# Patient Record
Sex: FEMALE | Race: BLACK OR AFRICAN AMERICAN | Employment: OTHER | URBAN - METROPOLITAN AREA
[De-identification: names, ages, dates, MRNs, and addresses within clinical notes are randomized per-mention and may not be internally consistent; named-entity substitution may affect disease eponyms.]

---

## 2017-07-21 ENCOUNTER — APPOINTMENT (OUTPATIENT)
Dept: CT IMAGING | Facility: HOSPITAL | Age: 62
DRG: 125 | End: 2017-07-21
Attending: EMERGENCY MEDICINE
Payer: COMMERCIAL

## 2017-07-21 ENCOUNTER — APPOINTMENT (OUTPATIENT)
Dept: CV DIAGNOSTICS | Facility: HOSPITAL | Age: 62
DRG: 125 | End: 2017-07-21
Attending: Other
Payer: COMMERCIAL

## 2017-07-21 ENCOUNTER — APPOINTMENT (OUTPATIENT)
Dept: MRI IMAGING | Facility: HOSPITAL | Age: 62
DRG: 125 | End: 2017-07-21
Attending: Other
Payer: COMMERCIAL

## 2017-07-21 ENCOUNTER — HOSPITAL ENCOUNTER (OUTPATIENT)
Facility: HOSPITAL | Age: 62
Setting detail: OBSERVATION
Discharge: HOME | DRG: 125 | End: 2017-07-22
Attending: EMERGENCY MEDICINE | Admitting: HOSPITALIST
Payer: COMMERCIAL

## 2017-07-21 ENCOUNTER — APPOINTMENT (OUTPATIENT)
Dept: ULTRASOUND IMAGING | Facility: HOSPITAL | Age: 62
DRG: 125 | End: 2017-07-21
Attending: Other
Payer: COMMERCIAL

## 2017-07-21 DIAGNOSIS — I63.9 CEREBROVASCULAR ACCIDENT (CVA), UNSPECIFIED MECHANISM (HCC): ICD-10-CM

## 2017-07-21 DIAGNOSIS — I10 ESSENTIAL HYPERTENSION: ICD-10-CM

## 2017-07-21 DIAGNOSIS — H54.62 VISION LOSS, LEFT EYE: Primary | ICD-10-CM

## 2017-07-21 LAB
ALBUMIN SERPL BCP-MCNC: 3.7 G/DL (ref 3.5–4.8)
ALBUMIN/GLOB SERPL: 0.9 {RATIO} (ref 1–2)
ALP SERPL-CCNC: 77 U/L (ref 32–100)
ALT SERPL-CCNC: 22 U/L (ref 14–54)
ANION GAP SERPL CALC-SCNC: 7 MMOL/L (ref 0–18)
ANION GAP SERPL CALC-SCNC: 9 MMOL/L (ref 0–18)
AST SERPL-CCNC: 24 U/L (ref 15–41)
BASOPHILS # BLD: 0.1 K/UL (ref 0–0.2)
BASOPHILS NFR BLD: 1 %
BILIRUB SERPL-MCNC: 0.5 MG/DL (ref 0.3–1.2)
BUN SERPL-MCNC: 14 MG/DL (ref 8–20)
BUN SERPL-MCNC: 14 MG/DL (ref 8–20)
BUN/CREAT SERPL: 13 (ref 10–20)
BUN/CREAT SERPL: 13.6 (ref 10–20)
CALCIUM SERPL-MCNC: 9 MG/DL (ref 8.5–10.5)
CALCIUM SERPL-MCNC: 9 MG/DL (ref 8.5–10.5)
CHLORIDE SERPL-SCNC: 103 MMOL/L (ref 95–110)
CHLORIDE SERPL-SCNC: 105 MMOL/L (ref 95–110)
CO2 SERPL-SCNC: 28 MMOL/L (ref 22–32)
CO2 SERPL-SCNC: 30 MMOL/L (ref 22–32)
CREAT SERPL-MCNC: 1.03 MG/DL (ref 0.5–1.5)
CREAT SERPL-MCNC: 1.08 MG/DL (ref 0.5–1.5)
EOSINOPHIL # BLD: 0.1 K/UL (ref 0–0.7)
EOSINOPHIL NFR BLD: 2 %
ERYTHROCYTE [DISTWIDTH] IN BLOOD BY AUTOMATED COUNT: 16.4 % (ref 11–15)
GLOBULIN PLAS-MCNC: 4.1 G/DL (ref 2.5–3.7)
GLUCOSE BLDC GLUCOMTR-MCNC: 132 MG/DL (ref 70–99)
GLUCOSE BLDC GLUCOMTR-MCNC: 143 MG/DL (ref 70–99)
GLUCOSE BLDC GLUCOMTR-MCNC: 85 MG/DL (ref 70–99)
GLUCOSE SERPL-MCNC: 120 MG/DL (ref 70–99)
GLUCOSE SERPL-MCNC: 124 MG/DL (ref 70–99)
HBA1C MFR BLD: 6.5 % (ref 4–6)
HCT VFR BLD AUTO: 37 % (ref 35–48)
HGB BLD-MCNC: 12 G/DL (ref 12–16)
LYMPHOCYTES # BLD: 1.6 K/UL (ref 1–4)
LYMPHOCYTES NFR BLD: 36 %
MCH RBC QN AUTO: 24 PG (ref 27–32)
MCHC RBC AUTO-ENTMCNC: 32.3 G/DL (ref 32–37)
MCV RBC AUTO: 74.5 FL (ref 80–100)
MONOCYTES # BLD: 0.3 K/UL (ref 0–1)
MONOCYTES NFR BLD: 7 %
NEUTROPHILS # BLD AUTO: 2.4 K/UL (ref 1.8–7.7)
NEUTROPHILS NFR BLD: 54 %
OSMOLALITY UR CALC.SUM OF ELEC: 292 MOSM/KG (ref 275–295)
OSMOLALITY UR CALC.SUM OF ELEC: 296 MOSM/KG (ref 275–295)
PLATELET # BLD AUTO: 303 K/UL (ref 140–400)
PMV BLD AUTO: 8.3 FL (ref 7.4–10.3)
POTASSIUM SERPL-SCNC: 3 MMOL/L (ref 3.3–5.1)
POTASSIUM SERPL-SCNC: 3 MMOL/L (ref 3.3–5.1)
PROT SERPL-MCNC: 7.8 G/DL (ref 5.9–8.4)
RBC # BLD AUTO: 4.97 M/UL (ref 3.7–5.4)
SODIUM SERPL-SCNC: 140 MMOL/L (ref 136–144)
SODIUM SERPL-SCNC: 142 MMOL/L (ref 136–144)
WBC # BLD AUTO: 4.4 K/UL (ref 4–11)

## 2017-07-21 PROCEDURE — 70450 CT HEAD/BRAIN W/O DYE: CPT | Performed by: EMERGENCY MEDICINE

## 2017-07-21 PROCEDURE — 99254 IP/OBS CNSLTJ NEW/EST MOD 60: CPT | Performed by: OTHER

## 2017-07-21 PROCEDURE — 70551 MRI BRAIN STEM W/O DYE: CPT | Performed by: OTHER

## 2017-07-21 PROCEDURE — 93880 EXTRACRANIAL BILAT STUDY: CPT | Performed by: OTHER

## 2017-07-21 RX ORDER — FLUTICASONE PROPIONATE AND SALMETEROL 500; 50 UG/1; UG/1
1 POWDER RESPIRATORY (INHALATION) 2 TIMES DAILY
COMMUNITY

## 2017-07-21 RX ORDER — BISACODYL 10 MG
10 SUPPOSITORY, RECTAL RECTAL
Status: DISCONTINUED | OUTPATIENT
Start: 2017-07-21 | End: 2017-07-22

## 2017-07-21 RX ORDER — LOSARTAN POTASSIUM 50 MG/1
100 TABLET ORAL DAILY
COMMUNITY

## 2017-07-21 RX ORDER — LOSARTAN POTASSIUM 50 MG/1
50 TABLET ORAL DAILY
Status: DISCONTINUED | OUTPATIENT
Start: 2017-07-21 | End: 2017-07-22

## 2017-07-21 RX ORDER — ACETAMINOPHEN 325 MG/1
650 TABLET ORAL EVERY 6 HOURS PRN
Status: DISCONTINUED | OUTPATIENT
Start: 2017-07-21 | End: 2017-07-22

## 2017-07-21 RX ORDER — DEXTROSE MONOHYDRATE 25 G/50ML
50 INJECTION, SOLUTION INTRAVENOUS AS NEEDED
Status: DISCONTINUED | OUTPATIENT
Start: 2017-07-21 | End: 2017-07-22

## 2017-07-21 RX ORDER — CARVEDILOL 25 MG/1
25 TABLET ORAL 2 TIMES DAILY WITH MEALS
Status: DISCONTINUED | OUTPATIENT
Start: 2017-07-21 | End: 2017-07-22

## 2017-07-21 RX ORDER — ASPIRIN 81 MG/1
81 TABLET, CHEWABLE ORAL DAILY
Status: DISCONTINUED | OUTPATIENT
Start: 2017-07-22 | End: 2017-07-21

## 2017-07-21 RX ORDER — DOCUSATE SODIUM 100 MG/1
100 CAPSULE, LIQUID FILLED ORAL 2 TIMES DAILY PRN
Status: DISCONTINUED | OUTPATIENT
Start: 2017-07-21 | End: 2017-07-22

## 2017-07-21 RX ORDER — CLOPIDOGREL BISULFATE 75 MG/1
75 TABLET ORAL DAILY
Status: DISCONTINUED | OUTPATIENT
Start: 2017-07-22 | End: 2017-07-22

## 2017-07-21 RX ORDER — ONDANSETRON 2 MG/ML
4 INJECTION INTRAMUSCULAR; INTRAVENOUS EVERY 6 HOURS PRN
Status: DISCONTINUED | OUTPATIENT
Start: 2017-07-21 | End: 2017-07-22

## 2017-07-21 RX ORDER — CARVEDILOL 25 MG/1
25 TABLET ORAL 2 TIMES DAILY WITH MEALS
COMMUNITY

## 2017-07-21 RX ORDER — AMLODIPINE BESYLATE 10 MG/1
10 TABLET ORAL DAILY
COMMUNITY

## 2017-07-21 RX ORDER — ASPIRIN 325 MG
325 TABLET ORAL ONCE
Status: COMPLETED | OUTPATIENT
Start: 2017-07-21 | End: 2017-07-21

## 2017-07-21 RX ORDER — SODIUM CHLORIDE 9 MG/ML
INJECTION, SOLUTION INTRAVENOUS CONTINUOUS
Status: DISCONTINUED | OUTPATIENT
Start: 2017-07-21 | End: 2017-07-21

## 2017-07-21 RX ORDER — SODIUM CHLORIDE 0.9 % (FLUSH) 0.9 %
3 SYRINGE (ML) INJECTION AS NEEDED
Status: DISCONTINUED | OUTPATIENT
Start: 2017-07-21 | End: 2017-07-22

## 2017-07-21 RX ORDER — AMLODIPINE BESYLATE 10 MG/1
10 TABLET ORAL DAILY
Status: DISCONTINUED | OUTPATIENT
Start: 2017-07-21 | End: 2017-07-22

## 2017-07-21 RX ORDER — BRIMONIDINE TARTRATE 2 MG/ML
1 SOLUTION/ DROPS OPHTHALMIC 2 TIMES DAILY
Status: DISCONTINUED | OUTPATIENT
Start: 2017-07-21 | End: 2017-07-22

## 2017-07-21 RX ORDER — ASPIRIN 81 MG/1
TABLET, CHEWABLE ORAL DAILY
Status: ON HOLD | COMMUNITY
End: 2017-07-22

## 2017-07-21 RX ORDER — BRIMONIDINE TARTRATE 2 MG/ML
1 SOLUTION/ DROPS OPHTHALMIC 2 TIMES DAILY
COMMUNITY

## 2017-07-21 RX ORDER — POLYETHYLENE GLYCOL 3350 17 G/17G
17 POWDER, FOR SOLUTION ORAL DAILY PRN
Status: DISCONTINUED | OUTPATIENT
Start: 2017-07-21 | End: 2017-07-22

## 2017-07-21 RX ORDER — METOCLOPRAMIDE HYDROCHLORIDE 5 MG/ML
10 INJECTION INTRAMUSCULAR; INTRAVENOUS EVERY 8 HOURS PRN
Status: DISCONTINUED | OUTPATIENT
Start: 2017-07-21 | End: 2017-07-22

## 2017-07-21 RX ORDER — POTASSIUM CHLORIDE 14.9 MG/ML
20 INJECTION INTRAVENOUS ONCE
Status: COMPLETED | OUTPATIENT
Start: 2017-07-21 | End: 2017-07-22

## 2017-07-21 NOTE — ED NOTES
Pt presents to the er with c/o of worsening vision in her left eye. Pt sts that after her stroke 3 years ago she was left with visual problems in her left eye. Pt sts that since last night her vision has been worse.  Pt denies any other previous residual or

## 2017-07-21 NOTE — DISCHARGE PLANNING
SW met w/ pt and pt's son to discuss discharge planning. Pt lives at home w/ son w/ 8 steps to use to get into the home. Pt reported using a cane. Pt reported no services and being independent w/ ADL's. Pt does not anticipate any d/c needs at this time.  Nicholas

## 2017-07-21 NOTE — ED PROVIDER NOTES
Patient Seen in: [facility] AND [facility] Emergency Department    History   Patient presents with:   Eye Visual Problem (opthalmic)    Stated Complaint: left eye visual problem    HPI    Patient with history of diabetes hypertension and stroke presents with visi °C)  Temp src: n/a  SpO2: 100 %  O2 Device: None (Room air)    Current:BP (!) 162/69   Pulse 66   Temp 97.8 °F (36.6 °C)   Resp 20   Ht 167.6 cm (5' 6\")   Wt 89.4 kg   SpO2 98%   BMI 31.80 kg/m²     Right Eye Chart Acuity: 20/30, Corrected  Left Eye Chart occipital problem in the past.    ED Course     Labs Reviewed   BASIC METABOLIC PANEL (8) - Abnormal; Notable for the following:        Result Value    Glucose 124 (*)     Potassium 3.0 (*)     GFR, Non- 51 (*)     All other components with

## 2017-07-21 NOTE — ED INITIAL ASSESSMENT (HPI)
Pt states she had a stroke 3 yrs ago and since then left eye closed with visual problems but seem worse since yesterday

## 2017-07-21 NOTE — CONSULTS
[facility]    PATIENT'S NAME: Zaira WOOD   ATTENDING PHYSICIAN: Dannie Loja MD   CONSULTING PHYSICIAN: Chucho Zavala MD   PATIENT ACCOUNT#:   [id number]    LOCATION:  [address] [place] [address] [address] #:   [id number]       DATE OF BIR Right optic disc was briefly visualized and appeared to be normal.  She has no vision centrally except to hand motion. Peripherally, she appears to have more vision but is not able to count fingers, but notices the fingers.   There appears to be a large ce

## 2017-07-21 NOTE — CONSULTS
[facility]  Opthalmologic Consul Note    Coleen Esteban Patient Status:  Inpatient    1955 MRN [id number]   Location [facility] 5SW/SE Attending Anny Suggs MD   Hosp Day # 0 PCP None Pcp       CHIEF COMPLAINT:  Patient present Rfl: TAKING   brimonidine Tartrate 0.2 % Ophthalmic Solution Place 1 drop into both eyes 2 (two) times daily. Disp:  Rfl:  TAKING       Allergies: Review of patient's allergies indicates no known allergies.     Social History:    Past Surgical History:  No will need outpatient followup for management of that problem as well. Please call if you have any questions.     Thank you        Mary Kay Ayala MD  7/21/2017

## 2017-07-21 NOTE — H&P
[facility] Hospitalist H&P     CC: Patient presents with:   Eye Visual Problem (opthalmic)       PCP: None Pcp, non [facility], in [place]      Assessment and Plan     Baron Thompson is a 57year old female with PMH sig for type 2 DM on oral meds, HTN on coreg, norv hospitalist to continue to follow patient while in house    Patient and/or patient's family given opportunity to ask questions and note understanding and agreeing with therapeutic plan as outlined    Thank Lexi Chen MD  Orase 98 carvedilol 25 MG Oral Tab Take 25 mg by mouth 2 (two) times daily with meals. Disp:  Rfl:    Losartan Potassium 50 MG Oral Tab Take 50 mg by mouth daily. Disp:  Rfl:    aspirin 81 MG Oral Chew Tab Chew by mouth daily.  Disp:  Rfl:    fluticasone-salmetero Or Head (98217)    Result Date: 7/21/2017  CONCLUSION:  1. Age-indeterminate small infarcts involving the left thalamus and basal ganglia. If there is concern for acute ischemia, MRI would be recommended.  2. Sequela of age-indeterminate likely chronic mode

## 2017-07-22 ENCOUNTER — APPOINTMENT (OUTPATIENT)
Dept: CV DIAGNOSTICS | Facility: HOSPITAL | Age: 62
DRG: 125 | End: 2017-07-22
Attending: Other
Payer: COMMERCIAL

## 2017-07-22 VITALS
SYSTOLIC BLOOD PRESSURE: 147 MMHG | TEMPERATURE: 98 F | WEIGHT: 190.13 LBS | DIASTOLIC BLOOD PRESSURE: 63 MMHG | HEART RATE: 72 BPM | HEIGHT: 67 IN | RESPIRATION RATE: 18 BRPM | BODY MASS INDEX: 29.84 KG/M2 | OXYGEN SATURATION: 99 %

## 2017-07-22 LAB
ANION GAP SERPL CALC-SCNC: 5 MMOL/L (ref 0–18)
BASOPHILS # BLD: 0 K/UL (ref 0–0.2)
BASOPHILS NFR BLD: 1 %
BUN SERPL-MCNC: 14 MG/DL (ref 8–20)
BUN/CREAT SERPL: 13.7 (ref 10–20)
CALCIUM SERPL-MCNC: 8.8 MG/DL (ref 8.5–10.5)
CHLORIDE SERPL-SCNC: 107 MMOL/L (ref 95–110)
CHOLEST SERPL-MCNC: 185 MG/DL (ref 110–200)
CO2 SERPL-SCNC: 28 MMOL/L (ref 22–32)
CREAT SERPL-MCNC: 1.02 MG/DL (ref 0.5–1.5)
EOSINOPHIL # BLD: 0.1 K/UL (ref 0–0.7)
EOSINOPHIL NFR BLD: 2 %
ERYTHROCYTE [DISTWIDTH] IN BLOOD BY AUTOMATED COUNT: 16.1 % (ref 11–15)
GLUCOSE BLDC GLUCOMTR-MCNC: 124 MG/DL (ref 70–99)
GLUCOSE SERPL-MCNC: 117 MG/DL (ref 70–99)
HCT VFR BLD AUTO: 33.7 % (ref 35–48)
HDLC SERPL-MCNC: 39 MG/DL
HGB BLD-MCNC: 11.1 G/DL (ref 12–16)
LDLC SERPL CALC-MCNC: 125 MG/DL (ref 0–99)
LYMPHOCYTES # BLD: 1.7 K/UL (ref 1–4)
LYMPHOCYTES NFR BLD: 37 %
MCH RBC QN AUTO: 24.4 PG (ref 27–32)
MCHC RBC AUTO-ENTMCNC: 32.8 G/DL (ref 32–37)
MCV RBC AUTO: 74.4 FL (ref 80–100)
MONOCYTES # BLD: 0.3 K/UL (ref 0–1)
MONOCYTES NFR BLD: 7 %
NEUTROPHILS # BLD AUTO: 2.5 K/UL (ref 1.8–7.7)
NEUTROPHILS NFR BLD: 54 %
NONHDLC SERPL-MCNC: 146 MG/DL
OSMOLALITY UR CALC.SUM OF ELEC: 292 MOSM/KG (ref 275–295)
PLATELET # BLD AUTO: 271 K/UL (ref 140–400)
PMV BLD AUTO: 8.8 FL (ref 7.4–10.3)
POTASSIUM SERPL-SCNC: 3.4 MMOL/L (ref 3.3–5.1)
RBC # BLD AUTO: 4.53 M/UL (ref 3.7–5.4)
SODIUM SERPL-SCNC: 140 MMOL/L (ref 136–144)
TRIGL SERPL-MCNC: 103 MG/DL (ref 1–149)
WBC # BLD AUTO: 4.6 K/UL (ref 4–11)

## 2017-07-22 RX ORDER — CLOPIDOGREL BISULFATE 75 MG/1
75 TABLET ORAL DAILY
Qty: 30 TABLET | Refills: 0 | Status: SHIPPED | OUTPATIENT
Start: 2017-07-22

## 2017-07-22 RX ORDER — ATORVASTATIN CALCIUM 20 MG/1
20 TABLET, FILM COATED ORAL NIGHTLY
Qty: 30 TABLET | Refills: 0 | Status: SHIPPED | OUTPATIENT
Start: 2017-07-22

## 2017-07-22 RX ORDER — POTASSIUM CHLORIDE 20 MEQ/1
40 TABLET, EXTENDED RELEASE ORAL EVERY 4 HOURS
Status: DISCONTINUED | OUTPATIENT
Start: 2017-07-22 | End: 2017-07-22

## 2017-07-22 NOTE — RESPIRATORY THERAPY NOTE
CEDRICK ASSESSMENT:    Pt does have a previous diagnosis of CEDRICK. Pt does routinely use a CPAP device at home. This pt is suspected to be at high risk for CEDRICK and sleep lab packet was not provided to patient for outpatient follow-up.     CPAP INITIATION:    Pt t

## 2017-07-22 NOTE — PHYSICAL THERAPY NOTE
Orders received and chart reviewed. Per RN Jann White and Dr. Rita Jain, patient is currently being discharged. She is at baseline function. Will complete orders at this time.

## 2017-07-22 NOTE — DISCHARGE SUMMARY
[facility] Internal Medicine Discharge Summary   Patient ID:  Barbara Reyes  [id number]  57year old  1/22/1955    Admit date: 7/21/2017    Discharge date and time: 7/22/2017 10:03 AM     Attending Physician: No att. providers found     Primary Care Physician of these medications  · atorvastatin 20 MG Tabs  · Clopidogrel Bisulfate 75 MG Tabs         Discharge Diagnoses:     Acute on chronic left vision loss  -prior retinal CVA  -seen by Neuro and Ophtho  -changed to plavix (takes aspirin most days)  -Per Dr Hali Gagnon CEREBRUM: No edema, hemorrhage, or mass. There were small areas of hypoattenuation in the left thalamus and left basal ganglia  compatible with age indeterminate infarcts.  WHITE MATTER: There are patchy and confluent areas of low attenuation in the suprat scattered hyperintense foci are present, typical for a patient of this age, most commonly caused by small vessel ischemic changes. Prior lacunar infarct in the left thalamus. There are numerous tiny foci of susceptibility artifact on T2*/GRE imaging.  CER ICA Peak systolic: 63.7-KO/H. ICA End diastolic: 41.2-TA/R. ECA Peak systolic:  57.4-XA/A. ICA/VCCA: 1.0. LEFT CAROTID: There is trace atherosclerosis at the bifurcation. 0-49 % stenosis based on Doppler assessment. CCA Peak systolic: 07.9-EL/L.   I Benign    Total Time Coordinating Care: > than 30 minutes    Patient had opportunity to ask questions and state understand and agree with therapeutic plan as outlined

## 2017-07-23 ENCOUNTER — TELEPHONE (OUTPATIENT)
Dept: MEDSURG UNIT | Facility: HOSPITAL | Age: 62
End: 2017-07-23

## 2017-07-26 NOTE — PAYOR COMM NOTE
--------------  DISCHARGE REVIEW    Payor: Mingo Rodolfo #:  [id number]  Authorization Number: [id number]    Admit date: 7/21/17  Admit time:  1322  Discharge Date: 7/22/2017 10:03 AM     Admitting Physician: Amada Young MD  Attending Physi 75 MG Tabs  Commonly known as:  PLAVIX  Take 1 tablet (75 mg total) by mouth daily.         CONTINUE taking these medications    AmLODIPine Besylate 10 MG Tabs  Commonly known as:  NORVASC     brimonidine Tartrate 0.2 % Soln  Commonly known as:  [place] [employer] CP or SOB     Asthma, CEDRICK  -no signs of acute exacerbation  -lungs clear, on RA    Consults:[JH.1] IP CONSULT TO OPHTHALMOLOGY  IP CONSULT TO OPHTHALMOLOGY  IP CONSULT TO SOCIAL WORK[JH.2]    Radiology: Ct Brain Or Head ([id number])    Result Date: 7/21/2017  P recommended. 2. Sequela of age-indeterminate likely chronic moderate microvascular ischemic changes, most pronounced in the frontal lobes. 3. Intracranial calcific atherosclerosis.         Mri Brain (cpt=70551)    Result Date: 7/21/2017  PROCEDURE: MRI BRA previous hemorrhage. Us Carotid Doppler Bilat - Diag Img (cpt=93880)    Result Date: 7/21/2017  PROCEDURE: US CAROTID DOPPLER BILAT-DIAG IMG (CPT=93880)  COMPARISON: None. INDICATIONS: Left eye visual problem.   TECHNIQUE: Color duplex Doppler Vee Bernabe HTN on coreg, norvasc, losartan, asthma on Advair,  CAD s/p 2 stents over 1 year ago, CEDRICK, prior CVA with diminished vision in left eye who presents with acute on chronic left vision changes. Head CT neg for acute findings.   Seen by Neuro and Ophthalmolog problem    HPI    Patient with history of diabetes hypertension and stroke presents with vision loss to left eye.   She reports she had a stroke about 3 years ago which caused her some have some visual problems to the left eye however she reports when she u 18   Ht 167.6 cm (5' 6\")   Wt 89.4 kg   SpO2 98%   BMI 31.80 kg/m²[MH.2]     Right Eye Chart Acuity: 20/30, Corrected  Left Eye Chart Acuity: 20/200, Corrected    Physical Exam  Constitutional:  Alert, well nourished adult lying in bed in no distress.   Vi Notable for the following:        Result Value    Glucose 124 (*)     Potassium 3.0 (*)     GFR, Non- 51 (*)     All other components within normal limits   CBC W/ DIFFERENTIAL - Abnormal; Notable for the following:     MCV 74.5 (*)     [facility] Karolina Sam MD on 7/21/2017 10:23 AM                       H&P - H&P Note      H&P signed by Dom Rudd MD at 7/21/2017  4:24 PM     Author:  Dom Rudd MD Service:  (none) Author Type:  Physician    Filed:  7/21/2017  4:24 PM Date of Service: CP or SOB    Asthma, CEDRICK  -no signs of acute exacerbation  -lungs clear  -CEDRICK protocol, cont home med    FEN  -no IVF  -lytes in AM  -cardiac/DM diet    PPX; SCD, await further work up    Dispo: pending course[JH.2]    Outpatient records reviewed confirmin CAD s/p 2 stents over 1 year ago  CEDRICK[JH.2]    PSH  Past Surgical History:  No date: TOTAL ABDOM HYSTERECTOMY     ALL:  No Known Allergies     Home Medications:    Outpatient Prescriptions Marked as Taking for the 7/21/17 encounter [facility] Encounter):   [place] Recent Labs   Lab  07/21/17   0953   NA  140   K  3.0*   CL  103   CO2  30   BUN  14   CREATSERUM  1.08   GLU  124*   CA  9.0       No results for input(s): ALT, AST, ALB, AMYLASE, LIPASE, LDH in the last 72 hours.     Invalid input(s): ALPHOS, TBIL,

## 2017-07-27 ENCOUNTER — HOSPITAL ENCOUNTER (OUTPATIENT)
Dept: CV DIAGNOSTICS | Facility: HOSPITAL | Age: 62
Discharge: HOME | End: 2017-07-27
Attending: HOSPITALIST
Payer: COMMERCIAL

## 2017-07-27 DIAGNOSIS — H54.62 VISION LOSS, LEFT EYE: ICD-10-CM

## 2017-07-27 DIAGNOSIS — I63.9 CEREBROVASCULAR ACCIDENT (CVA), UNSPECIFIED MECHANISM (HCC): ICD-10-CM

## 2017-07-27 PROCEDURE — 93306 TTE W/DOPPLER COMPLETE: CPT | Performed by: HOSPITALIST

## 2017-08-11 NOTE — PAYOR COMM NOTE
--------------  DISCHARGE REVIEW    Payor: Michel Joseph #:  [id number]  Authorization Number: [id number]    Admit date: 7/21/17  Admit time:  1322  Discharge Date: 7/22/2017 10:03 AM     Admitting Physician: Luba Bell MD  Attending Physi 75 MG Tabs  Commonly known as:  PLAVIX  Take 1 tablet (75 mg total) by mouth daily.         CONTINUE taking these medications    AmLODIPine Besylate 10 MG Tabs  Commonly known as:  NORVASC     brimonidine Tartrate 0.2 % Soln  Commonly known as:  [place] CP or SOB     Asthma, CEDRICK  -no signs of acute exacerbation  -lungs clear, on RA    Consults:[JH.1] IP CONSULT TO OPHTHALMOLOGY  IP CONSULT TO OPHTHALMOLOGY  IP CONSULT TO SOCIAL WORK[JH.2]    Radiology: Ct Brain Or Head ([id number])    Result Date: 7/21/2017  P recommended. 2. Sequela of age-indeterminate likely chronic moderate microvascular ischemic changes, most pronounced in the frontal lobes. 3. Intracranial calcific atherosclerosis.         Mri Brain (cpt=70551)    Result Date: 7/21/2017  PROCEDURE: MRI BRA previous hemorrhage. Us Carotid Doppler Bilat - Diag Img (cpt=93880)    Result Date: 7/21/2017  PROCEDURE: US CAROTID DOPPLER BILAT-DIAG IMG (CPT=93880)  COMPARISON: None. INDICATIONS: Left eye visual problem.   TECHNIQUE: Color duplex Doppler Elayne Holly HTN on coreg, norvasc, losartan, asthma on Advair,  CAD s/p 2 stents over 1 year ago, CEDRICK, prior CVA with diminished vision in left eye who presents with acute on chronic left vision changes. Head CT neg for acute findings.   Seen by Neuro and Ophthalmolog problem    HPI    Patient with history of diabetes hypertension and stroke presents with vision loss to left eye.   She reports she had a stroke about 3 years ago which caused her some have some visual problems to the left eye however she reports when she u 18   Ht 167.6 cm (5' 6\")   Wt 89.4 kg   SpO2 98%   BMI 31.80 kg/m²[MH.2]     Right Eye Chart Acuity: 20/30, Corrected  Left Eye Chart Acuity: 20/200, Corrected    Physical Exam  Constitutional:  Alert, well nourished adult lying in bed in no distress.   Vi Notable for the following:        Result Value    Glucose 124 (*)     Potassium 3.0 (*)     GFR, Non- 51 (*)     All other components within normal limits   CBC W/ DIFFERENTIAL - Abnormal; Notable for the following:     MCV 74.5 (*)     [facility] Carmen Richardson MD on 7/21/2017 10:23 AM                       H&P - H&P Note      H&P signed by Rneetta Earl MD at 7/21/2017  4:24 PM     Author:  Renetta Earl MD Service:  (none) Author Type:  Physician    Filed:  7/21/2017  4:24 PM Date of Service: CP or SOB    Asthma, CEDRICK  -no signs of acute exacerbation  -lungs clear  -CEDRICK protocol, cont home med    FEN  -no IVF  -lytes in AM  -cardiac/DM diet    PPX; SCD, await further work up    Dispo: pending course[JH.2]    Outpatient records reviewed confirmin CAD s/p 2 stents over 1 year ago  CEDRICK[JH.2]    PSH  Past Surgical History:  No date: TOTAL ABDOM HYSTERECTOMY     ALL:  No Known Allergies     Home Medications:    Outpatient Prescriptions Marked as Taking for the 7/21/17 encounter [facility] Encounter):   Kimberly Sykes Recent Labs   Lab  07/21/17   0953   NA  140   K  3.0*   CL  103   CO2  30   BUN  14   CREATSERUM  1.08   GLU  124*   CA  9.0       No results for input(s): ALT, AST, ALB, AMYLASE, LIPASE, LDH in the last 72 hours.     Invalid input(s): ALPHOS, TBIL,

## 2017-09-18 ENCOUNTER — ANESTHESIA EVENT (OUTPATIENT)
Dept: SURGERY | Facility: HOSPITAL | Age: 62
End: 2017-09-18
Payer: COMMERCIAL

## 2017-09-18 ENCOUNTER — SURGERY (OUTPATIENT)
Age: 62
End: 2017-09-18

## 2017-09-18 ENCOUNTER — HOSPITAL ENCOUNTER (OUTPATIENT)
Facility: HOSPITAL | Age: 62
Setting detail: HOSPITAL OUTPATIENT SURGERY
Discharge: HOME | End: 2017-09-18
Attending: OPHTHALMOLOGY | Admitting: OPHTHALMOLOGY
Payer: COMMERCIAL

## 2017-09-18 ENCOUNTER — ANESTHESIA (OUTPATIENT)
Dept: SURGERY | Facility: HOSPITAL | Age: 62
End: 2017-09-18
Payer: COMMERCIAL

## 2017-09-18 VITALS
RESPIRATION RATE: 12 BRPM | BODY MASS INDEX: 30.78 KG/M2 | TEMPERATURE: 97 F | WEIGHT: 191.5 LBS | HEIGHT: 66 IN | DIASTOLIC BLOOD PRESSURE: 56 MMHG | HEART RATE: 57 BPM | OXYGEN SATURATION: 96 % | SYSTOLIC BLOOD PRESSURE: 117 MMHG

## 2017-09-18 DIAGNOSIS — E11.3299 NONPROLIFERATIVE DIABETIC RETINOPATHY (HCC): Primary | ICD-10-CM

## 2017-09-18 LAB — GLUCOSE BLDC GLUCOMTR-MCNC: 151 MG/DL (ref 70–99)

## 2017-09-18 PROCEDURE — 08NF3ZZ RELEASE LEFT RETINA, PERCUTANEOUS APPROACH: ICD-10-PCS | Performed by: OPHTHALMOLOGY

## 2017-09-18 PROCEDURE — 82962 GLUCOSE BLOOD TEST: CPT

## 2017-09-18 PROCEDURE — 08T53ZZ RESECTION OF LEFT VITREOUS, PERCUTANEOUS APPROACH: ICD-10-PCS | Performed by: OPHTHALMOLOGY

## 2017-09-18 RX ORDER — PHENYLEPHRINE HCL 2.5 %
2 DROPS OPHTHALMIC (EYE) SEE ADMIN INSTRUCTIONS
Status: COMPLETED | OUTPATIENT
Start: 2017-09-18 | End: 2017-09-18

## 2017-09-18 RX ORDER — HYDROCODONE BITARTRATE AND ACETAMINOPHEN 5; 325 MG/1; MG/1
2 TABLET ORAL AS NEEDED
Status: DISCONTINUED | OUTPATIENT
Start: 2017-09-18 | End: 2017-09-18

## 2017-09-18 RX ORDER — KETAMINE HCL IN 0.9 % NACL 100MG/10ML
SYRINGE (ML) INTRAVENOUS AS NEEDED
Status: DISCONTINUED | OUTPATIENT
Start: 2017-09-18 | End: 2017-09-18 | Stop reason: SURG

## 2017-09-18 RX ORDER — KETOROLAC TROMETHAMINE 5 MG/ML
1 SOLUTION OPHTHALMIC SEE ADMIN INSTRUCTIONS
Status: COMPLETED | OUTPATIENT
Start: 2017-09-18 | End: 2017-09-18

## 2017-09-18 RX ORDER — NALOXONE HYDROCHLORIDE 0.4 MG/ML
80 INJECTION, SOLUTION INTRAMUSCULAR; INTRAVENOUS; SUBCUTANEOUS AS NEEDED
Status: DISCONTINUED | OUTPATIENT
Start: 2017-09-18 | End: 2017-09-18

## 2017-09-18 RX ORDER — FAMOTIDINE 20 MG/1
20 TABLET ORAL ONCE
Status: DISCONTINUED | OUTPATIENT
Start: 2017-09-18 | End: 2017-09-18 | Stop reason: HOSPADM

## 2017-09-18 RX ORDER — TRIAMCINOLONE ACETONIDE 40 MG/ML
INJECTION, SUSPENSION INTRA-ARTICULAR; INTRAMUSCULAR AS NEEDED
Status: DISCONTINUED | OUTPATIENT
Start: 2017-09-18 | End: 2017-09-18 | Stop reason: HOSPADM

## 2017-09-18 RX ORDER — MORPHINE SULFATE 10 MG/ML
6 INJECTION, SOLUTION INTRAMUSCULAR; INTRAVENOUS EVERY 10 MIN PRN
Status: DISCONTINUED | OUTPATIENT
Start: 2017-09-18 | End: 2017-09-18

## 2017-09-18 RX ORDER — SODIUM CHLORIDE, SODIUM LACTATE, POTASSIUM CHLORIDE, CALCIUM CHLORIDE 600; 310; 30; 20 MG/100ML; MG/100ML; MG/100ML; MG/100ML
INJECTION, SOLUTION INTRAVENOUS CONTINUOUS
Status: DISCONTINUED | OUTPATIENT
Start: 2017-09-18 | End: 2017-09-18

## 2017-09-18 RX ORDER — HYDROMORPHONE HYDROCHLORIDE 1 MG/ML
0.6 INJECTION, SOLUTION INTRAMUSCULAR; INTRAVENOUS; SUBCUTANEOUS EVERY 5 MIN PRN
Status: DISCONTINUED | OUTPATIENT
Start: 2017-09-18 | End: 2017-09-18

## 2017-09-18 RX ORDER — METOPROLOL TARTRATE 5 MG/5ML
2.5 INJECTION INTRAVENOUS ONCE
Status: DISCONTINUED | OUTPATIENT
Start: 2017-09-18 | End: 2017-09-18

## 2017-09-18 RX ORDER — ONDANSETRON 2 MG/ML
4 INJECTION INTRAMUSCULAR; INTRAVENOUS ONCE AS NEEDED
Status: DISCONTINUED | OUTPATIENT
Start: 2017-09-18 | End: 2017-09-18

## 2017-09-18 RX ORDER — CIPROFLOXACIN HYDROCHLORIDE 3.5 MG/ML
SOLUTION/ DROPS TOPICAL AS NEEDED
Status: DISCONTINUED | OUTPATIENT
Start: 2017-09-18 | End: 2017-09-18 | Stop reason: HOSPADM

## 2017-09-18 RX ORDER — ACETAMINOPHEN 325 MG/1
650 TABLET ORAL ONCE
Status: COMPLETED | OUTPATIENT
Start: 2017-09-18 | End: 2017-09-18

## 2017-09-18 RX ORDER — MORPHINE SULFATE 2 MG/ML
2 INJECTION, SOLUTION INTRAMUSCULAR; INTRAVENOUS EVERY 10 MIN PRN
Status: DISCONTINUED | OUTPATIENT
Start: 2017-09-18 | End: 2017-09-18

## 2017-09-18 RX ORDER — METHYLPREDNISOLONE SODIUM SUCCINATE 125 MG/2ML
INJECTION, POWDER, LYOPHILIZED, FOR SOLUTION INTRAMUSCULAR; INTRAVENOUS AS NEEDED
Status: DISCONTINUED | OUTPATIENT
Start: 2017-09-18 | End: 2017-09-18 | Stop reason: HOSPADM

## 2017-09-18 RX ORDER — MORPHINE SULFATE 4 MG/ML
4 INJECTION, SOLUTION INTRAMUSCULAR; INTRAVENOUS EVERY 10 MIN PRN
Status: DISCONTINUED | OUTPATIENT
Start: 2017-09-18 | End: 2017-09-18

## 2017-09-18 RX ORDER — DEXAMETHASONE SODIUM PHOSPHATE 4 MG/ML
VIAL (ML) INJECTION AS NEEDED
Status: DISCONTINUED | OUTPATIENT
Start: 2017-09-18 | End: 2017-09-18 | Stop reason: HOSPADM

## 2017-09-18 RX ORDER — HYDROMORPHONE HYDROCHLORIDE 1 MG/ML
0.4 INJECTION, SOLUTION INTRAMUSCULAR; INTRAVENOUS; SUBCUTANEOUS EVERY 5 MIN PRN
Status: DISCONTINUED | OUTPATIENT
Start: 2017-09-18 | End: 2017-09-18

## 2017-09-18 RX ORDER — MIDAZOLAM HYDROCHLORIDE 1 MG/ML
INJECTION INTRAMUSCULAR; INTRAVENOUS AS NEEDED
Status: DISCONTINUED | OUTPATIENT
Start: 2017-09-18 | End: 2017-09-18 | Stop reason: SURG

## 2017-09-18 RX ORDER — HALOPERIDOL 5 MG/ML
0.25 INJECTION INTRAMUSCULAR ONCE AS NEEDED
Status: DISCONTINUED | OUTPATIENT
Start: 2017-09-18 | End: 2017-09-18

## 2017-09-18 RX ORDER — EPHEDRINE SULFATE 50 MG/ML
INJECTION, SOLUTION INTRAVENOUS AS NEEDED
Status: DISCONTINUED | OUTPATIENT
Start: 2017-09-18 | End: 2017-09-18 | Stop reason: SURG

## 2017-09-18 RX ORDER — TROPICAMIDE 10 MG/ML
2 SOLUTION/ DROPS OPHTHALMIC SEE ADMIN INSTRUCTIONS
Status: COMPLETED | OUTPATIENT
Start: 2017-09-18 | End: 2017-09-18

## 2017-09-18 RX ORDER — METOCLOPRAMIDE 10 MG/1
10 TABLET ORAL ONCE
Status: COMPLETED | OUTPATIENT
Start: 2017-09-18 | End: 2017-09-18

## 2017-09-18 RX ORDER — BALANCED SALT SOLUTION 6.4; .75; .48; .3; 3.9; 1.7 MG/ML; MG/ML; MG/ML; MG/ML; MG/ML; MG/ML
SOLUTION OPHTHALMIC AS NEEDED
Status: DISCONTINUED | OUTPATIENT
Start: 2017-09-18 | End: 2017-09-18 | Stop reason: HOSPADM

## 2017-09-18 RX ORDER — HYDROMORPHONE HYDROCHLORIDE 1 MG/ML
0.2 INJECTION, SOLUTION INTRAMUSCULAR; INTRAVENOUS; SUBCUTANEOUS EVERY 5 MIN PRN
Status: DISCONTINUED | OUTPATIENT
Start: 2017-09-18 | End: 2017-09-18

## 2017-09-18 RX ORDER — HOMATROPINE HYDROBROMIDE OPHTHALMIC 50 MG/ML
2 SOLUTION OPHTHALMIC SEE ADMIN INSTRUCTIONS
Status: COMPLETED | OUTPATIENT
Start: 2017-09-18 | End: 2017-09-18

## 2017-09-18 RX ORDER — HYDROCODONE BITARTRATE AND ACETAMINOPHEN 5; 325 MG/1; MG/1
1 TABLET ORAL AS NEEDED
Status: DISCONTINUED | OUTPATIENT
Start: 2017-09-18 | End: 2017-09-18

## 2017-09-18 RX ADMIN — SODIUM CHLORIDE, SODIUM LACTATE, POTASSIUM CHLORIDE, CALCIUM CHLORIDE: 600; 310; 30; 20 INJECTION, SOLUTION INTRAVENOUS at 09:08:00

## 2017-09-18 RX ADMIN — EPHEDRINE SULFATE 5 MG: 50 INJECTION, SOLUTION INTRAVENOUS at 09:26:00

## 2017-09-18 RX ADMIN — SODIUM CHLORIDE, SODIUM LACTATE, POTASSIUM CHLORIDE, CALCIUM CHLORIDE: 600; 310; 30; 20 INJECTION, SOLUTION INTRAVENOUS at 09:54:00

## 2017-09-18 RX ADMIN — KETAMINE HCL IN 0.9 % NACL 20 MG: 100MG/10ML SYRINGE (ML) INTRAVENOUS at 09:13:00

## 2017-09-18 RX ADMIN — MIDAZOLAM HYDROCHLORIDE 2 MG: 1 INJECTION INTRAMUSCULAR; INTRAVENOUS at 09:10:00

## 2017-09-18 NOTE — H&P
History & Physical Examination    Name: Nuno Rae  Patient ID:  [id number]  Date:  9/18/2017  YOB: 1955 AGE:  57year old SEX:  female      M.D./D.O./D.P.M PERFORMING SURGERY/PROCEDURE:  Breanna Botello MD    Diagnosis:  t2dm with mild are noted above.   yes     Piedad Noe MD 09/18/17

## 2017-09-18 NOTE — OPERATIVE REPORT
[facility]    PATIENT'S NAME: Becky Willem   ATTENDING PHYSICIAN: Ramana Daly MD   OPERATING PHYSICIAN: Ramana Daly MD   PATIENT ACCOUNT#:   [de-identified]    LOCATION:  [place] [address]  MEDICAL RECORD #:   [id number]       DATE OF BIRTH:  0 cannulas. The retina was examined. There was noted to be a recent subacute onset vitreous hemorrhage inferiorly as well as the appearance of a subacute central retinal vein occlusion.   There was noted to be _______ hemorrhages with macular edema as well

## 2017-09-18 NOTE — BRIEF OP NOTE
Pre-Operative Diagnosis: type 2 diabetes with mild nonproliferative diabetic retinopathy without macular edema     Post-Operative Diagnosis: type 2 diabetes with mild nonproliferative diabetic retinopathy with  macular edema; central retinal vein occlus

## 2017-09-18 NOTE — ANESTHESIA POSTPROCEDURE EVALUATION
Patient: Shanna Milner    Procedure Summary     Date:  09/18/17 Room / Location:  [address] [place] [address] MAIN OR 03 / [address] [place] [address] MAIN OR    Anesthesia Start:  8175 Anesthesia Stop:      Procedure:  EYE VITRECTOMY WITH INDIRECT/ DIRECT LASER/ IRIDEX (Left Eye) Diagnosis:  (type 2

## 2017-09-18 NOTE — ANESTHESIA PREPROCEDURE EVALUATION
Anesthesia PreOp Note    HPI:     Zulema Erwin is a 57year old female who presents for preoperative consultation requested by:  Minor Centeno MD    Date of Surgery: 9/18/2017    Procedure(s):  EYE VITRECTOMY WITH INDIRECT/ DIRECT LASER/ IRIDEX  Indicatio lungs 2 (two) times daily. Disp:  Rfl:  9/16/2017   brimonidine Tartrate 0.2 % Ophthalmic Solution Place 1 drop into both eyes 2 (two) times daily.  Disp:  Rfl:  9/18/2017 at 0530       Current Facility-Administered Medications Ordered in Epic:  lactated ri 271 07/22/2017   MPV 8.8 07/22/2017       Lab Results  Component Value Date    07/22/2017   K 3.4 07/22/2017    07/22/2017   CO2 28 07/22/2017   BUN 14 07/22/2017   CREATSERUM 1.02 07/22/2017    (H) 07/22/2017   PGLU 151 (H) 09/18/2017

## 2017-10-09 ENCOUNTER — HOSPITAL ENCOUNTER (EMERGENCY)
Facility: HOSPITAL | Age: 62
Discharge: HOME | End: 2017-10-09
Attending: EMERGENCY MEDICINE
Payer: COMMERCIAL

## 2017-10-09 ENCOUNTER — APPOINTMENT (OUTPATIENT)
Dept: GENERAL RADIOLOGY | Facility: HOSPITAL | Age: 62
End: 2017-10-09
Payer: COMMERCIAL

## 2017-10-09 VITALS
BODY MASS INDEX: 30.22 KG/M2 | HEIGHT: 66 IN | WEIGHT: 188 LBS | TEMPERATURE: 98 F | DIASTOLIC BLOOD PRESSURE: 67 MMHG | HEART RATE: 63 BPM | RESPIRATION RATE: 12 BRPM | OXYGEN SATURATION: 97 % | SYSTOLIC BLOOD PRESSURE: 144 MMHG

## 2017-10-09 DIAGNOSIS — S90.31XA CONTUSION OF RIGHT FOOT, INITIAL ENCOUNTER: Primary | ICD-10-CM

## 2017-10-09 PROCEDURE — 73630 X-RAY EXAM OF FOOT: CPT | Performed by: EMERGENCY MEDICINE

## 2017-10-09 PROCEDURE — 99283 EMERGENCY DEPT VISIT LOW MDM: CPT

## 2017-10-09 NOTE — ED PROVIDER NOTES
Patient Seen in: [facility] AND [facility] Emergency Department    History   Patient presents with: Toe Pain    Stated Complaint: right toe pain    HPI    56 yo male with right foot pain after it was run over by a chair. She is able to walk but it is painful. HENT:   Head: Normocephalic and atraumatic. Musculoskeletal:   Right foot inspection normal. There is diffuse tenderness. Distal neurovascular intact. Neurological: She is alert. Skin: Skin is warm and dry.    Psychiatric: She has a normal mood and

## 2021-05-18 ENCOUNTER — APPOINTMENT (OUTPATIENT)
Dept: CT IMAGING | Facility: HOSPITAL | Age: 66
End: 2021-05-18
Payer: MEDICARE

## 2021-05-18 ENCOUNTER — HOSPITAL ENCOUNTER (EMERGENCY)
Facility: HOSPITAL | Age: 66
Discharge: HOME | End: 2021-05-18
Payer: MEDICARE

## 2021-05-18 VITALS
HEIGHT: 66 IN | SYSTOLIC BLOOD PRESSURE: 156 MMHG | DIASTOLIC BLOOD PRESSURE: 68 MMHG | BODY MASS INDEX: 30.37 KG/M2 | HEART RATE: 73 BPM | TEMPERATURE: 98 F | OXYGEN SATURATION: 99 % | RESPIRATION RATE: 18 BRPM | WEIGHT: 189 LBS

## 2021-05-18 DIAGNOSIS — R29.6 FREQUENT FALLS: Primary | ICD-10-CM

## 2021-05-18 PROCEDURE — 85025 COMPLETE CBC W/AUTO DIFF WBC: CPT

## 2021-05-18 PROCEDURE — 36415 COLL VENOUS BLD VENIPUNCTURE: CPT

## 2021-05-18 PROCEDURE — 99284 EMERGENCY DEPT VISIT MOD MDM: CPT

## 2021-05-18 PROCEDURE — 80048 BASIC METABOLIC PNL TOTAL CA: CPT

## 2021-05-18 PROCEDURE — 70450 CT HEAD/BRAIN W/O DYE: CPT

## 2021-05-18 NOTE — ED INITIAL ASSESSMENT (HPI)
Pt reporting several episodes of falling over the past two weeks. Pt denies dizziness or chest pain at time of fall. Reports legs feel shaky followed by falls. Pt with bruising to right and lower back pain. Pt with head injury today without LOC.  Pt on florence

## 2021-05-19 NOTE — ED PROVIDER NOTES
Patient Seen in: [facility] Emergency Department    History   Patient presents with:  Fall  Head Injury      HPI    The patient presents to the ED complaining of several mechanical falls over the past several weeks.   She states that her legs get s ED Triage Vitals [05/18/21 1901]   BP (!) 195/73   Pulse 82   Resp 18   Temp 97.9 °F (36.6 °C)   Temp src    SpO2 98 %   O2 Device None (Room air)       All measures to prevent infection transmission during my interaction with the patient were taken.  Martin Car 50 (*)     All other components within normal limits   CBC W/ DIFFERENTIAL - Abnormal; Notable for the following components:    HGB 10.8 (*)     MCV 78.8 (*)     MCH 24.3 (*)     MCHC 30.9 (*)     RDW 15.3 (*)     All other components within normal limits prior medical records for any recent pertinent discharge summaries, testing, and procedures and reviewed those reports. Complicating Factors: The patient already has does not have any pertinent problems on file.  to contribute to the complexity of this E

## 2021-07-29 NOTE — ED NOTES
Attempt to call report x1 normal... Well appearing, awake, alert, oriented to person, place, time/situation and in no apparent distress.

## 2021-12-17 ENCOUNTER — APPOINTMENT (OUTPATIENT)
Dept: CT IMAGING | Facility: HOSPITAL | Age: 66
End: 2021-12-17
Attending: EMERGENCY MEDICINE
Payer: MEDICARE

## 2021-12-17 ENCOUNTER — HOSPITAL ENCOUNTER (EMERGENCY)
Facility: HOSPITAL | Age: 66
Discharge: HOME | End: 2021-12-17
Attending: EMERGENCY MEDICINE
Payer: MEDICARE

## 2021-12-17 VITALS
DIASTOLIC BLOOD PRESSURE: 72 MMHG | HEART RATE: 97 BPM | OXYGEN SATURATION: 96 % | RESPIRATION RATE: 20 BRPM | BODY MASS INDEX: 32 KG/M2 | SYSTOLIC BLOOD PRESSURE: 176 MMHG | WEIGHT: 199 LBS | TEMPERATURE: 98 F

## 2021-12-17 DIAGNOSIS — N83.202 CYST OF LEFT OVARY: ICD-10-CM

## 2021-12-17 DIAGNOSIS — S30.1XXA CONTUSION OF ABDOMINAL WALL, INITIAL ENCOUNTER: Primary | ICD-10-CM

## 2021-12-17 PROCEDURE — 85730 THROMBOPLASTIN TIME PARTIAL: CPT

## 2021-12-17 PROCEDURE — 85610 PROTHROMBIN TIME: CPT | Performed by: EMERGENCY MEDICINE

## 2021-12-17 PROCEDURE — 85025 COMPLETE CBC W/AUTO DIFF WBC: CPT

## 2021-12-17 PROCEDURE — 93005 ELECTROCARDIOGRAM TRACING: CPT

## 2021-12-17 PROCEDURE — 99284 EMERGENCY DEPT VISIT MOD MDM: CPT

## 2021-12-17 PROCEDURE — 80048 BASIC METABOLIC PNL TOTAL CA: CPT | Performed by: EMERGENCY MEDICINE

## 2021-12-17 PROCEDURE — 80048 BASIC METABOLIC PNL TOTAL CA: CPT

## 2021-12-17 PROCEDURE — 74176 CT ABD & PELVIS W/O CONTRAST: CPT | Performed by: EMERGENCY MEDICINE

## 2021-12-17 PROCEDURE — 36415 COLL VENOUS BLD VENIPUNCTURE: CPT

## 2021-12-17 PROCEDURE — 85025 COMPLETE CBC W/AUTO DIFF WBC: CPT | Performed by: EMERGENCY MEDICINE

## 2021-12-17 PROCEDURE — 85730 THROMBOPLASTIN TIME PARTIAL: CPT | Performed by: EMERGENCY MEDICINE

## 2021-12-17 PROCEDURE — 93010 ELECTROCARDIOGRAM REPORT: CPT | Performed by: EMERGENCY MEDICINE

## 2021-12-17 PROCEDURE — 85610 PROTHROMBIN TIME: CPT

## 2021-12-17 NOTE — ED PROVIDER NOTES
Patient Seen in: [facility] AND [facility] Emergency Department      History   Patient presents with:  Fall    Stated Complaint: fall    Subjective:   HPI    58-year-old female now on Plavix on 3 antihypertensive medications here with her son after mechanical fa affect. Behavior is normal.   Nursing note and vitals reviewed. Differential diagnosis includes lower rib fracture, kidney or liver injury, abdominal wall contusion.       ED Course     Labs Reviewed   BASIC METABOLIC PANEL (8) - Abnormal; Notable for t employed. FINDINGS: COMMENT: Evaluation of the vasculature and of the abdominal viscera for the presence of intraparenchymal pathology is suboptimal in the absence of contrast infusion. LUNG BASES: The heart is normal in size.  There is dependent subsegmen evaluation is recommended in addition to gynecologic evaluation. 4. Well-circumscribed 1 cm low-density focus in the left hepatic lobe. This is incompletely characterized, but statistically relates to a benign cyst or hemangioma.  5. Lesser incidental find

## 2021-12-17 NOTE — ED INITIAL ASSESSMENT (HPI)
Pt felt dizzy and fell. Recent stroke last Thursday with  residual facial droop, slurred speech and r side weakness. Dizziness onset this am around 0700.

## 2022-01-18 ENCOUNTER — HOSPITAL ENCOUNTER (OUTPATIENT)
Facility: HOSPITAL | Age: 67
Setting detail: OBSERVATION
Discharge: NURSING, REHAB OR LONG-TERM CARE | End: 2022-01-25
Attending: EMERGENCY MEDICINE | Admitting: INTERNAL MEDICINE
Payer: MEDICARE

## 2022-01-18 ENCOUNTER — APPOINTMENT (OUTPATIENT)
Dept: CT IMAGING | Facility: HOSPITAL | Age: 67
End: 2022-01-18
Attending: EMERGENCY MEDICINE
Payer: MEDICARE

## 2022-01-18 DIAGNOSIS — U07.1 COVID-19: Primary | ICD-10-CM

## 2022-01-18 DIAGNOSIS — R53.1 RIGHT SIDED WEAKNESS: ICD-10-CM

## 2022-01-18 LAB
ANION GAP SERPL CALC-SCNC: 6 MMOL/L (ref 0–18)
BASOPHILS # BLD AUTO: 0.01 X10(3) UL (ref 0–0.2)
BASOPHILS NFR BLD AUTO: 0.2 %
BUN BLD-MCNC: 23 MG/DL (ref 7–18)
BUN/CREAT SERPL: 16.8 (ref 10–20)
CALCIUM BLD-MCNC: 9 MG/DL (ref 8.5–10.1)
CHLORIDE SERPL-SCNC: 110 MMOL/L (ref 98–112)
CO2 SERPL-SCNC: 27 MMOL/L (ref 21–32)
CREAT BLD-MCNC: 1.37 MG/DL
DEPRECATED RDW RBC AUTO: 46.3 FL (ref 35.1–46.3)
EOSINOPHIL # BLD AUTO: 0.11 X10(3) UL (ref 0–0.7)
EOSINOPHIL NFR BLD AUTO: 2.4 %
ERYTHROCYTE [DISTWIDTH] IN BLOOD BY AUTOMATED COUNT: 15.7 % (ref 11–15)
GLUCOSE BLD-MCNC: 157 MG/DL (ref 70–99)
GLUCOSE BLDC GLUCOMTR-MCNC: 119 MG/DL (ref 70–99)
GLUCOSE BLDC GLUCOMTR-MCNC: 162 MG/DL (ref 70–99)
HCT VFR BLD AUTO: 34 %
HGB BLD-MCNC: 10.6 G/DL
IMM GRANULOCYTES # BLD AUTO: 0.01 X10(3) UL (ref 0–1)
IMM GRANULOCYTES NFR BLD: 0.2 %
LYMPHOCYTES # BLD AUTO: 1.45 X10(3) UL (ref 1–4)
LYMPHOCYTES NFR BLD AUTO: 31.1 %
MCH RBC QN AUTO: 25.1 PG (ref 26–34)
MCHC RBC AUTO-ENTMCNC: 31.2 G/DL (ref 31–37)
MCV RBC AUTO: 80.6 FL
MONOCYTES # BLD AUTO: 0.25 X10(3) UL (ref 0.1–1)
MONOCYTES NFR BLD AUTO: 5.4 %
NEUTROPHILS # BLD AUTO: 2.83 X10 (3) UL (ref 1.5–7.7)
NEUTROPHILS # BLD AUTO: 2.83 X10(3) UL (ref 1.5–7.7)
NEUTROPHILS NFR BLD AUTO: 60.7 %
OSMOLALITY SERPL CALC.SUM OF ELEC: 303 MOSM/KG (ref 275–295)
PLATELET # BLD AUTO: 382 10(3)UL (ref 150–450)
POTASSIUM SERPL-SCNC: 3.5 MMOL/L (ref 3.5–5.1)
RBC # BLD AUTO: 4.22 X10(6)UL
SARS-COV-2 RNA RESP QL NAA+PROBE: DETECTED
SODIUM SERPL-SCNC: 143 MMOL/L (ref 136–145)
TROPONIN I HIGH SENSITIVITY: 11 NG/L
WBC # BLD AUTO: 4.7 X10(3) UL (ref 4–11)

## 2022-01-18 PROCEDURE — 70450 CT HEAD/BRAIN W/O DYE: CPT | Performed by: EMERGENCY MEDICINE

## 2022-01-18 RX ORDER — ACETAMINOPHEN 325 MG/1
650 TABLET ORAL EVERY 6 HOURS PRN
Status: DISCONTINUED | OUTPATIENT
Start: 2022-01-18 | End: 2022-01-25

## 2022-01-18 RX ORDER — SODIUM PHOSPHATE, DIBASIC AND SODIUM PHOSPHATE, MONOBASIC 7; 19 G/133ML; G/133ML
1 ENEMA RECTAL ONCE AS NEEDED
Status: DISCONTINUED | OUTPATIENT
Start: 2022-01-18 | End: 2022-01-25

## 2022-01-18 RX ORDER — ACETAMINOPHEN 650 MG/1
650 SUPPOSITORY RECTAL EVERY 4 HOURS PRN
Status: DISCONTINUED | OUTPATIENT
Start: 2022-01-18 | End: 2022-01-25

## 2022-01-18 RX ORDER — METOCLOPRAMIDE HYDROCHLORIDE 5 MG/ML
5 INJECTION INTRAMUSCULAR; INTRAVENOUS EVERY 8 HOURS PRN
Status: DISCONTINUED | OUTPATIENT
Start: 2022-01-18 | End: 2022-01-25

## 2022-01-18 RX ORDER — HYDRALAZINE HYDROCHLORIDE 20 MG/ML
10 INJECTION INTRAMUSCULAR; INTRAVENOUS EVERY 2 HOUR PRN
Status: DISCONTINUED | OUTPATIENT
Start: 2022-01-18 | End: 2022-01-25

## 2022-01-18 RX ORDER — CLOPIDOGREL BISULFATE 75 MG/1
75 TABLET ORAL DAILY
Status: DISCONTINUED | OUTPATIENT
Start: 2022-01-19 | End: 2022-01-25

## 2022-01-18 RX ORDER — POLYETHYLENE GLYCOL 3350 17 G/17G
17 POWDER, FOR SOLUTION ORAL DAILY PRN
Status: DISCONTINUED | OUTPATIENT
Start: 2022-01-18 | End: 2022-01-25

## 2022-01-18 RX ORDER — ALBUTEROL SULFATE 90 UG/1
2 AEROSOL, METERED RESPIRATORY (INHALATION) 4 TIMES DAILY
Status: DISCONTINUED | OUTPATIENT
Start: 2022-01-18 | End: 2022-01-25

## 2022-01-18 RX ORDER — BISACODYL 10 MG
10 SUPPOSITORY, RECTAL RECTAL
Status: DISCONTINUED | OUTPATIENT
Start: 2022-01-18 | End: 2022-01-25

## 2022-01-18 RX ORDER — CARVEDILOL 25 MG/1
25 TABLET ORAL 2 TIMES DAILY WITH MEALS
Status: DISCONTINUED | OUTPATIENT
Start: 2022-01-19 | End: 2022-01-25

## 2022-01-18 RX ORDER — OXYBUTYNIN CHLORIDE 5 MG/1
5 TABLET ORAL 3 TIMES DAILY
Status: DISCONTINUED | OUTPATIENT
Start: 2022-01-18 | End: 2022-01-25

## 2022-01-18 RX ORDER — ASPIRIN 300 MG
300 SUPPOSITORY, RECTAL RECTAL DAILY
Status: DISCONTINUED | OUTPATIENT
Start: 2022-01-18 | End: 2022-01-19

## 2022-01-18 RX ORDER — HEPARIN SODIUM 5000 [USP'U]/ML
5000 INJECTION, SOLUTION INTRAVENOUS; SUBCUTANEOUS EVERY 12 HOURS SCHEDULED
Status: DISCONTINUED | OUTPATIENT
Start: 2022-01-18 | End: 2022-01-19

## 2022-01-18 RX ORDER — DEXTROSE MONOHYDRATE 25 G/50ML
50 INJECTION, SOLUTION INTRAVENOUS
Status: DISCONTINUED | OUTPATIENT
Start: 2022-01-18 | End: 2022-01-25

## 2022-01-18 RX ORDER — BENZONATATE 100 MG/1
100 CAPSULE ORAL 3 TIMES DAILY PRN
Status: DISCONTINUED | OUTPATIENT
Start: 2022-01-18 | End: 2022-01-25

## 2022-01-18 RX ORDER — ENOXAPARIN SODIUM 100 MG/ML
40 INJECTION SUBCUTANEOUS DAILY
Status: DISCONTINUED | OUTPATIENT
Start: 2022-01-19 | End: 2022-01-25

## 2022-01-18 RX ORDER — ASPIRIN 325 MG
325 TABLET ORAL DAILY
Status: DISCONTINUED | OUTPATIENT
Start: 2022-01-18 | End: 2022-01-19

## 2022-01-18 RX ORDER — LABETALOL HYDROCHLORIDE 5 MG/ML
10 INJECTION, SOLUTION INTRAVENOUS EVERY 10 MIN PRN
Status: DISCONTINUED | OUTPATIENT
Start: 2022-01-18 | End: 2022-01-25

## 2022-01-18 RX ORDER — LOSARTAN POTASSIUM 100 MG/1
100 TABLET ORAL DAILY
Status: DISCONTINUED | OUTPATIENT
Start: 2022-01-19 | End: 2022-01-25

## 2022-01-18 RX ORDER — ASPIRIN 300 MG
300 SUPPOSITORY, RECTAL RECTAL ONCE
Status: COMPLETED | OUTPATIENT
Start: 2022-01-18 | End: 2022-01-18

## 2022-01-18 RX ORDER — BRIMONIDINE TARTRATE 2 MG/ML
1 SOLUTION/ DROPS OPHTHALMIC 2 TIMES DAILY
Status: DISCONTINUED | OUTPATIENT
Start: 2022-01-18 | End: 2022-01-25

## 2022-01-18 RX ORDER — ATORVASTATIN CALCIUM 80 MG/1
80 TABLET, FILM COATED ORAL NIGHTLY
Status: DISCONTINUED | OUTPATIENT
Start: 2022-01-18 | End: 2022-01-25

## 2022-01-18 RX ORDER — ATORVASTATIN CALCIUM 20 MG/1
20 TABLET, FILM COATED ORAL NIGHTLY
Status: DISCONTINUED | OUTPATIENT
Start: 2022-01-18 | End: 2022-01-18

## 2022-01-18 RX ORDER — OXYBUTYNIN CHLORIDE 5 MG/1
5 TABLET ORAL 3 TIMES DAILY
COMMUNITY

## 2022-01-18 RX ORDER — ONDANSETRON 2 MG/ML
4 INJECTION INTRAMUSCULAR; INTRAVENOUS EVERY 6 HOURS PRN
Status: DISCONTINUED | OUTPATIENT
Start: 2022-01-18 | End: 2022-01-25

## 2022-01-18 RX ORDER — ACETAMINOPHEN 325 MG/1
650 TABLET ORAL EVERY 4 HOURS PRN
Status: DISCONTINUED | OUTPATIENT
Start: 2022-01-18 | End: 2022-01-25

## 2022-01-18 RX ORDER — AMLODIPINE BESYLATE 10 MG/1
10 TABLET ORAL DAILY
Status: DISCONTINUED | OUTPATIENT
Start: 2022-01-19 | End: 2022-01-25

## 2022-01-18 RX ORDER — SODIUM CHLORIDE 9 MG/ML
INJECTION, SOLUTION INTRAVENOUS CONTINUOUS
Status: ACTIVE | OUTPATIENT
Start: 2022-01-18 | End: 2022-01-21

## 2022-01-18 RX ORDER — SENNOSIDES 8.6 MG
17.2 TABLET ORAL NIGHTLY PRN
Status: DISCONTINUED | OUTPATIENT
Start: 2022-01-18 | End: 2022-01-25

## 2022-01-19 ENCOUNTER — APPOINTMENT (OUTPATIENT)
Dept: ULTRASOUND IMAGING | Facility: HOSPITAL | Age: 67
End: 2022-01-19
Attending: Other
Payer: MEDICARE

## 2022-01-19 ENCOUNTER — APPOINTMENT (OUTPATIENT)
Dept: CV DIAGNOSTICS | Facility: HOSPITAL | Age: 67
End: 2022-01-19
Attending: Other
Payer: MEDICARE

## 2022-01-19 ENCOUNTER — APPOINTMENT (OUTPATIENT)
Dept: MRI IMAGING | Facility: HOSPITAL | Age: 67
End: 2022-01-19
Attending: Other
Payer: MEDICARE

## 2022-01-19 LAB
ALBUMIN SERPL-MCNC: 3.2 G/DL (ref 3.4–5)
ALBUMIN/GLOB SERPL: 0.9 {RATIO} (ref 1–2)
ALP LIVER SERPL-CCNC: 90 U/L
ALT SERPL-CCNC: 17 U/L
ANION GAP SERPL CALC-SCNC: 6 MMOL/L (ref 0–18)
AST SERPL-CCNC: 12 U/L (ref 15–37)
BASOPHILS # BLD AUTO: 0.02 X10(3) UL (ref 0–0.2)
BASOPHILS NFR BLD AUTO: 0.4 %
BILIRUB SERPL-MCNC: 0.4 MG/DL (ref 0.1–2)
BUN BLD-MCNC: 22 MG/DL (ref 7–18)
BUN/CREAT SERPL: 20.2 (ref 10–20)
CALCIUM BLD-MCNC: 9.2 MG/DL (ref 8.5–10.1)
CHLORIDE SERPL-SCNC: 110 MMOL/L (ref 98–112)
CHOLEST SERPL-MCNC: 149 MG/DL (ref ?–200)
CO2 SERPL-SCNC: 27 MMOL/L (ref 21–32)
CREAT BLD-MCNC: 1.09 MG/DL
DEPRECATED RDW RBC AUTO: 45 FL (ref 35.1–46.3)
EOSINOPHIL # BLD AUTO: 0.05 X10(3) UL (ref 0–0.7)
EOSINOPHIL NFR BLD AUTO: 0.9 %
ERYTHROCYTE [DISTWIDTH] IN BLOOD BY AUTOMATED COUNT: 15.6 % (ref 11–15)
EST. AVERAGE GLUCOSE BLD GHB EST-MCNC: 137 MG/DL (ref 68–126)
GLOBULIN PLAS-MCNC: 3.7 G/DL (ref 2.8–4.4)
GLUCOSE BLD-MCNC: 147 MG/DL (ref 70–99)
GLUCOSE BLDC GLUCOMTR-MCNC: 118 MG/DL (ref 70–99)
GLUCOSE BLDC GLUCOMTR-MCNC: 135 MG/DL (ref 70–99)
GLUCOSE BLDC GLUCOMTR-MCNC: 140 MG/DL (ref 70–99)
GLUCOSE BLDC GLUCOMTR-MCNC: 89 MG/DL (ref 70–99)
HBA1C MFR BLD: 6.4 % (ref ?–5.7)
HCT VFR BLD AUTO: 34.2 %
HDLC SERPL-MCNC: 39 MG/DL (ref 40–59)
HGB BLD-MCNC: 10.6 G/DL
IMM GRANULOCYTES # BLD AUTO: 0.02 X10(3) UL (ref 0–1)
IMM GRANULOCYTES NFR BLD: 0.4 %
LDLC SERPL CALC-MCNC: 95 MG/DL (ref ?–100)
LYMPHOCYTES # BLD AUTO: 1.49 X10(3) UL (ref 1–4)
LYMPHOCYTES NFR BLD AUTO: 28.1 %
MCH RBC QN AUTO: 24.8 PG (ref 26–34)
MCHC RBC AUTO-ENTMCNC: 31 G/DL (ref 31–37)
MCV RBC AUTO: 79.9 FL
MONOCYTES # BLD AUTO: 0.25 X10(3) UL (ref 0.1–1)
MONOCYTES NFR BLD AUTO: 4.7 %
NEUTROPHILS # BLD AUTO: 3.47 X10 (3) UL (ref 1.5–7.7)
NEUTROPHILS # BLD AUTO: 3.47 X10(3) UL (ref 1.5–7.7)
NEUTROPHILS NFR BLD AUTO: 65.5 %
NONHDLC SERPL-MCNC: 110 MG/DL (ref ?–130)
OSMOLALITY SERPL CALC.SUM OF ELEC: 302 MOSM/KG (ref 275–295)
PLATELET # BLD AUTO: 384 10(3)UL (ref 150–450)
POTASSIUM SERPL-SCNC: 3.9 MMOL/L (ref 3.5–5.1)
PROT SERPL-MCNC: 6.9 G/DL (ref 6.4–8.2)
RBC # BLD AUTO: 4.28 X10(6)UL
SODIUM SERPL-SCNC: 143 MMOL/L (ref 136–145)
TRIGL SERPL-MCNC: 78 MG/DL (ref 30–149)
VLDLC SERPL CALC-MCNC: 13 MG/DL (ref 0–30)
WBC # BLD AUTO: 5.3 X10(3) UL (ref 4–11)

## 2022-01-19 PROCEDURE — 70551 MRI BRAIN STEM W/O DYE: CPT | Performed by: OTHER

## 2022-01-19 PROCEDURE — 93306 TTE W/DOPPLER COMPLETE: CPT | Performed by: OTHER

## 2022-01-19 PROCEDURE — 99204 OFFICE O/P NEW MOD 45 MIN: CPT | Performed by: OTHER

## 2022-01-19 PROCEDURE — 95816 EEG AWAKE AND DROWSY: CPT | Performed by: OTHER

## 2022-01-19 PROCEDURE — 93880 EXTRACRANIAL BILAT STUDY: CPT | Performed by: OTHER

## 2022-01-19 RX ORDER — ASPIRIN 81 MG/1
81 TABLET ORAL DAILY
Status: DISCONTINUED | OUTPATIENT
Start: 2022-01-19 | End: 2022-01-25

## 2022-01-19 NOTE — PLAN OF CARE
Problem: Patient Centered Care  Goal: Patient preferences are identified and integrated in the patient's plan of care  Description: Interventions:  - What would you like us to know as we care for you? From home with son.   - Provide timely, complete, and patient fatigue and changes in neurological status  - Encourage and assist patient to increase activity and self care with guidance from PT/OT  - Encourage visually impaired, hearing impaired and aphasic patients to use assistive/communication devices  Out period  Description: INTERVENTIONS  - Monitor WBC  - Administer growth factors as ordered  - Implement neutropenic guidelines  Outcome: Progressing     Problem: Patient/Family Goals  Goal: Patient/Family Long Term Goal  Description: Patient's [facility]

## 2022-01-19 NOTE — OCCUPATIONAL THERAPY NOTE
OCCUPATIONAL THERAPY EVALUATION - INPATIENT     Room Number: 348/348-A  Evaluation Date: 1/19/2022  Type of Evaluation: Initial  Presenting Problem: Covid-19    Physician Order: IP Consult to Occupational Therapy  Reason for Therapy: ADL/IADL Dysfunction a cholesterol    • Hyperlipidemia    • Osteoarthritis    • Sleep apnea     cpap   • Stroke [facility])     30 yrs ago   • Visual impairment     left eye edema       Past Surgical History  Past Surgical History:   Procedure Laterality Date   • CATH BARE METAL STENT A    Education Provided: role of OT; therapy process       OT Goals  Patients self stated goal is: unable to state     Patient will complete functional transfer with SBA  Comment:     Patient will complete toileting with SBA  Comment:     Patient will tole

## 2022-01-19 NOTE — SLP NOTE
SPEECH/LANGUAGE/COGNITIVE EVALUATION - INPATIENT    Admission Date: 1/18/2022  Evaluation Date: 01/19/22    Reason for Referral: Stroke protocol    ASSESSMENT & PLAN   ASSESSMENT & IMPRESSION  PPE REQUIRED.  THIS THERAPIST WORE GLOVES, GOWN, N95 mask, DROPL comprehension;Verbal expression; Motor speech; Attention concentration;Problem solving;Memory    Discharge Recommendations/Plan: Acute rehabilitation    Patient Experiencing Pain: No             GOALS  Goal #1 The patient will follow 2 step directions with x plan of treatment and have been advised and agree on the findings and goals. FOLLOW UP  Treatment Plan/Recommendations: Aphasia therapy; Dysarthria therapy; Aspiration precautions  Number of Visits to Meet Established Goals: 2  Follow Up Needed (Ria Callaway

## 2022-01-19 NOTE — PHYSICAL THERAPY NOTE
PHYSICAL THERAPY EVALUATION - INPATIENT     Room Number: 348/348-A  Evaluation Date: 1/19/2022  Type of Evaluation: Initial   Physician Order: PT Eval and Treat    Presenting Problem: COVID  Reason for Therapy: Mobility Dysfunction and Discharge Planning Mobility Short Form. Research supports that patients with this level of impairment may benefit from Home with home health PT. RN aware of patient status post session.     Patient will benefit from continued IP PT services to address these deficits in prepa Status:  Impaired    RANGE OF MOTION AND STRENGTH ASSESSMENT    Lower extremity ROM is within functional limits BLE WNL    Lower extremity strength is within functional limits BLE WNL    BALANCE  Static Sitting: Good  Dynamic Sitting: Good  Static Standing will negotiate 6 stairs/one curb w/ assistive device and supervision   Goal #4   Current Status    Goal #5 Patient to demonstrate independence with home activity/exercise instructions provided to patient in preparation for discharge.    Goal #5   Current St

## 2022-01-19 NOTE — SLP NOTE
ADULT CLINICAL SWALLOWING EVALUATION    ASSESSMENT    ASSESSMENT/OVERALL IMPRESSION:  74year old female with PMH of HTN, DM, CVA, CAD, gout covid presented to ER with near syncope today while in bathroom.  Patient was just discharged from [facility] yesterday assessment 2/2 stroke protocol  Recommend f/u for dysarthria and speech therapy as well as x1 to ensure safety with above recs.         RECOMMENDATIONS   Diet Recommendations - Solids: Mechanical soft chopped/ Soft & Bite Sized  Diet Recommendations - Liqui artifact throughout the brain with bilaterality. These likely reflect foci of remote microhemorrhage. 3. Senescent changes of parenchymal volume loss with extensive sequela of chronic microvascular ischemic disease, including chronic lacunar infarcts. signs or symptoms of aspiration with 90 % accuracy over 1 session(s).   In Progress   Goal #2 The patient will tolerate trial upgrade of Regular Diet consistency and THIN liquids without overt signs or symptoms of aspiration with 100 % accuracy over 1 sessi

## 2022-01-19 NOTE — PROGRESS NOTES
[facility]    Progress Note    Lou Saud Patient Status:  Observation    1955 MRN [id number]   Location [facility] 3W/SW Attending Philippe Yeh MD   Hosp Day # 0 PCP WILLIAM Cherry     Subjective:    Vick Aranda foci of remote microhemorrhage. 3. Senescent changes of parenchymal volume loss with extensive sequela of chronic microvascular ischemic disease, including chronic lacunar infarcts. 4. Lesser incidental findings as above.     Dictated by (CST): Marshal Nino rehab placement     Rui Bardales MD  1/19/2022

## 2022-01-19 NOTE — CM/SW NOTE
Dr. Tashi Haynes called [facility] back stating she spoke with pt's daughter who does not want patient transferred back to [facility] and is requesting patient stay here at [address] [place] [address] for hospitalization.  [facility] will not pursue transfer per daughter's request.

## 2022-01-19 NOTE — COVID NURSING ASSESSMENT
COVID-19 Daily Discharge Readiness-Nursing    O2 Sat at Rest:    95  %   O2 Sat with Exertion:  90  % on RA  Temperature max from last 24 hrs: Temp (24hrs), Av.3 °F (36.8 °C), Min:97.9 °F (36.6 °C), Max:98.8 °F (37.1 °C)    Inflammatory Markers: No res

## 2022-01-19 NOTE — CONSULTS
[facility]    Report of Consultation    [facility] Patient Status:  Observation    1955 MRN [id number]   Location [facility] 3W/SW Attending Sabina Wells MD   Hosp Day # 0 PCP WILLIAM Daniels     Date of Admi Surgical History  Past Surgical History:   Procedure Laterality Date   • CATH BARE METAL STENT (BMS)     • COLONOSCOPY     • HYSTERECTOMY     • TOTAL ABDOM HYSTERECTOMY     • TUBAL LIGATION         Family History  Family History   Problem Relation Age of O PRN  metoclopramide (REGLAN) injection 5 mg, 5 mg, Intravenous, Q8H PRN  benzonatate (TESSALON) cap 100 mg, 100 mg, Oral, TID PRN  albuterol 108 (90 Base) MCG/ACT inhaler 2 puff, 2 puff, Inhalation, QID  glucose (DEX4) oral liquid 15 g, 15 g, Oral, Q15 Min apparent distress, well nourished, well groomed.   Head- Normocephalic, atraumatic  Eyes- No redness or swelling  ENT- Hearing intake, smell preserved, normal glutition  Neck- No masses or adenopathy  Cv: pulses were palpable and normal, no cyanosis or denzel Acute/subacute infarction involving the left frontal lobe centrum semiovale. 2. There are innumerable foci of supratentorial and infratentorial susceptibility artifact throughout the brain with bilaterality.  These likely reflect foci of remote microhemorr repeating it. Continue with rehabilitation. Thank you for allowing me to participate in the care of your patient.     Latasha Delacruz MD  1/19/2022

## 2022-01-19 NOTE — PROCEDURES
EEG report    REFERRING PHYSICIAN: Farnaz Parra MD    PCP and phone number:  Sheldon Arias, WILLIAM  [phone]    TECHNIQUE: 21 channels of EEG, 2 channels of EOG, and 1 channel of EKG were recorded utilizing the International 10/20 System.  The rec

## 2022-01-19 NOTE — H&P
[facility] Patient Status:  Observation    1955 MRN [id number]   Location [facility]5W Attending Tracee Ngo MD   Hosp Day # 0 PCP WILLIAM Benitez     Date:  2022  Tray date: 1980        Years since quittin.3      Smokeless tobacco: Never Used    Alcohol use: No    Drug use: No      Allergies/Medications: Allergies: No Known Allergies  oxybutynin 5 MG Oral Tab, Take 5 mg by mouth 3 (three) times daily.   Clopid (L) 01/18/2022    .0 01/18/2022    CREATSERUM 1.37 (H) 01/18/2022    BUN 23 (H) 01/18/2022     01/18/2022    K 3.5 01/18/2022     01/18/2022    CO2 27.0 01/18/2022     (H) 01/18/2022    CA 9.0 01/18/2022    ALB 3.7 07/21/2017    A TBD.      Taylor Guzman MD  1/18/2022

## 2022-01-19 NOTE — ED QUICK NOTES
Orders for admission, patient is aware of plan and ready to go upstairs. Any questions, please call ED RN Dilma Gloria  at [address].    Type of COVID test sent:rapid  COVID Suspicion level:High    Titratable drug(s) infusing:  Rate:    LOC at time of transp

## 2022-01-19 NOTE — CM/SW NOTE
Dr. Ashanti Duvall called [facility], [facility]. for assistance in transferring patient to a tele bed at [facility] for continuity of care for possible acute stroke.  Per Dr. Ashanti Duvall patient was discharged from [facility] yesterday S/P CVA and was supposed to be discharged to [facility] how

## 2022-01-19 NOTE — CM/SW NOTE
74year old female with PMH of HTN, DM, CVA, CAD, gout covid +  presented to ER on 1/18 with near syncope while in bathroom.  Patient was discharged from [facility] yesterday aftera week of admission for ischemic stroke with plans for transfer to [facility]

## 2022-01-19 NOTE — PROGRESS NOTES
fluticasone-salmeterol (ADVAIR DISKUS) 500-50 MCG/DOSE inhaler 1 puff bid  is Non-Formulary Medication &  Auto-Substituted to Fluticasone furorate/Vilanterol 200/62.5 mg 1 puff daily Per P&T PROTOCOL

## 2022-01-19 NOTE — ED INITIAL ASSESSMENT (HPI)
stopke alert called at [facility] by [facility]. Pt arrives with dizziness. Recent TIA last week and dc from [address] yesterday per EMS. Pt arrives with right sided face droop. Per EMS that was normal per family from TIA last week. Pt speaking in full sentences.  Rocío

## 2022-01-19 NOTE — CM/SW NOTE
This writer notified by assigned SW to begin ALICIA and [facility] referrals. Assigned CM/SW to follow up with pt/family on further discharge planning.      Marilou 106, LSW

## 2022-01-19 NOTE — ED PROVIDER NOTES
Patient Seen in: [facility] AND [facility] Emergency Department      History   Patient presents with:  Stroke    Stated Complaint: stroke alert 1912    Subjective:   HPI    History provided by EMS and patient.     49-year-old female with recent diagnosis of COVID Negative for rash. Neurological: Positive for weakness and light-headedness. Negative for dizziness and syncope. Psychiatric/Behavioral: Negative for suicidal ideas. All other systems reviewed and are negative.       Positive for stated complaint: str regular rhythm at a rate of 66 bpm.     My interpretation is   normal for rate   Normal for rhythm    Pulse Oximeter:  Pulse oximetry on room air is 98%, indicating adequate oxygenation.     PROCEDURES:  none    DIAGNOSTICS:   Labs:  Recent Results (from th 0.2 %    Immature Granulocyte % 0.2 %   Rapid SARS-CoV-2 by PCR    Collection Time: 01/18/22  7:36 PM    Specimen: Nares;  Other   Result Value Ref Range    Rapid SARS-CoV-2 by PCR Detected (A) Not Detected       Imaging Results Available and Reviewed by me generated by Resident/Fellow: Charisse Ugarte DO on 1/12/2022 1:09 AM.    Varghese Garcia report approved by Attending: Pepe Díaz on 1/12/2022 9:33 AM    EMERGENCY DEPARTMENT COURSE AND TREATMENT:  Patient's condition was stable during Emergency Depar recommend that you schedule follow up care with a primary care provider within the next three months to obtain basic health screening including reassessment of your blood pressure.       Medications Prescribed:  Current Discharge Medication List

## 2022-01-19 NOTE — IMAGING NOTE
Echo department protocol. Bubbles not performed on patients over 65 unless specified in the indications or comments. Patient had negative bubble study on 7/27/2017.

## 2022-01-20 LAB
GLUCOSE BLDC GLUCOMTR-MCNC: 100 MG/DL (ref 70–99)
GLUCOSE BLDC GLUCOMTR-MCNC: 103 MG/DL (ref 70–99)
GLUCOSE BLDC GLUCOMTR-MCNC: 124 MG/DL (ref 70–99)
GLUCOSE BLDC GLUCOMTR-MCNC: 92 MG/DL (ref 70–99)

## 2022-01-20 PROCEDURE — 99212 OFFICE O/P EST SF 10 MIN: CPT | Performed by: OTHER

## 2022-01-20 NOTE — PHYSICAL THERAPY NOTE
PHYSICAL THERAPY TREATMENT NOTE - INPATIENT     Room Number: 348/348-A       Presenting Problem: COVID       Problem List  Principal Problem:    COVID-19  Active Problems:    Right sided weakness    Aphasia due to recent stroke      PHYSICAL THERAPY ASSESS Air at Rest: 97  SPO2% Ambulation on Oxygen: 96    AM-PAC '6-Clicks' [address]  How much difficulty does the patient currently have. ..   Patient Difficulty: Turning over in bed (including adjusting bedclothes, sheets and blankets supervision   Goal #3   Current Status  CGA with RW 20' unsteady gait   Goal #4 Patient will negotiate 6 stairs/one curb w/ assistive device and supervision   Goal #4   Current Status  NT   Goal #5 Patient to demonstrate independence with home activity/exe

## 2022-01-20 NOTE — PROGRESS NOTES
[facility]    Progress Note    Deneise Mobile Patient Status:  Observation    1955 MRN [id number]   Location [facility] 3W/SW Attending Toni Reveles MD   Hosp Day # 0 PCP Cyndee Colon, APRN     Subjective:    Karla Krueger foci of remote microhemorrhage. 3. Senescent changes of parenchymal volume loss with extensive sequela of chronic microvascular ischemic disease, including chronic lacunar infarcts. 4. Lesser incidental findings as above.     Dictated by (CST): Arely Ledesma workup, pt/ot recommend acute rehab placement pending       #DM- continue metformin, ssi, achs     #HTN- resume amlodipine, carvedilol, losartan      #gluacoma- resume eye drops.      FEN  No IVF  Lytes prn  Cardiac diet      Code status: DNI  POA: Gokul thakkar

## 2022-01-20 NOTE — PLAN OF CARE
Problem: Patient Centered Care  Goal: Patient preferences are identified and integrated in the patient's plan of care  Description: Interventions:  - What would you like us to know as we care for you?  Recently at [facility] for stroke  - Provide timely, comp airway patency with patient fatigue and changes in neurological status  - Encourage and assist patient to increase activity and self care with guidance from PT/OT  - Encourage visually impaired, hearing impaired and aphasic patients to use assistive/commun Problem: Impaired Communication  Goal: Patient will achieve maximal communication potential  Description: Interventions:    Outcome: Not Progressing

## 2022-01-20 NOTE — PLAN OF CARE
Pt received awake and alert, confused at times. Son, Eze Pina, updated. [id number] [address] for daughter, Robyn Headley, to visit on Saturday. Neuro checks done. Educated to call for assistance. Call light within reach.      Problem: Patient Centered Care  Goal: Patient preference and sodium.  Initiate appropriate interventions as ordered  Outcome: Progressing  Goal: Achieves maximal functionality and self care  Description: INTERVENTIONS  - Monitor swallowing and airway patency with patient fatigue and changes in neurological status needed  - Discontinue feeding and notify MD (or speech pathologist) if coughing or persistent throat clearing or wet/gurgly vocal quality is noted  Outcome: Progressing     Problem: Impaired Cognition  Goal: Patient will exhibit improved attention, thought

## 2022-01-20 NOTE — PROGRESS NOTES
[facility] AND [facility]  Neurology Progress Note    Roque Lagos Patient Status:  Observation    1955 MRN [id number]   Location [facility] 3W/SW Attending Isai Mckoy MD   Hosp Day # 0 PCP WILLIAM Vann     Subjective:   Munira Blackman mg, 10 mg, Intravenous, Q2H PRN  atorvastatin (LIPITOR) tab 80 mg, 80 mg, Oral, Nightly  amLODIPine (NORVASC) tab 10 mg, 10 mg, Oral, Daily  brimonidine Tartrate (ALPHAGAN) 0.2 % ophthalmic solution 1 drop, 1 drop, Both Eyes, BID  carvedilol (COREG) tab 25 Pulse: 75 61 66 63   Resp: 18 16 16    Temp: 98.8 °F (37.1 °C) 98.9 °F (37.2 °C) 98.9 °F (37.2 °C)    TempSrc: Oral Oral Oral Oral   SpO2: 97% 98% 97% 97%   Weight:       Height:           General: No apparent distress, well nourished, well groomed.   He susceptibility artifact throughout the brain with bilaterality. These likely reflect foci of remote microhemorrhage.   3. Senescent changes of parenchymal volume loss with extensive sequela of chronic microvascular ischemic disease, including chronic lacuna therefore EEG was done and essentially normal, but after discussing with the family apparently patient was not feeling well and willing to 1 side but there was no actual syncope event. Continue with aspirin and Plavix.   Rest of the work-up was already d

## 2022-01-20 NOTE — CONSULTS
Physician Medicine & Rehabilitation Consult Note         SUBJECTIVE:    Chief Complaint: To assess rehabilitation needs following Mobility and ADL dysfunction s/p COVID-19 as per request of Dr. Paul Zazueta  .     History of Present Illness: Review of the mei Procedure Laterality Date   • CATH BARE METAL STENT (BMS)     • COLONOSCOPY     • HYSTERECTOMY     • TOTAL ABDOM HYSTERECTOMY     • TUBAL LIGATION          Medications:  • aspirin  81 mg Oral Daily   • Insulin Aspart Pen  1-5 Units Subcutaneous TID CC abdominal pain diarrhea constipation bright red blood per rectum, melena  Genitourinary: Denies dysuria to increased urinary frequency or hematuria  Musculoskeletal: Denies myalgias or arthralgias  Integumentary: Denies rash  Neurological: Denies weakness GFRNAA 40* 52*   CA 9.0 9.2    143   K 3.5 3.9    110   CO2 27.0 27.0     Lab Results   Component Value Date    INR 1.05 12/17/2021         REHAB DIAGNOSES:  Impaired mobility and self-care secondary to Right sided weakness [R53.1]  COVID-19 beneficial for medication management, pressure sore prevention, bladder and bowel management. Physiatric medical oversight to monitor anemia, cardiac function, neurologic status, deep vein thrombosis prevention. Estimated length of stay is 2-3 weeks.  Rehab

## 2022-01-21 LAB
GLUCOSE BLDC GLUCOMTR-MCNC: 105 MG/DL (ref 70–99)
GLUCOSE BLDC GLUCOMTR-MCNC: 107 MG/DL (ref 70–99)
GLUCOSE BLDC GLUCOMTR-MCNC: 108 MG/DL (ref 70–99)
GLUCOSE BLDC GLUCOMTR-MCNC: 77 MG/DL (ref 70–99)

## 2022-01-21 NOTE — PROGRESS NOTES
[facility]    Progress Note    Malika [place] Patient Status:  Observation    1955 MRN [id number]   Location [facility] 3W/SW Attending Santo Anglin MD   Hosp Day # 0 PCP WILLIAM Rubalcava     Subjective:    Nato Siddiqi Abraham Horn       #DM- continue metformin, ssi, achs     #HTN- resume amlodipine, carvedilol, losartan      #gluacoma- resume eye drops.      FEN  No IVF  Lytes prn  Cardiac diet      Code status: DNI  POA: Gokul hamilton  Dispo: pending rehab placement  Per S

## 2022-01-21 NOTE — OCCUPATIONAL THERAPY NOTE
OCCUPATIONAL THERAPY TREATMENT NOTE - INPATIENT        Room Number: 348/348-A  Presenting Problem: Covid-19    Problem List  Principal Problem:    COVID-19  Active Problems:    Right sided weakness    Aphasia due to recent stroke      OCCUPATIONAL THERAPY Activity Short Form  How much help from another person does the patient currently need…  -   Putting on and taking off regular lower body clothing?: A Lot  -   Bathing (including washing, rinsing, drying)?: A Lot  -   Toileting, which includes using toilet

## 2022-01-21 NOTE — PLAN OF CARE
Maryan Simmons is alert and oriented x1-2. She is up with standby assistance and a walker. She worked with PT today. Recommendation is acute rehab. Case management to work on placement.    Problem: Patient Centered Care  Goal: Patient preferences are identified Initiate appropriate interventions as ordered  Outcome: Progressing  Goal: Achieves maximal functionality and self care  Description: INTERVENTIONS  - Monitor swallowing and airway patency with patient fatigue and changes in neurological status  - Encourag degrees preferred)  - Offer food and liquids at a slow rate  - No straws  - Encourage small bites of food and small sips of liquid  - Offer pills one at a time, crush or deliver with applesauce as needed  - Discontinue feeding and notify MD (or speech path Encourage toileting schedule  Outcome: Progressing     Problem: RISK FOR INFECTION - ADULT  Goal: Absence of fever/infection during anticipated neutropenic period  Description: INTERVENTIONS  - Monitor WBC  - Administer growth factors as ordered  - Impleme

## 2022-01-21 NOTE — PHYSICAL THERAPY NOTE
PHYSICAL THERAPY TREATMENT NOTE - INPATIENT     Room Number: 348/348-A       Presenting Problem: COVID + CVA       Problem List  Principal Problem:    COVID-19  Active Problems:    Right sided weakness    Aphasia due to recent stroke      PHYSICAL THERAPY Discharge Recommendations: Acute rehabilitation     PLAN  PT Treatment Plan: Bed mobility; Body mechanics; Endurance; Patient education; Energy conservation; Family education;Gait training;Range of motion;Strengthening;Transfer training;Balance training;Stoop t times    THERAPEUTIC EXERCISES  Lower Extremity Alternating marching  Ankle pumps  Glut sets  Hip AB/AD  Heel raises  LAQ  Quad sets  Toe raises     Position Sitting & Standing       Patient End of Session: Up in chair;Needs met;Call light within reach;RN

## 2022-01-21 NOTE — SLP NOTE
SPEECH DAILY NOTE - INPATIENT    ASSESSMENT & PLAN   ASSESSMENT    PT IS COVID 19 +. PPE REQUIRED. THIS SLP WORE GLOVES, GOWN, FACE SHIELD AND DROPLET MASK OVER N95 MASK. HANDS SANITIZED/WASHED UPON ENTRANCE/EXIT. Pt alert, afebrile and on room air.  Pt therapy; Aspiration precautions  Interdisciplinary Communication: Discussed with RN    GOALS  Goal #1 The patient will tolerate Soft and Bite size Diet consistency and THIN liquids without overt signs or symptoms of aspiration with 90 % accuracy over 1-2 se

## 2022-01-21 NOTE — PLAN OF CARE
Justin Bhagat is alert and oriented to self. She is up with standby assistance and a walker. Patient is medically clear. Awaiting bed and acceptance at [facility] 2891.    Problem: Patient Centered Care  Goal: Patient preferences are identified and in appropriate interventions as ordered  Outcome: Progressing  Goal: Achieves maximal functionality and self care  Description: INTERVENTIONS  - Monitor swallowing and airway patency with patient fatigue and changes in neurological status  - Encourage and ass food and small sips of liquid  - Offer pills one at a time, crush or deliver with applesauce as needed  - Discontinue feeding and notify MD (or speech pathologist) if coughing or persistent throat clearing or wet/gurgly vocal quality is noted  Outcome: Pro Goal  Description: Patient's Long Term Goal: To get stronger    Interventions:  - PT/OT  - See additional Care Plan goals for specific interventions  Outcome: Progressing  Goal: Patient/Family Short Term Goal  Description: Patient's Short Term Goal: To go

## 2022-01-21 NOTE — CM/SW NOTE
Patient discussed in discharge rounds  Therapy notes reviewed. DISCHARGE RECOMMENDATIONS  PT/OT and PMR Discharge Recommendations: Acute rehabilitation.     Referrals sent in Aidin,   Choices were provided and only one accepting facility in network with pa

## 2022-01-22 ENCOUNTER — APPOINTMENT (OUTPATIENT)
Dept: CT IMAGING | Facility: HOSPITAL | Age: 67
End: 2022-01-22
Attending: INTERNAL MEDICINE
Payer: MEDICARE

## 2022-01-22 LAB
GLUCOSE BLDC GLUCOMTR-MCNC: 109 MG/DL (ref 70–99)
GLUCOSE BLDC GLUCOMTR-MCNC: 120 MG/DL (ref 70–99)
GLUCOSE BLDC GLUCOMTR-MCNC: 135 MG/DL (ref 70–99)
GLUCOSE BLDC GLUCOMTR-MCNC: 88 MG/DL (ref 70–99)

## 2022-01-22 PROCEDURE — 70450 CT HEAD/BRAIN W/O DYE: CPT | Performed by: INTERNAL MEDICINE

## 2022-01-22 PROCEDURE — 99213 OFFICE O/P EST LOW 20 MIN: CPT | Performed by: OTHER

## 2022-01-22 NOTE — PLAN OF CARE
Problem: Patient Centered Care  Goal: Patient preferences are identified and integrated in the patient's plan of care  Description: Interventions:  - What would you like us to know as we care for you?  Recently at [facility] for stroke  - Provide timely, comp with patient fatigue and changes in neurological status  - Encourage and assist patient to increase activity and self care with guidance from PT/OT  - Encourage visually impaired, hearing impaired and aphasic patients to use assistive/communication devices Patient will exhibit improved attention, thought processing and/or memory  Description: Interventions:  - Allow additional time for processing after asking questions or providing instructions  - Provide stimulation to the neglected side by encouraging fami Patient/Family Short Term Goal  Description: Patient's Short Term Goal: To go home    Interventions:   - PT/OT  - See additional Care Plan goals for specific interventions  Outcome: Progressing    Patient vital signs stable.   Call light and belongings with

## 2022-01-22 NOTE — PROGRESS NOTES
[facility]  [address] [facility], [address] [place] [address]  [phone]          INPATIENT NEUROLOGY   FOLLOW UP CONSULT NOTE       [facility]    Report of Consultation    Jenn Vicente Patient Status:  Poppy Hurtado antiplatelets. Admitted for near syncope. Neurology reconsulted due to worsening orientation questions.   During hospitalization, per most recent neurology note she had continued aphasia and was not oriented to the year but was able to tell what month it questions. This note was prepared using [employer] [facility] voice recognition dictation software and as a result, errors may occur. When identified, these errors have been corrected.  While every attempt is made to correct errors during dictation, Shaniqua Nascimento

## 2022-01-23 LAB
GLUCOSE BLDC GLUCOMTR-MCNC: 111 MG/DL (ref 70–99)
GLUCOSE BLDC GLUCOMTR-MCNC: 124 MG/DL (ref 70–99)
GLUCOSE BLDC GLUCOMTR-MCNC: 125 MG/DL (ref 70–99)
GLUCOSE BLDC GLUCOMTR-MCNC: 95 MG/DL (ref 70–99)

## 2022-01-23 NOTE — CM/SW NOTE
CM recived secure chat from RN requesting update on bed at [facility],  Keith Ruvalcaba message in [address], pts insurance does not fall under covid waiver and ins Eduarda Dileep is needed.      Today's MAR attached to referral per [facility] request.   Per [place] notes imaging obtained and

## 2022-01-23 NOTE — PLAN OF CARE
CT completed today. No acute changes. Safety precautions in place.  Call light within reach  Problem: Patient Centered Care  Goal: Patient preferences are identified and integrated in the patient's plan of care  Description: Interventions:  - What would you functionality and self care  Description: INTERVENTIONS  - Monitor swallowing and airway patency with patient fatigue and changes in neurological status  - Encourage and assist patient to increase activity and self care with guidance from PT/OT  - Encourag or wet/gurgly vocal quality is noted  Outcome: Progressing     Problem: Impaired Cognition  Goal: Patient will exhibit improved attention, thought processing and/or memory  Description: Interventions:    Outcome: Progressing     Problem: Impaired Communica

## 2022-01-23 NOTE — PLAN OF CARE
Patient had no complaints of chest pain, shortness of breath, cough and other discomfort. No change of cognition overnight. Prompt incontinent care done. Bed in lowest, locked position and alarm on. Nonskid socks in place.  Call light within reach and reinf cerebral perfusion and minimize risk of hemorrhage  - Monitor temperature, glucose, and sodium.  Initiate appropriate interventions as ordered  Outcome: Progressing  Goal: Achieves maximal functionality and self care  Description: INTERVENTIONS  - Monitor s slow rate  - No straws  - Encourage small bites of food and small sips of liquid  - Offer pills one at a time, crush or deliver with applesauce as needed  - Discontinue feeding and notify MD (or speech pathologist) if coughing or persistent throat clearing Short Term Goal  Description: Patient's Short Term Goal: To go home    Interventions:   - PT/OT  - See additional Care Plan goals for specific interventions  Outcome: Progressing

## 2022-01-23 NOTE — PROGRESS NOTES
[facility]    Progress Note    Rama Ruiz Patient Status:  Observation    1955 MRN [id number]   Location [facility] 3W/SW Attending Lucio Harrington MD   Hosp Day # 0 PCP WILLIAM Espinoza     Subjective:    Flor Moore microangiopathy throughout both cerebral hemispheres. Chronic lacunar infarcts within the left greater than right basal nuclei. 3. Intracranial atherosclerosis.  4. Lesser incidental findings as above.   elm-remote  Dictated by (CST): Cynthia Erazo MD on

## 2022-01-23 NOTE — PROGRESS NOTES
[facility]    Progress Note    Asia Dyson Patient Status:  Observation    1955 MRN [id number]   Location [facility] 3W/SW Attending Carrie Hancock MD   Hosp Day # 0 PCP WILLIAM Petersen     Subjective:    Severino Dejesus throughout both cerebral hemispheres. Chronic lacunar infarcts within the left greater than right basal nuclei. 3. Intracranial atherosclerosis.  4. Lesser incidental findings as above.   elm-remote  Dictated by (CST): Payton Mancilla MD on 1/22/2022 at

## 2022-01-23 NOTE — PLAN OF CARE
Problem: Patient Centered Care  Goal: Patient preferences are identified and integrated in the patient's plan of care  Description: Interventions:  - What would you like us to know as we care for you?  Recently at [facility] for stroke  - Provide timely, comp with patient fatigue and changes in neurological status  - Encourage and assist patient to increase activity and self care with guidance from PT/OT  - Encourage visually impaired, hearing impaired and aphasic patients to use assistive/communication devices improved attention, thought processing and/or memory  Description: Interventions:    Outcome: Progressing     Problem: Impaired Communication  Goal: Patient will achieve maximal communication potential  Description: Interventions:    Outcome: Progressing needed  - Discontinue feeding and notify MD (or speech pathologist) if coughing or persistent throat clearing or wet/gurgly vocal quality is noted  Outcome: Progressing     Patient is A/Ox1, RA, CPAP at night. VSS, no complaints of pain, pt sat up the chair

## 2022-01-24 LAB
ANION GAP SERPL CALC-SCNC: 6 MMOL/L (ref 0–18)
BASOPHILS # BLD AUTO: 0.02 X10(3) UL (ref 0–0.2)
BASOPHILS NFR BLD AUTO: 0.5 %
BUN BLD-MCNC: 22 MG/DL (ref 7–18)
BUN/CREAT SERPL: 18.5 (ref 10–20)
CALCIUM BLD-MCNC: 8.9 MG/DL (ref 8.5–10.1)
CHLORIDE SERPL-SCNC: 111 MMOL/L (ref 98–112)
CO2 SERPL-SCNC: 26 MMOL/L (ref 21–32)
CREAT BLD-MCNC: 1.19 MG/DL
DEPRECATED RDW RBC AUTO: 45.3 FL (ref 35.1–46.3)
EOSINOPHIL # BLD AUTO: 0.05 X10(3) UL (ref 0–0.7)
EOSINOPHIL NFR BLD AUTO: 1.1 %
ERYTHROCYTE [DISTWIDTH] IN BLOOD BY AUTOMATED COUNT: 15.8 % (ref 11–15)
GLUCOSE BLD-MCNC: 88 MG/DL (ref 70–99)
GLUCOSE BLDC GLUCOMTR-MCNC: 104 MG/DL (ref 70–99)
GLUCOSE BLDC GLUCOMTR-MCNC: 109 MG/DL (ref 70–99)
GLUCOSE BLDC GLUCOMTR-MCNC: 206 MG/DL (ref 70–99)
GLUCOSE BLDC GLUCOMTR-MCNC: 96 MG/DL (ref 70–99)
HCT VFR BLD AUTO: 30.1 %
HGB BLD-MCNC: 9.6 G/DL
IMM GRANULOCYTES # BLD AUTO: 0.02 X10(3) UL (ref 0–1)
IMM GRANULOCYTES NFR BLD: 0.5 %
LYMPHOCYTES # BLD AUTO: 1.22 X10(3) UL (ref 1–4)
LYMPHOCYTES NFR BLD AUTO: 27.5 %
MCH RBC QN AUTO: 25.3 PG (ref 26–34)
MCHC RBC AUTO-ENTMCNC: 31.9 G/DL (ref 31–37)
MCV RBC AUTO: 79.4 FL
MONOCYTES # BLD AUTO: 0.3 X10(3) UL (ref 0.1–1)
MONOCYTES NFR BLD AUTO: 6.8 %
NEUTROPHILS # BLD AUTO: 2.83 X10 (3) UL (ref 1.5–7.7)
NEUTROPHILS # BLD AUTO: 2.83 X10(3) UL (ref 1.5–7.7)
NEUTROPHILS NFR BLD AUTO: 63.6 %
OSMOLALITY SERPL CALC.SUM OF ELEC: 299 MOSM/KG (ref 275–295)
PLATELET # BLD AUTO: 386 10(3)UL (ref 150–450)
POTASSIUM SERPL-SCNC: 3.7 MMOL/L (ref 3.5–5.1)
RBC # BLD AUTO: 3.79 X10(6)UL
SODIUM SERPL-SCNC: 143 MMOL/L (ref 136–145)
WBC # BLD AUTO: 4.4 X10(3) UL (ref 4–11)

## 2022-01-24 RX ORDER — ASPIRIN 81 MG/1
81 TABLET ORAL DAILY
Qty: 30 TABLET | Refills: 2 | Status: SHIPPED | OUTPATIENT
Start: 2022-01-25

## 2022-01-24 NOTE — PROGRESS NOTES
[facility]    Progress Note    Neeraj Reardon Patient Status:  Observation    1955 MRN [id number]   Location [facility] 3W/SW Attending Rebecca Golden MD   Hosp Day # 0 PCP WILLIAM Blankenship     Subjective:    Ana M Teto Lucina Burger       #DM- continue metformin, ssi, achs     #HTN- resume amlodipine, carvedilol, losartan      #glu[place]- resume eye drops.      FEN  No IVF  Lytes prn  Cardiac diet      Code status: DNI  POA: Gokul hamilton  Dispo: pending rehab placement  Await

## 2022-01-24 NOTE — OCCUPATIONAL THERAPY NOTE
OCCUPATIONAL THERAPY TREATMENT NOTE - INPATIENT        Room Number: 348/348-A           Presenting Problem: Covid-19    Problem List  Principal Problem:    COVID-19  Active Problems:    Right sided weakness    Aphasia due to recent stroke    Cerebral amylo in the [facility] '6 clicks' Inpatient Daily Activity Short Form. Research supports that patients with this level of impairment may benefit from acute rehab.     DISCHARGE RECOMMENDATIONS  OT Discharge Recommendations: Acute rehabilitation        PLAN       SUB        Goals  on: 22  Frequency: 3-5x/wk

## 2022-01-24 NOTE — PLAN OF CARE
Problem: Patient Centered Care  Goal: Patient preferences are identified and integrated in the patient's plan of care  Description: Interventions:  - What would you like us to know as we care for you?  Recently at [facility] for stroke  - Provide timely, comp with patient fatigue and changes in neurological status  - Encourage and assist patient to increase activity and self care with guidance from PT/OT  - Encourage visually impaired, hearing impaired and aphasic patients to use assistive/communication devices Cognition  Goal: Patient will exhibit improved attention, thought processing and/or memory  Description: Interventions:  Outcome: Progressing     Problem: Impaired Communication  Goal: Patient will achieve maximal communication potential  Description: Inte

## 2022-01-24 NOTE — SLP NOTE
SPEECH DAILY NOTE - INPATIENT    ASSESSMENT & PLAN   ASSESSMENT    PT IS COVID 19 +. PPE REQUIRED. THIS SLP WORE GLOVES, GOWN, FACE SHIELD AND DROPLET MASK OVER N95 MASK. HANDS SANITIZED/WASHED UPON ENTRANCE/EXIT.     Pt seen for both speech and swallowing: therapy; Dysarthria therapy; Aspiration precautions  Interdisciplinary Communication: Discussed with RN    GOALS  Goal #1 The patient will tolerate Soft and Bite size Diet consistency and THIN liquids without overt signs or symptoms of aspiration with 90 % a improve verbal expression by completing sentences with max (semantic and phonemic) cues with 80 % accuracy within 2 session(s).        Naming  Common objects 80% min cues  Sentence completion 80% with min cues  Progressing   Goal #5 The patient will complet

## 2022-01-24 NOTE — PHYSICAL THERAPY NOTE
PHYSICAL THERAPY TREATMENT NOTE - INPATIENT     Room Number: 348/348-A       Presenting Problem: COVID + CVA       Problem List  Principal Problem:    COVID-19  Active Problems:    Right sided weakness    Aphasia due to recent stroke    Cerebral amyloid an training    SUBJECTIVE  Pt agreeable to PT treatment - acknowledges she has trouble finding words at times.      OBJECTIVE  Precautions: None    WEIGHT BEARING RESTRICTION                PAIN ASSESSMENT   Ratin          BALANCE  Static Sitting: Fair + is able to demonstrate transfers Sit to/from [facility] assistance level: supervision with walker - rolling      Goal #2  Current Status  Min A to RW (one episode of Mod A)   Goal #3 Patient is able to ambulate 100 feet with assist device: walker - rolling a

## 2022-01-24 NOTE — CM/SW NOTE
SW uploaded updated PT/OT and speech notes to [facility] via [employer]. Insurance [employer] is pending as of this documentation. SW/CM to remain available for support and/or discharge planning.      GLADYS Blanco, [facility]  Social Work   NCD:#[id number]

## 2022-01-24 NOTE — CM/SW NOTE
1/24 328pm: ENRIQUE was notified by Roopa Shaw, insurance [facility]) is requesting a peer to peer prior to making a determination. ENRIQUE notified Dr. Yee Alaniz of the above and provided with contact info for the peer to peer.   Peer to peer will need to be scheduled prior

## 2022-01-24 NOTE — DISCHARGE SUMMARY
[facility]    Discharge Summary    Jamaal Monique Patient Status:  Observation    1955 MRN [id number]   Location [facility] 3W/SW Attending Adela Perez MD   Hosp Day # 0 PCP WILLIAM Chauhan     Date of Admission and looked weak and family brought her in. Denies any specific symptoms. Patient vitals stable, Cr 1.37, Hgb 10.6, troponin negative, EKG NSR. CT head no acute process. Not a candidate for tpa due to recent CVA per neurology and will evaluate in morning.  Micah Perez times daily. brimonidine Tartrate 0.2 % Ophthalmic Solution  Place 1 drop into both eyes 2 (two) times daily. Discharge Diet: As tolerated    Discharge Activity: As tolerated    Follow up:       Follow-up Information     Schedule an appointm

## 2022-01-24 NOTE — PLAN OF CARE
Pt medically cleared for discharge. Awaiting insurance authorization and bed availability at [facility]. Safety precautions in place.     Problem: Patient Centered Care  Goal: Patient preferences are identified and integrated in the patient's plan of care within ordered parameters to optimize cerebral perfusion and minimize risk of hemorrhage  - Monitor temperature, glucose, and sodium.  Initiate appropriate interventions as ordered  Outcome: Progressing  Goal: Achieves maximal functionality and self care  D Encourage small bites of food and small sips of liquid  - Offer pills one at a time, crush or deliver with applesauce as needed  - Discontinue feeding and notify MD (or speech pathologist) if coughing or persistent throat clearing or wet/gurgly vocal quali

## 2022-01-25 VITALS
WEIGHT: 174.5 LBS | OXYGEN SATURATION: 98 % | SYSTOLIC BLOOD PRESSURE: 115 MMHG | DIASTOLIC BLOOD PRESSURE: 71 MMHG | RESPIRATION RATE: 16 BRPM | HEIGHT: 67 IN | HEART RATE: 69 BPM | TEMPERATURE: 98 F | BODY MASS INDEX: 27.39 KG/M2

## 2022-01-25 LAB — GLUCOSE BLDC GLUCOMTR-MCNC: 107 MG/DL (ref 70–99)

## 2022-01-25 NOTE — DISCHARGE PLANNING
I called and gave report to nurse Moe Herndon at [facility] rehab facility. Patient's physical and history were relayed to nursing staff and included past medical history, admitting diagnosis of right sided facial droop.  Patient will be discharging at 1pm as ar

## 2022-01-25 NOTE — PLAN OF CARE
Patient have no chest pain, SOB, or any discomfort. Prompt incontinent care done. Bed in lowest, locked position and alarm on. Nonskid socks in place. For transfer to [facility] for rehab.    Problem: Patient Centered Care  Goal: Patient preferences are i sodium.  Initiate appropriate interventions as ordered  Outcome: Progressing  Goal: Achieves maximal functionality and self care  Description: INTERVENTIONS  - Monitor swallowing and airway patency with patient fatigue and changes in neurological status  - time, crush or deliver with applesauce as needed  - Discontinue feeding and notify MD (or speech pathologist) if coughing or persistent throat clearing or wet/gurgly vocal quality is noted  Outcome: Progressing     Problem: Impaired Cognition  Goal: Patien additional Care Plan goals for specific interventions  Outcome: Progressing

## 2022-01-25 NOTE — PLAN OF CARE
Problem: Patient Centered Care  Goal: Patient preferences are identified and integrated in the patient's plan of care  Description: Interventions:  - What would you like us to know as we care for you?  Recently at [facility] for stroke  - Provide timely, comp with patient fatigue and changes in neurological status  - Encourage and assist patient to increase activity and self care with guidance from PT/OT  - Encourage visually impaired, hearing impaired and aphasic patients to use assistive/communication devices Cognition  Goal: Patient will exhibit improved attention, thought processing and/or memory  Description: Interventions:    Outcome: Progressing     Problem: Impaired Communication  Goal: Patient will achieve maximal communication potential  Description:  In

## 2022-01-25 NOTE — CM/SW NOTE
01/25/22 1000   Discharge disposition   Expected discharge disposition [facility] [address] Provider   (Trace Beckett)   Discharge transportation [facility]     Pt ready for discharge today and insurance auth obtained.   Conf

## 2022-04-26 ENCOUNTER — APPOINTMENT (OUTPATIENT)
Dept: MRI IMAGING | Facility: HOSPITAL | Age: 67
End: 2022-04-26
Attending: EMERGENCY MEDICINE
Payer: MEDICARE

## 2022-04-26 ENCOUNTER — HOSPITAL ENCOUNTER (EMERGENCY)
Facility: HOSPITAL | Age: 67
Discharge: HOME | End: 2022-04-26
Attending: EMERGENCY MEDICINE
Payer: MEDICARE

## 2022-04-26 VITALS
SYSTOLIC BLOOD PRESSURE: 152 MMHG | TEMPERATURE: 98 F | DIASTOLIC BLOOD PRESSURE: 63 MMHG | HEART RATE: 67 BPM | RESPIRATION RATE: 18 BRPM | OXYGEN SATURATION: 99 % | WEIGHT: 160 LBS | HEIGHT: 67 IN | BODY MASS INDEX: 25.11 KG/M2

## 2022-04-26 DIAGNOSIS — R42 DIZZINESS: Primary | ICD-10-CM

## 2022-04-26 LAB
ANION GAP SERPL CALC-SCNC: 3 MMOL/L (ref 0–18)
BASOPHILS # BLD AUTO: 0.02 X10(3) UL (ref 0–0.2)
BASOPHILS NFR BLD AUTO: 0.4 %
BILIRUB UR QL: NEGATIVE
BUN BLD-MCNC: 17 MG/DL (ref 7–18)
BUN/CREAT SERPL: 13.9 (ref 10–20)
CALCIUM BLD-MCNC: 9.5 MG/DL (ref 8.5–10.1)
CHLORIDE SERPL-SCNC: 110 MMOL/L (ref 98–112)
CO2 SERPL-SCNC: 32 MMOL/L (ref 21–32)
COLOR UR: YELLOW
CREAT BLD-MCNC: 1.22 MG/DL
DEPRECATED RDW RBC AUTO: 46.8 FL (ref 35.1–46.3)
EOSINOPHIL # BLD AUTO: 0.06 X10(3) UL (ref 0–0.7)
EOSINOPHIL NFR BLD AUTO: 1.1 %
ERYTHROCYTE [DISTWIDTH] IN BLOOD BY AUTOMATED COUNT: 15.5 % (ref 11–15)
GLUCOSE BLD-MCNC: 108 MG/DL (ref 70–99)
GLUCOSE UR-MCNC: NEGATIVE MG/DL
HCT VFR BLD AUTO: 33.4 %
HGB BLD-MCNC: 10.4 G/DL
IMM GRANULOCYTES # BLD AUTO: 0.02 X10(3) UL (ref 0–1)
IMM GRANULOCYTES NFR BLD: 0.4 %
KETONES UR-MCNC: NEGATIVE MG/DL
LYMPHOCYTES # BLD AUTO: 1.55 X10(3) UL (ref 1–4)
LYMPHOCYTES NFR BLD AUTO: 28.1 %
MCH RBC QN AUTO: 25.7 PG (ref 26–34)
MCHC RBC AUTO-ENTMCNC: 31.1 G/DL (ref 31–37)
MCV RBC AUTO: 82.7 FL
MONOCYTES # BLD AUTO: 0.36 X10(3) UL (ref 0.1–1)
MONOCYTES NFR BLD AUTO: 6.5 %
NEUTROPHILS # BLD AUTO: 3.51 X10 (3) UL (ref 1.5–7.7)
NEUTROPHILS # BLD AUTO: 3.51 X10(3) UL (ref 1.5–7.7)
NEUTROPHILS NFR BLD AUTO: 63.5 %
NITRITE UR QL STRIP.AUTO: POSITIVE
OSMOLALITY SERPL CALC.SUM OF ELEC: 302 MOSM/KG (ref 275–295)
PH UR: 8 [PH] (ref 5–8)
PLATELET # BLD AUTO: 303 10(3)UL (ref 150–450)
POTASSIUM SERPL-SCNC: 3.5 MMOL/L (ref 3.5–5.1)
PROT UR-MCNC: NEGATIVE MG/DL
RBC # BLD AUTO: 4.04 X10(6)UL
SODIUM SERPL-SCNC: 145 MMOL/L (ref 136–145)
SP GR UR STRIP: 1.01 (ref 1–1.03)
TROPONIN I HIGH SENSITIVITY: 17 NG/L
UROBILINOGEN UR STRIP-ACNC: <2
VIT C UR-MCNC: NEGATIVE MG/DL
WBC # BLD AUTO: 5.5 X10(3) UL (ref 4–11)

## 2022-04-26 PROCEDURE — 87086 URINE CULTURE/COLONY COUNT: CPT | Performed by: EMERGENCY MEDICINE

## 2022-04-26 PROCEDURE — 93005 ELECTROCARDIOGRAM TRACING: CPT

## 2022-04-26 PROCEDURE — 99284 EMERGENCY DEPT VISIT MOD MDM: CPT

## 2022-04-26 PROCEDURE — 93010 ELECTROCARDIOGRAM REPORT: CPT | Performed by: EMERGENCY MEDICINE

## 2022-04-26 PROCEDURE — 80048 BASIC METABOLIC PNL TOTAL CA: CPT | Performed by: EMERGENCY MEDICINE

## 2022-04-26 PROCEDURE — 84484 ASSAY OF TROPONIN QUANT: CPT | Performed by: EMERGENCY MEDICINE

## 2022-04-26 PROCEDURE — 87186 SC STD MICRODIL/AGAR DIL: CPT | Performed by: EMERGENCY MEDICINE

## 2022-04-26 PROCEDURE — 36415 COLL VENOUS BLD VENIPUNCTURE: CPT

## 2022-04-26 PROCEDURE — 87088 URINE BACTERIA CULTURE: CPT | Performed by: EMERGENCY MEDICINE

## 2022-04-26 PROCEDURE — 81001 URINALYSIS AUTO W/SCOPE: CPT | Performed by: EMERGENCY MEDICINE

## 2022-04-26 PROCEDURE — 85025 COMPLETE CBC W/AUTO DIFF WBC: CPT | Performed by: EMERGENCY MEDICINE

## 2022-04-26 PROCEDURE — 70551 MRI BRAIN STEM W/O DYE: CPT | Performed by: EMERGENCY MEDICINE

## 2022-04-26 RX ORDER — MECLIZINE HYDROCHLORIDE CHEWABLE TABLETS 25 MG/1
25 TABLET, CHEWABLE ORAL 3 TIMES DAILY PRN
Qty: 15 TABLET | Refills: 0 | Status: SHIPPED | OUTPATIENT
Start: 2022-04-26

## 2022-04-26 RX ORDER — MECLIZINE HYDROCHLORIDE 25 MG/1
25 TABLET ORAL ONCE
Status: COMPLETED | OUTPATIENT
Start: 2022-04-26 | End: 2022-04-26

## 2022-04-26 NOTE — ED INITIAL ASSESSMENT (HPI)
Patient from home with c/o dizziness and hypertension that began this morning. BP at home was 180/70. Feels the room is spinning. Denies nausea, vomiting, pain. Hx stroke in December.

## 2022-04-29 ENCOUNTER — HOSPITAL ENCOUNTER (OUTPATIENT)
Dept: MAMMOGRAPHY | Facility: HOSPITAL | Age: 67
Discharge: HOME | End: 2022-04-29
Attending: PHYSICIAN ASSISTANT
Payer: MEDICARE

## 2022-04-29 DIAGNOSIS — Z12.31 ENCOUNTER FOR SCREENING MAMMOGRAM FOR MALIGNANT NEOPLASM OF BREAST: ICD-10-CM

## 2022-04-29 PROCEDURE — 77067 SCR MAMMO BI INCL CAD: CPT | Performed by: PHYSICIAN ASSISTANT

## 2022-04-29 PROCEDURE — 77063 BREAST TOMOSYNTHESIS BI: CPT | Performed by: PHYSICIAN ASSISTANT

## 2022-05-03 ENCOUNTER — HOSPITAL ENCOUNTER (EMERGENCY)
Facility: HOSPITAL | Age: 67
Discharge: HOME | End: 2022-05-03
Attending: EMERGENCY MEDICINE
Payer: MEDICARE

## 2022-05-03 ENCOUNTER — APPOINTMENT (OUTPATIENT)
Dept: CT IMAGING | Facility: HOSPITAL | Age: 67
End: 2022-05-03
Attending: EMERGENCY MEDICINE
Payer: MEDICARE

## 2022-05-03 VITALS
OXYGEN SATURATION: 99 % | TEMPERATURE: 98 F | HEART RATE: 72 BPM | BODY MASS INDEX: 27 KG/M2 | RESPIRATION RATE: 16 BRPM | DIASTOLIC BLOOD PRESSURE: 50 MMHG | SYSTOLIC BLOOD PRESSURE: 127 MMHG | WEIGHT: 175 LBS
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
## 2022-05-03 DIAGNOSIS — S19.9XXA HEADACHE DUE TO INJURY OF HEAD AND NECK: ICD-10-CM

## 2022-05-03 DIAGNOSIS — S09.90XA HEADACHE DUE TO INJURY OF HEAD AND NECK: ICD-10-CM

## 2022-05-03 PROCEDURE — 99284 EMERGENCY DEPT VISIT MOD MDM: CPT

## 2022-05-03 PROCEDURE — 70450 CT HEAD/BRAIN W/O DYE: CPT | Performed by: EMERGENCY MEDICINE

## 2022-05-03 PROCEDURE — 72125 CT NECK SPINE W/O DYE: CPT | Performed by: EMERGENCY MEDICINE

## 2022-05-03 RX ORDER — BUTALBITAL, ACETAMINOPHEN AND CAFFEINE 50; 325; 40 MG/1; MG/1; MG/1
1 TABLET ORAL ONCE
Status: COMPLETED | OUTPATIENT
Start: 2022-05-03 | End: 2022-05-03

## 2022-05-03 RX ORDER — BUTALBITAL, ACETAMINOPHEN AND CAFFEINE 50; 325; 40 MG/1; MG/1; MG/1
1 TABLET ORAL 2 TIMES DAILY PRN
Qty: 6 TABLET | Refills: 0 | Status: SHIPPED | OUTPATIENT
Start: 2022-05-03 | End: 2022-05-06

## 2022-05-03 RX ORDER — CYCLOBENZAPRINE HCL 10 MG
10 TABLET ORAL 3 TIMES DAILY PRN
Qty: 15 TABLET | Refills: 0 | Status: SHIPPED | OUTPATIENT
Start: 2022-05-03 | End: 2022-05-08

## 2022-05-03 NOTE — ED INITIAL ASSESSMENT (HPI)
Patient complains of mid lower back pain secondary to mechanical fall from bed this am, positive head injury, believes she lost consciousness. Is on plavix. A/o x 4 arriving on wheelchair in triage.

## 2022-06-01 ENCOUNTER — APPOINTMENT (OUTPATIENT)
Dept: GENERAL RADIOLOGY | Facility: HOSPITAL | Age: 67
End: 2022-06-01
Attending: EMERGENCY MEDICINE
Payer: MEDICARE

## 2022-06-01 ENCOUNTER — HOSPITAL ENCOUNTER (EMERGENCY)
Facility: HOSPITAL | Age: 67
Discharge: HOME | End: 2022-06-01
Attending: EMERGENCY MEDICINE
Payer: MEDICARE

## 2022-06-01 VITALS
DIASTOLIC BLOOD PRESSURE: 69 MMHG | HEART RATE: 70 BPM | BODY MASS INDEX: 25 KG/M2 | SYSTOLIC BLOOD PRESSURE: 115 MMHG | TEMPERATURE: 98 F | OXYGEN SATURATION: 98 % | WEIGHT: 157 LBS | RESPIRATION RATE: 17 BRPM

## 2022-06-01 DIAGNOSIS — S93.602A FOOT SPRAIN, LEFT, INITIAL ENCOUNTER: Primary | ICD-10-CM

## 2022-06-01 PROCEDURE — 73590 X-RAY EXAM OF LOWER LEG: CPT | Performed by: EMERGENCY MEDICINE

## 2022-06-01 PROCEDURE — 73630 X-RAY EXAM OF FOOT: CPT | Performed by: EMERGENCY MEDICINE

## 2022-06-01 PROCEDURE — 99283 EMERGENCY DEPT VISIT LOW MDM: CPT

## 2022-06-01 NOTE — ED INITIAL ASSESSMENT (HPI)
Patient complains of bilateral leg swelling since Tuesday. Denies chest pain or shortness of breath.

## 2022-10-11 ENCOUNTER — APPOINTMENT (OUTPATIENT)
Dept: CT IMAGING | Facility: HOSPITAL | Age: 67
End: 2022-10-11
Attending: EMERGENCY MEDICINE
Payer: MEDICARE

## 2022-10-11 ENCOUNTER — APPOINTMENT (OUTPATIENT)
Dept: GENERAL RADIOLOGY | Facility: HOSPITAL | Age: 67
End: 2022-10-11
Attending: EMERGENCY MEDICINE
Payer: MEDICARE

## 2022-10-11 ENCOUNTER — HOSPITAL ENCOUNTER (EMERGENCY)
Facility: HOSPITAL | Age: 67
Discharge: HOME | End: 2022-10-11
Attending: EMERGENCY MEDICINE
Payer: MEDICARE

## 2022-10-11 ENCOUNTER — APPOINTMENT (OUTPATIENT)
Dept: ULTRASOUND IMAGING | Facility: HOSPITAL | Age: 67
End: 2022-10-11
Attending: EMERGENCY MEDICINE
Payer: MEDICARE

## 2022-10-11 VITALS
DIASTOLIC BLOOD PRESSURE: 69 MMHG | OXYGEN SATURATION: 99 % | SYSTOLIC BLOOD PRESSURE: 178 MMHG | HEART RATE: 67 BPM | WEIGHT: 156 LBS | TEMPERATURE: 98 F | HEIGHT: 66 IN | BODY MASS INDEX: 25.07 KG/M2 | RESPIRATION RATE: 19 BRPM
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
## 2022-10-11 DIAGNOSIS — M79.671 RIGHT FOOT PAIN: ICD-10-CM

## 2022-10-11 DIAGNOSIS — I10 HYPERTENSION, UNSPECIFIED TYPE: ICD-10-CM

## 2022-10-11 LAB
ANION GAP SERPL CALC-SCNC: 5 MMOL/L (ref 0–18)
BASOPHILS # BLD AUTO: 0.02 X10(3) UL (ref 0–0.2)
BASOPHILS NFR BLD AUTO: 0.4 %
BUN BLD-MCNC: 18 MG/DL (ref 7–18)
BUN/CREAT SERPL: 12.2 (ref 10–20)
CALCIUM BLD-MCNC: 9.5 MG/DL (ref 8.5–10.1)
CHLORIDE SERPL-SCNC: 108 MMOL/L (ref 98–112)
CO2 SERPL-SCNC: 29 MMOL/L (ref 21–32)
CREAT BLD-MCNC: 1.47 MG/DL
D DIMER PPP FEU-MCNC: 2.4 UG/ML FEU (ref ?–0.69)
DEPRECATED RDW RBC AUTO: 46.2 FL (ref 35.1–46.3)
EOSINOPHIL # BLD AUTO: 0.05 X10(3) UL (ref 0–0.7)
EOSINOPHIL NFR BLD AUTO: 1.1 %
ERYTHROCYTE [DISTWIDTH] IN BLOOD BY AUTOMATED COUNT: 15.2 % (ref 11–15)
GFR SERPLBLD BASED ON 1.73 SQ M-ARVRAT: 38 ML/MIN/1.73M2 (ref 60–?)
GLUCOSE BLD-MCNC: 105 MG/DL (ref 70–99)
HCT VFR BLD AUTO: 35.9 %
HGB BLD-MCNC: 11.2 G/DL
IMM GRANULOCYTES # BLD AUTO: 0 X10(3) UL (ref 0–1)
IMM GRANULOCYTES NFR BLD: 0 %
LYMPHOCYTES # BLD AUTO: 1.79 X10(3) UL (ref 1–4)
LYMPHOCYTES NFR BLD AUTO: 40.2 %
MCH RBC QN AUTO: 26 PG (ref 26–34)
MCHC RBC AUTO-ENTMCNC: 31.2 G/DL (ref 31–37)
MCV RBC AUTO: 83.5 FL
MONOCYTES # BLD AUTO: 0.3 X10(3) UL (ref 0.1–1)
MONOCYTES NFR BLD AUTO: 6.7 %
NEUTROPHILS # BLD AUTO: 2.29 X10 (3) UL (ref 1.5–7.7)
NEUTROPHILS # BLD AUTO: 2.29 X10(3) UL (ref 1.5–7.7)
NEUTROPHILS NFR BLD AUTO: 51.6 %
NT-PROBNP SERPL-MCNC: 628 PG/ML (ref ?–125)
OSMOLALITY SERPL CALC.SUM OF ELEC: 296 MOSM/KG (ref 275–295)
PLATELET # BLD AUTO: 271 10(3)UL (ref 150–450)
POTASSIUM SERPL-SCNC: 3.4 MMOL/L (ref 3.5–5.1)
RBC # BLD AUTO: 4.3 X10(6)UL
SODIUM SERPL-SCNC: 142 MMOL/L (ref 136–145)
TROPONIN I HIGH SENSITIVITY: 13 NG/L
WBC # BLD AUTO: 4.5 X10(3) UL (ref 4–11)

## 2022-10-11 PROCEDURE — 96374 THER/PROPH/DIAG INJ IV PUSH: CPT

## 2022-10-11 PROCEDURE — 71045 X-RAY EXAM CHEST 1 VIEW: CPT | Performed by: EMERGENCY MEDICINE

## 2022-10-11 PROCEDURE — 96375 TX/PRO/DX INJ NEW DRUG ADDON: CPT

## 2022-10-11 PROCEDURE — 85025 COMPLETE CBC W/AUTO DIFF WBC: CPT | Performed by: EMERGENCY MEDICINE

## 2022-10-11 PROCEDURE — 80048 BASIC METABOLIC PNL TOTAL CA: CPT | Performed by: EMERGENCY MEDICINE

## 2022-10-11 PROCEDURE — 83880 ASSAY OF NATRIURETIC PEPTIDE: CPT | Performed by: EMERGENCY MEDICINE

## 2022-10-11 PROCEDURE — 99285 EMERGENCY DEPT VISIT HI MDM: CPT

## 2022-10-11 PROCEDURE — 93010 ELECTROCARDIOGRAM REPORT: CPT | Performed by: EMERGENCY MEDICINE

## 2022-10-11 PROCEDURE — 71260 CT THORAX DX C+: CPT | Performed by: EMERGENCY MEDICINE

## 2022-10-11 PROCEDURE — 93005 ELECTROCARDIOGRAM TRACING: CPT

## 2022-10-11 PROCEDURE — 84484 ASSAY OF TROPONIN QUANT: CPT | Performed by: EMERGENCY MEDICINE

## 2022-10-11 PROCEDURE — 73630 X-RAY EXAM OF FOOT: CPT | Performed by: EMERGENCY MEDICINE

## 2022-10-11 PROCEDURE — 93971 EXTREMITY STUDY: CPT | Performed by: EMERGENCY MEDICINE

## 2022-10-11 PROCEDURE — 85379 FIBRIN DEGRADATION QUANT: CPT | Performed by: EMERGENCY MEDICINE

## 2022-10-11 RX ORDER — HYDROCODONE BITARTRATE AND ACETAMINOPHEN 5; 325 MG/1; MG/1
1 TABLET ORAL EVERY 8 HOURS PRN
Qty: 9 TABLET | Refills: 0 | Status: SHIPPED | OUTPATIENT
Start: 2022-10-11 | End: 2022-10-14

## 2022-10-11 RX ORDER — POTASSIUM CHLORIDE 20 MEQ/1
40 TABLET, EXTENDED RELEASE ORAL DAILY
Qty: 10 TABLET | Refills: 0 | Status: SHIPPED | OUTPATIENT
Start: 2022-10-11 | End: 2022-10-16

## 2022-10-11 RX ORDER — HYDROCODONE BITARTRATE AND ACETAMINOPHEN 5; 325 MG/1; MG/1
1 TABLET ORAL ONCE
Status: COMPLETED | OUTPATIENT
Start: 2022-10-11 | End: 2022-10-11

## 2022-10-11 RX ORDER — FUROSEMIDE 10 MG/ML
40 INJECTION INTRAMUSCULAR; INTRAVENOUS ONCE
Status: COMPLETED | OUTPATIENT
Start: 2022-10-11 | End: 2022-10-11

## 2022-10-11 RX ORDER — HYDRALAZINE HYDROCHLORIDE 20 MG/ML
10 INJECTION INTRAMUSCULAR; INTRAVENOUS ONCE
Status: COMPLETED | OUTPATIENT
Start: 2022-10-11 | End: 2022-10-11

## 2022-10-11 RX ORDER — FUROSEMIDE 40 MG/1
40 TABLET ORAL DAILY
Qty: 5 TABLET | Refills: 0 | Status: SHIPPED | OUTPATIENT
Start: 2022-10-11 | End: 2022-10-16

## 2022-10-11 RX ORDER — POTASSIUM CHLORIDE 20 MEQ/1
40 TABLET, EXTENDED RELEASE ORAL ONCE
Status: COMPLETED | OUTPATIENT
Start: 2022-10-11 | End: 2022-10-11

## 2022-10-11 NOTE — ED INITIAL ASSESSMENT (HPI)
Pt fell a couple months ago on her right side. Pt c/o about right leg and foot pain today. Pt seen here after the fall sent home with no injury. pt remembers chest pain across the whole chest that began yesterday.

## 2022-12-09 ENCOUNTER — HOSPITAL ENCOUNTER (EMERGENCY)
Facility: HOSPITAL | Age: 67
Discharge: HOME | End: 2022-12-09
Payer: MEDICARE

## 2022-12-09 ENCOUNTER — APPOINTMENT (OUTPATIENT)
Dept: GENERAL RADIOLOGY | Facility: HOSPITAL | Age: 67
End: 2022-12-09
Attending: NURSE PRACTITIONER
Payer: MEDICARE

## 2022-12-09 VITALS
HEIGHT: 69 IN | DIASTOLIC BLOOD PRESSURE: 71 MMHG | OXYGEN SATURATION: 100 % | RESPIRATION RATE: 18 BRPM | BODY MASS INDEX: 23.55 KG/M2 | HEART RATE: 67 BPM | SYSTOLIC BLOOD PRESSURE: 195 MMHG | WEIGHT: 159 LBS | TEMPERATURE: 98 F

## 2022-12-09 DIAGNOSIS — S99.921A INJURY OF RIGHT FOOT, INITIAL ENCOUNTER: Primary | ICD-10-CM

## 2022-12-09 PROCEDURE — 99283 EMERGENCY DEPT VISIT LOW MDM: CPT

## 2022-12-09 PROCEDURE — 73630 X-RAY EXAM OF FOOT: CPT | Performed by: NURSE PRACTITIONER

## 2022-12-09 RX ORDER — TRAMADOL HYDROCHLORIDE 50 MG/1
TABLET ORAL EVERY 6 HOURS PRN
Qty: 10 TABLET | Refills: 0 | Status: ON HOLD | OUTPATIENT
Start: 2022-12-09 | End: 2022-12-14

## 2022-12-09 NOTE — ED INITIAL ASSESSMENT (HPI)
S: Right foot pain, patient states that its the bottom of her foot that hurts, patient states that she fell on last Friday and it's been sore since then.    B: Hx stroke, dm, htn

## 2022-12-13 ENCOUNTER — HOSPITAL ENCOUNTER (EMERGENCY)
Facility: HOSPITAL | Age: 67
Discharge: HOME | End: 2022-12-13
Attending: EMERGENCY MEDICINE
Payer: MEDICARE

## 2022-12-13 VITALS
DIASTOLIC BLOOD PRESSURE: 61 MMHG | RESPIRATION RATE: 19 BRPM | OXYGEN SATURATION: 98 % | TEMPERATURE: 98 F | HEART RATE: 59 BPM | SYSTOLIC BLOOD PRESSURE: 188 MMHG

## 2022-12-13 DIAGNOSIS — I10 UNCONTROLLED HYPERTENSION: Primary | ICD-10-CM

## 2022-12-13 LAB
ANION GAP SERPL CALC-SCNC: 6 MMOL/L (ref 0–18)
BASOPHILS # BLD AUTO: 0.02 X10(3) UL (ref 0–0.2)
BASOPHILS NFR BLD AUTO: 0.5 %
BUN BLD-MCNC: 20 MG/DL (ref 7–18)
BUN/CREAT SERPL: 13.8 (ref 10–20)
CALCIUM BLD-MCNC: 9.6 MG/DL (ref 8.5–10.1)
CHLORIDE SERPL-SCNC: 110 MMOL/L (ref 98–112)
CO2 SERPL-SCNC: 31 MMOL/L (ref 21–32)
CREAT BLD-MCNC: 1.45 MG/DL
DEPRECATED RDW RBC AUTO: 42.7 FL (ref 35.1–46.3)
EOSINOPHIL # BLD AUTO: 0.06 X10(3) UL (ref 0–0.7)
EOSINOPHIL NFR BLD AUTO: 1.4 %
ERYTHROCYTE [DISTWIDTH] IN BLOOD BY AUTOMATED COUNT: 14.3 % (ref 11–15)
GFR SERPLBLD BASED ON 1.73 SQ M-ARVRAT: 39 ML/MIN/1.73M2 (ref 60–?)
GLUCOSE BLD-MCNC: 111 MG/DL (ref 70–99)
HCT VFR BLD AUTO: 35.4 %
HGB BLD-MCNC: 11.2 G/DL
IMM GRANULOCYTES # BLD AUTO: 0.01 X10(3) UL (ref 0–1)
IMM GRANULOCYTES NFR BLD: 0.2 %
LYMPHOCYTES # BLD AUTO: 1.52 X10(3) UL (ref 1–4)
LYMPHOCYTES NFR BLD AUTO: 35.1 %
MCH RBC QN AUTO: 25.9 PG (ref 26–34)
MCHC RBC AUTO-ENTMCNC: 31.6 G/DL (ref 31–37)
MCV RBC AUTO: 81.9 FL
MONOCYTES # BLD AUTO: 0.22 X10(3) UL (ref 0.1–1)
MONOCYTES NFR BLD AUTO: 5.1 %
NEUTROPHILS # BLD AUTO: 2.5 X10 (3) UL (ref 1.5–7.7)
NEUTROPHILS # BLD AUTO: 2.5 X10(3) UL (ref 1.5–7.7)
NEUTROPHILS NFR BLD AUTO: 57.7 %
OSMOLALITY SERPL CALC.SUM OF ELEC: 307 MOSM/KG (ref 275–295)
PLATELET # BLD AUTO: 259 10(3)UL (ref 150–450)
POTASSIUM SERPL-SCNC: 3.8 MMOL/L (ref 3.5–5.1)
RBC # BLD AUTO: 4.32 X10(6)UL
SODIUM SERPL-SCNC: 147 MMOL/L (ref 136–145)
TROPONIN I HIGH SENSITIVITY: 18 NG/L
WBC # BLD AUTO: 4.3 X10(3) UL (ref 4–11)

## 2022-12-13 PROCEDURE — 84484 ASSAY OF TROPONIN QUANT: CPT | Performed by: EMERGENCY MEDICINE

## 2022-12-13 PROCEDURE — 99284 EMERGENCY DEPT VISIT MOD MDM: CPT

## 2022-12-13 PROCEDURE — 93005 ELECTROCARDIOGRAM TRACING: CPT

## 2022-12-13 PROCEDURE — 93010 ELECTROCARDIOGRAM REPORT: CPT

## 2022-12-13 PROCEDURE — 80048 BASIC METABOLIC PNL TOTAL CA: CPT | Performed by: EMERGENCY MEDICINE

## 2022-12-13 PROCEDURE — 36415 COLL VENOUS BLD VENIPUNCTURE: CPT

## 2022-12-13 PROCEDURE — 85025 COMPLETE CBC W/AUTO DIFF WBC: CPT | Performed by: EMERGENCY MEDICINE

## 2022-12-13 RX ORDER — HYDROCHLOROTHIAZIDE 25 MG/1
25 TABLET ORAL DAILY
Qty: 90 TABLET | Refills: 0 | Status: ON HOLD | OUTPATIENT
Start: 2022-12-13 | End: 2023-03-13

## 2022-12-13 RX ORDER — MEMANTINE HYDROCHLORIDE 10 MG/1
10 TABLET ORAL 2 TIMES DAILY
Status: ON HOLD | COMMUNITY

## 2022-12-13 RX ORDER — HYDROCHLOROTHIAZIDE 25 MG/1
25 TABLET ORAL ONCE
Status: COMPLETED | OUTPATIENT
Start: 2022-12-13 | End: 2022-12-13

## 2022-12-13 NOTE — ED INITIAL ASSESSMENT (HPI)
S: Patient presents to ED with concerns for hypertension. BP has never been controlled but today higher than normal. Patient takes losartan 50mg 1/day. Patient ate heavily salted foods today. C/o chest pain and dizziness as well.

## 2022-12-14 LAB
ATRIAL RATE: 65 BPM
P AXIS: 31 DEGREES
P-R INTERVAL: 204 MS
Q-T INTERVAL: 418 MS
QRS DURATION: 88 MS
QTC CALCULATION (BEZET): 434 MS
R AXIS: -18 DEGREES
T AXIS: -85 DEGREES
VENTRICULAR RATE: 65 BPM

## 2022-12-14 NOTE — ED QUICK NOTES
Patient took BP at home and noticed BP was in 200s. Went to urgent care and was told to come to ER. No symptoms with high BP. Sclera red, new per pt. No vision changes.

## 2022-12-18 ENCOUNTER — HOSPITAL ENCOUNTER (INPATIENT)
Facility: HOSPITAL | Age: 67
LOS: 8 days | Discharge: HOME | End: 2022-12-27
Attending: EMERGENCY MEDICINE | Admitting: STUDENT IN AN ORGANIZED HEALTH CARE EDUCATION/TRAINING PROGRAM
Payer: MEDICARE

## 2022-12-18 ENCOUNTER — HOSPITAL ENCOUNTER (INPATIENT)
Facility: HOSPITAL | Age: 67
LOS: 8 days | Discharge: HOME HEALTH | End: 2022-12-27
Attending: EMERGENCY MEDICINE | Admitting: STUDENT IN AN ORGANIZED HEALTH CARE EDUCATION/TRAINING PROGRAM
Payer: MEDICARE

## 2022-12-18 ENCOUNTER — APPOINTMENT (OUTPATIENT)
Dept: CT IMAGING | Facility: HOSPITAL | Age: 67
End: 2022-12-18
Attending: EMERGENCY MEDICINE
Payer: MEDICARE

## 2022-12-18 ENCOUNTER — APPOINTMENT (OUTPATIENT)
Dept: GENERAL RADIOLOGY | Facility: HOSPITAL | Age: 67
End: 2022-12-18
Attending: EMERGENCY MEDICINE
Payer: MEDICARE

## 2022-12-18 DIAGNOSIS — R07.9 ACUTE CHEST PAIN: ICD-10-CM

## 2022-12-18 DIAGNOSIS — I70.1 RENAL ARTERY STENOSIS (HCC): ICD-10-CM

## 2022-12-18 DIAGNOSIS — I16.0 HYPERTENSIVE URGENCY: Primary | ICD-10-CM

## 2022-12-18 LAB
ANION GAP SERPL CALC-SCNC: 3 MMOL/L (ref 0–18)
BASOPHILS # BLD AUTO: 0.01 X10(3) UL (ref 0–0.2)
BASOPHILS NFR BLD AUTO: 0.2 %
BILIRUB UR QL: NEGATIVE
BUN BLD-MCNC: 33 MG/DL (ref 7–18)
BUN/CREAT SERPL: 15.5 (ref 10–20)
CALCIUM BLD-MCNC: 9.5 MG/DL (ref 8.5–10.1)
CHLORIDE SERPL-SCNC: 105 MMOL/L (ref 98–112)
CO2 SERPL-SCNC: 33 MMOL/L (ref 21–32)
COLOR UR: YELLOW
CREAT BLD-MCNC: 2.13 MG/DL
CRP SERPL-MCNC: 0.31 MG/DL (ref ?–0.3)
DEPRECATED RDW RBC AUTO: 41.5 FL (ref 35.1–46.3)
EOSINOPHIL # BLD AUTO: 0.05 X10(3) UL (ref 0–0.7)
EOSINOPHIL NFR BLD AUTO: 1 %
ERYTHROCYTE [DISTWIDTH] IN BLOOD BY AUTOMATED COUNT: 14.4 % (ref 11–15)
ERYTHROCYTE [SEDIMENTATION RATE] IN BLOOD: 37 MM/HR
GFR SERPLBLD BASED ON 1.73 SQ M-ARVRAT: 25 ML/MIN/1.73M2 (ref 60–?)
GLUCOSE BLD-MCNC: 106 MG/DL (ref 70–99)
GLUCOSE UR-MCNC: NEGATIVE MG/DL
HCT VFR BLD AUTO: 36.3 %
HGB BLD-MCNC: 11.8 G/DL
HGB UR QL STRIP.AUTO: NEGATIVE
HYALINE CASTS #/AREA URNS AUTO: PRESENT /LPF
IMM GRANULOCYTES # BLD AUTO: 0.01 X10(3) UL (ref 0–1)
IMM GRANULOCYTES NFR BLD: 0.2 %
KETONES UR-MCNC: NEGATIVE MG/DL
LYMPHOCYTES # BLD AUTO: 1.79 X10(3) UL (ref 1–4)
LYMPHOCYTES NFR BLD AUTO: 35.3 %
MAGNESIUM SERPL-MCNC: 2.1 MG/DL (ref 1.6–2.6)
MCH RBC QN AUTO: 26.1 PG (ref 26–34)
MCHC RBC AUTO-ENTMCNC: 32.5 G/DL (ref 31–37)
MCV RBC AUTO: 80.3 FL
MONOCYTES # BLD AUTO: 0.34 X10(3) UL (ref 0.1–1)
MONOCYTES NFR BLD AUTO: 6.7 %
NEUTROPHILS # BLD AUTO: 2.87 X10 (3) UL (ref 1.5–7.7)
NEUTROPHILS # BLD AUTO: 2.87 X10(3) UL (ref 1.5–7.7)
NEUTROPHILS NFR BLD AUTO: 56.6 %
NITRITE UR QL STRIP.AUTO: NEGATIVE
OSMOLALITY SERPL CALC.SUM OF ELEC: 300 MOSM/KG (ref 275–295)
PH UR: 6 [PH] (ref 5–8)
PLATELET # BLD AUTO: 283 10(3)UL (ref 150–450)
POTASSIUM SERPL-SCNC: 3.2 MMOL/L (ref 3.5–5.1)
PROT UR-MCNC: NEGATIVE MG/DL
RBC # BLD AUTO: 4.52 X10(6)UL
SARS-COV-2 RNA RESP QL NAA+PROBE: NOT DETECTED
SODIUM SERPL-SCNC: 141 MMOL/L (ref 136–145)
SP GR UR STRIP: 1.01 (ref 1–1.03)
TROPONIN I HIGH SENSITIVITY: 18 NG/L
UROBILINOGEN UR STRIP-ACNC: <2
VIT C UR-MCNC: NEGATIVE MG/DL
WBC # BLD AUTO: 5.1 X10(3) UL (ref 4–11)
YEAST UR QL: PRESENT /HPF

## 2022-12-18 PROCEDURE — 71045 X-RAY EXAM CHEST 1 VIEW: CPT | Performed by: EMERGENCY MEDICINE

## 2022-12-18 PROCEDURE — 83036 HEMOGLOBIN GLYCOSYLATED A1C: CPT | Performed by: INTERNAL MEDICINE

## 2022-12-18 PROCEDURE — 87086 URINE CULTURE/COLONY COUNT: CPT | Performed by: EMERGENCY MEDICINE

## 2022-12-18 PROCEDURE — 87088 URINE BACTERIA CULTURE: CPT | Performed by: EMERGENCY MEDICINE

## 2022-12-18 PROCEDURE — 85652 RBC SED RATE AUTOMATED: CPT | Performed by: EMERGENCY MEDICINE

## 2022-12-18 PROCEDURE — 74175 CTA ABDOMEN W/CONTRAST: CPT | Performed by: EMERGENCY MEDICINE

## 2022-12-18 PROCEDURE — 93005 ELECTROCARDIOGRAM TRACING: CPT

## 2022-12-18 PROCEDURE — 85025 COMPLETE CBC W/AUTO DIFF WBC: CPT | Performed by: EMERGENCY MEDICINE

## 2022-12-18 PROCEDURE — 99285 EMERGENCY DEPT VISIT HI MDM: CPT

## 2022-12-18 PROCEDURE — 71275 CT ANGIOGRAPHY CHEST: CPT | Performed by: EMERGENCY MEDICINE

## 2022-12-18 PROCEDURE — 86140 C-REACTIVE PROTEIN: CPT | Performed by: EMERGENCY MEDICINE

## 2022-12-18 PROCEDURE — 87186 SC STD MICRODIL/AGAR DIL: CPT | Performed by: EMERGENCY MEDICINE

## 2022-12-18 PROCEDURE — 81001 URINALYSIS AUTO W/SCOPE: CPT | Performed by: EMERGENCY MEDICINE

## 2022-12-18 PROCEDURE — 96361 HYDRATE IV INFUSION ADD-ON: CPT

## 2022-12-18 PROCEDURE — 80048 BASIC METABOLIC PNL TOTAL CA: CPT | Performed by: EMERGENCY MEDICINE

## 2022-12-18 PROCEDURE — 84484 ASSAY OF TROPONIN QUANT: CPT | Performed by: EMERGENCY MEDICINE

## 2022-12-18 PROCEDURE — 83735 ASSAY OF MAGNESIUM: CPT | Performed by: EMERGENCY MEDICINE

## 2022-12-18 PROCEDURE — 96374 THER/PROPH/DIAG INJ IV PUSH: CPT

## 2022-12-18 PROCEDURE — 93010 ELECTROCARDIOGRAM REPORT: CPT | Performed by: INTERNAL MEDICINE

## 2022-12-18 PROCEDURE — 93010 ELECTROCARDIOGRAM REPORT: CPT

## 2022-12-18 RX ORDER — HYDRALAZINE HYDROCHLORIDE 20 MG/ML
10 INJECTION INTRAMUSCULAR; INTRAVENOUS ONCE
Status: COMPLETED | OUTPATIENT
Start: 2022-12-18 | End: 2022-12-18

## 2022-12-19 PROBLEM — I70.1 RENAL ARTERY STENOSIS: Status: ACTIVE | Noted: 2022-12-19

## 2022-12-19 PROBLEM — R07.9 ACUTE CHEST PAIN: Status: ACTIVE | Noted: 2022-12-19

## 2022-12-19 PROBLEM — I70.1 RENAL ARTERY STENOSIS (HCC): Status: ACTIVE | Noted: 2022-12-19

## 2022-12-19 LAB
ALBUMIN SERPL-MCNC: 3.4 G/DL (ref 3.4–5)
ANION GAP SERPL CALC-SCNC: 6 MMOL/L (ref 0–18)
ATRIAL RATE: 61 BPM
BUN BLD-MCNC: 28 MG/DL (ref 7–18)
BUN/CREAT SERPL: 16.3 (ref 10–20)
CALCIUM BLD-MCNC: 9.1 MG/DL (ref 8.5–10.1)
CHLORIDE SERPL-SCNC: 108 MMOL/L (ref 98–112)
CO2 SERPL-SCNC: 30 MMOL/L (ref 21–32)
CREAT BLD-MCNC: 1.72 MG/DL
CREAT UR-SCNC: 182 MG/DL
EST. AVERAGE GLUCOSE BLD GHB EST-MCNC: 137 MG/DL (ref 68–126)
GFR SERPLBLD BASED ON 1.73 SQ M-ARVRAT: 32 ML/MIN/1.73M2 (ref 60–?)
GLUCOSE BLD-MCNC: 111 MG/DL (ref 70–99)
GLUCOSE BLDC GLUCOMTR-MCNC: 103 MG/DL (ref 70–99)
GLUCOSE BLDC GLUCOMTR-MCNC: 117 MG/DL (ref 70–99)
GLUCOSE BLDC GLUCOMTR-MCNC: 117 MG/DL (ref 70–99)
GLUCOSE BLDC GLUCOMTR-MCNC: 130 MG/DL (ref 70–99)
GLUCOSE BLDC GLUCOMTR-MCNC: 156 MG/DL (ref 70–99)
HBA1C MFR BLD: 6.4 % (ref ?–5.7)
OSMOLALITY SERPL CALC.SUM OF ELEC: 304 MOSM/KG (ref 275–295)
P AXIS: -3 DEGREES
P-R INTERVAL: 218 MS
PHOSPHATE SERPL-MCNC: 2.7 MG/DL (ref 2.5–4.9)
POTASSIUM SERPL-SCNC: 3.3 MMOL/L (ref 3.5–5.1)
POTASSIUM SERPL-SCNC: 3.3 MMOL/L (ref 3.5–5.1)
POTASSIUM SERPL-SCNC: 3.4 MMOL/L (ref 3.5–5.1)
POTASSIUM SERPL-SCNC: 3.5 MMOL/L (ref 3.5–5.1)
Q-T INTERVAL: 450 MS
QRS DURATION: 94 MS
QTC CALCULATION (BEZET): 453 MS
R AXIS: -29 DEGREES
SODIUM SERPL-SCNC: 144 MMOL/L (ref 136–145)
SODIUM SERPL-SCNC: 89 MMOL/L
T AXIS: 51 DEGREES
TROPONIN I HIGH SENSITIVITY: 22 NG/L
UUN UR-MCNC: 783 MG/DL
VENTRICULAR RATE: 61 BPM

## 2022-12-19 PROCEDURE — 84484 ASSAY OF TROPONIN QUANT: CPT | Performed by: STUDENT IN AN ORGANIZED HEALTH CARE EDUCATION/TRAINING PROGRAM

## 2022-12-19 PROCEDURE — 82962 GLUCOSE BLOOD TEST: CPT

## 2022-12-19 PROCEDURE — 84540 ASSAY OF URINE/UREA-N: CPT | Performed by: INTERNAL MEDICINE

## 2022-12-19 PROCEDURE — 84132 ASSAY OF SERUM POTASSIUM: CPT | Performed by: STUDENT IN AN ORGANIZED HEALTH CARE EDUCATION/TRAINING PROGRAM

## 2022-12-19 PROCEDURE — 84132 ASSAY OF SERUM POTASSIUM: CPT | Performed by: INTERNAL MEDICINE

## 2022-12-19 PROCEDURE — 80069 RENAL FUNCTION PANEL: CPT | Performed by: INTERNAL MEDICINE

## 2022-12-19 PROCEDURE — 82570 ASSAY OF URINE CREATININE: CPT | Performed by: INTERNAL MEDICINE

## 2022-12-19 PROCEDURE — 94660 CPAP INITIATION&MGMT: CPT

## 2022-12-19 PROCEDURE — 84300 ASSAY OF URINE SODIUM: CPT | Performed by: INTERNAL MEDICINE

## 2022-12-19 RX ORDER — HYDROCODONE BITARTRATE AND ACETAMINOPHEN 5; 325 MG/1; MG/1
1 TABLET ORAL EVERY 4 HOURS PRN
Status: DISCONTINUED | OUTPATIENT
Start: 2022-12-19 | End: 2022-12-27

## 2022-12-19 RX ORDER — SENNOSIDES 8.6 MG
17.2 TABLET ORAL NIGHTLY PRN
Status: DISCONTINUED | OUTPATIENT
Start: 2022-12-19 | End: 2022-12-27

## 2022-12-19 RX ORDER — AMLODIPINE BESYLATE 10 MG/1
10 TABLET ORAL DAILY
Status: DISCONTINUED | OUTPATIENT
Start: 2022-12-19 | End: 2022-12-27

## 2022-12-19 RX ORDER — HYDROCODONE BITARTRATE AND ACETAMINOPHEN 5; 325 MG/1; MG/1
2 TABLET ORAL EVERY 4 HOURS PRN
Status: DISCONTINUED | OUTPATIENT
Start: 2022-12-19 | End: 2022-12-27

## 2022-12-19 RX ORDER — DEXTROSE MONOHYDRATE 25 G/50ML
50 INJECTION, SOLUTION INTRAVENOUS
Status: DISCONTINUED | OUTPATIENT
Start: 2022-12-19 | End: 2022-12-27

## 2022-12-19 RX ORDER — ACETAMINOPHEN 325 MG/1
650 TABLET ORAL EVERY 4 HOURS PRN
Status: DISCONTINUED | OUTPATIENT
Start: 2022-12-19 | End: 2022-12-27

## 2022-12-19 RX ORDER — HYDROCHLOROTHIAZIDE 25 MG/1
25 TABLET ORAL DAILY
Status: DISCONTINUED | OUTPATIENT
Start: 2022-12-19 | End: 2022-12-27

## 2022-12-19 RX ORDER — METOCLOPRAMIDE HYDROCHLORIDE 5 MG/ML
5 INJECTION INTRAMUSCULAR; INTRAVENOUS EVERY 8 HOURS PRN
Status: DISCONTINUED | OUTPATIENT
Start: 2022-12-19 | End: 2022-12-27

## 2022-12-19 RX ORDER — NICOTINE POLACRILEX 4 MG
15 LOZENGE BUCCAL
Status: DISCONTINUED | OUTPATIENT
Start: 2022-12-19 | End: 2022-12-27

## 2022-12-19 RX ORDER — OXYBUTYNIN CHLORIDE 5 MG/1
5 TABLET ORAL 3 TIMES DAILY
Status: DISCONTINUED | OUTPATIENT
Start: 2022-12-19 | End: 2022-12-23

## 2022-12-19 RX ORDER — CLOPIDOGREL BISULFATE 75 MG/1
75 TABLET ORAL DAILY
Status: DISCONTINUED | OUTPATIENT
Start: 2022-12-19 | End: 2022-12-27

## 2022-12-19 RX ORDER — HYDRALAZINE HYDROCHLORIDE 20 MG/ML
10 INJECTION INTRAMUSCULAR; INTRAVENOUS
Status: DISCONTINUED | OUTPATIENT
Start: 2022-12-19 | End: 2022-12-21

## 2022-12-19 RX ORDER — FLUTICASONE FUROATE AND VILANTEROL 200; 25 UG/1; UG/1
1 POWDER RESPIRATORY (INHALATION) DAILY
Status: DISCONTINUED | OUTPATIENT
Start: 2022-12-19 | End: 2022-12-23

## 2022-12-19 RX ORDER — NICOTINE POLACRILEX 4 MG
30 LOZENGE BUCCAL
Status: DISCONTINUED | OUTPATIENT
Start: 2022-12-19 | End: 2022-12-27

## 2022-12-19 RX ORDER — LOSARTAN POTASSIUM 100 MG/1
100 TABLET ORAL DAILY
Status: DISCONTINUED | OUTPATIENT
Start: 2022-12-19 | End: 2022-12-27

## 2022-12-19 RX ORDER — HEPARIN SODIUM 5000 [USP'U]/ML
5000 INJECTION, SOLUTION INTRAVENOUS; SUBCUTANEOUS EVERY 8 HOURS SCHEDULED
Status: DISCONTINUED | OUTPATIENT
Start: 2022-12-19 | End: 2022-12-27

## 2022-12-19 RX ORDER — ACETAMINOPHEN 500 MG
500 TABLET ORAL EVERY 4 HOURS PRN
Status: DISCONTINUED | OUTPATIENT
Start: 2022-12-19 | End: 2022-12-27

## 2022-12-19 RX ORDER — POLYETHYLENE GLYCOL 3350 17 G/17G
17 POWDER, FOR SOLUTION ORAL DAILY PRN
Status: DISCONTINUED | OUTPATIENT
Start: 2022-12-19 | End: 2022-12-26

## 2022-12-19 RX ORDER — MEMANTINE HYDROCHLORIDE 5 MG/1
10 TABLET ORAL 2 TIMES DAILY
Status: DISCONTINUED | OUTPATIENT
Start: 2022-12-19 | End: 2022-12-27

## 2022-12-19 RX ORDER — CARVEDILOL 25 MG/1
25 TABLET ORAL 2 TIMES DAILY WITH MEALS
Status: DISCONTINUED | OUTPATIENT
Start: 2022-12-19 | End: 2022-12-27

## 2022-12-19 RX ORDER — POTASSIUM CHLORIDE 20 MEQ/1
40 TABLET, EXTENDED RELEASE ORAL ONCE
Status: COMPLETED | OUTPATIENT
Start: 2022-12-19 | End: 2022-12-19

## 2022-12-19 RX ORDER — ONDANSETRON 2 MG/ML
4 INJECTION INTRAMUSCULAR; INTRAVENOUS EVERY 6 HOURS PRN
Status: DISCONTINUED | OUTPATIENT
Start: 2022-12-19 | End: 2022-12-27

## 2022-12-19 RX ORDER — BRIMONIDINE TARTRATE 2 MG/ML
1 SOLUTION/ DROPS OPHTHALMIC 2 TIMES DAILY
Status: DISCONTINUED | OUTPATIENT
Start: 2022-12-19 | End: 2022-12-27

## 2022-12-19 RX ORDER — ASPIRIN 81 MG/1
81 TABLET ORAL DAILY
Status: DISCONTINUED | OUTPATIENT
Start: 2022-12-19 | End: 2022-12-27

## 2022-12-19 RX ORDER — ATORVASTATIN CALCIUM 40 MG/1
40 TABLET, FILM COATED ORAL NIGHTLY
Status: DISCONTINUED | OUTPATIENT
Start: 2022-12-19 | End: 2022-12-27

## 2022-12-19 RX ORDER — BISACODYL 10 MG
10 SUPPOSITORY, RECTAL RECTAL
Status: DISCONTINUED | OUTPATIENT
Start: 2022-12-19 | End: 2022-12-26

## 2022-12-19 NOTE — ED INITIAL ASSESSMENT (HPI)
Pt AOx4 C/C intermittent bilateral chest pain radiating to bilateral flanks x 2 weeks. Also states home /180. Seen here Tuesday for a fall.

## 2022-12-19 NOTE — PLAN OF CARE
Problem: Patient Centered Care  Goal: Patient preferences are identified and integrated in the patient's plan of care  Description: Interventions:  - What would you like us to know as we care for you?  Im from home with my son  - Provide timely, complete, and accurate information to patient/family  - Incorporate patient and family knowledge, values, beliefs, and cultural backgrounds into the planning and delivery of care  - Encourage patient/family to participate in care and decision-making at the level they choose  - Honor patient and family perspectives and choices  Outcome: Progressing     Problem: Patient/Family Goals  Goal: Patient/Family Long Term Goal  Description: Patient's Long Term Goal: Get stronger     Interventions:  - See additional Care Plan goals for specific interventions  Outcome: Progressing  Goal: Patient/Family Short Term Goal  Description: Patient's Short Term Goal: Go home     Interventions:   - See additional Care Plan goals for specific interventions  Outcome: Progressing     Problem: PAIN - ADULT  Goal: Verbalizes/displays adequate comfort level or patient's stated pain goal  Description: INTERVENTIONS:  - Encourage pt to monitor pain and request assistance  - Assess pain using appropriate pain scale  - Administer analgesics based on type and severity of pain and evaluate response  - Implement non-pharmacological measures as appropriate and evaluate response  - Consider cultural and social influences on pain and pain management  - Manage/alleviate anxiety  - Utilize distraction and/or relaxation techniques  - Monitor for opioid side effects  - Notify MD/LIP if interventions unsuccessful or patient reports new pain  - Anticipate increased pain with activity and pre-medicate as appropriate  Outcome: Progressing     Problem: SAFETY ADULT - FALL  Goal: Free from fall injury  Description: INTERVENTIONS:  - Assess pt frequently for physical needs  - Identify cognitive and physical deficits and behaviors that affect risk of falls.   - [place] fall precautions as indicated by assessment.  - Educate pt/family on patient safety including physical limitations  - Instruct pt to call for assistance with activity based on assessment  - Modify environment to reduce risk of injury  - Provide assistive devices as appropriate  - Consider OT/PT consult to assist with strengthening/mobility  - Encourage toileting schedule  Outcome: Progressing     Problem: DISCHARGE PLANNING  Goal: Discharge to home or other facility with appropriate resources  Description: INTERVENTIONS:  - Identify barriers to discharge w/pt and caregiver  - Include patient/family/discharge partner in discharge planning  - Arrange for needed discharge resources and transportation as appropriate  - Identify discharge learning needs (meds, wound care, etc)  - Arrange for interpreters to assist at discharge as needed  - Consider post-discharge preferences of patient/family/discharge partner  - Complete POLST form as appropriate  - Assess patient's ability to be responsible for managing their own health  - Refer to Case Management Department for coordinating discharge planning if the patient needs post-hospital services based on physician/LIP order or complex needs related to functional status, cognitive ability or social support system  Outcome: Progressing

## 2022-12-19 NOTE — ED QUICK NOTES
Pt updated on room assignment, remains agreeable to plan of care for admission. Pt remains A&Ox4, respirations even and unlabored, skin warm and dry. VSS. Awaiting transport.

## 2022-12-19 NOTE — ED QUICK NOTES
Dr. Jessi Valera reports that he is aware of pt's lab work and he wants the patient to get a CT w/ contrast.

## 2022-12-19 NOTE — ED QUICK NOTES
Orders for admission, patient is aware of plan and ready to go upstairs. Any questions, please call ED RN Gail Lugo at extension [phone].      Patient Covid vaccination status: Fully vaccinated     COVID Test Ordered in ED: Rapid SARS-CoV-2 by PCR    COVID Suspicion at Admission: No risk with negative rapid    Running Infusions:  None    Mental Status/LOC at time of transport: A&Ox4    Other pertinent information:   CIWA score: N/A   NIH score:  N/A clear

## 2022-12-20 ENCOUNTER — APPOINTMENT (OUTPATIENT)
Dept: NUCLEAR MEDICINE | Facility: HOSPITAL | Age: 67
End: 2022-12-20
Attending: STUDENT IN AN ORGANIZED HEALTH CARE EDUCATION/TRAINING PROGRAM
Payer: MEDICARE

## 2022-12-20 ENCOUNTER — APPOINTMENT (OUTPATIENT)
Dept: CV DIAGNOSTICS | Facility: HOSPITAL | Age: 67
End: 2022-12-20
Attending: STUDENT IN AN ORGANIZED HEALTH CARE EDUCATION/TRAINING PROGRAM
Payer: MEDICARE

## 2022-12-20 LAB
% OF MAX PREDICTED HR: 100 %
ALBUMIN SERPL-MCNC: 3.3 G/DL (ref 3.4–5)
ANION GAP SERPL CALC-SCNC: 6 MMOL/L (ref 0–18)
BUN BLD-MCNC: 28 MG/DL (ref 7–18)
BUN/CREAT SERPL: 19.4 (ref 10–20)
CALCIUM BLD-MCNC: 9.2 MG/DL (ref 8.5–10.1)
CHLORIDE SERPL-SCNC: 107 MMOL/L (ref 98–112)
CO2 SERPL-SCNC: 31 MMOL/L (ref 21–32)
CREAT BLD-MCNC: 1.44 MG/DL
DEPRECATED RDW RBC AUTO: 42.2 FL (ref 35.1–46.3)
ERYTHROCYTE [DISTWIDTH] IN BLOOD BY AUTOMATED COUNT: 14.5 % (ref 11–15)
GFR SERPLBLD BASED ON 1.73 SQ M-ARVRAT: 39 ML/MIN/1.73M2 (ref 60–?)
GLUCOSE BLD-MCNC: 104 MG/DL (ref 70–99)
GLUCOSE BLDC GLUCOMTR-MCNC: 108 MG/DL (ref 70–99)
GLUCOSE BLDC GLUCOMTR-MCNC: 123 MG/DL (ref 70–99)
GLUCOSE BLDC GLUCOMTR-MCNC: 162 MG/DL (ref 70–99)
GLUCOSE BLDC GLUCOMTR-MCNC: 201 MG/DL (ref 70–99)
HCT VFR BLD AUTO: 34.2 %
HGB BLD-MCNC: 11 G/DL
MAGNESIUM SERPL-MCNC: 2 MG/DL (ref 1.6–2.6)
MAX DIASTOLIC BP: 63 MMHG
MAX HEART RATE: 137 BPM
MAX PREDICTED HEART RATE: 151 BPM
MAX SYSTOLIC BP: 157 MMHG
MAX WORK LOAD: 10
MCH RBC QN AUTO: 25.9 PG (ref 26–34)
MCHC RBC AUTO-ENTMCNC: 32.2 G/DL (ref 31–37)
MCV RBC AUTO: 80.5 FL
OSMOLALITY SERPL CALC.SUM OF ELEC: 304 MOSM/KG (ref 275–295)
PHOSPHATE SERPL-MCNC: 2.9 MG/DL (ref 2.5–4.9)
PLATELET # BLD AUTO: 246 10(3)UL (ref 150–450)
POTASSIUM SERPL-SCNC: 3 MMOL/L (ref 3.5–5.1)
POTASSIUM SERPL-SCNC: 3.9 MMOL/L (ref 3.5–5.1)
RBC # BLD AUTO: 4.25 X10(6)UL
SODIUM SERPL-SCNC: 144 MMOL/L (ref 136–145)
WBC # BLD AUTO: 4.3 X10(3) UL (ref 4–11)

## 2022-12-20 PROCEDURE — 78452 HT MUSCLE IMAGE SPECT MULT: CPT | Performed by: STUDENT IN AN ORGANIZED HEALTH CARE EDUCATION/TRAINING PROGRAM

## 2022-12-20 PROCEDURE — 85027 COMPLETE CBC AUTOMATED: CPT | Performed by: INTERNAL MEDICINE

## 2022-12-20 PROCEDURE — 93306 TTE W/DOPPLER COMPLETE: CPT | Performed by: STUDENT IN AN ORGANIZED HEALTH CARE EDUCATION/TRAINING PROGRAM

## 2022-12-20 PROCEDURE — 94799 UNLISTED PULMONARY SVC/PX: CPT

## 2022-12-20 PROCEDURE — 83735 ASSAY OF MAGNESIUM: CPT | Performed by: INTERNAL MEDICINE

## 2022-12-20 PROCEDURE — 84132 ASSAY OF SERUM POTASSIUM: CPT | Performed by: STUDENT IN AN ORGANIZED HEALTH CARE EDUCATION/TRAINING PROGRAM

## 2022-12-20 PROCEDURE — 82962 GLUCOSE BLOOD TEST: CPT

## 2022-12-20 PROCEDURE — 80069 RENAL FUNCTION PANEL: CPT | Performed by: INTERNAL MEDICINE

## 2022-12-20 PROCEDURE — 93017 CV STRESS TEST TRACING ONLY: CPT | Performed by: STUDENT IN AN ORGANIZED HEALTH CARE EDUCATION/TRAINING PROGRAM

## 2022-12-20 PROCEDURE — 93018 CV STRESS TEST I&R ONLY: CPT | Performed by: INTERNAL MEDICINE

## 2022-12-20 PROCEDURE — 93016 CV STRESS TEST SUPVJ ONLY: CPT | Performed by: INTERNAL MEDICINE

## 2022-12-20 RX ORDER — POTASSIUM CHLORIDE 1.5 G/1.77G
40 POWDER, FOR SOLUTION ORAL EVERY 4 HOURS
Status: COMPLETED | OUTPATIENT
Start: 2022-12-20 | End: 2022-12-20

## 2022-12-20 NOTE — PLAN OF CARE
Pt alert and oriented x3. Pt on room air. Cardiology saw patient today and stress test completed. Losartan resumed per MD. Pt up to chair with stand by assistance. Problem: Patient Centered Care  Goal: Patient preferences are identified and integrated in the patient's plan of care  Description: Interventions:  - What would you like us to know as we care for you?  Im from home with my son  - Provide timely, complete, and accurate information to patient/family  - Incorporate patient and family knowledge, values, beliefs, and cultural backgrounds into the planning and delivery of care  - Encourage patient/family to participate in care and decision-making at the level they choose  - Honor patient and family perspectives and choices  Outcome: Progressing     Problem: Patient/Family Goals  Goal: Patient/Family Long Term Goal  Description: Patient's Long Term Goal: Get stronger     Interventions:  - See additional Care Plan goals for specific interventions  Outcome: Progressing  Goal: Patient/Family Short Term Goal  Description: Patient's Short Term Goal: Go home     Interventions:   - See additional Care Plan goals for specific interventions  Outcome: Progressing     Problem: PAIN - ADULT  Goal: Verbalizes/displays adequate comfort level or patient's stated pain goal  Description: INTERVENTIONS:  - Encourage pt to monitor pain and request assistance  - Assess pain using appropriate pain scale  - Administer analgesics based on type and severity of pain and evaluate response  - Implement non-pharmacological measures as appropriate and evaluate response  - Consider cultural and social influences on pain and pain management  - Manage/alleviate anxiety  - Utilize distraction and/or relaxation techniques  - Monitor for opioid side effects  - Notify MD/LIP if interventions unsuccessful or patient reports new pain  - Anticipate increased pain with activity and pre-medicate as appropriate  Outcome: Progressing     Problem: SAFETY ADULT - FALL  Goal: Free from fall injury  Description: INTERVENTIONS:  - Assess pt frequently for physical needs  - Identify cognitive and physical deficits and behaviors that affect risk of falls.   - [place] fall precautions as indicated by assessment.  - Educate pt/family on patient safety including physical limitations  - Instruct pt to call for assistance with activity based on assessment  - Modify environment to reduce risk of injury  - Provide assistive devices as appropriate  - Consider OT/PT consult to assist with strengthening/mobility  - Encourage toileting schedule  Outcome: Progressing     Problem: DISCHARGE PLANNING  Goal: Discharge to home or other facility with appropriate resources  Description: INTERVENTIONS:  - Identify barriers to discharge w/pt and caregiver  - Include patient/family/discharge partner in discharge planning  - Arrange for needed discharge resources and transportation as appropriate  - Identify discharge learning needs (meds, wound care, etc)  - Arrange for interpreters to assist at discharge as needed  - Consider post-discharge preferences of patient/family/discharge partner  - Complete POLST form as appropriate  - Assess patient's ability to be responsible for managing their own health  - Refer to Case Management Department for coordinating discharge planning if the patient needs post-hospital services based on physician/LIP order or complex needs related to functional status, cognitive ability or social support system  Outcome: Progressing

## 2022-12-20 NOTE — PLAN OF CARE
Patient A&Ox4, forgetful at times. Up with assistance. Hydralazine given x1 for SBP >160. Patient denies any complaints at this time. Instructed to call for assistance as needed. Fall precautions in place. Problem: Patient Centered Care  Goal: Patient preferences are identified and integrated in the patient's plan of care  Description: Interventions:  - What would you like us to know as we care for you?  Im from home with my son  - Provide timely, complete, and accurate information to patient/family  - Incorporate patient and family knowledge, values, beliefs, and cultural backgrounds into the planning and delivery of care  - Encourage patient/family to participate in care and decision-making at the level they choose  - Honor patient and family perspectives and choices  Outcome: Progressing     Problem: Patient/Family Goals  Goal: Patient/Family Long Term Goal  Description: Patient's Long Term Goal: Get stronger     Interventions:  - See additional Care Plan goals for specific interventions  Outcome: Progressing  Goal: Patient/Family Short Term Goal  Description: Patient's Short Term Goal: Go home     Interventions:   - See additional Care Plan goals for specific interventions  Outcome: Progressing     Problem: PAIN - ADULT  Goal: Verbalizes/displays adequate comfort level or patient's stated pain goal  Description: INTERVENTIONS:  - Encourage pt to monitor pain and request assistance  - Assess pain using appropriate pain scale  - Administer analgesics based on type and severity of pain and evaluate response  - Implement non-pharmacological measures as appropriate and evaluate response  - Consider cultural and social influences on pain and pain management  - Manage/alleviate anxiety  - Utilize distraction and/or relaxation techniques  - Monitor for opioid side effects  - Notify MD/LIP if interventions unsuccessful or patient reports new pain  - Anticipate increased pain with activity and pre-medicate as appropriate  Outcome: Progressing     Problem: SAFETY ADULT - FALL  Goal: Free from fall injury  Description: INTERVENTIONS:  - Assess pt frequently for physical needs  - Identify cognitive and physical deficits and behaviors that affect risk of falls.   - [place] fall precautions as indicated by assessment.  - Educate pt/family on patient safety including physical limitations  - Instruct pt to call for assistance with activity based on assessment  - Modify environment to reduce risk of injury  - Provide assistive devices as appropriate  - Consider OT/PT consult to assist with strengthening/mobility  - Encourage toileting schedule  Outcome: Progressing     Problem: DISCHARGE PLANNING  Goal: Discharge to home or other facility with appropriate resources  Description: INTERVENTIONS:  - Identify barriers to discharge w/pt and caregiver  - Include patient/family/discharge partner in discharge planning  - Arrange for needed discharge resources and transportation as appropriate  - Identify discharge learning needs (meds, wound care, etc)  - Arrange for interpreters to assist at discharge as needed  - Consider post-discharge preferences of patient/family/discharge partner  - Complete POLST form as appropriate  - Assess patient's ability to be responsible for managing their own health  - Refer to Case Management Department for coordinating discharge planning if the patient needs post-hospital services based on physician/LIP order or complex needs related to functional status, cognitive ability or social support system  Outcome: Progressing

## 2022-12-20 NOTE — PLAN OF CARE
Problem: Patient Centered Care  Goal: Patient preferences are identified and integrated in the patient's plan of care  Description: Interventions:  - What would you like us to know as we care for you?  Im from home with my son  - Provide timely, complete, and accurate information to patient/family  - Incorporate patient and family knowledge, values, beliefs, and cultural backgrounds into the planning and delivery of care  - Encourage patient/family to participate in care and decision-making at the level they choose  - Honor patient and family perspectives and choices  Outcome: Progressing     Problem: Patient/Family Goals  Goal: Patient/Family Long Term Goal  Description: Patient's Long Term Goal: Get stronger     Interventions:  - See additional Care Plan goals for specific interventions  Outcome: Progressing  Goal: Patient/Family Short Term Goal  Description: Patient's Short Term Goal: Go home     Interventions:   - See additional Care Plan goals for specific interventions  Outcome: Progressing     Problem: PAIN - ADULT  Goal: Verbalizes/displays adequate comfort level or patient's stated pain goal  Description: INTERVENTIONS:  - Encourage pt to monitor pain and request assistance  - Assess pain using appropriate pain scale  - Administer analgesics based on type and severity of pain and evaluate response  - Implement non-pharmacological measures as appropriate and evaluate response  - Consider cultural and social influences on pain and pain management  - Manage/alleviate anxiety  - Utilize distraction and/or relaxation techniques  - Monitor for opioid side effects  - Notify MD/LIP if interventions unsuccessful or patient reports new pain  - Anticipate increased pain with activity and pre-medicate as appropriate  Outcome: Progressing     Problem: SAFETY ADULT - FALL  Goal: Free from fall injury  Description: INTERVENTIONS:  - Assess pt frequently for physical needs  - Identify cognitive and physical deficits and behaviors that affect risk of falls. - [place] fall precautions as indicated by assessment.  - Educate pt/family on patient safety including physical limitations  - Instruct pt to call for assistance with activity based on assessment  - Modify environment to reduce risk of injury  - Provide assistive devices as appropriate  - Consider OT/PT consult to assist with strengthening/mobility  - Encourage toileting schedule  Outcome: Progressing     Problem: DISCHARGE PLANNING  Goal: Discharge to home or other facility with appropriate resources  Description: INTERVENTIONS:  - Identify barriers to discharge w/pt and caregiver  - Include patient/family/discharge partner in discharge planning  - Arrange for needed discharge resources and transportation as appropriate  - Identify discharge learning needs (meds, wound care, etc)  - Arrange for interpreters to assist at discharge as needed  - Consider post-discharge preferences of patient/family/discharge partner  - Complete POLST form as appropriate  - Assess patient's ability to be responsible for managing their own health  - Refer to Case Management Department for coordinating discharge planning if the patient needs post-hospital services based on physician/LIP order or complex needs related to functional status, cognitive ability or social support system  Outcome: Progressing     Pt alert and oriented X 4. Pt on room air. No complaints throughout the day. Replaced potassium today per protocol. Safety precautions in place, call light within reach. Plan is stress test and echo tomorrow.

## 2022-12-21 LAB
ALBUMIN SERPL-MCNC: 3.3 G/DL (ref 3.4–5)
ANION GAP SERPL CALC-SCNC: 7 MMOL/L (ref 0–18)
BILIRUB UR QL: NEGATIVE
BUN BLD-MCNC: 38 MG/DL (ref 7–18)
BUN/CREAT SERPL: 17.7 (ref 10–20)
CALCIUM BLD-MCNC: 8.9 MG/DL (ref 8.5–10.1)
CHLORIDE SERPL-SCNC: 107 MMOL/L (ref 98–112)
CO2 SERPL-SCNC: 29 MMOL/L (ref 21–32)
COLOR UR: YELLOW
CREAT BLD-MCNC: 2.15 MG/DL
GFR SERPLBLD BASED ON 1.73 SQ M-ARVRAT: 24 ML/MIN/1.73M2 (ref 60–?)
GLUCOSE BLD-MCNC: 103 MG/DL (ref 70–99)
GLUCOSE BLDC GLUCOMTR-MCNC: 105 MG/DL (ref 70–99)
GLUCOSE BLDC GLUCOMTR-MCNC: 113 MG/DL (ref 70–99)
GLUCOSE BLDC GLUCOMTR-MCNC: 129 MG/DL (ref 70–99)
GLUCOSE BLDC GLUCOMTR-MCNC: 206 MG/DL (ref 70–99)
GLUCOSE UR-MCNC: NEGATIVE MG/DL
HGB UR QL STRIP.AUTO: NEGATIVE
HYALINE CASTS #/AREA URNS AUTO: PRESENT /LPF
KETONES UR-MCNC: NEGATIVE MG/DL
MAGNESIUM SERPL-MCNC: 1.9 MG/DL (ref 1.6–2.6)
NITRITE UR QL STRIP.AUTO: NEGATIVE
OSMOLALITY SERPL CALC.SUM OF ELEC: 305 MOSM/KG (ref 275–295)
PH UR: 6 [PH] (ref 5–8)
PHOSPHATE SERPL-MCNC: 3.2 MG/DL (ref 2.5–4.9)
POTASSIUM SERPL-SCNC: 3.3 MMOL/L (ref 3.5–5.1)
POTASSIUM SERPL-SCNC: 3.7 MMOL/L (ref 3.5–5.1)
PROT UR-MCNC: 30 MG/DL
SODIUM SERPL-SCNC: 143 MMOL/L (ref 136–145)
SP GR UR STRIP: 1.02 (ref 1–1.03)
UROBILINOGEN UR STRIP-ACNC: 2
VIT C UR-MCNC: NEGATIVE MG/DL
WBC #/AREA URNS AUTO: >50 /HPF

## 2022-12-21 PROCEDURE — 94799 UNLISTED PULMONARY SVC/PX: CPT

## 2022-12-21 PROCEDURE — 87086 URINE CULTURE/COLONY COUNT: CPT | Performed by: STUDENT IN AN ORGANIZED HEALTH CARE EDUCATION/TRAINING PROGRAM

## 2022-12-21 PROCEDURE — 82962 GLUCOSE BLOOD TEST: CPT

## 2022-12-21 PROCEDURE — 97530 THERAPEUTIC ACTIVITIES: CPT

## 2022-12-21 PROCEDURE — 87186 SC STD MICRODIL/AGAR DIL: CPT | Performed by: STUDENT IN AN ORGANIZED HEALTH CARE EDUCATION/TRAINING PROGRAM

## 2022-12-21 PROCEDURE — 97162 PT EVAL MOD COMPLEX 30 MIN: CPT

## 2022-12-21 PROCEDURE — 81001 URINALYSIS AUTO W/SCOPE: CPT | Performed by: STUDENT IN AN ORGANIZED HEALTH CARE EDUCATION/TRAINING PROGRAM

## 2022-12-21 PROCEDURE — 87088 URINE BACTERIA CULTURE: CPT | Performed by: STUDENT IN AN ORGANIZED HEALTH CARE EDUCATION/TRAINING PROGRAM

## 2022-12-21 PROCEDURE — 83735 ASSAY OF MAGNESIUM: CPT | Performed by: INTERNAL MEDICINE

## 2022-12-21 PROCEDURE — 84132 ASSAY OF SERUM POTASSIUM: CPT | Performed by: STUDENT IN AN ORGANIZED HEALTH CARE EDUCATION/TRAINING PROGRAM

## 2022-12-21 PROCEDURE — 80069 RENAL FUNCTION PANEL: CPT | Performed by: INTERNAL MEDICINE

## 2022-12-21 RX ORDER — POTASSIUM CHLORIDE 20 MEQ/1
40 TABLET, EXTENDED RELEASE ORAL ONCE
Status: COMPLETED | OUTPATIENT
Start: 2022-12-21 | End: 2022-12-21

## 2022-12-21 RX ORDER — HYDRALAZINE HYDROCHLORIDE 25 MG/1
25 TABLET, FILM COATED ORAL EVERY 8 HOURS SCHEDULED
Status: DISCONTINUED | OUTPATIENT
Start: 2022-12-21 | End: 2022-12-27

## 2022-12-21 RX ORDER — HYDRALAZINE HYDROCHLORIDE 20 MG/ML
10 INJECTION INTRAMUSCULAR; INTRAVENOUS EVERY 6 HOURS PRN
Status: ACTIVE | OUTPATIENT
Start: 2022-12-21 | End: 2022-12-22

## 2022-12-21 NOTE — PLAN OF CARE
Pt did not report any CP, SOB or any discomfort overnight. No fever and not given any prn anti-hypertensive meds. Call light within reach and reinforced use. Bed in lowest, locked position. Nonskid socks in place. Problem: Patient Centered Care  Goal: Patient preferences are identified and integrated in the patient's plan of care  Description: Interventions:  - What would you like us to know as we care for you?  Im from home with my son  - Provide timely, complete, and accurate information to patient/family  - Incorporate patient and family knowledge, values, beliefs, and cultural backgrounds into the planning and delivery of care  - Encourage patient/family to participate in care and decision-making at the level they choose  - Honor patient and family perspectives and choices  Outcome: Progressing     Problem: Patient/Family Goals  Goal: Patient/Family Long Term Goal  Description: Patient's Long Term Goal: Get stronger     Interventions:  - See additional Care Plan goals for specific interventions  Outcome: Progressing  Goal: Patient/Family Short Term Goal  Description: Patient's Short Term Goal: Go home     Interventions:   - See additional Care Plan goals for specific interventions  Outcome: Progressing     Problem: PAIN - ADULT  Goal: Verbalizes/displays adequate comfort level or patient's stated pain goal  Description: INTERVENTIONS:  - Encourage pt to monitor pain and request assistance  - Assess pain using appropriate pain scale  - Administer analgesics based on type and severity of pain and evaluate response  - Implement non-pharmacological measures as appropriate and evaluate response  - Consider cultural and social influences on pain and pain management  - Manage/alleviate anxiety  - Utilize distraction and/or relaxation techniques  - Monitor for opioid side effects  - Notify MD/LIP if interventions unsuccessful or patient reports new pain  - Anticipate increased pain with activity and pre-medicate as appropriate  Outcome: Progressing     Problem: SAFETY ADULT - FALL  Goal: Free from fall injury  Description: INTERVENTIONS:  - Assess pt frequently for physical needs  - Identify cognitive and physical deficits and behaviors that affect risk of falls.   - [place] fall precautions as indicated by assessment.  - Educate pt/family on patient safety including physical limitations  - Instruct pt to call for assistance with activity based on assessment  - Modify environment to reduce risk of injury  - Provide assistive devices as appropriate  - Consider OT/PT consult to assist with strengthening/mobility  - Encourage toileting schedule  Outcome: Progressing     Problem: DISCHARGE PLANNING  Goal: Discharge to home or other facility with appropriate resources  Description: INTERVENTIONS:  - Identify barriers to discharge w/pt and caregiver  - Include patient/family/discharge partner in discharge planning  - Arrange for needed discharge resources and transportation as appropriate  - Identify discharge learning needs (meds, wound care, etc)  - Arrange for interpreters to assist at discharge as needed  - Consider post-discharge preferences of patient/family/discharge partner  - Complete POLST form as appropriate  - Assess patient's ability to be responsible for managing their own health  - Refer to Case Management Department for coordinating discharge planning if the patient needs post-hospital services based on physician/LIP order or complex needs related to functional status, cognitive ability or social support system  Outcome: Progressing

## 2022-12-21 NOTE — PLAN OF CARE
Eliud, A/O x 1-2, RA, 1 assist with a walker, patient has Pure wick on. Urinalysis sent down to lab. Patient was unable to answer where she was, what seasons it is, what month it is. Reoriented patient to day, season, month and birthday. Patient is relaxing in chair. Problem: Patient Centered Care  Goal: Patient preferences are identified and integrated in the patient's plan of care  Description: Interventions:  - What would you like us to know as we care for you?  Im from home with my son  - Provide timely, complete, and accurate information to patient/family  - Incorporate patient and family knowledge, values, beliefs, and cultural backgrounds into the planning and delivery of care  - Encourage patient/family to participate in care and decision-making at the level they choose  - Honor patient and family perspectives and choices  Outcome: Progressing     Problem: Patient/Family Goals  Goal: Patient/Family Long Term Goal  Description: Patient's Long Term Goal: Get stronger     Interventions:  - See additional Care Plan goals for specific interventions  Outcome: Progressing  Goal: Patient/Family Short Term Goal  Description: Patient's Short Term Goal: Go home     Interventions:   - See additional Care Plan goals for specific interventions  Outcome: Progressing     Problem: PAIN - ADULT  Goal: Verbalizes/displays adequate comfort level or patient's stated pain goal  Description: INTERVENTIONS:  - Encourage pt to monitor pain and request assistance  - Assess pain using appropriate pain scale  - Administer analgesics based on type and severity of pain and evaluate response  - Implement non-pharmacological measures as appropriate and evaluate response  - Consider cultural and social influences on pain and pain management  - Manage/alleviate anxiety  - Utilize distraction and/or relaxation techniques  - Monitor for opioid side effects  - Notify MD/LIP if interventions unsuccessful or patient reports new pain  - Anticipate increased pain with activity and pre-medicate as appropriate  Outcome: Progressing     Problem: SAFETY ADULT - FALL  Goal: Free from fall injury  Description: INTERVENTIONS:  - Assess pt frequently for physical needs  - Identify cognitive and physical deficits and behaviors that affect risk of falls.   - [place] fall precautions as indicated by assessment.  - Educate pt/family on patient safety including physical limitations  - Instruct pt to call for assistance with activity based on assessment  - Modify environment to reduce risk of injury  - Provide assistive devices as appropriate  - Consider OT/PT consult to assist with strengthening/mobility  - Encourage toileting schedule  Outcome: Progressing     Problem: DISCHARGE PLANNING  Goal: Discharge to home or other facility with appropriate resources  Description: INTERVENTIONS:  - Identify barriers to discharge w/pt and caregiver  - Include patient/family/discharge partner in discharge planning  - Arrange for needed discharge resources and transportation as appropriate  - Identify discharge learning needs (meds, wound care, etc)  - Arrange for interpreters to assist at discharge as needed  - Consider post-discharge preferences of patient/family/discharge partner  - Complete POLST form as appropriate  - Assess patient's ability to be responsible for managing their own health  - Refer to Case Management Department for coordinating discharge planning if the patient needs post-hospital services based on physician/LIP order or complex needs related to functional status, cognitive ability or social support system  Outcome: Progressing

## 2022-12-22 LAB
ALBUMIN SERPL-MCNC: 3.1 G/DL (ref 3.4–5)
ANION GAP SERPL CALC-SCNC: 5 MMOL/L (ref 0–18)
BUN BLD-MCNC: 33 MG/DL (ref 7–18)
BUN/CREAT SERPL: 20.4 (ref 10–20)
CALCIUM BLD-MCNC: 8.9 MG/DL (ref 8.5–10.1)
CHLORIDE SERPL-SCNC: 111 MMOL/L (ref 98–112)
CO2 SERPL-SCNC: 28 MMOL/L (ref 21–32)
CREAT BLD-MCNC: 1.62 MG/DL
GFR SERPLBLD BASED ON 1.73 SQ M-ARVRAT: 34 ML/MIN/1.73M2 (ref 60–?)
GLUCOSE BLD-MCNC: 107 MG/DL (ref 70–99)
GLUCOSE BLDC GLUCOMTR-MCNC: 103 MG/DL (ref 70–99)
GLUCOSE BLDC GLUCOMTR-MCNC: 120 MG/DL (ref 70–99)
GLUCOSE BLDC GLUCOMTR-MCNC: 138 MG/DL (ref 70–99)
GLUCOSE BLDC GLUCOMTR-MCNC: 174 MG/DL (ref 70–99)
OSMOLALITY SERPL CALC.SUM OF ELEC: 306 MOSM/KG (ref 275–295)
PHOSPHATE SERPL-MCNC: 3 MG/DL (ref 2.5–4.9)
POTASSIUM SERPL-SCNC: 3.5 MMOL/L (ref 3.5–5.1)
SODIUM SERPL-SCNC: 144 MMOL/L (ref 136–145)

## 2022-12-22 PROCEDURE — 94799 UNLISTED PULMONARY SVC/PX: CPT

## 2022-12-22 PROCEDURE — 82962 GLUCOSE BLOOD TEST: CPT

## 2022-12-22 PROCEDURE — 80069 RENAL FUNCTION PANEL: CPT | Performed by: INTERNAL MEDICINE

## 2022-12-22 RX ORDER — LEVOFLOXACIN 250 MG/1
250 TABLET ORAL
Status: DISCONTINUED | OUTPATIENT
Start: 2022-12-22 | End: 2022-12-22

## 2022-12-22 NOTE — PLAN OF CARE
Patient alert and oriented to self, reoriented to place/time/situation with notable forgetfulness. Breathing comfortably on room air. No acute distress, complains of some pain in abdomen but declines pain medication, heat pack provided with relief. 1 person assist with a walker. Purewick maintained. Plan is to discharge when medically cleared with renal follow-up.     Problem: PAIN - ADULT  Goal: Verbalizes/displays adequate comfort level or patient's stated pain goal  Description: INTERVENTIONS:  - Encourage pt to monitor pain and request assistance  - Assess pain using appropriate pain scale  - Administer analgesics based on type and severity of pain and evaluate response  - Implement non-pharmacological measures as appropriate and evaluate response  - Consider cultural and social influences on pain and pain management  - Manage/alleviate anxiety  - Utilize distraction and/or relaxation techniques  - Monitor for opioid side effects  - Notify MD/LIP if interventions unsuccessful or patient reports new pain  - Anticipate increased pain with activity and pre-medicate as appropriate  12/22/2022 0516 by Hemalatha Navas RN  Outcome: Progressing     Problem: CARDIOVASCULAR - ADULT  Goal: Absence of cardiac arrhythmias or at baseline  Description: INTERVENTIONS:  - Continuous cardiac monitoring, monitor vital signs, obtain 12 lead EKG if indicated  - Evaluate effectiveness of antiarrhythmic and heart rate control medications as ordered  - Initiate emergency measures for life threatening arrhythmias  - Monitor electrolytes and administer replacement therapy as ordered  12/22/2022 0516 by Hemalatha Navas RN  Outcome: Progressing  12/22/2022 0516 by Hemalatha Navas RN  Outcome: Progressing     Problem: METABOLIC/FLUID AND ELECTROLYTES - ADULT  Goal: Hemodynamic stability and optimal renal function maintained  Description: INTERVENTIONS:  - Monitor labs and assess for signs and symptoms of volume excess or deficit  - Monitor intake, output and patient weight  - Monitor urine specific gravity, serum osmolarity and serum sodium as indicated or ordered  - Monitor response to interventions for patient's volume status, including labs, urine output, blood pressure (other measures as available)  - Encourage oral intake as appropriate  - Instruct patient on fluid and nutrition restrictions as appropriate  Outcome: Progressing

## 2022-12-22 NOTE — PLAN OF CARE
Problem: Patient Centered Care  Goal: Patient preferences are identified and integrated in the patient's plan of care  Description: Interventions:  - What would you like us to know as we care for you?  Im from home with my son  - Provide timely, complete, and accurate information to patient/family  - Incorporate patient and family knowledge, values, beliefs, and cultural backgrounds into the planning and delivery of care  - Encourage patient/family to participate in care and decision-making at the level they choose  - Honor patient and family perspectives and choices  Outcome: Progressing     Problem: Patient/Family Goals  Goal: Patient/Family Long Term Goal  Description: Patient's Long Term Goal: Get stronger     Interventions:  - See additional Care Plan goals for specific interventions  Outcome: Progressing  Goal: Patient/Family Short Term Goal  Description: Patient's Short Term Goal: Go home     Interventions:   - See additional Care Plan goals for specific interventions  Outcome: Progressing     Problem: PAIN - ADULT  Goal: Verbalizes/displays adequate comfort level or patient's stated pain goal  Description: INTERVENTIONS:  - Encourage pt to monitor pain and request assistance  - Assess pain using appropriate pain scale  - Administer analgesics based on type and severity of pain and evaluate response  - Implement non-pharmacological measures as appropriate and evaluate response  - Consider cultural and social influences on pain and pain management  - Manage/alleviate anxiety  - Utilize distraction and/or relaxation techniques  - Monitor for opioid side effects  - Notify MD/LIP if interventions unsuccessful or patient reports new pain  - Anticipate increased pain with activity and pre-medicate as appropriate  Outcome: Progressing     Problem: SAFETY ADULT - FALL  Goal: Free from fall injury  Description: INTERVENTIONS:  - Assess pt frequently for physical needs  - Identify cognitive and physical deficits and behaviors that affect risk of falls. - [place] fall precautions as indicated by assessment.  - Educate pt/family on patient safety including physical limitations  - Instruct pt to call for assistance with activity based on assessment  - Modify environment to reduce risk of injury  - Provide assistive devices as appropriate  - Consider OT/PT consult to assist with strengthening/mobility  - Encourage toileting schedule  Outcome: Progressing     Problem: DISCHARGE PLANNING  Goal: Discharge to home or other facility with appropriate resources  Description: INTERVENTIONS:  - Identify barriers to discharge w/pt and caregiver  - Include patient/family/discharge partner in discharge planning  - Arrange for needed discharge resources and transportation as appropriate  - Identify discharge learning needs (meds, wound care, etc)  - Arrange for interpreters to assist at discharge as needed  - Consider post-discharge preferences of patient/family/discharge partner  - Complete POLST form as appropriate  - Assess patient's ability to be responsible for managing their own health  - Refer to Case Management Department for coordinating discharge planning if the patient needs post-hospital services based on physician/LIP order or complex needs related to functional status, cognitive ability or social support system  Outcome: Progressing     Problem: CARDIOVASCULAR - ADULT  Goal: Maintains optimal cardiac output and hemodynamic stability  Description: INTERVENTIONS:  - Monitor vital signs, rhythm, and trends  - Monitor for bleeding, hypotension and signs of decreased cardiac output  - Evaluate effectiveness of vasoactive medications to optimize hemodynamic stability  - Monitor arterial and/or venous puncture sites for bleeding and/or hematoma  - Assess quality of pulses, skin color and temperature  - Assess for signs of decreased coronary artery perfusion - ex.  Angina  - Evaluate fluid balance, assess for edema, trend weights  Outcome: Progressing  Goal: Absence of cardiac arrhythmias or at baseline  Description: INTERVENTIONS:  - Continuous cardiac monitoring, monitor vital signs, obtain 12 lead EKG if indicated  - Evaluate effectiveness of antiarrhythmic and heart rate control medications as ordered  - Initiate emergency measures for life threatening arrhythmias  - Monitor electrolytes and administer replacement therapy as ordered  Outcome: Progressing     Problem: METABOLIC/FLUID AND ELECTROLYTES - ADULT  Goal: Hemodynamic stability and optimal renal function maintained  Description: INTERVENTIONS:  - Monitor labs and assess for signs and symptoms of volume excess or deficit  - Monitor intake, output and patient weight  - Monitor urine specific gravity, serum osmolarity and serum sodium as indicated or ordered  - Monitor response to interventions for patient's volume status, including labs, urine output, blood pressure (other measures as available)  - Encourage oral intake as appropriate  - Instruct patient on fluid and nutrition restrictions as appropriate  Outcome: Progressing     Patient is alert and oriented X1, VSS, no complaints of pain. IV ABX added and UA to be collected today.

## 2022-12-23 LAB
ANION GAP SERPL CALC-SCNC: 6 MMOL/L (ref 0–18)
BUN BLD-MCNC: 33 MG/DL (ref 7–18)
BUN/CREAT SERPL: 19.8 (ref 10–20)
CALCIUM BLD-MCNC: 9 MG/DL (ref 8.5–10.1)
CHLORIDE SERPL-SCNC: 111 MMOL/L (ref 98–112)
CO2 SERPL-SCNC: 27 MMOL/L (ref 21–32)
CREAT BLD-MCNC: 1.67 MG/DL
DEPRECATED RDW RBC AUTO: 43.2 FL (ref 35.1–46.3)
ERYTHROCYTE [DISTWIDTH] IN BLOOD BY AUTOMATED COUNT: 14.7 % (ref 11–15)
GFR SERPLBLD BASED ON 1.73 SQ M-ARVRAT: 33 ML/MIN/1.73M2 (ref 60–?)
GLUCOSE BLD-MCNC: 103 MG/DL (ref 70–99)
GLUCOSE BLDC GLUCOMTR-MCNC: 123 MG/DL (ref 70–99)
GLUCOSE BLDC GLUCOMTR-MCNC: 155 MG/DL (ref 70–99)
GLUCOSE BLDC GLUCOMTR-MCNC: 180 MG/DL (ref 70–99)
GLUCOSE BLDC GLUCOMTR-MCNC: 193 MG/DL (ref 70–99)
HCT VFR BLD AUTO: 31.7 %
HGB BLD-MCNC: 10.3 G/DL
MCH RBC QN AUTO: 26.1 PG (ref 26–34)
MCHC RBC AUTO-ENTMCNC: 32.5 G/DL (ref 31–37)
MCV RBC AUTO: 80.3 FL
OSMOLALITY SERPL CALC.SUM OF ELEC: 306 MOSM/KG (ref 275–295)
PLATELET # BLD AUTO: 257 10(3)UL (ref 150–450)
POTASSIUM SERPL-SCNC: 3.8 MMOL/L (ref 3.5–5.1)
RBC # BLD AUTO: 3.95 X10(6)UL
SODIUM SERPL-SCNC: 144 MMOL/L (ref 136–145)
WBC # BLD AUTO: 4.4 X10(3) UL (ref 4–11)

## 2022-12-23 PROCEDURE — 82962 GLUCOSE BLOOD TEST: CPT

## 2022-12-23 PROCEDURE — 94799 UNLISTED PULMONARY SVC/PX: CPT

## 2022-12-23 PROCEDURE — 80048 BASIC METABOLIC PNL TOTAL CA: CPT | Performed by: STUDENT IN AN ORGANIZED HEALTH CARE EDUCATION/TRAINING PROGRAM

## 2022-12-23 PROCEDURE — 85027 COMPLETE CBC AUTOMATED: CPT | Performed by: STUDENT IN AN ORGANIZED HEALTH CARE EDUCATION/TRAINING PROGRAM

## 2022-12-23 RX ORDER — TRAMADOL HYDROCHLORIDE 50 MG/1
50 TABLET ORAL EVERY 6 HOURS PRN
COMMUNITY
End: 2022-12-27

## 2022-12-23 RX ORDER — HYDROCODONE BITARTRATE AND ACETAMINOPHEN 5; 325 MG/1; MG/1
1 TABLET ORAL EVERY 8 HOURS PRN
COMMUNITY
End: 2022-12-27

## 2022-12-23 NOTE — PLAN OF CARE
PENDING NOTE    Problem: Patient Centered Care  Goal: Patient preferences are identified and integrated in the patient's plan of care  Description: Interventions:  - What would you like us to know as we care for you?  Im from home with my son  - Provide timely, complete, and accurate information to patient/family  - Incorporate patient and family knowledge, values, beliefs, and cultural backgrounds into the planning and delivery of care  - Encourage patient/family to participate in care and decision-making at the level they choose  - Honor patient and family perspectives and choices  Outcome: Progressing     Problem: Patient/Family Goals  Goal: Patient/Family Long Term Goal  Description: Patient's Long Term Goal: Get stronger     Interventions:  - See additional Care Plan goals for specific interventions  Outcome: Progressing  Goal: Patient/Family Short Term Goal  Description: Patient's Short Term Goal: Go home     Interventions:   - See additional Care Plan goals for specific interventions  Outcome: Progressing     Problem: PAIN - ADULT  Goal: Verbalizes/displays adequate comfort level or patient's stated pain goal  Description: INTERVENTIONS:  - Encourage pt to monitor pain and request assistance  - Assess pain using appropriate pain scale  - Administer analgesics based on type and severity of pain and evaluate response  - Implement non-pharmacological measures as appropriate and evaluate response  - Consider cultural and social influences on pain and pain management  - Manage/alleviate anxiety  - Utilize distraction and/or relaxation techniques  - Monitor for opioid side effects  - Notify MD/LIP if interventions unsuccessful or patient reports new pain  - Anticipate increased pain with activity and pre-medicate as appropriate  Outcome: Progressing     Problem: SAFETY ADULT - FALL  Goal: Free from fall injury  Description: INTERVENTIONS:  - Assess pt frequently for physical needs  - Identify cognitive and physical deficits and behaviors that affect risk of falls. - [place] fall precautions as indicated by assessment.  - Educate pt/family on patient safety including physical limitations  - Instruct pt to call for assistance with activity based on assessment  - Modify environment to reduce risk of injury  - Provide assistive devices as appropriate  - Consider OT/PT consult to assist with strengthening/mobility  - Encourage toileting schedule  Outcome: Progressing     Problem: DISCHARGE PLANNING  Goal: Discharge to home or other facility with appropriate resources  Description: INTERVENTIONS:  - Identify barriers to discharge w/pt and caregiver  - Include patient/family/discharge partner in discharge planning  - Arrange for needed discharge resources and transportation as appropriate  - Identify discharge learning needs (meds, wound care, etc)  - Arrange for interpreters to assist at discharge as needed  - Consider post-discharge preferences of patient/family/discharge partner  - Complete POLST form as appropriate  - Assess patient's ability to be responsible for managing their own health  - Refer to Case Management Department for coordinating discharge planning if the patient needs post-hospital services based on physician/LIP order or complex needs related to functional status, cognitive ability or social support system  Outcome: Progressing     Problem: CARDIOVASCULAR - ADULT  Goal: Maintains optimal cardiac output and hemodynamic stability  Description: INTERVENTIONS:  - Monitor vital signs, rhythm, and trends  - Monitor for bleeding, hypotension and signs of decreased cardiac output  - Evaluate effectiveness of vasoactive medications to optimize hemodynamic stability  - Monitor arterial and/or venous puncture sites for bleeding and/or hematoma  - Assess quality of pulses, skin color and temperature  - Assess for signs of decreased coronary artery perfusion - ex.  Angina  - Evaluate fluid balance, assess for edema, trend weights  Outcome: Progressing  Goal: Absence of cardiac arrhythmias or at baseline  Description: INTERVENTIONS:  - Continuous cardiac monitoring, monitor vital signs, obtain 12 lead EKG if indicated  - Evaluate effectiveness of antiarrhythmic and heart rate control medications as ordered  - Initiate emergency measures for life threatening arrhythmias  - Monitor electrolytes and administer replacement therapy as ordered  Outcome: Progressing     Problem: METABOLIC/FLUID AND ELECTROLYTES - ADULT  Goal: Hemodynamic stability and optimal renal function maintained  Description: INTERVENTIONS:  - Monitor labs and assess for signs and symptoms of volume excess or deficit  - Monitor intake, output and patient weight  - Monitor urine specific gravity, serum osmolarity and serum sodium as indicated or ordered  - Monitor response to interventions for patient's volume status, including labs, urine output, blood pressure (other measures as available)  - Encourage oral intake as appropriate  - Instruct patient on fluid and nutrition restrictions as appropriate  Outcome: Progressing

## 2022-12-23 NOTE — PLAN OF CARE
Plan is to continue antibiotic Rocephin for UTI. CPAP placed for bedtime. BP has been stable. Problem: Patient Centered Care  Goal: Patient preferences are identified and integrated in the patient's plan of care  Description: Interventions:  - What would you like us to know as we care for you?  Im from home with my son  - Provide timely, complete, and accurate information to patient/family  - Incorporate patient and family knowledge, values, beliefs, and cultural backgrounds into the planning and delivery of care  - Encourage patient/family to participate in care and decision-making at the level they choose  - Honor patient and family perspectives and choices  Outcome: Progressing     Problem: Patient/Family Goals  Goal: Patient/Family Long Term Goal  Description: Patient's Long Term Goal: Get stronger     Interventions:  - See additional Care Plan goals for specific interventions  Outcome: Progressing  Goal: Patient/Family Short Term Goal  Description: Patient's Short Term Goal: Go home     Interventions:   - See additional Care Plan goals for specific interventions  Outcome: Progressing     Problem: PAIN - ADULT  Goal: Verbalizes/displays adequate comfort level or patient's stated pain goal  Description: INTERVENTIONS:  - Encourage pt to monitor pain and request assistance  - Assess pain using appropriate pain scale  - Administer analgesics based on type and severity of pain and evaluate response  - Implement non-pharmacological measures as appropriate and evaluate response  - Consider cultural and social influences on pain and pain management  - Manage/alleviate anxiety  - Utilize distraction and/or relaxation techniques  - Monitor for opioid side effects  - Notify MD/LIP if interventions unsuccessful or patient reports new pain  - Anticipate increased pain with activity and pre-medicate as appropriate  Outcome: Progressing     Problem: SAFETY ADULT - FALL  Goal: Free from fall injury  Description: INTERVENTIONS:  - Assess pt frequently for physical needs  - Identify cognitive and physical deficits and behaviors that affect risk of falls.   - [place] fall precautions as indicated by assessment.  - Educate pt/family on patient safety including physical limitations  - Instruct pt to call for assistance with activity based on assessment  - Modify environment to reduce risk of injury  - Provide assistive devices as appropriate  - Consider OT/PT consult to assist with strengthening/mobility  - Encourage toileting schedule  Outcome: Progressing     Problem: DISCHARGE PLANNING  Goal: Discharge to home or other facility with appropriate resources  Description: INTERVENTIONS:  - Identify barriers to discharge w/pt and caregiver  - Include patient/family/discharge partner in discharge planning  - Arrange for needed discharge resources and transportation as appropriate  - Identify discharge learning needs (meds, wound care, etc)  - Arrange for interpreters to assist at discharge as needed  - Consider post-discharge preferences of patient/family/discharge partner  - Complete POLST form as appropriate  - Assess patient's ability to be responsible for managing their own health  - Refer to Case Management Department for coordinating discharge planning if the patient needs post-hospital services based on physician/LIP order or complex needs related to functional status, cognitive ability or social support system  Outcome: Progressing     Problem: CARDIOVASCULAR - ADULT  Goal: Maintains optimal cardiac output and hemodynamic stability  Description: INTERVENTIONS:  - Monitor vital signs, rhythm, and trends  - Monitor for bleeding, hypotension and signs of decreased cardiac output  - Evaluate effectiveness of vasoactive medications to optimize hemodynamic stability  - Monitor arterial and/or venous puncture sites for bleeding and/or hematoma  - Assess quality of pulses, skin color and temperature  - Assess for signs of decreased coronary artery perfusion - ex.  Angina  - Evaluate fluid balance, assess for edema, trend weights  Outcome: Progressing  Goal: Absence of cardiac arrhythmias or at baseline  Description: INTERVENTIONS:  - Continuous cardiac monitoring, monitor vital signs, obtain 12 lead EKG if indicated  - Evaluate effectiveness of antiarrhythmic and heart rate control medications as ordered  - Initiate emergency measures for life threatening arrhythmias  - Monitor electrolytes and administer replacement therapy as ordered  Outcome: Progressing     Problem: METABOLIC/FLUID AND ELECTROLYTES - ADULT  Goal: Hemodynamic stability and optimal renal function maintained  Description: INTERVENTIONS:  - Monitor labs and assess for signs and symptoms of volume excess or deficit  - Monitor intake, output and patient weight  - Monitor urine specific gravity, serum osmolarity and serum sodium as indicated or ordered  - Monitor response to interventions for patient's volume status, including labs, urine output, blood pressure (other measures as available)  - Encourage oral intake as appropriate  - Instruct patient on fluid and nutrition restrictions as appropriate  Outcome: Progressing

## 2022-12-23 NOTE — CM/SW NOTE
12/23/22 0800   CM/SW Referral Data   Referral Source    Reason for Referral Discharge planning   Informant EMR;Clinical Staff Member   Pertinent Medical Hx   Does patient have an established PCP? Yes  Evangelina Guy)   Patient Info   Patient's Current Mental Status at Time of Assessment Alert;Oriented   Patient's [address]   Patient lives with Son   Patient Status Prior to Admission   Independent with ADLs and Mobility Yes   Discharge Needs   Anticipated D/C needs Home health care   Choice of Post-Acute Provider   Informed patient of right to choose their preferred provider Yes     Pt discussed during nursing rounds. Dx chest pain, hypertensive emergency. From home w/son, independent at baseline. PT recommending HH w/PT and OT at dc, pt agreeable to dc recommendation. [facility] referrals sent in [place], [place] completed. List of accepting agencies will be needed for choice. 1400: [facility] is only accepting agency as of the time of this entry. Agency reserved in [place] and added to [facility].    Plan: Home w/son and [facility] pending medical clearance. / to remain available for support and/or discharge planning.      ZOË Billingsley    [phone]

## 2022-12-23 NOTE — DISCHARGE INSTRUCTIONS
Sometimes managing your health at home requires assistance. The [facility] team has recognized your preference to use [address], Phone: [phone]. A representative from the home health agency will contact you or your family to schedule your first visit.

## 2022-12-23 NOTE — PLAN OF CARE
Patient's son at bedside today. See discontinued medications from meds brought in. Continued IV Rocephin. Blood sugars well-managed today. Plan - home with [facility]. Problem: Patient Centered Care  Goal: Patient preferences are identified and integrated in the patient's plan of care  Description: Interventions:  - What would you like us to know as we care for you?  Im from home with my son  - Provide timely, complete, and accurate information to patient/family  - Incorporate patient and family knowledge, values, beliefs, and cultural backgrounds into the planning and delivery of care  - Encourage patient/family to participate in care and decision-making at the level they choose  - Honor patient and family perspectives and choices  12/23/2022 1641 by Tate Rivero RN  Outcome: Progressing  12/23/2022 1640 by Tate Rivero RN  Outcome: Progressing     Problem: Patient/Family Goals  Goal: Patient/Family Long Term Goal  Description: Patient's Long Term Goal: Get stronger     Interventions:  - See additional Care Plan goals for specific interventions  12/23/2022 1641 by Tate Rivero RN  Outcome: Progressing  12/23/2022 1640 by Tate Rivero RN  Outcome: Progressing  Goal: Patient/Family Short Term Goal  Description: Patient's Short Term Goal: Go home     Interventions:   - See additional Care Plan goals for specific interventions  12/23/2022 1641 by Tate Rivero RN  Outcome: Progressing  12/23/2022 1640 by Tate Rivero RN  Outcome: Progressing     Problem: PAIN - ADULT  Goal: Verbalizes/displays adequate comfort level or patient's stated pain goal  Description: INTERVENTIONS:  - Encourage pt to monitor pain and request assistance  - Assess pain using appropriate pain scale  - Administer analgesics based on type and severity of pain and evaluate response  - Implement non-pharmacological measures as appropriate and evaluate response  - Consider cultural and social influences on pain and pain management  - Manage/alleviate anxiety  - Utilize distraction and/or relaxation techniques  - Monitor for opioid side effects  - Notify MD/LIP if interventions unsuccessful or patient reports new pain  - Anticipate increased pain with activity and pre-medicate as appropriate  12/23/2022 1641 by Marilyn Danielson RN  Outcome: Progressing  12/23/2022 1640 by Marilyn Danielson RN  Outcome: Progressing     Problem: SAFETY ADULT - FALL  Goal: Free from fall injury  Description: INTERVENTIONS:  - Assess pt frequently for physical needs  - Identify cognitive and physical deficits and behaviors that affect risk of falls.   - [place] fall precautions as indicated by assessment.  - Educate pt/family on patient safety including physical limitations  - Instruct pt to call for assistance with activity based on assessment  - Modify environment to reduce risk of injury  - Provide assistive devices as appropriate  - Consider OT/PT consult to assist with strengthening/mobility  - Encourage toileting schedule  12/23/2022 1641 by Marilyn Danielson RN  Outcome: Progressing  12/23/2022 1640 by Marilyn Danielson RN  Outcome: Progressing     Problem: DISCHARGE PLANNING  Goal: Discharge to home or other facility with appropriate resources  Description: INTERVENTIONS:  - Identify barriers to discharge w/pt and caregiver  - Include patient/family/discharge partner in discharge planning  - Arrange for needed discharge resources and transportation as appropriate  - Identify discharge learning needs (meds, wound care, etc)  - Arrange for interpreters to assist at discharge as needed  - Consider post-discharge preferences of patient/family/discharge partner  - Complete POLST form as appropriate  - Assess patient's ability to be responsible for managing their own health  - Refer to Case Management Department for coordinating discharge planning if the patient needs post-hospital services based on physician/LIP order or complex needs related to functional status, cognitive ability or social support system  12/23/2022 1641 by Marilyn Danielson RN  Outcome: Progressing  12/23/2022 1640 by Marilyn Danielson RN  Outcome: Progressing     Problem: CARDIOVASCULAR - ADULT  Goal: Maintains optimal cardiac output and hemodynamic stability  Description: INTERVENTIONS:  - Monitor vital signs, rhythm, and trends  - Monitor for bleeding, hypotension and signs of decreased cardiac output  - Evaluate effectiveness of vasoactive medications to optimize hemodynamic stability  - Monitor arterial and/or venous puncture sites for bleeding and/or hematoma  - Assess quality of pulses, skin color and temperature  - Assess for signs of decreased coronary artery perfusion - ex.  Angina  - Evaluate fluid balance, assess for edema, trend weights  12/23/2022 1641 by Marilyn Danielson RN  Outcome: Progressing  12/23/2022 1640 by Marilyn Danielson RN  Outcome: Progressing  Goal: Absence of cardiac arrhythmias or at baseline  Description: INTERVENTIONS:  - Continuous cardiac monitoring, monitor vital signs, obtain 12 lead EKG if indicated  - Evaluate effectiveness of antiarrhythmic and heart rate control medications as ordered  - Initiate emergency measures for life threatening arrhythmias  - Monitor electrolytes and administer replacement therapy as ordered  12/23/2022 1641 by Marilyn Danielson RN  Outcome: Progressing  12/23/2022 1640 by Marilyn Danielson RN  Outcome: Progressing     Problem: METABOLIC/FLUID AND ELECTROLYTES - ADULT  Goal: Hemodynamic stability and optimal renal function maintained  Description: INTERVENTIONS:  - Monitor labs and assess for signs and symptoms of volume excess or deficit  - Monitor intake, output and patient weight  - Monitor urine specific gravity, serum osmolarity and serum sodium as indicated or ordered  - Monitor response to interventions for patient's volume status, including labs, urine output, blood pressure (other measures as available)  - Encourage oral intake as appropriate  - Instruct patient on fluid and nutrition restrictions as appropriate  12/23/2022 1641 by Marie Childs RN  Outcome: Progressing  12/23/2022 1640 by Marie Childs RN  Outcome: Progressing

## 2022-12-24 LAB
ANION GAP SERPL CALC-SCNC: 6 MMOL/L (ref 0–18)
ANION GAP SERPL CALC-SCNC: 7 MMOL/L (ref 0–18)
BUN BLD-MCNC: 35 MG/DL (ref 7–18)
BUN BLD-MCNC: 39 MG/DL (ref 7–18)
BUN/CREAT SERPL: 17.6 (ref 10–20)
BUN/CREAT SERPL: 17.6 (ref 10–20)
CALCIUM BLD-MCNC: 9 MG/DL (ref 8.5–10.1)
CALCIUM BLD-MCNC: 9.3 MG/DL (ref 8.5–10.1)
CHLORIDE SERPL-SCNC: 109 MMOL/L (ref 98–112)
CHLORIDE SERPL-SCNC: 109 MMOL/L (ref 98–112)
CO2 SERPL-SCNC: 26 MMOL/L (ref 21–32)
CO2 SERPL-SCNC: 26 MMOL/L (ref 21–32)
CREAT BLD-MCNC: 1.99 MG/DL
CREAT BLD-MCNC: 2.21 MG/DL
CREAT UR-SCNC: 17.8 MG/DL
DEPRECATED RDW RBC AUTO: 44.3 FL (ref 35.1–46.3)
ERYTHROCYTE [DISTWIDTH] IN BLOOD BY AUTOMATED COUNT: 15.1 % (ref 11–15)
GFR SERPLBLD BASED ON 1.73 SQ M-ARVRAT: 24 ML/MIN/1.73M2 (ref 60–?)
GFR SERPLBLD BASED ON 1.73 SQ M-ARVRAT: 27 ML/MIN/1.73M2 (ref 60–?)
GLUCOSE BLD-MCNC: 124 MG/DL (ref 70–99)
GLUCOSE BLD-MCNC: 159 MG/DL (ref 70–99)
GLUCOSE BLDC GLUCOMTR-MCNC: 123 MG/DL (ref 70–99)
GLUCOSE BLDC GLUCOMTR-MCNC: 186 MG/DL (ref 70–99)
GLUCOSE BLDC GLUCOMTR-MCNC: 187 MG/DL (ref 70–99)
HCT VFR BLD AUTO: 31.6 %
HGB BLD-MCNC: 10 G/DL
MCH RBC QN AUTO: 25.6 PG (ref 26–34)
MCHC RBC AUTO-ENTMCNC: 31.6 G/DL (ref 31–37)
MCV RBC AUTO: 80.8 FL
OSMOLALITY SERPL CALC.SUM OF ELEC: 303 MOSM/KG (ref 275–295)
OSMOLALITY SERPL CALC.SUM OF ELEC: 305 MOSM/KG (ref 275–295)
PLATELET # BLD AUTO: 257 10(3)UL (ref 150–450)
POTASSIUM SERPL-SCNC: 3.7 MMOL/L (ref 3.5–5.1)
POTASSIUM SERPL-SCNC: 3.8 MMOL/L (ref 3.5–5.1)
RBC # BLD AUTO: 3.91 X10(6)UL
SODIUM SERPL-SCNC: 141 MMOL/L (ref 136–145)
SODIUM SERPL-SCNC: 142 MMOL/L (ref 136–145)
SODIUM SERPL-SCNC: 15 MMOL/L
WBC # BLD AUTO: 5 X10(3) UL (ref 4–11)

## 2022-12-24 PROCEDURE — 82570 ASSAY OF URINE CREATININE: CPT | Performed by: INTERNAL MEDICINE

## 2022-12-24 PROCEDURE — 82962 GLUCOSE BLOOD TEST: CPT

## 2022-12-24 PROCEDURE — 84300 ASSAY OF URINE SODIUM: CPT | Performed by: INTERNAL MEDICINE

## 2022-12-24 PROCEDURE — 85027 COMPLETE CBC AUTOMATED: CPT | Performed by: STUDENT IN AN ORGANIZED HEALTH CARE EDUCATION/TRAINING PROGRAM

## 2022-12-24 PROCEDURE — 80048 BASIC METABOLIC PNL TOTAL CA: CPT | Performed by: HOSPITALIST

## 2022-12-24 PROCEDURE — 80048 BASIC METABOLIC PNL TOTAL CA: CPT | Performed by: STUDENT IN AN ORGANIZED HEALTH CARE EDUCATION/TRAINING PROGRAM

## 2022-12-24 NOTE — PLAN OF CARE
Pt. still receiving IV antibiotic for E. Coli UTI. Confusion is improving but not at baseline yet. No complaints of pain. Vitals are stable. Plan is for home with [facility]. Problem: Patient Centered Care  Goal: Patient preferences are identified and integrated in the patient's plan of care  Description: Interventions:  - What would you like us to know as we care for you?  Im from home with my son  - Provide timely, complete, and accurate information to patient/family  - Incorporate patient and family knowledge, values, beliefs, and cultural backgrounds into the planning and delivery of care  - Encourage patient/family to participate in care and decision-making at the level they choose  - Honor patient and family perspectives and choices  Outcome: Progressing     Problem: Patient/Family Goals  Goal: Patient/Family Long Term Goal  Description: Patient's Long Term Goal: Get stronger     Interventions:  - See additional Care Plan goals for specific interventions  Outcome: Progressing  Goal: Patient/Family Short Term Goal  Description: Patient's Short Term Goal: Go home     Interventions:   - See additional Care Plan goals for specific interventions  Outcome: Progressing     Problem: PAIN - ADULT  Goal: Verbalizes/displays adequate comfort level or patient's stated pain goal  Description: INTERVENTIONS:  - Encourage pt to monitor pain and request assistance  - Assess pain using appropriate pain scale  - Administer analgesics based on type and severity of pain and evaluate response  - Implement non-pharmacological measures as appropriate and evaluate response  - Consider cultural and social influences on pain and pain management  - Manage/alleviate anxiety  - Utilize distraction and/or relaxation techniques  - Monitor for opioid side effects  - Notify MD/LIP if interventions unsuccessful or patient reports new pain  - Anticipate increased pain with activity and pre-medicate as appropriate  Outcome: Progressing     Problem: SAFETY ADULT - FALL  Goal: Free from fall injury  Description: INTERVENTIONS:  - Assess pt frequently for physical needs  - Identify cognitive and physical deficits and behaviors that affect risk of falls.   - [place] fall precautions as indicated by assessment.  - Educate pt/family on patient safety including physical limitations  - Instruct pt to call for assistance with activity based on assessment  - Modify environment to reduce risk of injury  - Provide assistive devices as appropriate  - Consider OT/PT consult to assist with strengthening/mobility  - Encourage toileting schedule  Outcome: Progressing     Problem: DISCHARGE PLANNING  Goal: Discharge to home or other facility with appropriate resources  Description: INTERVENTIONS:  - Identify barriers to discharge w/pt and caregiver  - Include patient/family/discharge partner in discharge planning  - Arrange for needed discharge resources and transportation as appropriate  - Identify discharge learning needs (meds, wound care, etc)  - Arrange for interpreters to assist at discharge as needed  - Consider post-discharge preferences of patient/family/discharge partner  - Complete POLST form as appropriate  - Assess patient's ability to be responsible for managing their own health  - Refer to Case Management Department for coordinating discharge planning if the patient needs post-hospital services based on physician/LIP order or complex needs related to functional status, cognitive ability or social support system  Outcome: Progressing     Problem: CARDIOVASCULAR - ADULT  Goal: Maintains optimal cardiac output and hemodynamic stability  Description: INTERVENTIONS:  - Monitor vital signs, rhythm, and trends  - Monitor for bleeding, hypotension and signs of decreased cardiac output  - Evaluate effectiveness of vasoactive medications to optimize hemodynamic stability  - Monitor arterial and/or venous puncture sites for bleeding and/or hematoma  - Assess quality of pulses, skin color and temperature  - Assess for signs of decreased coronary artery perfusion - ex.  Angina  - Evaluate fluid balance, assess for edema, trend weights  Outcome: Progressing  Goal: Absence of cardiac arrhythmias or at baseline  Description: INTERVENTIONS:  - Continuous cardiac monitoring, monitor vital signs, obtain 12 lead EKG if indicated  - Evaluate effectiveness of antiarrhythmic and heart rate control medications as ordered  - Initiate emergency measures for life threatening arrhythmias  - Monitor electrolytes and administer replacement therapy as ordered  Outcome: Progressing     Problem: METABOLIC/FLUID AND ELECTROLYTES - ADULT  Goal: Hemodynamic stability and optimal renal function maintained  Description: INTERVENTIONS:  - Monitor labs and assess for signs and symptoms of volume excess or deficit  - Monitor intake, output and patient weight  - Monitor urine specific gravity, serum osmolarity and serum sodium as indicated or ordered  - Monitor response to interventions for patient's volume status, including labs, urine output, blood pressure (other measures as available)  - Encourage oral intake as appropriate  - Instruct patient on fluid and nutrition restrictions as appropriate  Outcome: Progressing

## 2022-12-25 LAB
ANION GAP SERPL CALC-SCNC: 6 MMOL/L (ref 0–18)
BUN BLD-MCNC: 30 MG/DL (ref 7–18)
BUN/CREAT SERPL: 19 (ref 10–20)
CALCIUM BLD-MCNC: 9.1 MG/DL (ref 8.5–10.1)
CHLORIDE SERPL-SCNC: 112 MMOL/L (ref 98–112)
CO2 SERPL-SCNC: 27 MMOL/L (ref 21–32)
CREAT BLD-MCNC: 1.58 MG/DL
GFR SERPLBLD BASED ON 1.73 SQ M-ARVRAT: 35 ML/MIN/1.73M2 (ref 60–?)
GLUCOSE BLD-MCNC: 112 MG/DL (ref 70–99)
GLUCOSE BLDC GLUCOMTR-MCNC: 120 MG/DL (ref 70–99)
GLUCOSE BLDC GLUCOMTR-MCNC: 129 MG/DL (ref 70–99)
GLUCOSE BLDC GLUCOMTR-MCNC: 204 MG/DL (ref 70–99)
GLUCOSE BLDC GLUCOMTR-MCNC: 244 MG/DL (ref 70–99)
OSMOLALITY SERPL CALC.SUM OF ELEC: 307 MOSM/KG (ref 275–295)
POTASSIUM SERPL-SCNC: 3.8 MMOL/L (ref 3.5–5.1)
SODIUM SERPL-SCNC: 145 MMOL/L (ref 136–145)

## 2022-12-25 PROCEDURE — 80048 BASIC METABOLIC PNL TOTAL CA: CPT | Performed by: HOSPITALIST

## 2022-12-25 PROCEDURE — 82962 GLUCOSE BLOOD TEST: CPT

## 2022-12-25 NOTE — PLAN OF CARE
Continue with antibiotics, monitor kidney function, straight cath patient due to retention, will continue to monitor. Problem: Patient Centered Care  Goal: Patient preferences are identified and integrated in the patient's plan of care  Description: Interventions:  - What would you like us to know as we care for you?  Im from home with my son  - Provide timely, complete, and accurate information to patient/family  - Incorporate patient and family knowledge, values, beliefs, and cultural backgrounds into the planning and delivery of care  - Encourage patient/family to participate in care and decision-making at the level they choose  - Honor patient and family perspectives and choices  Outcome: Progressing     Problem: Patient/Family Goals  Goal: Patient/Family Long Term Goal  Description: Patient's Long Term Goal: Get stronger     Interventions:  - See additional Care Plan goals for specific interventions  Outcome: Progressing  Goal: Patient/Family Short Term Goal  Description: Patient's Short Term Goal: Go home     Interventions:   - See additional Care Plan goals for specific interventions  Outcome: Progressing     Problem: PAIN - ADULT  Goal: Verbalizes/displays adequate comfort level or patient's stated pain goal  Description: INTERVENTIONS:  - Encourage pt to monitor pain and request assistance  - Assess pain using appropriate pain scale  - Administer analgesics based on type and severity of pain and evaluate response  - Implement non-pharmacological measures as appropriate and evaluate response  - Consider cultural and social influences on pain and pain management  - Manage/alleviate anxiety  - Utilize distraction and/or relaxation techniques  - Monitor for opioid side effects  - Notify MD/LIP if interventions unsuccessful or patient reports new pain  - Anticipate increased pain with activity and pre-medicate as appropriate  Outcome: Progressing     Problem: SAFETY ADULT - FALL  Goal: Free from fall injury  Description: INTERVENTIONS:  - Assess pt frequently for physical needs  - Identify cognitive and physical deficits and behaviors that affect risk of falls.   - [place] fall precautions as indicated by assessment.  - Educate pt/family on patient safety including physical limitations  - Instruct pt to call for assistance with activity based on assessment  - Modify environment to reduce risk of injury  - Provide assistive devices as appropriate  - Consider OT/PT consult to assist with strengthening/mobility  - Encourage toileting schedule  Outcome: Progressing     Problem: DISCHARGE PLANNING  Goal: Discharge to home or other facility with appropriate resources  Description: INTERVENTIONS:  - Identify barriers to discharge w/pt and caregiver  - Include patient/family/discharge partner in discharge planning  - Arrange for needed discharge resources and transportation as appropriate  - Identify discharge learning needs (meds, wound care, etc)  - Arrange for interpreters to assist at discharge as needed  - Consider post-discharge preferences of patient/family/discharge partner  - Complete POLST form as appropriate  - Assess patient's ability to be responsible for managing their own health  - Refer to Case Management Department for coordinating discharge planning if the patient needs post-hospital services based on physician/LIP order or complex needs related to functional status, cognitive ability or social support system  Outcome: Progressing     Problem: CARDIOVASCULAR - ADULT  Goal: Maintains optimal cardiac output and hemodynamic stability  Description: INTERVENTIONS:  - Monitor vital signs, rhythm, and trends  - Monitor for bleeding, hypotension and signs of decreased cardiac output  - Evaluate effectiveness of vasoactive medications to optimize hemodynamic stability  - Monitor arterial and/or venous puncture sites for bleeding and/or hematoma  - Assess quality of pulses, skin color and temperature  - Assess for signs of decreased coronary artery perfusion - ex.  Angina  - Evaluate fluid balance, assess for edema, trend weights  Outcome: Progressing  Goal: Absence of cardiac arrhythmias or at baseline  Description: INTERVENTIONS:  - Continuous cardiac monitoring, monitor vital signs, obtain 12 lead EKG if indicated  - Evaluate effectiveness of antiarrhythmic and heart rate control medications as ordered  - Initiate emergency measures for life threatening arrhythmias  - Monitor electrolytes and administer replacement therapy as ordered  Outcome: Progressing     Problem: METABOLIC/FLUID AND ELECTROLYTES - ADULT  Goal: Hemodynamic stability and optimal renal function maintained  Description: INTERVENTIONS:  - Monitor labs and assess for signs and symptoms of volume excess or deficit  - Monitor intake, output and patient weight  - Monitor urine specific gravity, serum osmolarity and serum sodium as indicated or ordered  - Monitor response to interventions for patient's volume status, including labs, urine output, blood pressure (other measures as available)  - Encourage oral intake as appropriate  - Instruct patient on fluid and nutrition restrictions as appropriate  Outcome: Progressing

## 2022-12-25 NOTE — PLAN OF CARE
Pt. bladder scanned twice and straight cathed once. Plan is to continue monitoring BUN and CR levels from CKD and perhaps a muniz cath if retention persists due to pt. complicated anatomy. No complaints of pain. Problem: Patient Centered Care  Goal: Patient preferences are identified and integrated in the patient's plan of care  Description: Interventions:  - What would you like us to know as we care for you?  Im from home with my son  - Provide timely, complete, and accurate information to patient/family  - Incorporate patient and family knowledge, values, beliefs, and cultural backgrounds into the planning and delivery of care  - Encourage patient/family to participate in care and decision-making at the level they choose  - Honor patient and family perspectives and choices  Outcome: Progressing     Problem: Patient/Family Goals  Goal: Patient/Family Long Term Goal  Description: Patient's Long Term Goal: Get stronger     Interventions:  - See additional Care Plan goals for specific interventions  Outcome: Progressing  Goal: Patient/Family Short Term Goal  Description: Patient's Short Term Goal: Go home     Interventions:   - See additional Care Plan goals for specific interventions  Outcome: Progressing     Problem: PAIN - ADULT  Goal: Verbalizes/displays adequate comfort level or patient's stated pain goal  Description: INTERVENTIONS:  - Encourage pt to monitor pain and request assistance  - Assess pain using appropriate pain scale  - Administer analgesics based on type and severity of pain and evaluate response  - Implement non-pharmacological measures as appropriate and evaluate response  - Consider cultural and social influences on pain and pain management  - Manage/alleviate anxiety  - Utilize distraction and/or relaxation techniques  - Monitor for opioid side effects  - Notify MD/LIP if interventions unsuccessful or patient reports new pain  - Anticipate increased pain with activity and pre-medicate as appropriate  Outcome: Progressing     Problem: SAFETY ADULT - FALL  Goal: Free from fall injury  Description: INTERVENTIONS:  - Assess pt frequently for physical needs  - Identify cognitive and physical deficits and behaviors that affect risk of falls.   - [place] fall precautions as indicated by assessment.  - Educate pt/family on patient safety including physical limitations  - Instruct pt to call for assistance with activity based on assessment  - Modify environment to reduce risk of injury  - Provide assistive devices as appropriate  - Consider OT/PT consult to assist with strengthening/mobility  - Encourage toileting schedule  Outcome: Progressing     Problem: DISCHARGE PLANNING  Goal: Discharge to home or other facility with appropriate resources  Description: INTERVENTIONS:  - Identify barriers to discharge w/pt and caregiver  - Include patient/family/discharge partner in discharge planning  - Arrange for needed discharge resources and transportation as appropriate  - Identify discharge learning needs (meds, wound care, etc)  - Arrange for interpreters to assist at discharge as needed  - Consider post-discharge preferences of patient/family/discharge partner  - Complete POLST form as appropriate  - Assess patient's ability to be responsible for managing their own health  - Refer to Case Management Department for coordinating discharge planning if the patient needs post-hospital services based on physician/LIP order or complex needs related to functional status, cognitive ability or social support system  Outcome: Progressing     Problem: CARDIOVASCULAR - ADULT  Goal: Maintains optimal cardiac output and hemodynamic stability  Description: INTERVENTIONS:  - Monitor vital signs, rhythm, and trends  - Monitor for bleeding, hypotension and signs of decreased cardiac output  - Evaluate effectiveness of vasoactive medications to optimize hemodynamic stability  - Monitor arterial and/or venous puncture sites for bleeding and/or hematoma  - Assess quality of pulses, skin color and temperature  - Assess for signs of decreased coronary artery perfusion - ex.  Angina  - Evaluate fluid balance, assess for edema, trend weights  Outcome: Progressing  Goal: Absence of cardiac arrhythmias or at baseline  Description: INTERVENTIONS:  - Continuous cardiac monitoring, monitor vital signs, obtain 12 lead EKG if indicated  - Evaluate effectiveness of antiarrhythmic and heart rate control medications as ordered  - Initiate emergency measures for life threatening arrhythmias  - Monitor electrolytes and administer replacement therapy as ordered  Outcome: Progressing     Problem: METABOLIC/FLUID AND ELECTROLYTES - ADULT  Goal: Hemodynamic stability and optimal renal function maintained  Description: INTERVENTIONS:  - Monitor labs and assess for signs and symptoms of volume excess or deficit  - Monitor intake, output and patient weight  - Monitor urine specific gravity, serum osmolarity and serum sodium as indicated or ordered  - Monitor response to interventions for patient's volume status, including labs, urine output, blood pressure (other measures as available)  - Encourage oral intake as appropriate  - Instruct patient on fluid and nutrition restrictions as appropriate  Outcome: Progressing

## 2022-12-26 LAB
ANION GAP SERPL CALC-SCNC: 6 MMOL/L (ref 0–18)
ANION GAP SERPL CALC-SCNC: 6 MMOL/L (ref 0–18)
BUN BLD-MCNC: 29 MG/DL (ref 7–18)
BUN BLD-MCNC: 33 MG/DL (ref 7–18)
BUN/CREAT SERPL: 18.2 (ref 10–20)
BUN/CREAT SERPL: 19.4 (ref 10–20)
CALCIUM BLD-MCNC: 9 MG/DL (ref 8.5–10.1)
CALCIUM BLD-MCNC: 9.2 MG/DL (ref 8.5–10.1)
CHLORIDE SERPL-SCNC: 109 MMOL/L (ref 98–112)
CHLORIDE SERPL-SCNC: 113 MMOL/L (ref 98–112)
CO2 SERPL-SCNC: 27 MMOL/L (ref 21–32)
CO2 SERPL-SCNC: 27 MMOL/L (ref 21–32)
CREAT BLD-MCNC: 1.59 MG/DL
CREAT BLD-MCNC: 1.7 MG/DL
GFR SERPLBLD BASED ON 1.73 SQ M-ARVRAT: 32 ML/MIN/1.73M2 (ref 60–?)
GFR SERPLBLD BASED ON 1.73 SQ M-ARVRAT: 35 ML/MIN/1.73M2 (ref 60–?)
GLUCOSE BLD-MCNC: 122 MG/DL (ref 70–99)
GLUCOSE BLD-MCNC: 212 MG/DL (ref 70–99)
GLUCOSE BLDC GLUCOMTR-MCNC: 134 MG/DL (ref 70–99)
GLUCOSE BLDC GLUCOMTR-MCNC: 144 MG/DL (ref 70–99)
GLUCOSE BLDC GLUCOMTR-MCNC: 192 MG/DL (ref 70–99)
GLUCOSE BLDC GLUCOMTR-MCNC: 232 MG/DL (ref 70–99)
OSMOLALITY SERPL CALC.SUM OF ELEC: 306 MOSM/KG (ref 275–295)
OSMOLALITY SERPL CALC.SUM OF ELEC: 311 MOSM/KG (ref 275–295)
POTASSIUM SERPL-SCNC: 4 MMOL/L (ref 3.5–5.1)
POTASSIUM SERPL-SCNC: 4.1 MMOL/L (ref 3.5–5.1)
SODIUM SERPL-SCNC: 142 MMOL/L (ref 136–145)
SODIUM SERPL-SCNC: 146 MMOL/L (ref 136–145)

## 2022-12-26 PROCEDURE — 97530 THERAPEUTIC ACTIVITIES: CPT

## 2022-12-26 PROCEDURE — 80048 BASIC METABOLIC PNL TOTAL CA: CPT | Performed by: HOSPITALIST

## 2022-12-26 PROCEDURE — 94799 UNLISTED PULMONARY SVC/PX: CPT

## 2022-12-26 PROCEDURE — 82962 GLUCOSE BLOOD TEST: CPT

## 2022-12-26 PROCEDURE — 97116 GAIT TRAINING THERAPY: CPT

## 2022-12-26 RX ORDER — SODIUM CHLORIDE 450 MG/100ML
INJECTION, SOLUTION INTRAVENOUS CONTINUOUS
Status: DISCONTINUED | OUTPATIENT
Start: 2022-12-26 | End: 2022-12-27

## 2022-12-26 RX ORDER — BISACODYL 10 MG
10 SUPPOSITORY, RECTAL RECTAL
Status: DISCONTINUED | OUTPATIENT
Start: 2022-12-26 | End: 2022-12-27

## 2022-12-26 RX ORDER — POLYETHYLENE GLYCOL 3350 17 G/17G
17 POWDER, FOR SOLUTION ORAL DAILY
Status: DISCONTINUED | OUTPATIENT
Start: 2022-12-26 | End: 2022-12-27

## 2022-12-26 RX ORDER — DOCUSATE SODIUM 100 MG/1
100 CAPSULE, LIQUID FILLED ORAL 2 TIMES DAILY
Status: DISCONTINUED | OUTPATIENT
Start: 2022-12-26 | End: 2022-12-27

## 2022-12-26 NOTE — PLAN OF CARE
Problem: Patient Centered Care  Goal: Patient preferences are identified and integrated in the patient's plan of care  Description: Interventions:  - What would you like us to know as we care for you?  Im from home with my son  - Provide timely, complete, and accurate information to patient/family  - Incorporate patient and family knowledge, values, beliefs, and cultural backgrounds into the planning and delivery of care  - Encourage patient/family to participate in care and decision-making at the level they choose  - Honor patient and family perspectives and choices  Outcome: Progressing     Problem: Patient/Family Goals  Goal: Patient/Family Long Term Goal  Description: Patient's Long Term Goal: Get stronger     Interventions:  - PT/OT  - Ambulate as tolerated  - See additional Care Plan goals for specific interventions  Outcome: Progressing  Goal: Patient/Family Short Term Goal  Description: Patient's Short Term Goal: Go home     Interventions:   - Monitor vitals  - IV abx  - Monitor urine output  - See additional Care Plan goals for specific interventions  Outcome: Progressing     Problem: PAIN - ADULT  Goal: Verbalizes/displays adequate comfort level or patient's stated pain goal  Description: INTERVENTIONS:  - Encourage pt to monitor pain and request assistance  - Assess pain using appropriate pain scale  - Administer analgesics based on type and severity of pain and evaluate response  - Implement non-pharmacological measures as appropriate and evaluate response  - Consider cultural and social influences on pain and pain management  - Manage/alleviate anxiety  - Utilize distraction and/or relaxation techniques  - Monitor for opioid side effects  - Notify MD/LIP if interventions unsuccessful or patient reports new pain  - Anticipate increased pain with activity and pre-medicate as appropriate  Outcome: Progressing     Problem: SAFETY ADULT - FALL  Goal: Free from fall injury  Description: INTERVENTIONS:  - Assess pt frequently for physical needs  - Identify cognitive and physical deficits and behaviors that affect risk of falls.   - [place] fall precautions as indicated by assessment.  - Educate pt/family on patient safety including physical limitations  - Instruct pt to call for assistance with activity based on assessment  - Modify environment to reduce risk of injury  - Provide assistive devices as appropriate  - Consider OT/PT consult to assist with strengthening/mobility  - Encourage toileting schedule  Outcome: Progressing     Problem: DISCHARGE PLANNING  Goal: Discharge to home or other facility with appropriate resources  Description: INTERVENTIONS:  - Identify barriers to discharge w/pt and caregiver  - Include patient/family/discharge partner in discharge planning  - Arrange for needed discharge resources and transportation as appropriate  - Identify discharge learning needs (meds, wound care, etc)  - Arrange for interpreters to assist at discharge as needed  - Consider post-discharge preferences of patient/family/discharge partner  - Complete POLST form as appropriate  - Assess patient's ability to be responsible for managing their own health  - Refer to Case Management Department for coordinating discharge planning if the patient needs post-hospital services based on physician/LIP order or complex needs related to functional status, cognitive ability or social support system  Outcome: Progressing     Problem: CARDIOVASCULAR - ADULT  Goal: Maintains optimal cardiac output and hemodynamic stability  Description: INTERVENTIONS:  - Monitor vital signs, rhythm, and trends  - Monitor for bleeding, hypotension and signs of decreased cardiac output  - Evaluate effectiveness of vasoactive medications to optimize hemodynamic stability  - Monitor arterial and/or venous puncture sites for bleeding and/or hematoma  - Assess quality of pulses, skin color and temperature  - Assess for signs of decreased coronary artery perfusion - ex. Angina  - Evaluate fluid balance, assess for edema, trend weights  Outcome: Progressing  Goal: Absence of cardiac arrhythmias or at baseline  Description: INTERVENTIONS:  - Continuous cardiac monitoring, monitor vital signs, obtain 12 lead EKG if indicated  - Evaluate effectiveness of antiarrhythmic and heart rate control medications as ordered  - Initiate emergency measures for life threatening arrhythmias  - Monitor electrolytes and administer replacement therapy as ordered  Outcome: Progressing     Problem: METABOLIC/FLUID AND ELECTROLYTES - ADULT  Goal: Hemodynamic stability and optimal renal function maintained  Description: INTERVENTIONS:  - Monitor labs and assess for signs and symptoms of volume excess or deficit  - Monitor intake, output and patient weight  - Monitor urine specific gravity, serum osmolarity and serum sodium as indicated or ordered  - Monitor response to interventions for patient's volume status, including labs, urine output, blood pressure (other measures as available)  - Encourage oral intake as appropriate  - Instruct patient on fluid and nutrition restrictions as appropriate  Outcome: Progressing   Patient alert and orientated x 2 with delayed responses, room air, and up x 1 with a walker. IV abx. Patient had good urine output today - straight cath not required at this time. Call light within reach and safety precautions in place.

## 2022-12-26 NOTE — PLAN OF CARE
Patient denied pain or discomfort. Confused, easily reoriented. Patient urinating purewick in placed for strict I&Os, still retaining urine, bladder scan Q6H < 400ml. (See chart for details). On IV abx for UTI. Wear CPAP at night. Fall precaution maintained. Plan for discharge with home health once medically clear. Problem: Patient Centered Care  Goal: Patient preferences are identified and integrated in the patient's plan of care  Description: Interventions:  - What would you like us to know as we care for you?  Im from home with my son  - Provide timely, complete, and accurate information to patient/family  - Incorporate patient and family knowledge, values, beliefs, and cultural backgrounds into the planning and delivery of care  - Encourage patient/family to participate in care and decision-making at the level they choose  - Honor patient and family perspectives and choices  Outcome: Progressing     Problem: Patient/Family Goals  Goal: Patient/Family Long Term Goal  Description: Patient's Long Term Goal: Get stronger     Interventions:  - PT/OT  - Ambulate as tolerated  - See additional Care Plan goals for specific interventions  Outcome: Progressing  Goal: Patient/Family Short Term Goal  Description: Patient's Short Term Goal: Go home     Interventions:   - Monitor vitals  - IV abx  - Monitor urine output  - See additional Care Plan goals for specific interventions  Outcome: Progressing     Problem: PAIN - ADULT  Goal: Verbalizes/displays adequate comfort level or patient's stated pain goal  Description: INTERVENTIONS:  - Encourage pt to monitor pain and request assistance  - Assess pain using appropriate pain scale  - Administer analgesics based on type and severity of pain and evaluate response  - Implement non-pharmacological measures as appropriate and evaluate response  - Consider cultural and social influences on pain and pain management  - Manage/alleviate anxiety  - Utilize distraction and/or relaxation techniques  - Monitor for opioid side effects  - Notify MD/LIP if interventions unsuccessful or patient reports new pain  - Anticipate increased pain with activity and pre-medicate as appropriate  Outcome: Progressing     Problem: SAFETY ADULT - FALL  Goal: Free from fall injury  Description: INTERVENTIONS:  - Assess pt frequently for physical needs  - Identify cognitive and physical deficits and behaviors that affect risk of falls.   - [place] fall precautions as indicated by assessment.  - Educate pt/family on patient safety including physical limitations  - Instruct pt to call for assistance with activity based on assessment  - Modify environment to reduce risk of injury  - Provide assistive devices as appropriate  - Consider OT/PT consult to assist with strengthening/mobility  - Encourage toileting schedule  Outcome: Progressing     Problem: DISCHARGE PLANNING  Goal: Discharge to home or other facility with appropriate resources  Description: INTERVENTIONS:  - Identify barriers to discharge w/pt and caregiver  - Include patient/family/discharge partner in discharge planning  - Arrange for needed discharge resources and transportation as appropriate  - Identify discharge learning needs (meds, wound care, etc)  - Arrange for interpreters to assist at discharge as needed  - Consider post-discharge preferences of patient/family/discharge partner  - Complete POLST form as appropriate  - Assess patient's ability to be responsible for managing their own health  - Refer to Case Management Department for coordinating discharge planning if the patient needs post-hospital services based on physician/LIP order or complex needs related to functional status, cognitive ability or social support system  Outcome: Progressing     Problem: CARDIOVASCULAR - ADULT  Goal: Maintains optimal cardiac output and hemodynamic stability  Description: INTERVENTIONS:  - Monitor vital signs, rhythm, and trends  - Monitor for bleeding, hypotension and signs of decreased cardiac output  - Evaluate effectiveness of vasoactive medications to optimize hemodynamic stability  - Monitor arterial and/or venous puncture sites for bleeding and/or hematoma  - Assess quality of pulses, skin color and temperature  - Assess for signs of decreased coronary artery perfusion - ex.  Angina  - Evaluate fluid balance, assess for edema, trend weights  Outcome: Progressing  Goal: Absence of cardiac arrhythmias or at baseline  Description: INTERVENTIONS:  - Continuous cardiac monitoring, monitor vital signs, obtain 12 lead EKG if indicated  - Evaluate effectiveness of antiarrhythmic and heart rate control medications as ordered  - Initiate emergency measures for life threatening arrhythmias  - Monitor electrolytes and administer replacement therapy as ordered  Outcome: Progressing     Problem: METABOLIC/FLUID AND ELECTROLYTES - ADULT  Goal: Hemodynamic stability and optimal renal function maintained  Description: INTERVENTIONS:  - Monitor labs and assess for signs and symptoms of volume excess or deficit  - Monitor intake, output and patient weight  - Monitor urine specific gravity, serum osmolarity and serum sodium as indicated or ordered  - Monitor response to interventions for patient's volume status, including labs, urine output, blood pressure (other measures as available)  - Encourage oral intake as appropriate  - Instruct patient on fluid and nutrition restrictions as appropriate  Outcome: Progressing

## 2022-12-27 ENCOUNTER — APPOINTMENT (OUTPATIENT)
Dept: ULTRASOUND IMAGING | Facility: HOSPITAL | Age: 67
End: 2022-12-27
Attending: STUDENT IN AN ORGANIZED HEALTH CARE EDUCATION/TRAINING PROGRAM
Payer: MEDICARE

## 2022-12-27 VITALS
HEART RATE: 75 BPM | WEIGHT: 182.38 LBS | TEMPERATURE: 98 F | BODY MASS INDEX: 27 KG/M2 | RESPIRATION RATE: 18 BRPM | SYSTOLIC BLOOD PRESSURE: 135 MMHG | OXYGEN SATURATION: 94 % | DIASTOLIC BLOOD PRESSURE: 61 MMHG

## 2022-12-27 LAB
ANION GAP SERPL CALC-SCNC: 8 MMOL/L (ref 0–18)
BUN BLD-MCNC: 25 MG/DL (ref 7–18)
BUN/CREAT SERPL: 17.1 (ref 10–20)
CALCIUM BLD-MCNC: 8.8 MG/DL (ref 8.5–10.1)
CHLORIDE SERPL-SCNC: 111 MMOL/L (ref 98–112)
CO2 SERPL-SCNC: 25 MMOL/L (ref 21–32)
CREAT BLD-MCNC: 1.46 MG/DL
GFR SERPLBLD BASED ON 1.73 SQ M-ARVRAT: 39 ML/MIN/1.73M2 (ref 60–?)
GLUCOSE BLD-MCNC: 114 MG/DL (ref 70–99)
GLUCOSE BLDC GLUCOMTR-MCNC: 156 MG/DL (ref 70–99)
GLUCOSE BLDC GLUCOMTR-MCNC: 308 MG/DL (ref 70–99)
OSMOLALITY SERPL CALC.SUM OF ELEC: 303 MOSM/KG (ref 275–295)
POTASSIUM SERPL-SCNC: 4 MMOL/L (ref 3.5–5.1)
SODIUM SERPL-SCNC: 144 MMOL/L (ref 136–145)

## 2022-12-27 PROCEDURE — 82962 GLUCOSE BLOOD TEST: CPT

## 2022-12-27 PROCEDURE — 76770 US EXAM ABDO BACK WALL COMP: CPT | Performed by: STUDENT IN AN ORGANIZED HEALTH CARE EDUCATION/TRAINING PROGRAM

## 2022-12-27 PROCEDURE — 80048 BASIC METABOLIC PNL TOTAL CA: CPT | Performed by: HOSPITALIST

## 2022-12-27 PROCEDURE — 94799 UNLISTED PULMONARY SVC/PX: CPT

## 2022-12-27 RX ORDER — AMLODIPINE BESYLATE 10 MG/1
10 TABLET ORAL DAILY
Qty: 30 TABLET | Refills: 0 | Status: SHIPPED | OUTPATIENT
Start: 2022-12-27

## 2022-12-27 RX ORDER — ATORVASTATIN CALCIUM 20 MG/1
40 TABLET, FILM COATED ORAL NIGHTLY
Status: SHIPPED | COMMUNITY
Start: 2022-12-27

## 2022-12-27 RX ORDER — HYDRALAZINE HYDROCHLORIDE 25 MG/1
25 TABLET, FILM COATED ORAL EVERY 8 HOURS SCHEDULED
Qty: 90 TABLET | Refills: 0 | Status: SHIPPED | OUTPATIENT
Start: 2022-12-27

## 2022-12-27 NOTE — PLAN OF CARE
Patient medically cleared for discharge. Discharge instructions went over with the patient and her son. IV and tele box removed. Problem: Patient Centered Care  Goal: Patient preferences are identified and integrated in the patient's plan of care  Description: Interventions:  - What would you like us to know as we care for you?  Im from home with my son  - Provide timely, complete, and accurate information to patient/family  - Incorporate patient and family knowledge, values, beliefs, and cultural backgrounds into the planning and delivery of care  - Encourage patient/family to participate in care and decision-making at the level they choose  - Honor patient and family perspectives and choices  Outcome: Progressing     Problem: Patient/Family Goals  Goal: Patient/Family Long Term Goal  Description: Patient's Long Term Goal: Get stronger     Interventions:  - PT/OT  - Ambulate as tolerated  - See additional Care Plan goals for specific interventions  Outcome: Progressing  Goal: Patient/Family Short Term Goal  Description: Patient's Short Term Goal: Go home     Interventions:   - Monitor vitals  - IV abx  - Monitor urine output  - See additional Care Plan goals for specific interventions  Outcome: Progressing     Problem: PAIN - ADULT  Goal: Verbalizes/displays adequate comfort level or patient's stated pain goal  Description: INTERVENTIONS:  - Encourage pt to monitor pain and request assistance  - Assess pain using appropriate pain scale  - Administer analgesics based on type and severity of pain and evaluate response  - Implement non-pharmacological measures as appropriate and evaluate response  - Consider cultural and social influences on pain and pain management  - Manage/alleviate anxiety  - Utilize distraction and/or relaxation techniques  - Monitor for opioid side effects  - Notify MD/LIP if interventions unsuccessful or patient reports new pain  - Anticipate increased pain with activity and pre-medicate as appropriate  Outcome: Progressing     Problem: SAFETY ADULT - FALL  Goal: Free from fall injury  Description: INTERVENTIONS:  - Assess pt frequently for physical needs  - Identify cognitive and physical deficits and behaviors that affect risk of falls.   - [place] fall precautions as indicated by assessment.  - Educate pt/family on patient safety including physical limitations  - Instruct pt to call for assistance with activity based on assessment  - Modify environment to reduce risk of injury  - Provide assistive devices as appropriate  - Consider OT/PT consult to assist with strengthening/mobility  - Encourage toileting schedule  Outcome: Progressing     Problem: DISCHARGE PLANNING  Goal: Discharge to home or other facility with appropriate resources  Description: INTERVENTIONS:  - Identify barriers to discharge w/pt and caregiver  - Include patient/family/discharge partner in discharge planning  - Arrange for needed discharge resources and transportation as appropriate  - Identify discharge learning needs (meds, wound care, etc)  - Arrange for interpreters to assist at discharge as needed  - Consider post-discharge preferences of patient/family/discharge partner  - Complete POLST form as appropriate  - Assess patient's ability to be responsible for managing their own health  - Refer to Case Management Department for coordinating discharge planning if the patient needs post-hospital services based on physician/LIP order or complex needs related to functional status, cognitive ability or social support system  Outcome: Progressing     Problem: CARDIOVASCULAR - ADULT  Goal: Maintains optimal cardiac output and hemodynamic stability  Description: INTERVENTIONS:  - Monitor vital signs, rhythm, and trends  - Monitor for bleeding, hypotension and signs of decreased cardiac output  - Evaluate effectiveness of vasoactive medications to optimize hemodynamic stability  - Monitor arterial and/or venous puncture sites for bleeding and/or hematoma  - Assess quality of pulses, skin color and temperature  - Assess for signs of decreased coronary artery perfusion - ex.  Angina  - Evaluate fluid balance, assess for edema, trend weights  Outcome: Progressing  Goal: Absence of cardiac arrhythmias or at baseline  Description: INTERVENTIONS:  - Continuous cardiac monitoring, monitor vital signs, obtain 12 lead EKG if indicated  - Evaluate effectiveness of antiarrhythmic and heart rate control medications as ordered  - Initiate emergency measures for life threatening arrhythmias  - Monitor electrolytes and administer replacement therapy as ordered  Outcome: Progressing     Problem: METABOLIC/FLUID AND ELECTROLYTES - ADULT  Goal: Hemodynamic stability and optimal renal function maintained  Description: INTERVENTIONS:  - Monitor labs and assess for signs and symptoms of volume excess or deficit  - Monitor intake, output and patient weight  - Monitor urine specific gravity, serum osmolarity and serum sodium as indicated or ordered  - Monitor response to interventions for patient's volume status, including labs, urine output, blood pressure (other measures as available)  - Encourage oral intake as appropriate  - Instruct patient on fluid and nutrition restrictions as appropriate  Outcome: Progressing

## 2022-12-27 NOTE — PLAN OF CARE
Patient denied pain or discomfort. Still has diminished urine output. Purewick in placed to monitor output. Bladder scan Q6H, patient still retaining urine. Volume noted <300 ml, (see on chart). Colace given per MD order. On 0.45NaCl running at 100ml/hr. Plan to monitor urinary retention and creatinine level, discharge with [facility] once medically clear. Problem: Patient Centered Care  Goal: Patient preferences are identified and integrated in the patient's plan of care  Description: Interventions:  - What would you like us to know as we care for you?  Im from home with my son  - Provide timely, complete, and accurate information to patient/family  - Incorporate patient and family knowledge, values, beliefs, and cultural backgrounds into the planning and delivery of care  - Encourage patient/family to participate in care and decision-making at the level they choose  - Honor patient and family perspectives and choices  Outcome: Progressing     Problem: Patient/Family Goals  Goal: Patient/Family Long Term Goal  Description: Patient's Long Term Goal: Get stronger     Interventions:  - PT/OT  - Ambulate as tolerated  - See additional Care Plan goals for specific interventions  Outcome: Progressing  Goal: Patient/Family Short Term Goal  Description: Patient's Short Term Goal: Go home     Interventions:   - Monitor vitals  - IV abx  - Monitor urine output  - See additional Care Plan goals for specific interventions  Outcome: Progressing     Problem: PAIN - ADULT  Goal: Verbalizes/displays adequate comfort level or patient's stated pain goal  Description: INTERVENTIONS:  - Encourage pt to monitor pain and request assistance  - Assess pain using appropriate pain scale  - Administer analgesics based on type and severity of pain and evaluate response  - Implement non-pharmacological measures as appropriate and evaluate response  - Consider cultural and social influences on pain and pain management  - Manage/alleviate anxiety  - Utilize distraction and/or relaxation techniques  - Monitor for opioid side effects  - Notify MD/LIP if interventions unsuccessful or patient reports new pain  - Anticipate increased pain with activity and pre-medicate as appropriate  Outcome: Progressing     Problem: SAFETY ADULT - FALL  Goal: Free from fall injury  Description: INTERVENTIONS:  - Assess pt frequently for physical needs  - Identify cognitive and physical deficits and behaviors that affect risk of falls.   - [place] fall precautions as indicated by assessment.  - Educate pt/family on patient safety including physical limitations  - Instruct pt to call for assistance with activity based on assessment  - Modify environment to reduce risk of injury  - Provide assistive devices as appropriate  - Consider OT/PT consult to assist with strengthening/mobility  - Encourage toileting schedule  Outcome: Progressing     Problem: DISCHARGE PLANNING  Goal: Discharge to home or other facility with appropriate resources  Description: INTERVENTIONS:  - Identify barriers to discharge w/pt and caregiver  - Include patient/family/discharge partner in discharge planning  - Arrange for needed discharge resources and transportation as appropriate  - Identify discharge learning needs (meds, wound care, etc)  - Arrange for interpreters to assist at discharge as needed  - Consider post-discharge preferences of patient/family/discharge partner  - Complete POLST form as appropriate  - Assess patient's ability to be responsible for managing their own health  - Refer to Case Management Department for coordinating discharge planning if the patient needs post-hospital services based on physician/LIP order or complex needs related to functional status, cognitive ability or social support system  Outcome: Progressing     Problem: CARDIOVASCULAR - ADULT  Goal: Maintains optimal cardiac output and hemodynamic stability  Description: INTERVENTIONS:  - Monitor vital signs, rhythm, and trends  - Monitor for bleeding, hypotension and signs of decreased cardiac output  - Evaluate effectiveness of vasoactive medications to optimize hemodynamic stability  - Monitor arterial and/or venous puncture sites for bleeding and/or hematoma  - Assess quality of pulses, skin color and temperature  - Assess for signs of decreased coronary artery perfusion - ex.  Angina  - Evaluate fluid balance, assess for edema, trend weights  Outcome: Progressing  Goal: Absence of cardiac arrhythmias or at baseline  Description: INTERVENTIONS:  - Continuous cardiac monitoring, monitor vital signs, obtain 12 lead EKG if indicated  - Evaluate effectiveness of antiarrhythmic and heart rate control medications as ordered  - Initiate emergency measures for life threatening arrhythmias  - Monitor electrolytes and administer replacement therapy as ordered  Outcome: Progressing     Problem: METABOLIC/FLUID AND ELECTROLYTES - ADULT  Goal: Hemodynamic stability and optimal renal function maintained  Description: INTERVENTIONS:  - Monitor labs and assess for signs and symptoms of volume excess or deficit  - Monitor intake, output and patient weight  - Monitor urine specific gravity, serum osmolarity and serum sodium as indicated or ordered  - Monitor response to interventions for patient's volume status, including labs, urine output, blood pressure (other measures as available)  - Encourage oral intake as appropriate  - Instruct patient on fluid and nutrition restrictions as appropriate  Outcome: Progressing

## 2022-12-27 NOTE — PLAN OF CARE
Bladder scans done every 6 hours, did not have to straight cath during this shift. NaCl 0.45% IVF started today to help with Cr level. Plan to discharge home with [facility] PT/OT if Cr is stable tomorrow. Problem: Patient Centered Care  Goal: Patient preferences are identified and integrated in the patient's plan of care  Description: Interventions:  - What would you like us to know as we care for you?  Im from home with my son  - Provide timely, complete, and accurate information to patient/family  - Incorporate patient and family knowledge, values, beliefs, and cultural backgrounds into the planning and delivery of care  - Encourage patient/family to participate in care and decision-making at the level they choose  - Honor patient and family perspectives and choices  Outcome: Progressing     Problem: Patient/Family Goals  Goal: Patient/Family Long Term Goal  Description: Patient's Long Term Goal: Get stronger     Interventions:  - PT/OT  - Ambulate as tolerated  - See additional Care Plan goals for specific interventions  Outcome: Progressing  Goal: Patient/Family Short Term Goal  Description: Patient's Short Term Goal: Go home     Interventions:   - Monitor vitals  - IV abx  - Monitor urine output  - See additional Care Plan goals for specific interventions  Outcome: Progressing     Problem: PAIN - ADULT  Goal: Verbalizes/displays adequate comfort level or patient's stated pain goal  Description: INTERVENTIONS:  - Encourage pt to monitor pain and request assistance  - Assess pain using appropriate pain scale  - Administer analgesics based on type and severity of pain and evaluate response  - Implement non-pharmacological measures as appropriate and evaluate response  - Consider cultural and social influences on pain and pain management  - Manage/alleviate anxiety  - Utilize distraction and/or relaxation techniques  - Monitor for opioid side effects  - Notify MD/LIP if interventions unsuccessful or patient reports new pain  - Anticipate increased pain with activity and pre-medicate as appropriate  Outcome: Progressing     Problem: SAFETY ADULT - FALL  Goal: Free from fall injury  Description: INTERVENTIONS:  - Assess pt frequently for physical needs  - Identify cognitive and physical deficits and behaviors that affect risk of falls.   - [place] fall precautions as indicated by assessment.  - Educate pt/family on patient safety including physical limitations  - Instruct pt to call for assistance with activity based on assessment  - Modify environment to reduce risk of injury  - Provide assistive devices as appropriate  - Consider OT/PT consult to assist with strengthening/mobility  - Encourage toileting schedule  Outcome: Progressing     Problem: DISCHARGE PLANNING  Goal: Discharge to home or other facility with appropriate resources  Description: INTERVENTIONS:  - Identify barriers to discharge w/pt and caregiver  - Include patient/family/discharge partner in discharge planning  - Arrange for needed discharge resources and transportation as appropriate  - Identify discharge learning needs (meds, wound care, etc)  - Arrange for interpreters to assist at discharge as needed  - Consider post-discharge preferences of patient/family/discharge partner  - Complete POLST form as appropriate  - Assess patient's ability to be responsible for managing their own health  - Refer to Case Management Department for coordinating discharge planning if the patient needs post-hospital services based on physician/LIP order or complex needs related to functional status, cognitive ability or social support system  Outcome: Progressing     Problem: CARDIOVASCULAR - ADULT  Goal: Maintains optimal cardiac output and hemodynamic stability  Description: INTERVENTIONS:  - Monitor vital signs, rhythm, and trends  - Monitor for bleeding, hypotension and signs of decreased cardiac output  - Evaluate effectiveness of vasoactive medications to optimize hemodynamic stability  - Monitor arterial and/or venous puncture sites for bleeding and/or hematoma  - Assess quality of pulses, skin color and temperature  - Assess for signs of decreased coronary artery perfusion - ex.  Angina  - Evaluate fluid balance, assess for edema, trend weights  Outcome: Progressing  Goal: Absence of cardiac arrhythmias or at baseline  Description: INTERVENTIONS:  - Continuous cardiac monitoring, monitor vital signs, obtain 12 lead EKG if indicated  - Evaluate effectiveness of antiarrhythmic and heart rate control medications as ordered  - Initiate emergency measures for life threatening arrhythmias  - Monitor electrolytes and administer replacement therapy as ordered  Outcome: Progressing     Problem: METABOLIC/FLUID AND ELECTROLYTES - ADULT  Goal: Hemodynamic stability and optimal renal function maintained  Description: INTERVENTIONS:  - Monitor labs and assess for signs and symptoms of volume excess or deficit  - Monitor intake, output and patient weight  - Monitor urine specific gravity, serum osmolarity and serum sodium as indicated or ordered  - Monitor response to interventions for patient's volume status, including labs, urine output, blood pressure (other measures as available)  - Encourage oral intake as appropriate  - Instruct patient on fluid and nutrition restrictions as appropriate  Outcome: Progressing

## 2022-12-28 LAB — GLUCOSE BLDC GLUCOMTR-MCNC: 187 MG/DL (ref 70–99)

## 2023-01-13 ENCOUNTER — HOSPITAL ENCOUNTER (EMERGENCY)
Facility: HOSPITAL | Age: 68
Discharge: HOME | End: 2023-01-13
Attending: EMERGENCY MEDICINE
Payer: MEDICARE

## 2023-01-13 VITALS
HEIGHT: 66 IN | RESPIRATION RATE: 16 BRPM | HEART RATE: 63 BPM | WEIGHT: 156 LBS | DIASTOLIC BLOOD PRESSURE: 75 MMHG | TEMPERATURE: 98 F | OXYGEN SATURATION: 98 % | BODY MASS INDEX: 25.07 KG/M2 | SYSTOLIC BLOOD PRESSURE: 204 MMHG

## 2023-01-13 DIAGNOSIS — S40.022A CONTUSION OF LEFT UPPER EXTREMITY, INITIAL ENCOUNTER: ICD-10-CM

## 2023-01-13 DIAGNOSIS — S70.12XA CONTUSION OF LEFT THIGH, INITIAL ENCOUNTER: ICD-10-CM
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
## 2023-01-13 PROCEDURE — 99283 EMERGENCY DEPT VISIT LOW MDM: CPT | Performed by: EMERGENCY MEDICINE

## 2023-01-13 RX ORDER — ACETAMINOPHEN 500 MG
1000 TABLET ORAL ONCE
Status: DISCONTINUED | OUTPATIENT
Start: 2023-01-13 | End: 2023-01-13

## 2023-01-13 RX ORDER — ACETAMINOPHEN 500 MG
1000 TABLET ORAL EVERY 6 HOURS PRN
Qty: 50 TABLET | Refills: 0 | Status: SHIPPED | OUTPATIENT
Start: 2023-01-13 | End: 2023-01-20

## 2023-01-13 NOTE — ED QUICK NOTES
To ED for fall out of bed. Patient stated that she hit the side of there left arm on the night stand and is on plavix.  No pain medication taken upon arrival. Denies hitting head and LOC

## 2023-01-13 NOTE — ED INITIAL ASSESSMENT (HPI)
Patient presents to ED with c/o falling after trying to get out of bed. Patient states she got tangled up in the sheets and hit the nightstand. Small abrasion to left forearm. Patient states her left side hurts after the fall. Patient denies head injury, denies loc. Patient states she is on plavix.

## 2023-01-31 ENCOUNTER — HOSPITAL ENCOUNTER (EMERGENCY)
Facility: HOSPITAL | Age: 68
Discharge: HOME | End: 2023-01-31
Attending: EMERGENCY MEDICINE
Payer: MEDICARE

## 2023-01-31 ENCOUNTER — APPOINTMENT (OUTPATIENT)
Dept: GENERAL RADIOLOGY | Facility: HOSPITAL | Age: 68
End: 2023-01-31
Payer: MEDICARE

## 2023-01-31 VITALS
DIASTOLIC BLOOD PRESSURE: 66 MMHG | TEMPERATURE: 97 F | BODY MASS INDEX: 26.51 KG/M2 | WEIGHT: 179 LBS | HEART RATE: 68 BPM | RESPIRATION RATE: 20 BRPM | SYSTOLIC BLOOD PRESSURE: 154 MMHG | OXYGEN SATURATION: 99 % | HEIGHT: 69 IN

## 2023-01-31 DIAGNOSIS — I10 PRIMARY HYPERTENSION: Primary | ICD-10-CM

## 2023-01-31 DIAGNOSIS — N17.9 AKI (ACUTE KIDNEY INJURY) (HCC): ICD-10-CM

## 2023-01-31 DIAGNOSIS — R07.89 CHEST PAIN, ATYPICAL: ICD-10-CM

## 2023-01-31 LAB
ALBUMIN SERPL-MCNC: 3.7 G/DL (ref 3.4–5)
ALBUMIN/GLOB SERPL: 0.9 {RATIO} (ref 1–2)
ALP LIVER SERPL-CCNC: 80 U/L
ALT SERPL-CCNC: 24 U/L
ANION GAP SERPL CALC-SCNC: 7 MMOL/L (ref 0–18)
AST SERPL-CCNC: 19 U/L (ref 15–37)
BASOPHILS # BLD AUTO: 0.02 X10(3) UL (ref 0–0.2)
BASOPHILS NFR BLD AUTO: 0.3 %
BILIRUB SERPL-MCNC: 0.4 MG/DL (ref 0.1–2)
BUN BLD-MCNC: 27 MG/DL (ref 7–18)
BUN/CREAT SERPL: 15 (ref 10–20)
CALCIUM BLD-MCNC: 9.7 MG/DL (ref 8.5–10.1)
CHLORIDE SERPL-SCNC: 107 MMOL/L (ref 98–112)
CO2 SERPL-SCNC: 29 MMOL/L (ref 21–32)
CREAT BLD-MCNC: 1.8 MG/DL
DEPRECATED RDW RBC AUTO: 43.8 FL (ref 35.1–46.3)
EOSINOPHIL # BLD AUTO: 0.04 X10(3) UL (ref 0–0.7)
EOSINOPHIL NFR BLD AUTO: 0.7 %
ERYTHROCYTE [DISTWIDTH] IN BLOOD BY AUTOMATED COUNT: 14.9 % (ref 11–15)
GFR SERPLBLD BASED ON 1.73 SQ M-ARVRAT: 30 ML/MIN/1.73M2 (ref 60–?)
GLOBULIN PLAS-MCNC: 4.1 G/DL (ref 2.8–4.4)
GLUCOSE BLD-MCNC: 99 MG/DL (ref 70–99)
HCT VFR BLD AUTO: 33.8 %
HGB BLD-MCNC: 10.8 G/DL
IMM GRANULOCYTES # BLD AUTO: 0.01 X10(3) UL (ref 0–1)
IMM GRANULOCYTES NFR BLD: 0.2 %
LYMPHOCYTES # BLD AUTO: 1.81 X10(3) UL (ref 1–4)
LYMPHOCYTES NFR BLD AUTO: 30.8 %
MCH RBC QN AUTO: 25.8 PG (ref 26–34)
MCHC RBC AUTO-ENTMCNC: 32 G/DL (ref 31–37)
MCV RBC AUTO: 80.9 FL
MONOCYTES # BLD AUTO: 0.41 X10(3) UL (ref 0.1–1)
MONOCYTES NFR BLD AUTO: 7 %
NEUTROPHILS # BLD AUTO: 3.58 X10 (3) UL (ref 1.5–7.7)
NEUTROPHILS # BLD AUTO: 3.58 X10(3) UL (ref 1.5–7.7)
NEUTROPHILS NFR BLD AUTO: 61 %
OSMOLALITY SERPL CALC.SUM OF ELEC: 301 MOSM/KG (ref 275–295)
PLATELET # BLD AUTO: 283 10(3)UL (ref 150–450)
POTASSIUM SERPL-SCNC: 3.2 MMOL/L (ref 3.5–5.1)
PROT SERPL-MCNC: 7.8 G/DL (ref 6.4–8.2)
RBC # BLD AUTO: 4.18 X10(6)UL
SODIUM SERPL-SCNC: 143 MMOL/L (ref 136–145)
TROPONIN I HIGH SENSITIVITY: 20 NG/L
WBC # BLD AUTO: 5.9 X10(3) UL (ref 4–11)

## 2023-01-31 PROCEDURE — 84484 ASSAY OF TROPONIN QUANT: CPT

## 2023-01-31 PROCEDURE — 85025 COMPLETE CBC W/AUTO DIFF WBC: CPT | Performed by: EMERGENCY MEDICINE

## 2023-01-31 PROCEDURE — 84484 ASSAY OF TROPONIN QUANT: CPT | Performed by: EMERGENCY MEDICINE

## 2023-01-31 PROCEDURE — 96374 THER/PROPH/DIAG INJ IV PUSH: CPT

## 2023-01-31 PROCEDURE — 93010 ELECTROCARDIOGRAM REPORT: CPT

## 2023-01-31 PROCEDURE — 80053 COMPREHEN METABOLIC PANEL: CPT | Performed by: EMERGENCY MEDICINE

## 2023-01-31 PROCEDURE — 71045 X-RAY EXAM CHEST 1 VIEW: CPT | Performed by: EMERGENCY MEDICINE

## 2023-01-31 PROCEDURE — 99291 CRITICAL CARE FIRST HOUR: CPT

## 2023-01-31 PROCEDURE — 93005 ELECTROCARDIOGRAM TRACING: CPT

## 2023-01-31 PROCEDURE — 85025 COMPLETE CBC W/AUTO DIFF WBC: CPT

## 2023-01-31 PROCEDURE — 99284 EMERGENCY DEPT VISIT MOD MDM: CPT

## 2023-01-31 PROCEDURE — 80053 COMPREHEN METABOLIC PANEL: CPT

## 2023-01-31 RX ORDER — HYDRALAZINE HYDROCHLORIDE 20 MG/ML
10 INJECTION INTRAMUSCULAR; INTRAVENOUS ONCE
Status: COMPLETED | OUTPATIENT
Start: 2023-01-31 | End: 2023-01-31

## 2023-01-31 RX ORDER — AMLODIPINE BESYLATE 10 MG/1
10 TABLET ORAL DAILY
Qty: 30 TABLET | Refills: 0 | Status: SHIPPED | OUTPATIENT
Start: 2023-01-31

## 2023-02-01 LAB
ATRIAL RATE: 70 BPM
P AXIS: 10 DEGREES
P-R INTERVAL: 174 MS
Q-T INTERVAL: 402 MS
QRS DURATION: 90 MS
QTC CALCULATION (BEZET): 434 MS
R AXIS: -15 DEGREES
T AXIS: 198 DEGREES
VENTRICULAR RATE: 70 BPM

## 2023-02-01 NOTE — DISCHARGE INSTRUCTIONS
Call the cardiologist listed above for follow-up as soon as possible for further blood pressure management. Start taking the amlodipine as previously prescribed. Hold the hydrochlorothiazide. Return to the emergency department if you develop severe and persistent chest pain, difficulty breathing, dizziness, leg swelling, or if you are coughing up blood, as these can be signs of a medical emergency. Please call your doctor for a follow-up appointment in 1 to 3 days to determine the need for further testing.

## 2023-02-23 ENCOUNTER — APPOINTMENT (OUTPATIENT)
Dept: GENERAL RADIOLOGY | Facility: HOSPITAL | Age: 68
End: 2023-02-23
Payer: MEDICARE

## 2023-02-23 ENCOUNTER — HOSPITAL ENCOUNTER (EMERGENCY)
Facility: HOSPITAL | Age: 68
Discharge: HOME | End: 2023-02-23
Attending: EMERGENCY MEDICINE
Payer: MEDICARE

## 2023-02-23 VITALS
SYSTOLIC BLOOD PRESSURE: 150 MMHG | HEART RATE: 76 BPM | DIASTOLIC BLOOD PRESSURE: 70 MMHG | TEMPERATURE: 98 F | RESPIRATION RATE: 20 BRPM | OXYGEN SATURATION: 99 %

## 2023-02-23 DIAGNOSIS — M94.0 COSTOCHONDRITIS: Primary | ICD-10-CM

## 2023-02-23 LAB
ANION GAP SERPL CALC-SCNC: 5 MMOL/L (ref 0–18)
ATRIAL RATE: 69 BPM
BASOPHILS # BLD AUTO: 0.02 X10(3) UL (ref 0–0.2)
BASOPHILS NFR BLD AUTO: 0.5 %
BUN BLD-MCNC: 17 MG/DL (ref 7–18)
BUN/CREAT SERPL: 11.4 (ref 10–20)
CALCIUM BLD-MCNC: 9.4 MG/DL (ref 8.5–10.1)
CHLORIDE SERPL-SCNC: 110 MMOL/L (ref 98–112)
CO2 SERPL-SCNC: 29 MMOL/L (ref 21–32)
CREAT BLD-MCNC: 1.49 MG/DL
DEPRECATED RDW RBC AUTO: 46.2 FL (ref 35.1–46.3)
EOSINOPHIL # BLD AUTO: 0.03 X10(3) UL (ref 0–0.7)
EOSINOPHIL NFR BLD AUTO: 0.7 %
ERYTHROCYTE [DISTWIDTH] IN BLOOD BY AUTOMATED COUNT: 15.5 % (ref 11–15)
GFR SERPLBLD BASED ON 1.73 SQ M-ARVRAT: 38 ML/MIN/1.73M2 (ref 60–?)
GLUCOSE BLD-MCNC: 130 MG/DL (ref 70–99)
HCT VFR BLD AUTO: 34.8 %
HGB BLD-MCNC: 10.9 G/DL
IMM GRANULOCYTES # BLD AUTO: 0.01 X10(3) UL (ref 0–1)
IMM GRANULOCYTES NFR BLD: 0.2 %
LYMPHOCYTES # BLD AUTO: 1.44 X10(3) UL (ref 1–4)
LYMPHOCYTES NFR BLD AUTO: 34.6 %
MCH RBC QN AUTO: 25.5 PG (ref 26–34)
MCHC RBC AUTO-ENTMCNC: 31.3 G/DL (ref 31–37)
MCV RBC AUTO: 81.5 FL
MONOCYTES # BLD AUTO: 0.28 X10(3) UL (ref 0.1–1)
MONOCYTES NFR BLD AUTO: 6.7 %
NEUTROPHILS # BLD AUTO: 2.38 X10 (3) UL (ref 1.5–7.7)
NEUTROPHILS # BLD AUTO: 2.38 X10(3) UL (ref 1.5–7.7)
NEUTROPHILS NFR BLD AUTO: 57.3 %
OSMOLALITY SERPL CALC.SUM OF ELEC: 301 MOSM/KG (ref 275–295)
P AXIS: 50 DEGREES
P-R INTERVAL: 220 MS
PLATELET # BLD AUTO: 314 10(3)UL (ref 150–450)
POTASSIUM SERPL-SCNC: 3.8 MMOL/L (ref 3.5–5.1)
Q-T INTERVAL: 390 MS
QRS DURATION: 86 MS
QTC CALCULATION (BEZET): 417 MS
R AXIS: -10 DEGREES
RBC # BLD AUTO: 4.27 X10(6)UL
SODIUM SERPL-SCNC: 144 MMOL/L (ref 136–145)
T AXIS: 252 DEGREES
TROPONIN I HIGH SENSITIVITY: 14 NG/L
VENTRICULAR RATE: 69 BPM
WBC # BLD AUTO: 4.2 X10(3) UL (ref 4–11)

## 2023-02-23 PROCEDURE — 96374 THER/PROPH/DIAG INJ IV PUSH: CPT

## 2023-02-23 PROCEDURE — 84484 ASSAY OF TROPONIN QUANT: CPT | Performed by: EMERGENCY MEDICINE

## 2023-02-23 PROCEDURE — 80048 BASIC METABOLIC PNL TOTAL CA: CPT

## 2023-02-23 PROCEDURE — 99285 EMERGENCY DEPT VISIT HI MDM: CPT

## 2023-02-23 PROCEDURE — 71045 X-RAY EXAM CHEST 1 VIEW: CPT

## 2023-02-23 PROCEDURE — 93010 ELECTROCARDIOGRAM REPORT: CPT

## 2023-02-23 PROCEDURE — 99284 EMERGENCY DEPT VISIT MOD MDM: CPT

## 2023-02-23 PROCEDURE — 84484 ASSAY OF TROPONIN QUANT: CPT

## 2023-02-23 PROCEDURE — 93005 ELECTROCARDIOGRAM TRACING: CPT

## 2023-02-23 PROCEDURE — 85025 COMPLETE CBC W/AUTO DIFF WBC: CPT

## 2023-02-23 PROCEDURE — 80048 BASIC METABOLIC PNL TOTAL CA: CPT | Performed by: EMERGENCY MEDICINE

## 2023-02-23 PROCEDURE — 85025 COMPLETE CBC W/AUTO DIFF WBC: CPT | Performed by: EMERGENCY MEDICINE

## 2023-02-23 RX ORDER — ACETAMINOPHEN AND CODEINE PHOSPHATE 300; 30 MG/1; MG/1
1 TABLET ORAL EVERY 6 HOURS PRN
Qty: 10 TABLET | Refills: 0 | Status: SHIPPED | OUTPATIENT
Start: 2023-02-23 | End: 2023-02-28

## 2023-02-23 RX ORDER — MORPHINE SULFATE 4 MG/ML
4 INJECTION, SOLUTION INTRAMUSCULAR; INTRAVENOUS ONCE
Status: COMPLETED | OUTPATIENT
Start: 2023-02-23 | End: 2023-02-23

## 2023-02-23 NOTE — ED INITIAL ASSESSMENT (HPI)
Patient complains of chest pain x 6 weeks. Last ER visit was 1 month prior. Patient stayed over night for high blood pressure, no heart attack per patient. History of stroke in 2021.

## 2023-02-28 ENCOUNTER — HOSPITAL ENCOUNTER (EMERGENCY)
Facility: HOSPITAL | Age: 68
Discharge: HOME | End: 2023-02-28
Attending: EMERGENCY MEDICINE
Payer: MEDICARE

## 2023-02-28 VITALS
DIASTOLIC BLOOD PRESSURE: 62 MMHG | SYSTOLIC BLOOD PRESSURE: 124 MMHG | HEART RATE: 72 BPM | BODY MASS INDEX: 26.84 KG/M2 | HEIGHT: 66 IN | RESPIRATION RATE: 18 BRPM | TEMPERATURE: 98 F | OXYGEN SATURATION: 99 % | WEIGHT: 167 LBS

## 2023-02-28 DIAGNOSIS — R07.9 CHEST PAIN OF UNCERTAIN ETIOLOGY: Primary | ICD-10-CM

## 2023-02-28 LAB
ANION GAP SERPL CALC-SCNC: 4 MMOL/L (ref 0–18)
BASOPHILS # BLD AUTO: 0.02 X10(3) UL (ref 0–0.2)
BASOPHILS NFR BLD AUTO: 0.3 %
BUN BLD-MCNC: 19 MG/DL (ref 7–18)
BUN/CREAT SERPL: 12.2 (ref 10–20)
CALCIUM BLD-MCNC: 9.2 MG/DL (ref 8.5–10.1)
CHLORIDE SERPL-SCNC: 112 MMOL/L (ref 98–112)
CO2 SERPL-SCNC: 28 MMOL/L (ref 21–32)
CREAT BLD-MCNC: 1.56 MG/DL
DEPRECATED RDW RBC AUTO: 45 FL (ref 35.1–46.3)
EOSINOPHIL # BLD AUTO: 0.02 X10(3) UL (ref 0–0.7)
EOSINOPHIL NFR BLD AUTO: 0.3 %
ERYTHROCYTE [DISTWIDTH] IN BLOOD BY AUTOMATED COUNT: 15.2 % (ref 11–15)
GFR SERPLBLD BASED ON 1.73 SQ M-ARVRAT: 36 ML/MIN/1.73M2 (ref 60–?)
GLUCOSE BLD-MCNC: 132 MG/DL (ref 70–99)
HCT VFR BLD AUTO: 33.1 %
HGB BLD-MCNC: 10.5 G/DL
IMM GRANULOCYTES # BLD AUTO: 0.01 X10(3) UL (ref 0–1)
IMM GRANULOCYTES NFR BLD: 0.2 %
LYMPHOCYTES # BLD AUTO: 1.1 X10(3) UL (ref 1–4)
LYMPHOCYTES NFR BLD AUTO: 16.7 %
MCH RBC QN AUTO: 25.9 PG (ref 26–34)
MCHC RBC AUTO-ENTMCNC: 31.7 G/DL (ref 31–37)
MCV RBC AUTO: 81.5 FL
MONOCYTES # BLD AUTO: 0.22 X10(3) UL (ref 0.1–1)
MONOCYTES NFR BLD AUTO: 3.3 %
NEUTROPHILS # BLD AUTO: 5.21 X10 (3) UL (ref 1.5–7.7)
NEUTROPHILS # BLD AUTO: 5.21 X10(3) UL (ref 1.5–7.7)
NEUTROPHILS NFR BLD AUTO: 79.2 %
OSMOLALITY SERPL CALC.SUM OF ELEC: 302 MOSM/KG (ref 275–295)
PLATELET # BLD AUTO: 293 10(3)UL (ref 150–450)
POTASSIUM SERPL-SCNC: 3.7 MMOL/L (ref 3.5–5.1)
RBC # BLD AUTO: 4.06 X10(6)UL
SODIUM SERPL-SCNC: 144 MMOL/L (ref 136–145)
TROPONIN I HIGH SENSITIVITY: 13 NG/L
WBC # BLD AUTO: 6.6 X10(3) UL (ref 4–11)

## 2023-02-28 PROCEDURE — 93010 ELECTROCARDIOGRAM REPORT: CPT

## 2023-02-28 PROCEDURE — 80048 BASIC METABOLIC PNL TOTAL CA: CPT | Performed by: EMERGENCY MEDICINE

## 2023-02-28 PROCEDURE — 99284 EMERGENCY DEPT VISIT MOD MDM: CPT

## 2023-02-28 PROCEDURE — 84484 ASSAY OF TROPONIN QUANT: CPT | Performed by: EMERGENCY MEDICINE

## 2023-02-28 PROCEDURE — 93005 ELECTROCARDIOGRAM TRACING: CPT

## 2023-02-28 PROCEDURE — 96374 THER/PROPH/DIAG INJ IV PUSH: CPT

## 2023-02-28 PROCEDURE — 99285 EMERGENCY DEPT VISIT HI MDM: CPT

## 2023-02-28 PROCEDURE — 85025 COMPLETE CBC W/AUTO DIFF WBC: CPT | Performed by: EMERGENCY MEDICINE

## 2023-02-28 RX ORDER — HYDROCODONE BITARTRATE AND ACETAMINOPHEN 5; 325 MG/1; MG/1
1 TABLET ORAL EVERY 6 HOURS PRN
Qty: 10 TABLET | Refills: 0 | Status: SHIPPED | OUTPATIENT
Start: 2023-02-28 | End: 2023-03-07

## 2023-02-28 RX ORDER — MORPHINE SULFATE 4 MG/ML
4 INJECTION, SOLUTION INTRAMUSCULAR; INTRAVENOUS ONCE
Status: COMPLETED | OUTPATIENT
Start: 2023-02-28 | End: 2023-02-28

## 2023-03-01 LAB
ATRIAL RATE: 74 BPM
P AXIS: 46 DEGREES
P-R INTERVAL: 218 MS
Q-T INTERVAL: 396 MS
QRS DURATION: 84 MS
QTC CALCULATION (BEZET): 439 MS
R AXIS: 6 DEGREES
T AXIS: 253 DEGREES
VENTRICULAR RATE: 74 BPM

## 2023-03-01 NOTE — ED INITIAL ASSESSMENT (HPI)
Pt to ED via [facility] from home with c/o bilateral breast pain. Pt states she has been seen for the same concern.

## 2023-06-05 ENCOUNTER — HOSPITAL ENCOUNTER (OUTPATIENT)
Dept: MAMMOGRAPHY | Facility: HOSPITAL | Age: 68
Discharge: HOME | End: 2023-06-05
Attending: PHYSICIAN ASSISTANT
Payer: MEDICARE

## 2023-06-05 DIAGNOSIS — Z12.31 ENCOUNTER FOR SCREENING MAMMOGRAM FOR MALIGNANT NEOPLASM OF BREAST: ICD-10-CM

## 2023-06-05 PROCEDURE — 77067 SCR MAMMO BI INCL CAD: CPT | Performed by: PHYSICIAN ASSISTANT

## 2023-06-05 PROCEDURE — 77063 BREAST TOMOSYNTHESIS BI: CPT | Performed by: PHYSICIAN ASSISTANT

## 2023-09-30 ENCOUNTER — APPOINTMENT (OUTPATIENT)
Dept: GENERAL RADIOLOGY | Facility: HOSPITAL | Age: 68
End: 2023-09-30
Payer: MEDICARE

## 2023-09-30 ENCOUNTER — HOSPITAL ENCOUNTER (INPATIENT)
Facility: HOSPITAL | Age: 68
LOS: 3 days | Discharge: HOME HEALTH | End: 2023-10-03
Attending: STUDENT IN AN ORGANIZED HEALTH CARE EDUCATION/TRAINING PROGRAM | Admitting: HOSPITALIST
Payer: MEDICARE

## 2023-09-30 ENCOUNTER — APPOINTMENT (OUTPATIENT)
Dept: CT IMAGING | Facility: HOSPITAL | Age: 68
End: 2023-09-30
Attending: STUDENT IN AN ORGANIZED HEALTH CARE EDUCATION/TRAINING PROGRAM
Payer: MEDICARE

## 2023-09-30 DIAGNOSIS — U07.1 COVID-19: ICD-10-CM

## 2023-09-30 DIAGNOSIS — N30.90 CYSTITIS: Primary | ICD-10-CM

## 2023-09-30 LAB
ANION GAP SERPL CALC-SCNC: 6 MMOL/L (ref 0–18)
BASOPHILS # BLD AUTO: 0.02 X10(3) UL (ref 0–0.2)
BASOPHILS NFR BLD AUTO: 0.2 %
BILIRUB UR QL: NEGATIVE
BUN BLD-MCNC: 17 MG/DL (ref 7–18)
BUN/CREAT SERPL: 11.3 (ref 10–20)
CALCIUM BLD-MCNC: 9.1 MG/DL (ref 8.5–10.1)
CHLORIDE SERPL-SCNC: 113 MMOL/L (ref 98–112)
CO2 SERPL-SCNC: 27 MMOL/L (ref 21–32)
COLOR UR: YELLOW
CREAT BLD-MCNC: 1.51 MG/DL
DEPRECATED RDW RBC AUTO: 45.7 FL (ref 35.1–46.3)
EGFRCR SERPLBLD CKD-EPI 2021: 37 ML/MIN/1.73M2 (ref 60–?)
EOSINOPHIL # BLD AUTO: 0 X10(3) UL (ref 0–0.7)
EOSINOPHIL NFR BLD AUTO: 0 %
ERYTHROCYTE [DISTWIDTH] IN BLOOD BY AUTOMATED COUNT: 16.1 % (ref 11–15)
EST. AVERAGE GLUCOSE BLD GHB EST-MCNC: 120 MG/DL (ref 68–126)
GLUCOSE BLD-MCNC: 142 MG/DL (ref 70–99)
GLUCOSE BLDC GLUCOMTR-MCNC: 107 MG/DL (ref 70–99)
GLUCOSE UR-MCNC: NORMAL MG/DL
HBA1C MFR BLD: 5.8 % (ref ?–5.7)
HCT VFR BLD AUTO: 33.1 %
HGB BLD-MCNC: 10.6 G/DL
HGB UR QL STRIP.AUTO: NEGATIVE
IMM GRANULOCYTES # BLD AUTO: 0.05 X10(3) UL (ref 0–1)
IMM GRANULOCYTES NFR BLD: 0.6 %
KETONES UR-MCNC: NEGATIVE MG/DL
LACTATE SERPL-SCNC: 0.9 MMOL/L (ref 0.4–2)
LEUKOCYTE ESTERASE UR QL STRIP.AUTO: 500
LYMPHOCYTES # BLD AUTO: 0.75 X10(3) UL (ref 1–4)
LYMPHOCYTES NFR BLD AUTO: 8.7 %
MCH RBC QN AUTO: 25.2 PG (ref 26–34)
MCHC RBC AUTO-ENTMCNC: 32 G/DL (ref 31–37)
MCV RBC AUTO: 78.6 FL
MONOCYTES # BLD AUTO: 0.32 X10(3) UL (ref 0.1–1)
MONOCYTES NFR BLD AUTO: 3.7 %
NEUTROPHILS # BLD AUTO: 7.53 X10 (3) UL (ref 1.5–7.7)
NEUTROPHILS # BLD AUTO: 7.53 X10(3) UL (ref 1.5–7.7)
NEUTROPHILS NFR BLD AUTO: 86.8 %
OSMOLALITY SERPL CALC.SUM OF ELEC: 306 MOSM/KG (ref 275–295)
PH UR: 5.5 [PH] (ref 5–8)
PLATELET # BLD AUTO: 258 10(3)UL (ref 150–450)
POTASSIUM SERPL-SCNC: 3.3 MMOL/L (ref 3.5–5.1)
PROT UR-MCNC: 30 MG/DL
RBC # BLD AUTO: 4.21 X10(6)UL
SARS-COV-2 RNA RESP QL NAA+PROBE: DETECTED
SODIUM SERPL-SCNC: 146 MMOL/L (ref 136–145)
SP GR UR STRIP: 1.02 (ref 1–1.03)
UROBILINOGEN UR STRIP-ACNC: NORMAL
WBC # BLD AUTO: 8.7 X10(3) UL (ref 4–11)
WBC #/AREA URNS AUTO: >50 /HPF

## 2023-09-30 PROCEDURE — 99285 EMERGENCY DEPT VISIT HI MDM: CPT

## 2023-09-30 PROCEDURE — 96361 HYDRATE IV INFUSION ADD-ON: CPT

## 2023-09-30 PROCEDURE — 81001 URINALYSIS AUTO W/SCOPE: CPT | Performed by: STUDENT IN AN ORGANIZED HEALTH CARE EDUCATION/TRAINING PROGRAM

## 2023-09-30 PROCEDURE — 36415 COLL VENOUS BLD VENIPUNCTURE: CPT

## 2023-09-30 PROCEDURE — 87040 BLOOD CULTURE FOR BACTERIA: CPT | Performed by: STUDENT IN AN ORGANIZED HEALTH CARE EDUCATION/TRAINING PROGRAM

## 2023-09-30 PROCEDURE — 83036 HEMOGLOBIN GLYCOSYLATED A1C: CPT | Performed by: HOSPITALIST

## 2023-09-30 PROCEDURE — 85025 COMPLETE CBC W/AUTO DIFF WBC: CPT | Performed by: STUDENT IN AN ORGANIZED HEALTH CARE EDUCATION/TRAINING PROGRAM

## 2023-09-30 PROCEDURE — 83605 ASSAY OF LACTIC ACID: CPT | Performed by: STUDENT IN AN ORGANIZED HEALTH CARE EDUCATION/TRAINING PROGRAM

## 2023-09-30 PROCEDURE — 80048 BASIC METABOLIC PNL TOTAL CA: CPT

## 2023-09-30 PROCEDURE — 82962 GLUCOSE BLOOD TEST: CPT

## 2023-09-30 PROCEDURE — 71045 X-RAY EXAM CHEST 1 VIEW: CPT | Performed by: STUDENT IN AN ORGANIZED HEALTH CARE EDUCATION/TRAINING PROGRAM

## 2023-09-30 PROCEDURE — 94660 CPAP INITIATION&MGMT: CPT

## 2023-09-30 PROCEDURE — 93005 ELECTROCARDIOGRAM TRACING: CPT

## 2023-09-30 PROCEDURE — 87086 URINE CULTURE/COLONY COUNT: CPT | Performed by: STUDENT IN AN ORGANIZED HEALTH CARE EDUCATION/TRAINING PROGRAM

## 2023-09-30 PROCEDURE — 87088 URINE BACTERIA CULTURE: CPT | Performed by: STUDENT IN AN ORGANIZED HEALTH CARE EDUCATION/TRAINING PROGRAM

## 2023-09-30 PROCEDURE — 87040 BLOOD CULTURE FOR BACTERIA: CPT

## 2023-09-30 PROCEDURE — 5A09457 ASSISTANCE WITH RESPIRATORY VENTILATION, 24-96 CONSECUTIVE HOURS, CONTINUOUS POSITIVE AIRWAY PRESSURE: ICD-10-PCS | Performed by: HOSPITALIST

## 2023-09-30 PROCEDURE — 87186 SC STD MICRODIL/AGAR DIL: CPT | Performed by: STUDENT IN AN ORGANIZED HEALTH CARE EDUCATION/TRAINING PROGRAM

## 2023-09-30 PROCEDURE — 85025 COMPLETE CBC W/AUTO DIFF WBC: CPT

## 2023-09-30 PROCEDURE — 93010 ELECTROCARDIOGRAM REPORT: CPT

## 2023-09-30 PROCEDURE — 96365 THER/PROPH/DIAG IV INF INIT: CPT

## 2023-09-30 PROCEDURE — 80048 BASIC METABOLIC PNL TOTAL CA: CPT | Performed by: STUDENT IN AN ORGANIZED HEALTH CARE EDUCATION/TRAINING PROGRAM

## 2023-09-30 PROCEDURE — 70450 CT HEAD/BRAIN W/O DYE: CPT | Performed by: STUDENT IN AN ORGANIZED HEALTH CARE EDUCATION/TRAINING PROGRAM

## 2023-09-30 PROCEDURE — 83605 ASSAY OF LACTIC ACID: CPT

## 2023-09-30 RX ORDER — CARVEDILOL 25 MG/1
25 TABLET ORAL 2 TIMES DAILY WITH MEALS
Status: DISCONTINUED | OUTPATIENT
Start: 2023-09-30 | End: 2023-10-03

## 2023-09-30 RX ORDER — ENOXAPARIN SODIUM 100 MG/ML
40 INJECTION SUBCUTANEOUS DAILY
Status: DISCONTINUED | OUTPATIENT
Start: 2023-10-01 | End: 2023-10-03

## 2023-09-30 RX ORDER — BISACODYL 10 MG
10 SUPPOSITORY, RECTAL RECTAL
Status: DISCONTINUED | OUTPATIENT
Start: 2023-09-30 | End: 2023-10-03

## 2023-09-30 RX ORDER — CLOPIDOGREL BISULFATE 75 MG/1
75 TABLET ORAL DAILY
Status: DISCONTINUED | OUTPATIENT
Start: 2023-09-30 | End: 2023-10-03

## 2023-09-30 RX ORDER — BRIMONIDINE TARTRATE 2 MG/ML
1 SOLUTION/ DROPS OPHTHALMIC 2 TIMES DAILY
Status: DISCONTINUED | OUTPATIENT
Start: 2023-09-30 | End: 2023-10-03

## 2023-09-30 RX ORDER — SENNOSIDES 8.6 MG
17.2 TABLET ORAL NIGHTLY PRN
Status: DISCONTINUED | OUTPATIENT
Start: 2023-09-30 | End: 2023-10-03

## 2023-09-30 RX ORDER — ATORVASTATIN CALCIUM 40 MG/1
40 TABLET, FILM COATED ORAL NIGHTLY
Status: DISCONTINUED | OUTPATIENT
Start: 2023-09-30 | End: 2023-10-03

## 2023-09-30 RX ORDER — AMLODIPINE BESYLATE 10 MG/1
10 TABLET ORAL DAILY
Status: DISCONTINUED | OUTPATIENT
Start: 2023-09-30 | End: 2023-10-03

## 2023-09-30 RX ORDER — HYDRALAZINE HYDROCHLORIDE 25 MG/1
25 TABLET, FILM COATED ORAL EVERY 8 HOURS SCHEDULED
Status: DISCONTINUED | OUTPATIENT
Start: 2023-09-30 | End: 2023-10-03

## 2023-09-30 RX ORDER — ENEMA 19; 7 G/133ML; G/133ML
1 ENEMA RECTAL ONCE AS NEEDED
Status: DISCONTINUED | OUTPATIENT
Start: 2023-09-30 | End: 2023-10-03

## 2023-09-30 RX ORDER — POLYETHYLENE GLYCOL 3350 17 G/17G
17 POWDER, FOR SOLUTION ORAL DAILY PRN
Status: DISCONTINUED | OUTPATIENT
Start: 2023-09-30 | End: 2023-10-03

## 2023-09-30 RX ORDER — LOSARTAN POTASSIUM 50 MG/1
50 TABLET ORAL DAILY
Status: DISCONTINUED | OUTPATIENT
Start: 2023-09-30 | End: 2023-09-30

## 2023-09-30 RX ORDER — METOCLOPRAMIDE HYDROCHLORIDE 5 MG/ML
5 INJECTION INTRAMUSCULAR; INTRAVENOUS EVERY 8 HOURS PRN
Status: DISCONTINUED | OUTPATIENT
Start: 2023-09-30 | End: 2023-10-03

## 2023-09-30 RX ORDER — POTASSIUM CHLORIDE 20 MEQ/1
40 TABLET, EXTENDED RELEASE ORAL EVERY 4 HOURS
Status: COMPLETED | OUTPATIENT
Start: 2023-09-30 | End: 2023-10-01

## 2023-09-30 RX ORDER — MEMANTINE HYDROCHLORIDE 10 MG/1
10 TABLET ORAL 2 TIMES DAILY
Status: DISCONTINUED | OUTPATIENT
Start: 2023-09-30 | End: 2023-10-03

## 2023-09-30 RX ORDER — ONDANSETRON 2 MG/ML
4 INJECTION INTRAMUSCULAR; INTRAVENOUS EVERY 6 HOURS PRN
Status: DISCONTINUED | OUTPATIENT
Start: 2023-09-30 | End: 2023-10-03

## 2023-09-30 RX ORDER — ACETAMINOPHEN 500 MG
1000 TABLET ORAL ONCE
Status: COMPLETED | OUTPATIENT
Start: 2023-09-30 | End: 2023-09-30

## 2023-09-30 RX ORDER — ACETAMINOPHEN 500 MG
500 TABLET ORAL EVERY 4 HOURS PRN
Status: DISCONTINUED | OUTPATIENT
Start: 2023-09-30 | End: 2023-10-03

## 2023-09-30 RX ORDER — ASPIRIN 81 MG/1
81 TABLET ORAL DAILY
Status: DISCONTINUED | OUTPATIENT
Start: 2023-09-30 | End: 2023-10-03

## 2023-09-30 NOTE — ED INITIAL ASSESSMENT (HPI)
Pt presents via EMS from home s/p mechanical fall yesterday. EMS denies head injury. Pt presents for increased weakness s/p fall. Pt found to be febrile to 102 by EMS.

## 2023-09-30 NOTE — ED QUICK NOTES
Orders for admission, patient is aware of plan and ready to go upstairs. Any questions, please call ED RN Jen Quevedo at extension [phone].      Patient Covid vaccination status: Fully vaccinated     COVID Test Ordered in ED: Rapid SARS-CoV-2 by PCR    COVID Suspicion at Admission: N/A    Running Infusions:  None    Mental Status/LOC at time of transport: AOx4    Other pertinent information: from home, fall risk, very weak, hx of CVA with residual left sided weakness  CIWA score: N/A   NIH score:  N/A

## 2023-10-01 LAB
ALBUMIN SERPL-MCNC: 2.9 G/DL (ref 3.4–5)
ALBUMIN/GLOB SERPL: 0.7 {RATIO} (ref 1–2)
ALP LIVER SERPL-CCNC: 75 U/L
ALT SERPL-CCNC: 36 U/L
ANION GAP SERPL CALC-SCNC: 4 MMOL/L (ref 0–18)
AST SERPL-CCNC: 40 U/L (ref 15–37)
ATRIAL RATE: 92 BPM
BASOPHILS # BLD AUTO: 0.02 X10(3) UL (ref 0–0.2)
BASOPHILS NFR BLD AUTO: 0.5 %
BILIRUB SERPL-MCNC: 0.4 MG/DL (ref 0.1–2)
BUN BLD-MCNC: 17 MG/DL (ref 7–18)
BUN/CREAT SERPL: 12 (ref 10–20)
CALCIUM BLD-MCNC: 8.4 MG/DL (ref 8.5–10.1)
CHLORIDE SERPL-SCNC: 116 MMOL/L (ref 98–112)
CHOLEST SERPL-MCNC: 101 MG/DL (ref ?–200)
CO2 SERPL-SCNC: 26 MMOL/L (ref 21–32)
CREAT BLD-MCNC: 1.42 MG/DL
DEPRECATED RDW RBC AUTO: 48.9 FL (ref 35.1–46.3)
EGFRCR SERPLBLD CKD-EPI 2021: 40 ML/MIN/1.73M2 (ref 60–?)
EOSINOPHIL # BLD AUTO: 0.01 X10(3) UL (ref 0–0.7)
EOSINOPHIL NFR BLD AUTO: 0.2 %
ERYTHROCYTE [DISTWIDTH] IN BLOOD BY AUTOMATED COUNT: 16.5 % (ref 11–15)
GLOBULIN PLAS-MCNC: 4 G/DL (ref 2.8–4.4)
GLUCOSE BLD-MCNC: 123 MG/DL (ref 70–99)
GLUCOSE BLDC GLUCOMTR-MCNC: 106 MG/DL (ref 70–99)
GLUCOSE BLDC GLUCOMTR-MCNC: 132 MG/DL (ref 70–99)
GLUCOSE BLDC GLUCOMTR-MCNC: 133 MG/DL (ref 70–99)
GLUCOSE BLDC GLUCOMTR-MCNC: 147 MG/DL (ref 70–99)
HCT VFR BLD AUTO: 31.4 %
HDLC SERPL-MCNC: 53 MG/DL (ref 40–59)
HGB BLD-MCNC: 9.7 G/DL
IMM GRANULOCYTES # BLD AUTO: 0.02 X10(3) UL (ref 0–1)
IMM GRANULOCYTES NFR BLD: 0.5 %
LDLC SERPL CALC-MCNC: 32 MG/DL (ref ?–100)
LYMPHOCYTES # BLD AUTO: 0.85 X10(3) UL (ref 1–4)
LYMPHOCYTES NFR BLD AUTO: 20.5 %
MAGNESIUM SERPL-MCNC: 1.8 MG/DL (ref 1.6–2.6)
MCH RBC QN AUTO: 25 PG (ref 26–34)
MCHC RBC AUTO-ENTMCNC: 30.9 G/DL (ref 31–37)
MCV RBC AUTO: 80.9 FL
MONOCYTES # BLD AUTO: 0.39 X10(3) UL (ref 0.1–1)
MONOCYTES NFR BLD AUTO: 9.4 %
NEUTROPHILS # BLD AUTO: 2.85 X10 (3) UL (ref 1.5–7.7)
NEUTROPHILS # BLD AUTO: 2.85 X10(3) UL (ref 1.5–7.7)
NEUTROPHILS NFR BLD AUTO: 68.9 %
NONHDLC SERPL-MCNC: 48 MG/DL (ref ?–130)
OSMOLALITY SERPL CALC.SUM OF ELEC: 305 MOSM/KG (ref 275–295)
P-R INTERVAL: 200 MS
PHOSPHATE SERPL-MCNC: 2.6 MG/DL (ref 2.5–4.9)
PLATELET # BLD AUTO: 241 10(3)UL (ref 150–450)
POTASSIUM SERPL-SCNC: 4 MMOL/L (ref 3.5–5.1)
POTASSIUM SERPL-SCNC: 4 MMOL/L (ref 3.5–5.1)
PROT SERPL-MCNC: 6.9 G/DL (ref 6.4–8.2)
Q-T INTERVAL: 358 MS
QRS DURATION: 82 MS
QTC CALCULATION (BEZET): 442 MS
R AXIS: -28 DEGREES
RBC # BLD AUTO: 3.88 X10(6)UL
SODIUM SERPL-SCNC: 146 MMOL/L (ref 136–145)
T AXIS: 149 DEGREES
TRIGL SERPL-MCNC: 79 MG/DL (ref 30–149)
VENTRICULAR RATE: 92 BPM
VLDLC SERPL CALC-MCNC: 11 MG/DL (ref 0–30)
WBC # BLD AUTO: 4.1 X10(3) UL (ref 4–11)

## 2023-10-01 PROCEDURE — 94799 UNLISTED PULMONARY SVC/PX: CPT

## 2023-10-01 PROCEDURE — 84132 ASSAY OF SERUM POTASSIUM: CPT | Performed by: HOSPITALIST

## 2023-10-01 PROCEDURE — 80053 COMPREHEN METABOLIC PANEL: CPT | Performed by: HOSPITALIST

## 2023-10-01 PROCEDURE — 85025 COMPLETE CBC W/AUTO DIFF WBC: CPT | Performed by: HOSPITALIST

## 2023-10-01 PROCEDURE — 82962 GLUCOSE BLOOD TEST: CPT

## 2023-10-01 PROCEDURE — 84100 ASSAY OF PHOSPHORUS: CPT | Performed by: HOSPITALIST

## 2023-10-01 PROCEDURE — 83735 ASSAY OF MAGNESIUM: CPT | Performed by: HOSPITALIST

## 2023-10-01 PROCEDURE — 80061 LIPID PANEL: CPT | Performed by: HOSPITALIST

## 2023-10-01 RX ORDER — DEXTROSE MONOHYDRATE 25 G/50ML
50 INJECTION, SOLUTION INTRAVENOUS
Status: DISCONTINUED | OUTPATIENT
Start: 2023-10-01 | End: 2023-10-03

## 2023-10-01 RX ORDER — NICOTINE POLACRILEX 4 MG
30 LOZENGE BUCCAL
Status: DISCONTINUED | OUTPATIENT
Start: 2023-10-01 | End: 2023-10-03

## 2023-10-01 RX ORDER — BENZONATATE 100 MG/1
200 CAPSULE ORAL 3 TIMES DAILY PRN
Status: DISCONTINUED | OUTPATIENT
Start: 2023-10-01 | End: 2023-10-03

## 2023-10-01 RX ORDER — MAGNESIUM OXIDE 400 MG/1
400 TABLET ORAL ONCE
Status: COMPLETED | OUTPATIENT
Start: 2023-10-01 | End: 2023-10-01

## 2023-10-01 RX ORDER — NICOTINE POLACRILEX 4 MG
15 LOZENGE BUCCAL
Status: DISCONTINUED | OUTPATIENT
Start: 2023-10-01 | End: 2023-10-03

## 2023-10-01 NOTE — PLAN OF CARE
Problem: GASTROINTESTINAL - ADULT  Goal: Maintains or returns to baseline bowel function  Description: INTERVENTIONS:  - Assess bowel function  - Maintain adequate hydration with IV or PO as ordered and tolerated  - Evaluate effectiveness of GI medications  - Encourage mobilization and activity  - Obtain nutritional consult as needed  - Establish a toileting routine/schedule  - Consider collaborating with pharmacy to review patient's medication profile  Outcome: Progressing     Problem: METABOLIC/FLUID AND ELECTROLYTES - ADULT  Goal: Glucose maintained within prescribed range  Description: INTERVENTIONS:  - Monitor Blood Glucose as ordered  - Assess for signs and symptoms of hyperglycemia and hypoglycemia  - Administer ordered medications to maintain glucose within target range  - Assess barriers to adequate nutritional intake and initiate nutrition consult as needed  - Instruct patient on self management of diabetes  Outcome: Progressing  Goal: Electrolytes maintained within normal limits  Description: INTERVENTIONS:  - Monitor labs and rhythm and assess patient for signs and symptoms of electrolyte imbalances  - Administer electrolyte replacement as ordered  - Monitor response to electrolyte replacements, including rhythm and repeat lab results as appropriate  - Fluid restriction as ordered  - Instruct patient on fluid and nutrition restrictions as appropriate  Outcome: Progressing  Goal: Hemodynamic stability and optimal renal function maintained  Description: INTERVENTIONS:  - Monitor labs and assess for signs and symptoms of volume excess or deficit  - Monitor intake, output and patient weight  - Monitor urine specific gravity, serum osmolarity and serum sodium as indicated or ordered  - Monitor response to interventions for patient's volume status, including labs, urine output, blood pressure (other measures as available)  - Encourage oral intake as appropriate  - Instruct patient on fluid and nutrition restrictions as appropriate  Outcome: Progressing     Problem: SKIN/TISSUE INTEGRITY - ADULT  Goal: Skin integrity remains intact  Description: INTERVENTIONS  - Assess and document risk factors for pressure ulcer development  - Assess and document skin integrity  - Monitor for areas of redness and/or skin breakdown  - Initiate interventions, skin care algorithm/standards of care as needed  Outcome: Progressing     Problem: MUSCULOSKELETAL - ADULT  Goal: Return mobility to safest level of function  Description: INTERVENTIONS:  - Assess patient stability and activity tolerance for standing, transferring and ambulating w/ or w/o assistive devices  - Assist with transfers and ambulation using safe patient handling equipment as needed  - Ensure adequate protection for wounds/incisions during mobilization  - Obtain PT/OT consults as needed  - Advance activity as appropriate  - Communicate ordered activity level and limitations with patient/family  Outcome: Progressing     Problem: NEUROLOGICAL - ADULT  Goal: Achieves stable or improved neurological status  Description: INTERVENTIONS  - Assess for and report changes in neurological status  - Initiate measures to prevent increased intracranial pressure  - Maintain blood pressure and fluid volume within ordered parameters to optimize cerebral perfusion and minimize risk of hemorrhage  - Monitor temperature, glucose, and sodium.  Initiate appropriate interventions as ordered  Outcome: Progressing     Problem: Impaired Functional Mobility  Goal: Achieve highest/safest level of mobility/gait  Description: Interventions:  - Assess patient's functional ability and stability  - Promote increasing activity/tolerance for mobility and gait  - Educate and engage patient/family in tolerated activity level and precautions    Outcome: Progressing

## 2023-10-01 NOTE — CM/SW NOTE
10/01/23 1600   CM/SW Referral Data   Referral Source    Reason for Referral Discharge planning   Informant Son   Medical Hx   Does patient have an established PCP? Yes  Akila Lopez)   Patient Info   Patient's Current Mental Status at Time of Assessment Alert;Oriented;Memory Impairments   Patient's [address]   Patient lives with Son   Patient Status Prior to Admission   Independent with ADLs and Mobility Yes   Discharge Needs   Anticipated D/C needs To be determined     Pt discussed during nursing rounds. Dx cystitis. Home w/son, independent w/walker at baseline. Pt's son reports that pt has recent issue w/falling at home. PT/OT evals needed for dc recommendation, RN is aware. Plan: TBD    / to remain available for support and/or discharge planning.      ZOË Arreguin    [phone]

## 2023-10-01 NOTE — PLAN OF CARE
Problem: PAIN - ADULT  Goal: Verbalizes/displays adequate comfort level or patient's stated pain goal  Description: INTERVENTIONS:  - Encourage pt to monitor pain and request assistance  - Assess pain using appropriate pain scale  - Administer analgesics based on type and severity of pain and evaluate response  - Implement non-pharmacological measures as appropriate and evaluate response  - Consider cultural and social influences on pain and pain management  - Manage/alleviate anxiety  - Utilize distraction and/or relaxation techniques  - Monitor for opioid side effects  - Notify MD/LIP if interventions unsuccessful or patient reports new pain  - Anticipate increased pain with activity and pre-medicate as appropriate  Outcome: Progressing     Problem: RISK FOR INFECTION - ADULT  Goal: Absence of fever/infection during anticipated neutropenic period  Description: INTERVENTIONS  - Monitor WBC  - Administer growth factors as ordered  - Implement neutropenic guidelines  Outcome: Progressing     Problem: SAFETY ADULT - FALL  Goal: Free from fall injury  Description: INTERVENTIONS:  - Assess pt frequently for physical needs  - Identify cognitive and physical deficits and behaviors that affect risk of falls.   - [place] fall precautions as indicated by assessment.  - Educate pt/family on patient safety including physical limitations  - Instruct pt to call for assistance with activity based on assessment  - Modify environment to reduce risk of injury  - Provide assistive devices as appropriate  - Consider OT/PT consult to assist with strengthening/mobility  - Encourage toileting schedule  Outcome: Progressing     Problem: METABOLIC/FLUID AND ELECTROLYTES - ADULT  Goal: Glucose maintained within prescribed range  Description: INTERVENTIONS:  - Monitor Blood Glucose as ordered  - Assess for signs and symptoms of hyperglycemia and hypoglycemia  - Administer ordered medications to maintain glucose within target range  - Assess barriers to adequate nutritional intake and initiate nutrition consult as needed  - Instruct patient on self management of diabetes  Outcome: Progressing  Goal: Electrolytes maintained within normal limits  Description: INTERVENTIONS:  - Monitor labs and rhythm and assess patient for signs and symptoms of electrolyte imbalances  - Administer electrolyte replacement as ordered  - Monitor response to electrolyte replacements, including rhythm and repeat lab results as appropriate  - Fluid restriction as ordered  - Instruct patient on fluid and nutrition restrictions as appropriate  Outcome: Progressing     Problem: SKIN/TISSUE INTEGRITY - ADULT  Goal: Skin integrity remains intact  Description: INTERVENTIONS  - Assess and document risk factors for pressure ulcer development  - Assess and document skin integrity  - Monitor for areas of redness and/or skin breakdown  - Initiate interventions, skin care algorithm/standards of care as needed  Outcome: Progressing     No acute events this shift. Safety precautions in place, frequent nursing rounding completed, call light within reach. All questions answered and needs met.

## 2023-10-01 NOTE — PLAN OF CARE
Patient from home, was admitted due to fall and increased weakness. Vital signs stable at this time. Monitoring blood glucose levels. Fall precautions maintained, bed locked in lowest position, bed alarm on, call light within reach and frequent rounding by nursing staff. No acute changes noted at this moment. Problem: Patient Centered Care  Goal: Patient preferences are identified and integrated in the patient's plan of care  Description: Interventions:  - What would you like us to know as we care for you? From home. - Provide timely, complete, and accurate information to patient/family  - Incorporate patient and family knowledge, values, beliefs, and cultural backgrounds into the planning and delivery of care  - Encourage patient/family to participate in care and decision-making at the level they choose  - Honor patient and family perspectives and choices  Outcome: Progressing     Problem: RISK FOR INFECTION - ADULT  Goal: Absence of fever/infection during anticipated neutropenic period  Description: INTERVENTIONS  - Monitor WBC  - Administer growth factors as ordered  - Implement neutropenic guidelines  Outcome: Progressing     Problem: SAFETY ADULT - FALL  Goal: Free from fall injury  Description: INTERVENTIONS:  - Assess pt frequently for physical needs  - Identify cognitive and physical deficits and behaviors that affect risk of falls.   - [place] fall precautions as indicated by assessment.  - Educate pt/family on patient safety including physical limitations  - Instruct pt to call for assistance with activity based on assessment  - Modify environment to reduce risk of injury  - Provide assistive devices as appropriate  - Consider OT/PT consult to assist with strengthening/mobility  - Encourage toileting schedule  Outcome: Progressing     Problem: DISCHARGE PLANNING  Goal: Discharge to home or other facility with appropriate resources  Description: INTERVENTIONS:  - Identify barriers to discharge w/pt and caregiver  - Include patient/family/discharge partner in discharge planning  - Arrange for needed discharge resources and transportation as appropriate  - Identify discharge learning needs (meds, wound care, etc)  - Arrange for interpreters to assist at discharge as needed  - Consider post-discharge preferences of patient/family/discharge partner  - Complete POLST form as appropriate  - Assess patient's ability to be responsible for managing their own health  - Refer to Case Management Department for coordinating discharge planning if the patient needs post-hospital services based on physician/LIP order or complex needs related to functional status, cognitive ability or social support system  Outcome: Progressing

## 2023-10-02 LAB
GLUCOSE BLDC GLUCOMTR-MCNC: 106 MG/DL (ref 70–99)
GLUCOSE BLDC GLUCOMTR-MCNC: 117 MG/DL (ref 70–99)
GLUCOSE BLDC GLUCOMTR-MCNC: 127 MG/DL (ref 70–99)
GLUCOSE BLDC GLUCOMTR-MCNC: 97 MG/DL (ref 70–99)
MAGNESIUM SERPL-MCNC: 2 MG/DL (ref 1.6–2.6)

## 2023-10-02 PROCEDURE — 82962 GLUCOSE BLOOD TEST: CPT

## 2023-10-02 PROCEDURE — 83735 ASSAY OF MAGNESIUM: CPT | Performed by: HOSPITALIST

## 2023-10-02 PROCEDURE — 97110 THERAPEUTIC EXERCISES: CPT

## 2023-10-02 PROCEDURE — 94799 UNLISTED PULMONARY SVC/PX: CPT

## 2023-10-02 PROCEDURE — 97166 OT EVAL MOD COMPLEX 45 MIN: CPT

## 2023-10-02 PROCEDURE — 97530 THERAPEUTIC ACTIVITIES: CPT

## 2023-10-02 PROCEDURE — 97162 PT EVAL MOD COMPLEX 30 MIN: CPT

## 2023-10-02 RX ORDER — ACETAMINOPHEN 325 MG/1
650 TABLET ORAL ONCE
Status: COMPLETED | OUTPATIENT
Start: 2023-10-02 | End: 2023-10-02

## 2023-10-02 NOTE — PHYSICAL THERAPY NOTE
PHYSICAL THERAPY EVALUATION - INPATIENT     Room Number: 362/220-U  Evaluation Date: 10/2/2023  Type of Evaluation: Initial   Physician Order: PT Eval and Treat    Presenting Problem: cysitis  Reason for Therapy: Mobility Dysfunction and Discharge Planning    PHYSICAL THERAPY ASSESSMENT   Orders received and chart reviewed. ODALYS Keenan approved participation in physical therapy. Session coordinated with OT, gait belt donned. PPE worn by therapist: mask, gloves, goggles, gown, and N95. Patient was not wearing a mask during session. Patient is a 77year old female admitted 9/30/2023 for Presenting Problem: cysitis. Patient presented in bedside chair with 0/10 pain. Education provided on Physical therapy plan of care and physiological benefits of out of bed mobility. Patient with fair carryover. Patient confused during the session, not aware of the month. Patient with pain and numbness in legs. Patient normally uses rollator while ambulating, however, patient not safe for ambulation at this time. Patient currently with mod A x 2 for bed mobility and transfer to bedside chair, able to take a few steps. Patient on room air, oxygen sat 95% and heart rate 79, blood pressure 117/59 when sitting edge of bed, after the session oxygen sat 97% and heart rate 81. Patient with history of CVA and right sided weakness. Patient is currently functioning below baseline with bed mobility, transfers, gait, and stair negotiation as a result of the following impairments: decreased functional strength and medical status. Next session anticipate to progress bed mobility, transfers, gait, and stair negotiation. Patient history and/or personal factors that may impact the plan of care include home accessibility concerns and cognitive deficits.  Based on the physical therapy examination of the noted systems and functional activity/participation limitations, the patient presentation is evolving given the patient demonstrates worsening of previously stable condition, demonstrates worsening of co-morbidities, and demonstrates cognitive skills affecting safety. Patient would benefit from continued skilled physical therapy interventions to maximize patient safety and functional independence. Updated nurse on results of session, patient left in bedside chair, all lines intact, all needs met with call light in reach. Bed mobility: Mod assist  Transfers: Mod assist and assist of 2  Gait Assistance: Not tested                   Patient was left in bedside chair at end of session with all needs in reach. Patient's current functional deficits include bed mobility, transfers, gait and stair navigation. Patient does not have the physical skills to return to prior living environment upon D/C from the hospital. Anticipate patient will benefit from continued skilled physical therapy while in the hospital and upon D/C from the hospital at a rehab facility. The patient's Approx Degree of Impairment: 76.75% has been calculated based on documentation in the [facility] '6 clicks' Inpatient Basic Mobility Short Form. Research supports that patients with this level of impairment may benefit from Long term acute care. However, patient would benefit from rehab facility. RN aware of patient status post session. Patient will benefit from continued IP PT services to address these deficits in preparation for discharge. DISCHARGE RECOMMENDATIONS  PT Discharge Recommendations: Sub-acute rehabilitation    PLAN  PT Treatment Plan: Bed mobility; Body mechanics; Patient education;Gait training;Transfer training;Balance training;Strengthening  Rehab Potential : Fair  Frequency (Obs): 3-5x/week       PHYSICAL THERAPY MEDICAL/SOCIAL HISTORY   Problem List  Principal Problem:    Cystitis  Active Problems:    COVID-19    Past Medical History  Past Medical History:   Diagnosis Date    Asthma     Coronary atherosclerosis     Diabetes ([facility] [address]     diabetes for 2 yrs    Essential hypertension     Glaucoma     right    Heart attack ([facility] [address]     2005    High blood pressure     High cholesterol     Hyperlipidemia     Osteoarthritis     Sleep apnea     cpap    Stroke [facility])     30 yrs ago    Visual impairment     left eye edema     Past Surgical History  Past Surgical History:   Procedure Laterality Date    ANESTH,VITRECTOMY Left 2017    Pars plana vitrectomy, internal limiting membrane peel, left eye. CATH BARE METAL STENT (BMS)      COLONOSCOPY      HYSTERECTOMY      TOTAL ABDOM HYSTERECTOMY      TUBAL LIGATION       HOME SITUATION  Type of Home: House   Home Layout: One level  Stairs to Enter : 6  Railing: Yes  Stairs to Bedroom: 0  Railing: No  Lives With: Family (son)  Drives: No  Patient Owned Equipment: Rollator  Patient Regularly Uses: Glasses    Prior Level of [place]: Patient reports assist with ADL's and ambulation with rollator prior to admission to the hospital.     SUBJECTIVE  \"I have 20 stairs to enter my home\"    PHYSICAL THERAPY EXAMINATION     OBJECTIVE  Precautions: Bed/chair alarm  Fall Risk: High fall risk    WEIGHT BEARING RESTRICTION  Weight Bearing Restriction: None                PAIN ASSESSMENT  Ratin          COGNITION  Overall Cognitive Status:  Impaired    RANGE OF MOTION AND STRENGTH ASSESSMENT    Lower extremity ROM is within functional limits  BLE WNL    Lower extremity strength is within functional limits  BLE WNL    BALANCE  Static Sitting: Fair +  Dynamic Sitting: Fair  Static Standing: Fair -  Dynamic Standing: Poor +    AM-PAC '6-Clicks' INPATIENT SHORT FORM - BASIC MOBILITY  How much difficulty does the patient currently have. ..   Patient Difficulty: Turning over in bed (including adjusting bedclothes, sheets and blankets)?: A Lot   Patient Difficulty: Sitting down on and standing up from a chair with arms (e.g., wheelchair, bedside commode, etc.): A Lot   Patient Difficulty: Moving from lying on back to sitting on the side of the bed?: A Lot How much help from another person does the patient currently need. .. Help from Another: Moving to and from a bed to a chair (including a wheelchair)?: A Lot   Help from Another: Need to walk in hospital room?: Total   Help from Another: Climbing 3-5 steps with a railing?: Total     AM-PAC Score:  Raw Score: 10   Approx Degree of Impairment: 76.75%   Standardized Score (AM-PAC Scale): 32.29   CMS Modifier (G-Code): CL    Exercise/Education Provided:  Bed mobility  Body mechanics  Transfer training    Patient End of Session: Up in chair;Needs met;Call light within reach;RN aware of session/findings; All patient questions and concerns addressed; Alarm set    CURRENT GOALS  Goals to be met by: 10/20/23  Patient Goal Patient's self-stated goal is: to go home   Goal #1 Patient is able to demonstrate supine - sit EOB @ level: supervision     Goal #1   Current Status    Goal #2 Patient is able to demonstrate transfers Sit to/from Stand at assistance level: minimum assistance with walker - rolling     Goal #2  Current Status    Goal #3 Patient is able to ambulate 10 feet with assist device: walker - rolling at assistance level: minimum assistance   Goal #3   Current Status    Goal #4 Patient will negotiate 6 stairs/one curb w/ assistive device and supervision   Goal #4   Current Status    Goal #5 Patient to demonstrate independence with home activity/exercise instructions provided to patient in preparation for discharge.    Goal #5   Current Status    Goal #6    Goal #6  Current Status     Patient Evaluation Complexity Level:  History Moderate - 1 or 2 personal factors and/or co-morbidities   Examination of body systems Moderate - addressing a total of 3 or more elements   Clinical Presentation Moderate - Evolving   Clinical Decision Making Moderate Complexity     Therapeutic Activity: 10 minutes  Therapeutic Exercise: 13 minutes

## 2023-10-02 NOTE — PLAN OF CARE
Pt is A/Ox3, forgetful, slept well during the night. RA when awake/CPAP at night. On remote tele. VSS. PRN Tessalon given for cough and has been effective. Denies pain or discomfort. Incontinent care provided. Fall precaution maintained.     Problem: Patient Centered Care  Goal: Patient preferences are identified and integrated in the patient's plan of care  Description: Interventions:  - What would you like us to know as we care for you?   - Provide timely, complete, and accurate information to patient/family  - Incorporate patient and family knowledge, values, beliefs, and cultural backgrounds into the planning and delivery of care  - Encourage patient/family to participate in care and decision-making at the level they choose  - Honor patient and family perspectives and choices  Outcome: Progressing     Problem: PAIN - ADULT  Goal: Verbalizes/displays adequate comfort level or patient's stated pain goal  Description: INTERVENTIONS:  - Encourage pt to monitor pain and request assistance  - Assess pain using appropriate pain scale  - Administer analgesics based on type and severity of pain and evaluate response  - Implement non-pharmacological measures as appropriate and evaluate response  - Consider cultural and social influences on pain and pain management  - Manage/alleviate anxiety  - Utilize distraction and/or relaxation techniques  - Monitor for opioid side effects  - Notify MD/LIP if interventions unsuccessful or patient reports new pain  - Anticipate increased pain with activity and pre-medicate as appropriate  Outcome: Progressing     Problem: RISK FOR INFECTION - ADULT  Goal: Absence of fever/infection during anticipated neutropenic period  Description: INTERVENTIONS  - Monitor WBC  - Administer growth factors as ordered  - Implement neutropenic guidelines  Outcome: Progressing     Problem: SAFETY ADULT - FALL  Goal: Free from fall injury  Description: INTERVENTIONS:  - Assess pt frequently for physical needs  - Identify cognitive and physical deficits and behaviors that affect risk of falls.   - [place] fall precautions as indicated by assessment.  - Educate pt/family on patient safety including physical limitations  - Instruct pt to call for assistance with activity based on assessment  - Modify environment to reduce risk of injury  - Provide assistive devices as appropriate  - Consider OT/PT consult to assist with strengthening/mobility  - Encourage toileting schedule  Outcome: Progressing     Problem: GASTROINTESTINAL - ADULT  Goal: Maintains or returns to baseline bowel function  Description: INTERVENTIONS:  - Assess bowel function  - Maintain adequate hydration with IV or PO as ordered and tolerated  - Evaluate effectiveness of GI medications  - Encourage mobilization and activity  - Obtain nutritional consult as needed  - Establish a toileting routine/schedule  - Consider collaborating with pharmacy to review patient's medication profile  Outcome: Progressing     Problem: METABOLIC/FLUID AND ELECTROLYTES - ADULT  Goal: Glucose maintained within prescribed range  Description: INTERVENTIONS:  - Monitor Blood Glucose as ordered  - Assess for signs and symptoms of hyperglycemia and hypoglycemia  - Administer ordered medications to maintain glucose within target range  - Assess barriers to adequate nutritional intake and initiate nutrition consult as needed  - Instruct patient on self management of diabetes  Outcome: Progressing  Goal: Electrolytes maintained within normal limits  Description: INTERVENTIONS:  - Monitor labs and rhythm and assess patient for signs and symptoms of electrolyte imbalances  - Administer electrolyte replacement as ordered  - Monitor response to electrolyte replacements, including rhythm and repeat lab results as appropriate  - Fluid restriction as ordered  - Instruct patient on fluid and nutrition restrictions as appropriate  Outcome: Progressing  Goal: Hemodynamic stability and optimal renal function maintained  Description: INTERVENTIONS:  - Monitor labs and assess for signs and symptoms of volume excess or deficit  - Monitor intake, output and patient weight  - Monitor urine specific gravity, serum osmolarity and serum sodium as indicated or ordered  - Monitor response to interventions for patient's volume status, including labs, urine output, blood pressure (other measures as available)  - Encourage oral intake as appropriate  - Instruct patient on fluid and nutrition restrictions as appropriate  Outcome: Progressing     Problem: SKIN/TISSUE INTEGRITY - ADULT  Goal: Skin integrity remains intact  Description: INTERVENTIONS  - Assess and document risk factors for pressure ulcer development  - Assess and document skin integrity  - Monitor for areas of redness and/or skin breakdown  - Initiate interventions, skin care algorithm/standards of care as needed  Outcome: Progressing     Problem: MUSCULOSKELETAL - ADULT  Goal: Return mobility to safest level of function  Description: INTERVENTIONS:  - Assess patient stability and activity tolerance for standing, transferring and ambulating w/ or w/o assistive devices  - Assist with transfers and ambulation using safe patient handling equipment as needed  - Ensure adequate protection for wounds/incisions during mobilization  - Obtain PT/OT consults as needed  - Advance activity as appropriate  - Communicate ordered activity level and limitations with patient/family  Outcome: Progressing     Problem: NEUROLOGICAL - ADULT  Goal: Achieves stable or improved neurological status  Description: INTERVENTIONS  - Assess for and report changes in neurological status  - Initiate measures to prevent increased intracranial pressure  - Maintain blood pressure and fluid volume within ordered parameters to optimize cerebral perfusion and minimize risk of hemorrhage  - Monitor temperature, glucose, and sodium.  Initiate appropriate interventions as ordered  Outcome: Progressing     Problem: Impaired Functional Mobility  Goal: Achieve highest/safest level of mobility/gait  Description: Interventions:  - Assess patient's functional ability and stability  - Promote increasing activity/tolerance for mobility and gait  - Educate and engage patient/family in tolerated activity level and precautions  Outcome: Progressing     Problem: DISCHARGE PLANNING  Goal: Discharge to home or other facility with appropriate resources  Description: INTERVENTIONS:  - Identify barriers to discharge w/pt and caregiver  - Include patient/family/discharge partner in discharge planning  - Arrange for needed discharge resources and transportation as appropriate  - Identify discharge learning needs (meds, wound care, etc)  - Arrange for interpreters to assist at discharge as needed  - Consider post-discharge preferences of patient/family/discharge partner  - Complete POLST form as appropriate  - Assess patient's ability to be responsible for managing their own health  - Refer to Case Management Department for coordinating discharge planning if the patient needs post-hospital services based on physician/LIP order or complex needs related to functional status, cognitive ability or social support system  Outcome: Not Progressing

## 2023-10-02 NOTE — CM/SW NOTE
Expected Discharge Plan:    Destination: Subacute Rehab:    Referrals pending to:  [facility]      Current Barriers to d/c: Medical Clearance; Pt/Family aware of plan:  yes    [place] Staff aware of dc plan: Patient discussed in care rounds. PASRR completed    Discharge Planner Follow up Needed:  [facility] list for choice. DC planner/CM to remain available for support and/or discharge planning.     Anna Taylor RN, [facility]    Ext.  [phone]

## 2023-10-02 NOTE — CM/SW NOTE
Pt  NOT managed thru [facility]. Assigned SW/CM to follow up with patient/family on discharge plan.        Nic Khan   , [facility]

## 2023-10-02 NOTE — PLAN OF CARE
Leora Bowie is alert and oriented X3, frequently forgetful. Vitals are stable at this time. Family updated on plan of care. Pain assessed and treated. Bed low, locked, and all safety measures in place. Patient communicates understanding of fall precautions. Frequent rounding, call light within reach.      Problem: Patient Centered Care  Goal: Patient preferences are identified and integrated in the patient's plan of care  Description: Interventions:  - What would you like us to know as we care for you?   - Provide timely, complete, and accurate information to patient/family  - Incorporate patient and family knowledge, values, beliefs, and cultural backgrounds into the planning and delivery of care  - Encourage patient/family to participate in care and decision-making at the level they choose  - Honor patient and family perspectives and choices  Outcome: Progressing     Problem: PAIN - ADULT  Goal: Verbalizes/displays adequate comfort level or patient's stated pain goal  Description: INTERVENTIONS:  - Encourage pt to monitor pain and request assistance  - Assess pain using appropriate pain scale  - Administer analgesics based on type and severity of pain and evaluate response  - Implement non-pharmacological measures as appropriate and evaluate response  - Consider cultural and social influences on pain and pain management  - Manage/alleviate anxiety  - Utilize distraction and/or relaxation techniques  - Monitor for opioid side effects  - Notify MD/LIP if interventions unsuccessful or patient reports new pain  - Anticipate increased pain with activity and pre-medicate as appropriate  Outcome: Progressing     Problem: RISK FOR INFECTION - ADULT  Goal: Absence of fever/infection during anticipated neutropenic period  Description: INTERVENTIONS  - Monitor WBC  - Administer growth factors as ordered  - Implement neutropenic guidelines  Outcome: Progressing     Problem: SAFETY ADULT - FALL  Goal: Free from fall injury  Description: INTERVENTIONS:  - Assess pt frequently for physical needs  - Identify cognitive and physical deficits and behaviors that affect risk of falls.   - [place] fall precautions as indicated by assessment.  - Educate pt/family on patient safety including physical limitations  - Instruct pt to call for assistance with activity based on assessment  - Modify environment to reduce risk of injury  - Provide assistive devices as appropriate  - Consider OT/PT consult to assist with strengthening/mobility  - Encourage toileting schedule  Outcome: Progressing     Problem: DISCHARGE PLANNING  Goal: Discharge to home or other facility with appropriate resources  Description: INTERVENTIONS:  - Identify barriers to discharge w/pt and caregiver  - Include patient/family/discharge partner in discharge planning  - Arrange for needed discharge resources and transportation as appropriate  - Identify discharge learning needs (meds, wound care, etc)  - Arrange for interpreters to assist at discharge as needed  - Consider post-discharge preferences of patient/family/discharge partner  - Complete POLST form as appropriate  - Assess patient's ability to be responsible for managing their own health  - Refer to Case Management Department for coordinating discharge planning if the patient needs post-hospital services based on physician/LIP order or complex needs related to functional status, cognitive ability or social support system  Outcome: Progressing     Problem: GASTROINTESTINAL - ADULT  Goal: Maintains or returns to baseline bowel function  Description: INTERVENTIONS:  - Assess bowel function  - Maintain adequate hydration with IV or PO as ordered and tolerated  - Evaluate effectiveness of GI medications  - Encourage mobilization and activity  - Obtain nutritional consult as needed  - Establish a toileting routine/schedule  - Consider collaborating with pharmacy to review patient's medication profile  Outcome: Progressing     Problem: METABOLIC/FLUID AND ELECTROLYTES - ADULT  Goal: Glucose maintained within prescribed range  Description: INTERVENTIONS:  - Monitor Blood Glucose as ordered  - Assess for signs and symptoms of hyperglycemia and hypoglycemia  - Administer ordered medications to maintain glucose within target range  - Assess barriers to adequate nutritional intake and initiate nutrition consult as needed  - Instruct patient on self management of diabetes  Outcome: Progressing  Goal: Electrolytes maintained within normal limits  Description: INTERVENTIONS:  - Monitor labs and rhythm and assess patient for signs and symptoms of electrolyte imbalances  - Administer electrolyte replacement as ordered  - Monitor response to electrolyte replacements, including rhythm and repeat lab results as appropriate  - Fluid restriction as ordered  - Instruct patient on fluid and nutrition restrictions as appropriate  Outcome: Progressing  Goal: Hemodynamic stability and optimal renal function maintained  Description: INTERVENTIONS:  - Monitor labs and assess for signs and symptoms of volume excess or deficit  - Monitor intake, output and patient weight  - Monitor urine specific gravity, serum osmolarity and serum sodium as indicated or ordered  - Monitor response to interventions for patient's volume status, including labs, urine output, blood pressure (other measures as available)  - Encourage oral intake as appropriate  - Instruct patient on fluid and nutrition restrictions as appropriate  Outcome: Progressing     Problem: SKIN/TISSUE INTEGRITY - ADULT  Goal: Skin integrity remains intact  Description: INTERVENTIONS  - Assess and document risk factors for pressure ulcer development  - Assess and document skin integrity  - Monitor for areas of redness and/or skin breakdown  - Initiate interventions, skin care algorithm/standards of care as needed  Outcome: Progressing     Problem: MUSCULOSKELETAL - ADULT  Goal: Return mobility to safest level of function  Description: INTERVENTIONS:  - Assess patient stability and activity tolerance for standing, transferring and ambulating w/ or w/o assistive devices  - Assist with transfers and ambulation using safe patient handling equipment as needed  - Ensure adequate protection for wounds/incisions during mobilization  - Obtain PT/OT consults as needed  - Advance activity as appropriate  - Communicate ordered activity level and limitations with patient/family  Outcome: Progressing     Problem: NEUROLOGICAL - ADULT  Goal: Achieves stable or improved neurological status  Description: INTERVENTIONS  - Assess for and report changes in neurological status  - Initiate measures to prevent increased intracranial pressure  - Maintain blood pressure and fluid volume within ordered parameters to optimize cerebral perfusion and minimize risk of hemorrhage  - Monitor temperature, glucose, and sodium.  Initiate appropriate interventions as ordered  Outcome: Progressing     Problem: Impaired Functional Mobility  Goal: Achieve highest/safest level of mobility/gait  Description: Interventions:  - Assess patient's functional ability and stability  - Promote increasing activity/tolerance for mobility and gait  - Educate and engage patient/family in tolerated activity level and precautions  Outcome: Progressing

## 2023-10-03 VITALS
TEMPERATURE: 98 F | WEIGHT: 176.88 LBS | DIASTOLIC BLOOD PRESSURE: 59 MMHG | OXYGEN SATURATION: 93 % | BODY MASS INDEX: 28.43 KG/M2 | SYSTOLIC BLOOD PRESSURE: 125 MMHG | RESPIRATION RATE: 18 BRPM | HEIGHT: 66 IN | HEART RATE: 74 BPM

## 2023-10-03 LAB
ALBUMIN SERPL-MCNC: 2.6 G/DL (ref 3.4–5)
ALBUMIN/GLOB SERPL: 0.7 {RATIO} (ref 1–2)
ALP LIVER SERPL-CCNC: 70 U/L
ALT SERPL-CCNC: 30 U/L
ANION GAP SERPL CALC-SCNC: 5 MMOL/L (ref 0–18)
AST SERPL-CCNC: 24 U/L (ref 15–37)
BASOPHILS # BLD AUTO: 0 X10(3) UL (ref 0–0.2)
BASOPHILS NFR BLD AUTO: 0 %
BILIRUB SERPL-MCNC: 0.3 MG/DL (ref 0.1–2)
BUN BLD-MCNC: 13 MG/DL (ref 7–18)
BUN/CREAT SERPL: 11.4 (ref 10–20)
CALCIUM BLD-MCNC: 8.5 MG/DL (ref 8.5–10.1)
CHLORIDE SERPL-SCNC: 111 MMOL/L (ref 98–112)
CO2 SERPL-SCNC: 28 MMOL/L (ref 21–32)
CREAT BLD-MCNC: 1.14 MG/DL
DEPRECATED RDW RBC AUTO: 46.1 FL (ref 35.1–46.3)
EGFRCR SERPLBLD CKD-EPI 2021: 52 ML/MIN/1.73M2 (ref 60–?)
EOSINOPHIL # BLD AUTO: 0.04 X10(3) UL (ref 0–0.7)
EOSINOPHIL NFR BLD AUTO: 1.3 %
ERYTHROCYTE [DISTWIDTH] IN BLOOD BY AUTOMATED COUNT: 15.9 % (ref 11–15)
GLOBULIN PLAS-MCNC: 4 G/DL (ref 2.8–4.4)
GLUCOSE BLD-MCNC: 96 MG/DL (ref 70–99)
GLUCOSE BLDC GLUCOMTR-MCNC: 108 MG/DL (ref 70–99)
GLUCOSE BLDC GLUCOMTR-MCNC: 184 MG/DL (ref 70–99)
HCT VFR BLD AUTO: 29.7 %
HGB BLD-MCNC: 9.4 G/DL
IMM GRANULOCYTES # BLD AUTO: 0.01 X10(3) UL (ref 0–1)
IMM GRANULOCYTES NFR BLD: 0.3 %
LYMPHOCYTES # BLD AUTO: 1.39 X10(3) UL (ref 1–4)
LYMPHOCYTES NFR BLD AUTO: 45.1 %
MCH RBC QN AUTO: 25.1 PG (ref 26–34)
MCHC RBC AUTO-ENTMCNC: 31.6 G/DL (ref 31–37)
MCV RBC AUTO: 79.2 FL
MONOCYTES # BLD AUTO: 0.22 X10(3) UL (ref 0.1–1)
MONOCYTES NFR BLD AUTO: 7.1 %
NEUTROPHILS # BLD AUTO: 1.42 X10 (3) UL (ref 1.5–7.7)
NEUTROPHILS # BLD AUTO: 1.42 X10(3) UL (ref 1.5–7.7)
NEUTROPHILS NFR BLD AUTO: 46.2 %
OSMOLALITY SERPL CALC.SUM OF ELEC: 298 MOSM/KG (ref 275–295)
PLATELET # BLD AUTO: 243 10(3)UL (ref 150–450)
POTASSIUM SERPL-SCNC: 3.5 MMOL/L (ref 3.5–5.1)
PROT SERPL-MCNC: 6.6 G/DL (ref 6.4–8.2)
RBC # BLD AUTO: 3.75 X10(6)UL
SODIUM SERPL-SCNC: 144 MMOL/L (ref 136–145)
WBC # BLD AUTO: 3.1 X10(3) UL (ref 4–11)

## 2023-10-03 PROCEDURE — 85025 COMPLETE CBC W/AUTO DIFF WBC: CPT | Performed by: HOSPITALIST

## 2023-10-03 PROCEDURE — 82962 GLUCOSE BLOOD TEST: CPT

## 2023-10-03 PROCEDURE — 80053 COMPREHEN METABOLIC PANEL: CPT | Performed by: HOSPITALIST

## 2023-10-03 RX ORDER — POTASSIUM CHLORIDE 20 MEQ/1
40 TABLET, EXTENDED RELEASE ORAL EVERY 4 HOURS
Status: DISCONTINUED | OUTPATIENT
Start: 2023-10-03 | End: 2023-10-03

## 2023-10-03 NOTE — PROGRESS NOTES
Patient okay to be discharged per MD order, all discharge instructions discussed with pt and pt's son at bedside, all questions answered, peripheral IV removd. Pt assisted to hospital entrance via wheelchair by staff, pt discharged with all belongings and picked up by her son, stable at time of discharge.

## 2023-10-03 NOTE — PLAN OF CARE
No acute changes. Vitals stable. Safety precautions maintained. Frequent rounding by staff. Continue to monitor. Problem: Patient Centered Care  Goal: Patient preferences are identified and integrated in the patient's plan of care  Description: Interventions:  - What would you like us to know as we care for you?   - Provide timely, complete, and accurate information to patient/family  - Incorporate patient and family knowledge, values, beliefs, and cultural backgrounds into the planning and delivery of care  - Encourage patient/family to participate in care and decision-making at the level they choose  - Honor patient and family perspectives and choices  Outcome: Progressing     Problem: PAIN - ADULT  Goal: Verbalizes/displays adequate comfort level or patient's stated pain goal  Description: INTERVENTIONS:  - Encourage pt to monitor pain and request assistance  - Assess pain using appropriate pain scale  - Administer analgesics based on type and severity of pain and evaluate response  - Implement non-pharmacological measures as appropriate and evaluate response  - Consider cultural and social influences on pain and pain management  - Manage/alleviate anxiety  - Utilize distraction and/or relaxation techniques  - Monitor for opioid side effects  - Notify MD/LIP if interventions unsuccessful or patient reports new pain  - Anticipate increased pain with activity and pre-medicate as appropriate  Outcome: Progressing     Problem: RISK FOR INFECTION - ADULT  Goal: Absence of fever/infection during anticipated neutropenic period  Description: INTERVENTIONS  - Monitor WBC  - Administer growth factors as ordered  - Implement neutropenic guidelines  Outcome: Progressing     Problem: SAFETY ADULT - FALL  Goal: Free from fall injury  Description: INTERVENTIONS:  - Assess pt frequently for physical needs  - Identify cognitive and physical deficits and behaviors that affect risk of falls.   - [place] fall precautions as indicated by assessment.  - Educate pt/family on patient safety including physical limitations  - Instruct pt to call for assistance with activity based on assessment  - Modify environment to reduce risk of injury  - Provide assistive devices as appropriate  - Consider OT/PT consult to assist with strengthening/mobility  - Encourage toileting schedule  Outcome: Progressing     Problem: DISCHARGE PLANNING  Goal: Discharge to home or other facility with appropriate resources  Description: INTERVENTIONS:  - Identify barriers to discharge w/pt and caregiver  - Include patient/family/discharge partner in discharge planning  - Arrange for needed discharge resources and transportation as appropriate  - Identify discharge learning needs (meds, wound care, etc)  - Arrange for interpreters to assist at discharge as needed  - Consider post-discharge preferences of patient/family/discharge partner  - Complete POLST form as appropriate  - Assess patient's ability to be responsible for managing their own health  - Refer to Case Management Department for coordinating discharge planning if the patient needs post-hospital services based on physician/LIP order or complex needs related to functional status, cognitive ability or social support system  Outcome: Progressing     Problem: GASTROINTESTINAL - ADULT  Goal: Maintains or returns to baseline bowel function  Description: INTERVENTIONS:  - Assess bowel function  - Maintain adequate hydration with IV or PO as ordered and tolerated  - Evaluate effectiveness of GI medications  - Encourage mobilization and activity  - Obtain nutritional consult as needed  - Establish a toileting routine/schedule  - Consider collaborating with pharmacy to review patient's medication profile  Outcome: Progressing     Problem: METABOLIC/FLUID AND ELECTROLYTES - ADULT  Goal: Glucose maintained within prescribed range  Description: INTERVENTIONS:  - Monitor Blood Glucose as ordered  - Assess for signs and symptoms of hyperglycemia and hypoglycemia  - Administer ordered medications to maintain glucose within target range  - Assess barriers to adequate nutritional intake and initiate nutrition consult as needed  - Instruct patient on self management of diabetes  Outcome: Progressing  Goal: Electrolytes maintained within normal limits  Description: INTERVENTIONS:  - Monitor labs and rhythm and assess patient for signs and symptoms of electrolyte imbalances  - Administer electrolyte replacement as ordered  - Monitor response to electrolyte replacements, including rhythm and repeat lab results as appropriate  - Fluid restriction as ordered  - Instruct patient on fluid and nutrition restrictions as appropriate  Outcome: Progressing  Goal: Hemodynamic stability and optimal renal function maintained  Description: INTERVENTIONS:  - Monitor labs and assess for signs and symptoms of volume excess or deficit  - Monitor intake, output and patient weight  - Monitor urine specific gravity, serum osmolarity and serum sodium as indicated or ordered  - Monitor response to interventions for patient's volume status, including labs, urine output, blood pressure (other measures as available)  - Encourage oral intake as appropriate  - Instruct patient on fluid and nutrition restrictions as appropriate  Outcome: Progressing     Problem: SKIN/TISSUE INTEGRITY - ADULT  Goal: Skin integrity remains intact  Description: INTERVENTIONS  - Assess and document risk factors for pressure ulcer development  - Assess and document skin integrity  - Monitor for areas of redness and/or skin breakdown  - Initiate interventions, skin care algorithm/standards of care as needed  Outcome: Progressing     Problem: MUSCULOSKELETAL - ADULT  Goal: Return mobility to safest level of function  Description: INTERVENTIONS:  - Assess patient stability and activity tolerance for standing, transferring and ambulating w/ or w/o assistive devices  - Assist with transfers and ambulation using safe patient handling equipment as needed  - Ensure adequate protection for wounds/incisions during mobilization  - Obtain PT/OT consults as needed  - Advance activity as appropriate  - Communicate ordered activity level and limitations with patient/family  Outcome: Progressing     Problem: Impaired Functional Mobility  Goal: Achieve highest/safest level of mobility/gait  Description: Interventions:  - Assess patient's functional ability and stability  - Promote increasing activity/tolerance for mobility and gait  - Educate and engage patient/family in tolerated activity level and precautions    Outcome: Progressing

## 2023-10-03 NOTE — CM/SW NOTE
Department  notified of request for [facility], aidin referrals started. Assigned CM/SW to follow up with pt/family on further discharge planning.        Amando King   , [address] [place] [address]

## 2023-10-03 NOTE — PLAN OF CARE
Problem: Patient Centered Care  Goal: Patient preferences are identified and integrated in the patient's plan of care  Description: Interventions:  - What would you like us to know as we care for you?   - Provide timely, complete, and accurate information to patient/family  - Incorporate patient and family knowledge, values, beliefs, and cultural backgrounds into the planning and delivery of care  - Encourage patient/family to participate in care and decision-making at the level they choose  - Honor patient and family perspectives and choices  Outcome: Progressing     Problem: PAIN - ADULT  Goal: Verbalizes/displays adequate comfort level or patient's stated pain goal  Description: INTERVENTIONS:  - Encourage pt to monitor pain and request assistance  - Assess pain using appropriate pain scale  - Administer analgesics based on type and severity of pain and evaluate response  - Implement non-pharmacological measures as appropriate and evaluate response  - Consider cultural and social influences on pain and pain management  - Manage/alleviate anxiety  - Utilize distraction and/or relaxation techniques  - Monitor for opioid side effects  - Notify MD/LIP if interventions unsuccessful or patient reports new pain  - Anticipate increased pain with activity and pre-medicate as appropriate  Outcome: Progressing     Problem: RISK FOR INFECTION - ADULT  Goal: Absence of fever/infection during anticipated neutropenic period  Description: INTERVENTIONS  - Monitor WBC  - Administer growth factors as ordered  - Implement neutropenic guidelines  Outcome: Progressing     Problem: SAFETY ADULT - FALL  Goal: Free from fall injury  Description: INTERVENTIONS:  - Assess pt frequently for physical needs  - Identify cognitive and physical deficits and behaviors that affect risk of falls.   - [place] fall precautions as indicated by assessment.  - Educate pt/family on patient safety including physical limitations  - Instruct pt to call for assistance with activity based on assessment  - Modify environment to reduce risk of injury  - Provide assistive devices as appropriate  - Consider OT/PT consult to assist with strengthening/mobility  - Encourage toileting schedule  Outcome: Progressing     Problem: DISCHARGE PLANNING  Goal: Discharge to home or other facility with appropriate resources  Description: INTERVENTIONS:  - Identify barriers to discharge w/pt and caregiver  - Include patient/family/discharge partner in discharge planning  - Arrange for needed discharge resources and transportation as appropriate  - Identify discharge learning needs (meds, wound care, etc)  - Arrange for interpreters to assist at discharge as needed  - Consider post-discharge preferences of patient/family/discharge partner  - Complete POLST form as appropriate  - Assess patient's ability to be responsible for managing their own health  - Refer to Case Management Department for coordinating discharge planning if the patient needs post-hospital services based on physician/LIP order or complex needs related to functional status, cognitive ability or social support system  Outcome: Progressing     Problem: GASTROINTESTINAL - ADULT  Goal: Maintains or returns to baseline bowel function  Description: INTERVENTIONS:  - Assess bowel function  - Maintain adequate hydration with IV or PO as ordered and tolerated  - Evaluate effectiveness of GI medications  - Encourage mobilization and activity  - Obtain nutritional consult as needed  - Establish a toileting routine/schedule  - Consider collaborating with pharmacy to review patient's medication profile  Outcome: Progressing     Problem: METABOLIC/FLUID AND ELECTROLYTES - ADULT  Goal: Glucose maintained within prescribed range  Description: INTERVENTIONS:  - Monitor Blood Glucose as ordered  - Assess for signs and symptoms of hyperglycemia and hypoglycemia  - Administer ordered medications to maintain glucose within target range  - Assess barriers to adequate nutritional intake and initiate nutrition consult as needed  - Instruct patient on self management of diabetes  Outcome: Progressing  Goal: Electrolytes maintained within normal limits  Description: INTERVENTIONS:  - Monitor labs and rhythm and assess patient for signs and symptoms of electrolyte imbalances  - Administer electrolyte replacement as ordered  - Monitor response to electrolyte replacements, including rhythm and repeat lab results as appropriate  - Fluid restriction as ordered  - Instruct patient on fluid and nutrition restrictions as appropriate  Outcome: Progressing  Goal: Hemodynamic stability and optimal renal function maintained  Description: INTERVENTIONS:  - Monitor labs and assess for signs and symptoms of volume excess or deficit  - Monitor intake, output and patient weight  - Monitor urine specific gravity, serum osmolarity and serum sodium as indicated or ordered  - Monitor response to interventions for patient's volume status, including labs, urine output, blood pressure (other measures as available)  - Encourage oral intake as appropriate  - Instruct patient on fluid and nutrition restrictions as appropriate  Outcome: Progressing     Problem: SKIN/TISSUE INTEGRITY - ADULT  Goal: Skin integrity remains intact  Description: INTERVENTIONS  - Assess and document risk factors for pressure ulcer development  - Assess and document skin integrity  - Monitor for areas of redness and/or skin breakdown  - Initiate interventions, skin care algorithm/standards of care as needed  Outcome: Progressing     Problem: MUSCULOSKELETAL - ADULT  Goal: Return mobility to safest level of function  Description: INTERVENTIONS:  - Assess patient stability and activity tolerance for standing, transferring and ambulating w/ or w/o assistive devices  - Assist with transfers and ambulation using safe patient handling equipment as needed  - Ensure adequate protection for wounds/incisions during mobilization  - Obtain PT/OT consults as needed  - Advance activity as appropriate  - Communicate ordered activity level and limitations with patient/family  Outcome: Progressing     Problem: NEUROLOGICAL - ADULT  Goal: Achieves stable or improved neurological status  Description: INTERVENTIONS  - Assess for and report changes in neurological status  - Initiate measures to prevent increased intracranial pressure  - Maintain blood pressure and fluid volume within ordered parameters to optimize cerebral perfusion and minimize risk of hemorrhage  - Monitor temperature, glucose, and sodium. Initiate appropriate interventions as ordered  Outcome: Progressing     Problem: Impaired Functional Mobility  Goal: Achieve highest/safest level of mobility/gait  Description: Interventions:  - Assess patient's functional ability and stability  - Promote increasing activity/tolerance for mobility and gait  - Educate and engage patient/family in tolerated activity level and precautions    Outcome: Progressing     Patient A/Ox3-4, resting in bed, encourage activity, family at bedside. Pain manage per protocol. Isolation precaution. Potassium 3.5 replaced, will continue to monitor.  for discharge support, patient medically clear, plan for discharge to [facility], family is aware. Fall precaution. Call light within reach.

## 2023-10-03 NOTE — CM/SW NOTE
CM assisting Mary Kay LAMBERT. Per Mell Young, pt and family now requesting to take pt home with Amari Granado Counter Liaison updated via [employer]. CM requested department  [facility]) to initiate [address] referral for [facility]. F2F entered. SW/PETRA will continue to follow. Farhat Worthy.  Dusty Peabody RN, BSN  Nurse   [phone]

## 2024-03-20 ENCOUNTER — HOSPITAL ENCOUNTER (EMERGENCY)
Facility: HOSPITAL | Age: 69
Discharge: HOME | End: 2024-03-20
Attending: EMERGENCY MEDICINE
Payer: MEDICARE

## 2024-03-20 ENCOUNTER — APPOINTMENT (OUTPATIENT)
Dept: GENERAL RADIOLOGY | Facility: HOSPITAL | Age: 69
End: 2024-03-20
Attending: EMERGENCY MEDICINE
Payer: MEDICARE

## 2024-03-20 VITALS
SYSTOLIC BLOOD PRESSURE: 133 MMHG | DIASTOLIC BLOOD PRESSURE: 59 MMHG | BODY MASS INDEX: 31 KG/M2 | RESPIRATION RATE: 14 BRPM | HEART RATE: 61 BPM | WEIGHT: 194 LBS | TEMPERATURE: 98 F | OXYGEN SATURATION: 97 %

## 2024-03-20 DIAGNOSIS — N64.4 BREAST PAIN, RIGHT: Primary | ICD-10-CM

## 2024-03-20 LAB
ALBUMIN SERPL-MCNC: 4.3 G/DL (ref 3.2–4.8)
ALP LIVER SERPL-CCNC: 74 U/L
ALT SERPL-CCNC: 13 U/L
ANION GAP SERPL CALC-SCNC: 6 MMOL/L (ref 0–18)
AST SERPL-CCNC: 15 U/L (ref ?–34)
ATRIAL RATE: 68 BPM
BASOPHILS # BLD AUTO: 0.02 X10(3) UL (ref 0–0.2)
BASOPHILS NFR BLD AUTO: 0.5 %
BILIRUB DIRECT SERPL-MCNC: 0.2 MG/DL (ref ?–0.3)
BILIRUB SERPL-MCNC: 0.4 MG/DL (ref 0.2–1.1)
BNP SERPL-MCNC: 92 PG/ML
BUN BLD-MCNC: 11 MG/DL (ref 9–23)
BUN/CREAT SERPL: 8.7 (ref 10–20)
CALCIUM BLD-MCNC: 9.4 MG/DL (ref 8.7–10.4)
CHLORIDE SERPL-SCNC: 113 MMOL/L (ref 98–112)
CO2 SERPL-SCNC: 26 MMOL/L (ref 21–32)
CREAT BLD-MCNC: 1.26 MG/DL
DEPRECATED RDW RBC AUTO: 46.5 FL (ref 35.1–46.3)
EGFRCR SERPLBLD CKD-EPI 2021: 46 ML/MIN/1.73M2 (ref 60–?)
EOSINOPHIL # BLD AUTO: 0.04 X10(3) UL (ref 0–0.7)
EOSINOPHIL NFR BLD AUTO: 0.9 %
ERYTHROCYTE [DISTWIDTH] IN BLOOD BY AUTOMATED COUNT: 16.3 % (ref 11–15)
GLUCOSE BLD-MCNC: 109 MG/DL (ref 70–99)
GLUCOSE BLDC GLUCOMTR-MCNC: 109 MG/DL (ref 70–99)
HCT VFR BLD AUTO: 33.2 %
HGB BLD-MCNC: 10.3 G/DL
IMM GRANULOCYTES # BLD AUTO: 0.03 X10(3) UL (ref 0–1)
IMM GRANULOCYTES NFR BLD: 0.7 %
LYMPHOCYTES # BLD AUTO: 1.35 X10(3) UL (ref 1–4)
LYMPHOCYTES NFR BLD AUTO: 30.5 %
MCH RBC QN AUTO: 24.8 PG (ref 26–34)
MCHC RBC AUTO-ENTMCNC: 31 G/DL (ref 31–37)
MCV RBC AUTO: 79.8 FL
MONOCYTES # BLD AUTO: 0.31 X10(3) UL (ref 0.1–1)
MONOCYTES NFR BLD AUTO: 7 %
NEUTROPHILS # BLD AUTO: 2.67 X10 (3) UL (ref 1.5–7.7)
NEUTROPHILS # BLD AUTO: 2.67 X10(3) UL (ref 1.5–7.7)
NEUTROPHILS NFR BLD AUTO: 60.4 %
OSMOLALITY SERPL CALC.SUM OF ELEC: 300 MOSM/KG (ref 275–295)
P AXIS: 24 DEGREES
P-R INTERVAL: 220 MS
PLATELET # BLD AUTO: 282 10(3)UL (ref 150–450)
POTASSIUM SERPL-SCNC: 3.3 MMOL/L (ref 3.5–5.1)
PROT SERPL-MCNC: 7.5 G/DL (ref 5.7–8.2)
Q-T INTERVAL: 404 MS
QRS DURATION: 86 MS
QTC CALCULATION (BEZET): 429 MS
R AXIS: -25 DEGREES
RBC # BLD AUTO: 4.16 X10(6)UL
SODIUM SERPL-SCNC: 145 MMOL/L (ref 136–145)
T AXIS: 110 DEGREES
TROPONIN I SERPL HS-MCNC: 10 NG/L
VENTRICULAR RATE: 68 BPM
WBC # BLD AUTO: 4.4 X10(3) UL (ref 4–11)

## 2024-03-20 PROCEDURE — 84484 ASSAY OF TROPONIN QUANT: CPT | Performed by: EMERGENCY MEDICINE

## 2024-03-20 PROCEDURE — 93010 ELECTROCARDIOGRAM REPORT: CPT

## 2024-03-20 PROCEDURE — 82962 GLUCOSE BLOOD TEST: CPT

## 2024-03-20 PROCEDURE — 99284 EMERGENCY DEPT VISIT MOD MDM: CPT

## 2024-03-20 PROCEDURE — 71045 X-RAY EXAM CHEST 1 VIEW: CPT | Performed by: EMERGENCY MEDICINE

## 2024-03-20 PROCEDURE — 80048 BASIC METABOLIC PNL TOTAL CA: CPT | Performed by: EMERGENCY MEDICINE

## 2024-03-20 PROCEDURE — 36415 COLL VENOUS BLD VENIPUNCTURE: CPT

## 2024-03-20 PROCEDURE — 93005 ELECTROCARDIOGRAM TRACING: CPT

## 2024-03-20 PROCEDURE — 80076 HEPATIC FUNCTION PANEL: CPT | Performed by: EMERGENCY MEDICINE

## 2024-03-20 PROCEDURE — 85025 COMPLETE CBC W/AUTO DIFF WBC: CPT | Performed by: EMERGENCY MEDICINE

## 2024-03-20 PROCEDURE — 83880 ASSAY OF NATRIURETIC PEPTIDE: CPT | Performed by: EMERGENCY MEDICINE

## 2024-03-20 NOTE — ED INITIAL ASSESSMENT (HPI)
Patient presents from ED with complaints of weakness and high blood pressure readings at home. Patient reports weakness on the right side of her body.

## 2024-03-20 NOTE — ED QUICK NOTES
Patient's son, Gokul, given an update on patient's condition and aware of patient's discharge back home. Discharge information/instructions given to Gokul and patient.

## 2024-03-20 NOTE — ED PROVIDER NOTES
Patient Seen in: [facility] Emergency Department    History     Chief Complaint   Patient presents with    Numbness Weakness       HPI    69-year-old female with past medical history significant for CAD, diabetes, high blood pressure, high cholesterol, asthma, sleep apnea, CVA, presents to the ED via EMS for hypotension and right breast pain.  Per EMS, they were called due to patient having low blood pressure after she got out of the shower.  Upon their arrival, patient's blood pressure was elevated near 180 systolic.  Patient subsequently took all of her morning medications.  Patient's son had also stated that patient may have been weaker than usual.  Patient states that she asked her son to call EMS because she was experiencing pain in her right breast which had been going on for 3 days.  Patient states otherwise she is feeling fine and denies any new weakness, shortness of breath, nausea, fevers, chills.    History from Independent Source: History given by EMS as stated in HPI    External Records Reviewed: Reviewed medical history available in EMR and patient had CVA in 2022 with residual aphasia and has done speech therapy.  Prior hospitalization record from December 2022 and patient has history of confusion with baseline cognitive delay due to vascular dementia from prior CVAs    History reviewed.   Past Medical History:   Diagnosis Date    Asthma (HCC)     Coronary atherosclerosis     Diabetes (HCC)     diabetes for 2 yrs    Essential hypertension     Glaucoma     right    Heart attack (HCC)     2005    High blood pressure     High cholesterol     Hyperlipidemia     Osteoarthritis     Sleep apnea     cpap    Stroke (HCC)     30 yrs ago    Visual impairment     left eye edema       History reviewed.   Past Surgical History:   Procedure Laterality Date    ANESTH,VITRECTOMY Left 09/18/2017    Pars plana vitrectomy, internal limiting membrane peel, left eye.    CATH BARE METAL STENT (BMS)      COLONOSCOPY       HYSTERECTOMY      TOTAL ABDOM HYSTERECTOMY      TUBAL LIGATION           Medications :  (Not in a hospital admission)       Family History   Problem Relation Age of Onset    Cancer Father     Diabetes Mother        Smoking Status:   Social History     Socioeconomic History    Marital status: Single   Tobacco Use    Smoking status: Former     Years: 4     Types: Cigarettes     Quit date: 1980     Years since quittin.5    Smokeless tobacco: Never   Vaping Use    Vaping Use: Never used   Substance and Sexual Activity    Alcohol use: No    Drug use: No       Constitutional and vital signs reviewed.      Social History and Family History elements reviewed from today, pertinent positives to the presenting problem noted.    Physical Exam     ED Triage Vitals [24 0748]   /65   Pulse 71   Resp 16   Temp 97.7 °F (36.5 °C)   Temp src    SpO2 97 %   O2 Device None (Room air)       Physical Exam   Constitutional: AAOx3, well nourished, NAD  HEENT: Normocephalic, PERRLA, MMM  CV: s1s2+, RRR, no m/r/g, normal distal pulses  Pulmonary/Chest: CTA b/l with no rales, wheezes.  No chest wall tenderness  Abdominal: Nontender.  Nondistended. Soft. Bowel sounds are normal.   Neck/Back:   :   Musculoskeletal: Normal range of motion. No deformity.   Neurological: Awake, alert. Normal reflexes. No cranial nerve deficit.    Skin: Skin is warm and dry. No rash noted. No erythema.       All measures to prevent infection transmission during my interaction with the patient were taken. The patient was already wearing a droplet mask on my arrival to the room. Personal protective equipment was worn throughout the duration of the exam.      ED Course        Labs Reviewed   BASIC METABOLIC PANEL (8) - Abnormal; Notable for the following components:       Result Value    Glucose 109 (*)     Potassium 3.3 (*)     Chloride 113 (*)     Creatinine 1.26 (*)     BUN/CREA Ratio 8.7 (*)     Calculated Osmolality 300 (*)     eGFR-Cr  46 (*)     All other components within normal limits   POCT GLUCOSE - Abnormal; Notable for the following components:    POC Glucose  109 (*)     All other components within normal limits   CBC W/ DIFFERENTIAL - Abnormal; Notable for the following components:    HGB 10.3 (*)     HCT 33.2 (*)     MCV 79.8 (*)     MCH 24.8 (*)     RDW-SD 46.5 (*)     RDW 16.3 (*)     All other components within normal limits   HEPATIC FUNCTION PANEL (7) - Normal   TROPONIN I HIGH SENSITIVITY - Normal   BNP (B TYPE NATRIURETIC PEPTIDE) - Normal   CBC WITH DIFFERENTIAL WITH PLATELET    Narrative:     The following orders were created for panel order CBC With Differential With Platelet.                  Procedure                               Abnormality         Status                                     ---------                               -----------         ------                                     CBC W/ DIFFERENTIAL[[id number]]          Abnormal            Final result                                                 Please view results for these tests on the individual orders.   RAINBOW DRAW LAVENDER   RAINBOW DRAW LIGHT GREEN   RAINBOW DRAW BLUE   RAINBOW DRAW GOLD     My Independent Interpretation of EKG (if performed): EKG    Rate, intervals and axes as noted on EKG Report.  Rate: 68 bpm  Rhythm: Sinus Rhythm  Reading: Normal sinus rhythm, first-degree AV block,  nonspecific ST changes, normal axis, no ectopy             Monitor Interpretation:   normal sinus rhythm as interpreted by me.      Imaging Results Available and Reviewed while in ED: XR CHEST AP PORTABLE  (CPT=71045)    Result Date: 3/20/2024  CONCLUSION: Moderate interstitial edema.   Dictated by (CST): Eugenio Espitia MD on 3/20/2024 at 8:34 AM     Finalized by (CST): Eugenio Espitia MD on 3/20/2024 at 8:37 AM         ED Medications Administered: Medications - No data to display          MDM     Vitals:    03/20/24 0748 03/20/24 1000   BP: 149/65 133/59   Pulse: 71 61    Resp: 16 14   Temp: 97.7 °F (36.5 °C)    SpO2: 97% 97%   Weight: 88 kg      *I personally reviewed and interpreted all ED vitals.    Independent Interpretation of Studies: Independently reviewed patient's chest x-ray and there are no significant acute findings    Social Determinants of Health:     Procedures:      Differential/MDM/Shared Decision Making: Differential Diagnosis includes yes, PE, pneumonia, chest wall pain, mastitis, others.      The patient already has prior CVA with aphasia, CAD, high blood pressure, sleep apnea, diabetes, asthma, high cholesterol to contribute to the complexity of this ED evaluation.           Workup in the ED is relatively unremarkable.  Patient does not have any significant physical exam findings.  Discussed case with patient and she is comfortable discharge and follow-up with her doctor.      Condition upon leaving the department: Stable    Disposition and Plan     Clinical Impression:  1. Breast pain, right        Disposition:  Discharge    Follow-up:  Yao Rodriguez  [address] [place] [id number]  [phone]    Call in 2 day(s)        Medications Prescribed:  Current Discharge Medication List

## 2024-03-24 ENCOUNTER — APPOINTMENT (OUTPATIENT)
Dept: CT IMAGING | Facility: HOSPITAL | Age: 69
End: 2024-03-24
Attending: EMERGENCY MEDICINE
Payer: MEDICARE

## 2024-03-24 ENCOUNTER — HOSPITAL ENCOUNTER (EMERGENCY)
Facility: HOSPITAL | Age: 69
Discharge: HOME | End: 2024-03-24
Attending: EMERGENCY MEDICINE
Payer: MEDICARE

## 2024-03-24 VITALS
DIASTOLIC BLOOD PRESSURE: 61 MMHG | BODY MASS INDEX: 30.45 KG/M2 | RESPIRATION RATE: 20 BRPM | WEIGHT: 194 LBS | HEART RATE: 61 BPM | HEIGHT: 67 IN | SYSTOLIC BLOOD PRESSURE: 144 MMHG | TEMPERATURE: 98 F | OXYGEN SATURATION: 97 %

## 2024-03-24 DIAGNOSIS — N30.00 ACUTE CYSTITIS WITHOUT HEMATURIA: Primary | ICD-10-CM

## 2024-03-24 DIAGNOSIS — M54.16 LUMBAR RADICULOPATHY: ICD-10-CM

## 2024-03-24 LAB
ALBUMIN SERPL-MCNC: 4.1 G/DL (ref 3.2–4.8)
ALBUMIN/GLOB SERPL: 1.3 {RATIO} (ref 1–2)
ALP LIVER SERPL-CCNC: 74 U/L
ALT SERPL-CCNC: 14 U/L
ANION GAP SERPL CALC-SCNC: 8 MMOL/L (ref 0–18)
AST SERPL-CCNC: 19 U/L (ref ?–34)
BASOPHILS # BLD AUTO: 0.02 X10(3) UL (ref 0–0.2)
BASOPHILS NFR BLD AUTO: 0.5 %
BILIRUB SERPL-MCNC: 0.3 MG/DL (ref 0.2–1.1)
BILIRUB UR QL: NEGATIVE
BUN BLD-MCNC: 13 MG/DL (ref 9–23)
BUN/CREAT SERPL: 10.5 (ref 10–20)
CALCIUM BLD-MCNC: 9.7 MG/DL (ref 8.7–10.4)
CHLORIDE SERPL-SCNC: 111 MMOL/L (ref 98–112)
CK SERPL-CCNC: 79 U/L
CO2 SERPL-SCNC: 26 MMOL/L (ref 21–32)
COLOR UR: YELLOW
CREAT BLD-MCNC: 1.24 MG/DL
DEPRECATED RDW RBC AUTO: 48 FL (ref 35.1–46.3)
EGFRCR SERPLBLD CKD-EPI 2021: 47 ML/MIN/1.73M2 (ref 60–?)
EOSINOPHIL # BLD AUTO: 0.03 X10(3) UL (ref 0–0.7)
EOSINOPHIL NFR BLD AUTO: 0.7 %
ERYTHROCYTE [DISTWIDTH] IN BLOOD BY AUTOMATED COUNT: 16.2 % (ref 11–15)
FLUAV + FLUBV RNA SPEC NAA+PROBE: NEGATIVE
FLUAV + FLUBV RNA SPEC NAA+PROBE: NEGATIVE
GLOBULIN PLAS-MCNC: 3.2 G/DL (ref 2.8–4.4)
GLUCOSE BLD-MCNC: 99 MG/DL (ref 70–99)
GLUCOSE BLDC GLUCOMTR-MCNC: 89 MG/DL (ref 70–99)
GLUCOSE UR-MCNC: NORMAL MG/DL
HCT VFR BLD AUTO: 32.5 %
HGB BLD-MCNC: 10.1 G/DL
HGB UR QL STRIP.AUTO: NEGATIVE
IMM GRANULOCYTES # BLD AUTO: 0.02 X10(3) UL (ref 0–1)
IMM GRANULOCYTES NFR BLD: 0.5 %
KETONES UR-MCNC: NEGATIVE MG/DL
LACTATE SERPL-SCNC: 1.3 MMOL/L (ref 0.5–2)
LEUKOCYTE ESTERASE UR QL STRIP.AUTO: 500
LYMPHOCYTES # BLD AUTO: 1.47 X10(3) UL (ref 1–4)
LYMPHOCYTES NFR BLD AUTO: 34.3 %
MCH RBC QN AUTO: 25.1 PG (ref 26–34)
MCHC RBC AUTO-ENTMCNC: 31.1 G/DL (ref 31–37)
MCV RBC AUTO: 80.6 FL
MONOCYTES # BLD AUTO: 0.21 X10(3) UL (ref 0.1–1)
MONOCYTES NFR BLD AUTO: 4.9 %
NEUTROPHILS # BLD AUTO: 2.53 X10 (3) UL (ref 1.5–7.7)
NEUTROPHILS # BLD AUTO: 2.53 X10(3) UL (ref 1.5–7.7)
NEUTROPHILS NFR BLD AUTO: 59.1 %
OSMOLALITY SERPL CALC.SUM OF ELEC: 300 MOSM/KG (ref 275–295)
PH UR: 6.5 [PH] (ref 5–8)
PLATELET # BLD AUTO: 273 10(3)UL (ref 150–450)
POTASSIUM SERPL-SCNC: 3.8 MMOL/L (ref 3.5–5.1)
PROT SERPL-MCNC: 7.3 G/DL (ref 5.7–8.2)
RBC # BLD AUTO: 4.03 X10(6)UL
RSV RNA SPEC NAA+PROBE: NEGATIVE
SARS-COV-2 RNA RESP QL NAA+PROBE: NOT DETECTED
SODIUM SERPL-SCNC: 145 MMOL/L (ref 136–145)
SP GR UR STRIP: 1.02 (ref 1–1.03)
UROBILINOGEN UR STRIP-ACNC: NORMAL
WBC # BLD AUTO: 4.3 X10(3) UL (ref 4–11)
WBC #/AREA URNS AUTO: >50 /HPF

## 2024-03-24 PROCEDURE — 96366 THER/PROPH/DIAG IV INF ADDON: CPT

## 2024-03-24 PROCEDURE — 82962 GLUCOSE BLOOD TEST: CPT

## 2024-03-24 PROCEDURE — 82550 ASSAY OF CK (CPK): CPT | Performed by: EMERGENCY MEDICINE

## 2024-03-24 PROCEDURE — 74177 CT ABD & PELVIS W/CONTRAST: CPT | Performed by: EMERGENCY MEDICINE

## 2024-03-24 PROCEDURE — 87088 URINE BACTERIA CULTURE: CPT | Performed by: EMERGENCY MEDICINE

## 2024-03-24 PROCEDURE — 80053 COMPREHEN METABOLIC PANEL: CPT | Performed by: EMERGENCY MEDICINE

## 2024-03-24 PROCEDURE — 0241U SARS-COV-2/FLU A AND B/RSV BY PCR (GENEXPERT): CPT | Performed by: EMERGENCY MEDICINE

## 2024-03-24 PROCEDURE — 85025 COMPLETE CBC W/AUTO DIFF WBC: CPT | Performed by: EMERGENCY MEDICINE

## 2024-03-24 PROCEDURE — 87186 SC STD MICRODIL/AGAR DIL: CPT | Performed by: EMERGENCY MEDICINE

## 2024-03-24 PROCEDURE — 51798 US URINE CAPACITY MEASURE: CPT

## 2024-03-24 PROCEDURE — 36415 COLL VENOUS BLD VENIPUNCTURE: CPT

## 2024-03-24 PROCEDURE — 87086 URINE CULTURE/COLONY COUNT: CPT | Performed by: EMERGENCY MEDICINE

## 2024-03-24 PROCEDURE — 96375 TX/PRO/DX INJ NEW DRUG ADDON: CPT

## 2024-03-24 PROCEDURE — 96365 THER/PROPH/DIAG IV INF INIT: CPT

## 2024-03-24 PROCEDURE — 99285 EMERGENCY DEPT VISIT HI MDM: CPT

## 2024-03-24 PROCEDURE — 81001 URINALYSIS AUTO W/SCOPE: CPT | Performed by: EMERGENCY MEDICINE

## 2024-03-24 PROCEDURE — 83605 ASSAY OF LACTIC ACID: CPT | Performed by: EMERGENCY MEDICINE

## 2024-03-24 PROCEDURE — 87040 BLOOD CULTURE FOR BACTERIA: CPT | Performed by: EMERGENCY MEDICINE

## 2024-03-24 RX ORDER — KETOROLAC TROMETHAMINE 10 MG/1
10 TABLET, FILM COATED ORAL EVERY 6 HOURS PRN
Qty: 20 TABLET | Refills: 0 | Status: SHIPPED | OUTPATIENT
Start: 2024-03-24 | End: 2024-03-29

## 2024-03-24 RX ORDER — ORPHENADRINE CITRATE 30 MG/ML
30 INJECTION INTRAMUSCULAR; INTRAVENOUS ONCE
Status: COMPLETED | OUTPATIENT
Start: 2024-03-24 | End: 2024-03-24

## 2024-03-24 RX ORDER — METHYLPREDNISOLONE SODIUM SUCCINATE 40 MG/ML
40 INJECTION, POWDER, LYOPHILIZED, FOR SOLUTION INTRAMUSCULAR; INTRAVENOUS ONCE
Status: COMPLETED | OUTPATIENT
Start: 2024-03-24 | End: 2024-03-24

## 2024-03-24 RX ORDER — CEPHALEXIN 500 MG/1
500 CAPSULE ORAL 2 TIMES DAILY
Qty: 14 CAPSULE | Refills: 0 | Status: SHIPPED | OUTPATIENT
Start: 2024-03-24 | End: 2024-03-31

## 2024-03-24 RX ORDER — KETOROLAC TROMETHAMINE 15 MG/ML
15 INJECTION, SOLUTION INTRAMUSCULAR; INTRAVENOUS ONCE
Status: COMPLETED | OUTPATIENT
Start: 2024-03-24 | End: 2024-03-24

## 2024-03-24 RX ORDER — PREDNISONE 20 MG/1
20 TABLET ORAL DAILY
Qty: 5 TABLET | Refills: 0 | Status: SHIPPED | OUTPATIENT
Start: 2024-03-24 | End: 2024-03-29

## 2024-03-24 NOTE — ED QUICK NOTES
Patient is here with the complains of bilat. Hand & leg pain. Patient complains of lower back pain that is traveling to her right leg. + chills. Denies fevers.  History of stroke with right side deficits.

## 2024-03-24 NOTE — ED INITIAL ASSESSMENT (HPI)
Pt to ED A&O x 4 w/ c/o B/L mid back pain that started last night.  Pt denies injury or trauma.  Pt also reporting chills, denies any fevers. Pt denies any abdominal pain, n/v/d/c, dysuria, cough/congestion, chest pain, KAIA, sore throat.  Pt w/ hx prior CVA & residual R sided deficits & speech deficits.

## 2024-03-24 NOTE — ED PROVIDER NOTES
Patient Seen in: [facility] Emergency Department    History     Chief Complaint   Patient presents with    Back Pain     Stated Complaint: Back Pain, Chills     HPI    69-year-old female with past medical history of CAD, diabetes, hypertension, dyslipidemia, CVA with residual expressive aphasia/dysarthria in addition to right-sided weakness presenting sister for evaluation with 1 week of right lower back pain radiating to abdomen/lower extremity.  No new weakness or paresthesias, no incontinence.  No headache or neck pain.  No trauma.  Symptoms associate with chills without overt fevers.  No medications prior to arrival.  Sister bedside noting symptom onset after driving 3 hours each way for family  without known trauma or overexertion.    Past Medical History:   Diagnosis Date    Asthma (HCC)     Coronary atherosclerosis     Diabetes (HCC)     diabetes for 2 yrs    Essential hypertension     Glaucoma     right    Heart attack (HCC)         High blood pressure     High cholesterol     Hyperlipidemia     Osteoarthritis     Sleep apnea     cpap    Stroke (HCC)     30 yrs ago    Visual impairment     left eye edema       Past Surgical History:   Procedure Laterality Date    ANESTH,VITRECTOMY Left 2017    Pars plana vitrectomy, internal limiting membrane peel, left eye.    CATH BARE METAL STENT (BMS)      COLONOSCOPY      HYSTERECTOMY      TOTAL ABDOM HYSTERECTOMY      TUBAL LIGATION              Family History   Problem Relation Age of Onset    Cancer Father     Diabetes Mother        Social History     Socioeconomic History    Marital status: Single   Tobacco Use    Smoking status: Former     Years: 4     Types: Cigarettes     Quit date: 1980     Years since quittin.5    Smokeless tobacco: Never   Vaping Use    Vaping Use: Never used   Substance and Sexual Activity    Alcohol use: No    Drug use: No       Review of Systems :  Constitutional: As per HPI  Musculoskeletal: (+) lower back  pain.    Positive for stated complaint: Back Pain, Chills  Other systems are as noted in HPI.  Constitutional and vital signs reviewed.      All other systems reviewed and negative except as noted above.    PSFH elements reviewed from today and agreed except as otherwise stated in HPI.    Physical Exam     ED Triage Vitals [03/24/24 1141]   /60   Pulse 68   Resp 16   Temp 97.9 °F (36.6 °C)   Temp src Temporal   SpO2 99 %   O2 Device None (Room air)       Current:/61   Pulse 62   Temp 97.9 °F (36.6 °C) (Temporal)   Resp 20   Ht 170.2 cm (5' 7\")   Wt 88 kg   SpO2 97%   BMI 30.39 kg/m²         Physical Exam   Constitutional: No distress.   HEENT: MMM.  Head: Normocephalic.   Eyes: No injection.   Cardiovascular: RRR. BLE with 2+ DP/PT pulses.  Pulmonary/Chest: Effort normal. CTAB.  Abdominal: Soft. Mild right flank tenderness without cutaneous/peritonitic change.  Musculoskeletal: No gross deformity. Right paralumbar spasm without cutaneous/crepitant change and with soft compartments.  Neurological: Alert. CN II-XII grossly itnact. BUE with 5/5 strength proximally and distally. BLE with equal strength proximally and mildly weaker distally to RLE (baseline per patient/ family).  Skin: Skin is warm.   Psychiatric: Cooperative.  Nursing note and vitals reviewed.        ED Course     Labs Reviewed   URINALYSIS, ROUTINE - Abnormal; Notable for the following components:       Result Value    Clarity Urine Turbid (*)     Protein Urine Trace (*)     Nitrite Urine 1+ (*)     Leukocyte Esterase Urine 500 (*)     WBC Urine >50 (*)     RBC Urine 3-5 (*)     Bacteria Urine 1+ (*)     Squamous Epi. Cells Few (*)     All other components within normal limits   COMP METABOLIC PANEL (14) - Abnormal; Notable for the following components:    Creatinine 1.24 (*)     Calculated Osmolality 300 (*)     eGFR-Cr 47 (*)     All other components within normal limits   CBC W/ DIFFERENTIAL - Abnormal; Notable for the following  components:    HGB 10.1 (*)     HCT 32.5 (*)     MCH 25.1 (*)     RDW-SD 48.0 (*)     RDW 16.2 (*)     All other components within normal limits   CK CREATINE KINASE (NOT CREATININE) - Normal   LACTIC ACID, PLASMA - Normal   POCT GLUCOSE - Normal   SARS-COV-2/FLU A AND B/RSV BY PCR (GENEXPERT) - Normal    Narrative:     This test is intended for the qualitative detection and differentiation of SARS-CoV-2, influenza A, influenza B, and respiratory syncytial virus (RSV) viral RNA in nasopharyngeal or nares swabs from individuals suspected of respiratory viral infection consistent with COVID-19 by their healthcare provider. Signs and symptoms of respiratory viral infection due to SARS-CoV-2, influenza, and RSV can be similar.    Test performed using the Xpert Xpress SARS-CoV-2/FLU/RSV (real time RT-PCR)  assay on the [employer]ee[employer] instrument, [employer], [place] [address].   This test is being used under the Food and Drug Administration's Emergency Use Authorization.    The authorized Fact Sheet for Healthcare Providers for this assay is available upon request from the laboratory.   CBC WITH DIFFERENTIAL WITH PLATELET    Narrative:     The following orders were created for panel order CBC With Differential With Platelet.  Procedure                               Abnormality         Status                     ---------                               -----------         ------                     CBC W/ DIFFERENTIAL[[id number]]          Abnormal            Final result                 Please view results for these tests on the individual orders.   BLOOD CULTURE   BLOOD CULTURE   URINE CULTURE, ROUTINE     CT ABDOMEN+PELVIS(CONTRAST ONLY)(CPT=74177)    Result Date: 3/24/2024  PROCEDURE: CT ABDOMEN + PELVIS (CONTRAST ONLY) (CPT=74177)  COMPARISON: [facility], CTA CHEST+CTA ABDOMEN DISSECT SET (CPT=71275/63235), 12/18/2022, 10:29 PM.  [facility], CT ABDOMEN+PELVIS (CPT=74176), 12/17/2021, 2:12 PM.   INDICATIONS: Back Pain, Chills.  TECHNIQUE: CT images of the abdomen and pelvis were obtained with non-ionic intravenous contrast material.  Automated exposure control for dose reduction was used. Adjustment of the mA and/or kV was done based on the patient's size. Use of iterative reconstruction technique for dose reduction was used.  Dose information is transmitted to the ACR (American College of Radiology) NRDR (National Radiology Data Registry) which includes the Dose Index Registry.  FINDINGS:  LOWER THORAX:  Cardiomegaly.  Coronary artery calcifications are not seen.  Subsegmental atelectasis. LIVER:   No enlargement, atrophy, abnormal density, or significant focal lesion. GALLBLADDER: Normal size and appearance. BILIARY:   No visible dilatation or calcification.  PANCREAS:   No lesion, fluid collection, ductal dilatation, or atrophy.  SPLEEN:   No enlargement or focal lesion.  ADRENALS:   No mass or enlargement.  KIDNEYS:   No mass, obstruction, or calcification.  Small bilateral renal hypodensities are too small to be accurately characterized, statistically favored to represent cysts.  RETROPERITONEUM:   No mass or enlarged adenopathy.  AORTA/VASCULAR:   No aneurysm or dissection.  Mild calcified atherosclerosis. PERITONEUM: No free fluid or air. GI TRACT/MESENTERY:    No visible mass, obstruction, or bowel wall thickening.  The appendix is not seen but there are no right lower quadrant inflammatory changes. URINARY BLADDER: Unremarkable. REPRODUCTIVE ORGANS: The uterus is not seen.  There is no adnexal mass. ABDOMINAL WALL:   No acute abnormality. BONES:  No acute fracture.  Osteopenia.  Mild-to-moderate spondylosis.          CONCLUSION:   No acute abnormality in the abdomen or pelvis.  No renal stones or hydronephrosis.  No bowel obstruction.  The appendix is not seen but there are no right lower quadrant inflammatory changes.  No gallstones or biliary ductal dilatation.  No acute fracture or malalignment  of the lumbar spine.  Mild-to-moderate spondylosis.  Additional chronic or incidental findings are described in the body of this report.    Dictated by (CST): Philip Manzo MD on 3/24/2024 at 3:17 PM     Finalized by (CST): Philip Manzo MD on 3/24/2024 at 3:23 PM          ED Course as of 03/24/24 1647  ------------------------------------------------------------  Time: 03/24 1555  Comment: Resting comfortably, smiling with improving symptoms - labs/CT nonacute, will discharge with symptomatic care/antibiotics and ongoing outpatient followup.       MDM   DIFFERENTIAL DIAGNOSIS: After history and physical exam differential diagnosis includes but is not limited to UTI, ureterolithiasis, pyelonephritis, radiculopathy, retroperitoneal hemorrhage, electrolyte derangement, rhabdomyolysis, bacteremia.    Pulse ox: 97%:Normal on RA, as independently interpreted by myself    Medical Decision Making  Evaluation for right lateral back pain with radiation to right lower extremity without neurovascular compromise and with palpable spasm noted without red flag symptoms CT/labs nonacute, UA noted for which ceftriaxone initiated. Resting comfortably after meds, will discharge with same and empirically prolonged course of antibiotics for possible pyelo with pancultures pending.    Problems Addressed:  Acute cystitis without hematuria: acute illness or injury  Lumbar radiculopathy: acute illness or injury    Amount and/or Complexity of Data Reviewed  Independent Historian: caregiver     Details: Sister at bedside for collateral history  External Data Reviewed: labs.  Labs: ordered. Decision-making details documented in ED Course.  Radiology: ordered and independent interpretation performed. Decision-making details documented in ED Course.     Details: CT without obvious free air as independently interpreted by myself    Risk  Prescription drug management.        I was wearing at minimum a facemask and eye protection throughout this  encounter with handwashing performed prior and after patient evaluation without personal hand/facial/oropharyngeal contact and gloves worn throughout encounter. See note and/or contact this provider for further PPE details.      Disposition and Plan     Clinical Impression:  1. Acute cystitis without hematuria    2. Lumbar radiculopathy        Disposition:  Discharge    Follow-up:  Yao Rodriguez  [address] [place] [id number]  [phone]    Call  For followup and re-evaluation.      Medications Prescribed:  Discharge Medication List as of 3/24/2024  4:36 PM        START taking these medications    Details   cephalexin 500 MG Oral Cap Take 1 capsule (500 mg total) by mouth 2 (two) times daily for 7 days., Normal, Disp-14 capsule, R-0      predniSONE 20 MG Oral Tab Take 1 tablet (20 mg total) by mouth daily for 5 days., Normal, Disp-5 tablet, R-0      Ketorolac Tromethamine 10 MG Oral Tab Take 1 tablet (10 mg total) by mouth every 6 (six) hours as needed for Pain., Normal, Disp-20 tablet, R-0

## 2024-03-26 NOTE — PROGRESS NOTES
ED Culture Callback Results Review    Pharmacist reviewed culture results from ED visit .    Final urine culture positive for e. coli. Patient was prescribed Cephalexin (Keflex) on discharge. Current therapy is appropriate based on reported susceptibilities. No further intervention required at this time.      Heidy Germain, Lyric  Emergency Medicine Pharmacist Specialist  03/26/24; 11:12 AM

## 2024-07-08 ENCOUNTER — APPOINTMENT (OUTPATIENT)
Dept: MRI IMAGING | Facility: HOSPITAL | Age: 69
End: 2024-07-08
Attending: EMERGENCY MEDICINE
Payer: MEDICARE

## 2024-07-08 ENCOUNTER — HOSPITAL ENCOUNTER (OUTPATIENT)
Facility: HOSPITAL | Age: 69
Setting detail: OBSERVATION
Discharge: HOME | End: 2024-07-10
Attending: EMERGENCY MEDICINE | Admitting: HOSPITALIST
Payer: MEDICARE

## 2024-07-08 DIAGNOSIS — I10 PRIMARY HYPERTENSION: ICD-10-CM

## 2024-07-08 DIAGNOSIS — N17.9 AKI (ACUTE KIDNEY INJURY) (HCC): Primary | ICD-10-CM

## 2024-07-08 LAB
ALBUMIN SERPL-MCNC: 4.4 G/DL (ref 3.2–4.8)
ALBUMIN/GLOB SERPL: 1.4 {RATIO} (ref 1–2)
ALP LIVER SERPL-CCNC: 81 U/L
ALT SERPL-CCNC: 31 U/L
ANION GAP SERPL CALC-SCNC: 10 MMOL/L (ref 0–18)
AST SERPL-CCNC: 20 U/L (ref ?–34)
BASOPHILS # BLD AUTO: 0.01 X10(3) UL (ref 0–0.2)
BASOPHILS NFR BLD AUTO: 0.2 %
BILIRUB SERPL-MCNC: 0.5 MG/DL (ref 0.2–1.1)
BILIRUB UR QL: NEGATIVE
BUN BLD-MCNC: 19 MG/DL (ref 9–23)
BUN/CREAT SERPL: 12 (ref 10–20)
CALCIUM BLD-MCNC: 9.9 MG/DL (ref 8.7–10.4)
CHLORIDE SERPL-SCNC: 113 MMOL/L (ref 98–112)
CLARITY UR: CLEAR
CO2 SERPL-SCNC: 26 MMOL/L (ref 21–32)
CREAT BLD-MCNC: 1.58 MG/DL
DEPRECATED RDW RBC AUTO: 46.3 FL (ref 35.1–46.3)
EGFRCR SERPLBLD CKD-EPI 2021: 35 ML/MIN/1.73M2 (ref 60–?)
EOSINOPHIL # BLD AUTO: 0.03 X10(3) UL (ref 0–0.7)
EOSINOPHIL NFR BLD AUTO: 0.7 %
ERYTHROCYTE [DISTWIDTH] IN BLOOD BY AUTOMATED COUNT: 15.9 % (ref 11–15)
EST. AVERAGE GLUCOSE BLD GHB EST-MCNC: 114 MG/DL (ref 68–126)
GLOBULIN PLAS-MCNC: 3.2 G/DL (ref 2–3.5)
GLUCOSE BLD-MCNC: 91 MG/DL (ref 70–99)
GLUCOSE BLDC GLUCOMTR-MCNC: 104 MG/DL (ref 70–99)
GLUCOSE BLDC GLUCOMTR-MCNC: 77 MG/DL (ref 70–99)
GLUCOSE BLDC GLUCOMTR-MCNC: 96 MG/DL (ref 70–99)
GLUCOSE UR-MCNC: NORMAL MG/DL
HBA1C MFR BLD: 5.6 % (ref ?–5.7)
HCT VFR BLD AUTO: 32.8 %
HGB BLD-MCNC: 10.3 G/DL
HGB UR QL STRIP.AUTO: NEGATIVE
IMM GRANULOCYTES # BLD AUTO: 0.02 X10(3) UL (ref 0–1)
IMM GRANULOCYTES NFR BLD: 0.4 %
KETONES UR-MCNC: NEGATIVE MG/DL
LEUKOCYTE ESTERASE UR QL STRIP.AUTO: NEGATIVE
LYMPHOCYTES # BLD AUTO: 1.55 X10(3) UL (ref 1–4)
LYMPHOCYTES NFR BLD AUTO: 34.8 %
MCH RBC QN AUTO: 25.2 PG (ref 26–34)
MCHC RBC AUTO-ENTMCNC: 31.4 G/DL (ref 31–37)
MCV RBC AUTO: 80.4 FL
MONOCYTES # BLD AUTO: 0.29 X10(3) UL (ref 0.1–1)
MONOCYTES NFR BLD AUTO: 6.5 %
NEUTROPHILS # BLD AUTO: 2.56 X10 (3) UL (ref 1.5–7.7)
NEUTROPHILS # BLD AUTO: 2.56 X10(3) UL (ref 1.5–7.7)
NEUTROPHILS NFR BLD AUTO: 57.4 %
NITRITE UR QL STRIP.AUTO: NEGATIVE
OSMOLALITY SERPL CALC.SUM OF ELEC: 310 MOSM/KG (ref 275–295)
PH UR: 5.5 [PH] (ref 5–8)
PLATELET # BLD AUTO: 308 10(3)UL (ref 150–450)
POTASSIUM SERPL-SCNC: 3.1 MMOL/L (ref 3.5–5.1)
PROT SERPL-MCNC: 7.6 G/DL (ref 5.7–8.2)
PROT UR-MCNC: 20 MG/DL
RBC # BLD AUTO: 4.08 X10(6)UL
SODIUM SERPL-SCNC: 149 MMOL/L (ref 136–145)
SP GR UR STRIP: 1.02 (ref 1–1.03)
UROBILINOGEN UR STRIP-ACNC: NORMAL
WBC # BLD AUTO: 4.5 X10(3) UL (ref 4–11)

## 2024-07-08 PROCEDURE — 70551 MRI BRAIN STEM W/O DYE: CPT | Performed by: EMERGENCY MEDICINE

## 2024-07-08 PROCEDURE — 99223 1ST HOSP IP/OBS HIGH 75: CPT | Performed by: HOSPITALIST

## 2024-07-08 RX ORDER — ASPIRIN 81 MG/1
81 TABLET ORAL DAILY
Status: DISCONTINUED | OUTPATIENT
Start: 2024-07-08 | End: 2024-07-10

## 2024-07-08 RX ORDER — SPIRONOLACTONE 25 MG/1
25 TABLET ORAL DAILY
Status: DISCONTINUED | OUTPATIENT
Start: 2024-07-08 | End: 2024-07-10

## 2024-07-08 RX ORDER — BRIMONIDINE TARTRATE 2 MG/ML
1 SOLUTION/ DROPS OPHTHALMIC 2 TIMES DAILY
Status: DISCONTINUED | OUTPATIENT
Start: 2024-07-08 | End: 2024-07-10

## 2024-07-08 RX ORDER — LOSARTAN POTASSIUM 50 MG/1
50 TABLET ORAL DAILY
COMMUNITY
Start: 2024-03-23

## 2024-07-08 RX ORDER — ACETAMINOPHEN 500 MG
500 TABLET ORAL EVERY 4 HOURS PRN
Status: DISCONTINUED | OUTPATIENT
Start: 2024-07-08 | End: 2024-07-10

## 2024-07-08 RX ORDER — ATORVASTATIN CALCIUM 40 MG/1
40 TABLET, FILM COATED ORAL NIGHTLY
Status: DISCONTINUED | OUTPATIENT
Start: 2024-07-08 | End: 2024-07-10

## 2024-07-08 RX ORDER — CLOPIDOGREL BISULFATE 75 MG/1
75 TABLET ORAL DAILY
Status: DISCONTINUED | OUTPATIENT
Start: 2024-07-09 | End: 2024-07-10

## 2024-07-08 RX ORDER — DONEPEZIL HYDROCHLORIDE 10 MG/1
10 TABLET, FILM COATED ORAL NIGHTLY
Status: DISCONTINUED | OUTPATIENT
Start: 2024-07-09 | End: 2024-07-10

## 2024-07-08 RX ORDER — HYDRALAZINE HYDROCHLORIDE 20 MG/ML
20 INJECTION INTRAMUSCULAR; INTRAVENOUS EVERY 4 HOURS PRN
Status: DISCONTINUED | OUTPATIENT
Start: 2024-07-08 | End: 2024-07-10

## 2024-07-08 RX ORDER — HEPARIN SODIUM 5000 [USP'U]/ML
5000 INJECTION, SOLUTION INTRAVENOUS; SUBCUTANEOUS EVERY 12 HOURS SCHEDULED
Status: DISCONTINUED | OUTPATIENT
Start: 2024-07-08 | End: 2024-07-10

## 2024-07-08 RX ORDER — MEMANTINE HYDROCHLORIDE AND DONEPEZIL HYDROCHLORIDE 28; 10 MG/1; MG/1
28 CAPSULE ORAL DAILY
COMMUNITY

## 2024-07-08 RX ORDER — HYDRALAZINE HYDROCHLORIDE 50 MG/1
50 TABLET, FILM COATED ORAL EVERY 8 HOURS SCHEDULED
Status: DISCONTINUED | OUTPATIENT
Start: 2024-07-08 | End: 2024-07-10

## 2024-07-08 RX ORDER — MEMANTINE HYDROCHLORIDE 10 MG/1
10 TABLET ORAL 2 TIMES DAILY
Status: DISCONTINUED | OUTPATIENT
Start: 2024-07-09 | End: 2024-07-10

## 2024-07-08 RX ORDER — HYDRALAZINE HYDROCHLORIDE 20 MG/ML
10 INJECTION INTRAMUSCULAR; INTRAVENOUS ONCE
Status: COMPLETED | OUTPATIENT
Start: 2024-07-08 | End: 2024-07-08

## 2024-07-08 RX ORDER — LOSARTAN POTASSIUM 50 MG/1
50 TABLET ORAL DAILY
Status: DISCONTINUED | OUTPATIENT
Start: 2024-07-09 | End: 2024-07-10

## 2024-07-08 RX ORDER — ONDANSETRON 2 MG/ML
4 INJECTION INTRAMUSCULAR; INTRAVENOUS EVERY 6 HOURS PRN
Status: DISCONTINUED | OUTPATIENT
Start: 2024-07-08 | End: 2024-07-10

## 2024-07-08 RX ORDER — AMLODIPINE BESYLATE 10 MG/1
10 TABLET ORAL DAILY
Status: DISCONTINUED | OUTPATIENT
Start: 2024-07-08 | End: 2024-07-08

## 2024-07-08 RX ORDER — METOCLOPRAMIDE HYDROCHLORIDE 5 MG/ML
5 INJECTION INTRAMUSCULAR; INTRAVENOUS EVERY 8 HOURS PRN
Status: DISCONTINUED | OUTPATIENT
Start: 2024-07-08 | End: 2024-07-10

## 2024-07-08 RX ORDER — SODIUM CHLORIDE 9 MG/ML
INJECTION, SOLUTION INTRAVENOUS CONTINUOUS
Status: DISCONTINUED | OUTPATIENT
Start: 2024-07-08 | End: 2024-07-10

## 2024-07-08 NOTE — ED INITIAL ASSESSMENT (HPI)
Pt presents to ED with son for difficulty swallowing and drooling since last Sunday .   Per son he noticed pt drooling and having a hard time eating for about 4-5 days. Pt also having more confusion and slurred speech for a couple days.  Pt denies numbness or weakness. Pt states she has visual deficits on her R eye from prev stroke but no new visual changes. Denies dizziness. Denies headache.   Hx of stroke in 2021. Pt on plavix

## 2024-07-08 NOTE — ED QUICK NOTES
Orders for admission, patient is aware of plan and ready to go upstairs. Any questions, please call ED RN Celia at extension [phone].     Patient Covid vaccination status: Fully vaccinated     COVID Test Ordered in ED: None    COVID Suspicion at Admission: N/A    Running Infusions:  None    Mental Status/LOC at time of transport: x3    Other pertinent information:   CIWA score: N/A   NIH score:  N/A

## 2024-07-08 NOTE — RESPIRATORY THERAPY NOTE
Pt has a history of CEDRICK and uses cpap at home. Hospital's equipment is available at the bedside for prn. Pt is not able to wear CPAP tonight due to uncontrollable drooling.

## 2024-07-09 LAB
ANION GAP SERPL CALC-SCNC: 8 MMOL/L (ref 0–18)
BASOPHILS # BLD AUTO: 0.02 X10(3) UL (ref 0–0.2)
BASOPHILS NFR BLD AUTO: 0.5 %
BUN BLD-MCNC: 17 MG/DL (ref 9–23)
BUN/CREAT SERPL: 14.3 (ref 10–20)
CALCIUM BLD-MCNC: 9.1 MG/DL (ref 8.7–10.4)
CHLORIDE SERPL-SCNC: 113 MMOL/L (ref 98–112)
CO2 SERPL-SCNC: 26 MMOL/L (ref 21–32)
CREAT BLD-MCNC: 1.19 MG/DL
DEPRECATED RDW RBC AUTO: 44.2 FL (ref 35.1–46.3)
EGFRCR SERPLBLD CKD-EPI 2021: 49 ML/MIN/1.73M2 (ref 60–?)
EOSINOPHIL # BLD AUTO: 0.03 X10(3) UL (ref 0–0.7)
EOSINOPHIL NFR BLD AUTO: 0.7 %
ERYTHROCYTE [DISTWIDTH] IN BLOOD BY AUTOMATED COUNT: 15.6 % (ref 11–15)
GLUCOSE BLD-MCNC: 84 MG/DL (ref 70–99)
GLUCOSE BLDC GLUCOMTR-MCNC: 108 MG/DL (ref 70–99)
GLUCOSE BLDC GLUCOMTR-MCNC: 122 MG/DL (ref 70–99)
GLUCOSE BLDC GLUCOMTR-MCNC: 84 MG/DL (ref 70–99)
GLUCOSE BLDC GLUCOMTR-MCNC: 91 MG/DL (ref 70–99)
HCT VFR BLD AUTO: 30.1 %
HGB BLD-MCNC: 9.8 G/DL
IMM GRANULOCYTES # BLD AUTO: 0.01 X10(3) UL (ref 0–1)
IMM GRANULOCYTES NFR BLD: 0.2 %
LYMPHOCYTES # BLD AUTO: 1.43 X10(3) UL (ref 1–4)
LYMPHOCYTES NFR BLD AUTO: 33.6 %
MCH RBC QN AUTO: 25.7 PG (ref 26–34)
MCHC RBC AUTO-ENTMCNC: 32.6 G/DL (ref 31–37)
MCV RBC AUTO: 78.8 FL
MONOCYTES # BLD AUTO: 0.24 X10(3) UL (ref 0.1–1)
MONOCYTES NFR BLD AUTO: 5.6 %
NEUTROPHILS # BLD AUTO: 2.52 X10 (3) UL (ref 1.5–7.7)
NEUTROPHILS # BLD AUTO: 2.52 X10(3) UL (ref 1.5–7.7)
NEUTROPHILS NFR BLD AUTO: 59.4 %
OSMOLALITY SERPL CALC.SUM OF ELEC: 305 MOSM/KG (ref 275–295)
PLATELET # BLD AUTO: 261 10(3)UL (ref 150–450)
POTASSIUM SERPL-SCNC: 3.4 MMOL/L (ref 3.5–5.1)
RBC # BLD AUTO: 3.82 X10(6)UL
SODIUM SERPL-SCNC: 147 MMOL/L (ref 136–145)
WBC # BLD AUTO: 4.3 X10(3) UL (ref 4–11)

## 2024-07-09 PROCEDURE — 99233 SBSQ HOSP IP/OBS HIGH 50: CPT | Performed by: HOSPITALIST

## 2024-07-09 RX ORDER — CARVEDILOL 25 MG/1
25 TABLET ORAL 2 TIMES DAILY WITH MEALS
Status: DISCONTINUED | OUTPATIENT
Start: 2024-07-09 | End: 2024-07-10

## 2024-07-09 RX ORDER — NICOTINE POLACRILEX 4 MG
15 LOZENGE BUCCAL
Status: DISCONTINUED | OUTPATIENT
Start: 2024-07-09 | End: 2024-07-10

## 2024-07-09 RX ORDER — DEXTROSE MONOHYDRATE 25 G/50ML
50 INJECTION, SOLUTION INTRAVENOUS
Status: DISCONTINUED | OUTPATIENT
Start: 2024-07-09 | End: 2024-07-10

## 2024-07-09 RX ORDER — POTASSIUM CHLORIDE 20 MEQ/1
40 TABLET, EXTENDED RELEASE ORAL ONCE
Status: COMPLETED | OUTPATIENT
Start: 2024-07-09 | End: 2024-07-09

## 2024-07-09 RX ORDER — NICOTINE POLACRILEX 4 MG
30 LOZENGE BUCCAL
Status: DISCONTINUED | OUTPATIENT
Start: 2024-07-09 | End: 2024-07-10

## 2024-07-09 NOTE — H&P
[facility]    PATIENT'S NAME: ANKUSH HOFFMAN   ATTENDING PHYSICIAN: Elizabeth Murillo MD   PATIENT ACCOUNT#:   [id number]    LOCATION:  [address] [place] [address]  MEDICAL RECORD #:   [id number]       YOB: 1955  ADMISSION DATE:       07/08/2024    HISTORY AND PHYSICAL EXAMINATION    CHIEF COMPLAINT:  Uncontrolled hypertension, hypokalemia, and difficulty swallowing.    HISTORY OF PRESENT ILLNESS:  The patient is a 69-year-old  female who brought into the emergency department for evaluation for difficulty swallowing her food per the family for last few days.  Noted to have systolic blood pressure 180 to 200.  MRI scan of the brain showed no acute infarct.  Chemistry showed BUN and creatinine of 19 and 1.58.  GFR is 35, slightly below her baseline.  Urinalysis was unremarkable.  Patient will be admitted to the hospital for further management.  Received potassium in the emergency room for a potassium level of 3.1.    PAST MEDICAL HISTORY:  Per the records, patient had a history of hypertension, cerebrovascular accident with chronic aphasia, chronic kidney disease stage 3, obstructive sleep apnea, obesity, asthma, hyperlipidemia, diabetes mellitus type 2, glaucoma, osteoarthritis, coronary artery disease with remote PCI, dementia.    PAST SURGICAL HISTORY:  Left eye vitrectomy, total abdominal hysterectomy.    MEDICATIONS:  Please see medication reconciliation list.     ALLERGIES:  No known drug allergies.    FAMILY HISTORY:  Positive for hypertension and diabetes mellitus type 2.    SOCIAL HISTORY:  Ex-tobacco user.  No current tobacco, alcohol, or drug use.  Lives with her family.  Usually independent for basic activities of daily living.     REVIEW OF SYSTEMS:  Per the family, patient will take few bites.  She will swallow them and then she will stop eating.  She does not complain of specific dysphagia.  No choking or aspiration.  Other 12-point review of systems is negative.  Recently,  her carvedilol was discontinued because of bradycardia.      PHYSICAL EXAMINATION:    GENERAL:  Alert and oriented to time, place, and person.  Responds to commands.  VITAL SIGNS:  Temperature 97.5, pulse 63, respiratory rate 13, blood pressure 178/54, pulse ox 97% on room air.    HEENT:  Atraumatic.  Oropharynx clear.  Dry mucous membranes.  Ears, nose normal.  Eyes:  Anicteric sclerae.  NECK:  Supple.  No lymphadenopathy.  Trachea midline.  Full range of motion.  LUNGS:  Clear to auscultation bilaterally.  Normal respiratory effort.   HEART:  Regular rate, rhythm.  S1 and S2 auscultated.  No murmur.  ABDOMEN:  Soft, nondistended.  No tenderness.  Positive bowel sounds.   EXTREMITIES:  No peripheral edema, clubbing, or cyanosis.  NEUROLOGIC:  Motor and sensory intact.  Expressive aphasia on exam.    ASSESSMENT:    1.   Mild acute on chronic kidney injury and dehydration.  2.   Difficulty with oral intake, not clear if the patient actually has dysphagia.  3.   Uncontrolled hypertension.  4.   Hypokalemia.    PLAN:  Patient will be admitted to general medical floor.  IV hydralazine p.r.n.  Start her on spironolactone and monitor her blood pressure.  If that does not work, we can up titrate hydralazine.  Speech therapy to evaluate patient's swallowing ability and evaluate for any swallowing dysfunction.  Further recommendations to follow.     Dictated By Elizabeth Murillo MD  d: 07/08/2024 17:53:50  t: 07/08/2024 18:44:46  Job [id number]  FB/

## 2024-07-09 NOTE — PLAN OF CARE
New admission. Patient is alert and oriented x4 and on RA. No complaints of pain. Frequent rounding done throughout the shift. Safety precautions in place. Plan speech eval and IV fluids.     Problem: Patient Centered Care  Goal: Patient preferences are identified and integrated in the patient's plan of care  Description: Interventions:  - What would you like us to know as we care for you?   - Provide timely, complete, and accurate information to patient/family  - Incorporate patient and family knowledge, values, beliefs, and cultural backgrounds into the planning and delivery of care  - Encourage patient/family to participate in care and decision-making at the level they choose  - Honor patient and family perspectives and choices  7/8/2024 1934 by Heidy Almodovar  Outcome: Progressing  7/8/2024 1934 by Heidy Almodovar  Outcome: Progressing     Problem: Diabetes/Glucose Control  Goal: Glucose maintained within prescribed range  Description: INTERVENTIONS:  - Monitor Blood Glucose as ordered  - Assess for signs and symptoms of hyperglycemia and hypoglycemia  - Administer ordered medications to maintain glucose within target range  - Assess barriers to adequate nutritional intake and initiate nutrition consult as needed  - Instruct patient on self management of diabetes  7/8/2024 1934 by Heidy Almodovar  Outcome: Progressing  7/8/2024 1934 by Heidy Almodovar  Outcome: Progressing     Problem: Patient/Family Goals  Goal: Patient/Family Long Term Goal  Description: Patient's Long Term Goal: Go home     Interventions:  - Follow MD orders   - Non pharmacological interventions   - See additional Care Plan goals for specific interventions  7/8/2024 1934 by Heidy Almodovar  Outcome: Progressing  7/8/2024 1934 by Heidy Almodovar  Outcome: Progressing  Goal: Patient/Family Short Term Goal  Description: Patient's Short Term Goal: Resolve swallowing difficulties back to baseline.     Interventions:   - Follow MD orders  - Non  pharmacological interventions   - See additional Care Plan goals for specific interventions  7/8/2024 1934 by Heidy Almodovar  Outcome: Progressing  7/8/2024 1934 by Heidy Almodovar  Outcome: Progressing     Problem: PAIN - ADULT  Goal: Verbalizes/displays adequate comfort level or patient's stated pain goal  Description: INTERVENTIONS:  - Encourage pt to monitor pain and request assistance  - Assess pain using appropriate pain scale  - Administer analgesics based on type and severity of pain and evaluate response  - Implement non-pharmacological measures as appropriate and evaluate response  - Consider cultural and social influences on pain and pain management  - Manage/alleviate anxiety  - Utilize distraction and/or relaxation techniques  - Monitor for opioid side effects  - Notify MD/LIP if interventions unsuccessful or patient reports new pain  - Anticipate increased pain with activity and pre-medicate as appropriate  7/8/2024 1934 by Heidy Almodovar  Outcome: Progressing  7/8/2024 1934 by Heidy Almodovar  Outcome: Progressing     Problem: Impaired Swallowing  Goal: Minimize aspiration risk  Description: Interventions:  - Patient should be alert and upright for all feedings (90 degrees preferred)  - Offer food and liquids at a slow rate  - No straws  - Encourage small bites of food and small sips of liquid  - Offer pills one at a time, crush or deliver with applesauce as needed  - Discontinue feeding and notify MD (or speech pathologist) if coughing or persistent throat clearing or wet/gurgly vocal quality is noted  Outcome: Progressing

## 2024-07-09 NOTE — ED PROVIDER NOTES
Patient Seen in: [facility] 5sw/se      History     Chief Complaint   Patient presents with    Swallowing Problem     Stated Complaint: patient is drooling, hx of stroke in 2021    Subjective:   HPI    69-year-old female with history of prior CVA with residual right-sided weakness and speech difficulties, diabetes, hypertension, hyperlipidemia, CAD presenting to the emergency department for complaints of ongoing difficulties swallowing with worsening slurred speech and right-sided weakness for approximately 1 week.     For member at the bedside who is the primary caregiver states that the patient is acting normally contrary to triage note.  States that slurred speech with difficulty swallowing noted approximately 1 week ago.  States that patient has been spitting more.  Has still been able to swallow during meals and eat.  Denies specific aggravating relieving factors.  Denies acute traumatic injury.  Details worsening right-sided weakness ongoing over similar timeframe.    Of note patient recently seen by primary care provider with hypertensive agent removed from medication regimen.    Otherwise denies fever, head neck or back pain, chest pain or shortness of breath, abdominal pain, dysuria or hematuria.    Objective:   Past Medical History:    Asthma (HCC)    Coronary atherosclerosis    Diabetes (HCC)    diabetes for 2 yrs    Essential hypertension    Glaucoma    right    Heart attack (HCC)    2005    High blood pressure    High cholesterol    Hyperlipidemia    Osteoarthritis    Sleep apnea    cpap    Stroke (HCC)    30 yrs ago    Visual impairment    left eye edema              Past Surgical History:   Procedure Laterality Date    Anesth,vitrectomy Left 09/18/2017    Pars plana vitrectomy, internal limiting membrane peel, left eye.    Cath bare metal stent (bms)      Colonoscopy      Hysterectomy      Total abdom hysterectomy      Tubal ligation                  Social History     Socioeconomic History     Marital status: Single   Tobacco Use    Smoking status: Former     Current packs/day: 0.00     Types: Cigarettes     Start date: 1976     Quit date: 1980     Years since quittin.8    Smokeless tobacco: Never   Vaping Use    Vaping status: Never Used   Substance and Sexual Activity    Alcohol use: No    Drug use: No     Social Determinants of Health     Food Insecurity: No Food Insecurity (2024)    Food Insecurity     Food Insecurity: Never true   Transportation Needs: No Transportation Needs (2024)    Transportation Needs     Lack of Transportation: No   Housing Stability: Low Risk  (2024)    Housing Stability     Housing Instability: No              Review of Systems    Positive for stated Chief Complaint: Swallowing Problem    Other systems are as noted in HPI.  Constitutional and vital signs reviewed.      All other systems reviewed and negative except as noted above.    Physical Exam     ED Triage Vitals   BP 24 1412 (!) 207/65   Pulse 24 1412 78   Resp 24 1412 18   Temp 24 1412 97.5 °F (36.4 °C)   Temp src 24 1833 Oral   SpO2 24 1412 97 %   O2 Device 24 1412 None (Room air)       Current Vitals:   Vital Signs  BP: (!) 192/66 (PRN BP given)  Pulse: 63  Resp: 15  Temp: 97.4 °F (36.3 °C)  Temp src: Oral  MAP (mmHg): 98    Oxygen Therapy  SpO2: 97 %  O2 Device: None (Room air)            Physical Exam    Physical Exam:   BP (!) 192/66 (BP Location: Right arm)   Pulse 63   Temp 97.4 °F (36.3 °C) (Oral)   Resp 15   Wt 77.6 kg   SpO2 97%   BMI 27.60 kg/m²  - I reviewed these vital signs    Constitutional: Pt is well appearing, in no distress  HEENT: Normocephalic/Atraumatic, PERRL, EOM grossly normal, Conjunctiva Clear, MMM without Erythema or Lesions, Uvula midline, No Palatal/Oropharyngeal Erythema/Lesions noted  Neck: Range of motion intact, no midline tenderness to palpation  Lungs: CTA B, No crepitus, Talking in full sentences in no  respiratory distress  Cardiovascular: RRR: Yes  Abdominal: No discomfort to palpation   Back: No erythema or rash, no midline tenderness to palpation  Rectal: deferred  : deferred  Musculoskeletal: No deformities noted, No cyanosis/clubbing noted, No tenderness to palpation  Neurologic: A&O x 3, Speech, No facial asymmetry noted, DTR intact in UE and LE bilaterally, intact sensation throughout V1-3 distribution, Shoulder shrug equal bilaterally, midline tongue protrusion, equal audiotry capabilities and facial sensation bilaterally, 5/5 strength in LUE/LLE with 4/5 strength in RUE/RLE, equal sensation to upper and lower extremities bilaterally  Psychiatric: Mood and affect are appropriate, Speech not pressured, Thought process is logical  Skin: Warm and dry, No rash      ED Course     Labs Reviewed   COMP METABOLIC PANEL (14) - Abnormal; Notable for the following components:       Result Value    Sodium 149 (*)     Potassium 3.1 (*)     Chloride 113 (*)     Creatinine 1.58 (*)     Calculated Osmolality 310 (*)     eGFR-Cr 35 (*)     All other components within normal limits   URINALYSIS WITH CULTURE REFLEX - Abnormal; Notable for the following components:    Protein Urine 20 (*)     All other components within normal limits   CBC W/ DIFFERENTIAL - Abnormal; Notable for the following components:    HGB 10.3 (*)     HCT 32.8 (*)     MCH 25.2 (*)     RDW 15.9 (*)     All other components within normal limits   HEMOGLOBIN A1C - Normal   POCT GLUCOSE - Normal   POCT GLUCOSE - Normal   CBC WITH DIFFERENTIAL WITH PLATELET    Narrative:     The following orders were created for panel order CBC With Differential With Platelet.  Procedure                               Abnormality         Status                     ---------                               -----------         ------                     CBC W/ DIFFERENTIAL[[id number]]          Abnormal            Final result                 Please view results for these tests  on the individual orders.   RAINBOW DRAW BLUE          MDM        Medical Decision Making  69-year-old female presenting to the emergency department for 1 week of bulbar symptoms including difficulty swallowing with worsening symptoms and deficits from previous stroke.    On arrival to the emergency department patient overall well-appearing and in no acute distress.      Patient with symptoms approximately 1 week in time.  Do not suspect acute stroke.  Stroke code not called as no deficits are new.  Do not suspect acute CVA.  Do not suspect acute traumatic causes no trauma is reported.    ED Course as of 07/08/24 2012  ------------------------------------------------------------  Time: 07/08 1434  Value: POC GLUCOSE: 96  Comment: No evidence of hypoglycemia  ------------------------------------------------------------  Time: 07/08 1547  Comment: Urinalysis unremarkable lessening concern for UTI.  ------------------------------------------------------------  Time: 07/08 1650  Comment: CMP with potassium 3.1.  This will be repleted in the emergency department.  Creatinine at 1.58 increased from baseline concerning for PAPO in the setting of uncontrolled hypertension.  ------------------------------------------------------------  Time: 07/08 1657  Comment: Hydralazine ordered for the patient for blood pressure control.  ------------------------------------------------------------  Time: 07/08 1658  Comment: CBC with hemoglobin near patient's baseline.  No leukocytosis.  Platelets normal.  ------------------------------------------------------------  Time: 07/08 1659  Comment: No acute or subacute infarct. Moderate confluent chronic periventricular white matter disease with prior lacunar infarcts in the left corona radiata. Diffuse small foci of gradient susceptibility throughout the bilateral cerebral and cerebellar hemispheres from old hemorrhagic micro infarcts or amyloid angiopathy. Stable mild diffuse ventricular  enlargement.  No midline shift     No acute or subacute infarct detailed.  Possible uncomplicated recrudescence of stroke symptoms with new swallowing difficulties of other etiology.  Suspect uncontrolled hypertension with renal deficits noted as a result.      Patient will be admitted at this time for blood pressure management in addition to further workup of possible PAPO.    Have discussed with admitting physician who is agreeable with admission at this time.      Amount and/or Complexity of Data Reviewed  Independent Historian: caregiver  External Data Reviewed: notes.  Labs: ordered. Decision-making details documented in ED Course.  Radiology: ordered and independent interpretation performed. Decision-making details documented in ED Course.    Risk  Prescription drug management.  Decision regarding hospitalization.        Disposition and Plan     Clinical Impression:  1. PAPO (acute kidney injury) (HCC)    2. Primary hypertension         Disposition:  Admit  7/8/2024  4:57 pm    Follow-up:  No follow-up provider specified.  We recommend that you schedule follow up care with a primary care provider within the next three months to obtain basic health screening including reassessment of your blood pressure.      Medications Prescribed:  Current Discharge Medication List                            Hospital Problems       Present on Admission             ICD-10-CM Noted POA    * (Principal) PAPO (acute kidney injury) (HCC) N17.9 7/8/2024 Unknown    Primary hypertension I10 7/8/2024 Unknown    Uncontrolled hypertension I10 7/8/2024 Unknown

## 2024-07-09 NOTE — PLAN OF CARE
Problem: Patient Centered Care  Goal: Patient preferences are identified and integrated in the patient's plan of care  Description: Interventions:  - What would you like us to know as we care for you? From home with son  - Provide timely, complete, and accurate information to patient/family  - Incorporate patient and family knowledge, values, beliefs, and cultural backgrounds into the planning and delivery of care  - Encourage patient/family to participate in care and decision-making at the level they choose  - Honor patient and family perspectives and choices  Outcome: Progressing     Problem: Diabetes/Glucose Control  Goal: Glucose maintained within prescribed range  Description: INTERVENTIONS:  - Monitor Blood Glucose as ordered  - Assess for signs and symptoms of hyperglycemia and hypoglycemia  - Administer ordered medications to maintain glucose within target range  - Assess barriers to adequate nutritional intake and initiate nutrition consult as needed  - Instruct patient on self management of diabetes  Outcome: Progressing     Problem: PAIN - ADULT  Goal: Verbalizes/displays adequate comfort level or patient's stated pain goal  Description: INTERVENTIONS:  - Encourage pt to monitor pain and request assistance  - Assess pain using appropriate pain scale  - Administer analgesics based on type and severity of pain and evaluate response  - Implement non-pharmacological measures as appropriate and evaluate response  - Consider cultural and social influences on pain and pain management  - Manage/alleviate anxiety  - Utilize distraction and/or relaxation techniques  - Monitor for opioid side effects  - Notify MD/LIP if interventions unsuccessful or patient reports new pain  - Anticipate increased pain with activity and pre-medicate as appropriate  Outcome: Progressing     Problem: Impaired Swallowing  Goal: Minimize aspiration risk  Description: Interventions:  - Patient should be alert and upright for all  feedings (90 degrees preferred)  - Offer food and liquids at a slow rate  - No straws  - Encourage small bites of food and small sips of liquid  - Offer pills one at a time, crush or deliver with applesauce as needed  - Discontinue feeding and notify MD (or speech pathologist) if coughing or persistent throat clearing or wet/gurgly vocal quality is noted  Outcome: Progressing   Patient in no acute distress, denies shortness of breath, denies pain, tolerating diet well, no s/s aspiration. Safety precautions in place, son updated on plan of care at bedside

## 2024-07-09 NOTE — SLP NOTE
ADULT SWALLOWING EVALUATION    ASSESSMENT    ASSESSMENT/OVERALL IMPRESSION:  PPE REQUIRED. THIS THERAPIST WORE GLOVES AND MASK FOR DURATION OF EVALUATION. HANDS WASHED UPON ENTRANCE/EXIT.    Per MD H&P, \"The patient is a 69-year-old  female who brought into the emergency department for evaluation for difficulty swallowing her food per the family for last few days. Noted to have systolic blood pressure 180 to 200. MRI scan of the brain showed no acute infarct. Chemistry showed BUN and creatinine of 19 and 1.58. GFR is 35, slightly below her baseline. Urinalysis was unremarkable. Patient will be admitted to the hospital for further management.\"    SLP BSSE orders received and acknowledged. A swallow evaluation warranted per \"eval/tx\". Pt afebrile with clear vocal quality, on room air, with oxygen saturation at 98%. Pt with hx of dysphagia at [facility], last seen 1/24/22 with recommendations for soft easy to chew/thin liquids.   Pt positioned 90 degrees in bed, alert/cooperative/son present. Pt with no complaints of pain. Oral motor skills within functional limits. Pt presented with trials of hard solids and thin liquids via straw. Pt with adequate oral acceptance and bilabial seal across all trials. Pt with intact bite, mastication of solids, and timely A-P transit. Pt's swallow response appears delayed with reduced hyolaryngeal elevation/excursion. No clinical signs of aspiration (e.g., immediate/delayed throat clear, immediate/delayed cough, wet vocal quality, increased O2 effort) observed across all trials. No CXR on this admission. Oxygen status remained stable t/o the entire evaluation.     At this time, pt presents with functional oral swallow stage and probable pharyngeal dysfunction. Recommend a regular diet and thin liquids with strict adherence to safe swallowing compensatory strategies. Results and recommendations reviewed with RN, pt, and family. Pt/pt's family v/u to all results/recommendations.  Recommendations remain written on whiteboard. SLP collaborated with RN for MD diet orders.     PLAN: SLP to f/u x1-2 meal assessments, monitor imaging, and VFSS if clinically indicated         RECOMMENDATIONS   Diet Recommendations - Solids: Regular  Diet Recommendations - Liquids: Thin Liquids                        Compensatory Strategies Recommended: Slow rate  Aspiration Precautions: Upright position;Slow rate  Medication Administration Recommendations:  (as tolerated)  Treatment Plan/Recommendations: Aspiration precautions    HISTORY   MEDICAL HISTORY  Reason for Referral:  (eval/tx)    Problem List  Principal Problem:    PAPO (acute kidney injury) (HCC)  Active Problems:    Primary hypertension    Uncontrolled hypertension      Past Medical History  Past Medical History:    Asthma (HCC)    Coronary atherosclerosis    Diabetes (HCC)    diabetes for 2 yrs    Essential hypertension    Glaucoma    right    Heart attack (HCC)    2005    High blood pressure    High cholesterol    Hyperlipidemia    Osteoarthritis    Sleep apnea    cpap    Stroke (HCC)    30 yrs ago    Visual impairment    left eye edema       Prior Living Situation: Home with support  Diet Prior to Admission: Regular;Thin liquids  Precautions: Aspiration    Patient/Family Goals: did not state    SWALLOWING HISTORY  Current Diet Consistency: Soft/ Easy to Chew;Thin liquids  Dysphagia History: see above  Imaging Results: 7/8 MRI BRAIN  CONCLUSION: No acute infarct     SUBJECTIVE       OBJECTIVE   ORAL MOTOR EXAMINATION  Dentition: Functional  Symmetry: Within Functional Limits  Strength: Within Functional Limits     Range of Motion: Within Functional Limits  Rate of Motion: Within Functional Limits    Voice Quality: Clear  Respiratory Status: Unlabored  Consistencies Trialed: Thin liquids;Hard solid  Method of Presentation: Staff/Clinician assistance;Straw;Self presentation  Patient Positioning: Upright;Midline    Oral Phase of Swallow: Within  Functional Limits                      Pharyngeal Phase of Swallow: Impaired  Laryngeal Elevation Timing: Appears impaired  Laryngeal Elevation Strength: Appears impaired  Laryngeal Elevation Coordination: Appears intact  (Please note: Silent aspiration cannot be evaluated clinically. Videofluoroscopic Swallow Study is required to rule-out silent aspiration.)    Esophageal Phase of Swallow: No complaints consistent with possible esophageal involvement  Comments: NA              GOALS  Goal #1 The patient will tolerate regular consistency and thin liquids without overt signs or symptoms of aspiration with 100 % accuracy over 1-2 session(s).  In Progress   Goal #2 The patient/family/caregiver will demonstrate understanding and implementation of aspiration precautions and swallow strategies independently over 1-2 session(s).    In Progress     FOLLOW UP  Treatment Plan/Recommendations: Aspiration precautions  Number of Visits to Meet Established Goals:  (1-2)  Follow Up Needed (Documentation Required): Yes  SLP Follow-up Date: 07/10/24    Thank you for your referral.   If you have any questions, please contact ALICIA Gaitan M.S. CCC-SLP  Speech Language Pathologist  Phone Number [phone]

## 2024-07-09 NOTE — SPIRITUAL CARE NOTE
Spiritual Care Visit Note    Patient Name: Eliud Fine Date of Spiritual Care Visit: 24   : 1955 Primary Dx: PAPO (acute kidney injury) (HCC)       Referred By: Referral From: Nurse;Family    Spiritual Care Taxonomy:    Intended Effects: Aligning care plan with patient's values    Methods: Collaborate with care team member;Offer support    Interventions: Active listening;Ask guided questions;Assist someone with Advance Directives;Silent prayer    Visit Type/Summary:     - PoA: New PoAH Created: Visited patient in response to PoA for Healthcare consult/request. Created a new PoAH for Healthcare document that, per the patient, accurately reflects their wishes. Patient named son, Gokul Fine ([phone]) as primary agent. Gave the patient the original PoAH document along with copies. Confirmed that the PoAH primary agent name and contact information have been entered into the Epic, Advance Directives section. A copy of the new PoAH document has been placed on the patient's paper chart. Writer gave Spiritual Care card. No other need at this time.       ELIU Emmanuel CAMII   [id number]     Spiritual Care support can be requested via an [facility] consult. For urgent/immediate needs, please contact the On Call  at: [place]: ext [phone]

## 2024-07-09 NOTE — PLAN OF CARE
Pt alert and oriented. Vitals stable. IV fluids infusing. Pt appetite good. Possible dc tomorrow   Problem: Patient Centered Care  Goal: Patient preferences are identified and integrated in the patient's plan of care  Description: Interventions:  - What would you like us to know as we care for you?  - Provide timely, complete, and accurate information to patient/family  - Incorporate patient and family knowledge, values, beliefs, and cultural backgrounds into the planning and delivery of care  - Encourage patient/family to participate in care and decision-making at the level they choose  - Honor patient and family perspectives and choices  Outcome: Progressing     Problem: Diabetes/Glucose Control  Goal: Glucose maintained within prescribed range  Description: INTERVENTIONS:  - Monitor Blood Glucose as ordered  - Assess for signs and symptoms of hyperglycemia and hypoglycemia  - Administer ordered medications to maintain glucose within target range  - Assess barriers to adequate nutritional intake and initiate nutrition consult as needed  - Instruct patient on self management of diabetes  Outcome: Progressing     Problem: Patient/Family Goals  Goal: Patient/Family Long Term Goal  Description: Patient's Long Term Goal: Go home     Interventions:  - Follow MD orders   - Non pharmacological interventions   - See additional Care Plan goals for specific interventions  Outcome: Progressing  Goal: Patient/Family Short Term Goal  Description: Patient's Short Term Goal: Resolve swallowing difficulties back to baseline.     Interventions:   - Follow MD orders  - Non pharmacological interventions   - See additional Care Plan goals for specific interventions  Outcome: Progressing     Problem: PAIN - ADULT  Goal: Verbalizes/displays adequate comfort level or patient's stated pain goal  Description: INTERVENTIONS:  - Encourage pt to monitor pain and request assistance  - Assess pain using appropriate pain scale  - Administer  analgesics based on type and severity of pain and evaluate response  - Implement non-pharmacological measures as appropriate and evaluate response  - Consider cultural and social influences on pain and pain management  - Manage/alleviate anxiety  - Utilize distraction and/or relaxation techniques  - Monitor for opioid side effects  - Notify MD/LIP if interventions unsuccessful or patient reports new pain  - Anticipate increased pain with activity and pre-medicate as appropriate  Outcome: Progressing     Problem: Impaired Swallowing  Goal: Minimize aspiration risk  Description: Interventions:  - Patient should be alert and upright for all feedings (90 degrees preferred)  - Offer food and liquids at a slow rate  - No straws  - Encourage small bites of food and small sips of liquid  - Offer pills one at a time, crush or deliver with applesauce as needed  - Discontinue feeding and notify MD (or speech pathologist) if coughing or persistent throat clearing or wet/gurgly vocal quality is noted  Outcome: Progressing

## 2024-07-10 VITALS
RESPIRATION RATE: 18 BRPM | TEMPERATURE: 98 F | OXYGEN SATURATION: 98 % | HEART RATE: 69 BPM | DIASTOLIC BLOOD PRESSURE: 57 MMHG | BODY MASS INDEX: 28 KG/M2 | SYSTOLIC BLOOD PRESSURE: 150 MMHG | WEIGHT: 171 LBS

## 2024-07-10 LAB
ANION GAP SERPL CALC-SCNC: 6 MMOL/L (ref 0–18)
BUN BLD-MCNC: 14 MG/DL (ref 9–23)
BUN/CREAT SERPL: 11.5 (ref 10–20)
CALCIUM BLD-MCNC: 8.8 MG/DL (ref 8.7–10.4)
CHLORIDE SERPL-SCNC: 113 MMOL/L (ref 98–112)
CO2 SERPL-SCNC: 25 MMOL/L (ref 21–32)
CREAT BLD-MCNC: 1.22 MG/DL
EGFRCR SERPLBLD CKD-EPI 2021: 47 ML/MIN/1.73M2 (ref 60–?)
GLUCOSE BLD-MCNC: 161 MG/DL (ref 70–99)
GLUCOSE BLDC GLUCOMTR-MCNC: 106 MG/DL (ref 70–99)
GLUCOSE BLDC GLUCOMTR-MCNC: 95 MG/DL (ref 70–99)
OSMOLALITY SERPL CALC.SUM OF ELEC: 302 MOSM/KG (ref 275–295)
POTASSIUM SERPL-SCNC: 3.4 MMOL/L (ref 3.5–5.1)
POTASSIUM SERPL-SCNC: 3.6 MMOL/L (ref 3.5–5.1)
SODIUM SERPL-SCNC: 144 MMOL/L (ref 136–145)

## 2024-07-10 PROCEDURE — 99239 HOSP IP/OBS DSCHRG MGMT >30: CPT | Performed by: HOSPITALIST

## 2024-07-10 RX ORDER — AMLODIPINE BESYLATE 10 MG/1
10 TABLET ORAL DAILY
Status: DISCONTINUED | OUTPATIENT
Start: 2024-07-10 | End: 2024-07-10

## 2024-07-10 RX ORDER — POTASSIUM CHLORIDE 20 MEQ/1
40 TABLET, EXTENDED RELEASE ORAL ONCE
Status: COMPLETED | OUTPATIENT
Start: 2024-07-10 | End: 2024-07-10

## 2024-07-10 RX ORDER — HYDRALAZINE HYDROCHLORIDE 50 MG/1
50 TABLET, FILM COATED ORAL EVERY 8 HOURS SCHEDULED
Qty: 90 TABLET | Refills: 0 | Status: SHIPPED | OUTPATIENT
Start: 2024-07-10

## 2024-07-10 RX ORDER — SPIRONOLACTONE 25 MG/1
25 TABLET ORAL DAILY
Qty: 30 TABLET | Refills: 0 | Status: SHIPPED | OUTPATIENT
Start: 2024-07-11

## 2024-07-10 RX ORDER — AMLODIPINE BESYLATE 10 MG/1
10 TABLET ORAL DAILY
Qty: 30 TABLET | Refills: 0 | Status: SHIPPED | OUTPATIENT
Start: 2024-07-10

## 2024-07-10 NOTE — PLAN OF CARE
Problem: Patient Centered Care  Goal: Patient preferences are identified and integrated in the patient's plan of care  Description: Interventions:  - What would you like us to know as we care for you?   - Provide timely, complete, and accurate information to patient/family  - Incorporate patient and family knowledge, values, beliefs, and cultural backgrounds into the planning and delivery of care  - Encourage patient/family to participate in care and decision-making at the level they choose  - Honor patient and family perspectives and choices  Outcome: Progressing     Problem: Diabetes/Glucose Control  Goal: Glucose maintained within prescribed range  Description: INTERVENTIONS:  - Monitor Blood Glucose as ordered  - Assess for signs and symptoms of hyperglycemia and hypoglycemia  - Administer ordered medications to maintain glucose within target range  - Assess barriers to adequate nutritional intake and initiate nutrition consult as needed  - Instruct patient on self management of diabetes  Outcome: Progressing     Problem: Patient/Family Goals  Goal: Patient/Family Long Term Goal  Description: Patient's Long Term Goal: Go home     Interventions:  - Follow MD orders   - Non pharmacological interventions   - See additional Care Plan goals for specific interventions  Outcome: Progressing  Goal: Patient/Family Short Term Goal  Description: Patient's Short Term Goal: Resolve swallowing difficulties back to baseline.     Interventions:   - Follow MD orders  - Non pharmacological interventions   - See additional Care Plan goals for specific interventions  Outcome: Progressing     Problem: PAIN - ADULT  Goal: Verbalizes/displays adequate comfort level or patient's stated pain goal  Description: INTERVENTIONS:  - Encourage pt to monitor pain and request assistance  - Assess pain using appropriate pain scale  - Administer analgesics based on type and severity of pain and evaluate response  - Implement non-pharmacological  measures as appropriate and evaluate response  - Consider cultural and social influences on pain and pain management  - Manage/alleviate anxiety  - Utilize distraction and/or relaxation techniques  - Monitor for opioid side effects  - Notify MD/LIP if interventions unsuccessful or patient reports new pain  - Anticipate increased pain with activity and pre-medicate as appropriate  Outcome: Progressing     Problem: Impaired Swallowing  Goal: Minimize aspiration risk  Description: Interventions:  - Patient should be alert and upright for all feedings (90 degrees preferred)  - Offer food and liquids at a slow rate  - No straws  - Encourage small bites of food and small sips of liquid  - Offer pills one at a time, crush or deliver with applesauce as needed  - Discontinue feeding and notify MD (or speech pathologist) if coughing or persistent throat clearing or wet/gurgly vocal quality is noted  Outcome: Progressing

## 2024-07-10 NOTE — SLP NOTE
SPEECH DAILY NOTE - INPATIENT    ASSESSMENT & PLAN   ASSESSMENT  Pt was seen for dysphagia intervention. RN was consulted prior to the visit.   Pt was sitting independently on a chair during the visit. Alertness and oxygen status stable throughout the session. Pt reported adequate tolerance to current diet recommendation of regular solids and thin liquids.   Pt was observed taking several bites of regular solids and sips of thin liquids via straw. Oral bolus prep and oral transit were both prolonged for the solid consistency. Bolus formation was occasionally observed to be incomplete. No overt s/s of laryngeal penetration or aspiration were observed for any consistency.   At this time, pt is safe to intake a regular solid diet with thin liquids. Pt was educated on adequate mastication and chewing prior to attempting to swallow. She verbalized understanding.   Pt is not in need of skilled SLP services at this time. Please contact us if status changes. Thank you for the referral.     Diet Recommendations - Solids: Regular  Diet Recommendations - Liquids: Thin Liquids    Compensatory Strategies Recommended: Slow rate  Aspiration Precautions: Upright position;Slow rate  Medication Administration Recommendations:  (as tolerated)    Patient Experiencing Pain: No                Treatment Plan  Treatment Plan/Recommendations: Aspiration precautions    Interdisciplinary Communication: Discussed with RN            GOALS  Goal #1 The patient will tolerate regular consistency and thin liquids without overt signs or symptoms of aspiration with 100 % accuracy over 1-2 session(s).  Met - 7/10   Goal #2 The patient/family/caregiver will demonstrate understanding and implementation of aspiration precautions and swallow strategies independently over 1-2 session(s).     Met - 7/10          FOLLOW UP  Follow Up Needed (Documentation Required): Yes  SLP Follow-up Date: 07/10/24  Number of Visits to Meet Established Goals:   (1-2)    Session: 1    If you have any questions, please contact Tay Whelan, ALICIA Whelan, Ph.D., [facility]-SLP  Speech-Language Pathologist  Phone number Ext.: [phone]

## 2024-07-10 NOTE — DISCHARGE SUMMARY
[facility]  Discharge Summary     Eliud Fine  : 1955    Status: Observation  Day #: 0    Attending: Laron Alejandro MD  PCP: Yao Rodriguez     Date of Admission:  2024  Date of Discharge:  7/10/2024     Hospital Discharge Diagnoses:     Dehydration  Mild PAPO on CKD3  Uncontrolled hypertension  Hypokalemia      History of Present Illness:     Copied from Admission H&P:    The patient is a 69-year-old  female who brought into the emergency department for evaluation for difficulty swallowing her food per the family for last few days. Noted to have systolic blood pressure 180 to 200. MRI scan of the brain showed no acute infarct. Chemistry showed BUN and creatinine of 19 and 1.58. GFR is 35, slightly below her baseline. Urinalysis was unremarkable. Patient will be admitted to the hospital for further management. Received potassium in the emergency room for a potassium level of 3.1.       Hospital Course:     The patient was given IVF with improvement in her Cr which is actually a little better than baseline now at 1.22. She was seen by ST and there were no signs of dysphagia. BP better on increased dose of hydralazine and aldactone. She is medically stable for discharge home.       Physical Exam:   Blood pressure 150/57, pulse 69, temperature 98.2 °F (36.8 °C), temperature source Oral, resp. rate 18, weight 171 lb (77.6 kg), SpO2 98%.  General:  Alert, no distress  HEENT:  Normocephalic, atraumatic  Cardiac:  Regular rate, regular rhythm  Pulmonary:  Clear to auscultation bilaterally, respirations unlabored  Gastrointestinal:  Soft, non-tender, normal bowel sounds  Musculoskeletal:  No joint swelling  Extremities:  No edema, no cyanosis, no clubbing  Neurologic:  nonfocal  Psychiatric:  Normal affect, calm and appropriate         Discharge Medications        START taking these medications        Instructions Prescription details   spironolactone 25 MG Tabs  Commonly known  as: Aldactone  Start taking on: July 11, 2024      Take 1 tablet (25 mg total) by mouth daily.   Quantity: 30 tablet  Refills: 0            CHANGE how you take these medications        Instructions Prescription details   hydrALAZINE 50 MG Tabs  Commonly known as: Apresoline  What changed:   medication strength  how much to take      Take 1 tablet (50 mg total) by mouth every 8 (eight) hours.   Quantity: 90 tablet  Refills: 0            CONTINUE taking these medications        Instructions Prescription details   amLODIPine 10 MG Tabs  Commonly known as: Norvasc      Take 1 tablet (10 mg total) by mouth daily.   Quantity: 30 tablet  Refills: 0     atorvastatin 20 MG Tabs  Commonly known as: Lipitor      Take 2 tablets (40 mg total) by mouth nightly.   Refills: 0     brimonidine 0.2 % Soln  Commonly known as: Alphagan      Place 1 drop into both eyes 2 (two) times daily.   Refills: 0     carvedilol 25 MG Tabs  Commonly known as: Coreg      Take 1 tablet (25 mg total) by mouth 2 (two) times daily with meals.   Refills: 0     clopidogrel 75 MG Tabs  Commonly known as: Plavix      Take 1 tablet (75 mg total) by mouth daily.   Quantity: 30 tablet  Refills: 0     losartan 50 MG Tabs  Commonly known as: Cozaar      Take 1 tablet (50 mg total) by mouth daily.   Refills: 0     metFORMIN 500 MG Tabs  Commonly known as: Glucophage  Notes to patient: Continue home routine      Take 1 tablet (500 mg total) by mouth daily with breakfast.   Refills: 0     Namzaric 28-10 MG Cp24  Generic drug: Memantine HCl-Donepezil HCl  Notes to patient: Continue home routine      Take 28 mg by mouth daily.   Refills: 0            STOP taking these medications      aspirin 81 MG Tbec        memantine 10 MG Tabs  Commonly known as: Namenda                  Where to Get Your Medications        These medications were sent to [id number] - [place], [place] - [address], [phone], [phone]   [address] [address]      Phone: [phone]   amLODIPine 10 MG Tabs  hydrALAZINE 50 MG Tabs  spironolactone 25 MG Tabs           Hospital Discharge Diagnoses:  Dehydration    Lace+ Score: 72  59-90 High Risk  29-58 Medium Risk  0-28   Low Risk.    TCM Follow-Up Recommendation:  LACE > 58: High Risk of readmission after discharge from the hospital.        I spent >30 minutes on this discharge. Discussed treatment and discharge plans.       Laron Alejandro MD

## 2024-07-10 NOTE — CM/SW NOTE
07/10/24 1000   CM/SW Referral Data   Referral Source Social Work (self-referral)   Reason for Referral Discharge planning   Informant Son   Medical Hx   Does patient have an established PCP? Yes   Patient Info   Patient's Home Environment House   Patient lives with Son   Patient Status Prior to Admission   Independent with ADLs and Mobility No   Services in place prior to admission DME/Supplies at home   Type of DME/Supplies Wheeled Walker   Discharge Needs   Anticipated D/C needs Home health care     Sw initiated self referral for discharge planning. SW called alfonso Hodgson, introduced self and role to son. Son provided above information. Son informed ENRIQUE that patient lives at address on file, son assists patient with all ADLs, and patient has walker at home. Anticipated therapy need: Home with Home Healthcare. Son aware of HH, and agreeable to HH. SW sent HH referrals via [employer], f2f placed. ENRIQUE will provide a list of HH providers once avail.     1157am- ENRIQUE was informed [facility] is avail to accept patient. ENRIQUE called alfonso Hodgson, who is aware, and agreeable. SW reserved [facility]. ENRIQUE sent AVS via [employer].    07/10/24 1100   Discharge disposition   Expected discharge disposition Home or Self   Post Acute Care Provider [facility]   Discharge transportation Private car       Plan: DC to Home with [facility],    SW/CM to remain available for support and/or discharge planning.     Meghna Saravia, MSW, LSW   x [phone]

## 2024-07-10 NOTE — PROGRESS NOTES
[facility]  part of [facility]    Progress Note    Eliud Fine Patient Status:  Observation    1955 MRN [id number]   Location [facility] 5SW/SE Attending Joselito Calhoun MD   Hosp Day # 0 PCP Yao Rodriguez       Subjective:     Pt seen earlier this afternoon.  Eating better after seeing speech therapy.  No complaints.      Objective:   Blood pressure (!) 179/74, pulse 83, temperature 98.5 °F (36.9 °C), temperature source Oral, resp. rate 18, weight 171 lb (77.6 kg), SpO2 100%.    Gen:   NAD.  A and O x 3  CV:   RRR, no m/g/r  Pulm:   CTA bilat  Abd:   +bs, soft, NT, ND  LE:   No c/c/e  Neuro:   nonfocal    Results:     Lab Results   Component Value Date    WBC 4.3 2024    HGB 9.8 (L) 2024    HCT 30.1 (L) 2024    .0 2024    CREATSERUM 1.19 (H) 2024    BUN 17 2024     (H) 2024    K 3.4 (L) 2024     (H) 2024    CO2 26.0 2024    GLU 84 2024    CA 9.1 2024    ALB 4.4 2024    ALKPHO 81 2024    BILT 0.5 2024    TP 7.6 2024    AST 20 2024    ALT 31 2024    PTT 28.8 2021    INR 1.05 2021    DDIMER 2.40 (H) 10/11/2022    ESRML 37 (H) 2022    CRP 0.31 (H) 2022    MG 2.0 10/02/2023    PHOS 2.6 10/01/2023    CK 79 2024       MRI BRAIN WO ACUTE (3) SEQUENCE (CPT=70551)    Result Date: 2024  CONCLUSION: No acute infarct    Dictated by (CST): Musa Gutierrez MD on 2024 at 4:10 PM     Finalized by (CST): Musa Gutierrez MD on 2024 at 4:13 PM               Assessment and Plan:     Mild acute on chronic kidney injury and dehydration.  - improving with IVF  - cont IVF    Difficulty with oral intake, not clear if the patient actually has dysphagia.  - improved after speech therapy  - encourage PO intake    HTN  - resume home coreg  - cont hydralazine  - cont losartan  - aldactone added  - can add norvasc next if  needed    Hypokalemia.  - replace per protocol    dvt proph:    heparin    Code status:    DNAR-select    MDM:    High Joselito Jorgensen MD  7/9/2024

## 2024-07-10 NOTE — PHYSICAL THERAPY NOTE
PHYSICAL THERAPY EVALUATION - INPATIENT     Room Number: 551/551-A  Evaluation Date: 7/10/2024  Type of Evaluation: Initial   Physician Order: PT Eval and Treat    Presenting Problem: Uncontrolled hypertension, hypokalemia, and difficulty swallowing  Co-Morbidities : hx of stroke, chronic ahpasia, DM Type II, CAD,  Reason for Therapy: Mobility Dysfunction and Discharge Planning    PHYSICAL THERAPY ASSESSMENT   Patient is a 69 year old female admitted 7/8/2024 for difficulty swallowing, slurred speech, drooling, and change in mental status (confusion). Prior to admission, patient's baseline is mod I with rollator for ambulation and assist from son with ADLs.  Patient is currently functioning below baseline with transfers, gait, standing prolonged periods, and performing household tasks.  Patient is requiring contact guard assist and minimal assist as a result of the following impairments: decreased functional strength, decreased endurance/aerobic capacity, impaired standing balance, decreased muscular endurance, and medical status.  Physical Therapy will continue to follow for duration of hospitalization.    Patient will benefit from continued skilled PT Services at discharge to promote functional independence in home.  Anticipate patient will return home with home health PT.    PLAN  PT Treatment Plan: Bed mobility;Body mechanics;Endurance;Patient education;Gait training;Strengthening;Transfer training;Balance training  Rehab Potential : Good  Frequency (Obs): 5x/week    PHYSICAL THERAPY MEDICAL/SOCIAL HISTORY   Problem List  Principal Problem:    PAPO (acute kidney injury) (HCC)  Active Problems:    Primary hypertension    Uncontrolled hypertension      HOME SITUATION  Home Situation  Type of Home: House  Home Layout: One level  Stairs to Enter : 4  Railing: Yes  Stairs to Bedroom: 0  Railing: No  Lives With: Son  Drives: No  Patient Owned Equipment: Rollator;Rolling walker  Patient Regularly Uses: Glasses      SUBJECTIVE  \"I didn't sleep much last night because of the TV\"    PHYSICAL THERAPY EXAMINATION   OBJECTIVE  Precautions: Bed/chair alarm  Fall Risk: High fall risk    WEIGHT BEARING RESTRICTION  Weight Bearing Restriction: None    PAIN ASSESSMENT  Ratin    COGNITION  Overall Cognitive Status:  WFL - within functional limits  Following Commands:  follows one step commands consistently and follows multistep commands without difficulty  Safety Judgement:  decreased awareness of need for safety    RANGE OF MOTION AND STRENGTH ASSESSMENT  Upper extremity ROM and strength are within functional limits   Lower extremity ROM is within functional limits except for the following: R ankle DF/PF ROM  Lower extremity strength is within functional limits: Formal testing revealed RLE weakness (4/5) except R ankle DF/PF (4-/5).     BALANCE  Static Sitting: Good  Dynamic Sitting: Fair +  Static Standing: Fair -  Dynamic Standing: Poor +    ACTIVITY TOLERANCE  Pulse: 69  Heart Rate Source: Monitor     BP: 157/62  BP Location: Right arm  BP Method: Automatic  Patient Position: Semi-Jones    O2 WALK  Oxygen Therapy  SPO2% on Room Air at Rest: 96  SPO2% Ambulation on Room Air: 100    AM-PAC '6-Clicks' INPATIENT SHORT FORM - BASIC MOBILITY  How much difficulty does the patient currently have...  Patient Difficulty: Turning over in bed (including adjusting bedclothes, sheets and blankets)?: A Little   Patient Difficulty: Sitting down on and standing up from a chair with arms (e.g., wheelchair, bedside commode, etc.): A Little   Patient Difficulty: Moving from lying on back to sitting on the side of the bed?: A Little   How much help from another person does the patient currently need...   Help from Another: Moving to and from a bed to a chair (including a wheelchair)?: A Little   Help from Another: Need to walk in hospital room?: A Little   Help from Another: Climbing 3-5 steps with a railing?: A Little     AM-PAC Score:  Raw  Score: 18   Approx Degree of Impairment: 46.58%   Standardized Score (AM-PAC Scale): 43.63   CMS Modifier (G-Code): CK    FUNCTIONAL ABILITY STATUS  Functional Mobility/Gait Assessment  Gait Assistance: Contact guard assist;Minimum assistance  Distance (ft): 150 ft  Assistive Device:  (rollator)  Pattern: R Foot flat;R Foot drag (decreased elliott/speed, L lateral lean with RLE in swing for clearance, short step length, as time went on toe drag becomes more apparent. R side inattention with cues to bring awareness to obstacles on R. CG initially, Min A as pt fatigued.)   Supine to Sit: contact guard assist and minimal assist. 2 attempts. Pt had to return to supine during first attempt due to pt not used to sitting up. Pt required hand-held assist during the second attempt, min A. Increased time to complete. Pt tolerated 5 minutes sitting at EOB with CGA.   Sit to Stand: contact guard assist. Cued on UE sequencing/positioning to ensure pt safety. Pt tolerated 3 minutes of standing requiring SBA to maintain standing balance and bilateral UE support on rollator.     Exercise/Education Provided:  Bed mobility  Body mechanics  Gait training  Strengthening  Transfer training    The patient's Approx Degree of Impairment: 46.58% has been calculated based on documentation in the [facility] '6 clicks' Inpatient Basic Mobility Short Form.  Research supports that patients with this level of impairment may benefit from home with HHPT.  Final disposition will be made by interdisciplinary medical team.    Pt received supine lying in bed. RN approved activity. Pt agreeable to participation. At end of gait training, pt's R foot drag and left lean became more apparent due to fatigue. Vitals taken after gait training (/62, O2 96%, HR 69 BPM).     Patient End of Session: Up in chair;Needs met;Call light within reach;RN aware of session/findings;All patient questions and concerns addressed;Alarm set    CURRENT GOALS  Goals to be met  by: 7/17/24  Patient Goal Patient's self-stated goal is: return to PLOF   Goal #1 Patient is able to demonstrate supine - sit EOB @ level: Mod I     Goal #1   Current Status    Goal #2 Patient is able to demonstrate transfers Sit to/from Stand at assistance level: Mod I with rollator     Goal #2  Current Status    Goal #3 Patient is able to ambulate 175 feet with assist device:  rollator  at assistance level: supervision   Goal #3   Current Status    Goal #4 Patient will negotiate 6 stairs/one curb w/ assistive device and CG   Goal #4   Current Status    Goal #5 Patient to demonstrate independence with home activity/exercise instructions provided to patient in preparation for discharge.   Goal #5   Current Status      Patient Evaluation Complexity Level:  History Moderate - 1 or 2 personal factors and/or co-morbidities   Examination of body systems Moderate - addressing a total of 3 or more elements   Clinical Presentation Low- Stable   Clinical Decision Making  Moderate Complexity     Gait Training: 15 minutes  Therapeutic Activity:  8 minutes    [facility]  DPT Student     I provided clinical instruction and supervision for the duration of this session and agree with the above documentation.     Maira Savage, PT, DPT

## 2024-07-10 NOTE — PLAN OF CARE
Patient adequate for discharge. PIV removed. AVS completed. Personal belongings taken home. Nurse  emptied. Pt taken down by wheelchair.    Problem: Patient Centered Care  Goal: Patient preferences are identified and integrated in the patient's plan of care  Description: Interventions:  - What would you like us to know as we care for you?   - Provide timely, complete, and accurate information to patient/family  - Incorporate patient and family knowledge, values, beliefs, and cultural backgrounds into the planning and delivery of care  - Encourage patient/family to participate in care and decision-making at the level they choose  - Honor patient and family perspectives and choices  Outcome: Adequate for Discharge     Problem: Diabetes/Glucose Control  Goal: Glucose maintained within prescribed range  Description: INTERVENTIONS:  - Monitor Blood Glucose as ordered  - Assess for signs and symptoms of hyperglycemia and hypoglycemia  - Administer ordered medications to maintain glucose within target range  - Assess barriers to adequate nutritional intake and initiate nutrition consult as needed  - Instruct patient on self management of diabetes  Outcome: Adequate for Discharge     Problem: Patient/Family Goals  Goal: Patient/Family Long Term Goal  Description: Patient's Long Term Goal: Go home     Interventions:  - Follow MD orders   - Non pharmacological interventions   - See additional Care Plan goals for specific interventions  Outcome: Adequate for Discharge  Goal: Patient/Family Short Term Goal  Description: Patient's Short Term Goal: Resolve swallowing difficulties back to baseline.     Interventions:   - Follow MD orders  - Non pharmacological interventions   - See additional Care Plan goals for specific interventions  Outcome: Adequate for Discharge     Problem: PAIN - ADULT  Goal: Verbalizes/displays adequate comfort level or patient's stated pain goal  Description: INTERVENTIONS:  - Encourage pt to  monitor pain and request assistance  - Assess pain using appropriate pain scale  - Administer analgesics based on type and severity of pain and evaluate response  - Implement non-pharmacological measures as appropriate and evaluate response  - Consider cultural and social influences on pain and pain management  - Manage/alleviate anxiety  - Utilize distraction and/or relaxation techniques  - Monitor for opioid side effects  - Notify MD/LIP if interventions unsuccessful or patient reports new pain  - Anticipate increased pain with activity and pre-medicate as appropriate  Outcome: Adequate for Discharge     Problem: Impaired Swallowing  Goal: Minimize aspiration risk  Description: Interventions:  - Patient should be alert and upright for all feedings (90 degrees preferred)  - Offer food and liquids at a slow rate  - No straws  - Encourage small bites of food and small sips of liquid  - Offer pills one at a time, crush or deliver with applesauce as needed  - Discontinue feeding and notify MD (or speech pathologist) if coughing or persistent throat clearing or wet/gurgly vocal quality is noted  Outcome: Adequate for Discharge

## 2024-09-23 ENCOUNTER — APPOINTMENT (OUTPATIENT)
Dept: MRI IMAGING | Facility: HOSPITAL | Age: 69
End: 2024-09-23
Attending: INTERNAL MEDICINE
Payer: MEDICARE

## 2024-09-23 ENCOUNTER — HOSPITAL ENCOUNTER (INPATIENT)
Facility: HOSPITAL | Age: 69
LOS: 4 days | Discharge: HOME HEALTH | End: 2024-09-27
Attending: EMERGENCY MEDICINE
Payer: MEDICARE

## 2024-09-23 ENCOUNTER — APPOINTMENT (OUTPATIENT)
Dept: GENERAL RADIOLOGY | Facility: HOSPITAL | Age: 69
End: 2024-09-23
Attending: EMERGENCY MEDICINE
Payer: MEDICARE

## 2024-09-23 ENCOUNTER — APPOINTMENT (OUTPATIENT)
Dept: CT IMAGING | Facility: HOSPITAL | Age: 69
End: 2024-09-23
Attending: EMERGENCY MEDICINE
Payer: MEDICARE

## 2024-09-23 ENCOUNTER — HOSPITAL ENCOUNTER (INPATIENT)
Facility: HOSPITAL | Age: 69
LOS: 4 days | Discharge: HOME HEALTH | End: 2024-09-27
Attending: EMERGENCY MEDICINE | Admitting: INTERNAL MEDICINE
Payer: MEDICARE

## 2024-09-23 ENCOUNTER — APPOINTMENT (OUTPATIENT)
Dept: CT IMAGING | Facility: HOSPITAL | Age: 69
End: 2024-09-23
Payer: MEDICARE

## 2024-09-23 ENCOUNTER — APPOINTMENT (OUTPATIENT)
Dept: CV DIAGNOSTICS | Facility: HOSPITAL | Age: 69
End: 2024-09-23
Attending: INTERNAL MEDICINE
Payer: MEDICARE

## 2024-09-23 DIAGNOSIS — I63.9 ACUTE CVA (CEREBROVASCULAR ACCIDENT) (HCC): Primary | ICD-10-CM

## 2024-09-23 LAB
ALBUMIN SERPL-MCNC: 4.3 G/DL (ref 3.2–4.8)
ALBUMIN/GLOB SERPL: 1.3 {RATIO} (ref 1–2)
ALP LIVER SERPL-CCNC: 86 U/L
ALT SERPL-CCNC: 23 U/L
ANION GAP SERPL CALC-SCNC: 9 MMOL/L (ref 0–18)
APTT PPP: 27.8 SECONDS (ref 23–36)
AST SERPL-CCNC: 21 U/L (ref ?–34)
ATRIAL RATE: 64 BPM
BASOPHILS # BLD AUTO: 0.02 X10(3) UL (ref 0–0.2)
BASOPHILS NFR BLD AUTO: 0.5 %
BILIRUB SERPL-MCNC: 0.4 MG/DL (ref 0.2–1.1)
BILIRUB UR QL: NEGATIVE
BUN BLD-MCNC: 24 MG/DL (ref 9–23)
BUN/CREAT SERPL: 11.9 (ref 10–20)
C DIFF TOX B STL QL: NEGATIVE
CALCIUM BLD-MCNC: 9.5 MG/DL (ref 8.7–10.4)
CHLORIDE SERPL-SCNC: 114 MMOL/L (ref 98–112)
CHOLEST SERPL-MCNC: 115 MG/DL (ref ?–200)
CLARITY UR: CLEAR
CO2 SERPL-SCNC: 26 MMOL/L (ref 21–32)
CREAT BLD-MCNC: 2.01 MG/DL
DEPRECATED RDW RBC AUTO: 51.5 FL (ref 35.1–46.3)
EGFRCR SERPLBLD CKD-EPI 2021: 26 ML/MIN/1.73M2 (ref 60–?)
EOSINOPHIL # BLD AUTO: 0.04 X10(3) UL (ref 0–0.7)
EOSINOPHIL NFR BLD AUTO: 1 %
ERYTHROCYTE [DISTWIDTH] IN BLOOD BY AUTOMATED COUNT: 17.3 % (ref 11–15)
GLOBULIN PLAS-MCNC: 3.2 G/DL (ref 2–3.5)
GLUCOSE BLD-MCNC: 100 MG/DL (ref 70–99)
GLUCOSE BLDC GLUCOMTR-MCNC: 112 MG/DL (ref 70–99)
GLUCOSE BLDC GLUCOMTR-MCNC: 144 MG/DL (ref 70–99)
GLUCOSE BLDC GLUCOMTR-MCNC: 94 MG/DL (ref 70–99)
GLUCOSE UR-MCNC: NORMAL MG/DL
HCT VFR BLD AUTO: 34.1 %
HDLC SERPL-MCNC: 33 MG/DL (ref 40–59)
HGB BLD-MCNC: 10.8 G/DL
HGB UR QL STRIP.AUTO: NEGATIVE
HYALINE CASTS #/AREA URNS AUTO: PRESENT /LPF
IMM GRANULOCYTES # BLD AUTO: 0.01 X10(3) UL (ref 0–1)
IMM GRANULOCYTES NFR BLD: 0.3 %
INR BLD: 1.11 (ref 0.8–1.2)
KETONES UR-MCNC: NEGATIVE MG/DL
LDLC SERPL CALC-MCNC: 66 MG/DL (ref ?–100)
LEUKOCYTE ESTERASE UR QL STRIP.AUTO: 250
LYMPHOCYTES # BLD AUTO: 1.33 X10(3) UL (ref 1–4)
LYMPHOCYTES NFR BLD AUTO: 34.6 %
MCH RBC QN AUTO: 25.8 PG (ref 26–34)
MCHC RBC AUTO-ENTMCNC: 31.7 G/DL (ref 31–37)
MCV RBC AUTO: 81.6 FL
MONOCYTES # BLD AUTO: 0.2 X10(3) UL (ref 0.1–1)
MONOCYTES NFR BLD AUTO: 5.2 %
NEUTROPHILS # BLD AUTO: 2.24 X10 (3) UL (ref 1.5–7.7)
NEUTROPHILS # BLD AUTO: 2.24 X10(3) UL (ref 1.5–7.7)
NEUTROPHILS NFR BLD AUTO: 58.4 %
NONHDLC SERPL-MCNC: 82 MG/DL (ref ?–130)
OSMOLALITY SERPL CALC.SUM OF ELEC: 312 MOSM/KG (ref 275–295)
P AXIS: 24 DEGREES
P-R INTERVAL: 216 MS
PH UR: 6.5 [PH] (ref 5–8)
PLATELET # BLD AUTO: 254 10(3)UL (ref 150–450)
POTASSIUM SERPL-SCNC: 3.9 MMOL/L (ref 3.5–5.1)
PROT SERPL-MCNC: 7.5 G/DL (ref 5.7–8.2)
PROT UR-MCNC: NEGATIVE MG/DL
PROTHROMBIN TIME: 15 SECONDS (ref 11.6–14.8)
Q-T INTERVAL: 418 MS
QRS DURATION: 84 MS
QTC CALCULATION (BEZET): 431 MS
R AXIS: -24 DEGREES
RBC # BLD AUTO: 4.18 X10(6)UL
SODIUM SERPL-SCNC: 149 MMOL/L (ref 136–145)
SP GR UR STRIP: 1.03 (ref 1–1.03)
T AXIS: 111 DEGREES
TRIGL SERPL-MCNC: 77 MG/DL (ref 30–149)
TROPONIN I SERPL HS-MCNC: 12 NG/L
TROPONIN I SERPL HS-MCNC: 14 NG/L
UROBILINOGEN UR STRIP-ACNC: NORMAL
VENTRICULAR RATE: 64 BPM
VLDLC SERPL CALC-MCNC: 12 MG/DL (ref 0–30)
WBC # BLD AUTO: 3.8 X10(3) UL (ref 4–11)

## 2024-09-23 PROCEDURE — 99223 1ST HOSP IP/OBS HIGH 75: CPT | Performed by: INTERNAL MEDICINE

## 2024-09-23 PROCEDURE — 70450 CT HEAD/BRAIN W/O DYE: CPT | Performed by: EMERGENCY MEDICINE

## 2024-09-23 PROCEDURE — 70496 CT ANGIOGRAPHY HEAD: CPT | Performed by: EMERGENCY MEDICINE

## 2024-09-23 PROCEDURE — 71045 X-RAY EXAM CHEST 1 VIEW: CPT | Performed by: EMERGENCY MEDICINE

## 2024-09-23 PROCEDURE — 70551 MRI BRAIN STEM W/O DYE: CPT | Performed by: INTERNAL MEDICINE

## 2024-09-23 PROCEDURE — 93306 TTE W/DOPPLER COMPLETE: CPT | Performed by: INTERNAL MEDICINE

## 2024-09-23 PROCEDURE — 70498 CT ANGIOGRAPHY NECK: CPT | Performed by: EMERGENCY MEDICINE

## 2024-09-23 RX ORDER — DEXTROSE MONOHYDRATE 25 G/50ML
50 INJECTION, SOLUTION INTRAVENOUS
Status: DISCONTINUED | OUTPATIENT
Start: 2024-09-23 | End: 2024-09-27

## 2024-09-23 RX ORDER — ONDANSETRON 2 MG/ML
4 INJECTION INTRAMUSCULAR; INTRAVENOUS EVERY 6 HOURS PRN
Status: DISCONTINUED | OUTPATIENT
Start: 2024-09-23 | End: 2024-09-27

## 2024-09-23 RX ORDER — HYDROCODONE BITARTRATE AND ACETAMINOPHEN 5; 325 MG/1; MG/1
2 TABLET ORAL EVERY 4 HOURS PRN
Status: DISCONTINUED | OUTPATIENT
Start: 2024-09-23 | End: 2024-09-27

## 2024-09-23 RX ORDER — NICOTINE POLACRILEX 4 MG
15 LOZENGE BUCCAL
Status: DISCONTINUED | OUTPATIENT
Start: 2024-09-23 | End: 2024-09-27

## 2024-09-23 RX ORDER — LABETALOL HYDROCHLORIDE 5 MG/ML
10 INJECTION, SOLUTION INTRAVENOUS EVERY 10 MIN PRN
Status: DISCONTINUED | OUTPATIENT
Start: 2024-09-23 | End: 2024-09-27

## 2024-09-23 RX ORDER — NICOTINE POLACRILEX 4 MG
30 LOZENGE BUCCAL
Status: DISCONTINUED | OUTPATIENT
Start: 2024-09-23 | End: 2024-09-27

## 2024-09-23 RX ORDER — ASPIRIN 81 MG/1
81 TABLET, CHEWABLE ORAL DAILY
Status: DISCONTINUED | OUTPATIENT
Start: 2024-09-23 | End: 2024-09-27

## 2024-09-23 RX ORDER — SODIUM CHLORIDE 9 MG/ML
INJECTION, SOLUTION INTRAVENOUS CONTINUOUS
Status: DISCONTINUED | OUTPATIENT
Start: 2024-09-23 | End: 2024-09-24

## 2024-09-23 RX ORDER — ATORVASTATIN CALCIUM 80 MG/1
80 TABLET, FILM COATED ORAL NIGHTLY
Status: DISCONTINUED | OUTPATIENT
Start: 2024-09-23 | End: 2024-09-27

## 2024-09-23 RX ORDER — HYDRALAZINE HYDROCHLORIDE 20 MG/ML
10 INJECTION INTRAMUSCULAR; INTRAVENOUS EVERY 2 HOUR PRN
Status: DISCONTINUED | OUTPATIENT
Start: 2024-09-23 | End: 2024-09-27

## 2024-09-23 RX ORDER — ACETAMINOPHEN 500 MG
500 TABLET ORAL EVERY 4 HOURS PRN
Status: DISCONTINUED | OUTPATIENT
Start: 2024-09-23 | End: 2024-09-27

## 2024-09-23 RX ORDER — CLOPIDOGREL BISULFATE 75 MG/1
75 TABLET ORAL DAILY
Status: DISCONTINUED | OUTPATIENT
Start: 2024-09-23 | End: 2024-09-27

## 2024-09-23 RX ORDER — ACETAMINOPHEN 325 MG/1
650 TABLET ORAL EVERY 4 HOURS PRN
Status: DISCONTINUED | OUTPATIENT
Start: 2024-09-23 | End: 2024-09-27

## 2024-09-23 RX ORDER — HEPARIN SODIUM 5000 [USP'U]/ML
5000 INJECTION, SOLUTION INTRAVENOUS; SUBCUTANEOUS EVERY 12 HOURS SCHEDULED
Status: DISCONTINUED | OUTPATIENT
Start: 2024-09-23 | End: 2024-09-27

## 2024-09-23 RX ORDER — HYDROCODONE BITARTRATE AND ACETAMINOPHEN 5; 325 MG/1; MG/1
1 TABLET ORAL EVERY 4 HOURS PRN
Status: DISCONTINUED | OUTPATIENT
Start: 2024-09-23 | End: 2024-09-27

## 2024-09-23 NOTE — ED INITIAL ASSESSMENT (HPI)
Son reports \"Yesterday at 12 pm she started to have slurred speech\".   Son reports LKW was 11:50am yesterday.   Pt has hx of strokes with right sided deficits/ right sided facial droop  +slurred speech  Pt is unable to maintain oral secretions.   Stroke alert called at 10:58    Pt c/o cp onset yesterday.

## 2024-09-23 NOTE — SPIRITUAL CARE NOTE
Spiritual Care Visit Note    Patient Name: Eliud Fine Date of Spiritual Care Visit: 24   : 1955 Primary Dx: <principal problem not specified>       Referred By:      Spiritual Care Taxonomy:    Intended Effects: Demonstrate caring and concern    Methods: Encourage sharing of feelings;Explore mary and values    Interventions: Acknowledge current situation;Acknowledge response to difficult experience;Active listening;Ask guided questions;Discuss concerns;Explain  role;Provide hospitality    Visit Type/Summary:     - Spiritual Care: Responded to a request via the on call phone Consulted with RN prior to visit. Offered empathic listening and emotional support. Patient and family expressed appreciation for  visit. Attempted visit. Patient is unavailable. Left a Spiritual Care contact information card. Prayed with the son.  remains available.    Spiritual Care support can be requested via an Epic consult. For urgent/immediate needs, please contact the On Call  at: [place]: ext [phone]    Resident , Marysol Veras PhD

## 2024-09-23 NOTE — H&P
[facility]  part of [facility]  HISTORY AND PHYSICAL       Eliud Fine Patient Status:  Inpatient    1955  69 year old CSN [id number]   Location 309/309-A Attending Vu Rea MD     PCP Yao Rodriguez         DATE OF ADMISSION: 2024     CHIEF COMPLAINT: Slurred speech, right-sided weakness    HISTORY OF PRESENT ILLNESS  This is a 69 year oldfemale who presented complaining of slurred speech and right-sided weakness.  Of note, patient with history of CVA in  with deficits of slurred speech and right-sided weakness.  Patient believes them to be getting worse over the past 1 day prior to admission.  Patient reports she also developed central chest pressure prompting visit to ED for further evaluation.  Patient described her difficulty speaking as being slurred and with difficulty with word finding.  Stuttering more than usual.  Patient stated she was having increased right-sided weakness which led to difficulties with ambulation.  At time of interview, patient reports chest pain resolved.  Denied current shortness of breath.  No associated diaphoresis, palpitations.  Pain was nonradiating.  Patient otherwise denies abdominal pain, nausea vomit, fevers or chills.    PAST MEDICAL HISTORY  Past Medical History:    Asthma (HCC)    Coronary atherosclerosis    Diabetes (HCC)    diabetes for 2 yrs    Essential hypertension    Glaucoma    right    Heart attack (HCC)        High blood pressure    High cholesterol    Hyperlipidemia    Osteoarthritis    Sleep apnea    cpap    Stroke (HCC)    30 yrs ago    Visual impairment    left eye edema        PAST SURGICAL HISTORY  Past Surgical History:   Procedure Laterality Date    Anesth,vitrectomy Left 2017    Pars plana vitrectomy, internal limiting membrane peel, left eye.    Cath bare metal stent (bms)      Colonoscopy      Hysterectomy      Total abdom hysterectomy      Tubal ligation         ALLERGIES   Patient has no  known allergies.    MEDICATIONS  Current Discharge Medication List        CONTINUE these medications which have NOT CHANGED    Details   hydrALAZINE 50 MG Oral Tab Take 1 tablet (50 mg total) by mouth every 8 (eight) hours.  Qty: 90 tablet, Refills: 0      spironolactone 25 MG Oral Tab Take 1 tablet (25 mg total) by mouth daily.  Qty: 30 tablet, Refills: 0      NAMZARIC 28-10 MG Oral Capsule SR 24 Hr Take 28 mg by mouth daily.      atorvastatin 20 MG Oral Tab Take 2 tablets (40 mg total) by mouth nightly.      Clopidogrel Bisulfate 75 MG Oral Tab Take 1 tablet (75 mg total) by mouth daily.  Qty: 30 tablet, Refills: 0    Associated Diagnoses: Cerebrovascular accident (CVA), unspecified mechanism (HCC)      MetFORMIN HCl 500 MG Oral Tab Take 1 tablet (500 mg total) by mouth daily with breakfast.      carvedilol 25 MG Oral Tab Take 1 tablet (25 mg total) by mouth 2 (two) times daily with meals.      brimonidine Tartrate 0.2 % Ophthalmic Solution Place 1 drop into both eyes 2 (two) times daily.      amLODIPine 10 MG Oral Tab Take 1 tablet (10 mg total) by mouth daily.  Qty: 30 tablet, Refills: 0      losartan 50 MG Oral Tab Take 1 tablet (50 mg total) by mouth daily.             SOCIAL HISTORY  Social History     Socioeconomic History    Marital status: Single   Tobacco Use    Smoking status: Former     Current packs/day: 0.00     Types: Cigarettes     Start date: 1976     Quit date: 1980     Years since quittin.0    Smokeless tobacco: Never   Vaping Use    Vaping status: Never Used   Substance and Sexual Activity    Alcohol use: No    Drug use: No       FAMILY HISTORY  Family History   Problem Relation Age of Onset    Cancer Father     Diabetes Mother        PHYSICAL EXAM  Vital signs:  BP (!) 172/61 (BP Location: Right arm)   Pulse 65   Temp 97.8 °F (36.6 °C) (Axillary)   Resp 15   Ht 5' 7\" (1.702 m)   Wt 157 lb 3.2 oz (71.3 kg)   SpO2 98%   BMI 24.62 kg/m²      GENERAL:  Awake and alert, in no  acute distress.  HEART:  Regular rhythm.  Regular rate   LUNGS:  Air entry was good.  No crackles or wheezes   ABDOMEN: Soft and non-tender.    NEUROLOGICAL: Awake and alert, right-sided weakness in upper and lower extremity compared to left.  Some intermittent shaking of right upper extremity, patient reports this is chronic.  Slurred speech with stuttering.    SKIN:  Warm and well perfused  PSYCHIATRIC: Normal mood      IMAGING  XR CHEST AP PORTABLE  (CPT=71045)    Result Date: 9/23/2024  CONCLUSION:  1. No acute cardiopulmonary finding.    Dictated by (CST): Alexis Rabago MD on 9/23/2024 at 12:27 PM     Finalized by (CST): Alexis Rabago MD on 9/23/2024 at 12:28 PM          CT STROKE CTA BRAIN/CTA NECK (W IV)(CPT=70496/86448)    Result Date: 9/23/2024  CONCLUSION:  1. CTA head: No high-grade stenosis, occlusion, or gross aneurysm in the anterior or posterior circulation. 2. CTA neck: No high-grade stenosis, occlusion, or dissection of the carotids or vertebrals.    Dictated by (CST): Brett Berumen MD on 9/23/2024 at 11:32 AM     Finalized by (CST): Brett Berumen MD on 9/23/2024 at 11:37 AM          CT STROKE BRAIN (NO IV)(CPT=70450)    Result Date: 9/23/2024  CONCLUSION:  Moderate chronic microvascular ischemic disease without acute intracranial abnormality.  If there is high clinical suspicion for acute ischemia, MRI brain should be obtained.  This report was called immediately at 1127 hours to emergency department and discussed with Dr. Gallagher.     Dictated by (CST): Eugenio Espitia MD on 9/23/2024 at 11:24 AM     Finalized by (CST): Eugenio Espitia MD on 9/23/2024 at 11:27 AM           Data:  Recent Labs   Lab 09/23/24  1107   RBC 4.18   HGB 10.8*   HCT 34.1*   MCV 81.6   MCH 25.8*   MCHC 31.7   RDW 17.3*   NEPRELIM 2.24   WBC 3.8*   .0     Recent Labs   Lab 09/23/24  1107   *   BUN 24*   CREATSERUM 2.01*   CA 9.5   ALB 4.3   *   K 3.9   *   CO2 26.0   ALKPHO 86   AST 21   ALT 23   BILT 0.4    TP 7.5       ASSESSMENT/PLAN    Worsening slurred speech and right-sided weakness.  -Patient with history of CVA in 2021 with similar deficits.  -Patient reports deficits worsening over the past 1 day.  -Initial CT brain without acute infarct.    -Patient deemed not a candidate for TPA  -Restart home aspirin, Plavix, and statin therapy  -Allowing permissive hypertension  -CTA reviewed with no high-grade stenosis.  -Check MR I and echocardiogram  -Neurology consulted, appreciate further recommendations  -PT/OT/SLP  -Telemetry  -Neurochecks    Acute Chest Pain  -Resolving   -Initial troponin negative  -Started on ASA, Plavix and statin as above.   -Telemetry monitoring.   -Trending troponins.   -Consider further ischemic evaluation.     Possible Acute cystitis  -Patient presenting with worsening strokelike symptoms as above  -UA with signs of infection  -Starting broad-spectrum antibiotics and IV fluids  -Follow-up urine culture  -Continue to monitor    Acute Kidney Injury on CKD  III   - Starting IVF  - Avoiding Nephrotoxic agents  - Cont to monitor    HTN  -Elevations noted  -Allowing for permissive hypertension as above  -As needed antihypertensives for gross elevations.  - Monitor and adjust as needed        Plan of care discussed with patient and son at bedside.  Discussed with ED physician and RN. Decision made that pt needs hospitalization for further management/monitoring.      Vu Rea MD    This note was prepared using Dragon Medical voice recognition dictation software. As a result errors may occur. When identified these errors have been corrected. While every attempt is made to correct errors during dictation discrepancies may still exist

## 2024-09-23 NOTE — ED PROVIDER NOTES
Patient Seen in: [facility] 3w/      History     Chief Complaint   Patient presents with    Stroke     Stated Complaint: Stroke Symptoms    Subjective:   HPI        Objective:   Past Medical History:    Asthma (HCC)    Coronary atherosclerosis    Diabetes (HCC)    diabetes for 2 yrs    Essential hypertension    Glaucoma    right    Heart attack (HCC)        High blood pressure    High cholesterol    Hyperlipidemia    Osteoarthritis    Sleep apnea    cpap    Stroke (HCC)    30 yrs ago    Visual impairment    left eye edema              Past Surgical History:   Procedure Laterality Date    Anesth,vitrectomy Left 2017    Pars plana vitrectomy, internal limiting membrane peel, left eye.    Cath bare metal stent (bms)      Colonoscopy      Hysterectomy      Total abdom hysterectomy      Tubal ligation                  Social History     Socioeconomic History    Marital status: Single   Tobacco Use    Smoking status: Former     Current packs/day: 0.00     Types: Cigarettes     Start date: 1976     Quit date: 1980     Years since quittin.0    Smokeless tobacco: Never   Vaping Use    Vaping status: Never Used   Substance and Sexual Activity    Alcohol use: No    Drug use: No     Social Determinants of Health     Food Insecurity: No Food Insecurity (2024)    Food Insecurity     Food Insecurity: Never true   Transportation Needs: No Transportation Needs (2024)    Transportation Needs     Lack of Transportation: No   Housing Stability: Low Risk  (2024)    Housing Stability     Housing Instability: No              Review of Systems    Positive for stated Chief Complaint: Stroke    Other systems are as noted in HPI.  Constitutional and vital signs reviewed.      All other systems reviewed and negative except as noted above.    Physical Exam     ED Triage Vitals   BP 24 1059 (!) 174/67   Pulse 24 1059 69   Resp 24 1059 16   Temp 24 1059 97.1 °F (36.2 °C)    Temp src 09/23/24 1325 Oral   SpO2 09/23/24 1059 98 %   O2 Device 09/23/24 1059 None (Room air)       Current Vitals:   Vital Signs  BP: (!) 182/58  Pulse: 64  Resp: 18  Temp: 98.4 °F (36.9 °C)  Temp src: Oral  MAP (mmHg): 88    Oxygen Therapy  SpO2: 100 %  O2 Device: None (Room air)            Physical Exam          ED Course     Labs Reviewed   CBC WITH DIFFERENTIAL WITH PLATELET - Abnormal; Notable for the following components:       Result Value    WBC 3.8 (*)     HGB 10.8 (*)     HCT 34.1 (*)     MCH 25.8 (*)     RDW-SD 51.5 (*)     RDW 17.3 (*)     All other components within normal limits   COMP METABOLIC PANEL (14) - Abnormal; Notable for the following components:    Glucose 100 (*)     Sodium 149 (*)     Chloride 114 (*)     BUN 24 (*)     Creatinine 2.01 (*)     Calculated Osmolality 312 (*)     eGFR-Cr 26 (*)     All other components within normal limits   PROTHROMBIN TIME (PT) - Abnormal; Notable for the following components:    PT 15.0 (*)     All other components within normal limits   URINALYSIS WITH CULTURE REFLEX - Abnormal; Notable for the following components:    Nitrite Urine 2+ (*)     Leukocyte Esterase Urine 250 (*)     WBC Urine 11-20 (*)     Bacteria Urine 1+ (*)     Squamous Epi. Cells Few (*)     Hyaline Casts Present (*)     All other components within normal limits   LIPID PANEL - Abnormal; Notable for the following components:    HDL Cholesterol 33 (*)     All other components within normal limits   TROPONIN I HIGH SENSITIVITY - Normal   PTT, ACTIVATED - Normal   POCT GLUCOSE - Normal   RAINBOW DRAW BLUE   RAINBOW DRAW GOLD   URINE CULTURE, ROUTINE     EKG    Rate, intervals and axes as noted on EKG Report.  Rate: 64  Rhythm: Sinus Rhythm  Reading: Normal sinus rhythm without signs of acute ischemia or abnormal intervals.             TNK/ NI Documentation:    Date/Time last known well:   N/A    NIHSS on presentation: 5     Chief Complaint   Patient presents with    Stroke     IV  Tenecteplase (TNK) administered: No; Patient is not a Candidate for IV TNK due to: Out of time window period or time of onset unknown    Candidate for Endovascular thrombectomy (EVT): No; Patient is not a candidate for Endovascular Thrombectomy due to: No large vessel occlusion ( LVO)  on CTA/MRA imaging      Disposition: Admit                  [facility]      69-year-old female with history of hypertension and diabetes presents today with slurred speech and right-sided weakness.  Per her son, who provides most of the history, symptoms started about 23 hours prior to arrival.  At that time, noticing primarily some speech slurring.  They deny recent illness, medication changes, abnormal ingestions, or other concomitant symptoms.    On exam, hypertensive 170s over 60s, right facial droop, some speech slurring, right-sided weakness primarily in the right lower extremity.  Abdomen benign.  Lungs clear.    Differential: CVA, ICH, sepsis    Patient evaluated as a stroke code.  Noncontrast head CT negative for bleed.  Patient not a candidate for TNK or endovascular intervention.     Lab workup also significant for PAPO for which she was given IV saline and likely UTI for which she was given ceftriaxone.    Neurology consulted and recommends admission for MRI and stroke workup.  Admission disposition: 9/23/2024 11:38 AM         I spent a total of 40 minutes of critical care time in obtaining history, performing a physical exam, bedside monitoring of interventions, collecting and interpreting tests and discussion with consultants but not including time spent performing procedures.                                 [facility]    Disposition and Plan     Clinical Impression:  1. Acute CVA (cerebrovascular accident) (HCC)         Disposition:  Admit  9/23/2024 11:38 am    Follow-up:  No follow-up provider specified.  We recommend that you schedule follow up care with a primary care provider within the next three months to obtain basic health  screening including reassessment of your blood pressure.      Medications Prescribed:  Current Discharge Medication List                            Hospital Problems       Present on Admission             ICD-10-CM Noted POA    * (Principal) Acute CVA (cerebrovascular accident) (HCC) I63.9 9/23/2024 Unknown    CVA (cerebral vascular accident) (HCC) I63.9 9/23/2024 Unknown

## 2024-09-23 NOTE — SLP NOTE
ADULT SWALLOWING EVALUATION    ASSESSMENT    ASSESSMENT/OVERALL IMPRESSION:  Pt was seen for a clinical swallowing exam at bedside. The exam is suggestive of mild oropharyngeal dysphagia with no overt s/s of laryngeal penetration or aspiration.   Pt is known to our service and was last seen by our SLP team in July 2024. RN was consulted before the visit.     Pt is fully alert and answered simple yes/no questions and followed simple verbal commands with 100% accuracy. Family is by bedside.   Oral motor skills were characterized by generalized weakness of all oral structures. Some tremors were noted of oral structures as well as limbs. Pt was given trials of thin liquids, puree and solid consistencies. Oral acceptance of bolus was WFL with adequate labial seal. Pt demonstrated mildly prolonged oral prep time and oral bolus transit. Hyolaryngeal excursion was perceived to be mildly reduced. No overt s/s of laryngeal penetration or aspiration were noted for any consistency. Voice was clear post swallows and respiratory status was stable throughout the session.     Recommendations:  Diet: Thin liquids and regular solids.  Tx: SLP to f/u x1-2 to ensure diet tolerance. Pt will benefit from an SLE evaluation.          RECOMMENDATIONS   Diet Recommendations - Solids: Regular  Diet Recommendations - Liquids: Thin Liquids                        Compensatory Strategies Recommended: Slow rate;Small bites and sips  Aspiration Precautions: Upright position;Slow rate;Small bites and sips  Medication Administration Recommendations: One pill at a time  Treatment Plan/Recommendations: Aspiration precautions;Communication evaluation    HISTORY   MEDICAL HISTORY  Reason for Referral: Stroke protocol    Problem List  Principal Problem:    Acute CVA (cerebrovascular accident) (HCC)  Active Problems:    CVA (cerebral vascular accident) (HCC)      Past Medical History  Past Medical History:    Asthma (HCC)    Coronary atherosclerosis     Diabetes (HCC)    diabetes for 2 yrs    Essential hypertension    Glaucoma    right    Heart attack (HCC)    2005    High blood pressure    High cholesterol    Hyperlipidemia    Osteoarthritis    Sleep apnea    cpap    Stroke (HCC)    30 yrs ago    Visual impairment    left eye edema       Prior Living Situation: Home with support  Diet Prior to Admission: Regular;Thin liquids  Precautions: Aspiration    Patient/Family Goals: Start eating    SWALLOWING HISTORY  Current Diet Consistency: NPO  Dysphagia History: Pt seen by SLP service at [facility] in January 2022 and July 2024. Most recent visit in July 2024 - recommended a regular diet with thin liquids.   Imaging Results:     Chest Xray - 9/23: No acute cardiopulmonary finding.     CTA head: No high-grade stenosis, occlusion, or gross aneurysm in the anterior or posterior circulation     SUBJECTIVE       OBJECTIVE   ORAL MOTOR EXAMINATION  Dentition: Functional (some missing teeth)  Symmetry: Reduced right facial;Reduced left facial  Strength: Reduced right facial;Reduced left facial;Reduced right lingual;Reduced left lingual  Tone: Reduced right facial;Reduced left facial;Reduced right lingual;Reduced left lingual  Range of Motion: Reduced right facial;Reduced left facial;Reduced right lingual;Reduced left lingual  Rate of Motion: Reduced    Voice Quality: Clear  Respiratory Status: Unlabored  Consistencies Trialed: Thin liquids;Puree;Hard solid  Method of Presentation: Self presentation;Spoon;Cup  Patient Positioning: Upright    Oral Phase of Swallow: Impaired  Bolus Retrieval: Intact  Bilabial Seal: Intact  Bolus Formation: Impaired  Bolus Propulsion: Impaired  Mastication: Impaired  Retention: Impaired    Pharyngeal Phase of Swallow: Impaired  Laryngeal Elevation Timing: Appears impaired  Laryngeal Elevation Strength: Appears impaired  Laryngeal Elevation Coordination: Appears impaired  (Please note: Silent aspiration cannot be evaluated clinically. Videofluoroscopic  Swallow Study is required to rule-out silent aspiration.)    Esophageal Phase of Swallow: No complaints consistent with possible esophageal involvement              GOALS  Goal #1 The patient will tolerate regular solid consistency and thin liquids without overt signs or symptoms of aspiration with 100 % accuracy over 1-2 session(s).  In Progress   Goal #2 The patient/family/caregiver will demonstrate understanding and implementation of aspiration precautions and swallow strategies independently over 2 session(s).    In Progress     FOLLOW UP  Treatment Plan/Recommendations: Aspiration precautions;Communication evaluation  Number of Visits to Meet Established Goals: 1  Follow Up Needed (Documentation Required): Yes  SLP Follow-up Date: 09/24/24    Thank you for your referral.   If you have any questions, please contact Tay Whelan, SLP  Tay Whelan, Ph.D., [facility], [place]-SLP  Speech-Language Pathologist  Phone number Ext.: [phone]

## 2024-09-24 LAB
ALBUMIN SERPL-MCNC: 3.3 G/DL (ref 3.2–4.8)
ALBUMIN/GLOB SERPL: 1.4 {RATIO} (ref 1–2)
ALP LIVER SERPL-CCNC: 67 U/L
ALT SERPL-CCNC: 16 U/L
ANION GAP SERPL CALC-SCNC: 7 MMOL/L (ref 0–18)
AST SERPL-CCNC: 18 U/L (ref ?–34)
BILIRUB SERPL-MCNC: 0.3 MG/DL (ref 0.2–1.1)
BUN BLD-MCNC: 21 MG/DL (ref 9–23)
BUN/CREAT SERPL: 13.7 (ref 10–20)
CALCIUM BLD-MCNC: 9 MG/DL (ref 8.7–10.4)
CHLORIDE SERPL-SCNC: 114 MMOL/L (ref 98–112)
CO2 SERPL-SCNC: 24 MMOL/L (ref 21–32)
CREAT BLD-MCNC: 1.53 MG/DL
EGFRCR SERPLBLD CKD-EPI 2021: 36 ML/MIN/1.73M2 (ref 60–?)
GLOBULIN PLAS-MCNC: 2.4 G/DL (ref 2–3.5)
GLUCOSE BLD-MCNC: 94 MG/DL (ref 70–99)
GLUCOSE BLDC GLUCOMTR-MCNC: 101 MG/DL (ref 70–99)
GLUCOSE BLDC GLUCOMTR-MCNC: 164 MG/DL (ref 70–99)
GLUCOSE BLDC GLUCOMTR-MCNC: 174 MG/DL (ref 70–99)
GLUCOSE BLDC GLUCOMTR-MCNC: 88 MG/DL (ref 70–99)
GLUCOSE BLDC GLUCOMTR-MCNC: 90 MG/DL (ref 70–99)
MAGNESIUM SERPL-MCNC: 1.5 MG/DL (ref 1.6–2.6)
OSMOLALITY SERPL CALC.SUM OF ELEC: 303 MOSM/KG (ref 275–295)
POTASSIUM SERPL-SCNC: 3.7 MMOL/L (ref 3.5–5.1)
PROT SERPL-MCNC: 5.7 G/DL (ref 5.7–8.2)
SODIUM SERPL-SCNC: 145 MMOL/L (ref 136–145)

## 2024-09-24 PROCEDURE — 99233 SBSQ HOSP IP/OBS HIGH 50: CPT | Performed by: HOSPITALIST

## 2024-09-24 PROCEDURE — 99223 1ST HOSP IP/OBS HIGH 75: CPT | Performed by: OTHER

## 2024-09-24 RX ORDER — CARVEDILOL 25 MG/1
25 TABLET ORAL 2 TIMES DAILY WITH MEALS
Status: DISCONTINUED | OUTPATIENT
Start: 2024-09-24 | End: 2024-09-27

## 2024-09-24 RX ORDER — SPIRONOLACTONE 25 MG/1
25 TABLET ORAL DAILY
Status: DISCONTINUED | OUTPATIENT
Start: 2024-09-24 | End: 2024-09-27

## 2024-09-24 RX ORDER — POTASSIUM CHLORIDE 1500 MG/1
40 TABLET, EXTENDED RELEASE ORAL ONCE
Status: COMPLETED | OUTPATIENT
Start: 2024-09-24 | End: 2024-09-24

## 2024-09-24 RX ORDER — MAGNESIUM OXIDE 400 MG/1
800 TABLET ORAL ONCE
Status: COMPLETED | OUTPATIENT
Start: 2024-09-24 | End: 2024-09-24

## 2024-09-24 RX ORDER — BRIMONIDINE TARTRATE 2 MG/ML
1 SOLUTION/ DROPS OPHTHALMIC 2 TIMES DAILY
Status: DISCONTINUED | OUTPATIENT
Start: 2024-09-24 | End: 2024-09-27

## 2024-09-24 RX ORDER — HYDRALAZINE HYDROCHLORIDE 50 MG/1
50 TABLET, FILM COATED ORAL 2 TIMES DAILY
Status: DISCONTINUED | OUTPATIENT
Start: 2024-09-24 | End: 2024-09-27

## 2024-09-24 NOTE — PHYSICAL THERAPY NOTE
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 Pt with PMH of CVA w/right sided deficits.  Prior to admission, patient's baseline is supervision to mod I at home using a rollator walker.  Patient is currently functioning near baseline with bed mobility, transfers, gait, stair negotiation, and standing prolonged periods.  Patient is requiring minimal to contact guard assist as a result of the following impairments: decreased functional strength, decreased endurance/aerobic capacity, impaired standing balance, impaired coordination, decreased muscular endurance, and medical status.  Physical Therapy will continue to follow for duration of hospitalization and will need 24 hr hands on care at home.    Patient will benefit from continued skilled PT Services at discharge to promote prior level of function and safety with additional support and return home with home health PT.Patient reports her son is very helpful and they have figured things out at home.     PLAN  PT Treatment Plan: Bed mobility;Endurance;Patient education;Family education;Gait training;Strengthening;Transfer training;Stair training  Rehab Potential : Good  Frequency (Obs): 5x/week    PHYSICAL THERAPY MEDICAL/SOCIAL HISTORY   History related to current admission: This is a 69 year oldfemale who presented complaining of slurred speech and right-sided weakness.  Of note, patient with history of CVA in 2021 with deficits of slurred speech and right-sided  weakness.  Patient believes them to be getting worse over the past 1 day prior to admission.  Patient reports she also developed central chest pressure prompting visit to ED for further evaluation.  Patient described her difficulty speaking as being slurred and with difficulty with word finding.  Stuttering more than usual.  Patient stated she was having increased right-sided weakness which led to difficulties with ambulation.  At time of interview, patient reports chest pain resolved.  Denied current shortness of breath.  No associated diaphoresis, palpitations.  Pain was nonradiating.  Patient otherwise denies abdominal pain, nausea vomit, fevers or chills.      Problem List  Principal Problem:    Acute CVA (cerebrovascular accident) ([facility])  Active Problems:    CVA (cerebral vascular accident) ([facility])      HOME SITUATION  Home Situation  Type of Home: House  Home Layout: One level  Stairs to Enter : 4  Railing: Yes  Lives With: Son  Drives: No  Patient Owned Equipment: Rollator  Patient Regularly Uses: Glasses     Prior Level of [place]: Patient lives with her son in a one level home, has a few steps to enter and son helps her. She uses a rollator at home.     SUBJECTIVE  \"My son helps me at home\"    PHYSICAL THERAPY EXAMINATION   OBJECTIVE  Precautions: Limb alert - right  Fall Risk: Standard fall risk    WEIGHT BEARING RESTRICTION  Weight Bearing Restriction: None                PAIN ASSESSMENT  Ratin  Location: no complaints of pain  Management Techniques: Activity promotion    COGNITION  Overall Cognitive Status:  WFL - within functional limits    RANGE OF MOTION AND STRENGTH ASSESSMENT  Upper extremity ROM and strength are within functional limits   Lower extremity ROM is within functional limits   Lower extremity strength is within functional limits but right side weaker than left    BALANCE  Static Sitting: Good  Dynamic Sitting: Fair +  Static Standing: Fair -  Dynamic Standing: Poor    NEUROLOGICAL  FINDINGS        Coordination - Rapid Alternating Movement: Right decreased speed;Right decreased accuracy             ACTIVITY TOLERANCE  Pulse: 65  Heart Rate Source: Monitor     BP: (!) 170/66  BP Location: Right arm  BP Method: Automatic  Patient Position: Sitting    O2 WALK  Oxygen Therapy  SPO2% on Room Air at Rest: 98  SPO2% Ambulation on Room Air: 98    AM-PAC '6-Clicks' INPATIENT SHORT FORM - BASIC MOBILITY  How much difficulty does the patient currently have...  Patient Difficulty: Turning over in bed (including adjusting bedclothes, sheets and blankets)?: A Little   Patient Difficulty: Sitting down on and standing up from a chair with arms (e.g., wheelchair, bedside commode, etc.): A Little   Patient Difficulty: Moving from lying on back to sitting on the side of the bed?: A Little   How much help from another person does the patient currently need...   Help from Another: Moving to and from a bed to a chair (including a wheelchair)?: A Little   Help from Another: Need to walk in hospital room?: A Little   Help from Another: Climbing 3-5 steps with a railing?: A Little     AM-PAC Score:  Raw Score: 18   Approx Degree of Impairment: 46.58%   Standardized Score (AM-PAC Scale): 43.63   CMS Modifier (G-Code): CK    FUNCTIONAL ABILITY STATUS  Functional Mobility/Gait Assessment  Gait Assistance: Contact guard assist;Supervision  Distance (ft): 20'x2  Assistive Device: Rolling walker (pts own rollator)  Pattern: Shuffle;R Foot drag  Rolling: minimal assist  Supine to Sit: minimal assist  Sit to Supine: minimal assist  Sit to Stand: contact guard assist    Exercise/Education Provided:  Bed mobility  Energy conservation  Functional activity tolerated  Gait training  Neuromuscular re-educate  Posture  Transfer training    Skilled Therapy Provided: ODALYS Marcum approves participation in therapy session. Patient presents in bed and agreeable to therapy. Bed is saturated so assisted pt to clean up and clean gown and briefs.  She is able to report plf and reports she feels a bit weaker on the right compared to normal, needing a bit more assist than usual. She is able to walk to bathroom and needs CGA-min A throughout session. Increased time to process directions and respond to questions noted. She does have some drooling but is able to correct with cues, reports she has a suction machine she uses at home for this. She is moving well with rollator, encouraged her to be up and moving with staff to maximize ability to return home. She does have some slowed SHREYA and noted increased right foot drag near end of walks. She will benefit from HHPT. RN is aware of session and mobility.    The patient's Approx Degree of Impairment: 46.58% has been calculated based on documentation in the [facility] '6 clicks' Inpatient Basic Mobility Short Form.  Research supports that patients with this level of impairment may benefit from home with 24 hr care and HHPT and this is the recommendation.  Final disposition will be made by interdisciplinary medical team.    Patient End of Session: Up in chair;Needs met;Call light within reach;RN aware of session/findings;All patient questions and concerns addressed;Alarm set;Discussed recommendations with /    CURRENT GOALS  Goals to be met by: 9/30/24  Patient Goal Patient's self-stated goal is: to go home   Goal #1 Patient is able to demonstrate supine - sit EOB @ level: modified independent     Goal #1   Current Status    Goal #2 Patient is able to demonstrate transfers Sit to/from Stand at assistance level: supervision with rollator     Goal #2  Current Status    Goal #3 Patient is able to ambulate 40 feet with assist device: rollator at assistance level: supervision   Goal #3   Current Status    Goal #4 Patient will negotiate 4 stairs/one curb w/ assistive device and minimal assistance   Goal #4   Current Status    Goal #5 Patient to demonstrate independence with home activity/exercise  instructions provided to patient in preparation for discharge.   Goal #5   Current Status    Goal #6    Goal #6  Current Status      Patient Evaluation Complexity Level:  History High - 3 or more personal factors and/or co-morbidities   Examination of body systems Moderate - addressing a total of 3 or more elements   Clinical Presentation  Moderate - Evolving   Clinical Decision Making  Moderate Complexity     Therapeutic Activity:  23 minutes

## 2024-09-24 NOTE — SLP NOTE
SPEECH DAILY NOTE - INPATIENT    ASSESSMENT & PLAN   ASSESSMENT  PPE REQUIRED. THIS THERAPIST WORE GLOVES AND GOWN FOR DURATION OF EVALUATION. HANDS WASHED UPON ENTRANCE/EXIT.    SLP f/u for ongoing meal assessment per recommendations of regular/thin liquids per BSE. RN reports pt tolerates diet and medication well with no overt clinical s/s aspiration. Pt denies any swallowing challenges.     Pt positioned upright in bed, alert/cooperative. Pt afebrile, tolerating room air with oxygen status 100% prior to the start of oral trials. SLP reviewed aspiration precautions and safe swallowing compensatory strategies with the patient. Safe swallow guidelines remain written on the white board in purple. Patient v/u. Provided 1:1 assistance, pt tolerates hard solids and thin liquids via straw with no overt clinical signs/symptoms of aspiration. Oxygen status remained stable t/o the entire session. Recommend remain on current diet with adherence to aspiration precautions and swallow strategies. No further swallow services warranted at this time. Please re consult if needed. RN alerted with results and recommendations.     MOST RECENT CXR 9/23  CONCLUSION:   1. No acute cardiopulmonary finding.       Diet Recommendations - Solids: Regular  Diet Recommendations - Liquids: Thin Liquids    Compensatory Strategies Recommended: Slow rate;Small bites and sips  Aspiration Precautions: Upright position;Slow rate;Small bites and sips  Medication Administration Recommendations: One pill at a time    Patient Experiencing Pain: No              Treatment Plan  Treatment Plan/Recommendations: No further inpatient SLP service warranted    Interdisciplinary Communication: Discussed with RN  Plan posted at bedside          GOALS  Goal #1 The patient will tolerate regular solid consistency and thin liquids without overt signs or symptoms of aspiration with 100 % accuracy over 1-2 session(s).  Goal Met 9/24   Goal #2 The patient/family/caregiver  will demonstrate understanding and implementation of aspiration precautions and swallow strategies independently over 2 session(s).    Goal Met 9/24     FOLLOW UP  Follow Up Needed (Documentation Required): No  SLP Follow-up Date: 09/24/24  Number of Visits to Meet Established Goals: 1    Session: 1    If you have any questions, please contact ALICIA Gaitan M.S. CCC-SLP  Speech Language Pathologist  Phone Number [phone]

## 2024-09-24 NOTE — PLAN OF CARE
Problem: CARDIOVASCULAR - ADULT  Goal: Maintains optimal cardiac output and hemodynamic stability  Description: INTERVENTIONS:  - Monitor vital signs, rhythm, and trends  - Monitor for bleeding, hypotension and signs of decreased cardiac output  - Evaluate effectiveness of vasoactive medications to optimize hemodynamic stability  - Monitor arterial and/or venous puncture sites for bleeding and/or hematoma  - Assess quality of pulses, skin color and temperature  - Assess for signs of decreased coronary artery perfusion - ex. Angina  - Evaluate fluid balance, assess for edema, trend weights  Outcome: Progressing  Goal: Absence of cardiac arrhythmias or at baseline  Description: INTERVENTIONS:  - Continuous cardiac monitoring, monitor vital signs, obtain 12 lead EKG if indicated  - Evaluate effectiveness of antiarrhythmic and heart rate control medications as ordered  - Initiate emergency measures for life threatening arrhythmias  - Monitor electrolytes and administer replacement therapy as ordered  Outcome: Progressing     Problem: Patient/Family Goals  Goal: Patient/Family Long Term Goal  Description: Patient's Long Term Goal: to go back home    Interventions:  - follow medical regimen  - See additional Care Plan goals for specific interventions  Outcome: Progressing  Goal: Patient/Family Short Term Goal  Description: Patient's Short Term Goal: slurred speech    Interventions:   - follow medical regimen  - See additional Care Plan goals for specific interventions  Outcome: Progressing     Problem: PAIN - ADULT  Goal: Verbalizes/displays adequate comfort level or patient's stated pain goal  Description: INTERVENTIONS:  - Encourage pt to monitor pain and request assistance  - Assess pain using appropriate pain scale  - Administer analgesics based on type and severity of pain and evaluate response  - Implement non-pharmacological measures as appropriate and evaluate response  - Consider cultural and social influences  on pain and pain management  - Manage/alleviate anxiety  - Utilize distraction and/or relaxation techniques  - Monitor for opioid side effects  - Notify MD/LIP if interventions unsuccessful or patient reports new pain  - Anticipate increased pain with activity and pre-medicate as appropriate  Outcome: Progressing     Problem: Patient Centered Care  Goal: Patient preferences are identified and integrated in the patient's plan of care  Description: Interventions:  - What would you like us to know as we care for you? From home with son  - Provide timely, complete, and accurate information to patient/family  - Incorporate patient and family knowledge, values, beliefs, and cultural backgrounds into the planning and delivery of care  - Encourage patient/family to participate in care and decision-making at the level they choose  - Honor patient and family perspectives and choices  Outcome: Progressing     Problem: Diabetes/Glucose Control  Goal: Glucose maintained within prescribed range  Description: INTERVENTIONS:  - Monitor Blood Glucose as ordered  - Assess for signs and symptoms of hyperglycemia and hypoglycemia  - Administer ordered medications to maintain glucose within target range  - Assess barriers to adequate nutritional intake and initiate nutrition consult as needed  - Instruct patient on self management of diabetes  Outcome: Progressing   A&ox3, neurochecks unchanged. Cpap overnight. SR on tele. Purewick in place. IVF and abx as ordered. MRI done overnight results pending. All needs met at this time.

## 2024-09-24 NOTE — PLAN OF CARE
Med rec completed. Patient is A&Ox 3/4, room air, complete assist. R facial droop & slurred speech is her baseline. Plan for PT/OT & MRI. Call light within reach and fall precautions in place.     Problem: CARDIOVASCULAR - ADULT  Goal: Maintains optimal cardiac output and hemodynamic stability  Description: INTERVENTIONS:  - Monitor vital signs, rhythm, and trends  - Monitor for bleeding, hypotension and signs of decreased cardiac output  - Evaluate effectiveness of vasoactive medications to optimize hemodynamic stability  - Monitor arterial and/or venous puncture sites for bleeding and/or hematoma  - Assess quality of pulses, skin color and temperature  - Assess for signs of decreased coronary artery perfusion - ex. Angina  - Evaluate fluid balance, assess for edema, trend weights  Outcome: Progressing  Goal: Absence of cardiac arrhythmias or at baseline  Description: INTERVENTIONS:  - Continuous cardiac monitoring, monitor vital signs, obtain 12 lead EKG if indicated  - Evaluate effectiveness of antiarrhythmic and heart rate control medications as ordered  - Initiate emergency measures for life threatening arrhythmias  - Monitor electrolytes and administer replacement therapy as ordered  Outcome: Progressing     Problem: Patient/Family Goals  Goal: Patient/Family Long Term Goal  Description: Patient's Long Term Goal: to go back home    Interventions:  - follow medical regimen  - See additional Care Plan goals for specific interventions  Outcome: Progressing  Goal: Patient/Family Short Term Goal  Description: Patient's Short Term Goal: slurred speech    Interventions:   - follow medical regimen  - See additional Care Plan goals for specific interventions  Outcome: Progressing     Problem: PAIN - ADULT  Goal: Verbalizes/displays adequate comfort level or patient's stated pain goal  Description: INTERVENTIONS:  - Encourage pt to monitor pain and request assistance  - Assess pain using appropriate pain scale  -  Administer analgesics based on type and severity of pain and evaluate response  - Implement non-pharmacological measures as appropriate and evaluate response  - Consider cultural and social influences on pain and pain management  - Manage/alleviate anxiety  - Utilize distraction and/or relaxation techniques  - Monitor for opioid side effects  - Notify MD/LIP if interventions unsuccessful or patient reports new pain  - Anticipate increased pain with activity and pre-medicate as appropriate  Outcome: Progressing

## 2024-09-24 NOTE — PLAN OF CARE
Continuing Q4 neuro's & NIH daily. Plan to go home with  services and once medically cleared. Electrolytes replaced. Call light within reach and fall precautions in place.     Problem: CARDIOVASCULAR - ADULT  Goal: Maintains optimal cardiac output and hemodynamic stability  Description: INTERVENTIONS:  - Monitor vital signs, rhythm, and trends  - Monitor for bleeding, hypotension and signs of decreased cardiac output  - Evaluate effectiveness of vasoactive medications to optimize hemodynamic stability  - Monitor arterial and/or venous puncture sites for bleeding and/or hematoma  - Assess quality of pulses, skin color and temperature  - Assess for signs of decreased coronary artery perfusion - ex. Angina  - Evaluate fluid balance, assess for edema, trend weights  Outcome: Progressing  Goal: Absence of cardiac arrhythmias or at baseline  Description: INTERVENTIONS:  - Continuous cardiac monitoring, monitor vital signs, obtain 12 lead EKG if indicated  - Evaluate effectiveness of antiarrhythmic and heart rate control medications as ordered  - Initiate emergency measures for life threatening arrhythmias  - Monitor electrolytes and administer replacement therapy as ordered  Outcome: Progressing     Problem: Patient/Family Goals  Goal: Patient/Family Long Term Goal  Description: Patient's Long Term Goal: to go back home    Interventions:  - follow medical regimen  - See additional Care Plan goals for specific interventions  Outcome: Progressing  Goal: Patient/Family Short Term Goal  Description: Patient's Short Term Goal: slurred speech    Interventions:   - follow medical regimen  - See additional Care Plan goals for specific interventions  Outcome: Progressing     Problem: PAIN - ADULT  Goal: Verbalizes/displays adequate comfort level or patient's stated pain goal  Description: INTERVENTIONS:  - Encourage pt to monitor pain and request assistance  - Assess pain using appropriate pain scale  - Administer analgesics  based on type and severity of pain and evaluate response  - Implement non-pharmacological measures as appropriate and evaluate response  - Consider cultural and social influences on pain and pain management  - Manage/alleviate anxiety  - Utilize distraction and/or relaxation techniques  - Monitor for opioid side effects  - Notify MD/LIP if interventions unsuccessful or patient reports new pain  - Anticipate increased pain with activity and pre-medicate as appropriate  Outcome: Progressing     Problem: Patient Centered Care  Goal: Patient preferences are identified and integrated in the patient's plan of care  Description: Interventions:  - What would you like us to know as we care for you? From home with son  - Provide timely, complete, and accurate information to patient/family  - Incorporate patient and family knowledge, values, beliefs, and cultural backgrounds into the planning and delivery of care  - Encourage patient/family to participate in care and decision-making at the level they choose  - Honor patient and family perspectives and choices  Outcome: Progressing     Problem: Diabetes/Glucose Control  Goal: Glucose maintained within prescribed range  Description: INTERVENTIONS:  - Monitor Blood Glucose as ordered  - Assess for signs and symptoms of hyperglycemia and hypoglycemia  - Administer ordered medications to maintain glucose within target range  - Assess barriers to adequate nutritional intake and initiate nutrition consult as needed  - Instruct patient on self management of diabetes  Outcome: Progressing     Problem: NEUROLOGICAL - ADULT  Goal: Achieves stable or improved neurological status  Description: INTERVENTIONS  - Assess for and report changes in neurological status  - Initiate measures to prevent increased intracranial pressure  - Maintain blood pressure and fluid volume within ordered parameters to optimize cerebral perfusion and minimize risk of hemorrhage  - Monitor temperature, glucose,  and sodium. Initiate appropriate interventions as ordered  Outcome: Progressing  Goal: Achieves maximal functionality and self care  Description: INTERVENTIONS  - Monitor swallowing and airway patency with patient fatigue and changes in neurological status  - Encourage and assist patient to increase activity and self care with guidance from PT/OT  - Encourage visually impaired, hearing impaired and aphasic patients to use assistive/communication devices  Outcome: Progressing

## 2024-09-24 NOTE — DISCHARGE INSTRUCTIONS
Sometimes managing your health at home requires assistance.  The [facility] team has recognized your preference to use [facility], Phone: [phone] or Fax: [phone]. A representative from the home health agency will contact you or your family to schedule your first visit.       SCHEDULE MRI PELVIS WITH YOUR PCP (your ultrasound showed Indeterminate complex cystic lesion at the left lateral aspect of the bladder wall. This may be adnexal in etiology.)    SCHEDULE APPOINTMENT WITH YOUR EYE DOCTOR FOR FULL EYE EXAM FOLLOWING STROKE    CHECK YOUR BLOOD PRESSURE DAILY AND WHEN NOT FEELING WELL,   WRITE IT DOWN TO CREATE A LOG AND BRING TO YOUR PCP FOR REVIEW    IF ANY QUESTIONS OR CONCERNS, CALL YOUR PCP FOR ADVICE

## 2024-09-24 NOTE — OCCUPATIONAL THERAPY NOTE
OCCUPATIONAL THERAPY EVALUATION - INPATIENT     Room Number: 309/309-A  Evaluation Date: 9/24/2024  Type of Evaluation: Initial  Presenting Problem: slurred speech and RT sided weakness  Physician Order: IP Consult to Occupational Therapy  Reason for Therapy: ADL/IADL Dysfunction and Discharge Planning    OCCUPATIONAL THERAPY ASSESSMENT   Patient is a 69 year old female admitted 9/23/2024 for RT sided weakness and slurred speech. Pt dx with UTI, imaging (-).   Prior to admission, patient's baseline is PRN assist for ADls and functional mobility using 3 wheeled rollator.  Patient is currently functioning near baseline. Patient is requiring minimal assist as a result of the following impairments: decreased functional strength, impaired coordination, decreased muscular endurance, cognitive deficits (benefits from increased time to process), and decreased safety awareness. Occupational Therapy will continue to follow for duration of hospitalization.    Patient will benefit from continued skilled OT Services at discharge to promote prior level of function and safety with additional support and return home with home health OT    PLAN  OT Treatment Plan: ADL training;Functional transfer training;Patient/Family education;Patient/Family training     OCCUPATIONAL THERAPY MEDICAL/SOCIAL HISTORY   Problem List  Principal Problem:    Acute CVA (cerebrovascular accident) ([facility])  Active Problems:    CVA (cerebral vascular accident) ([facility])    HOME SITUATION  Home Layout: One level  Lives With: Son  Other Equipment: -- (3 Wheeled rollator)  Hand Dominance: Right  Drives: No  Patient Regularly Uses: Glasses      Prior Level of [place]:  Prior to admission, patient's baseline is PRN assist for ADls and functional mobility using 3 wheeled rollator.     SUBJECTIVE  \"My son helps me\"    OCCUPATIONAL THERAPY EXAMINATION    OBJECTIVE  Precautions: Limb alert - right  Fall Risk: Standard fall risk    PAIN ASSESSMENT  Rating:  0    ACTIVITY TOLERANCE  Performs all requested in room tasks without c/o pain, no SOB    COGNITION  Pt is alert, consistently following commands  Pt benefits from increased time to process verbal instruction  Pt benefits from repeated cues to carry over instruction    VISION  Pt denies deficits, no deficits observed    Communication: slurred speech- improves when cues to clear mouth from excess saliva     Behavioral/Emotional/Social: pleasant and cooperative    RANGE OF MOTION   Pt demo RAFA UE AROM and strength to WFL; note mild increased tone RT UE/LE    COORDINATION  Gross Motor: mild impaired RT  Fine Motor: mild impaired RT    ACTIVITIES OF DAILY LIVING ASSESSMENT  AM-PAC ‘6-Clicks’ Inpatient Daily Activity Short Form  How much help from another person does the patient currently need…  -   Putting on and taking off regular lower body clothing?: A Little  -   Bathing (including washing, rinsing, drying)?: A Little  -   Toileting, which includes using toilet, bedpan or urinal? : A Lot  -   Putting on and taking off regular upper body clothing?: A Little  -   Taking care of personal grooming such as brushing teeth?: A Little  -   Eating meals?: A Little    AM-PAC Score:  Score: 17  Approx Degree of Impairment: 50.11%  Standardized Score (AM-PAC Scale): 37.26  CMS Modifier (G-Code): CK    FUNCTIONAL TRANSFER ASSESSMENT  Sit to Stand: Edge of Bed; Chair  Edge of Bed: Contact Guard Assist  Chair: Contact Guard Assist    BED MOBILITY  Rolling: Not Tested  Supine to Sit : Minimal Assist  Sit to Supine (OT): Not Tested    FUNCTIONAL ADL ASSESSMENT  Provide set up fr simple feeling task  Provide set up from sitting for [facility]   Provide Min A for UE and Min to Mod A for LE dressing  Toileting: Mod A; pt incontinent of urine with external catheter out of place      Skilled Therapy Provided: Pt received in sitting up in bed, no visitors present. Pt is pleasant and cooperative. Observe RT sided facial droop with mild rt sided  increased tone. Pt tends to hold saliva in RT side of mouth, able to clear when cues. Pt reports she uses a \"spit cup\" or in home suction PTA Provide CGA/Min A for bed mobility, functional xfers, benefiting from repeated cues. Provide CGA/close SBA for short distance BR mobility using 3 wheeled rollator.     EDUCATION PROVIDED  Patient: Role of Occupational Therapy; Discharge Recommendations; Fall Prevention; Posture/Positioning  Patient's Response to Education: Verbalized Understanding (would benefit from CG included in education)    The patient's Approx Degree of Impairment: 50.11% has been calculated based on documentation in the [facility] '6 clicks' Inpatient Daily Activity Short Form.  Research supports that patients with this level of impairment may benefit from ALICIA denise pt is from home where she was recieiving PRN assist from her son and anticipate she is performing near her baseline. Pt and her CG son may benefit from HHOT/PT services upon discharge.  Final disposition will be made by interdisciplinary medical team.     Patient End of Session: Up in chair;Needs met;Call light within reach;Alarm set;All patient questions and concerns addressed    OT Goals  Patients self stated goal is: pt is hoping to get home     Patient will complete functional transfer with SBA  Comment:     Patient will complete toileting with SBA  Comment:     Patient will tolerate standing for 3 minutes in prep for adls with SBA   Comment:    Patient will complete UE/LE dressing with set up and supervision  Comment:          Goals  on: 10/4/24  Frequency: 3x/week    Patient Evaluation Complexity Level:   Occupational Profile/Medical History MODERATE - Expanded review of history including review of medical or therapy record   Specific performance deficits impacting engagement in ADL/IADL MODERATE  3 - 5 performance deficits   Client Assessment/Performance Deficits MODERATE - Comorbidities and min to mod modifications of tasks     Clinical Decision Making MODERATE - Analysis of occupational profile, detailed assessments, several treatment options    Overall Complexity MODERATE     Self-Care Home Management: 15 minutes

## 2024-09-24 NOTE — CM/SW NOTE
09/24/24 1000   CM/SW Referral Data   Referral Source    Reason for Referral Discharge planning   Informant Patient;Son   Medical Hx   Does patient have an established PCP? Yes  (Yao Rodriguez)   Patient Info   Patient's Current Mental Status at Time of Assessment Alert;Oriented;Memory Impairments   Patient's Home Environment House   Number of Levels in Home 1   Patient lives with Son   Patient Status Prior to Admission   Independent with ADLs and Mobility No   Pt. requires assistance with Housework;Driving;Meals;Finances;Medications;Bathing   Services in place prior to admission Home Health Care;DME/Supplies at home   Home Health Provider Info [facility]  (RN and PT)   Type of DME/Supplies Wheeled Walker   Discharge Needs   Anticipated D/C needs To be determined     Pt discussed during nursing rounds. Dxs r/o CVA, chest pain, hx CVA, right facial droop and slurred speech at baseline. Home w/son who assists patient w/ADLs, meals, transport, etc. Current w/[facility] for RN and PT services. HH referral sent to [facility]  in [facility], RICKY entered. PT/OT evals scheduled later today.    1655: Anticipated therapy need: Home with Home Healthcare. New F2F entered, OT added to current RN and PT services. Wednesday  will need to send signed F2F in [facility] once available.    Plan: Home w/son with [facility] pending medical clearance.    / to remain available for support and/or discharge planning.     ZOË Caldwell    [phone]

## 2024-09-24 NOTE — CONSULTS
[facility]  [address] [place] [address], [address]  [facility] [address]  [phone]            Eliud Fine Patient Status:  Inpatient    1955 MRN [id number]   Location [facility] 3W/SW Attending Magdalena Moran MD   Hosp Day # 1 PCP Yao Rodriguez     Date of Admission:  2024  Date of Consult:  2024  Reason for Consultation:     Weakness  History of Present Illness:   Patient is a 69 year old female who was admitted to the hospital for Acute CVA (cerebrovascular accident) (HCC):    History was obtained from the patient herself as well as from the medical record.  She is admitted for slurred speech and right-sided weakness.  Her past medical history significant for ischemic stroke in the left basal ganglia back in  and residual dysarthria and right-sided weakness.  Couple of days ago her symptoms started to recur, she started experiencing more slurred speech and felt that the right upper and lower extremities were weaker than usual.  She also complained of chest pain that has not resolved.  She told me that she has no symptoms on the left side, there was no twitching or jerking on her face upper or lower extremities, she did not lose consciousness.  Upon arrival to the emergency department a CT scan of the head was obtained still any acute intracranial abnormalities.  CT angiogram of the head and neck reveal any acute intracranial or extracranial stenosis.   MRI brain was also obtained that revealed chronic microvascular ischemic disease, no acute strokes.  She has urinary tract infection and is currently on antibiotics.  Her blood pressure is elevated 160-180 systolic.  She told me she is taking aspirin and Plavix though when I looked in her medication bag, she had no aspirin and apparently has not been taking it and her Plavix medication bottle was empty        Past Medical History  Past Medical History:    Asthma (HCC)    Coronary  atherosclerosis    Diabetes (HCC)    diabetes for 2 yrs    Essential hypertension    Glaucoma    right    Heart attack (HCC)    2005    High blood pressure    High cholesterol    Hyperlipidemia    Osteoarthritis    Sleep apnea    cpap    Stroke (HCC)    30 yrs ago    Visual impairment    left eye edema       Past Surgical History  Past Surgical History:   Procedure Laterality Date    Anesth,vitrectomy Left 09/18/2017    Pars plana vitrectomy, internal limiting membrane peel, left eye.    Cath bare metal stent (bms)      Colonoscopy      Hysterectomy      Total abdom hysterectomy      Tubal ligation         Family History  Family History   Problem Relation Age of Onset    Cancer Father     Diabetes Mother        Social History  Pediatric History   Patient Parents    Not on file     Other Topics Concern    Not on file   Social History Narrative    Not on file           Current Medications:  Current Facility-Administered Medications   Medication Dose Route Frequency    brimonidine (Alphagan) 0.2 % ophthalmic solution 1 drop  1 drop Both Eyes BID    carvedilol (Coreg) tab 25 mg  25 mg Oral BID with meals    hydrALAZINE (Apresoline) tab 50 mg  50 mg Oral BID    spironolactone (Aldactone) tab 25 mg  25 mg Oral Daily    clopidogrel (Plavix) tab 75 mg  75 mg Oral Daily    ceFAZolin (Ancef) 1 g in dextrose 5% 100mL IVPB-ADD  1 g Intravenous Q12H    glucose (Dex4) 15 GM/59ML oral liquid 15 g  15 g Oral Q15 Min PRN    Or    glucose (Glutose) 40% oral gel 15 g  15 g Oral Q15 Min PRN    Or    glucose-vitamin C (Dex-4) chewable tab 4 tablet  4 tablet Oral Q15 Min PRN    Or    dextrose 50% injection 50 mL  50 mL Intravenous Q15 Min PRN    Or    glucose (Dex4) 15 GM/59ML oral liquid 30 g  30 g Oral Q15 Min PRN    Or    glucose (Glutose) 40% oral gel 30 g  30 g Oral Q15 Min PRN    Or    glucose-vitamin C (Dex-4) chewable tab 8 tablet  8 tablet Oral Q15 Min PRN    labetalol (Trandate) 5 mg/mL injection 10 mg  10 mg Intravenous Q10  Min PRN    hydrALAzine (Apresoline) 20 mg/mL injection 10 mg  10 mg Intravenous Q2H PRN    ondansetron (Zofran) 4 MG/2ML injection 4 mg  4 mg Intravenous Q6H PRN    heparin (Porcine) 5000 UNIT/ML injection 5,000 Units  5,000 Units Subcutaneous 2 times per day    acetaminophen (Tylenol Extra Strength) tab 500 mg  500 mg Oral Q4H PRN    acetaminophen (Tylenol) tab 650 mg  650 mg Oral Q4H PRN    Or    HYDROcodone-acetaminophen (Norco) 5-325 MG per tab 1 tablet  1 tablet Oral Q4H PRN    Or    HYDROcodone-acetaminophen (Norco) 5-325 MG per tab 2 tablet  2 tablet Oral Q4H PRN    atorvastatin (Lipitor) tab 80 mg  80 mg Oral Nightly    aspirin chewable tab 81 mg  81 mg Oral Daily    insulin aspart (NovoLOG) 100 Units/mL FlexPen 1-5 Units  1-5 Units Subcutaneous TID CC and HS     Medications Prior to Admission   Medication Sig    hydrALAZINE 50 MG Oral Tab Take 1 tablet (50 mg total) by mouth every 8 (eight) hours. (Patient taking differently: Take 1 tablet (50 mg total) by mouth in the morning and 1 tablet (50 mg total) before bedtime.)    spironolactone 25 MG Oral Tab Take 1 tablet (25 mg total) by mouth daily.    NAMZARIC 28-10 MG Oral Capsule SR 24 Hr Take 28 mg by mouth daily.    atorvastatin 20 MG Oral Tab Take 2 tablets (40 mg total) by mouth nightly.    Clopidogrel Bisulfate 75 MG Oral Tab Take 1 tablet (75 mg total) by mouth daily.    MetFORMIN HCl 500 MG Oral Tab Take 1 tablet (500 mg total) by mouth daily with breakfast.    carvedilol 25 MG Oral Tab Take 1 tablet (25 mg total) by mouth 2 (two) times daily with meals.    brimonidine Tartrate 0.2 % Ophthalmic Solution Place 1 drop into both eyes 2 (two) times daily.    amLODIPine 10 MG Oral Tab Take 1 tablet (10 mg total) by mouth daily. (Patient not taking: Reported on 9/23/2024)    losartan 50 MG Oral Tab Take 1 tablet (50 mg total) by mouth daily. (Patient not taking: Reported on 9/23/2024)       Allergies  No Known Allergies    Review of Systems:   As in HPI,  the rest of the 14 system review was done and was negative    Physical Exam:     Vitals:    09/23/24 2116 09/24/24 0109 09/24/24 0400 09/24/24 0800   BP: (!) 175/66 (!) 162/63 (!) 186/64 (!) 175/89   Pulse: 78 69 64 69   Resp: 18 16 18 18   Temp: 98.7 °F (37.1 °C) 97.7 °F (36.5 °C) 98.7 °F (37.1 °C) 98.6 °F (37 °C)   TempSrc: Oral Oral Oral Oral   SpO2: 97% 100% 98% 99%   Weight:       Height:         Neurologic:  Alert attentive and she follows simple and complex commands.  Her speech is moderately dysarthric, she was able to name objects and repeat.  Her visual field is full to confrontation test and her extraocular movements are intact, she has right-sided facial droop, she is weak in right upper and right lower extremity and mildly spastic.  She had 3+ symmetric reflexes  Decreased sensation in right lower extremity compared to the left.  She is ataxic in her right upper extremity    Results:     Laboratory Data:  Lab Results   Component Value Date    WBC 3.8 (L) 09/23/2024    HGB 10.8 (L) 09/23/2024    HCT 34.1 (L) 09/23/2024    .0 09/23/2024    CREATSERUM 1.53 (H) 09/24/2024    BUN 21 09/24/2024     09/24/2024    K 3.7 09/24/2024     (H) 09/24/2024    CO2 24.0 09/24/2024    GLU 94 09/24/2024    CA 9.0 09/24/2024    ALB 3.3 09/24/2024    ALKPHO 67 09/24/2024    TP 5.7 09/24/2024    AST 18 09/24/2024    ALT 16 09/24/2024    PTT 27.8 09/23/2024    INR 1.11 09/23/2024    PTP 15.0 (H) 09/23/2024    DDIMER 2.40 (H) 10/11/2022    ESRML 37 (H) 12/18/2022    CRP 0.31 (H) 12/18/2022    MG 1.5 (L) 09/24/2024    PHOS 2.6 10/01/2023    CK 79 03/24/2024         Imaging:    MRI BRAIN (CPT=70551)    Result Date: 9/24/2024  CONCLUSION:   Severe chronic microvascular ischemic disease without acute intracranial abnormality.   Multiple chronic foci of hemosiderin deposition seen involving the bilateral cerebral hemispheres consistent with remote microhemorrhages.  This is secondary to chronic microangiopathy  and can be seen with amyloid.  No acute hemorrhage.  Preliminary report was submitted by the [facility] teleradiologist and there are no significant discrepancies.  Dictated by (CST): Eugenio Espitia MD on 9/24/2024 at 8:44 AM     Finalized by (CST): Eugenio Espitia MD on 9/24/2024 at 8:51 AM          XR CHEST AP PORTABLE  (CPT=71045)    Result Date: 9/23/2024  CONCLUSION:  1. No acute cardiopulmonary finding.    Dictated by (CST): Alexis Rabago MD on 9/23/2024 at 12:27 PM     Finalized by (CST): Alexis Rabago MD on 9/23/2024 at 12:28 PM          CT STROKE CTA BRAIN/CTA NECK (W IV)(CPT=70496/00565)    Result Date: 9/23/2024  CONCLUSION:  1. CTA head: No high-grade stenosis, occlusion, or gross aneurysm in the anterior or posterior circulation. 2. CTA neck: No high-grade stenosis, occlusion, or dissection of the carotids or vertebrals.    Dictated by (CST): Brett Berumen MD on 9/23/2024 at 11:32 AM     Finalized by (CST): Brett Berumen MD on 9/23/2024 at 11:37 AM          CT STROKE BRAIN (NO IV)(CPT=70450)    Result Date: 9/23/2024  CONCLUSION:  Moderate chronic microvascular ischemic disease without acute intracranial abnormality.  If there is high clinical suspicion for acute ischemia, MRI brain should be obtained.  This report was called immediately at 1127 hours to emergency department and discussed with Dr. Gallagher.     Dictated by (CST): Eugenio Espitia MD on 9/23/2024 at 11:24 AM     Finalized by (CST): Eugenio Espitia MD on 9/23/2024 at 11:27 AM         EKG 12 Lead    Result Date: 9/23/2024  Sinus rhythm with 1st degree A-V block Minimal voltage criteria for LVH, may be normal variant ( Byron product ) Nonspecific T wave abnormality Abnormal ECG When compared with ECG of 20-MAR-2024 08:00, No significant change was found Confirmed by RADHA PETTIT ([id number]) on 9/23/2024 3:45:55 PM       Impression:     Recrudescence of her old left basal ganglia stroke symptoms in setting of UTI    I personally reviewed MRI brain, there are no  acute strokes.  She has old left basal ganglia infarct.  She also has urinary tract infection currently and is on antibiotics        Recommendations:  1-continue dual antiplatelet therapy with aspirin 81 mg and Plavix 75 mg  2-continue atorvastatin 80 mg  3-treat urinary tract infection    Neurology signing off    Thank you for allowing me to participate in the care of your patient.    Shaun Medel MD

## 2024-09-24 NOTE — PAYOR COMM NOTE
ADMISSION REVIEW     Payor: [facility] [place]  Subscriber #:  [id number]  Authorization Number: [id number]    Admit date: 24  Admit time:  1:25 PM       REVIEW DOCUMENTATION:     ED Provider Notes        ED Provider Notes signed by Edmund Gallagher MD at 2024  4:45 PM       Author: Edmund Gallagher MD Service: -- Author Type: Physician    Filed: 2024  4:45 PM Date of Service: 2024  4:42 PM Status: Signed    : Edmund Gallagher MD (Physician)           Patient Seen in: [facility] 3w      History     Chief Complaint   Patient presents with    Stroke     Stated Complaint: Stroke Symptoms    Subjective:   HPI        Objective:   Past Medical History:    Asthma (HCC)    Coronary atherosclerosis    Diabetes (HCC)    diabetes for 2 yrs    Essential hypertension    Glaucoma    right    Heart attack (HCC)        High blood pressure    High cholesterol    Hyperlipidemia    Osteoarthritis    Sleep apnea    cpap    Stroke (HCC)    30 yrs ago    Visual impairment    left eye edema              Past Surgical History:   Procedure Laterality Date    Anesth,vitrectomy Left 2017    Pars plana vitrectomy, internal limiting membrane peel, left eye.    Cath bare metal stent (bms)      Colonoscopy      Hysterectomy      Total abdom hysterectomy      Tubal ligation                  Social History     Socioeconomic History    Marital status: Single   Tobacco Use    Smoking status: Former     Current packs/day: 0.00     Types: Cigarettes     Start date: 1976     Quit date: 1980     Years since quittin.0    Smokeless tobacco: Never   Vaping Use    Vaping status: Never Used   Substance and Sexual Activity    Alcohol use: No    Drug use: No     Social Determinants of Health     Food Insecurity: No Food Insecurity (2024)    Food Insecurity     Food Insecurity: Never true   Transportation Needs: No Transportation Needs (2024)    Transportation Needs     Lack of Transportation: No    Housing Stability: Low Risk  (7/8/2024)    Housing Stability     Housing Instability: No              Review of Systems    Positive for stated Chief Complaint: Stroke    Other systems are as noted in HPI.  Constitutional and vital signs reviewed.      All other systems reviewed and negative except as noted above.    Physical Exam     ED Triage Vitals   BP 09/23/24 1059 (!) 174/67   Pulse 09/23/24 1059 69   Resp 09/23/24 1059 16   Temp 09/23/24 1059 97.1 °F (36.2 °C)   Temp src 09/23/24 1325 Oral   SpO2 09/23/24 1059 98 %   O2 Device 09/23/24 1059 None (Room air)       Current Vitals:   Vital Signs  BP: (!) 182/58  Pulse: 64  Resp: 18  Temp: 98.4 °F (36.9 °C)  Temp src: Oral  MAP (mmHg): 88    Oxygen Therapy  SpO2: 100 %  O2 Device: None (Room air)            Physical Exam          ED Course     Labs Reviewed   CBC WITH DIFFERENTIAL WITH PLATELET - Abnormal; Notable for the following components:       Result Value    WBC 3.8 (*)     HGB 10.8 (*)     HCT 34.1 (*)     MCH 25.8 (*)     RDW-SD 51.5 (*)     RDW 17.3 (*)     All other components within normal limits   COMP METABOLIC PANEL (14) - Abnormal; Notable for the following components:    Glucose 100 (*)     Sodium 149 (*)     Chloride 114 (*)     BUN 24 (*)     Creatinine 2.01 (*)     Calculated Osmolality 312 (*)     eGFR-Cr 26 (*)     All other components within normal limits   PROTHROMBIN TIME (PT) - Abnormal; Notable for the following components:    PT 15.0 (*)     All other components within normal limits   URINALYSIS WITH CULTURE REFLEX - Abnormal; Notable for the following components:    Nitrite Urine 2+ (*)     Leukocyte Esterase Urine 250 (*)     WBC Urine 11-20 (*)     Bacteria Urine 1+ (*)     Squamous Epi. Cells Few (*)     Hyaline Casts Present (*)     All other components within normal limits   LIPID PANEL - Abnormal; Notable for the following components:    HDL Cholesterol 33 (*)     All other components within normal limits   TROPONIN I HIGH  SENSITIVITY - Normal   PTT, ACTIVATED - Normal   POCT GLUCOSE - Normal   RAINBOW DRAW BLUE   RAINBOW DRAW GOLD   URINE CULTURE, ROUTINE     EKG    Rate, intervals and axes as noted on EKG Report.  Rate: 64  Rhythm: Sinus Rhythm  Reading: Normal sinus rhythm without signs of acute ischemia or abnormal intervals.             TNK/ NI Documentation:    Date/Time last known well:   N/A    NIHSS on presentation: 5     Chief Complaint   Patient presents with    Stroke     IV Tenecteplase (TNK) administered: No; Patient is not a Candidate for IV TNK due to: Out of time window period or time of onset unknown    Candidate for Endovascular thrombectomy (EVT): No; Patient is not a candidate for Endovascular Thrombectomy due to: No large vessel occlusion ( LVO)  on CTA/MRA imaging      Disposition: Admit        MDM      69-year-old female with history of hypertension and diabetes presents today with slurred speech and right-sided weakness.  Per her son, who provides most of the history, symptoms started about 23 hours prior to arrival.  At that time, noticing primarily some speech slurring.  They deny recent illness, medication changes, abnormal ingestions, or other concomitant symptoms.    On exam, hypertensive 170s over 60s, right facial droop, some speech slurring, right-sided weakness primarily in the right lower extremity.  Abdomen benign.  Lungs clear.    Differential: CVA, ICH, sepsis    Patient evaluated as a stroke code.  Noncontrast head CT negative for bleed.  Patient not a candidate for TNK or endovascular intervention.     Lab workup also significant for PAPO for which she was given IV saline and likely UTI for which she was given ceftriaxone.    Neurology consulted and recommends admission for MRI and stroke workup.  Admission disposition: 9/23/2024 11:38 AM         I spent a total of 40 minutes of critical care time in obtaining history, performing a physical exam, bedside monitoring of interventions, collecting  and interpreting tests and discussion with consultants but not including time spent performing procedures.         MDM    Disposition and Plan     Clinical Impression:  1. Acute CVA (cerebrovascular accident) (HCC)         Disposition:  Admit  9/23/2024 11:38 am    Follow-up:  No follow-up provider specified.  We recommend that you schedule follow up care with a primary care provider within the next three months to obtain basic health screening including reassessment of your blood pressure.      Hospital Problems       Present on Admission             ICD-10-CM Noted POA    * (Principal) Acute CVA (cerebrovascular accident) (HCC) I63.9 9/23/2024 Unknown    CVA (cerebral vascular accident) (HCC) I63.9 9/23/2024 Unknown                     Signed by Edmund Gallagher MD on 9/23/2024  4:45 PM         MEDICATIONS ADMINISTERED IN LAST 1 DAY:  aspirin chewable tab 81 mg       Date Action Dose Route User    9/23/2024 1444 Given 81 mg Oral Rae Mendez RN          atorvastatin (Lipitor) tab 80 mg       Date Action Dose Route User    9/23/2024 2300 Given 80 mg Oral Sara Lacey RN          ceFAZolin (Ancef) 1 g in dextrose 5% 100mL IVPB-ADD       Date Action Dose Route User    9/24/2024 0602 New Bag 1 g Intravenous Sara Lacey RN          cefTRIAXone (Rocephin) 2 g in sodium chloride 0.9% 100 mL IVPB-ADDV       Date Action Dose Route User    9/23/2024 1303 New Bag 2 g Intravenous Lexi Vu RN          clopidogrel (Plavix) tab 75 mg       Date Action Dose Route User    9/23/2024 1444 Given 75 mg Oral Rae Mendez RN          heparin (Porcine) 5000 UNIT/ML injection 5,000 Units       Date Action Dose Route User    9/23/2024 2300 Given 5,000 Units Subcutaneous (Left Upper Abdomen) Sara Lacey RN          iopamidol 76% (ISOVUE-370) injection for power injector       Date Action Dose Route User    9/23/2024 1127 Given 65 mL Intravenous Frauli, [place] L          sodium chloride 0.9% infusion        Date Action Dose Route User    9/23/2024 1800 New Bag (none) Intravenous Trixie George, RN    9/23/2024 1441 New Bag (none) Intravenous Rae Mendez, RN          sodium chloride 0.9 % IV bolus 1,000 mL       Date Action Dose Route User    9/23/2024 1304 New Bag 1,000 mL Intravenous Lexi Vu, RN            Vitals (last day)       Date/Time Temp Pulse Resp BP SpO2 Weight O2 Device O2 Flow Rate (L/min) [facility]    09/24/24 0400 98.7 °F (37.1 °C) 64 18 186/64 98 % -- None (Room air) -- AL    09/24/24 0109 97.7 °F (36.5 °C) 69 16 162/63 100 % -- None (Room air) -- AL    09/23/24 2116 98.7 °F (37.1 °C) 78 18 175/66 97 % -- -- -- AL    09/23/24 1900 -- 65 -- -- -- -- -- -- CY    09/23/24 1800 98.5 °F (36.9 °C) 63 18 175/78 98 % -- None (Room air) -- LD    09/23/24 1600 98.4 °F (36.9 °C) 64 18 182/58 100 % -- None (Room air) -- LD    09/23/24 1400 97.8 °F (36.6 °C) 65 15 172/61 98 % -- None (Room air) -- AB    09/23/24 1342 -- -- -- -- -- 157 lb 3.2 oz (71.3 kg) -- -- LD    09/23/24 1325 98.6 °F (37 °C) 62 15 177/49 98 % -- None (Room air) -- LD    09/23/24 1315 -- 57 13 185/64 99 % -- None (Room air) -- CB    09/23/24 1059 97.1 °F (36.2 °C) 69 16 174/67 98 % 157 lb 10.1 oz (71.5 kg) None (Room air) -- KS       9/23/2024 H&P  DATE OF ADMISSION: 9/23/2024      CHIEF COMPLAINT: Slurred speech, right-sided weakness     HISTORY OF PRESENT ILLNESS  This is a 69 year oldfemale who presented complaining of slurred speech and right-sided weakness.  Of note, patient with history of CVA in 2021 with deficits of slurred speech and right-sided weakness.  Patient believes them to be getting worse over the past 1 day prior to admission.  Patient reports she also developed central chest pressure prompting visit to ED for further evaluation.  Patient described her difficulty speaking as being slurred and with difficulty with word finding.  Stuttering more than usual.  Patient stated she was having increased right-sided weakness  which led to difficulties with ambulation.  At time of interview, patient reports chest pain resolved.  Denied current shortness of breath.  No associated diaphoresis, palpitations.  Pain was nonradiating.  Patient otherwise denies abdominal pain, nausea vomit, fevers or chills.     PAST MEDICAL HISTORY  Past Medical History       Past Medical History:    Asthma (HCC)    Coronary atherosclerosis    Diabetes (HCC)     diabetes for 2 yrs    Essential hypertension    Glaucoma     right    Heart attack (HCC)     2005    High blood pressure    High cholesterol    Hyperlipidemia    Osteoarthritis    Sleep apnea     cpap    Stroke ([facility])     30 yrs ago    Visual impairment     left eye edema            PAST SURGICAL HISTORY  Past Surgical History         Past Surgical History:   Procedure Laterality Date    Anesth,vitrectomy Left 09/18/2017     Pars plana vitrectomy, internal limiting membrane peel, left eye.    Cath bare metal stent (bms)        Colonoscopy        Hysterectomy        Total abdom hysterectomy        Tubal ligation                ALLERGIES   Patient has no known allergies.     MEDICATIONS  Current Discharge Medication List               CONTINUE these medications which have NOT CHANGED     Details   hydrALAZINE 50 MG Oral Tab Take 1 tablet (50 mg total) by mouth every 8 (eight) hours.  Qty: 90 tablet, Refills: 0       spironolactone 25 MG Oral Tab Take 1 tablet (25 mg total) by mouth daily.  Qty: 30 tablet, Refills: 0       NAMZARIC 28-10 MG Oral Capsule SR 24 Hr Take 28 mg by mouth daily.       atorvastatin 20 MG Oral Tab Take 2 tablets (40 mg total) by mouth nightly.       Clopidogrel Bisulfate 75 MG Oral Tab Take 1 tablet (75 mg total) by mouth daily.  Qty: 30 tablet, Refills: 0     Associated Diagnoses: Cerebrovascular accident (CVA), unspecified mechanism ([facility])       MetFORMIN HCl 500 MG Oral Tab Take 1 tablet (500 mg total) by mouth daily with breakfast.       carvedilol 25 MG Oral Tab Take 1 tablet  (25 mg total) by mouth 2 (two) times daily with meals.       brimonidine Tartrate 0.2 % Ophthalmic Solution Place 1 drop into both eyes 2 (two) times daily.       amLODIPine 10 MG Oral Tab Take 1 tablet (10 mg total) by mouth daily.  Qty: 30 tablet, Refills: 0       losartan 50 MG Oral Tab Take 1 tablet (50 mg total) by mouth daily.                 SOCIAL HISTORY  Social Hx on file   Social History            Socioeconomic History    Marital status: Single   Tobacco Use    Smoking status: Former       Current packs/day: 0.00       Types: Cigarettes       Start date: 1976       Quit date: 1980       Years since quittin.0    Smokeless tobacco: Never   Vaping Use    Vaping status: Never Used   Substance and Sexual Activity    Alcohol use: No    Drug use: No            FAMILY HISTORY  Family History         Family History   Problem Relation Age of Onset    Cancer Father      Diabetes Mother              PHYSICAL EXAM  Vital signs:  BP (!) 172/61 (BP Location: Right arm)   Pulse 65   Temp 97.8 °F (36.6 °C) (Axillary)   Resp 15   Ht 5' 7\" (1.702 m)   Wt 157 lb 3.2 oz (71.3 kg)   SpO2 98%   BMI 24.62 kg/m²      GENERAL:  Awake and alert, in no acute distress.  HEART:  Regular rhythm.  Regular rate   LUNGS:  Air entry was good.  No crackles or wheezes   ABDOMEN: Soft and non-tender.    NEUROLOGICAL: Awake and alert, right-sided weakness in upper and lower extremity compared to left.  Some intermittent shaking of right upper extremity, patient reports this is chronic.  Slurred speech with stuttering.    SKIN:  Warm and well perfused  PSYCHIATRIC: Normal mood        IMAGING  XR CHEST AP PORTABLE  (CPT=71045)     Result Date: 2024  CONCLUSION:         1. No acute cardiopulmonary finding.    Dictated by (CST): Alexis Rabago MD on 2024 at 12:27 PM     Finalized by (CST): Alexis Rabago MD on 2024 at 12:28 PM           CT STROKE CTA BRAIN/CTA NECK (W IV)(CPT=70496/65537)     Result Date:  9/23/2024  CONCLUSION:         1. CTA head: No high-grade stenosis, occlusion, or gross aneurysm in the anterior or posterior circulation. 2. CTA neck: No high-grade stenosis, occlusion, or dissection of the carotids or vertebrals.    Dictated by (CST): Brett Berumen MD on 9/23/2024 at 11:32 AM     Finalized by (CST): Brett Berumen MD on 9/23/2024 at 11:37 AM           CT STROKE BRAIN (NO IV)(CPT=70450)     Result Date: 9/23/2024  CONCLUSION:  Moderate chronic microvascular ischemic disease without acute intracranial abnormality.  If there is high clinical suspicion for acute ischemia, MRI brain should be obtained.  This report was called immediately at 1127 hours to emergency department and discussed with Dr. Gallagher.     Dictated by (CST): Eugenio Espitia MD on 9/23/2024 at 11:24 AM     Finalized by (CST): Eugenio Espitia MD on 9/23/2024 at 11:27 AM            Data:      Recent Labs   Lab 09/23/24  1107   RBC 4.18   HGB 10.8*   HCT 34.1*   MCV 81.6   MCH 25.8*   MCHC 31.7   RDW 17.3*   NEPRELIM 2.24   WBC 3.8*   .0          Recent Labs   Lab 09/23/24  1107   *   BUN 24*   CREATSERUM 2.01*   CA 9.5   ALB 4.3   *   K 3.9   *   CO2 26.0   ALKPHO 86   AST 21   ALT 23   BILT 0.4   TP 7.5         ASSESSMENT/PLAN     Worsening slurred speech and right-sided weakness.  -Patient with history of CVA in 2021 with similar deficits.  -Patient reports deficits worsening over the past 1 day.  -Initial CT brain without acute infarct.    -Patient deemed not a candidate for TPA  -Restart home aspirin, Plavix, and statin therapy  -Allowing permissive hypertension  -CTA reviewed with no high-grade stenosis.  -Check MR I and echocardiogram  -Neurology consulted, appreciate further recommendations  -PT/OT/SLP  -Telemetry  -Neurochecks     Acute Chest Pain  -Resolving   -Initial troponin negative  -Started on ASA, Plavix and statin as above.   -Telemetry monitoring.   -Trending troponins.   -Consider further ischemic  evaluation.      Possible Acute cystitis  -Patient presenting with worsening strokelike symptoms as above  -UA with signs of infection  -Starting broad-spectrum antibiotics and IV fluids  -Follow-up urine culture  -Continue to monitor     Acute Kidney Injury on CKD  III   - Starting IVF  - Avoiding Nephrotoxic agents  - Cont to monitor     HTN  -Elevations noted  -Allowing for permissive hypertension as above  -As needed antihypertensives for gross elevations.  - Monitor and adjust as needed           Plan of care discussed with patient and son at bedside.  Discussed with ED physician and RN. Decision made that pt needs hospitalization for further management/monitoring.        Vu Rea MD      Medications 09/22/24 09/23/24 09/24/24   aspirin chewable tab 81 mg  Dose: 81 mg  Freq: Daily Route: OR  Start: 09/23/24 1430     1444 AB-Given        0930          atorvastatin (Lipitor) tab 80 mg  Dose: 80 mg  Freq: Nightly Route: OR  Start: 09/23/24 2100     2300 AL-Given        2100          ceFAZolin (Ancef) 1 g in dextrose 5% 100mL IVPB-ADD  Dose: 1 g  Freq: Every 12 hours Route: IV  Last Dose: 1 g (09/24/24 0602)  Start: 09/24/24 0600   Order specific questions:         0602 AL-New Bag   1800         cefTRIAXone (Rocephin) 2 g in sodium chloride 0.9% 100 mL IVPB-ADDV  Dose: 2 g  Freq: Once Route: IV  Last Dose: 2 g (09/23/24 1303)  Start: 09/23/24 1210 End: 09/23/24 1333   Admin Instructions:   Ceftriaxone must NOT be administered simultaneously with calcium containing IV solutions. Includes Y-site as well.  In patients other than neonates ceftriaxone and calcium containing products may administered sequentially, provided the line is flushed in between administrations.   Order specific questions:        1303 CB-New Bag   1317 CB-Handoff          clopidogrel (Plavix) tab 75 mg  Dose: 75 mg  Freq: Daily Route: OR  Start: 09/23/24 1430     1444 AB-Given        0930          heparin (Porcine) 5000 UNIT/ML  injection 5,000 Units  Dose: 5,000 Units  Freq: 2 times per day Route: SC  Start: 09/23/24 2100     2300 AL-Given           sodium chloride 0.9 % IV bolus 1,000 mL  Dose: 1,000 mL  Freq: Once Route: IV  Last Dose: 1,000 mL (09/23/24 1304)       sodium chloride 0.9% infusion  Rate: 75 mL/hr  Freq: Continuous Route: IV  Start: 09/23/24 1415 End: 09/25/24 1440       iopamidol 76% (ISOVUE-370) injection for power injector  Dose: 65 mL  Freq: IMG once as needed Route: IV  PRN Reason: contrast  Start: 09/23/24 1127 End: 09/23/24 1127

## 2024-09-24 NOTE — PROGRESS NOTES
[facility]  part of [facility]    Progress Note    Eliud Fine Patient Status:  Inpatient    1955 MRN [id number]   Location [facility] 3W/SW Attending Magdalena Moran MD   Hosp Day # 1 PCP Yao Rodriguez     Chief complaint: weakness  Subjective:   Pt reports feeling much better  No new deficit  No high fevers    Objective:   Blood pressure 156/62, pulse 68, temperature 98.8 °F (37.1 °C), temperature source Oral, resp. rate 18, height 5' 7\" (1.702 m), weight 157 lb 3.2 oz (71.3 kg), SpO2 98%.    GEN: NAD, alert, conversant  HEENT: conjunctivae/corneas clear. PERRL, EOM's intact.  Neck: no adenopathy, no carotid bruit, no JVD, supple  Pulmonary:  clear to auscultation bilaterally  Cardiovascular: S1, S2 normal, no murmur, click, rub or gallop, regular rate and rhythm  Abdominal: soft, non-tender; bowel sounds normal;    Extremities: no cyanosis or edema  Pulses: palpable and symmetric  Skin: No rashes or lesions  Neurologic: Alert and oriented X 3, Rt sided weakness  Psychiatric: calm, cooperative    Assessment and Plan:     Worsening slurred speech and right-sided weakness.  -Patient with history of CVA in  with similar deficits.  -Patient reports deficits worsening over the past 1 day.  -Initial CT brain without acute infarct.    -Patient deemed not a candidate for TPA  -Restart home aspirin, Plavix, and statin therapy  -Allowing permissive hypertension  -CTA reviewed with no high-grade stenosis.  - MRI brain was also obtained that revealed chronic microvascular ischemic disease, no acute strokes.   -Neurology consulted, symptoms c/w  Recrudescence of her old left basal ganglia stroke symptoms in setting of UTI, recomm to cont ASA/plavix, statins   -PT/OT/SLP  -Telemetry  -Neurochecks  -previous TSH nl  -previous B12 low ~186 (')-->recheck     Acute Chest Pain  -Resolved   -Initial troponin negative  -Started on ASA, Plavix and statin as above.   -Telemetry  monitoring.   -Trending troponins.   -Consider further ischemic evaluation.       Acute cystitis  -Patient presenting with worsening strokelike symptoms as above  -UA with signs of infection  -Starting broad-spectrum antibiotics and IV fluids  -ceftriaxone x 1 on admission, now on iv ancef  -Follow-up urine culture, +E. coli  -Continue to monitor     Acute Kidney Injury on CKD  III   - IVF  - Avoiding Nephrotoxic agents  - Cont to monitor  - Cr trending down 2 -->1.53 (baseline ~1.2)     HTN  -Elevations noted  -Allowing for permissive hypertension as above  -restart antihypertensives (acute stroke ruled out)  - Monitor and adjust as needed    Hypomagnesemia  -suppl, -->cont on discharge, has been low in the past    DVT prophylaxis: heparin sq     Dispo: home with HH once stable, anticipate 1-2 days       Chart reviewed, including current vitals, notes, labs and imaging  Pertinent past medical records reviewed  Labs ordered and medications adjusted as outlined above  Coordinate care with care team/consultants  Discussed with patient the results of tests, management plan as outlined above, and the need for ongoing hospitalization  D/w RN     MDM high  severe acute illness/or exacerbation of chronic illness posing a threat to life, IV medications, requiring close monitoring in hospital.       Results:     Lab Results   Component Value Date    WBC 3.8 (L) 09/23/2024    HGB 10.8 (L) 09/23/2024    HCT 34.1 (L) 09/23/2024    .0 09/23/2024    CREATSERUM 1.53 (H) 09/24/2024    BUN 21 09/24/2024     09/24/2024    K 3.7 09/24/2024     (H) 09/24/2024    CO2 24.0 09/24/2024    GLU 94 09/24/2024    CA 9.0 09/24/2024    ALB 3.3 09/24/2024    ALKPHO 67 09/24/2024    BILT 0.3 09/24/2024    TP 5.7 09/24/2024    AST 18 09/24/2024    ALT 16 09/24/2024    PTT 27.8 09/23/2024    INR 1.11 09/23/2024    DDIMER 2.40 (H) 10/11/2022    ESRML 37 (H) 12/18/2022    CRP 0.31 (H) 12/18/2022    MG 1.5 (L) 09/24/2024    PHOS 2.6  10/01/2023    CK 79 03/24/2024       MRI BRAIN (CPT=70551)    Result Date: 9/24/2024  CONCLUSION:   Severe chronic microvascular ischemic disease without acute intracranial abnormality.   Multiple chronic foci of hemosiderin deposition seen involving the bilateral cerebral hemispheres consistent with remote microhemorrhages.  This is secondary to chronic microangiopathy and can be seen with amyloid.  No acute hemorrhage.  Preliminary report was submitted by the [facility] teleradiologist and there are no significant discrepancies.  Dictated by (CST): Eugenio Espitia MD on 9/24/2024 at 8:44 AM     Finalized by (CST): Eugenio Espitia MD on 9/24/2024 at 8:51 AM          XR CHEST AP PORTABLE  (CPT=71045)    Result Date: 9/23/2024  CONCLUSION:  1. No acute cardiopulmonary finding.    Dictated by (CST): Alexis Rabago MD on 9/23/2024 at 12:27 PM     Finalized by (CST): Alexis Rabago MD on 9/23/2024 at 12:28 PM          CT STROKE CTA BRAIN/CTA NECK (W IV)(CPT=70496/36040)    Result Date: 9/23/2024  CONCLUSION:  1. CTA head: No high-grade stenosis, occlusion, or gross aneurysm in the anterior or posterior circulation. 2. CTA neck: No high-grade stenosis, occlusion, or dissection of the carotids or vertebrals.    Dictated by (CST): Brett Berumen MD on 9/23/2024 at 11:32 AM     Finalized by (CST): Brett Berumen MD on 9/23/2024 at 11:37 AM          CT STROKE BRAIN (NO IV)(CPT=70450)    Result Date: 9/23/2024  CONCLUSION:  Moderate chronic microvascular ischemic disease without acute intracranial abnormality.  If there is high clinical suspicion for acute ischemia, MRI brain should be obtained.  This report was called immediately at 1127 hours to emergency department and discussed with Dr. Gallagher.     Dictated by (CST): Eugenio Espitia MD on 9/23/2024 at 11:24 AM     Finalized by (CST): Eugenio Espitia MD on 9/23/2024 at 11:27 AM         EKG 12 Lead    Result Date: 9/23/2024  Sinus rhythm with 1st degree A-V block Minimal voltage criteria for  LVH, may be normal variant ( [place] product ) Nonspecific T wave abnormality Abnormal ECG When compared with ECG of 20-MAR-2024 08:00, No significant change was found Confirmed by RADHA PETTIT ([id number]) on 9/23/2024 3:45:55 PM       MANUEL WESTON MD  9/24/2024

## 2024-09-25 ENCOUNTER — APPOINTMENT (OUTPATIENT)
Dept: CT IMAGING | Facility: HOSPITAL | Age: 69
End: 2024-09-25
Attending: HOSPITALIST
Payer: MEDICARE

## 2024-09-25 LAB
ALBUMIN SERPL-MCNC: 3.5 G/DL (ref 3.2–4.8)
ALBUMIN SERPL-MCNC: 3.5 G/DL (ref 3.2–4.8)
ALBUMIN/GLOB SERPL: 1.2 {RATIO} (ref 1–2)
ALP LIVER SERPL-CCNC: 70 U/L
ALT SERPL-CCNC: 15 U/L
ANION GAP SERPL CALC-SCNC: 5 MMOL/L (ref 0–18)
ANION GAP SERPL CALC-SCNC: 5 MMOL/L (ref 0–18)
ANTIBODY SCREEN: NEGATIVE
APTT PPP: 30.4 SECONDS (ref 23–36)
AST SERPL-CCNC: 16 U/L (ref ?–34)
BASOPHILS # BLD AUTO: 0.02 X10(3) UL (ref 0–0.2)
BASOPHILS NFR BLD AUTO: 0.6 %
BILIRUB SERPL-MCNC: 0.3 MG/DL (ref 0.2–1.1)
BUN BLD-MCNC: 18 MG/DL (ref 9–23)
BUN BLD-MCNC: 18 MG/DL (ref 9–23)
BUN/CREAT SERPL: 12 (ref 10–20)
BUN/CREAT SERPL: 12.6 (ref 10–20)
CALCIUM BLD-MCNC: 9.2 MG/DL (ref 8.7–10.4)
CALCIUM BLD-MCNC: 9.3 MG/DL (ref 8.7–10.4)
CHLORIDE SERPL-SCNC: 112 MMOL/L (ref 98–112)
CHLORIDE SERPL-SCNC: 114 MMOL/L (ref 98–112)
CO2 SERPL-SCNC: 25 MMOL/L (ref 21–32)
CO2 SERPL-SCNC: 25 MMOL/L (ref 21–32)
CREAT BLD-MCNC: 1.43 MG/DL
CREAT BLD-MCNC: 1.5 MG/DL
DEPRECATED RDW RBC AUTO: 50.7 FL (ref 35.1–46.3)
EGFRCR SERPLBLD CKD-EPI 2021: 37 ML/MIN/1.73M2 (ref 60–?)
EGFRCR SERPLBLD CKD-EPI 2021: 39 ML/MIN/1.73M2 (ref 60–?)
EOSINOPHIL # BLD AUTO: 0.06 X10(3) UL (ref 0–0.7)
EOSINOPHIL NFR BLD AUTO: 1.9 %
ERYTHROCYTE [DISTWIDTH] IN BLOOD BY AUTOMATED COUNT: 17.1 % (ref 11–15)
GLOBULIN PLAS-MCNC: 3 G/DL (ref 2–3.5)
GLUCOSE BLD-MCNC: 94 MG/DL (ref 70–99)
GLUCOSE BLD-MCNC: 95 MG/DL (ref 70–99)
GLUCOSE BLDC GLUCOMTR-MCNC: 102 MG/DL (ref 70–99)
GLUCOSE BLDC GLUCOMTR-MCNC: 104 MG/DL (ref 70–99)
GLUCOSE BLDC GLUCOMTR-MCNC: 130 MG/DL (ref 70–99)
GLUCOSE BLDC GLUCOMTR-MCNC: 79 MG/DL (ref 70–99)
GLUCOSE BLDC GLUCOMTR-MCNC: 96 MG/DL (ref 70–99)
HCT VFR BLD AUTO: 28.9 %
HGB BLD-MCNC: 9.2 G/DL
IMM GRANULOCYTES # BLD AUTO: 0.01 X10(3) UL (ref 0–1)
IMM GRANULOCYTES NFR BLD: 0.3 %
INR BLD: 1.11 (ref 0.8–1.2)
LYMPHOCYTES # BLD AUTO: 1.27 X10(3) UL (ref 1–4)
LYMPHOCYTES NFR BLD AUTO: 41.2 %
MAGNESIUM SERPL-MCNC: 1.6 MG/DL (ref 1.6–2.6)
MCH RBC QN AUTO: 26 PG (ref 26–34)
MCHC RBC AUTO-ENTMCNC: 31.8 G/DL (ref 31–37)
MCV RBC AUTO: 81.6 FL
MONOCYTES # BLD AUTO: 0.21 X10(3) UL (ref 0.1–1)
MONOCYTES NFR BLD AUTO: 6.8 %
NEUTROPHILS # BLD AUTO: 1.51 X10 (3) UL (ref 1.5–7.7)
NEUTROPHILS # BLD AUTO: 1.51 X10(3) UL (ref 1.5–7.7)
NEUTROPHILS NFR BLD AUTO: 49.2 %
OSMOLALITY SERPL CALC.SUM OF ELEC: 296 MOSM/KG (ref 275–295)
OSMOLALITY SERPL CALC.SUM OF ELEC: 300 MOSM/KG (ref 275–295)
PHOSPHATE SERPL-MCNC: 3.3 MG/DL (ref 2.4–5.1)
PLATELET # BLD AUTO: 215 10(3)UL (ref 150–450)
POTASSIUM SERPL-SCNC: 4 MMOL/L (ref 3.5–5.1)
POTASSIUM SERPL-SCNC: 4 MMOL/L (ref 3.5–5.1)
POTASSIUM SERPL-SCNC: 4.1 MMOL/L (ref 3.5–5.1)
PROT SERPL-MCNC: 6.5 G/DL (ref 5.7–8.2)
PROTHROMBIN TIME: 15 SECONDS (ref 11.6–14.8)
RBC # BLD AUTO: 3.54 X10(6)UL
RH BLOOD TYPE: POSITIVE
RH BLOOD TYPE: POSITIVE
SODIUM SERPL-SCNC: 142 MMOL/L (ref 136–145)
SODIUM SERPL-SCNC: 144 MMOL/L (ref 136–145)
TROPONIN I SERPL HS-MCNC: 12 NG/L
VIT B12 SERPL-MCNC: 260 PG/ML (ref 211–911)
WBC # BLD AUTO: 3.1 X10(3) UL (ref 4–11)

## 2024-09-25 PROCEDURE — 70450 CT HEAD/BRAIN W/O DYE: CPT | Performed by: HOSPITALIST

## 2024-09-25 PROCEDURE — 99291 CRITICAL CARE FIRST HOUR: CPT | Performed by: OTHER

## 2024-09-25 PROCEDURE — 99233 SBSQ HOSP IP/OBS HIGH 50: CPT | Performed by: HOSPITALIST

## 2024-09-25 RX ORDER — MAGNESIUM OXIDE 400 MG/1
400 TABLET ORAL 2 TIMES DAILY WITH MEALS
Status: DISCONTINUED | OUTPATIENT
Start: 2024-09-25 | End: 2024-09-27

## 2024-09-25 RX ORDER — MAGNESIUM OXIDE 400 MG (241.3 MG MAGNESIUM) TABLET
500 TABLET DAILY
Status: DISCONTINUED | OUTPATIENT
Start: 2024-09-25 | End: 2024-09-27

## 2024-09-25 RX ORDER — MAGNESIUM OXIDE 400 MG/1
400 TABLET ORAL ONCE
Status: COMPLETED | OUTPATIENT
Start: 2024-09-25 | End: 2024-09-25

## 2024-09-25 NOTE — SPIRITUAL CARE NOTE
Spiritual Care Visit Note    Patient Name: Eliud Fine Date of Spiritual Care Visit: 24   : 1955 Primary Dx: Acute CVA (cerebrovascular accident) (HCC)       Referred By: Referral From: Care Coordination    Spiritual Care Taxonomy:    Intended Effects: Demonstrate caring and concern  Spiritual Care Visit Note    Patient Name: Eliud Fine Date of Spiritual Care Visit: 24   : 1955 Primary Dx: Acute CVA (cerebrovascular accident) (HCC)       Referred By: Referral From: Care Coordination    Spiritual Care Taxonomy:    Intended Effects: Build relationship of care and support    Methods: Offer support;Offer emotional support    Interventions: Acknowledge current situation    Visit Type/Summary:     Spiritual Care: Responded to a request via the on call phone Consulted with RN prior to visit.  remains available as needed for follow up. Patient sent to CT brain. No family members present.    Spiritual Care support can be requested via an Epic consult. For urgent/immediate needs, please contact the On Call  at: [place]: haylie [phone]    [place] [phone]

## 2024-09-25 NOTE — RESPIRATORY THERAPY NOTE
RESPIRATORY THERAPY RAPID RESPONSE NOTE    RRT called for respiratory distress? no- stroke alert.  Breathing pattern: Normal    RT interventions necessary? no

## 2024-09-25 NOTE — PLAN OF CARE
RRT    *See RRT Documentation Record*    Reason the RRT was called: Stroke alert  Assessment of patient leading up to RRT: Pt had fixed 1 mm pupils on Neuro assessment, partial gaze pasly, partial hemiopia, mild to moderate aphasia upon NIH assessment.   Interventions/Testing: STAT CT of brain   Patient Outcome/Disposition: CT of brain negative for acute changes.  Family Notified: yes  Name of family notified: Gokul Fine (son)

## 2024-09-25 NOTE — PLAN OF CARE
Q4 neuros stable. No complaints overnight. Wore CPAP. Plan: home w/ HH PT pending medical clearance    Call light within reach, safety precautions in place  Problem: CARDIOVASCULAR - ADULT  Goal: Maintains optimal cardiac output and hemodynamic stability  Description: INTERVENTIONS:  - Monitor vital signs, rhythm, and trends  - Monitor for bleeding, hypotension and signs of decreased cardiac output  - Evaluate effectiveness of vasoactive medications to optimize hemodynamic stability  - Monitor arterial and/or venous puncture sites for bleeding and/or hematoma  - Assess quality of pulses, skin color and temperature  - Assess for signs of decreased coronary artery perfusion - ex. Angina  - Evaluate fluid balance, assess for edema, trend weights  Outcome: Progressing  Goal: Absence of cardiac arrhythmias or at baseline  Description: INTERVENTIONS:  - Continuous cardiac monitoring, monitor vital signs, obtain 12 lead EKG if indicated  - Evaluate effectiveness of antiarrhythmic and heart rate control medications as ordered  - Initiate emergency measures for life threatening arrhythmias  - Monitor electrolytes and administer replacement therapy as ordered  Outcome: Progressing     Problem: PAIN - ADULT  Goal: Verbalizes/displays adequate comfort level or patient's stated pain goal  Description: INTERVENTIONS:  - Encourage pt to monitor pain and request assistance  - Assess pain using appropriate pain scale  - Administer analgesics based on type and severity of pain and evaluate response  - Implement non-pharmacological measures as appropriate and evaluate response  - Consider cultural and social influences on pain and pain management  - Manage/alleviate anxiety  - Utilize distraction and/or relaxation techniques  - Monitor for opioid side effects  - Notify MD/LIP if interventions unsuccessful or patient reports new pain  - Anticipate increased pain with activity and pre-medicate as appropriate  Outcome: Progressing      Problem: Diabetes/Glucose Control  Goal: Glucose maintained within prescribed range  Description: INTERVENTIONS:  - Monitor Blood Glucose as ordered  - Assess for signs and symptoms of hyperglycemia and hypoglycemia  - Administer ordered medications to maintain glucose within target range  - Assess barriers to adequate nutritional intake and initiate nutrition consult as needed  - Instruct patient on self management of diabetes  Outcome: Progressing     Problem: NEUROLOGICAL - ADULT  Goal: Achieves stable or improved neurological status  Description: INTERVENTIONS  - Assess for and report changes in neurological status  - Initiate measures to prevent increased intracranial pressure  - Maintain blood pressure and fluid volume within ordered parameters to optimize cerebral perfusion and minimize risk of hemorrhage  - Monitor temperature, glucose, and sodium. Initiate appropriate interventions as ordered  Outcome: Progressing  Goal: Achieves maximal functionality and self care  Description: INTERVENTIONS  - Monitor swallowing and airway patency with patient fatigue and changes in neurological status  - Encourage and assist patient to increase activity and self care with guidance from PT/OT  - Encourage visually impaired, hearing impaired and aphasic patients to use assistive/communication devices  Outcome: Progressing

## 2024-09-25 NOTE — SPIRITUAL CARE NOTE
Spiritual Care Visit Note    Patient Name: Eliud Fine Date of Spiritual Care Visit: 24   : 1955 Primary Dx: Acute CVA (cerebrovascular accident) (HCC)       Referred By: Referral From: Care Coordination    Spiritual Care Taxonomy:    Intended Effects: Build relationship of care and support    Methods: Offer spiritual/Advent support    Interventions: Acknowledge current situation;Acknowledge response to difficult experience;Prayer for healing;Provide hospitality    Visit Type/Summary:     - Spiritual Care: Consulted with RN prior to visit. Offered empathic listening and emotional support. Provided information regarding how to contact Spiritual Care and left a Spiritual Care information card.  remains available as needed for follow up.    Spiritual Care support can be requested via an Epic consult. For urgent/immediate needs, please contact the On Call  at: [place]: ext [phone]    Chaplain Resident, Marysol Veras, PhD

## 2024-09-25 NOTE — CONSULTS
[facility]  [address] [place] [address], [id number]  [facility] [address]  [phone]            Eliud Fine Patient Status:  Inpatient    1955 MRN [id number]   Location [facility] 3W/SW Attending Magdalena Moran MD   Hosp Day # 2 PCP Yao Rodriguez     Date of Admission:  2024  Date of Consult:  2024  Reason for Consultation:   Acute stroke    History of Present Illness:   Patient is a 69 year old female who was admitted to the hospital for Acute CVA (cerebrovascular accident) (HCC):    Was called acute stroke this morning due to vision changes.  She initially said she might have had some vision loss on the left.  When I talked to her at bedside she told me that she has been feeling some odd sensation on the left and right side of her face.  No new weakness in upper or lower extremities.   I have seen her yesterday for recrudescence of her old left basal ganglia stroke due to urinary tract infection.   Her BP has been consistently elevated systolic 180-190's  Had a stat CT head        Past Medical History  Past Medical History:    Asthma ([facility])    Coronary atherosclerosis    Diabetes ([facility])    diabetes for 2 yrs    Essential hypertension    Glaucoma    right    Heart attack ([facility])        High blood pressure    High cholesterol    Hyperlipidemia    Osteoarthritis    Sleep apnea    cpap    Stroke ([facility])    30 yrs ago    Visual impairment    left eye edema       Past Surgical History  Past Surgical History:   Procedure Laterality Date    Anesth,vitrectomy Left 2017    Pars plana vitrectomy, internal limiting membrane peel, left eye.    Cath bare metal stent (bms)      Colonoscopy      Hysterectomy      Total abdom hysterectomy      Tubal ligation         Family History  Family History   Problem Relation Age of Onset    Cancer Father     Diabetes Mother        Social History  Pediatric History   Patient Parents    Not on file     Other Topics Concern     Not on file   Social History Narrative    Not on file           Current Medications:  Current Facility-Administered Medications   Medication Dose Route Frequency    ceFAZolin (Ancef) 1 g in dextrose 5% 100mL IVPB-ADD  1 g Intravenous Q8H    magnesium oxide (Mag-Ox) tab 400 mg  400 mg Oral Once    brimonidine (Alphagan) 0.2 % ophthalmic solution 1 drop  1 drop Both Eyes BID    carvedilol (Coreg) tab 25 mg  25 mg Oral BID with meals    hydrALAZINE (Apresoline) tab 50 mg  50 mg Oral BID    spironolactone (Aldactone) tab 25 mg  25 mg Oral Daily    clopidogrel (Plavix) tab 75 mg  75 mg Oral Daily    glucose (Dex4) 15 GM/59ML oral liquid 15 g  15 g Oral Q15 Min PRN    Or    glucose (Glutose) 40% oral gel 15 g  15 g Oral Q15 Min PRN    Or    glucose-vitamin C (Dex-4) chewable tab 4 tablet  4 tablet Oral Q15 Min PRN    Or    dextrose 50% injection 50 mL  50 mL Intravenous Q15 Min PRN    Or    glucose (Dex4) 15 GM/59ML oral liquid 30 g  30 g Oral Q15 Min PRN    Or    glucose (Glutose) 40% oral gel 30 g  30 g Oral Q15 Min PRN    Or    glucose-vitamin C (Dex-4) chewable tab 8 tablet  8 tablet Oral Q15 Min PRN    labetalol (Trandate) 5 mg/mL injection 10 mg  10 mg Intravenous Q10 Min PRN    hydrALAzine (Apresoline) 20 mg/mL injection 10 mg  10 mg Intravenous Q2H PRN    ondansetron (Zofran) 4 MG/2ML injection 4 mg  4 mg Intravenous Q6H PRN    heparin (Porcine) 5000 UNIT/ML injection 5,000 Units  5,000 Units Subcutaneous 2 times per day    acetaminophen (Tylenol Extra Strength) tab 500 mg  500 mg Oral Q4H PRN    acetaminophen (Tylenol) tab 650 mg  650 mg Oral Q4H PRN    Or    HYDROcodone-acetaminophen (Norco) 5-325 MG per tab 1 tablet  1 tablet Oral Q4H PRN    Or    HYDROcodone-acetaminophen (Norco) 5-325 MG per tab 2 tablet  2 tablet Oral Q4H PRN    atorvastatin (Lipitor) tab 80 mg  80 mg Oral Nightly    aspirin chewable tab 81 mg  81 mg Oral Daily    insulin aspart (NovoLOG) 100 Units/mL FlexPen 1-5 Units  1-5 Units  Subcutaneous TID CC and HS     Medications Prior to Admission   Medication Sig    hydrALAZINE 50 MG Oral Tab Take 1 tablet (50 mg total) by mouth every 8 (eight) hours. (Patient taking differently: Take 1 tablet (50 mg total) by mouth in the morning and 1 tablet (50 mg total) before bedtime.)    spironolactone 25 MG Oral Tab Take 1 tablet (25 mg total) by mouth daily.    NAMZARIC 28-10 MG Oral Capsule SR 24 Hr Take 28 mg by mouth daily.    atorvastatin 20 MG Oral Tab Take 2 tablets (40 mg total) by mouth nightly.    Clopidogrel Bisulfate 75 MG Oral Tab Take 1 tablet (75 mg total) by mouth daily.    MetFORMIN HCl 500 MG Oral Tab Take 1 tablet (500 mg total) by mouth daily with breakfast.    carvedilol 25 MG Oral Tab Take 1 tablet (25 mg total) by mouth 2 (two) times daily with meals.    brimonidine Tartrate 0.2 % Ophthalmic Solution Place 1 drop into both eyes 2 (two) times daily.    amLODIPine 10 MG Oral Tab Take 1 tablet (10 mg total) by mouth daily. (Patient not taking: Reported on 9/23/2024)    losartan 50 MG Oral Tab Take 1 tablet (50 mg total) by mouth daily. (Patient not taking: Reported on 9/23/2024)       Allergies  No Known Allergies    Review of Systems:   As in HPI, the rest of the 14 system review was done and was negative    Physical Exam:     Vitals:    09/25/24 0000 09/25/24 0400 09/25/24 0801 09/25/24 0830   BP: 139/68 156/69 (!) 186/62 (!) 192/61   Pulse: 60 55 58    Resp: 16 18 16    Temp: 98 °F (36.7 °C) 97.9 °F (36.6 °C) 98.1 °F (36.7 °C)    TempSrc: Axillary Axillary Oral    SpO2: 97% 100% 100%    Weight:  162 lb 14.4 oz (73.9 kg)     Height:           General: No apparent distress, was smiling with symmetric face for me when I walked in the room  NIHSS  1a 0  1b 0  1c 0  2 0  3 0  4 0  5a 0  5b 0  6a 2  6b 2  7 1  8 0  9 0  10 2  11 0    NIHSS 7        Results:     Laboratory Data:  Lab Results   Component Value Date    WBC 3.1 (L) 09/25/2024    HGB 9.2 (L) 09/25/2024    HCT 28.9 (L) 09/25/2024     .0 09/25/2024    CREATSERUM 1.50 (H) 09/25/2024    BUN 18 09/25/2024     09/25/2024    K 4.0 09/25/2024    K 4.0 09/25/2024     (H) 09/25/2024    CO2 25.0 09/25/2024    GLU 94 09/25/2024    CA 9.3 09/25/2024    ALB 3.5 09/25/2024    ALKPHO 67 09/24/2024    TP 5.7 09/24/2024    AST 18 09/24/2024    ALT 16 09/24/2024    PTT 27.8 09/23/2024    INR 1.11 09/23/2024    PTP 15.0 (H) 09/23/2024    DDIMER 2.40 (H) 10/11/2022    ESRML 37 (H) 12/18/2022    CRP 0.31 (H) 12/18/2022    MG 1.6 09/25/2024    PHOS 3.3 09/25/2024    CK 79 03/24/2024    B12 260 09/25/2024         Imaging:    CT STROKE BRAIN (NO IV)(CPT=70450)    Result Date: 9/25/2024  CONCLUSION:  1. No evidence of acute intracranial hemorrhage or extended signs of acute large vessel infarction. If symptoms or suspicion for acute ischemia persist, consider followup brain MR. 2. Stable chronic lacunar infarcts in the right caudate nucleus and left thalamus. 3. Nonspecific white matter changes involving both cerebral hemispheres that most likely reflect sequelae of chronic microangiopathy. 4. Intracranial atherosclerosis. 5. Nonspecific rightward conjugate gaze deviation at the time of imaging.  This report was called immediately at 0847 hours to the inpatient floor and discussed with ODALYS Blanchard.    Dictated by (CST): Kelvin Amaya MD on 9/25/2024 at 8:44 AM     Finalized by (CST): Kelvin Amaya MD on 9/25/2024 at 8:49 AM          MRI BRAIN (CPT=70551)    Result Date: 9/24/2024  CONCLUSION:   Severe chronic microvascular ischemic disease without acute intracranial abnormality.   Multiple chronic foci of hemosiderin deposition seen involving the bilateral cerebral hemispheres consistent with remote microhemorrhages.  This is secondary to chronic microangiopathy and can be seen with amyloid.  No acute hemorrhage.  Preliminary report was submitted by the [facility] teleradiologist and there are no significant discrepancies.  Dictated by (CST): Omkar  MD Eugenio on 9/24/2024 at 8:44 AM     Finalized by (CST): Eugenio Espitia MD on 9/24/2024 at 8:51 AM          XR CHEST AP PORTABLE  (CPT=71045)    Result Date: 9/23/2024  CONCLUSION:  1. No acute cardiopulmonary finding.    Dictated by (CST): Alexis Rabago MD on 9/23/2024 at 12:27 PM     Finalized by (CST): Alexis Rabago MD on 9/23/2024 at 12:28 PM          CT STROKE CTA BRAIN/CTA NECK (W IV)(CPT=70496/60412)    Result Date: 9/23/2024  CONCLUSION:  1. CTA head: No high-grade stenosis, occlusion, or gross aneurysm in the anterior or posterior circulation. 2. CTA neck: No high-grade stenosis, occlusion, or dissection of the carotids or vertebrals.    Dictated by (CST): Brett Berumen MD on 9/23/2024 at 11:32 AM     Finalized by (CST): Brett Berumen MD on 9/23/2024 at 11:37 AM          CT STROKE BRAIN (NO IV)(CPT=70450)    Result Date: 9/23/2024  CONCLUSION:  Moderate chronic microvascular ischemic disease without acute intracranial abnormality.  If there is high clinical suspicion for acute ischemia, MRI brain should be obtained.  This report was called immediately at 1127 hours to emergency department and discussed with Dr. Gallagher.     Dictated by (CST): Eugenio Espitia MD on 9/23/2024 at 11:24 AM     Finalized by (CST): Eugenio Espitia MD on 9/23/2024 at 11:27 AM         EKG 12 Lead    Result Date: 9/23/2024  Sinus rhythm with 1st degree A-V block Minimal voltage criteria for LVH, may be normal variant ( [place] product ) Nonspecific T wave abnormality Abnormal ECG When compared with ECG of 20-MAR-2024 08:00, No significant change was found Confirmed by RADHA PETTIT ([id number]) on 9/23/2024 3:45:55 PM       Impression:     Acute CVA (cerebrovascular accident) (HCC)  I have previously seen her for recrudescence of her old left basal ganglia stroke.  Her symptoms today are nonlocalizing and inconsistent.  She has no visual field deficit on my exam.  She told me that she has odd sensation bilaterally in her face, there was no facial  droop.  She was stuttering when speaking and was dysarthric.  She has been treated for UTI and her blood pressure has been consistently elevated  I personally reviewed the CT scan of the head and there are no acute intracranial findings      Recommendations:  1-I am not suspecting an ischemic cerebrovascular event.  2-continue aspirin 81 mg daily, plavix 75 mg atorvastatin 80 mg  3-treat blood pressure to normal   4-continue antibiotics for UTI      45 min critical care time      Thank you for allowing me to participate in the care of your patient.    Shaun Medel MD

## 2024-09-25 NOTE — PLAN OF CARE
Pt alertx4, stroke alert called this AM, Stat CT of brain came back with no acute changes, pt is q4 nuero checks, NIH daily, IV ancef for UTI, plan is for pt to DC home with current HH services once medically clear.   Problem: CARDIOVASCULAR - ADULT  Goal: Maintains optimal cardiac output and hemodynamic stability  Description: INTERVENTIONS:  - Monitor vital signs, rhythm, and trends  - Monitor for bleeding, hypotension and signs of decreased cardiac output  - Evaluate effectiveness of vasoactive medications to optimize hemodynamic stability  - Monitor arterial and/or venous puncture sites for bleeding and/or hematoma  - Assess quality of pulses, skin color and temperature  - Assess for signs of decreased coronary artery perfusion - ex. Angina  - Evaluate fluid balance, assess for edema, trend weights  Outcome: Progressing  Goal: Absence of cardiac arrhythmias or at baseline  Description: INTERVENTIONS:  - Continuous cardiac monitoring, monitor vital signs, obtain 12 lead EKG if indicated  - Evaluate effectiveness of antiarrhythmic and heart rate control medications as ordered  - Initiate emergency measures for life threatening arrhythmias  - Monitor electrolytes and administer replacement therapy as ordered  Outcome: Progressing     Problem: PAIN - ADULT  Goal: Verbalizes/displays adequate comfort level or patient's stated pain goal  Description: INTERVENTIONS:  - Encourage pt to monitor pain and request assistance  - Assess pain using appropriate pain scale  - Administer analgesics based on type and severity of pain and evaluate response  - Implement non-pharmacological measures as appropriate and evaluate response  - Consider cultural and social influences on pain and pain management  - Manage/alleviate anxiety  - Utilize distraction and/or relaxation techniques  - Monitor for opioid side effects  - Notify MD/LIP if interventions unsuccessful or patient reports new pain  - Anticipate increased pain with activity  and pre-medicate as appropriate  Outcome: Progressing     Problem: Diabetes/Glucose Control  Goal: Glucose maintained within prescribed range  Description: INTERVENTIONS:  - Monitor Blood Glucose as ordered  - Assess for signs and symptoms of hyperglycemia and hypoglycemia  - Administer ordered medications to maintain glucose within target range  - Assess barriers to adequate nutritional intake and initiate nutrition consult as needed  - Instruct patient on self management of diabetes  Outcome: Progressing     Problem: NEUROLOGICAL - ADULT  Goal: Achieves stable or improved neurological status  Description: INTERVENTIONS  - Assess for and report changes in neurological status  - Initiate measures to prevent increased intracranial pressure  - Maintain blood pressure and fluid volume within ordered parameters to optimize cerebral perfusion and minimize risk of hemorrhage  - Monitor temperature, glucose, and sodium. Initiate appropriate interventions as ordered  Outcome: Progressing  Goal: Achieves maximal functionality and self care  Description: INTERVENTIONS  - Monitor swallowing and airway patency with patient fatigue and changes in neurological status  - Encourage and assist patient to increase activity and self care with guidance from PT/OT  - Encourage visually impaired, hearing impaired and aphasic patients to use assistive/communication devices  Outcome: Progressing

## 2024-09-26 ENCOUNTER — APPOINTMENT (OUTPATIENT)
Dept: ULTRASOUND IMAGING | Facility: HOSPITAL | Age: 69
End: 2024-09-26
Attending: HOSPITALIST
Payer: MEDICARE

## 2024-09-26 LAB
ALBUMIN SERPL-MCNC: 3.4 G/DL (ref 3.2–4.8)
ANION GAP SERPL CALC-SCNC: 6 MMOL/L (ref 0–18)
BASOPHILS # BLD AUTO: 0.01 X10(3) UL (ref 0–0.2)
BASOPHILS NFR BLD AUTO: 0.3 %
BUN BLD-MCNC: 19 MG/DL (ref 9–23)
BUN/CREAT SERPL: 12.4 (ref 10–20)
CALCIUM BLD-MCNC: 8.9 MG/DL (ref 8.7–10.4)
CHLORIDE SERPL-SCNC: 110 MMOL/L (ref 98–112)
CO2 SERPL-SCNC: 26 MMOL/L (ref 21–32)
CREAT BLD-MCNC: 1.53 MG/DL
DEPRECATED RDW RBC AUTO: 51 FL (ref 35.1–46.3)
EGFRCR SERPLBLD CKD-EPI 2021: 36 ML/MIN/1.73M2 (ref 60–?)
EOSINOPHIL # BLD AUTO: 0.06 X10(3) UL (ref 0–0.7)
EOSINOPHIL NFR BLD AUTO: 1.8 %
ERYTHROCYTE [DISTWIDTH] IN BLOOD BY AUTOMATED COUNT: 17.2 % (ref 11–15)
GLUCOSE BLD-MCNC: 87 MG/DL (ref 70–99)
GLUCOSE BLDC GLUCOMTR-MCNC: 101 MG/DL (ref 70–99)
GLUCOSE BLDC GLUCOMTR-MCNC: 106 MG/DL (ref 70–99)
GLUCOSE BLDC GLUCOMTR-MCNC: 129 MG/DL (ref 70–99)
GLUCOSE BLDC GLUCOMTR-MCNC: 88 MG/DL (ref 70–99)
HCT VFR BLD AUTO: 27.5 %
HGB BLD-MCNC: 8.7 G/DL
IMM GRANULOCYTES # BLD AUTO: 0.01 X10(3) UL (ref 0–1)
IMM GRANULOCYTES NFR BLD: 0.3 %
LYMPHOCYTES # BLD AUTO: 1.42 X10(3) UL (ref 1–4)
LYMPHOCYTES NFR BLD AUTO: 41.5 %
MAGNESIUM SERPL-MCNC: 1.8 MG/DL (ref 1.6–2.6)
MCH RBC QN AUTO: 25.7 PG (ref 26–34)
MCHC RBC AUTO-ENTMCNC: 31.6 G/DL (ref 31–37)
MCV RBC AUTO: 81.4 FL
MONOCYTES # BLD AUTO: 0.19 X10(3) UL (ref 0.1–1)
MONOCYTES NFR BLD AUTO: 5.6 %
NEUTROPHILS # BLD AUTO: 1.73 X10 (3) UL (ref 1.5–7.7)
NEUTROPHILS # BLD AUTO: 1.73 X10(3) UL (ref 1.5–7.7)
NEUTROPHILS NFR BLD AUTO: 50.5 %
OSMOLALITY SERPL CALC.SUM OF ELEC: 296 MOSM/KG (ref 275–295)
PHOSPHATE SERPL-MCNC: 3.1 MG/DL (ref 2.4–5.1)
PLATELET # BLD AUTO: 218 10(3)UL (ref 150–450)
POTASSIUM SERPL-SCNC: 4.1 MMOL/L (ref 3.5–5.1)
RBC # BLD AUTO: 3.38 X10(6)UL
SODIUM SERPL-SCNC: 142 MMOL/L (ref 136–145)
WBC # BLD AUTO: 3.4 X10(3) UL (ref 4–11)

## 2024-09-26 PROCEDURE — 99233 SBSQ HOSP IP/OBS HIGH 50: CPT | Performed by: HOSPITALIST

## 2024-09-26 PROCEDURE — 76770 US EXAM ABDO BACK WALL COMP: CPT | Performed by: HOSPITALIST

## 2024-09-26 RX ORDER — MAGNESIUM OXIDE 400 MG/1
400 TABLET ORAL ONCE
Status: DISCONTINUED | OUTPATIENT
Start: 2024-09-26 | End: 2024-09-26

## 2024-09-26 RX ORDER — DEXTROSE MONOHYDRATE AND SODIUM CHLORIDE 5; .45 G/100ML; G/100ML
INJECTION, SOLUTION INTRAVENOUS CONTINUOUS
Status: DISCONTINUED | OUTPATIENT
Start: 2024-09-26 | End: 2024-09-27

## 2024-09-26 RX ORDER — AMLODIPINE BESYLATE 5 MG/1
5 TABLET ORAL NIGHTLY
Status: DISCONTINUED | OUTPATIENT
Start: 2024-09-26 | End: 2024-09-27

## 2024-09-26 NOTE — PLAN OF CARE
IV abx for UTI.  PT recommending HH.  EF of 60-65%.  CT brain/neck negative for anything acute.  Possible discharge.      Problem: CARDIOVASCULAR - ADULT  Goal: Maintains optimal cardiac output and hemodynamic stability  Description: INTERVENTIONS:  - Monitor vital signs, rhythm, and trends  - Monitor for bleeding, hypotension and signs of decreased cardiac output  - Evaluate effectiveness of vasoactive medications to optimize hemodynamic stability  - Monitor arterial and/or venous puncture sites for bleeding and/or hematoma  - Assess quality of pulses, skin color and temperature  - Assess for signs of decreased coronary artery perfusion - ex. Angina  - Evaluate fluid balance, assess for edema, trend weights  Outcome: Not Progressing  Goal: Absence of cardiac arrhythmias or at baseline  Description: INTERVENTIONS:  - Continuous cardiac monitoring, monitor vital signs, obtain 12 lead EKG if indicated  - Evaluate effectiveness of antiarrhythmic and heart rate control medications as ordered  - Initiate emergency measures for life threatening arrhythmias  - Monitor electrolytes and administer replacement therapy as ordered  Outcome: Not Progressing

## 2024-09-26 NOTE — SPIRITUAL CARE NOTE
Spiritual Care Visit Note    Patient Name: Eliud Fine Date of Spiritual Care Visit: 24   : 1955 Primary Dx: Acute CVA (cerebrovascular accident) (HCC)       Referred By: Referral From: Nurse    Spiritual Care Taxonomy:    Intended Effects: Aligning care plan with patient's values    Methods: Assist with spiritual/Mosque practices    Interventions: Active listening;Ask guided questions;Ask guided questions about cultural and Mosque values    Visit Type/Summary:     - Spiritual Care: Consulted with RN prior to visit. Offered empathic listening and emotional support. Writer asked if patient would like [employer] resources. Patient would like [employer] resources.  Son, Gokul at bedside.     ELIU Emmnauel CAMII   [id number]     Spiritual Care support can be requested via an Epic consult. For urgent/immediate needs, please contact the On Call  at: Maria Stein: ext [phone]

## 2024-09-26 NOTE — CM/SW NOTE
ENRIQUE received MDO for POLST. ENRIQUE placed POLST form on chart. MD to discuss w/ pt/family, fill out middle section of POLST form, and sign prior to pt's discharge.      GLADYS Marie, LSW [id number]

## 2024-09-26 NOTE — PROGRESS NOTES
[facility]  part of [facility]    Progress Note    Eliud Fine Patient Status:  Inpatient    1955 MRN [id number]   Location [facility] 3W/SW Attending Magdalena Moran MD   Hosp Day # 3 PCP Yao Rodriguez     Chief complaint: weakness  Subjective:     No recurrent neuro deficits  Pt reports feeling much better now, resting in bed   No high fevers    BP overall better    Cr up today    No family at bedside    Objective:   Blood pressure 121/54, pulse 65, temperature 97.5 °F (36.4 °C), temperature source Axillary, resp. rate 20, height 5' 7\" (1.702 m), weight 161 lb 8 oz (73.3 kg), SpO2 98%.    GEN: NAD, alert, conversant, but looks more fatigued  HEENT: conjunctivae/corneas clear.  Left eye conjugate gaze (while Rt covered)  Neck: no adenopathy, no carotid bruit, no JVD, supple  Pulmonary:  clear to auscultation bilaterally  Cardiovascular: S1, S2 normal, no murmur, click, rub or gallop, regular rate and rhythm  Abdominal: soft, non-tender; bowel sounds normal;    Extremities: no cyanosis or edema  Pulses: palpable and symmetric  Skin: No rashes or lesions  Neurologic: Alert and oriented X 3, Rt sided weakness  Psychiatric: calm, cooperative    Assessment and Plan:     Worsening slurred speech and right-sided weakness.    -Patient with history of CVA in  with similar deficits.  -Patient reports deficits worsening over the past 1 day.  -Initial CT brain without acute infarct.    -Patient deemed not a candidate for TPA  -Restart home aspirin, Plavix, and statin therapy  -Allowing permissive hypertension  -CTA reviewed with no high-grade stenosis.  - MRI brain was also obtained that revealed chronic microvascular ischemic disease, no acute strokes.   -Neurology consulted, symptoms c/w  Recrudescence of her old left basal ganglia stroke symptoms in setting of UTI, recomm to cont ASA/plavix, statins   -PT/OT/SLP  -Telemetry  -Neurochecks  -previous TSH nl  -previous B12  low ~186 ('2022)-->recheck ~260 -->suppl  -mag low ~1.6 -->suppl    RRT the morning 9/25 for worsening vision   -Pt reports mostly left eye blurriness  She wears glasses and states \"were working good\" so far  -emergent CT and neurology eval without acute changes or new recommendations  -BP variable, elevated during episode this morning, but other times ok (of note she didn't get her morning BP meds until 10 am -->re time for earlier dosing)  -on coreg 25 mg bid and hydralazine 50 bid, and spironolactone 25 mg  -9/26 --> overall blood pressure much better, still occasionally on the higher side, restart amlodipine 5 mg nightly     Acute Chest Pain  -Resolved   -Initial troponin negative  -Started on ASA, Plavix and statin as above.   -Telemetry monitoring.   -Trending troponins.   -Consider further ischemic evaluation.       Acute cystitis  -Patient presenting with worsening strokelike symptoms as above  -UA with signs of infection  -Starting broad-spectrum antibiotics and IV fluids  -ceftriaxone x 1 on admission, now on iv ancef  -Follow-up urine culture, +E. coli  -Continue to monitor     Acute Kidney Injury on CKD  III   - Avoiding Nephrotoxic agents  - Cont to monitor  - Cr improved initially from ~2 -->1.53 (baseline ~1.2)  Now plateaued, ?  Poor oral intake  -Push oral hydration  -Check renal ultrasound  -Repeat in the morning  -May need to restart fluids     HTN  -Elevations noted  -Allowing for permissive hypertension as above  -restart antihypertensives (acute stroke ruled out)  - Monitor and adjust as needed  -Changes as above    Hypomagnesemia  -suppl, -->cont on discharge, has been low in the past    DVT prophylaxis: heparin sq     Dispo: home with HH once stable, anticipate 1-2 days       Chart reviewed, including current vitals, notes, labs and imaging  Pertinent past medical records reviewed  Labs ordered and medications adjusted as outlined above  Coordinate care with care team/consultants  Discussed  with patient he results of tests, management plan as outlined above, and the need for ongoing hospitalization  D/w RN     MDM high  severe acute illness/or exacerbation of chronic illness posing a threat to life, IV medications, requiring close monitoring in hospital.       Results:     Lab Results   Component Value Date    WBC 3.4 (L) 09/26/2024    HGB 8.7 (L) 09/26/2024    HCT 27.5 (L) 09/26/2024    .0 09/26/2024    CREATSERUM 1.53 (H) 09/26/2024    BUN 19 09/26/2024     09/26/2024    K 4.1 09/26/2024     09/26/2024    CO2 26.0 09/26/2024    GLU 87 09/26/2024    CA 8.9 09/26/2024    ALB 3.4 09/26/2024    ALKPHO 70 09/25/2024    BILT 0.3 09/25/2024    TP 6.5 09/25/2024    AST 16 09/25/2024    ALT 15 09/25/2024    PTT 30.4 09/25/2024    INR 1.11 09/25/2024    DDIMER 2.40 (H) 10/11/2022    ESRML 37 (H) 12/18/2022    CRP 0.31 (H) 12/18/2022    MG 1.8 09/26/2024    PHOS 3.1 09/26/2024    CK 79 03/24/2024    B12 260 09/25/2024       CT STROKE BRAIN (NO IV)(CPT=70450)    Result Date: 9/25/2024  CONCLUSION:  1. No evidence of acute intracranial hemorrhage or extended signs of acute large vessel infarction. If symptoms or suspicion for acute ischemia persist, consider followup brain MR. 2. Stable chronic lacunar infarcts in the right caudate nucleus and left thalamus. 3. Nonspecific white matter changes involving both cerebral hemispheres that most likely reflect sequelae of chronic microangiopathy. 4. Intracranial atherosclerosis. 5. Nonspecific rightward conjugate gaze deviation at the time of imaging.  This report was called immediately at 0847 hours to the inpatient floor and discussed with ODALYS Blanchard.    Dictated by (CST): Kelvin Amaya MD on 9/25/2024 at 8:44 AM     Finalized by (CST): Kelvin Amaya MD on 9/25/2024 at 8:49 AM                 MANUEL WESTON MD  9/26/2024

## 2024-09-26 NOTE — PROGRESS NOTES
[facility]  part of [facility]    Progress Note    Eliud Fine Patient Status:  Inpatient    1955 MRN [id number]   Location [facility] 3W/SW Attending Magdalena Moran MD   Hosp Day # 2 PCP Yao Rodriguez     Chief complaint: weakness  Subjective:   RRT this morning for worsening vision     Pt reports feeling much better now, resting in bed, reports mostly left eye blurriness  She wears glasses and states \"were working good\" so far  No high fevers    BP variable, elevated during episode this morning, but other times ok    Sister at bedside    Objective:   Blood pressure 133/61, pulse 65, temperature 98.7 °F (37.1 °C), temperature source Oral, resp. rate 18, height 5' 7\" (1.702 m), weight 162 lb 14.4 oz (73.9 kg), SpO2 99%.    GEN: NAD, alert, conversant, but looks more fatigued  HEENT: conjunctivae/corneas clear.  Left eye conjugate gaze (while Rt covered)  Neck: no adenopathy, no carotid bruit, no JVD, supple  Pulmonary:  clear to auscultation bilaterally  Cardiovascular: S1, S2 normal, no murmur, click, rub or gallop, regular rate and rhythm  Abdominal: soft, non-tender; bowel sounds normal;    Extremities: no cyanosis or edema  Pulses: palpable and symmetric  Skin: No rashes or lesions  Neurologic: Alert and oriented X 3, Rt sided weakness  Psychiatric: calm, cooperative    Assessment and Plan:     Worsening slurred speech and right-sided weakness.  -Patient with history of CVA in  with similar deficits.  -Patient reports deficits worsening over the past 1 day.  -Initial CT brain without acute infarct.    -Patient deemed not a candidate for TPA  -Restart home aspirin, Plavix, and statin therapy  -Allowing permissive hypertension  -CTA reviewed with no high-grade stenosis.  - MRI brain was also obtained that revealed chronic microvascular ischemic disease, no acute strokes.   -Neurology consulted, symptoms c/w  Recrudescence of her old left basal ganglia stroke  symptoms in setting of UTI, recomm to cont ASA/plavix, statins   -PT/OT/SLP  -Telemetry  -Neurochecks  -previous TSH nl  -previous B12 low ~186 ('2022)-->recheck ~260 -->suppl  -mag low ~1.6 -->suppl    RRT this morning 9/25 for worsening vision   -Pt reports mostly left eye blurriness  She wears glasses and states \"were working good\" so far  -emergent CT and neurology eval without acute changes or new recommendations  -BP variable, elevated during episode this morning, but other times ok (of note she didn't get her morning BP meds until 10 am -->re time for earlier dosing)  -on coreg 25 mg bid and hydralazine 50 bid     Acute Chest Pain  -Resolved   -Initial troponin negative  -Started on ASA, Plavix and statin as above.   -Telemetry monitoring.   -Trending troponins.   -Consider further ischemic evaluation.       Acute cystitis  -Patient presenting with worsening strokelike symptoms as above  -UA with signs of infection  -Starting broad-spectrum antibiotics and IV fluids  -ceftriaxone x 1 on admission, now on iv ancef  -Follow-up urine culture, +E. coli  -Continue to monitor     Acute Kidney Injury on CKD  III   - IVF  - Avoiding Nephrotoxic agents  - Cont to monitor  - Cr trending down 2 -->1.53 (baseline ~1.2)     HTN  -Elevations noted  -Allowing for permissive hypertension as above  -restart antihypertensives (acute stroke ruled out)  - Monitor and adjust as needed    Hypomagnesemia  -suppl, -->cont on discharge, has been low in the past    DVT prophylaxis: heparin sq     Dispo: home with HH once stable, anticipate 1-2 days       Chart reviewed, including current vitals, notes, labs and imaging  Pertinent past medical records reviewed  Labs ordered and medications adjusted as outlined above  Coordinate care with care team/consultants  Discussed with patient and family the results of tests, management plan as outlined above, and the need for ongoing hospitalization  D/w RN     MDM high  severe acute illness/or  exacerbation of chronic illness posing a threat to life, IV medications, requiring close monitoring in hospital.       Results:     Lab Results   Component Value Date    WBC 3.1 (L) 09/25/2024    HGB 9.2 (L) 09/25/2024    HCT 28.9 (L) 09/25/2024    .0 09/25/2024    CREATSERUM 1.43 (H) 09/25/2024    BUN 18 09/25/2024     09/25/2024    K 4.1 09/25/2024     09/25/2024    CO2 25.0 09/25/2024    GLU 95 09/25/2024    CA 9.2 09/25/2024    ALB 3.5 09/25/2024    ALKPHO 70 09/25/2024    BILT 0.3 09/25/2024    TP 6.5 09/25/2024    AST 16 09/25/2024    ALT 15 09/25/2024    PTT 30.4 09/25/2024    INR 1.11 09/25/2024    DDIMER 2.40 (H) 10/11/2022    ESRML 37 (H) 12/18/2022    CRP 0.31 (H) 12/18/2022    MG 1.6 09/25/2024    PHOS 3.3 09/25/2024    CK 79 03/24/2024    B12 260 09/25/2024       CT STROKE BRAIN (NO IV)(CPT=70450)    Result Date: 9/25/2024  CONCLUSION:  1. No evidence of acute intracranial hemorrhage or extended signs of acute large vessel infarction. If symptoms or suspicion for acute ischemia persist, consider followup brain MR. 2. Stable chronic lacunar infarcts in the right caudate nucleus and left thalamus. 3. Nonspecific white matter changes involving both cerebral hemispheres that most likely reflect sequelae of chronic microangiopathy. 4. Intracranial atherosclerosis. 5. Nonspecific rightward conjugate gaze deviation at the time of imaging.  This report was called immediately at 0847 hours to the inpatient floor and discussed with ODALYS Blanchard.    Dictated by (CST): Kelvin Amaya MD on 9/25/2024 at 8:44 AM     Finalized by (CST): Kelvin Amaya MD on 9/25/2024 at 8:49 AM          MRI BRAIN (CPT=70551)    Result Date: 9/24/2024  CONCLUSION:   Severe chronic microvascular ischemic disease without acute intracranial abnormality.   Multiple chronic foci of hemosiderin deposition seen involving the bilateral cerebral hemispheres consistent with remote microhemorrhages.  This is secondary to chronic  microangiopathy and can be seen with amyloid.  No acute hemorrhage.  Preliminary report was submitted by the [facility] teleradiologist and there are no significant discrepancies.  Dictated by (CST): Eugenio Espitia MD on 9/24/2024 at 8:44 AM     Finalized by (CST): Eugenio Espitia MD on 9/24/2024 at 8:51 AM                 MANUEL WESTON MD  9/25/2024

## 2024-09-26 NOTE — PLAN OF CARE
Pt is alertx4, Neuro q4, NIH daily, RA, IV ancef, D5-Nacl 0.4% @75ml/hr, US of kidney done today, see results. No complaints, pt up to chair and walking with mobility tech Jennifer, pt son updated.   Problem: CARDIOVASCULAR - ADULT  Goal: Maintains optimal cardiac output and hemodynamic stability  Description: INTERVENTIONS:  - Monitor vital signs, rhythm, and trends  - Monitor for bleeding, hypotension and signs of decreased cardiac output  - Evaluate effectiveness of vasoactive medications to optimize hemodynamic stability  - Monitor arterial and/or venous puncture sites for bleeding and/or hematoma  - Assess quality of pulses, skin color and temperature  - Assess for signs of decreased coronary artery perfusion - ex. Angina  - Evaluate fluid balance, assess for edema, trend weights  Outcome: Progressing  Goal: Absence of cardiac arrhythmias or at baseline  Description: INTERVENTIONS:  - Continuous cardiac monitoring, monitor vital signs, obtain 12 lead EKG if indicated  - Evaluate effectiveness of antiarrhythmic and heart rate control medications as ordered  - Initiate emergency measures for life threatening arrhythmias  - Monitor electrolytes and administer replacement therapy as ordered  Outcome: Progressing     Problem: PAIN - ADULT  Goal: Verbalizes/displays adequate comfort level or patient's stated pain goal  Description: INTERVENTIONS:  - Encourage pt to monitor pain and request assistance  - Assess pain using appropriate pain scale  - Administer analgesics based on type and severity of pain and evaluate response  - Implement non-pharmacological measures as appropriate and evaluate response  - Consider cultural and social influences on pain and pain management  - Manage/alleviate anxiety  - Utilize distraction and/or relaxation techniques  - Monitor for opioid side effects  - Notify MD/LIP if interventions unsuccessful or patient reports new pain  - Anticipate increased pain with activity and pre-medicate  as appropriate  Outcome: Progressing     Problem: Diabetes/Glucose Control  Goal: Glucose maintained within prescribed range  Description: INTERVENTIONS:  - Monitor Blood Glucose as ordered  - Assess for signs and symptoms of hyperglycemia and hypoglycemia  - Administer ordered medications to maintain glucose within target range  - Assess barriers to adequate nutritional intake and initiate nutrition consult as needed  - Instruct patient on self management of diabetes  Outcome: Progressing     Problem: NEUROLOGICAL - ADULT  Goal: Achieves stable or improved neurological status  Description: INTERVENTIONS  - Assess for and report changes in neurological status  - Initiate measures to prevent increased intracranial pressure  - Maintain blood pressure and fluid volume within ordered parameters to optimize cerebral perfusion and minimize risk of hemorrhage  - Monitor temperature, glucose, and sodium. Initiate appropriate interventions as ordered  Outcome: Progressing  Goal: Achieves maximal functionality and self care  Description: INTERVENTIONS  - Monitor swallowing and airway patency with patient fatigue and changes in neurological status  - Encourage and assist patient to increase activity and self care with guidance from PT/OT  - Encourage visually impaired, hearing impaired and aphasic patients to use assistive/communication devices  Outcome: Progressing

## 2024-09-26 NOTE — PROGRESS NOTES
09/26/24 1459   VISIT TYPE   PT Inpatient Visit Type (Documentation Required) Attempted Treatment  (pt heading off unit to US)

## 2024-09-27 VITALS
OXYGEN SATURATION: 99 % | BODY MASS INDEX: 25.56 KG/M2 | HEART RATE: 69 BPM | RESPIRATION RATE: 18 BRPM | HEIGHT: 67 IN | TEMPERATURE: 99 F | DIASTOLIC BLOOD PRESSURE: 51 MMHG | SYSTOLIC BLOOD PRESSURE: 125 MMHG | WEIGHT: 162.88 LBS

## 2024-09-27 LAB
ALBUMIN SERPL-MCNC: 3.4 G/DL (ref 3.2–4.8)
ANION GAP SERPL CALC-SCNC: 3 MMOL/L (ref 0–18)
BUN BLD-MCNC: 17 MG/DL (ref 9–23)
BUN/CREAT SERPL: 11.4 (ref 10–20)
CALCIUM BLD-MCNC: 9.3 MG/DL (ref 8.7–10.4)
CHLORIDE SERPL-SCNC: 112 MMOL/L (ref 98–112)
CO2 SERPL-SCNC: 27 MMOL/L (ref 21–32)
CREAT BLD-MCNC: 1.49 MG/DL
EGFRCR SERPLBLD CKD-EPI 2021: 37 ML/MIN/1.73M2 (ref 60–?)
GLUCOSE BLD-MCNC: 110 MG/DL (ref 70–99)
GLUCOSE BLDC GLUCOMTR-MCNC: 112 MG/DL (ref 70–99)
GLUCOSE BLDC GLUCOMTR-MCNC: 153 MG/DL (ref 70–99)
MAGNESIUM SERPL-MCNC: 2.1 MG/DL (ref 1.6–2.6)
OSMOLALITY SERPL CALC.SUM OF ELEC: 296 MOSM/KG (ref 275–295)
PHOSPHATE SERPL-MCNC: 2.9 MG/DL (ref 2.4–5.1)
POTASSIUM SERPL-SCNC: 4.3 MMOL/L (ref 3.5–5.1)
SODIUM SERPL-SCNC: 142 MMOL/L (ref 136–145)

## 2024-09-27 PROCEDURE — 5A09357 ASSISTANCE WITH RESPIRATORY VENTILATION, LESS THAN 24 CONSECUTIVE HOURS, CONTINUOUS POSITIVE AIRWAY PRESSURE: ICD-10-PCS | Performed by: HOSPITALIST

## 2024-09-27 RX ORDER — AMLODIPINE BESYLATE 10 MG/1
10 TABLET ORAL NIGHTLY
Qty: 30 TABLET | Refills: 1 | Status: SHIPPED | OUTPATIENT
Start: 2024-09-27

## 2024-09-27 RX ORDER — ASPIRIN 81 MG/1
81 TABLET, CHEWABLE ORAL DAILY
Qty: 30 TABLET | Refills: 1 | Status: SHIPPED | OUTPATIENT
Start: 2024-09-27

## 2024-09-27 RX ORDER — MAGNESIUM OXIDE 400 MG/1
400 TABLET ORAL DAILY
Qty: 30 TABLET | Refills: 1 | Status: SHIPPED | OUTPATIENT
Start: 2024-09-27

## 2024-09-27 RX ORDER — HYDRALAZINE HYDROCHLORIDE 50 MG/1
50 TABLET, FILM COATED ORAL 2 TIMES DAILY
Status: SHIPPED | COMMUNITY
Start: 2024-09-27

## 2024-09-27 RX ORDER — CEPHALEXIN 500 MG/1
500 CAPSULE ORAL 2 TIMES DAILY
Qty: 4 CAPSULE | Refills: 0 | Status: SHIPPED | OUTPATIENT
Start: 2024-09-27 | End: 2024-09-29

## 2024-09-27 NOTE — PHYSICAL THERAPY NOTE
PHYSICAL THERAPY TREATMENT NOTE - INPATIENT     Room Number: 309/309-A       Presenting Problem: worsening slurred speech and RUE/RLE weakness, UTI  Co-Morbidities : h/o LG BG CVA  with RT sided weakness, PAPO,    Problem List  Principal Problem:    Acute CVA (cerebrovascular accident) ([facility])  Active Problems:    CVA (cerebral vascular accident) ([facility])      PHYSICAL THERAPY ASSESSMENT   Patient demonstrates fair progress this session, goals  remain in progress.      Patient is requiring contact guard assist as a result of the following impairments: decreased functional strength, impaired standing balance, and decreased muscular endurance.     Patient continues to function near baseline with bed mobility, transfers, gait, stair negotiation, and standing prolonged periods.  Next session anticipate patient to progress bed mobility, transfers, gait, and stair negotiation.  Physical Therapy will continue to follow patient for duration of hospitalization.    Patient continues to benefit from continued skilled PT services: at discharge to promote prior level of function.  Anticipate patient will return home with home health PT and assist from son at home.    PLAN  PT Treatment Plan: Bed mobility;Endurance;Patient education;Family education;Gait training;Strengthening;Stair training;Transfer training;Balance training  Frequency (Obs): 5x/week    SUBJECTIVE  I want to go to the bathroom    OBJECTIVE  Precautions: Limb alert - right    WEIGHT BEARING RESTRICTION                PAIN ASSESSMENT   Ratin  Location: no c/o pain  Management Techniques: Activity promotion    BALANCE  Static Sitting: Fair +  Dynamic Sitting: Fair  Static Standing: Fair -  Dynamic Standing: Fair -    ACTIVITY TOLERANCE                          O2 WALK       AM-PAC '6-Clicks' INPATIENT SHORT FORM - BASIC MOBILITY  How much difficulty does the patient currently have...  Patient Difficulty: Turning over in bed (including adjusting bedclothes, sheets  and blankets)?: A Little   Patient Difficulty: Sitting down on and standing up from a chair with arms (e.g., wheelchair, bedside commode, etc.): A Little   Patient Difficulty: Moving from lying on back to sitting on the side of the bed?: A Little   How much help from another person does the patient currently need...   Help from Another: Moving to and from a bed to a chair (including a wheelchair)?: A Little   Help from Another: Need to walk in hospital room?: A Little   Help from Another: Climbing 3-5 steps with a railing?: A Little     AM-PAC Score:  Raw Score: 18   Approx Degree of Impairment: 46.58%   Standardized Score (AM-PAC Scale): 43.63   CMS Modifier (G-Code): CK    FUNCTIONAL ABILITY STATUS  Functional Mobility/Gait Assessment  Gait Assistance: Contact guard assist  Distance (ft): 2x15  Assistive Device: Rolling walker (rollator)  Pattern: R Decreased stance time    Sit to Stand: contact guard assist with inc time and rollator    Skilled Therapy Provided: Pt ok to be seen per ODALYS Minor. Pt willing to participate in PT session. Pt plans to d/c today. Pt with inc time for functional mobility, pt aware of R sided weakness. Pt locked breaks on rollator prior to initiating mobility. Pt amb with dec stance time R, but with ability to clear feet in swing phase. Pt cued for hand placement and ecc control for stand to sit at toilet and at bedside chair. Assist provided for yolanda cares in bathroom. Pt able to stand safely at counter to wash hands. PCT present to assist pt to dress for d/c. Pt had no further questions at this time.    The patient's Approx Degree of Impairment: 46.58% has been calculated based on documentation in the [facility] '6 clicks' Inpatient Daily Activity Short Form.  Research supports that patients with this level of impairment may benefit from home with HHPT.  Final disposition will be made by interdisciplinary medical team.      Patient End of Session: Up in chair;Needs met;With  staff;RN  aware of session/findings;All patient questions and concerns addressed;Alarm set    CURRENT GOALS   Goals to be met by: 9/30/24  Patient Goal Patient's self-stated goal is: to go home   Goal #1 Patient is able to demonstrate supine - sit EOB @ level: modified independent      Goal #1   Current Status  NT, pt received up in chair   Goal #2 Patient is able to demonstrate transfers Sit to/from Stand at assistance level: supervision with rollator      Goal #2  Current Status  CGA with rollator   Goal #3 Patient is able to ambulate 40 feet with assist device: rollator at assistance level: supervision   Goal #3   Current Status  2x15 feet with RW and CGA   Goal #4 Patient will negotiate 4 stairs/one curb w/ assistive device and minimal assistance   Goal #4   Current Status  NT   Goal #5 Patient to demonstrate independence with home activity/exercise instructions provided to patient in preparation for discharge.   Goal #5   Current Status  in progress   Goal #6     Goal #6  Current Status       Gait Training: 15 minutes  Therapeutic Activity: 5 minutes

## 2024-09-27 NOTE — SPIRITUAL CARE NOTE
Spiritual Care Visit Note    Patient Name: Eliud Fine Date of Spiritual Care Visit: 24   : 1955 Primary Dx: Acute CVA (cerebrovascular accident) (HCC)       Referred By: Referral From:     Spiritual Care Taxonomy:    Intended Effects: Aligning care plan with patient's values    Methods: Offer spiritual/Zoroastrian support    Interventions: Provide a Zoroastrian item(s)    Visit Type/Summary:     - Spiritual Care: Patient and family expressed appreciation for  visit. Provided support for Patient's spiritual/Zoroastrian requests. Provided Zoroastrian resources/items, per Patient's request.      ELIU Emmanuel CAMII   [id number]     Spiritual Care support can be requested via an Epic consult. For urgent/immediate needs, please contact the On Call  at: [place]: ext [phone]

## 2024-09-27 NOTE — PLAN OF CARE
Eliud Alvarez is resting well. Plan for patient to discharge home when able.     Problem: CARDIOVASCULAR - ADULT  Goal: Maintains optimal cardiac output and hemodynamic stability  Description: INTERVENTIONS:  - Monitor vital signs, rhythm, and trends  - Monitor for bleeding, hypotension and signs of decreased cardiac output  - Evaluate effectiveness of vasoactive medications to optimize hemodynamic stability  - Monitor arterial and/or venous puncture sites for bleeding and/or hematoma  - Assess quality of pulses, skin color and temperature  - Assess for signs of decreased coronary artery perfusion - ex. Angina  - Evaluate fluid balance, assess for edema, trend weights  Outcome: Progressing  Goal: Absence of cardiac arrhythmias or at baseline  Description: INTERVENTIONS:  - Continuous cardiac monitoring, monitor vital signs, obtain 12 lead EKG if indicated  - Evaluate effectiveness of antiarrhythmic and heart rate control medications as ordered  - Initiate emergency measures for life threatening arrhythmias  - Monitor electrolytes and administer replacement therapy as ordered  Outcome: Progressing     Problem: Patient/Family Goals  Goal: Patient/Family Long Term Goal  Description: Patient's Long Term Goal: to go back home    Interventions:  - follow medical regimen  - See additional Care Plan goals for specific interventions  Outcome: Progressing  Goal: Patient/Family Short Term Goal  Description: Patient's Short Term Goal: slurred speech    Interventions:   - follow medical regimen  - See additional Care Plan goals for specific interventions  Outcome: Progressing     Problem: PAIN - ADULT  Goal: Verbalizes/displays adequate comfort level or patient's stated pain goal  Description: INTERVENTIONS:  - Encourage pt to monitor pain and request assistance  - Assess pain using appropriate pain scale  - Administer analgesics based on type and severity of pain and evaluate response  - Implement non-pharmacological measures as  appropriate and evaluate response  - Consider cultural and social influences on pain and pain management  - Manage/alleviate anxiety  - Utilize distraction and/or relaxation techniques  - Monitor for opioid side effects  - Notify MD/LIP if interventions unsuccessful or patient reports new pain  - Anticipate increased pain with activity and pre-medicate as appropriate  Outcome: Progressing     Problem: Patient Centered Care  Goal: Patient preferences are identified and integrated in the patient's plan of care  Description: Interventions:  - What would you like us to know as we care for you?   - Provide timely, complete, and accurate information to patient/family  - Incorporate patient and family knowledge, values, beliefs, and cultural backgrounds into the planning and delivery of care  - Encourage patient/family to participate in care and decision-making at the level they choose  - Honor patient and family perspectives and choices  Outcome: Progressing     Problem: Diabetes/Glucose Control  Goal: Glucose maintained within prescribed range  Description: INTERVENTIONS:  - Monitor Blood Glucose as ordered  - Assess for signs and symptoms of hyperglycemia and hypoglycemia  - Administer ordered medications to maintain glucose within target range  - Assess barriers to adequate nutritional intake and initiate nutrition consult as needed  - Instruct patient on self management of diabetes  Outcome: Progressing     Problem: NEUROLOGICAL - ADULT  Goal: Achieves stable or improved neurological status  Description: INTERVENTIONS  - Assess for and report changes in neurological status  - Initiate measures to prevent increased intracranial pressure  - Maintain blood pressure and fluid volume within ordered parameters to optimize cerebral perfusion and minimize risk of hemorrhage  - Monitor temperature, glucose, and sodium. Initiate appropriate interventions as ordered  Outcome: Progressing  Goal: Achieves maximal functionality and  self care  Description: INTERVENTIONS  - Monitor swallowing and airway patency with patient fatigue and changes in neurological status  - Encourage and assist patient to increase activity and self care with guidance from PT/OT  - Encourage visually impaired, hearing impaired and aphasic patients to use assistive/communication devices  Outcome: Progressing

## 2024-09-27 NOTE — PLAN OF CARE
Patient was provided with discharge instructions, education, and follow up information. Patient's son present for discharge instructions with patient's consent. Prescriptions were already sent electronically to patient's pharmacy. Patient verbalizes understanding of follow up information, specifically PCP and Neurology. Patient has no questions after reviewing all instructions and will be going home.     Liam MORROW, Discharge Leader [id number]

## 2024-09-27 NOTE — CM/SW NOTE
09/27/24 1000   Discharge disposition   Expected discharge disposition Home-Health   Post Acute Care Provider   ([facility])   Discharge transportation Private car       Per chart, pt has DC order for today.    [facility] notified via [employer] messenger.    Pt is cleared from SW/CM stand point.    PLAN: Home w/ son & [facility]            GLADYS Marie, LSW [id number]

## 2024-09-28 NOTE — PLAN OF CARE
Patient remains on q shift neuros and NIH daily. Denies pain throughout shift. POC updated to patient.     Problem: SAFETY ADULT - FALL  Goal: Free from fall injury  Description: INTERVENTIONS:  - Assess pt frequently for physical needs  - Identify cognitive and physical deficits and behaviors that affect risk of falls.  - [place] fall precautions as indicated by assessment.  - Educate pt/family on patient safety including physical limitations  - Instruct pt to call for assistance with activity based on assessment  - Modify environment to reduce risk of injury  - Provide assistive devices as appropriate  - Consider OT/PT consult to assist with strengthening/mobility  - Encourage toileting schedule  Outcome: Adequate for Discharge     Problem: PAIN - ADULT  Goal: Verbalizes/displays adequate comfort level or patient's stated pain goal  Description: INTERVENTIONS:  - Encourage pt to monitor pain and request assistance  - Assess pain using appropriate pain scale  - Administer analgesics based on type and severity of pain and evaluate response  - Implement non-pharmacological measures as appropriate and evaluate response  - Consider cultural and social influences on pain and pain management  - Manage/alleviate anxiety  - Utilize distraction and/or relaxation techniques  - Monitor for opioid side effects  - Notify MD/LIP if interventions unsuccessful or patient reports new pain  - Anticipate increased pain with activity and pre-medicate as appropriate  Outcome: Adequate for Discharge     Problem: CARDIOVASCULAR - ADULT  Goal: Maintains optimal cardiac output and hemodynamic stability  Description: INTERVENTIONS:  - Monitor vital signs, rhythm, and trends  - Monitor for bleeding, hypotension and signs of decreased cardiac output  - Evaluate effectiveness of vasoactive medications to optimize hemodynamic stability  - Monitor arterial and/or venous puncture sites for bleeding and/or hematoma  - Assess quality of pulses,  skin color and temperature  - Assess for signs of decreased coronary artery perfusion - ex. Angina  - Evaluate fluid balance, assess for edema, trend weights  Outcome: Adequate for Discharge  Goal: Absence of cardiac arrhythmias or at baseline  Description: INTERVENTIONS:  - Continuous cardiac monitoring, monitor vital signs, obtain 12 lead EKG if indicated  - Evaluate effectiveness of antiarrhythmic and heart rate control medications as ordered  - Initiate emergency measures for life threatening arrhythmias  - Monitor electrolytes and administer replacement therapy as ordered  Outcome: Adequate for Discharge     Problem: MUSCULOSKELETAL - ADULT  Goal: Return mobility to safest level of function  Description: INTERVENTIONS:  - Assess patient stability and activity tolerance for standing, transferring and ambulating w/ or w/o assistive devices  - Assist with transfers and ambulation using safe patient handling equipment as needed  - Ensure adequate protection for wounds/incisions during mobilization  - Obtain PT/OT consults as needed  - Advance activity as appropriate  - Communicate ordered activity level and limitations with patient/family  Outcome: Adequate for Discharge  Goal: Maintain proper alignment of affected body part  Description: INTERVENTIONS:  - Support and protect limb and body alignment per provider's orders  - Instruct and reinforce with patient and family use of appropriate assistive device and precautions (e.g. spinal or hip dislocation precautions)  Outcome: Adequate for Discharge     Problem: NEUROLOGICAL - ADULT  Goal: Achieves stable or improved neurological status  Description: INTERVENTIONS  - Assess for and report changes in neurological status  - Initiate measures to prevent increased intracranial pressure  - Maintain blood pressure and fluid volume within ordered parameters to optimize cerebral perfusion and minimize risk of hemorrhage  - Monitor temperature, glucose, and sodium. Initiate  appropriate interventions as ordered  Outcome: Adequate for Discharge  Goal: Absence of seizures  Description: INTERVENTIONS  - Monitor for seizure activity  - Administer anti-seizure medications as ordered  - Monitor neurological status  Outcome: Adequate for Discharge  Goal: Remains free of injury related to seizure activity  Description: INTERVENTIONS:  - Maintain airway, patient safety  and administer oxygen as ordered  - Monitor patient for seizure activity, document and report duration and description of seizure to MD/LIP  - If seizure occurs, turn patient to side and suction secretions as needed  - Reorient patient post seizure  - Seizure pads on all 4 side rails  - Instruct patient/family to notify RN of any seizure activity  - Instruct patient/family to call for assistance with activity based on assessment  Outcome: Adequate for Discharge  Goal: Achieves maximal functionality and self care  Description: INTERVENTIONS  - Monitor swallowing and airway patency with patient fatigue and changes in neurological status  - Encourage and assist patient to increase activity and self care with guidance from PT/OT  - Encourage visually impaired, hearing impaired and aphasic patients to use assistive/communication devices  Outcome: Adequate for Discharge

## 2024-10-01 ENCOUNTER — PATIENT OUTREACH (OUTPATIENT)
Dept: CASE MANAGEMENT | Age: 69
End: 2024-10-01

## 2024-10-01 NOTE — PROGRESS NOTES
Hospital follow up.    Stroke follow up with any neurologist.    PCP follow up.  Patient followed up with PCP today (10/1).    Theresa Gil M.D.  Neurology; Clinical Neurophysiology; Epilepsy  [address] [address]  [phone]  Monday 11/11 @1:50    No answer on return call; left a message with appointment information and callback information.    Closing encounter.

## 2024-10-02 NOTE — PAYOR COMM NOTE
Discharge Notification    Patient Name: ANKUSH HOFFMAN  Payor: MARJORIE DAVE [facility]  Subscriber #: [id number]  Authorization Number: [id number]  Admit Date/Time: 9/23/2024 11:00 AM  Discharge Date/Time: 9/27/2024 3:36 PM

## 2024-10-24 ENCOUNTER — TELEPHONE (OUTPATIENT)
Facility: CLINIC | Age: 69
End: 2024-10-24

## 2024-10-24 NOTE — TELEPHONE ENCOUNTER
Referral received via fax from [facility] on 10/8/24. Referral expires on 10/8/2025. No appt made yet. Referral in GI referral binder at .

## 2024-11-04 ENCOUNTER — HOSPITAL ENCOUNTER (INPATIENT)
Facility: HOSPITAL | Age: 69
LOS: 2 days | Discharge: HOME HEALTH | End: 2024-11-06
Attending: EMERGENCY MEDICINE | Admitting: HOSPITALIST
Payer: MEDICARE

## 2024-11-04 ENCOUNTER — APPOINTMENT (OUTPATIENT)
Dept: CT IMAGING | Facility: HOSPITAL | Age: 69
End: 2024-11-04
Payer: MEDICARE

## 2024-11-04 ENCOUNTER — APPOINTMENT (OUTPATIENT)
Dept: CT IMAGING | Facility: HOSPITAL | Age: 69
End: 2024-11-04
Attending: EMERGENCY MEDICINE
Payer: MEDICARE

## 2024-11-04 DIAGNOSIS — R53.1 RIGHT SIDED WEAKNESS: ICD-10-CM

## 2024-11-04 DIAGNOSIS — R47.81 SLURRED SPEECH: Primary | ICD-10-CM

## 2024-11-04 PROBLEM — R29.898 WEAKNESS OF RIGHT UPPER EXTREMITY: Status: ACTIVE | Noted: 2024-11-04

## 2024-11-04 LAB
ANION GAP SERPL CALC-SCNC: 5 MMOL/L (ref 0–18)
BASOPHILS # BLD AUTO: 0.02 X10(3) UL (ref 0–0.2)
BASOPHILS NFR BLD AUTO: 0.5 %
BILIRUB UR QL: NEGATIVE
BUN BLD-MCNC: 24 MG/DL (ref 9–23)
BUN/CREAT SERPL: 12.7 (ref 10–20)
CALCIUM BLD-MCNC: 9.5 MG/DL (ref 8.7–10.4)
CHLORIDE SERPL-SCNC: 111 MMOL/L (ref 98–112)
CHOLEST SERPL-MCNC: 89 MG/DL (ref ?–200)
CLARITY UR: CLEAR
CO2 SERPL-SCNC: 26 MMOL/L (ref 21–32)
COLOR UR: YELLOW
CREAT BLD-MCNC: 1.89 MG/DL
DEPRECATED RDW RBC AUTO: 48.2 FL (ref 35.1–46.3)
EGFRCR SERPLBLD CKD-EPI 2021: 28 ML/MIN/1.73M2 (ref 60–?)
EOSINOPHIL # BLD AUTO: 0.05 X10(3) UL (ref 0–0.7)
EOSINOPHIL NFR BLD AUTO: 1.2 %
ERYTHROCYTE [DISTWIDTH] IN BLOOD BY AUTOMATED COUNT: 15.8 % (ref 11–15)
EST. AVERAGE GLUCOSE BLD GHB EST-MCNC: 105 MG/DL (ref 68–126)
GLUCOSE BLD-MCNC: 91 MG/DL (ref 70–99)
GLUCOSE BLDC GLUCOMTR-MCNC: 100 MG/DL (ref 70–99)
GLUCOSE UR-MCNC: NORMAL MG/DL
HBA1C MFR BLD: 5.3 % (ref ?–5.7)
HCT VFR BLD AUTO: 29.7 %
HDLC SERPL-MCNC: 29 MG/DL (ref 40–59)
HGB BLD-MCNC: 9.5 G/DL
HGB UR QL STRIP.AUTO: NEGATIVE
IMM GRANULOCYTES # BLD AUTO: 0.01 X10(3) UL (ref 0–1)
IMM GRANULOCYTES NFR BLD: 0.2 %
KETONES UR-MCNC: NEGATIVE MG/DL
LDLC SERPL CALC-MCNC: 43 MG/DL (ref ?–100)
LEUKOCYTE ESTERASE UR QL STRIP.AUTO: 75
LYMPHOCYTES # BLD AUTO: 1.5 X10(3) UL (ref 1–4)
LYMPHOCYTES NFR BLD AUTO: 37.2 %
MCH RBC QN AUTO: 26.7 PG (ref 26–34)
MCHC RBC AUTO-ENTMCNC: 32 G/DL (ref 31–37)
MCV RBC AUTO: 83.4 FL
MONOCYTES # BLD AUTO: 0.34 X10(3) UL (ref 0.1–1)
MONOCYTES NFR BLD AUTO: 8.4 %
NEUTROPHILS # BLD AUTO: 2.11 X10 (3) UL (ref 1.5–7.7)
NEUTROPHILS # BLD AUTO: 2.11 X10(3) UL (ref 1.5–7.7)
NEUTROPHILS NFR BLD AUTO: 52.5 %
NITRITE UR QL STRIP.AUTO: NEGATIVE
NONHDLC SERPL-MCNC: 60 MG/DL (ref ?–130)
OSMOLALITY SERPL CALC.SUM OF ELEC: 298 MOSM/KG (ref 275–295)
PH UR: 5.5 [PH] (ref 5–8)
PLATELET # BLD AUTO: 223 10(3)UL (ref 150–450)
POTASSIUM SERPL-SCNC: 4 MMOL/L (ref 3.5–5.1)
PROT UR-MCNC: 20 MG/DL
RBC # BLD AUTO: 3.56 X10(6)UL
SODIUM SERPL-SCNC: 142 MMOL/L (ref 136–145)
SP GR UR STRIP: >1.03 (ref 1–1.03)
TRIGL SERPL-MCNC: 80 MG/DL (ref 30–149)
UROBILINOGEN UR STRIP-ACNC: NORMAL
VLDLC SERPL CALC-MCNC: 11 MG/DL (ref 0–30)
WBC # BLD AUTO: 4 X10(3) UL (ref 4–11)

## 2024-11-04 PROCEDURE — 70450 CT HEAD/BRAIN W/O DYE: CPT | Performed by: EMERGENCY MEDICINE

## 2024-11-04 PROCEDURE — 70498 CT ANGIOGRAPHY NECK: CPT | Performed by: EMERGENCY MEDICINE

## 2024-11-04 PROCEDURE — 70496 CT ANGIOGRAPHY HEAD: CPT | Performed by: EMERGENCY MEDICINE

## 2024-11-04 PROCEDURE — 99222 1ST HOSP IP/OBS MODERATE 55: CPT | Performed by: HOSPITALIST

## 2024-11-04 RX ORDER — HYDRALAZINE HYDROCHLORIDE 20 MG/ML
10 INJECTION INTRAMUSCULAR; INTRAVENOUS EVERY 2 HOUR PRN
Status: DISCONTINUED | OUTPATIENT
Start: 2024-11-04 | End: 2024-11-06

## 2024-11-04 RX ORDER — LOSARTAN POTASSIUM 50 MG/1
50 TABLET ORAL DAILY
COMMUNITY

## 2024-11-04 RX ORDER — ASPIRIN 81 MG/1
81 TABLET, CHEWABLE ORAL DAILY
Status: DISCONTINUED | OUTPATIENT
Start: 2024-11-04 | End: 2024-11-06

## 2024-11-04 RX ORDER — HEPARIN SODIUM 5000 [USP'U]/ML
5000 INJECTION, SOLUTION INTRAVENOUS; SUBCUTANEOUS EVERY 12 HOURS SCHEDULED
Status: DISCONTINUED | OUTPATIENT
Start: 2024-11-04 | End: 2024-11-06

## 2024-11-04 RX ORDER — ACETAMINOPHEN 500 MG
500 TABLET ORAL EVERY 4 HOURS PRN
Status: DISCONTINUED | OUTPATIENT
Start: 2024-11-04 | End: 2024-11-06

## 2024-11-04 RX ORDER — METOCLOPRAMIDE HYDROCHLORIDE 5 MG/ML
10 INJECTION INTRAMUSCULAR; INTRAVENOUS EVERY 8 HOURS PRN
Status: DISCONTINUED | OUTPATIENT
Start: 2024-11-04 | End: 2024-11-06

## 2024-11-04 RX ORDER — ACETAMINOPHEN 650 MG/1
650 SUPPOSITORY RECTAL EVERY 4 HOURS PRN
Status: DISCONTINUED | OUTPATIENT
Start: 2024-11-04 | End: 2024-11-06

## 2024-11-04 RX ORDER — ONDANSETRON 2 MG/ML
4 INJECTION INTRAMUSCULAR; INTRAVENOUS EVERY 6 HOURS PRN
Status: DISCONTINUED | OUTPATIENT
Start: 2024-11-04 | End: 2024-11-06

## 2024-11-04 RX ORDER — ATORVASTATIN CALCIUM 40 MG/1
40 TABLET, FILM COATED ORAL NIGHTLY
Status: DISCONTINUED | OUTPATIENT
Start: 2024-11-04 | End: 2024-11-06

## 2024-11-04 RX ORDER — LABETALOL HYDROCHLORIDE 5 MG/ML
10 INJECTION, SOLUTION INTRAVENOUS EVERY 10 MIN PRN
Status: DISCONTINUED | OUTPATIENT
Start: 2024-11-04 | End: 2024-11-06

## 2024-11-04 RX ORDER — ACETAMINOPHEN 325 MG/1
650 TABLET ORAL EVERY 4 HOURS PRN
Status: DISCONTINUED | OUTPATIENT
Start: 2024-11-04 | End: 2024-11-06

## 2024-11-04 RX ORDER — SODIUM CHLORIDE 9 MG/ML
INJECTION, SOLUTION INTRAVENOUS CONTINUOUS
Status: DISCONTINUED | OUTPATIENT
Start: 2024-11-04 | End: 2024-11-05

## 2024-11-04 NOTE — ED INITIAL ASSESSMENT (HPI)
Per son, patient is incomprehensible since noon today. Droop to right side of face. Unable to lift up right leg.

## 2024-11-05 ENCOUNTER — APPOINTMENT (OUTPATIENT)
Dept: MRI IMAGING | Facility: HOSPITAL | Age: 69
End: 2024-11-05
Attending: Other
Payer: MEDICARE

## 2024-11-05 LAB
ANION GAP SERPL CALC-SCNC: 6 MMOL/L (ref 0–18)
ATRIAL RATE: 62 BPM
BASOPHILS # BLD AUTO: 0.01 X10(3) UL (ref 0–0.2)
BASOPHILS NFR BLD AUTO: 0.3 %
BUN BLD-MCNC: 22 MG/DL (ref 9–23)
BUN/CREAT SERPL: 13.7 (ref 10–20)
CALCIUM BLD-MCNC: 9.5 MG/DL (ref 8.7–10.4)
CHLORIDE SERPL-SCNC: 112 MMOL/L (ref 98–112)
CO2 SERPL-SCNC: 26 MMOL/L (ref 21–32)
CREAT BLD-MCNC: 1.61 MG/DL
DEPRECATED RDW RBC AUTO: 47.9 FL (ref 35.1–46.3)
EGFRCR SERPLBLD CKD-EPI 2021: 34 ML/MIN/1.73M2 (ref 60–?)
EOSINOPHIL # BLD AUTO: 0.04 X10(3) UL (ref 0–0.7)
EOSINOPHIL NFR BLD AUTO: 1.3 %
ERYTHROCYTE [DISTWIDTH] IN BLOOD BY AUTOMATED COUNT: 15.5 % (ref 11–15)
GLUCOSE BLD-MCNC: 89 MG/DL (ref 70–99)
GLUCOSE BLDC GLUCOMTR-MCNC: 131 MG/DL (ref 70–99)
GLUCOSE BLDC GLUCOMTR-MCNC: 167 MG/DL (ref 70–99)
GLUCOSE BLDC GLUCOMTR-MCNC: 75 MG/DL (ref 70–99)
GLUCOSE BLDC GLUCOMTR-MCNC: 90 MG/DL (ref 70–99)
GLUCOSE BLDC GLUCOMTR-MCNC: 97 MG/DL (ref 70–99)
HCT VFR BLD AUTO: 28.3 %
HGB BLD-MCNC: 9.1 G/DL
IMM GRANULOCYTES # BLD AUTO: 0.01 X10(3) UL (ref 0–1)
IMM GRANULOCYTES NFR BLD: 0.3 %
LYMPHOCYTES # BLD AUTO: 1.33 X10(3) UL (ref 1–4)
LYMPHOCYTES NFR BLD AUTO: 44.8 %
MAGNESIUM SERPL-MCNC: 2 MG/DL (ref 1.6–2.6)
MCH RBC QN AUTO: 27.1 PG (ref 26–34)
MCHC RBC AUTO-ENTMCNC: 32.2 G/DL (ref 31–37)
MCV RBC AUTO: 84.2 FL
MONOCYTES # BLD AUTO: 0.24 X10(3) UL (ref 0.1–1)
MONOCYTES NFR BLD AUTO: 8.1 %
NEUTROPHILS # BLD AUTO: 1.34 X10 (3) UL (ref 1.5–7.7)
NEUTROPHILS # BLD AUTO: 1.34 X10(3) UL (ref 1.5–7.7)
NEUTROPHILS NFR BLD AUTO: 45.2 %
OSMOLALITY SERPL CALC.SUM OF ELEC: 301 MOSM/KG (ref 275–295)
P AXIS: 69 DEGREES
P-R INTERVAL: 216 MS
PLATELET # BLD AUTO: 205 10(3)UL (ref 150–450)
POTASSIUM SERPL-SCNC: 3.7 MMOL/L (ref 3.5–5.1)
Q-T INTERVAL: 438 MS
QRS DURATION: 82 MS
QTC CALCULATION (BEZET): 444 MS
R AXIS: -22 DEGREES
RBC # BLD AUTO: 3.36 X10(6)UL
SODIUM SERPL-SCNC: 144 MMOL/L (ref 136–145)
T AXIS: 46 DEGREES
VENTRICULAR RATE: 62 BPM
WBC # BLD AUTO: 3 X10(3) UL (ref 4–11)

## 2024-11-05 PROCEDURE — 99223 1ST HOSP IP/OBS HIGH 75: CPT | Performed by: OTHER

## 2024-11-05 PROCEDURE — 99233 SBSQ HOSP IP/OBS HIGH 50: CPT | Performed by: HOSPITALIST

## 2024-11-05 PROCEDURE — 70551 MRI BRAIN STEM W/O DYE: CPT | Performed by: OTHER

## 2024-11-05 PROCEDURE — 95816 EEG AWAKE AND DROWSY: CPT | Performed by: OTHER

## 2024-11-05 RX ORDER — POTASSIUM CHLORIDE 1.5 G/1.58G
40 POWDER, FOR SOLUTION ORAL ONCE
Status: COMPLETED | OUTPATIENT
Start: 2024-11-05 | End: 2024-11-05

## 2024-11-05 RX ORDER — MAGNESIUM OXIDE 400 MG (241.3 MG MAGNESIUM) TABLET
500 TABLET DAILY
Status: DISCONTINUED | OUTPATIENT
Start: 2024-11-05 | End: 2024-11-06

## 2024-11-05 RX ORDER — POTASSIUM CHLORIDE 14.9 MG/ML
20 INJECTION INTRAVENOUS ONCE
Status: DISCONTINUED | OUTPATIENT
Start: 2024-11-05 | End: 2024-11-05

## 2024-11-05 RX ORDER — NICOTINE POLACRILEX 4 MG
15 LOZENGE BUCCAL
Status: DISCONTINUED | OUTPATIENT
Start: 2024-11-05 | End: 2024-11-06

## 2024-11-05 RX ORDER — LEVETIRACETAM 500 MG/1
500 TABLET ORAL 2 TIMES DAILY
Status: DISCONTINUED | OUTPATIENT
Start: 2024-11-05 | End: 2024-11-06

## 2024-11-05 RX ORDER — DEXTROSE MONOHYDRATE AND SODIUM CHLORIDE 5; .9 G/100ML; G/100ML
INJECTION, SOLUTION INTRAVENOUS CONTINUOUS
Status: DISCONTINUED | OUTPATIENT
Start: 2024-11-05 | End: 2024-11-06

## 2024-11-05 RX ORDER — MAGNESIUM OXIDE 400 MG/1
400 TABLET ORAL DAILY
Status: DISCONTINUED | OUTPATIENT
Start: 2024-11-05 | End: 2024-11-06

## 2024-11-05 RX ORDER — CLOPIDOGREL BISULFATE 75 MG/1
75 TABLET ORAL DAILY
Status: DISCONTINUED | OUTPATIENT
Start: 2024-11-05 | End: 2024-11-05

## 2024-11-05 RX ORDER — NICOTINE POLACRILEX 4 MG
30 LOZENGE BUCCAL
Status: DISCONTINUED | OUTPATIENT
Start: 2024-11-05 | End: 2024-11-06

## 2024-11-05 RX ORDER — DEXTROSE MONOHYDRATE 25 G/50ML
50 INJECTION, SOLUTION INTRAVENOUS
Status: DISCONTINUED | OUTPATIENT
Start: 2024-11-05 | End: 2024-11-06

## 2024-11-05 RX ORDER — SPIRONOLACTONE 25 MG/1
25 TABLET ORAL DAILY
Status: DISCONTINUED | OUTPATIENT
Start: 2024-11-05 | End: 2024-11-06

## 2024-11-05 RX ORDER — BRIMONIDINE TARTRATE 2 MG/ML
1 SOLUTION/ DROPS OPHTHALMIC 2 TIMES DAILY
Status: DISCONTINUED | OUTPATIENT
Start: 2024-11-05 | End: 2024-11-06

## 2024-11-05 RX ORDER — CARVEDILOL 25 MG/1
25 TABLET ORAL 2 TIMES DAILY WITH MEALS
Status: DISCONTINUED | OUTPATIENT
Start: 2024-11-05 | End: 2024-11-06

## 2024-11-05 NOTE — ED QUICK NOTES
Orders for admission, patient is aware of plan and ready to go upstairs. Any questions, please call ED RN Rafael at extension [phone].     Patient Covid vaccination status: Fully vaccinated     COVID Test Ordered in ED: None    COVID Suspicion at Admission: N/A    Running Infusions:  None    Mental Status/LOC at time of transport: A+Ox2    Other pertinent information:   CIWA score: N/A   NIH score:  10

## 2024-11-05 NOTE — PLAN OF CARE
Problem: Patient Centered Care  Goal: Patient preferences are identified and integrated in the patient's plan of care  Description: Interventions:  - What would you like us to know as we care for you? From home with son and home health care  - Provide timely, complete, and accurate information to patient/family  - Incorporate patient and family knowledge, values, beliefs, and cultural backgrounds into the planning and delivery of care  - Encourage patient/family to participate in care and decision-making at the level they choose  - Honor patient and family perspectives and choices  Outcome: Progressing     Problem: CARDIOVASCULAR - ADULT  Goal: Maintains optimal cardiac output and hemodynamic stability  Description: INTERVENTIONS:  - Monitor vital signs, rhythm, and trends  - Monitor for bleeding, hypotension and signs of decreased cardiac output  - Evaluate effectiveness of vasoactive medications to optimize hemodynamic stability  - Monitor arterial and/or venous puncture sites for bleeding and/or hematoma  - Assess quality of pulses, skin color and temperature  - Assess for signs of decreased coronary artery perfusion - ex. Angina  - Evaluate fluid balance, assess for edema, trend weights  Outcome: Progressing  Goal: Absence of cardiac arrhythmias or at baseline  Description: INTERVENTIONS:  - Continuous cardiac monitoring, monitor vital signs, obtain 12 lead EKG if indicated  - Evaluate effectiveness of antiarrhythmic and heart rate control medications as ordered  - Initiate emergency measures for life threatening arrhythmias  - Monitor electrolytes and administer replacement therapy as ordered  Outcome: Progressing     Problem: NEUROLOGICAL - ADULT  Goal: Achieves stable or improved neurological status  Description: INTERVENTIONS  - Assess for and report changes in neurological status  - Initiate measures to prevent increased intracranial pressure  - Maintain blood pressure and fluid volume within ordered  parameters to optimize cerebral perfusion and minimize risk of hemorrhage  - Monitor temperature, glucose, and sodium. Initiate appropriate interventions as ordered  Outcome: Progressing     Problem: Impaired Swallowing  Goal: Minimize aspiration risk  Description: Interventions:  - Patient should be alert and upright for all feedings (90 degrees preferred)  - Offer food and liquids at a slow rate  - No straws  - Encourage small bites of food and small sips of liquid  - Offer pills one at a time, crush or deliver with applesauce as needed  - Discontinue feeding and notify MD (or speech pathologist) if coughing or persistent throat clearing or wet/gurgly vocal quality is noted  Outcome: Progressing

## 2024-11-05 NOTE — PLAN OF CARE
noticed DNAR forms not signed by POA. DNAR select forms re-printed and signed by POA son. Placed in chart.

## 2024-11-05 NOTE — PROGRESS NOTES
[facility]  Progress Note     Eliud Fine  : 1955    Status: Inpatient  Day #: 1    Attending: Laron Alejandro MD  PCP: Yao Rodriguez     Assessment and Plan:    RUE weakness, possible TIA vs related to UTI  -neurology on consult  -CTA head and neck reviewed  -MRI albert without acute stroke  -SBP goal <180 per neuro  -asa, plavix x3 months  -setatin  -PT/OT/ST    Acute UTI  -UA, UCx reviewed. +GNR, follow up results  -start ctx    HTN  -cont coreg  -resume other home meds as needed    HL  -statin    H/o Prediabetes - A1c ok at 5.3 this admission    Vascular dementia  -monitor        DVT Mechanical Prophylaxis:   SCDs,    DVT Pharmacologic Prophylaxis   Medication    heparin (Porcine) 5000 UNIT/ML injection 5,000 Units      DVT Pharmacologic prophylaxis: Aspirin 162 mg        Subjective:      Initial Chief Complaint:  RUE weakness    No CP, no SOB, no new weakness    10 point ROS completed and was negative, except for pertinent positive and negatives stated in subjective.      Objective:      Temp:  [97.3 °F (36.3 °C)-98.5 °F (36.9 °C)] 97.9 °F (36.6 °C)  Pulse:  [53-64] 62  Resp:  [12-20] 20  BP: (121-218)/(45-67) 121/45  SpO2:  [96 %-100 %] 96 %  General:  Alert, no distress  HEENT:  Normocephalic, atraumatic  Cardiac:  Regular rate, regular rhythm  Pulmonary:  Clear to auscultation bilaterally, respirations unlabored  Gastrointestinal:  Soft, non-tender, normal bowel sounds  Musculoskeletal:  No joint swelling  Extremities:  No edema, no cyanosis, no clubbing  Neurologic:  RUE and LUE strength seems similar   Psychiatric:  Normal affect, calm and appropriate    Intake/Output Summary (Last 24 hours) at 2024 1246  Last data filed at 2024 1000  Gross per 24 hour   Intake 0 ml   Output --   Net 0 ml         Recent Labs   Lab 24  1816 24  0639   WBC 4.0 3.0*   HGB 9.5* 9.1*   HCT 29.7* 28.3*   .0 205.0   RBC 3.56* 3.36*   MCV 83.4 84.2   MCH 26.7 27.1   MCHC  32.0 32.2   RDW 15.8* 15.5*   NEPRELIM 2.11 1.34*     Recent Labs   Lab 11/04/24  1817 11/05/24  0639   BUN 24* 22   CREATSERUM 1.89* 1.61*   CA 9.5 9.5    144   K 4.0 3.7    112   CO2 26.0 26.0   GLU 91 89   MG  --  2.0       MRI BRAIN (CPT=70551)    Result Date: 11/5/2024  CONCLUSION:   1. No acute intracranial abnormality.  No evidence of acute or subacute ischemia or infarct.  2. Advanced chronic microvascular ischemic changes and moderate generalized parenchymal volume loss.  3. Chronic diffuse cerebral microhemorrhages, with likely differential etiology including chronic hypertensive encephalopathy and cerebral amyloid angiopathy.      elm-remote  Dictated by (CST): Philip Manzo MD on 11/05/2024 at 12:27 PM     Finalized by (CST): Philip Manzo MD on 11/05/2024 at 12:33 PM          CT STROKE CTA BRAIN/CTA NECK (W IV)(CPT=70496/92399)    Result Date: 11/4/2024  CONCLUSION:  1. No large vessel occlusion, aneurysm, or dissection in the head or neck. 2. Moderate multifocal narrowing of the anterior left M2 segment. 3. Mild-to-moderate narrowing of the anterior right M2 segment. 4. Moderate narrowing of a right M3 branch in the right temporal region. 5. Mild narrowing of the distal right V4 segment of the vertebral artery. 6. Lesser incidental findings described above.    Impression points 1-4 communicated via telephone to Azam JOSEPH at 6:33 p.m. on 11/04/2024 by Kunal Meléndez MD  Dictated by (CST): Kunal Meléndez MD on 11/04/2024 at 6:23 PM     Finalized by (CST): Kunal Meléndez MD on 11/04/2024 at 6:35 PM          CT STROKE BRAIN (NO IV)(CPT=70450)    Result Date: 11/4/2024  CONCLUSION:   1. No new transcortical area of hypoattenuation to suggest an acute infarct.  If there is clinical concern for an acute infarct, recommend further evaluation with an MRI brain. 2. No acute intracranial hemorrhage, midline shift, mass effect, or hydrocephalus. 3. Multiple small chronic infarcts involving the deep  gray nuclei and left corona radiata/centrum semiovale. 4. Severe chronic microvascular ischemic changes.  5. Lesser incidental findings described above.   Impression points 1-4 were communicated via telephone to Azam JOSEPH at 6:16 p.m. on 11/04/2024 by Kunal Meléndez MD  Dictated by (CST): Kunal Meléndez MD on 11/04/2024 at 6:10 PM     Finalized by (CST): Kunal Meléndez MD on 11/04/2024 at 6:16 PM         EKG 12 Lead    Result Date: 11/5/2024  Sinus rhythm with 1st degree A-V block Nonspecific T wave abnormality Abnormal ECG When compared with ECG of 23-SEP-2024 11:25, Nonspecific T wave abnormality has replaced inverted T waves in Lateral leads Confirmed by JALEEL WILSON ([id number]) on 11/5/2024 12:02:26 PM   Medications:   brimonidine  1 drop Both Eyes BID    carvedilol  25 mg Oral BID with meals    cyanocobalamin  500 mcg Oral Daily    magnesium oxide  400 mg Oral Daily    spironolactone  25 mg Oral Daily    levETIRAcetam  500 mg Oral BID    atorvastatin  40 mg Oral Nightly    heparin  5,000 Units Subcutaneous 2 times per day    aspirin  81 mg Oral Daily      PRN Meds:   glucose **OR** glucose **OR** glucose-vitamin C **OR** dextrose **OR** glucose **OR** glucose **OR** glucose-vitamin C    acetaminophen **OR** acetaminophen    labetalol    hydrALAzine    ondansetron    metoclopramide    acetaminophen    Supplementary Documentation:   DVT Mechanical Prophylaxis:   SCDs,    DVT Pharmacologic Prophylaxis   Medication    heparin (Porcine) 5000 UNIT/ML injection 5,000 Units      DVT Pharmacologic prophylaxis: Aspirin 162 mg         Code Status: DNAR/Selective Treatment  Marino: External urinary catheter in place  Marino Duration (in days):   Central line:    CHARLY: 11/6/2024        **Certification      PHYSICIAN Certification of Need for Inpatient Hospitalization - Initial Certification    Patient will require inpatient services that will reasonably be expected to span two midnight's based on the clinical documentation in  H+P.   Based on patients current state of illness, I anticipate that, after discharge, patient will require TBD.                MDM High. Time spent on chart/note review, review of labs/imaging, discussion with patient, physical exam, discussion with staff, consultants, coordinating care, writing progress note, discussion of plan of care.      Laron Alejandro MD

## 2024-11-05 NOTE — CM/SW NOTE
11/05/24 1100   CM/SW Referral Data   Referral Source Social Work (self-referral);Physician   Reason for Referral Discharge planning   Informant Patient;Son   Medical Hx   Does patient have an established PCP? Yes   Significant Past Medical/Mental Health Hx HTN, CVA, Asphasia, CKD3, CEDRICK, Asthma, HLD, CAD, Dementia, Arthritis   Patient Info   Patient's Current Mental Status at Time of Assessment Alert;Oriented   Patient's Home Environment House   Number of Levels in Home 2   Number of Stair in Home 10   Patient lives with Son   Patient Status Prior to Admission   Independent with ADLs and Mobility No   Pt. requires assistance with Housework;Driving   Services in place prior to admission DME/Supplies at home;Home Health Care   Home Health Provider Info [facility]   Type of DME/Supplies Wheeled Walker;CPAP   Discharge Needs   Anticipated D/C needs To be determined     Social work was able to meet with the patient and her son at bedside to discuss discharge planning.    The patient lives in a 2 level home with her son.  The patient is independent with most ADLs at baseline.  The patient uses a wheeled walker.    The patient is current with [facility] for RN/PT/OT.  New F2F entered.    Social work will follow up if there are any further discharge needs.    SW/CM to remain available for support and/or discharge planning.     Malika Leonardo MSW, LSW  Discharge Planner [id number]

## 2024-11-05 NOTE — DISCHARGE INSTRUCTIONS
HOME HEALTH:  Sometimes managing your health at home requires assistance.  The [facility] team has recognized your preference to use   [facility]  [address] [place] [id number]  Phone: [phone]  Fax: [phone]  A representative from the home health agency will contact you or your family to schedule your first visit.

## 2024-11-05 NOTE — DIETARY NOTE
ADULT NUTRITION INITIAL ASSESSMENT    Pt is at moderate nutrition risk.  Pt does not meet malnutrition criteria.      RECOMMENDATIONS TO MD: See nutrition intervention for ONS (oral nutrition supplements)    ADMITTING DIAGNOSIS:  Slurred speech [R47.81]  Right sided weakness [R53.1]  PERTINENT PAST MEDICAL HISTORY:   Past Medical History:    Asthma (HCC)    Coronary atherosclerosis    Diabetes (HCC)    diabetes for 2 yrs    Essential hypertension    Glaucoma    right    Heart attack (HCC)    2005    High blood pressure    High cholesterol    Hyperlipidemia    Osteoarthritis    Sleep apnea    cpap    Stroke (HCC)    30 yrs ago    Visual impairment    left eye edema     PATIENT STATUS: Initial 11/05/24: Pt assessed due to screening at risk for MST of 2 for declined PO and weight loss. Chart review. Variable weights noted, but it appears pt has weight loss of ~18#/10.5% x 3 months (severe). Pt with 1 meal recorded 100% so far. Visited pt at bedside who was sleeping, but son was present. Son provided most of history, states pt typically eats well at home. Some days it is not 3 full meals, but for the most part she does eat well. States her weight loss likely accurate. She sometimes drinks strawberry or vanilla Ensure. Agreeable to ONS daily while admitted. RD to update orders. Pt appears well-nourished.     FOOD/NUTRITION RELATED HISTORY:  Appetite: Good  Intake:  100% x 1 meal so far  Intake Meeting Needs: Yes, and oral nutrition supplements (ONS) to maximize  Percent Meals Eaten (last 6 days)       Date/Time Percent Meals Eaten (%)    11/05/24 1217 100 %            Food Allergies: No Known Food Allergies (NKFA)  Cultural/Ethnic/Mormon Preferences: None    GASTROINTESTINAL: no GI issues reported per pt/chart    MEDICATIONS: reviewed   brimonidine  1 drop Both Eyes BID    carvedilol  25 mg Oral BID with meals    cyanocobalamin  500 mcg Oral Daily    magnesium oxide  400 mg Oral Daily    spironolactone  25 mg Oral  Daily    levETIRAcetam  500 mg Oral BID    cefTRIAXone  1 g Intravenous Q24H    atorvastatin  40 mg Oral Nightly    heparin  5,000 Units Subcutaneous 2 times per day    aspirin  81 mg Oral Daily     LABS: reviewed  Recent Labs     11/04/24  1817 11/05/24  0639   GLU 91 89   BUN 24* 22   CREATSERUM 1.89* 1.61*   CA 9.5 9.5   MG  --  2.0    144   K 4.0 3.7    112   CO2 26.0 26.0   OSMOCALC 298* 301*       NUTRITION RELATED PHYSICAL FINDINGS:  - Nutrition Focused Physical Exam (NFPE): well nourished  - Fluid Accumulation: none  ---> See RN documentation for details  - Skin Integrity: intact ---> See RN documentation for details    ANTHROPOMETRICS:  HT: 167.6 cm (5' 6\")  WT: 69.8 kg (153 lb 14.4 oz)   BMI: Body mass index is 24.84 kg/m².  BMI CLASSIFICATION: 19-24.9 kg/m2 - WNL  IBW: 130 lbs        117% IBW  Usual Body Wt: ~170's in recent past, ~180's in 2022 per EMR      90% UBW    WEIGHT HISTORY:  Patient Weight(s) for the past 336 hrs:   Weight   11/04/24 2225 69.8 kg (153 lb 14.4 oz)   11/04/24 1754 70.5 kg (155 lb 6.8 oz)     Wt Readings from Last 10 Encounters:   11/04/24 69.8 kg (153 lb 14.4 oz)   09/27/24 73.9 kg (162 lb 14.4 oz)   07/08/24 77.6 kg (171 lb)   07/08/24 89 kg (196 lb 3.4 oz)   03/24/24 88 kg (194 lb 0.1 oz)   03/20/24 88 kg (194 lb)   09/30/23 80.2 kg (176 lb 14.4 oz)   02/28/23 75.8 kg (167 lb)   01/31/23 81.2 kg (179 lb)   01/13/23 70.8 kg (156 lb)     NUTRITION DIAGNOSIS/PROBLEM:   Unintended wt loss related to Psychological causes (h/o stroke and seizures) as evidenced by severe weight loss of 10.5% x 3 months.     NUTRITION INTERVENTION:     NUTRITION PRESCRIPTION:   Estimated Nutrition needs: --dosing wt of 69.8 kg - wt taken on 11/4  Calories: 5000-5760 calories/day (25-28 calories per kg Dosing wt)  Protein: 83-90 g protein/day (1.2-1.3 g protein/kg Dosing wt)    - Diet:       Procedures    Cardiac diet Cardiac, No Straw; Fluid Consistency: Thin Liquids ; Texture Consistency:  Regular; Is Patient on Accuchecks? Yes      - RD Malnutrition Care Plan: Initiated ONS (oral nutritional supplements)  - Meals and snacks: Encouraged increased PO intake  - Medical Food Supplements-RD added Ensure Enlive (350 calories/ 20 g protein each) Daily Strawberry. Rational/use of oral supplements discussed.  - Vitamin and mineral supplements: vitamin B12  - Feeding assistance: independent  - Nutrition education: Discussed importance of adequate energy and protein intake with pt's son    - Coordination of nutrition care: collaboration with other providers  - Discharge and transfer of nutrition care to new setting or provider: monitor plans    MONITOR AND EVALUATE/NUTRITION GOALS:  - Food and Nutrient Intake:      Monitor: adequacy of PO intake and adequacy of supplement intake  - Food and Nutrient Administration:      Monitor: N/A  - Anthropometric Measurement:    Monitor weight  - Nutrition Goals:      halt wt loss, maintain wt within 5%, PO and supplement greater than 75% of needs, and good supplement intake    DIETITIAN FOLLOW UP: RD to follow and monitor nutrition status       Olesya Salazar RD, LDN  Clinical Dietitian  P: [phone]

## 2024-11-05 NOTE — ED PROVIDER NOTES
Patient Seen in: [facility] Emergency Department    History     Chief Complaint   Patient presents with    Stroke       HPI    The patient presents to the ED after developing slurred speech and increased weakness to her right side around noon today per her son.  History of a stroke in the past with the same symptoms.  Son states that she also had difficulty speaking and right-sided weakness at that time.  Patient was also admitted recently for the same symptoms just over a month ago.  Patient unable to provide much history given difficulty speaking.    History reviewed.   Past Medical History:    Asthma (HCC)    Coronary atherosclerosis    Diabetes ([facility])    diabetes for 2 yrs    Essential hypertension    Glaucoma    right    Heart attack (HCC)        High blood pressure    High cholesterol    Hyperlipidemia    Osteoarthritis    Sleep apnea    cpap    Stroke (HCC)    30 yrs ago    Visual impairment    left eye edema       History reviewed.   Past Surgical History:   Procedure Laterality Date    Anesth,vitrectomy Left 2017    Pars plana vitrectomy, internal limiting membrane peel, left eye.    Cath bare metal stent (bms)      Colonoscopy      Hysterectomy      Total abdom hysterectomy      Tubal ligation           Medications :  Prescriptions Prior to Admission[1]     Family History   Problem Relation Age of Onset    Cancer Father     Diabetes Mother        Smoking Status:   Social History     Socioeconomic History    Marital status: Single   Tobacco Use    Smoking status: Former     Current packs/day: 0.00     Types: Cigarettes     Start date: 1976     Quit date: 1980     Years since quittin.1    Smokeless tobacco: Never   Vaping Use    Vaping status: Never Used   Substance and Sexual Activity    Alcohol use: No    Drug use: No       Constitutional and vital signs reviewed.      Social History and Family History elements reviewed from today, pertinent positives to the presenting  problem noted.    Physical Exam     ED Triage Vitals   BP 11/04/24 1746 (!) 189/67   Pulse 11/04/24 1746 59   Resp 11/04/24 1746 18   Temp 11/04/24 1746 98.5 °F (36.9 °C)   Temp src 11/04/24 2225 Oral   SpO2 11/04/24 1746 100 %   O2 Device 11/04/24 1746 None (Room air)       All measures to prevent infection transmission during my interaction with the patient were taken. Handwashing was performed prior to and after the exam.  Stethoscope and any equipment used during my examination was cleaned with super sani-cloth germicidal wipes following the exam.     Physical Exam  Vitals and nursing note reviewed.   Constitutional:       General: She is not in acute distress.     Appearance: She is well-developed. She is not ill-appearing or toxic-appearing.   HENT:      Head: Normocephalic and atraumatic.   Eyes:      General:         Right eye: No discharge.         Left eye: No discharge.      Conjunctiva/sclera: Conjunctivae normal.   Neck:      Trachea: No tracheal deviation.   Cardiovascular:      Rate and Rhythm: Normal rate.   Pulmonary:      Effort: Pulmonary effort is normal. No respiratory distress.      Breath sounds: No stridor.   Abdominal:      General: There is no distension.      Palpations: Abdomen is soft.   Musculoskeletal:         General: No deformity.   Skin:     General: Skin is warm and dry.   Neurological:      Mental Status: She is alert and oriented to person, place, and time.      Comments: Slurred garbled speech that is incomprehensible.  Weakness noted to the right arm and less to the right leg however patient with seemingly normal strength at some times in the arm and leg.   Psychiatric:         Mood and Affect: Mood normal.         Behavior: Behavior normal.         ED Course        Labs Reviewed   BASIC METABOLIC PANEL (8) - Abnormal; Notable for the following components:       Result Value    BUN 24 (*)     Creatinine 1.89 (*)     Calculated Osmolality 298 (*)     eGFR-Cr 28 (*)     All other  components within normal limits   CBC WITH DIFFERENTIAL WITH PLATELET - Abnormal; Notable for the following components:    RBC 3.56 (*)     HGB 9.5 (*)     HCT 29.7 (*)     RDW-SD 48.2 (*)     RDW 15.8 (*)     All other components within normal limits   URINALYSIS WITH CULTURE REFLEX - Abnormal; Notable for the following components:    Spec Gravity >1.030 (*)     Protein Urine 20 (*)     Leukocyte Esterase Urine 75 (*)     WBC Urine 6-10 (*)     RBC Urine 3-5 (*)     Bacteria Urine 2+ (*)     Squamous Epi. Cells Few (*)     All other components within normal limits   LIPID PANEL - Abnormal; Notable for the following components:    HDL Cholesterol 29 (*)     All other components within normal limits   POCT GLUCOSE - Abnormal; Notable for the following components:    POC Glucose  100 (*)     All other components within normal limits   HEMOGLOBIN A1C - Normal   BASIC METABOLIC PANEL (8)   CBC WITH DIFFERENTIAL WITH PLATELET   RAINBOW DRAW LAVENDER   RAINBOW DRAW LIGHT GREEN   RAINBOW DRAW BLUE   RAINBOW DRAW GOLD   URINE CULTURE, ROUTINE     EKG    Rate, intervals and axes as noted on EKG Report.  Rate: Normal, 62 bpm  Rhythm: Sinus Rhythm  Readinst degree AV block, nonspecific T wave abnormality, abnormal EKG first           As Interpreted by me    Imaging Results Available and Reviewed while in ED: CT STROKE CTA BRAIN/CTA NECK (W IV)(CPT=70496/83419)    Result Date: 2024  CONCLUSION:  1. No large vessel occlusion, aneurysm, or dissection in the head or neck. 2. Moderate multifocal narrowing of the anterior left M2 segment. 3. Mild-to-moderate narrowing of the anterior right M2 segment. 4. Moderate narrowing of a right M3 branch in the right temporal region. 5. Mild narrowing of the distal right V4 segment of the vertebral artery. 6. Lesser incidental findings described above.    Impression points 1-4 communicated via telephone to Azam JOSEPH at 6:33 p.m. on 2024 by Kunal Meléndez MD  Dictated by (CST):  Kunal Meléndez MD on 11/04/2024 at 6:23 PM     Finalized by (CST): Kunal Meléndez MD on 11/04/2024 at 6:35 PM          CT STROKE BRAIN (NO IV)(CPT=70450)    Result Date: 11/4/2024  CONCLUSION:   1. No new transcortical area of hypoattenuation to suggest an acute infarct.  If there is clinical concern for an acute infarct, recommend further evaluation with an MRI brain. 2. No acute intracranial hemorrhage, midline shift, mass effect, or hydrocephalus. 3. Multiple small chronic infarcts involving the deep gray nuclei and left corona radiata/centrum semiovale. 4. Severe chronic microvascular ischemic changes.  5. Lesser incidental findings described above.   Impression points 1-4 were communicated via telephone to Azam JOSEPH at 6:16 p.m. on 11/04/2024 by Kunal Meléndez MD  Dictated by (CST): Kunal Meléndez MD on 11/04/2024 at 6:10 PM     Finalized by (CST): Kunal Meléndez MD on 11/04/2024 at 6:16 PM         ED Medications Administered:   Medications   sodium chloride 0.9% infusion ( Intravenous New Bag 11/4/24 2332)   acetaminophen (Tylenol) tab 650 mg (has no administration in time range)     Or   acetaminophen (Tylenol) rectal suppository 650 mg (has no administration in time range)   labetalol (Trandate) 5 mg/mL injection 10 mg (has no administration in time range)   hydrALAzine (Apresoline) 20 mg/mL injection 10 mg (has no administration in time range)   ondansetron (Zofran) 4 MG/2ML injection 4 mg (has no administration in time range)   metoclopramide (Reglan) 5 mg/mL injection 10 mg (has no administration in time range)   atorvastatin (Lipitor) tab 40 mg (40 mg Oral Not Given 11/4/24 2230)   heparin (Porcine) 5000 UNIT/ML injection 5,000 Units (5,000 Units Subcutaneous Given 11/4/24 2332)   aspirin chewable tab 81 mg (81 mg Oral Not Given 11/4/24 2230)   acetaminophen (Tylenol Extra Strength) tab 500 mg (has no administration in time range)   iopamidol 76% (ISOVUE-370) injection for power injector (80 mL  Intravenous Given 11/4/24 1810)         MDM     Vitals:    11/04/24 2200 11/04/24 2225 11/04/24 2259 11/04/24 2341   BP: (!) 175/60 (!) 206/51  (!) 188/64   BP Location:  Right arm  Right arm   Pulse: 53 54 56    Resp: 14 20     Temp:  97.6 °F (36.4 °C)     TempSrc:  Oral     SpO2: 100% 98%  100%   Weight:  69.8 kg     Height:  167.6 cm (5' 6\")       *I personally reviewed and interpreted all ED vitals.    Pulse Ox: 100%, Room air, Normal     Monitor Interpretation:   normal sinus rhythm, sinus bradycardia as interpreted by me.  The cardiac monitor was ordered to monitor heart rate.    Differential Diagnosis/ Diagnostic Considerations: Stroke, UTI, recurrent neurologic deficits, other    Complicating Factors: The patient already has does not have any pertinent problems on file. to contribute to the complexity of this ED evaluation.    Medical Decision Making  The patient presents to the ED with slurred speech and right-sided weakness.  These are the same symptoms with which she has presented with in the past.  Initially for a stroke and recently which was deemed not an acute stroke.  Patient presents around 6 hours after symptom onset and is not a candidate for TNK.  I discussed with Dr. Chandra for neurology consultation and Dr. Murillo for admission.    Problems Addressed:  Right sided weakness: chronic illness or injury with exacerbation, progression, or side effects of treatment that poses a threat to life or bodily functions  Slurred speech: chronic illness or injury with exacerbation, progression, or side effects of treatment that poses a threat to life or bodily functions    Amount and/or Complexity of Data Reviewed  Labs: ordered. Decision-making details documented in ED Course.  Radiology: ordered and independent interpretation performed. Decision-making details documented in ED Course.     Details: I personally reviewed the patient's CT brain and noted no ICH  ECG/medicine tests: ordered and independent  interpretation performed. Decision-making details documented in ED Course.  Discussion of management or test interpretation with external provider(s):  I discussed with Dr. Chandra for neurology consultation and Dr. Murillo for admission.        Condition upon leaving the department: Stable    Disposition and Plan     Clinical Impression:  1. Slurred speech    2. Right sided weakness        Disposition:  Admit    Follow-up:  No follow-up provider specified.    Medications Prescribed:  Current Discharge Medication List          Hospital Problems       Present on Admission             ICD-10-CM Noted POA    * (Principal) Slurred speech R47.81 2024 Unknown    Right sided weakness R53.1 2022 Unknown    Weakness of right upper extremity R29.898 2024 Unknown                       [1]   Medications Prior to Admission   Medication Sig Dispense Refill Last Dose/Taking    losartan 50 MG Oral Tab Take 1 tablet (50 mg total) by mouth daily.   2024    amLODIPine 10 MG Oral Tab Take 1 tablet (10 mg total) by mouth at bedtime. 30 tablet 1     hydrALAZINE 50 MG Oral Tab Take 1 tablet (50 mg total) by mouth in the morning and 1 tablet (50 mg total) before bedtime.   2024 Morning    aspirin 81 MG Oral Chew Tab Chew 1 tablet (81 mg total) by mouth daily. 30 tablet 1 11/3/2024    cyanocobalamin 500 MCG Oral Tab Take 1 tablet (500 mcg total) by mouth daily.   Unknown    magnesium oxide 400 MG Oral Tab Take 1 tablet (400 mg total) by mouth daily. 30 tablet 1 2024    [] cephALEXin 500 MG Oral Cap Take 1 capsule (500 mg total) by mouth 2 (two) times daily for 2 days. 4 capsule 0     spironolactone 25 MG Oral Tab Take 1 tablet (25 mg total) by mouth daily. 30 tablet 0 2024    NAMZARIC 28-10 MG Oral Capsule SR 24 Hr Take 28 mg by mouth daily.   2024    atorvastatin 20 MG Oral Tab Take 2 tablets (40 mg total) by mouth nightly.   11/3/2024 Bedtime    Clopidogrel Bisulfate 75 MG Oral Tab Take 1  tablet (75 mg total) by mouth daily. 30 tablet 0 11/4/2024    MetFORMIN HCl 500 MG Oral Tab Take 1 tablet (500 mg total) by mouth daily with breakfast.   11/4/2024    carvedilol 25 MG Oral Tab Take 1 tablet (25 mg total) by mouth 2 (two) times daily with meals.   11/4/2024 Morning    brimonidine Tartrate 0.2 % Ophthalmic Solution Place 1 drop into both eyes 2 (two) times daily.   11/3/2024

## 2024-11-05 NOTE — ED QUICK NOTES
Orders for admission, patient is aware of plan and ready to go upstairs. Any questions, please call ED RN aruna at extension [phone].     Patient Covid vaccination status: Fully vaccinated     COVID Test Ordered in ED: None    COVID Suspicion at Admission: N/A    Running Infusions:  None    Mental Status/LOC at time of transport: aox2    Other pertinent information:   CIWA score: N/A   NIH score:  5

## 2024-11-05 NOTE — PROCEDURES
EEG report    REFERRING PHYSICIAN: Laron Alejandro MD    PCP and phone number:  Yao Rodriguez  [phone]    TECHNIQUE: 21 channels of EEG, 2 channels of EOG, and 1 channel of EKG were recorded utilizing the [employer] 10/20 System. The recording was performed in a digitized monopolar referential format and playback was reformatted into various referential and bipolar montages utilizing appropriate filter settings. Automatic seizure and spike detection programs were utilized throughout the recording.  Video was recorded during the study    CLINICAL DATA:  Patient is sent for the evaluation of possible seizures.    MEDICATION:  Continuous Medications:   dextrose 5%-sodium chloride 0.9% 75 mL/hr at 11/05/24 0013       Scheduled Medications:  No current outpatient medications on file.    PRN Medications:    glucose **OR** glucose **OR** glucose-vitamin C **OR** dextrose **OR** glucose **OR** glucose **OR** glucose-vitamin C    acetaminophen **OR** acetaminophen    labetalol    hydrALAzine    ondansetron    metoclopramide    acetaminophen    ACTIVATION:  Hyperventilation: Not done  Photic stimulation: Done, no abnormalities  Sleep: Normal sleep architecture was seen.    BACKGROUND  While the patient was awake, the posterior dominant rhythm consisted of well-regulated 9-10 Hz rhythmic waveforms, symmetrically distributed over both posterior quadrants and was reactive to eye opening.    EEG ABNORMALITY  None    IMPRESSION:  This is a normal EEG. No focal, lateralized, or epileptiform features are noted. Clinical correlation required.

## 2024-11-05 NOTE — CONSULTS
[facility]  [address] [place] [address], [address]  [facility] [address]  [phone]            Eliud Fine Patient Status:  Inpatient    1955 MRN [id number]   Location [facility] 3W/SW Attending Laron Alejandro MD   Hosp Day # 1 PCP Yao Rodriguez     Date of Admission:  2024  Date of Consult:  2024  Reason for Consultation:   Slurring of speech    History of Present Illness:   Patient is a 69 year old female who was admitted to the hospital for Slurred speech:  Patient with a history of left basal ganglia stroke back in 2021, residual dysarthria and right-sided weakness.  She has been back to our hospital for more times, 2022 and 2024 when her symptoms will be recurring again, she would experience more slurring of speech and right upper and lower extremity weakness will be weaker than usual.  MRI of the brain 2024 that was obtained for those reasons was not showing any acute changes.  Cholesterol density was in progestins of her old stroke symptoms.    MRI of the brain in 2022 also did not show any further changes from the prior stroke.  She came back to the hospital again in 2024, since since at least  Of that day she was having some slurring of speech and worsening of right-sided weakness.  She was outside the window for TNK.    By the next day patient back to usual self again.  Her blood pressure was still poorly controlled as it was last time in September.    Past Medical History  Past Medical History:    Asthma (HCC)    Coronary atherosclerosis    Diabetes (HCC)    diabetes for 2 yrs    Essential hypertension    Glaucoma    right    Heart attack (HCC)        High blood pressure    High cholesterol    Hyperlipidemia    Osteoarthritis    Sleep apnea    cpap    Stroke ([facility])    30 yrs ago    Visual impairment    left eye edema       Past Surgical History  Past Surgical History:   Procedure Laterality  Date    Anesth,vitrectomy Left 09/18/2017    Pars plana vitrectomy, internal limiting membrane peel, left eye.    Cath bare metal stent (bms)      Colonoscopy      Hysterectomy      Total abdom hysterectomy      Tubal ligation         Family History  Family History   Problem Relation Age of Onset    Cancer Father     Diabetes Mother        Social History  Pediatric History   Patient Parents    Not on file     Other Topics Concern    Not on file   Social History Narrative    Not on file           Current Medications:  Current Facility-Administered Medications   Medication Dose Route Frequency    dextrose 5%-sodium chloride 0.9% infusion   Intravenous Continuous    glucose (Dex4) 15 GM/59ML oral liquid 15 g  15 g Oral Q15 Min PRN    Or    glucose (Glutose) 40% oral gel 15 g  15 g Oral Q15 Min PRN    Or    glucose-vitamin C (Dex-4) chewable tab 4 tablet  4 tablet Oral Q15 Min PRN    Or    dextrose 50% injection 50 mL  50 mL Intravenous Q15 Min PRN    Or    glucose (Dex4) 15 GM/59ML oral liquid 30 g  30 g Oral Q15 Min PRN    Or    glucose (Glutose) 40% oral gel 30 g  30 g Oral Q15 Min PRN    Or    glucose-vitamin C (Dex-4) chewable tab 8 tablet  8 tablet Oral Q15 Min PRN    potassium chloride 20 mEq/100mL IVPB premix 20 mEq  20 mEq Intravenous Once    brimonidine (Alphagan) 0.2 % ophthalmic solution 1 drop  1 drop Both Eyes BID    carvedilol (Coreg) tab 25 mg  25 mg Oral BID with meals    clopidogrel (Plavix) tab 75 mg  75 mg Oral Daily    cyanocobalamin (Vitamin B12) tab 500 mcg  500 mcg Oral Daily    magnesium oxide (Mag-Ox) tab 400 mg  400 mg Oral Daily    spironolactone (Aldactone) tab 25 mg  25 mg Oral Daily    acetaminophen (Tylenol) tab 650 mg  650 mg Oral Q4H PRN    Or    acetaminophen (Tylenol) rectal suppository 650 mg  650 mg Rectal Q4H PRN    labetalol (Trandate) 5 mg/mL injection 10 mg  10 mg Intravenous Q10 Min PRN    hydrALAzine (Apresoline) 20 mg/mL injection 10 mg  10 mg Intravenous Q2H PRN     ondansetron (Zofran) 4 MG/2ML injection 4 mg  4 mg Intravenous Q6H PRN    metoclopramide (Reglan) 5 mg/mL injection 10 mg  10 mg Intravenous Q8H PRN    atorvastatin (Lipitor) tab 40 mg  40 mg Oral Nightly    heparin (Porcine) 5000 UNIT/ML injection 5,000 Units  5,000 Units Subcutaneous 2 times per day    aspirin chewable tab 81 mg  81 mg Oral Daily    acetaminophen (Tylenol Extra Strength) tab 500 mg  500 mg Oral Q4H PRN     Medications Prior to Admission   Medication Sig    losartan 50 MG Oral Tab Take 1 tablet (50 mg total) by mouth daily.    hydrALAZINE 50 MG Oral Tab Take 1 tablet (50 mg total) by mouth in the morning and 1 tablet (50 mg total) before bedtime.    aspirin 81 MG Oral Chew Tab Chew 1 tablet (81 mg total) by mouth daily.    magnesium oxide 400 MG Oral Tab Take 1 tablet (400 mg total) by mouth daily.    spironolactone 25 MG Oral Tab Take 1 tablet (25 mg total) by mouth daily.    NAMZARIC 28-10 MG Oral Capsule SR 24 Hr Take 28 mg by mouth daily.    atorvastatin 20 MG Oral Tab Take 2 tablets (40 mg total) by mouth nightly.    Clopidogrel Bisulfate 75 MG Oral Tab Take 1 tablet (75 mg total) by mouth daily.    MetFORMIN HCl 500 MG Oral Tab Take 1 tablet (500 mg total) by mouth daily with breakfast.    carvedilol 25 MG Oral Tab Take 1 tablet (25 mg total) by mouth 2 (two) times daily with meals.    brimonidine Tartrate 0.2 % Ophthalmic Solution Place 1 drop into both eyes 2 (two) times daily.    amLODIPine 10 MG Oral Tab Take 1 tablet (10 mg total) by mouth at bedtime.    cyanocobalamin 500 MCG Oral Tab Take 1 tablet (500 mcg total) by mouth daily.    [] cephALEXin 500 MG Oral Cap Take 1 capsule (500 mg total) by mouth 2 (two) times daily for 2 days.       Allergies  Allergies[1]    Review of Systems:   As in HPI, the rest of the 14 system review was done and was negative    Physical Exam:     Vitals:    24 0005 24 0200 24 0414 24 0628   BP: (!) 184/53 (!) 218/59 (!) 163/59  (!) 172/54   Pulse: 55 58 64 53   Resp: 16 20 16 20   Temp: 97.8 °F (36.6 °C) 97.3 °F (36.3 °C) 97.3 °F (36.3 °C) 97.3 °F (36.3 °C)   TempSrc: Oral Axillary Axillary Axillary   SpO2: 100% 100% 100% 100%   Weight:       Height:           General: No apparent distress, well nourished, well groomed.  Head- Normocephalic, atraumatic  Eyes- No redness or swelling  ENT- Hearing intake, smell preserved, normal glutition  Neck- No masses or adenopathy  Cv: pulses were palpable and normal, no cyanosis or edema     Neurological:     Mental Status- Alert had some language and speech difficulties, but able to say that she was in the hospital, she knew the year, she knew the month, she was able to answer some simple questions.  Some hesitancy with answering questions and some mild dysarthria noted.    Cranial Nerves:  II.- Visual fields full to confrontation    III, IV, VI- EOM intact, PETER  V. Facial sensation intact  VII. Face symmetric, no facial weakness  VIII. Hearing intact.  IX. Pallet elevates symmetrically.  XI. Shoulder shrug is intact  XII. Tongue is midline    Motor Exam:  Muscle tone normal  No atrophy or fasciculations  Strength- upper extremities 5-, possibly slightly weaker on the right side /5 proximally and distally                  - lower  extremities on the left and 3 out of 5 on the right.  4/5 proximally and distally    Sensory Exam:  Light touch sensation- intact in all 4 extremities    Coordination:  Finger to nose intact  Rapid alternating movements intact      Results:     Laboratory Data:  Lab Results   Component Value Date    WBC 3.0 (L) 11/05/2024    HGB 9.1 (L) 11/05/2024    HCT 28.3 (L) 11/05/2024    .0 11/05/2024    CREATSERUM 1.61 (H) 11/05/2024    BUN 22 11/05/2024     11/05/2024    K 3.7 11/05/2024     11/05/2024    CO2 26.0 11/05/2024    GLU 89 11/05/2024    CA 9.5 11/05/2024    ALB 3.4 09/27/2024    ALKPHO 70 09/25/2024    TP 6.5 09/25/2024    AST 16 09/25/2024    ALT 15  09/25/2024    PTT 30.4 09/25/2024    INR 1.11 09/25/2024    PTP 15.0 (H) 09/25/2024    DDIMER 2.40 (H) 10/11/2022    ESRML 37 (H) 12/18/2022    CRP 0.31 (H) 12/18/2022    MG 2.1 09/27/2024    PHOS 2.9 09/27/2024    CK 79 03/24/2024    B12 260 09/25/2024         Imaging:    CT STROKE CTA BRAIN/CTA NECK (W IV)(CPT=70496/26268)    Result Date: 11/4/2024  CONCLUSION:  1. No large vessel occlusion, aneurysm, or dissection in the head or neck. 2. Moderate multifocal narrowing of the anterior left M2 segment. 3. Mild-to-moderate narrowing of the anterior right M2 segment. 4. Moderate narrowing of a right M3 branch in the right temporal region. 5. Mild narrowing of the distal right V4 segment of the vertebral artery. 6. Lesser incidental findings described above.    Impression points 1-4 communicated via telephone to Azam JOSEPH at 6:33 p.m. on 11/04/2024 by Kunal Meléndez MD  Dictated by (CST): Kunal Meléndez MD on 11/04/2024 at 6:23 PM     Finalized by (CST): Kunal Meléndez MD on 11/04/2024 at 6:35 PM          CT STROKE BRAIN (NO IV)(CPT=70450)    Result Date: 11/4/2024  CONCLUSION:   1. No new transcortical area of hypoattenuation to suggest an acute infarct.  If there is clinical concern for an acute infarct, recommend further evaluation with an MRI brain. 2. No acute intracranial hemorrhage, midline shift, mass effect, or hydrocephalus. 3. Multiple small chronic infarcts involving the deep gray nuclei and left corona radiata/centrum semiovale. 4. Severe chronic microvascular ischemic changes.  5. Lesser incidental findings described above.   Impression points 1-4 were communicated via telephone to Azam JOSEPH at 6:16 p.m. on 11/04/2024 by Kunal Meléndez MD  Dictated by (CST): Kunal Meléndez MD on 11/04/2024 at 6:10 PM     Finalized by (CST): Kunal Meléndez MD on 11/04/2024 at 6:16 PM         EKG 12 Lead    Result Date: 11/5/2024  Sinus rhythm with 1st degree A-V block Nonspecific T wave abnormality Abnormal ECG When  compared with ECG of 23-SEP-2024 11:25, Nonspecific T wave abnormality has replaced inverted T waves in Lateral leads       Impression:     Slurred speech    Possibility of recrudescence of old symptoms of the old stroke versus new stroke.  -patient was not a thrombolysis candidate because outside the window, also it was low suspicion for the new stroke.  -patient there was no LVO did not receive thrombectomy     -Aspirin 81 mg daily and Plavix 75 mg daily for 3 months, then single agent alone  - Atorvastatin 40 mg daily   -MRI brain  -CTA head and neck with moderate multifocal narrowing of the left M2, mild to moderate degree of the right M2  -Echocardiogram  -LDL was 43, we will continue the home dose of a statin and hemoglobin A1c was 5.3  -PT, OT and speech therapy   -Blood pressure goals: Below 180 systolic.  It seems that her main risk factor for strokes and worsening of her symptoms is severe hypertension.      STROKE RISK FACTORS:   *Hypertension - Target blood pressure <140/90, <130/85 for high risk, normal 120/80  *Physical inactivity - target exercise at least 3 times per week  *Obesity - target ideal body weight and girth <35\" for women, <40\" for men  *Diabetes - Target <6.5-7%   *Smoking - Target smoking cessation  *Hyperlipidemia - Target total cholesterol < 200, Target LDL <100, < 70 for high risk, Target HDL >45 for men, >55 for women, Target triglycerides <150        Thank you for allowing me to participate in the care of your patient.    Chris Chandra MD  11/5/2024           [1] No Known Allergies

## 2024-11-05 NOTE — SLP NOTE
ADULT SWALLOWING EVALUATION    ASSESSMENT    ASSESSMENT/OVERALL IMPRESSION:      Proper PPE worn. Hands sanitized upon entrance/exit Pt room.       BSE ordered 2/2 stroke protocol. Pt admitted 11/04/24 with slurred speech. Pt on solid/thin liquids at home with family (requires assistance). PMH includes CVA (~20 years ago), MI, dementia, HTN, Asthma, CKD Stage 4, DM.  Currently, Pt NPO.    PMH of dysphagia E-EHC: Most recent swallow noted 9/24/24 rec solid/thin liquids.     MRI 11/05/24:  CONCLUSION:    1. No acute intracranial abnormality.  No evidence of acute or subacute ischemia or infarct.    2. Advanced chronic microvascular ischemic changes and moderate generalized parenchymal volume loss.    3. Chronic diffuse cerebral microhemorrhages, with likely differential etiology including chronic hypertensive encephalopathy and cerebral amyloid angiopathy.     No CXR completed.     Pt alert, on room air, afebrile and assessed sitting upright in bed (after consulting with RN). Pt agreed to participate. Learning preference verbal. No verbal or non-verbal indication of pain. Vocal quality/intensity adequate. Volitional swallow and cough present; considered functional. Oral motor exam unremarkable. Functional dentition. Pt self-fed solid and thin liquid trials. Bilabial seal adequate; no anterior loss. Lingual skills adequate for bolus formation, preparation and control of all consistencies. Bite reflex of solid functional in strength. Rotary, essentially coordinated mastication skills. Oral cavity grossly clear post swallows.     Pharyngeal response appeared slightly delayed per hyolaryngeal elevation to completion (considered reduced in strength/rise to palpation). No overt CSA on solid nor thin liquids via open cup. Throat clearing x1 on chain swallow thin liquids via straw. Overall, swallow function appeared coordinated. Sp02 ~95% during this assessment.        IMPRESSIONS:    Pt presents with functional oral swallow  and possible pharyngeal dysfunction. Collaborated with RN regarding Pt's swallowing plan of care. BSE results/recommendations discussed with Pt. Pt with fair understanding; would benefit from additional reinforcement. Swallowing precautions written on white board in room for reinforcement/carry-over of skills for Pt, family and staff. Call light within Pt's reach upon SLP discharge from room.      PLAN:    To f/u x1-2 sessions to ensure safe intake of diet and reinforce swallowing/aspiration precautions. To monitor for any CXR results. Family education as available.      RECOMMENDATIONS   Diet Recommendations - Solids: Regular  Diet Recommendations - Liquids: Thin Liquids    Compensatory Strategies Recommended: No straws  Aspiration Precautions: Upright position;Slow rate;Small bites and sips;No straw  Medication Administration Recommendations: One pill at a time  Treatment Plan/Recommendations: Aspiration precautions    HISTORY   MEDICAL HISTORY  Reason for Referral: Stroke protocol    Problem List  Principal Problem:    Slurred speech  Active Problems:    Right sided weakness    Weakness of right upper extremity      Past Medical History  Past Medical History:    Asthma (HCC)    Coronary atherosclerosis    Diabetes (HCC)    diabetes for 2 yrs    Essential hypertension    Glaucoma    right    Heart attack (HCC)    2005    High blood pressure    High cholesterol    Hyperlipidemia    Osteoarthritis    Sleep apnea    cpap    Stroke (HCC)    30 yrs ago    Visual impairment    left eye edema       Prior Living Situation:  (Lives with family)  Diet Prior to Admission: Regular;Thin liquids  Precautions: Aspiration    Patient/Family Goals: to eat    SWALLOWING HISTORY  Current Diet Consistency: NPO    OBJECTIVE   ORAL MOTOR EXAMINATION  Dentition: Natural;Functional  Symmetry: Within Functional Limits  Strength: Within Functional Limits  Range of Motion: Within Functional Limits  Rate of Motion: Within Functional  Limits    Voice Quality: Clear  Respiratory Status: Unlabored  Consistencies Trialed: Thin liquids;Hard solid  Method of Presentation: Self presentation;Cup;Straw  Patient Positioning: Upright;Midline    Oral Phase of Swallow: Within Functional Limits  Pharyngeal Phase of Swallow: Impaired  Laryngeal Elevation Timing: Appears impaired  Laryngeal Elevation Strength: Appears impaired     (Please note: Silent aspiration cannot be evaluated clinically. Videofluoroscopic Swallow Study is required to rule-out silent aspiration.)      GOALS  Goal #1 The patient will tolerate solid consistency and thin liquids without overt signs or symptoms of aspiration with 100 % accuracy over 1-2 session(s).  In Progress   Goal #2 The patient/family/caregiver will demonstrate understanding and implementation of aspiration precautions and swallow strategies independently over 1-2 session(s).    In Progress   FOLLOW UP  Treatment Plan/Recommendations: Aspiration precautions  Number of Visits to Meet Established Goals:  (1-2)  Follow Up Needed (Documentation Required): Yes  SLP Follow-up Date: 11/06/24    Thank you for your referral.   If you have any questions, please contact   Sophia Jiménez M.S. [facility]3]/SLP  Speech-Language Pathologist  [facility]  #[phone]

## 2024-11-05 NOTE — H&P
[facility]    PATIENT'S NAME: ANKUSH HOFFMAN   ATTENDING PHYSICIAN: Joao Nascimento MD   PATIENT ACCOUNT#:   [id number]    LOCATION:  [place] [address]  MEDICAL RECORD #:   [id number]       YOB: 1955  ADMISSION DATE:       11/04/2024    HISTORY AND PHYSICAL EXAMINATION    CHIEF COMPLAINT:  Right upper and lower extremity weakness.    HISTORY OF PRESENT ILLNESS:  Patient is a 69-year-old  female with history of prior lacunar infarct, cerebrovascular accidents, and uncontrolled hypertension.  Presented today to the emergency department for evaluation of right upper and lower extremity worsening weakness.  CT scan of the brain and CT angiogram of the head and neck both were unremarkable for acute findings.  CBC and chemistry at baseline.    PAST MEDICAL HISTORY:  Essential hypertension, cerebrovascular accident with chronic aphasia, right nasolabial droop, chronic kidney disease stage 3, obstructive sleep apnea, obesity, asthma, hyperlipidemia, prediabetic state, glaucoma, osteoarthritis, coronary artery disease with remote PCI intervention, vascular dementia.    PAST SURGICAL HISTORY:  Left eye vitrectomy, total abdominal hysterectomy.    MEDICATIONS:  Please see medication reconciliation list.  Currently on dual antiplatelet therapy with aspirin and Plavix.    ALLERGIES:  No known drug allergies.     FAMILY HISTORY:  Positive for hypertension.     SOCIAL HISTORY:  Ex-tobacco user.  No current tobacco, alcohol, or drug use.  Lives with her family.  Requires some assistance in her basic activities of daily living.     REVIEW OF SYSTEMS:  Difficult to obtain from the patient herself.  Per her son, patient started developing weakness in her right upper and lower extremity earlier today.  She does have chronic right nasolabial droop from prior stroke.      PHYSICAL EXAMINATION:    GENERAL:  Alert and oriented, able to follow commands.  Nonverbal.  VITAL SIGNS:  Temperature  98.5, pulse 61, respiratory rate 13, blood pressure 182/65, pulse ox 99% on room air.  HEENT:  Atraumatic.  Oropharynx clear.  Right nasolabial droop, chronic.   NECK:  Supple.  No lymphadenopathy.    LUNGS:  Clear to auscultation bilaterally.  Normal respiratory effort.    HEART:  Regular rate and rhythm.  S1 and S2 auscultated.  No murmur.    ABDOMEN:  Soft, nondistended.  No tenderness.  Positive bowel sounds.   EXTREMITIES:  No peripheral edema, clubbing or cyanosis.   NEUROLOGIC:  Patient has good muscle tone in her right upper and lower extremities.  I do not know her baseline.      ASSESSMENT:  Right upper extremity weakness.  Rule out recurrent cerebrovascular accident.  So far, workup is negative.  EKG, normal sinus rhythm.      PLAN:  We will continue to monitor her in the hospital.  Stroke protocol.  MRI scan of the brain.  Neurology consult.  N.p.o. until speech therapy evaluation.  Further recommendations to follow.     Dictated By Elizabeth Murillo MD  d: 11/04/2024 18:49:34  t: 11/04/2024 19:55:29  Job [id number]  /

## 2024-11-05 NOTE — OCCUPATIONAL THERAPY NOTE
OCCUPATIONAL THERAPY EVALUATION - INPATIENT     Room Number: 332/332-A  Evaluation Date: 11/5/2024  Type of Evaluation: Initial  Presenting Problem: Rue weakness    Physician Order: IP Consult to Occupational Therapy  Reason for Therapy: ADL/IADL Dysfunction and Discharge Planning    OCCUPATIONAL THERAPY ASSESSMENT   Patient is a 69 year old female admitted 11/4/2024 for Rue weakness.  Prior to admission, patient was living w/ her son in a 1 story home. Pt's son assists w/ adls. Pt ambulates w/ a 3 wheel walker.  Patient is currently functioning near baseline with adls and functional mobility.  Patient is requiring minimal assist as a result of the following impairments: decreased functional strength and residual deficits from prior cva . Occupational Therapy will continue to follow for duration of hospitalization.    Patient will benefit from continued skilled OT Services at discharge to promote prior level of function and safety with additional support and return home with home health OT.    PLAN DURING HOSPITALIZATION  OT Device Recommendations: TBD  OT Treatment Plan: Compensatory technique education;Patient/Family training;Patient/Family education;Endurance training;Functional transfer training;ADL training     OCCUPATIONAL THERAPY MEDICAL/SOCIAL HISTORY   Problem List  Principal Problem:    Slurred speech  Active Problems:    Right sided weakness    Weakness of right upper extremity    HOME SITUATION  Type of Home: House  Home Layout: One level  Lives With: Son  Toilet and Equipment: Standard height toilet  Shower/Tub and Equipment: Walk-in shower; Shower chair  Other Equipment: -- (3 wheel walker)  Hand Dominance: Right  Drives: No  Patient Regularly Uses: Rollator    Stairs in Home: 4 stairs to enter  Use of Assistive Device(s): 3 wheel walker    Prior Level of [place]: Prior to admission, patient was living w/ her son in a 1 story home. Pt's son assists w/ adls. Pt ambulates w/ a 3 wheel walker.      SUBJECTIVE  Pt reporting that she felt she was back to her baseline    OCCUPATIONAL THERAPY EXAMINATION      OBJECTIVE  Precautions: Bed/chair alarm  Fall Risk: High fall risk      PAIN ASSESSMENT  Ratin      ACTIVITY TOLERANCE  good    O2 SATURATIONS  Activity on room air    SENSATION  Pt denied numbness/tingling    Communication: intact    Behavioral/Emotional/Social: pleasant and cooperative    RANGE OF MOTION   Upper extremity ROM is within functional limits     STRENGTH ASSESSMENT  Upper extremity strength is grossly fair+    COORDINATION  Gross Motor: WFL   Fine Motor: WFL     ACTIVITIES OF DAILY LIVING ASSESSMENT  AM-PAC ‘6-Clicks’ Inpatient Daily Activity Short Form  How much help from another person does the patient currently need…  -   Putting on and taking off regular lower body clothing?: A Little  -   Bathing (including washing, rinsing, drying)?: A Little  -   Toileting, which includes using toilet, bedpan or urinal? : A Little  -   Putting on and taking off regular upper body clothing?: A Little  -   Taking care of personal grooming such as brushing teeth?: A Little  -   Eating meals?: A Little    AM-PAC Score:  Score: 18  Approx Degree of Impairment: 46.65%  Standardized Score (AM-PAC Scale): 38.66  CMS Modifier (G-Code): CK    FUNCTIONAL ADL ASSESSMENT  Eating: setup assist  Grooming: setup assist  UB Dressing: min assist  LB Dressing: min assist  Toileting: na    Skilled Therapy Provided: RN contacted prior to start of care. Treatment coordinated w/ PT. Pt agreeable to participation in therapy. Gait belt used during dynamic activity. Pt received in bed, alert and oriented. On initial contact pt transferred supine<>sit at eob w/ cga. Pt maintained unsupported sitting w/ supervision. Pt performing sit<>stand  bed/chair w/ cga. Pt ambulating in the room w/ 3 wheel walker and cga;elliott slow but no lob noted. Pt reporting that she was  ambulating as usual.     At end of session pt remaining up  in chair w/ all needs in reach and alarm on. RN at bedside and aware of pt's status and performance in therapy      EDUCATION PROVIDED  Patient Education : Role of Occupational Therapy; Plan of Care; Functional Transfer Techniques; Fall Prevention  Patient's Response to Education: Verbalized Understanding    The patient's Approx Degree of Impairment: 46.65% has been calculated based on documentation in the [facility] '6 clicks' Inpatient Daily Activity Short Form.  Research supports that patients with this level of impairment may benefit from return to home w/ HH.  Final disposition will be made by interdisciplinary medical team.     Patient End of Session: Up in chair;Needs met;Call light within reach;RN aware of session/findings;With  staff;All patient questions and concerns addressed;Alarm set    OT Goals  Patients self stated goal is: unstated     Patient will complete functional transfer with supervision  Comment:     Patient will complete toileting with set up  Comment:     Patient will tolerate standing for 5 minutes in prep for adls with supervision   Comment:             Goals  on: 24  Frequency: 3-5x/week    Patient Evaluation Complexity Level:   Occupational Profile/Medical History MODERATE - Expanded review of history including review of medical or therapy record   Specific performance deficits impacting engagement in ADL/IADL LOW  1 - 3 performance deficits    Client Assessment/Performance Deficits MODERATE - Comorbidities and min to mod modifications of tasks    Clinical Decision Making MODERATE - Analysis of occupational profile, detailed assessments, several treatment options    Overall Complexity LOW     Therapeutic Activity: 20 minutes

## 2024-11-05 NOTE — PHYSICAL THERAPY NOTE
PHYSICAL THERAPY EVALUATION - INPATIENT     Room Number: 332/332-A  Evaluation Date: 11/5/2024  Type of Evaluation: Initial   Physician Order: PT Eval and Treat    Presenting Problem: slurred speech, R side weakness and facial droop  Co-Morbidities : hx CVA, chronic aphasia  Reason for Therapy: Mobility Dysfunction and Discharge Planning    PHYSICAL THERAPY ASSESSMENT   Patient is a 69 year old female admitted 11/4/2024 for slurred speech, R side weakness and facial droop.  Prior to admission, patient's baseline is independent with ADLs and functional mobility with rollator.  Patient is currently functioning near baseline with bed mobility, transfers, gait, stair negotiation, standing prolonged periods, and performing household tasks.  Patient is requiring contact guard assist as a result of the following impairments: decreased functional strength, impaired dynamic standing balance, decreased muscular endurance, and medical status.  Physical Therapy will continue to follow for duration of hospitalization.    Patient will benefit from continued skilled PT Services at discharge to promote prior level of function and safety with additional support and return home with home health PT.    PLAN DURING HOSPITALIZATION  Nursing Mobility Recommendation : 1 Assist  PT Device Recommendation: Rolling walker  PT Treatment Plan: Bed mobility;Body mechanics;Patient education;Endurance;Energy conservation;Gait training;Strengthening;Balance training;Transfer training;Stair training  Rehab Potential : Good  Frequency (Obs): 5x/week     PHYSICAL THERAPY MEDICAL/SOCIAL HISTORY     Problem List  Principal Problem:    Slurred speech  Active Problems:    Right sided weakness    Weakness of right upper extremity    HOME SITUATION  Type of Home: House  Home Layout: One level  Stairs to Enter : 16   Railing: Yes    Lives With: Son    Drives: No   Patient Regularly Uses: Rollator     Prior Level of [place]: independent, ambulates with  rollator     SUBJECTIVE  Agreeable to activity.     PHYSICAL THERAPY EXAMINATION   OBJECTIVE  Precautions: Bed/chair alarm  Fall Risk: High fall risk    WEIGHT BEARING RESTRICTION  none    PAIN ASSESSMENT  Ratin    COGNITION  Overall Cognitive Status:  WFL - within functional limits A&O x 4, follows all commands and safety cues, some delayed processing/delayed verbal responses to questions noted    RANGE OF MOTION AND STRENGTH ASSESSMENT  Upper extremity ROM and strength are within functional limits.  Lower extremity ROM is within functional limits.  Lower extremity strength is within functional limits.    BALANCE  Static Sitting: Good  Dynamic Sitting: Fair +  Static Standing: Fair  Dynamic Standing: Fair -    AM-PAC '6-Clicks' INPATIENT SHORT FORM - BASIC MOBILITY  How much difficulty does the patient currently have...  Patient Difficulty: Turning over in bed (including adjusting bedclothes, sheets and blankets)?: A Little   Patient Difficulty: Sitting down on and standing up from a chair with arms (e.g., wheelchair, bedside commode, etc.): A Little   Patient Difficulty: Moving from lying on back to sitting on the side of the bed?: A Little   How much help from another person does the patient currently need...   Help from Another: Moving to and from a bed to a chair (including a wheelchair)?: A Little   Help from Another: Need to walk in hospital room?: A Little   Help from Another: Climbing 3-5 steps with a railing?: A Little     AM-PAC Score:  Raw Score: 18   Approx Degree of Impairment: 46.58%   Standardized Score (AM-PAC Scale): 43.63   CMS Modifier (G-Code): CK    FUNCTIONAL ABILITY STATUS  Functional Mobility/Gait Assessment  Gait Assistance: Contact guard assist  Distance (ft): 50  Assistive Device: Rolling walker  Pattern: Shuffle (slow pace, overall steady gait no LOB)  Supine to Sit: contact guard assist  Sit to Stand: contact guard assist    Exercise/Education Provided:  Education Provided To:  Patient     Patient Education: Role of Physical Therapy;Plan of Care;Discharge Recommendations;DME Recommendations;Functional Transfer Techniques;Posture/Positioning;Energy Conservation;Proper Body Mechanics;Gait Training     Patient's Response to Education: Verbalized Understanding;Returned Demonstration     Skilled Therapy Provided: Pt received resting in bed. Introduced self and role. Pt agreeable to activity. Pt reported continued RUE and RLE weakness which has improved significantly from yesterday. Pt with no c/o pain, dizziness, SOB, vision changes, or sensation changes. Pt demos bed mobility with CGA with HOB elevated; STS transfer to/from bed and chair with rollator and CGA, did require VC to lock rollator prior to standing; household distance ambulation with rollator and CGA, slow but overall steady gait. Returned to room and was left sitting in chair with alarm on, needs within reach, handoff to RN complete.     The patient's Approx Degree of Impairment: 46.58% has been calculated based on documentation in the [facility] '6 clicks' Inpatient Basic Mobility Short Form.  Research supports that patients with this level of impairment may benefit from home with HH PT.  Final disposition will be made by interdisciplinary medical team.    Patient End of Session: Up in chair;Needs met;RN aware of session/findings;Call light within reach;All patient questions and concerns addressed;Hospital anti-slip socks;Alarm set    CURRENT GOALS  Goals to be met by: 11/12/24  Patient Goal Patient's self-stated goal is: go home   Goal #1 Patient is able to demonstrate supine - sit EOB @ level: supervision     Goal #1   Current Status    Goal #2 Patient is able to demonstrate transfers Sit to/from Stand at assistance level: supervision with walker - rolling     Goal #2  Current Status    Goal #3 Patient is able to ambulate 150 feet with assist device: walker - rolling at assistance level: supervision   Goal #3   Current Status    Goal  #4 Patient will negotiate 8 stairs/one curb w/ assistive device and supervision   Goal #4   Current Status    Goal #5 Patient to demonstrate independence with home activity/exercise instructions provided to patient in preparation for discharge.   Goal #5   Current Status    Goal #6    Goal #6  Current Status      Patient Evaluation Complexity Level:  History Moderate - 1 or 2 personal factors and/or co-morbidities   Examination of body systems Moderate - addressing a total of 3 or more elements   Clinical Presentation  Moderate - Evolving   Clinical Decision Making  Moderate Complexity     Therapeutic Activity:  20 minutes

## 2024-11-06 VITALS
SYSTOLIC BLOOD PRESSURE: 157 MMHG | WEIGHT: 155 LBS | TEMPERATURE: 98 F | HEIGHT: 66 IN | HEART RATE: 61 BPM | BODY MASS INDEX: 24.91 KG/M2 | DIASTOLIC BLOOD PRESSURE: 53 MMHG | OXYGEN SATURATION: 100 % | RESPIRATION RATE: 18 BRPM

## 2024-11-06 LAB
ANION GAP SERPL CALC-SCNC: 5 MMOL/L (ref 0–18)
BASOPHILS # BLD AUTO: 0.01 X10(3) UL (ref 0–0.2)
BASOPHILS NFR BLD AUTO: 0.3 %
BUN BLD-MCNC: 21 MG/DL (ref 9–23)
BUN/CREAT SERPL: 13.2 (ref 10–20)
CALCIUM BLD-MCNC: 9.1 MG/DL (ref 8.7–10.4)
CHLORIDE SERPL-SCNC: 113 MMOL/L (ref 98–112)
CO2 SERPL-SCNC: 25 MMOL/L (ref 21–32)
CREAT BLD-MCNC: 1.59 MG/DL
DEPRECATED RDW RBC AUTO: 47.6 FL (ref 35.1–46.3)
EGFRCR SERPLBLD CKD-EPI 2021: 35 ML/MIN/1.73M2 (ref 60–?)
EOSINOPHIL # BLD AUTO: 0.05 X10(3) UL (ref 0–0.7)
EOSINOPHIL NFR BLD AUTO: 1.7 %
ERYTHROCYTE [DISTWIDTH] IN BLOOD BY AUTOMATED COUNT: 15.5 % (ref 11–15)
GLUCOSE BLD-MCNC: 102 MG/DL (ref 70–99)
GLUCOSE BLDC GLUCOMTR-MCNC: 113 MG/DL (ref 70–99)
GLUCOSE BLDC GLUCOMTR-MCNC: 123 MG/DL (ref 70–99)
HCT VFR BLD AUTO: 27.7 %
HGB BLD-MCNC: 8.5 G/DL
IMM GRANULOCYTES # BLD AUTO: 0 X10(3) UL (ref 0–1)
IMM GRANULOCYTES NFR BLD: 0 %
LYMPHOCYTES # BLD AUTO: 1.12 X10(3) UL (ref 1–4)
LYMPHOCYTES NFR BLD AUTO: 38.2 %
MCH RBC QN AUTO: 26 PG (ref 26–34)
MCHC RBC AUTO-ENTMCNC: 30.7 G/DL (ref 31–37)
MCV RBC AUTO: 84.7 FL
MONOCYTES # BLD AUTO: 0.2 X10(3) UL (ref 0.1–1)
MONOCYTES NFR BLD AUTO: 6.8 %
NEUTROPHILS # BLD AUTO: 1.55 X10 (3) UL (ref 1.5–7.7)
NEUTROPHILS # BLD AUTO: 1.55 X10(3) UL (ref 1.5–7.7)
NEUTROPHILS NFR BLD AUTO: 53 %
OSMOLALITY SERPL CALC.SUM OF ELEC: 299 MOSM/KG (ref 275–295)
PLATELET # BLD AUTO: 206 10(3)UL (ref 150–450)
POTASSIUM SERPL-SCNC: 4.5 MMOL/L (ref 3.5–5.1)
POTASSIUM SERPL-SCNC: 4.5 MMOL/L (ref 3.5–5.1)
RBC # BLD AUTO: 3.27 X10(6)UL
SODIUM SERPL-SCNC: 143 MMOL/L (ref 136–145)
WBC # BLD AUTO: 2.9 X10(3) UL (ref 4–11)

## 2024-11-06 PROCEDURE — 99239 HOSP IP/OBS DSCHRG MGMT >30: CPT | Performed by: HOSPITALIST

## 2024-11-06 PROCEDURE — 99232 SBSQ HOSP IP/OBS MODERATE 35: CPT | Performed by: OTHER

## 2024-11-06 RX ORDER — LEVOFLOXACIN 250 MG/1
250 TABLET, FILM COATED ORAL DAILY
Qty: 7 TABLET | Refills: 0 | Status: SHIPPED | OUTPATIENT
Start: 2024-11-06 | End: 2024-11-13

## 2024-11-06 RX ORDER — LEVETIRACETAM 500 MG/1
500 TABLET ORAL 2 TIMES DAILY
Qty: 60 TABLET | Refills: 1 | Status: SHIPPED | OUTPATIENT
Start: 2024-11-06

## 2024-11-06 NOTE — PLAN OF CARE
Eliud A&O x3. Patient being discharge home with son. IV and telebox removed at time of discharge. All discharge paperwork reviewed and all questions and concerned answered and all paper prescription sent with patient if applicable.Patient brought down to exit via wheelchair by medical staff.

## 2024-11-06 NOTE — OCCUPATIONAL THERAPY NOTE
OCCUPATIONAL THERAPY TREATMENT NOTE - INPATIENT        Room Number: 332/332-A     Presenting Problem: Rue weakness    MRI brain  (-) for acute findings    Problem List  Principal Problem:    Slurred speech  Active Problems:    Right sided weakness    Weakness of right upper extremity      OCCUPATIONAL THERAPY ASSESSMENT   Patient demonstrates good  progress this session, goals remain in progress.    Patient is requiring up to CGA for ADLs as a result of the following impairments: decreased functional strength, decreased functional reach, decreased endurance, impaired balance, decreased muscular endurance, cognitive deficits (at baseline), and decreased safety awareness.    Patient continues to function near baseline with  ADLs and fx mobility/transfers .  Next session anticipate patient to progress lower body dressing, dynamic standing balance, and functional standing tolerance.  Occupational Therapy will continue to follow patient for duration of hospitalization.    Patient continues to benefit from continued skilled OT services: at discharge to promote prior level of function and safety with additional support and return home with home health OT.     PLAN DURING HOSPITALIZATION  OT Device Recommendations: TBD  OT Treatment Plan: Compensatory technique education;Patient/Family training;Patient/Family education;Endurance training;Functional transfer training;ADL training     SUBJECTIVE  Patient agreeable to OT treatment session.    OBJECTIVE  Precautions: Aspiration;Bed/chair alarm    WEIGHT BEARING RESTRICTION  None    PAIN ASSESSMENT  Ratin    ACTIVITY TOLERANCE  Pulse: 68  Heart Rate Source: Monitor       O2 SATURATIONS  Oxygen Therapy  SPO2% on Room Air at Rest: 98    ACTIVITIES OF DAILY LIVING ASSESSMENT  AM-PAC ‘6-Clicks’ Inpatient Daily Activity Short Form  How much help from another person does the patient currently need…  -   Putting on and taking off regular lower body clothing?: A Little  -    Bathing (including washing, rinsing, drying)?: A Little  -   Toileting, which includes using toilet, bedpan or urinal? : A Little  -   Putting on and taking off regular upper body clothing?: A Little  -   Taking care of personal grooming such as brushing teeth?: A Little  -   Eating meals?: None    AM-PAC Score:  Score: 19  Approx Degree of Impairment: 42.8%  Standardized Score (AM-PAC Scale): 40.22  CMS Modifier (G-Code): CK    BED MOBILITY  Supine to Sit: stand-by assist    FUNCTIONAL TRANSFER ASSESSMENT  Sit to Stand from EOB: contact guard assist  Toilet Transfer: contact guard assist  Chair Transfer: contact guard assist    FUNCTIONAL MOBILITY  contact guard assist for hallway fx mobility using rollator  Comments:  Benefited from repeat cues for locking brakes of rollator.    ACTIVITIES OF DAILY LIVING  Eating: independent  Grooming: stand-by assist  UB Dressing: independent  LB Dressing: contact guard assist  Toileting: supervision for pericares seated  Comments:   1x LOB during sit>stand attempt from toilet; able to self-correct to prevent falls.    EDUCATION PROVIDED  Patient Education : Plan of Care; Role of Occupational Therapy; Discharge Recommendations; Functional Transfer Techniques; Fall Prevention; Posture/Positioning; Energy Conservation; Proper Body Mechanics  Patient's Response to Education: Verbalized Understanding; Returned Demonstration; Requires Further Education    The patient's Approx Degree of Impairment: 42.8% has been calculated based on documentation in the [facility] '6 clicks' Inpatient Daily Activity Short Form.  Research supports that patients with this level of impairment may benefit from  OT.  Final disposition will be made by interdisciplinary medical team.    Patient End of Session: Up in chair;Needs met;Call light within reach;All patient questions and concerns addressed;RN aware of session/findings;Hospital anti-slip socks;Alarm set    OT Goals:  Patient's self stated goal is:  unstated     Patient will complete functional transfer with supervision  Comment: ongoing    Patient will complete toileting with set up  Comment: MET with SUP     Patient will tolerate standing for 5 minutes in prep for adls with supervision   Comment: ongoing             Goals  on: 24  Frequency: 3-5x/week    OT Session Time  Self-Care Home Management: 15 minutes  Therapeutic Activity: 8 minutes    LAYLA Turk/L  [facility]  #[id number]

## 2024-11-06 NOTE — CM/SW NOTE
11/06/24 1200   Discharge disposition   Expected discharge disposition Home-Health   Post Acute Care Provider Other  (Purpose Care )   Discharge transportation Private car     The patient received a MDO for discharge.    The patient is reserved with [facility].    The patient will be transported home by her son vis private car.    SW/PETRA to remain available for support and/or discharge planning.     Malika Leonardo MSW, LSW  Discharge Planner [id number]

## 2024-11-06 NOTE — PROGRESS NOTES
[facility]  [address] [place] [address], [address]  [facility] [address]  [phone]            Eliud Fine Patient Status:  Inpatient    1955 MRN [id number]   Location [facility] 3W/SW Attending Joselito Calhoun MD   Hosp Day # 2 PCP Yao Rodriguez     Subjective:  Eliud Fine is a(n) 69 year old female.    Hospital course to date:     Patient with a history of left basal ganglia stroke back in 2021, residual dysarthria and right-sided weakness.  She has been back to our hospital for more times, 2022 and 2024 when her symptoms will be recurring again, she would experience more slurring of speech and right upper and lower extremity weakness will be weaker than usual.  MRI of the brain 2024 that was obtained for those reasons was not showing any acute changes.  Cholesterol density was in progestins of her old stroke symptoms.     MRI of the brain in 2022 also did not show any further changes from the prior stroke.  She came back to the hospital again in 2024, since since at least  Of that day she was having some slurring of speech and worsening of right-sided weakness.  She was outside the window for TNK.     By the next day patient back to usual self again.  Her blood pressure was still poorly controlled as it was last time in September.     MRI of the brain was repeated again in 2024, no acute changes, but significant mount of microhemorrhages noted.  More than 10.  Therefore EEG also was done, and it was normal.  Keppra was started at the same time for possible seizures presenting as transient symptoms in the setting of possible amyloid angiopathy    Current Facility-Administered Medications   Medication Dose Route Frequency    dextrose 5%-sodium chloride 0.9% infusion   Intravenous Continuous    glucose (Dex4) 15 GM/59ML oral liquid 15 g  15 g Oral Q15 Min PRN    Or    glucose (Glutose) 40% oral  gel 15 g  15 g Oral Q15 Min PRN    Or    glucose-vitamin C (Dex-4) chewable tab 4 tablet  4 tablet Oral Q15 Min PRN    Or    dextrose 50% injection 50 mL  50 mL Intravenous Q15 Min PRN    Or    glucose (Dex4) 15 GM/59ML oral liquid 30 g  30 g Oral Q15 Min PRN    Or    glucose (Glutose) 40% oral gel 30 g  30 g Oral Q15 Min PRN    Or    glucose-vitamin C (Dex-4) chewable tab 8 tablet  8 tablet Oral Q15 Min PRN    brimonidine (Alphagan) 0.2 % ophthalmic solution 1 drop  1 drop Both Eyes BID    carvedilol (Coreg) tab 25 mg  25 mg Oral BID with meals    cyanocobalamin (Vitamin B12) tab 500 mcg  500 mcg Oral Daily    magnesium oxide (Mag-Ox) tab 400 mg  400 mg Oral Daily    spironolactone (Aldactone) tab 25 mg  25 mg Oral Daily    levETIRAcetam (Keppra) tab 500 mg  500 mg Oral BID    cefTRIAXone (Rocephin) 1 g in sodium chloride 0.9% 100 mL IVPB-ADDV  1 g Intravenous Q24H    acetaminophen (Tylenol) tab 650 mg  650 mg Oral Q4H PRN    Or    acetaminophen (Tylenol) rectal suppository 650 mg  650 mg Rectal Q4H PRN    labetalol (Trandate) 5 mg/mL injection 10 mg  10 mg Intravenous Q10 Min PRN    hydrALAzine (Apresoline) 20 mg/mL injection 10 mg  10 mg Intravenous Q2H PRN    ondansetron (Zofran) 4 MG/2ML injection 4 mg  4 mg Intravenous Q6H PRN    metoclopramide (Reglan) 5 mg/mL injection 10 mg  10 mg Intravenous Q8H PRN    atorvastatin (Lipitor) tab 40 mg  40 mg Oral Nightly    heparin (Porcine) 5000 UNIT/ML injection 5,000 Units  5,000 Units Subcutaneous 2 times per day    aspirin chewable tab 81 mg  81 mg Oral Daily    acetaminophen (Tylenol Extra Strength) tab 500 mg  500 mg Oral Q4H PRN       Objective:  Blood pressure 158/54, pulse 68, temperature 97.5 °F (36.4 °C), temperature source Oral, resp. rate 16, height 66\", weight 155 lb (70.3 kg), SpO2 100%.    Physical Exam:  Vitals:    11/06/24 0100 11/06/24 0359 11/06/24 0858 11/06/24 0940   BP:  (!) 173/64 158/54    Pulse:  63 64 68   Resp:  16 16    Temp:  97.5 °F (36.4  °C) 97.5 °F (36.4 °C)    TempSrc:  Oral Oral    SpO2:  99% 100%    Weight: 155 lb (70.3 kg)      Height:           General: No apparent distress, well nourished, well groomed.  Head- Normocephalic, atraumatic  Eyes- No redness or swelling  Neck- No masses or adenopathy  CV: pulses were palpable and normal, no cyanosis or edema     Neurological:     Mental Status- Alert and oriented x3.  Normal attention span and concentration  Thought process intact  Memory intact- recent and remote  Mood intact  Fund of knowledge appropriate for education and age    Language intact including: comprehension, naming, repetition, vocabulary    Cranial Nerves:  II.- Visual fields full to confrontation        Fundoscopically- No papilledema or retinal hemorrhages. Normal optic discs, sharp edges.  III, IV, VI- EOM intact, PETER  V. Facial sensation intact  VII. Face symmetric, no facial weakness  VIII. Hearing intact to whisper, tuning fork or finger rub.  IX. Pallet elevates symmetrically.  XI. Shoulder shrug is intact  XII. Tongue is midline    Motor Exam:  Muscle tone normal  No atrophy or fasciculations  Strength- upper extremities 5/5 proximally and distally                  - lower  extremities 5/5 proximally and distally    Sensory Exam:  Light touch sensation- intact in all 4 extremities    Deep Tendon Reflexes:  Biceps 2+ bilateral symmetric  Triceps 2+ bilateral symmetric  Brachioradialis 2 + bilateral symmetric  Patellar 2+ bilateral symmetric  Ankle jerk 2+ bilateral symmetric    No clonus  No Babinski sign    Coordination:  Finger to nose intact  Rapid alternating movements intact    Gait:  Normal posture  Normal physiologic      Lab Results   Component Value Date    WBC 2.9 (L) 11/06/2024    HGB 8.5 (L) 11/06/2024    HCT 27.7 (L) 11/06/2024    .0 11/06/2024    CREATSERUM 1.59 (H) 11/06/2024    BUN 21 11/06/2024     11/06/2024    K 4.5 11/06/2024    K 4.5 11/06/2024     (H) 11/06/2024    CO2 25.0  11/06/2024     (H) 11/06/2024    CA 9.1 11/06/2024    ALB 3.4 09/27/2024    ALKPHO 70 09/25/2024    BILT 0.3 09/25/2024    TP 6.5 09/25/2024    AST 16 09/25/2024    ALT 15 09/25/2024    PTT 30.4 09/25/2024    INR 1.11 09/25/2024    DDIMER 2.40 (H) 10/11/2022    ESRML 37 (H) 12/18/2022    CRP 0.31 (H) 12/18/2022    MG 2.0 11/05/2024    PHOS 2.9 09/27/2024    CK 79 03/24/2024    B12 260 09/25/2024       MRI BRAIN (CPT=70551)    Result Date: 11/5/2024  CONCLUSION:   1. No acute intracranial abnormality.  No evidence of acute or subacute ischemia or infarct.  2. Advanced chronic microvascular ischemic changes and moderate generalized parenchymal volume loss.  3. Chronic diffuse cerebral microhemorrhages, with likely differential etiology including chronic hypertensive encephalopathy and cerebral amyloid angiopathy.      elm-remote  Dictated by (CST): Philip Manzo MD on 11/05/2024 at 12:27 PM     Finalized by (CST): Philip Manzo MD on 11/05/2024 at 12:33 PM          CT STROKE CTA BRAIN/CTA NECK (W IV)(CPT=70496/92529)    Result Date: 11/4/2024  CONCLUSION:  1. No large vessel occlusion, aneurysm, or dissection in the head or neck. 2. Moderate multifocal narrowing of the anterior left M2 segment. 3. Mild-to-moderate narrowing of the anterior right M2 segment. 4. Moderate narrowing of a right M3 branch in the right temporal region. 5. Mild narrowing of the distal right V4 segment of the vertebral artery. 6. Lesser incidental findings described above.    Impression points 1-4 communicated via telephone to Azam JOSEPH at 6:33 p.m. on 11/04/2024 by Kunal Meléndez MD  Dictated by (CST): Kunal Meléndez MD on 11/04/2024 at 6:23 PM     Finalized by (CST): Kunal Meléndez MD on 11/04/2024 at 6:35 PM          CT STROKE BRAIN (NO IV)(CPT=70450)    Result Date: 11/4/2024  CONCLUSION:   1. No new transcortical area of hypoattenuation to suggest an acute infarct.  If there is clinical concern for an acute infarct, recommend  further evaluation with an MRI brain. 2. No acute intracranial hemorrhage, midline shift, mass effect, or hydrocephalus. 3. Multiple small chronic infarcts involving the deep gray nuclei and left corona radiata/centrum semiovale. 4. Severe chronic microvascular ischemic changes.  5. Lesser incidental findings described above.   Impression points 1-4 were communicated via telephone to Aazm JOSEPH at 6:16 p.m. on 11/04/2024 by Kunal Meléndez MD  Dictated by (CST): Kunal Meléndez MD on 11/04/2024 at 6:10 PM     Finalized by (CST): Kunal Meléndez MD on 11/04/2024 at 6:16 PM         EKG 12 Lead    Result Date: 11/5/2024  Sinus rhythm with 1st degree A-V block Nonspecific T wave abnormality Abnormal ECG When compared with ECG of 23-SEP-2024 11:25, Nonspecific T wave abnormality has replaced inverted T waves in Lateral leads Confirmed by JALEEL WILSON ([id number]) on 11/5/2024 12:02:26 PM       Assessment:  Patient Active Problem List   Diagnosis    Vision loss, left eye    Cerebrovascular accident (CVA), unspecified mechanism (HCC)    Essential hypertension    COVID-19    Right sided weakness    Aphasia due to recent stroke    Cerebral amyloid angiopathy (CODE)    Toxic encephalopathy    Hypertensive urgency    Acute chest pain    Renal artery stenosis (HCC)    Cystitis    PAPO (acute kidney injury) (HCC)    Primary hypertension    Uncontrolled hypertension    CVA (cerebral vascular accident) (HCC)    Acute CVA (cerebrovascular accident) (HCC)    Slurred speech    Weakness of right upper extremity         MRI of the brain was repeated again in November 2024, no acute changes, but significant mount of microhemorrhages noted.  More than 10.  Therefore EEG also was done, and it was normal.  Keppra was started at the same time for possible seizures presenting as transient symptoms in the setting of possible amyloid angiopathy.  She will continue with only aspirin, Plavix will be stopped.  Follow-up in the clinic in 1 month, no  additional neurologic recommendations at this time    Chris Chandra MD  11/6/2024  10:05 AM

## 2024-11-06 NOTE — PAYOR COMM NOTE
11/4 & 11/5  ADMISSION REVIEW     [place]or[place] MARJORIE DAVE [facility] [place] #:  [id number]  Authorization Number: [id number]      Admit date: 11/4/24  Admit time: 10:23 PM       REVIEW DOCUMENTATION:     ED Provider Notes        ED Provider Notes signed by Joao Nascimento MD at 11/5/2024 12:09 AM       Author: Joao Nascimento MD Service: -- Author Type: Physician    Filed: 11/5/2024 12:09 AM Date of Service: 11/5/2024 12:04 AM Status: Signed    : Joao Nascimento MD (Physician)           Patient Seen in: [facility] Emergency Department    History     Chief Complaint   Patient presents with    Stroke       HPI    The patient presents to the ED after developing slurred speech and increased weakness to her right side around noon today per her son.  History of a stroke in the past with the same symptoms.  Son states that she also had difficulty speaking and right-sided weakness at that time.  Patient was also admitted recently for the same symptoms just over a month ago.  Patient unable to provide much history given difficulty speaking.    History reviewed.   Past Medical History:    Asthma (HCC)    Coronary atherosclerosis    Diabetes (HCC)    diabetes for 2 yrs    Essential hypertension    Glaucoma    right    Heart attack (HCC)    2005    High blood pressure    High cholesterol    Hyperlipidemia    Osteoarthritis    Sleep apnea    cpap    Stroke (HCC)    30 yrs ago    Visual impairment    left eye edema       History reviewed.   Past Surgical History:   Procedure Laterality Date    Anesth,vitrectomy Left 09/18/2017    Pars plana vitrectomy, internal limiting membrane peel, left eye.    Cath bare metal stent (bms)      Colonoscopy      Hysterectomy      Total abdom hysterectomy      Tubal ligation           Medications :  Prescriptions Prior to Admission[1]     Family History   Problem Relation Age of Onset    Cancer Father     Diabetes Mother        Smoking Status:   Social History     Socioeconomic History     Marital status: Single   Tobacco Use    Smoking status: Former     Current packs/day: 0.00     Types: Cigarettes     Start date: 1976     Quit date: 1980     Years since quittin.1    Smokeless tobacco: Never   Vaping Use    Vaping status: Never Used   Substance and Sexual Activity    Alcohol use: No    Drug use: No       Constitutional and vital signs reviewed.      Social History and Family History elements reviewed from today, pertinent positives to the presenting problem noted.    Physical Exam     ED Triage Vitals   BP 24 1746 (!) 189/67   Pulse 24 1746 59   Resp 24 1746 18   Temp 24 1746 98.5 °F (36.9 °C)   Temp src 24 2225 Oral   SpO2 24 1746 100 %   O2 Device 24 1746 None (Room air)       All measures to prevent infection transmission during my interaction with the patient were taken. Handwashing was performed prior to and after the exam.  Stethoscope and any equipment used during my examination was cleaned with super sani-cloth germicidal wipes following the exam.     Physical Exam  Vitals and nursing note reviewed.   Constitutional:       General: She is not in acute distress.     Appearance: She is well-developed. She is not ill-appearing or toxic-appearing.   HENT:      Head: Normocephalic and atraumatic.   Eyes:      General:         Right eye: No discharge.         Left eye: No discharge.      Conjunctiva/sclera: Conjunctivae normal.   Neck:      Trachea: No tracheal deviation.   Cardiovascular:      Rate and Rhythm: Normal rate.   Pulmonary:      Effort: Pulmonary effort is normal. No respiratory distress.      Breath sounds: No stridor.   Abdominal:      General: There is no distension.      Palpations: Abdomen is soft.   Musculoskeletal:         General: No deformity.   Skin:     General: Skin is warm and dry.   Neurological:      Mental Status: She is alert and oriented to person, place, and time.      Comments: Slurred garbled speech that  is incomprehensible.  Weakness noted to the right arm and less to the right leg however patient with seemingly normal strength at some times in the arm and leg.   Psychiatric:         Mood and Affect: Mood normal.         Behavior: Behavior normal.         ED Course        Labs Reviewed   BASIC METABOLIC PANEL (8) - Abnormal; Notable for the following components:       Result Value    BUN 24 (*)     Creatinine 1.89 (*)     Calculated Osmolality 298 (*)     eGFR-Cr 28 (*)     All other components within normal limits   CBC WITH DIFFERENTIAL WITH PLATELET - Abnormal; Notable for the following components:    RBC 3.56 (*)     HGB 9.5 (*)     HCT 29.7 (*)     RDW-SD 48.2 (*)     RDW 15.8 (*)     All other components within normal limits   URINALYSIS WITH CULTURE REFLEX - Abnormal; Notable for the following components:    Spec Gravity >1.030 (*)     Protein Urine 20 (*)     Leukocyte Esterase Urine 75 (*)     WBC Urine 6-10 (*)     RBC Urine 3-5 (*)     Bacteria Urine 2+ (*)     Squamous Epi. Cells Few (*)     All other components within normal limits   LIPID PANEL - Abnormal; Notable for the following components:    HDL Cholesterol 29 (*)     All other components within normal limits   POCT GLUCOSE - Abnormal; Notable for the following components:    POC Glucose  100 (*)     All other components within normal limits   HEMOGLOBIN A1C - Normal   BASIC METABOLIC PANEL (8)   CBC WITH DIFFERENTIAL WITH PLATELET   RAINBOW DRAW LAVENDER   RAINBOW DRAW LIGHT GREEN   RAINBOW DRAW BLUE   RAINBOW DRAW GOLD   URINE CULTURE, ROUTINE     EKG    Rate, intervals and axes as noted on EKG Report.  Rate: Normal, 62 bpm  Rhythm: Sinus Rhythm  Readinst degree AV block, nonspecific T wave abnormality, abnormal EKG first           As Interpreted by me    Imaging Results Available and Reviewed while in ED: CT STROKE CTA BRAIN/CTA NECK (W IV)(CPT=70496/27580)    Result Date: 2024  CONCLUSION:  1. No large vessel occlusion, aneurysm, or  dissection in the head or neck. 2. Moderate multifocal narrowing of the anterior left M2 segment. 3. Mild-to-moderate narrowing of the anterior right M2 segment. 4. Moderate narrowing of a right M3 branch in the right temporal region. 5. Mild narrowing of the distal right V4 segment of the vertebral artery. 6. Lesser incidental findings described above.    Impression points 1-4 communicated via telephone to Azam JOSEPH at 6:33 p.m. on 11/04/2024 by Kunal Meléndez MD  Dictated by (CST): Kunal Meléndez MD on 11/04/2024 at 6:23 PM     Finalized by (CST): Kunal Meléndez MD on 11/04/2024 at 6:35 PM          CT STROKE BRAIN (NO IV)(CPT=70450)    Result Date: 11/4/2024  CONCLUSION:   1. No new transcortical area of hypoattenuation to suggest an acute infarct.  If there is clinical concern for an acute infarct, recommend further evaluation with an MRI brain. 2. No acute intracranial hemorrhage, midline shift, mass effect, or hydrocephalus. 3. Multiple small chronic infarcts involving the deep gray nuclei and left corona radiata/centrum semiovale. 4. Severe chronic microvascular ischemic changes.  5. Lesser incidental findings described above.   Impression points 1-4 were communicated via telephone to Azam JOSEPH at 6:16 p.m. on 11/04/2024 by Kunal Meléndez MD  Dictated by (CST): Kunal Meléndez MD on 11/04/2024 at 6:10 PM     Finalized by (CST): Kunal Meléndez MD on 11/04/2024 at 6:16 PM         ED Medications Administered:   Medications   sodium chloride 0.9% infusion ( Intravenous New Bag 11/4/24 2332)   acetaminophen (Tylenol) tab 650 mg (has no administration in time range)     Or   acetaminophen (Tylenol) rectal suppository 650 mg (has no administration in time range)   labetalol (Trandate) 5 mg/mL injection 10 mg (has no administration in time range)   hydrALAzine (Apresoline) 20 mg/mL injection 10 mg (has no administration in time range)   ondansetron (Zofran) 4 MG/2ML injection 4 mg (has no administration in time  range)   metoclopramide (Reglan) 5 mg/mL injection 10 mg (has no administration in time range)   atorvastatin (Lipitor) tab 40 mg (40 mg Oral Not Given 11/4/24 2230)   heparin (Porcine) 5000 UNIT/ML injection 5,000 Units (5,000 Units Subcutaneous Given 11/4/24 2332)   aspirin chewable tab 81 mg (81 mg Oral Not Given 11/4/24 2230)   acetaminophen (Tylenol Extra Strength) tab 500 mg (has no administration in time range)   iopamidol 76% (ISOVUE-370) injection for power injector (80 mL Intravenous Given 11/4/24 1810)         MDM     Vitals:    11/04/24 2200 11/04/24 2225 11/04/24 2259 11/04/24 2341   BP: (!) 175/60 (!) 206/51  (!) 188/64   BP Location:  Right arm  Right arm   Pulse: 53 54 56    Resp: 14 20     Temp:  97.6 °F (36.4 °C)     TempSrc:  Oral     SpO2: 100% 98%  100%   Weight:  69.8 kg     Height:  167.6 cm (5' 6\")       *I personally reviewed and interpreted all ED vitals.    Pulse Ox: 100%, Room air, Normal     Monitor Interpretation:   normal sinus rhythm, sinus bradycardia as interpreted by me.  The cardiac monitor was ordered to monitor heart rate.    Differential Diagnosis/ Diagnostic Considerations: Stroke, UTI, recurrent neurologic deficits, other    Complicating Factors: The patient already has does not have any pertinent problems on file. to contribute to the complexity of this ED evaluation.    Medical Decision Making  The patient presents to the ED with slurred speech and right-sided weakness.  These are the same symptoms with which she has presented with in the past.  Initially for a stroke and recently which was deemed not an acute stroke.  Patient presents around 6 hours after symptom onset and is not a candidate for TNK.  I discussed with Dr. Chandra for neurology consultation and Dr. Murillo for admission.    Problems Addressed:  Right sided weakness: chronic illness or injury with exacerbation, progression, or side effects of treatment that poses a threat to life or bodily  functions  Slurred speech: chronic illness or injury with exacerbation, progression, or side effects of treatment that poses a threat to life or bodily functions    Amount and/or Complexity of Data Reviewed  Labs: ordered. Decision-making details documented in ED Course.  Radiology: ordered and independent interpretation performed. Decision-making details documented in ED Course.     Details: I personally reviewed the patient's CT brain and noted no ICH  ECG/medicine tests: ordered and independent interpretation performed. Decision-making details documented in ED Course.  Discussion of management or test interpretation with external provider(s):  I discussed with Dr. Chandra for neurology consultation and Dr. Murillo for admission.        Condition upon leaving the department: Stable    Disposition and Plan     Clinical Impression:  1. Slurred speech    2. Right sided weakness        Disposition:  Admit    Follow-up:  No follow-up provider specified.    Medications Prescribed:  Current Discharge Medication List          Hospital Problems       Present on Admission             ICD-10-CM Noted POA    * (Principal) Slurred speech R47.81 11/4/2024 Unknown    Right sided weakness R53.1 1/18/2022 Unknown    Weakness of right upper extremity R29.898 11/4/2024 Unknown                    Signed by Joao Nascimento MD on 11/5/2024 12:09 AM         MEDICATIONS ADMINISTERED IN LAST 1 DAY:  aspirin chewable tab 81 mg       Date Action Dose Route User    11/6/2024 0902 Given 81 mg Oral Mindy Chaves RN          atorvastatin (Lipitor) tab 40 mg       Date Action Dose Route User    11/5/2024 2214 Given 40 mg Oral Temo Simpson RN          brimonidine (Alphagan) 0.2 % ophthalmic solution 1 drop       Date Action Dose Route User    11/6/2024 0903 Given 1 drop Both Eyes Mindy Chaves RN    11/5/2024 2214 Given 1 drop Both Eyes Temo Simpson RN          carvedilol (Coreg) tab 25 mg       Date Action Dose Route User     11/6/2024 0902 Given 25 mg Oral Mindy Chaves RN    11/5/2024 1745 Given 25 mg Oral Mindy Chaves RN          cefTRIAXone (Rocephin) 1 g in sodium chloride 0.9% 100 mL IVPB-ADDV       Date Action Dose Route User    11/6/2024 1303 New Bag 1 g Intravenous Mindy Chaves RN          dextrose 5%-sodium chloride 0.9% infusion       Date Action Dose Route User    11/6/2024 0411 New Bag (none) Intravenous Josefina Sher RN    11/5/2024 1444 New Bag (none) Intravenous Mindy Chaves RN          heparin (Porcine) 5000 UNIT/ML injection 5,000 Units       Date Action Dose Route User    11/6/2024 0902 Given 5,000 Units Subcutaneous (Left Lower Abdomen) Mindy Chaves RN    11/5/2024 2214 Given 5,000 Units Subcutaneous (Right Lower Arm) Temo Simpson RN          levETIRAcetam (Keppra) tab 500 mg       Date Action Dose Route User    11/6/2024 0902 Given 500 mg Oral Mindy Chaves RN    11/5/2024 2214 Given 500 mg Oral Temo Simpson RN          magnesium oxide (Mag-Ox) tab 400 mg       Date Action Dose Route User    11/6/2024 0902 Given 400 mg Oral Mindy Chaves RN          spironolactone (Aldactone) tab 25 mg       Date Action Dose Route User    11/6/2024 0902 Given 25 mg Oral Mindy Chaves RN          cyanocobalamin (Vitamin B12) tab 500 mcg       Date Action Dose Route User    11/6/2024 0902 Given 500 mcg Oral Mindy Chaves RN            Vitals (last day)       Date/Time Temp Pulse Resp BP SpO2 Weight O2 Device O2 Flow Rate (L/min) [facility]    11/06/24 1212 97.5 °F (36.4 °C) 61 18 157/53 100 % -- None (Room air) -- CR    11/06/24 0940 -- 68 -- -- -- -- -- -- GR    11/06/24 0858 97.5 °F (36.4 °C) 64 16 158/54 100 % -- None (Room air) -- CR    11/06/24 0359 97.5 °F (36.4 °C) 63 16 173/64 99 % -- None (Room air) -- AL    11/06/24 0100 -- -- -- -- -- 155 lb (70.3 kg) -- -- LR    11/06/24 0013 97.3 °F (36.3 °C) 71 20 140/59 100 % -- None (Room air) -- AL    11/05/24 2108 97.7 °F (36.5 °C) 62 16 169/61  99 % -- None (Room air) -- AL    11/05/24 1900 -- 68 -- -- -- -- -- -- KD    11/05/24 1745 -- 66 -- 145/50 -- -- -- -- CR    11/05/24 1609 97.5 °F (36.4 °C) 57 20 173/58 100 % -- None (Room air) -- CR    11/05/24 1217 97.9 °F (36.6 °C) 62 20 121/45 96 % -- None (Room air) -- CR    11/05/24 1036 -- 63 -- 173/48 -- -- -- -- CR    11/05/24 0950 97.6 °F (36.4 °C) 54 20 197/55 96 % -- None (Room air) -- CR    11/05/24 0628 97.3 °F (36.3 °C) 53 20 172/54 100 % -- CPAP -- AL    11/05/24 0414 97.3 °F (36.3 °C) 64 16 163/59 100 % -- CPAP -- AL    11/05/24 0200 97.3 °F (36.3 °C) 58 20 218/59 100 % -- CPAP -- AL    11/05/24 0005 97.8 °F (36.6 °C) 55 16 184/53 100 % -- None (Room air) -- AL         H&P    HISTORY AND PHYSICAL EXAMINATION     CHIEF COMPLAINT:  Right upper and lower extremity weakness.     HISTORY OF PRESENT ILLNESS:  Patient is a 69-year-old  female with history of prior lacunar infarct, cerebrovascular accidents, and uncontrolled hypertension.  Presented today to the emergency department for evaluation of right upper and lower extremity worsening weakness.  CT scan of the brain and CT angiogram of the head and neck both were unremarkable for acute findings.  CBC and chemistry at baseline.     ASSESSMENT:  Right upper extremity weakness.  Rule out recurrent cerebrovascular accident.  So far, workup is negative.  EKG, normal sinus rhythm.       PLAN:  We will continue to monitor her in the hospital.  Stroke protocol.  MRI scan of the brain.  Neurology consult.  N.p.o. until speech therapy evaluation.  Further recommendations to follow.      11/5 NEUROLOGY CONSULT NOTE  Date of Consult:  11/5/2024  Reason for Consultation:   Slurring of speech     History of Present Illness:   Patient is a 69 year old female who was admitted to the hospital for Slurred speech:  Patient with a history of left basal ganglia stroke back in December 2021, residual dysarthria and right-sided weakness.  She has been back  to our hospital for more times, January 2022 and September 2024 when her symptoms will be recurring again, she would experience more slurring of speech and right upper and lower extremity weakness will be weaker than usual.  MRI of the brain September 2024 that was obtained for those reasons was not showing any acute changes.  Cholesterol density was in progestins of her old stroke symptoms.     MRI of the brain in January 2022 also did not show any further changes from the prior stroke.  She came back to the hospital again in November 2024, since since at least  Of that day she was having some slurring of speech and worsening of right-sided weakness.  She was outside the window for TNK.     By the next day patient back to usual self again.  Her blood pressure was still poorly controlled as it was last time in September.      Physical Exam:             Vitals:     11/05/24 0005 11/05/24 0200 11/05/24 0414 11/05/24 0628   BP: (!) 184/53 (!) 218/59 (!) 163/59 (!) 172/54   Pulse: 55 58 64 53   Resp: 16 20 16 20   Temp: 97.8 °F (36.6 °C) 97.3 °F (36.3 °C) 97.3 °F (36.3 °C) 97.3 °F (36.3 °C)   TempSrc: Oral Axillary Axillary Axillary   SpO2: 100% 100% 100% 100%   Weight:               Neurological:      Mental Status- Alert had some language and speech difficulties, but able to say that she was in the hospital, she knew the year, she knew the month, she was able to answer some simple questions.  Some hesitancy with answering questions and some mild dysarthria noted.     Cranial Nerves:  II.- Visual fields full to confrontation     III, IV, VI- EOM intact, PETER  V. Facial sensation intact  VII. Face symmetric, no facial weakness  VIII. Hearing intact.  IX. Pallet elevates symmetrically.  XI. Shoulder shrug is intact  XII. Tongue is midline     Motor Exam:  Muscle tone normal  No atrophy or fasciculations  Strength- upper extremities 5-, possibly slightly weaker on the right side /5 proximally and distally                   - lower  extremities on the left and 3 out of 5 on the right.  4/5 proximally and distally     Sensory Exam:  Light touch sensation- intact in all 4 extremities     Coordination:  Finger to nose intact  Rapid alternating movements intact        Results:      Laboratory Data:        Lab Results   Component Value Date     WBC 3.0 (L) 11/05/2024     HGB 9.1 (L) 11/05/2024     HCT 28.3 (L) 11/05/2024     .0 11/05/2024     CREATSERUM 1.61 (H) 11/05/2024     BUN 22 11/05/2024      11/05/2024     K 3.7 11/05/2024      11/05/2024     CO2 26.0 11/05/2024     GLU 89 11/05/2024     CA 9.5 11/05/2024 11/5 HOSPITALIST NOTE    Assessment and Plan:     RUE weakness, possible TIA vs related to UTI  -neurology on consult  -CTA head and neck reviewed  -MRI albert without acute stroke  -SBP goal <180 per neuro  -asa, plavix x3 months  -setatin  -PT/OT/ST     Acute UTI  -UA, UCx reviewed. +GNR, follow up results  -start ctx     HTN  -cont coreg  -resume other home meds as needed     HL  -statin     H/o Prediabetes - A1c ok at 5.3 this admission     Vascular dementia  -monitor         Objective:  Temp:  [97.3 °F (36.3 °C)-98.5 °F (36.9 °C)] 97.9 °F (36.6 °C)  Pulse:  [53-64] 62  Resp:  [12-20] 20  BP: (121-218)/(45-67) 121/45  SpO2:  [96 %-100 %] 96 %         MRI BRAIN (CPT=70551)     Result Date: 11/5/2024  CONCLUSION:                                                                                            1. No acute intracranial abnormality.  No evidence of acute or subacute ischemia or infarct.  2. Advanced chronic microvascular ischemic changes and moderate generalized parenchymal volume loss.  3. Chronic diffuse cerebral microhemorrhages, with likely differential etiology including chronic hypertensive encephalopathy and cerebral amyloid angiopathy.      elm-remote  Dictated by (CST): Philip Manzo MD on 11/05/2024 at 12:27 PM     Finalized by (CST): Philip Manzo MD on 11/05/2024 at 12:33 PM             [1]   Medications Prior to Admission   Medication Sig Dispense Refill Last Dose/Taking    losartan 50 MG Oral Tab Take 1 tablet (50 mg total) by mouth daily.   2024    amLODIPine 10 MG Oral Tab Take 1 tablet (10 mg total) by mouth at bedtime. 30 tablet 1     hydrALAZINE 50 MG Oral Tab Take 1 tablet (50 mg total) by mouth in the morning and 1 tablet (50 mg total) before bedtime.   2024 Morning    aspirin 81 MG Oral Chew Tab Chew 1 tablet (81 mg total) by mouth daily. 30 tablet 1 11/3/2024    cyanocobalamin 500 MCG Oral Tab Take 1 tablet (500 mcg total) by mouth daily.   Unknown    magnesium oxide 400 MG Oral Tab Take 1 tablet (400 mg total) by mouth daily. 30 tablet 1 2024    [] cephALEXin 500 MG Oral Cap Take 1 capsule (500 mg total) by mouth 2 (two) times daily for 2 days. 4 capsule 0     spironolactone 25 MG Oral Tab Take 1 tablet (25 mg total) by mouth daily. 30 tablet 0 2024    NAMZARIC 28-10 MG Oral Capsule SR 24 Hr Take 28 mg by mouth daily.   2024    atorvastatin 20 MG Oral Tab Take 2 tablets (40 mg total) by mouth nightly.   11/3/2024 Bedtime    Clopidogrel Bisulfate 75 MG Oral Tab Take 1 tablet (75 mg total) by mouth daily. 30 tablet 0 2024    MetFORMIN HCl 500 MG Oral Tab Take 1 tablet (500 mg total) by mouth daily with breakfast.   2024    carvedilol 25 MG Oral Tab Take 1 tablet (25 mg total) by mouth 2 (two) times daily with meals.   2024 Morning    brimonidine Tartrate 0.2 % Ophthalmic Solution Place 1 drop into both eyes 2 (two) times daily.   11/3/2024

## 2024-11-06 NOTE — DISCHARGE SUMMARY
[facility]  part of [facility]    Discharge Summary    Eliud Fine Patient Status:  Inpatient    1955 MRN [id number]   Location [facility] 3W/SW Attending Joselito Calhoun MD   Hosp Day # 2 PCP Yao Rodriguez     Date of Admission: 2024 Disposition:   Home Health Care Services     Date of Discharge:   2024     Admitting Diagnosis:   Slurred speech [R47.81]  Right sided weakness [R53.1]    Hospital Discharge Diagnoses:    RUE weakness  Cerebral micro hemorrhages  UTI  Possible TIA    HTN  Hx of HL  Hx of prediabetes  Hx of vascular dementia    Problem List:     Patient Active Problem List   Diagnosis    Vision loss, left eye    Cerebrovascular accident (CVA), unspecified mechanism (HCC)    Essential hypertension    COVID-19    Right sided weakness    Aphasia due to recent stroke    Cerebral amyloid angiopathy (CODE)    Toxic encephalopathy    Hypertensive urgency    Acute chest pain    Renal artery stenosis (HCC)    Cystitis    PAPO (acute kidney injury) (HCC)    Primary hypertension    Uncontrolled hypertension    CVA (cerebral vascular accident) (HCC)    Acute CVA (cerebrovascular accident) (HCC)    Slurred speech    Weakness of right upper extremity       Physical Exam:      /54 (BP Location: Right arm)   Pulse 68   Temp 97.5 °F (36.4 °C) (Oral)   Resp 16   Ht 5' 6\" (1.676 m)   Wt 155 lb (70.3 kg)   SpO2 100%   BMI 25.02 kg/m²     Gen:  NAD.  Awake and alert  CV:   RRR.  No m/g/r  Pulm:   CTA bilat  Abd:   +bs, soft, NT, ND  LE:  No c/c/e  Neuro:  nonfocal      Reason for Admission:   Right upper and lower extremity weakness.     HISTORY OF PRESENT ILLNESS:  Patient is a 69-year-old  female with history of prior lacunar infarct, cerebrovascular accidents, and uncontrolled hypertension.  Presented today to the emergency department for evaluation of right upper and lower extremity worsening weakness.  CT scan of the brain and CT  angiogram of the head and neck both were unremarkable for acute findings.  CBC and chemistry at baseline.    Hospital Course:     RUE weakness - resolved  Possible TIA vs related to UTI  No acute CVA seen on MRI  Cerebral microhemorrhages seen on MRI.  - neurology was on consult  - CTA head and neck reviewed  - MRI albert without acute stroke  - SBP goal <180 per neuro  - cont aspirin  - stop plavix  - pt started on keppra  - abx for UTI  - PT/OT/ST  - neuro f/u     Acute UTI  Urine cx with E coli  - pt was on iv ceftriaxone  - home on levaquin    HTN  Resume home meds.  PCP f/u.     HL  -statin     H/o Prediabetes - A1c ok at 5.3 this admission     Vascular dementia     Dvt proph - heparin    Code status - full code    Consultations:   Neurology     Discharge Condition:  Good    Discharge Medications:      Discharge Medications        START taking these medications        Instructions Prescription details   levETIRAcetam 500 MG Tabs  Commonly known as: Keppra      Take 1 tablet (500 mg total) by mouth 2 (two) times daily.   Quantity: 60 tablet  Refills: 1     levoFLOXacin 250 MG Tabs  Commonly known as: Levaquin      Take 1 tablet (250 mg total) by mouth daily for 7 days.   Stop taking on: November 13, 2024  Quantity: 7 tablet  Refills: 0            CONTINUE taking these medications        Instructions Prescription details   amLODIPine 10 MG Tabs  Commonly known as: Norvasc      Take 1 tablet (10 mg total) by mouth at bedtime.   Quantity: 30 tablet  Refills: 1     aspirin 81 MG Chew      Chew 1 tablet (81 mg total) by mouth daily.   Quantity: 30 tablet  Refills: 1     atorvastatin 20 MG Tabs  Commonly known as: Lipitor      Take 2 tablets (40 mg total) by mouth nightly.   Refills: 0     brimonidine 0.2 % Soln  Commonly known as: Alphagan      Place 1 drop into both eyes 2 (two) times daily.   Refills: 0     carvedilol 25 MG Tabs  Commonly known as: Coreg      Take 1 tablet (25 mg total) by mouth 2 (two) times daily  with meals.   Refills: 0     cyanocobalamin 500 MCG Tabs      Take 1 tablet (500 mcg total) by mouth daily.   Refills: 0     hydrALAZINE 50 MG Tabs  Commonly known as: Apresoline      Take 1 tablet (50 mg total) by mouth in the morning and 1 tablet (50 mg total) before bedtime.   Refills: 0     losartan 50 MG Tabs  Commonly known as: Cozaar      Take 1 tablet (50 mg total) by mouth daily.   Refills: 0     magnesium oxide 400 MG Tabs  Commonly known as: Mag-Ox      Take 1 tablet (400 mg total) by mouth daily.   Quantity: 30 tablet  Refills: 1     metFORMIN 500 MG Tabs  Commonly known as: Glucophage      Take 1 tablet (500 mg total) by mouth daily with breakfast.   Refills: 0     Namzaric 28-10 MG Cp24  Generic drug: Memantine HCl-Donepezil HCl      Take 28 mg by mouth daily.   Refills: 0     spironolactone 25 MG Tabs  Commonly known as: Aldactone      Take 1 tablet (25 mg total) by mouth daily.   Quantity: 30 tablet  Refills: 0            STOP taking these medications      cephALEXin 500 MG Caps  Commonly known as: Keflex        clopidogrel 75 MG Tabs  Commonly known as: Plavix                  Where to Get Your Medications        These medications were sent to [id number] - [place], [place] - [address] [phone], [phone] [address], [facility] [address]      Phone: [phone]   levETIRAcetam 500 MG Tabs  levoFLOXacin 250 MG Tabs         Follow up Visits:     Follow-up Information       Yao Rodriguez Schedule an appointment as soon as possible for a visit in 1 week(s).    Specialty: Physician Assistant  Contact information:  [address] [place] [address] [id number] [phone]               Chris Chandra MD Follow up in 1 month(s).    Specialty: NEUROLOGY  Contact information:  [address] [facility] [address] [place] [address] [phone]                             Hospital Discharge Diagnoses:  TIA    Lace+ Score: 72  59-90 High  Risk  29-58 Medium Risk  0-28   Low Risk.    TCM Follow-Up Recommendation:  LACE > 58: High Risk of readmission after discharge from the hospital.    >35 minutes spent preparing this discharge.    Joselito Jorgensen MD  11/6/2024  12:10 PM

## 2024-11-06 NOTE — PHYSICAL THERAPY NOTE
PHYSICAL THERAPY TREATMENT NOTE - INPATIENT     Room Number: 332/332-A       Presenting Problem: slurred speech, R side weakness and facial droop  Co-Morbidities : hx CVA, chronic aphasia    Problem List  Principal Problem:    Slurred speech  Active Problems:    Right sided weakness    Weakness of right upper extremity      PHYSICAL THERAPY ASSESSMENT   Patient demonstrates good  progress this session, goals  remain in progress.      Patient is requiring contact guard assist and minimal assist as a result of the following impairments: impaired standing dynamic balance and cognitive deficits (decreased safety awareness, decreased carryover).     Patient continues to function near baseline with bed mobility, transfers, and gait.  Next session anticipate patient to progress gait and stair negotiation.  Physical Therapy will continue to follow patient for duration of hospitalization.    Patient continues to benefit from continued skilled PT services: at discharge to promote prior level of function and safety with additional support and return home with home health PT.    PLAN DURING HOSPITALIZATION  Nursing Mobility Recommendation : 1 Assist  PT Device Recommendation: Rolling walker  PT Treatment Plan: Bed mobility;Body mechanics;Patient education;Endurance;Energy conservation;Gait training;Strengthening;Balance training;Transfer training;Stair training  Frequency (Obs): 5x/week     SUBJECTIVE  \"I got 2 big dogs at home.\"    OBJECTIVE  Precautions: Bed/chair alarm    WEIGHT BEARING RESTRICTION   none    PAIN ASSESSMENT   Ratin  Location: denies       BALANCE  Static Sitting: Good  Dynamic Sitting: Fair +  Static Standing: Fair  Dynamic Standing: Fair -    ACTIVITY TOLERANCE  Pulse: 68                       O2 WALK  Oxygen Therapy  SPO2% on Room Air at Rest: 98    AM-PAC '6-Clicks' INPATIENT SHORT FORM - BASIC MOBILITY  How much difficulty does the patient currently have...  Patient Difficulty: Turning over in bed  (including adjusting bedclothes, sheets and blankets)?: A Little   Patient Difficulty: Sitting down on and standing up from a chair with arms (e.g., wheelchair, bedside commode, etc.): A Lot   Patient Difficulty: Moving from lying on back to sitting on the side of the bed?: A Little   How much help from another person does the patient currently need...   Help from Another: Moving to and from a bed to a chair (including a wheelchair)?: A Little   Help from Another: Need to walk in hospital room?: A Little   Help from Another: Climbing 3-5 steps with a railing?: A Little     AM-PAC Score:  Raw Score: 17   Approx Degree of Impairment: 50.57%   Standardized Score (AM-PAC Scale): 42.13   CMS Modifier (G-Code): CK    FUNCTIONAL ABILITY STATUS  Functional Mobility/Gait Assessment  Gait Assistance: Contact guard assist  Distance (ft): 90+20  Assistive Device: Rolling walker  Pattern: R Decreased stance time;R Flexed knee;R Foot drag (decreased R step length, wide turning radius, no overt LOB)  Supine to Sit: stand-by assist  Sit to Stand: stand-by assist and moderate assist - modA from toilet when attempting to pull up from rollator, SBA from bedside chair when pushing off armrest    Skilled Therapy Provided: Pt tolerating household distances of gait using rollator. Intermittent cueing throughout for safety: hand placement for sit-to-stand transfers, locking and unlocking rollator.     Patient received semi-fowlers in bed, agreeable to physical therapy. Vital signs monitored as noted above, no adverse symptoms and patient stable during session. Anticipated therapy needs remain appropriate based on the patient's performance, personal factors, and remaining functional impairments.    PATIENT EDUCATION  Education Provided To: Patient  Patient Education: Role of Physical Therapy;Plan of Care;Discharge Recommendations;DME Recommendations;Functional Transfer Techniques;Posture/Positioning;Energy Conservation;Proper Body  Mechanics;Gait Training  Patient's Response to Education: Verbalized Understanding;Returned Demonstration          The patient's Approx Degree of Impairment: 50.57% has been calculated based on documentation in the [facility] '6 clicks' Inpatient Daily Activity Short Form.  Research supports that patients with this level of impairment may benefit from home-health PT.  Final disposition will be made by interdisciplinary medical team.    Patient End of Session: Up in chair;Call light within reach;Needs met;RN aware of session/findings;All patient questions and concerns addressed;Hospital anti-slip socks    CURRENT GOALS   Goals to be met by: 24  Patient Goal Patient's self-stated goal is: go home   Goal #1 Patient is able to demonstrate supine - sit EOB @ level: supervision     Goal #1   Current Status NOT MET/IN PROGRESS    Goal #2 Patient is able to demonstrate transfers Sit to/from Stand at assistance level: supervision with walker - rolling     Goal #2  Current Status NOT MET/IN PROGRESS    Goal #3 Patient is able to ambulate 150 feet with assist device: walker - rolling at assistance level: supervision   Goal #3   Current Status NOT MET/IN PROGRESS    Goal #4 Patient will negotiate 8 stairs/one curb w/ assistive device and supervision   Goal #4   Current Status NOT MET/IN PROGRESS    Goal #5 Patient to demonstrate independence with home activity/exercise instructions provided to patient in preparation for discharge.   Goal #5   Current Status IN PROGRESS/ONGOING    Goal #6    Goal #6  Current Status      Gait Trainin minutes  Therapeutic Activity: 12 minutes      Mary Ann Claire PT, DPT  [facility]  Rehab Services - Physical Therapy  [id number]

## 2024-11-07 NOTE — PAYOR COMM NOTE
--------------  DISCHARGE REVIEW    Payor: MARJORIE DAVE O  Subscriber #:  [id number]  Authorization Number: [id number]      Admit date: 24  Admit time:  10:23 PM  Discharge Date: 2024  3:27 PM     Admitting Physician: Elizabeth Murillo MD  Attending Physician:  No att. providers found  Primary Care Physician: Yao Rodriguez          Discharge Summary Notes        Discharge Summary signed by Joselito Calhoun MD at 2024 12:13 PM       Author: Joselito Calhoun MD Specialty: HOSPITALIST, HOSPITALIST Author Type: Physician    Filed: 2024 12:13 PM Date of Service: 2024 12:10 PM Status: Signed    : Joselito Calhoun MD (Physician)           [facility]  part of [facility]    Discharge Summary    Eliud Fine Patient Status:  Inpatient    1955 MRN [id number]   Location [facility] 3W/SW Attending Joselito Calhoun MD   Hosp Day # 2 PCP Yao Rodriguez     Date of Admission: 2024 Disposition:   Home Health Care Services     Date of Discharge:   2024     Admitting Diagnosis:   Slurred speech [R47.81]  Right sided weakness [R53.1]    Hospital Discharge Diagnoses:    RUE weakness  Cerebral micro hemorrhages  UTI  Possible TIA    HTN  Hx of HL  Hx of prediabetes  Hx of vascular dementia    Problem List:     Patient Active Problem List   Diagnosis    Vision loss, left eye    Cerebrovascular accident (CVA), unspecified mechanism (HCC)    Essential hypertension    COVID-19    Right sided weakness    Aphasia due to recent stroke    Cerebral amyloid angiopathy (CODE)    Toxic encephalopathy    Hypertensive urgency    Acute chest pain    Renal artery stenosis (HCC)    Cystitis    PAPO (acute kidney injury) (HCC)    Primary hypertension    Uncontrolled hypertension    CVA (cerebral vascular accident) (HCC)    Acute CVA (cerebrovascular accident) (HCC)    Slurred speech    Weakness of right upper extremity       Physical Exam:      /54 (BP Location: Right  arm)   Pulse 68   Temp 97.5 °F (36.4 °C) (Oral)   Resp 16   Ht 5' 6\" (1.676 m)   Wt 155 lb (70.3 kg)   SpO2 100%   BMI 25.02 kg/m²     Gen:  NAD.  Awake and alert  CV:   RRR.  No m/g/r  Pulm:   CTA bilat  Abd:   +bs, soft, NT, ND  LE:  No c/c/e  Neuro:  nonfocal      Reason for Admission:   Right upper and lower extremity weakness.     HISTORY OF PRESENT ILLNESS:  Patient is a 69-year-old  female with history of prior lacunar infarct, cerebrovascular accidents, and uncontrolled hypertension.  Presented today to the emergency department for evaluation of right upper and lower extremity worsening weakness.  CT scan of the brain and CT angiogram of the head and neck both were unremarkable for acute findings.  CBC and chemistry at baseline.    Hospital Course:     RUE weakness - resolved  Possible TIA vs related to UTI  No acute CVA seen on MRI  Cerebral microhemorrhages seen on MRI.  - neurology was on consult  - CTA head and neck reviewed  - MRI albert without acute stroke  - SBP goal <180 per neuro  - cont aspirin  - stop plavix  - pt started on keppra  - abx for UTI  - PT/OT/ST  - neuro f/u     Acute UTI  Urine cx with E coli  - pt was on iv ceftriaxone  - home on levaquin    HTN  Resume home meds.  PCP f/u.     HL  -statin     H/o Prediabetes - A1c ok at 5.3 this admission     Vascular dementia     Dvt proph - heparin    Code status - full code    Consultations:   Neurology     Discharge Condition:  Good    Discharge Medications:      Discharge Medications        START taking these medications        Instructions Prescription details   levETIRAcetam 500 MG Tabs  Commonly known as: Keppra      Take 1 tablet (500 mg total) by mouth 2 (two) times daily.   Quantity: 60 tablet  Refills: 1     levoFLOXacin 250 MG Tabs  Commonly known as: Levaquin      Take 1 tablet (250 mg total) by mouth daily for 7 days.   Stop taking on: November 13, 2024  Quantity: 7 tablet  Refills: 0            CONTINUE taking  these medications        Instructions Prescription details   amLODIPine 10 MG Tabs  Commonly known as: Norvasc      Take 1 tablet (10 mg total) by mouth at bedtime.   Quantity: 30 tablet  Refills: 1     aspirin 81 MG Chew      Chew 1 tablet (81 mg total) by mouth daily.   Quantity: 30 tablet  Refills: 1     atorvastatin 20 MG Tabs  Commonly known as: Lipitor      Take 2 tablets (40 mg total) by mouth nightly.   Refills: 0     brimonidine 0.2 % Soln  Commonly known as: Alphagan      Place 1 drop into both eyes 2 (two) times daily.   Refills: 0     carvedilol 25 MG Tabs  Commonly known as: Coreg      Take 1 tablet (25 mg total) by mouth 2 (two) times daily with meals.   Refills: 0     cyanocobalamin 500 MCG Tabs      Take 1 tablet (500 mcg total) by mouth daily.   Refills: 0     hydrALAZINE 50 MG Tabs  Commonly known as: Apresoline      Take 1 tablet (50 mg total) by mouth in the morning and 1 tablet (50 mg total) before bedtime.   Refills: 0     losartan 50 MG Tabs  Commonly known as: Cozaar      Take 1 tablet (50 mg total) by mouth daily.   Refills: 0     magnesium oxide 400 MG Tabs  Commonly known as: Mag-Ox      Take 1 tablet (400 mg total) by mouth daily.   Quantity: 30 tablet  Refills: 1     metFORMIN 500 MG Tabs  Commonly known as: Glucophage      Take 1 tablet (500 mg total) by mouth daily with breakfast.   Refills: 0     Namzaric 28-10 MG Cp24  Generic drug: Memantine HCl-Donepezil HCl      Take 28 mg by mouth daily.   Refills: 0     spironolactone 25 MG Tabs  Commonly known as: Aldactone      Take 1 tablet (25 mg total) by mouth daily.   Quantity: 30 tablet  Refills: 0            STOP taking these medications      cephALEXin 500 MG Caps  Commonly known as: Keflex        clopidogrel 75 MG Tabs  Commonly known as: Plavix                  Where to Get Your Medications        These medications were sent to [facility][id number] - [place], [place] - [address]  [phone], [phone]  [address] , [facility] [address]      Phone: [phone]   levETIRAcetam 500 MG Tabs  levoFLOXacin 250 MG Tabs         Follow up Visits:     Follow-up Information       Yao Rodriguez Schedule an appointment as soon as possible for a visit in 1 week(s).    Specialty: Physician Assistant  Contact information:  [address] [place] [address] [id number] [phone]               Chris Chandra MD Follow up in 1 month(s).    Specialty: NEUROLOGY  Contact information:  [address] [facility] [address]  [facility] [id number] [phone]                             Hospital Discharge Diagnoses:  TIA    Lace+ Score: 72  59-90 High Risk  29-58 Medium Risk  0-28   Low Risk.    TCM Follow-Up Recommendation:  LACE > 58: High Risk of readmission after discharge from the hospital.    >35 minutes spent preparing this discharge.    Joselito Jorgensen MD  11/6/2024  12:10 PM     Electronically signed by Joselito Calhoun MD on 11/6/2024 12:13 PM         REVIEWER COMMENTS

## 2024-11-11 ENCOUNTER — OFFICE VISIT (OUTPATIENT)
Dept: NEUROLOGY | Facility: CLINIC | Age: 69
End: 2024-11-11
Payer: MEDICARE

## 2024-11-11 VITALS
BODY MASS INDEX: 26.52 KG/M2 | HEART RATE: 77 BPM | DIASTOLIC BLOOD PRESSURE: 62 MMHG | SYSTOLIC BLOOD PRESSURE: 118 MMHG | HEIGHT: 66 IN | WEIGHT: 165 LBS

## 2024-11-11 DIAGNOSIS — I69.90 SEQUELAE OF CEREBROVASCULAR DISEASE: Primary | ICD-10-CM

## 2024-11-11 RX ORDER — ALLOPURINOL 100 MG/1
100 TABLET ORAL 2 TIMES DAILY
COMMUNITY

## 2024-11-11 RX ORDER — ATORVASTATIN CALCIUM 40 MG/1
TABLET, FILM COATED ORAL
COMMUNITY
Start: 2024-08-30

## 2024-11-11 RX ORDER — BRIMONIDINE TARTRATE AND TIMOLOL MALEATE 2; 5 MG/ML; MG/ML
1 SOLUTION OPHTHALMIC 2 TIMES DAILY
COMMUNITY
Start: 2023-02-09

## 2024-11-11 RX ORDER — ALBUTEROL SULFATE 0.83 MG/ML
SOLUTION RESPIRATORY (INHALATION)
COMMUNITY

## 2024-11-11 RX ORDER — BUDESONIDE AND FORMOTEROL FUMARATE DIHYDRATE 80; 4.5 UG/1; UG/1
AEROSOL RESPIRATORY (INHALATION)
COMMUNITY

## 2024-11-11 NOTE — PROGRESS NOTES
[facility] NEUROSCIENCES [address], [facility] [address]  [facility] [address]  [phone]            Neurology Initial Visit     Referred By: Dr. Kimball ref. provider found    Chief Complaint:   Chief Complaint   Patient presents with    Neurologic Problem     Patient presents here today to establish care, patient was referred by ED 11/4/24 to evaluate and treat Slurred speech, patient c/o having slurred speech and right side weakness. Patient was given levETIRAcetam 500 MG however son took her off of it.       HPI:     Eliud Fine is a 69 year old female history of diabetes, CAD, hypertension, hyperlipidemia, CEDRICK, who presents for posthospital follow-up of right sided weakness.  Patient was accompanied to the visit by her son and daughter.  Her son is power of .  They state that she has had several strokes in the past, the most recent of which was in January 2022.  Most of the strokes have affected her right side but she has bounced back to where she is social, walks with a walker, lives with her son.  Then, last week he noticed that she did not have much of an appetite and looked lethargic.  Brought her to the hospital and she was found to have a UTI.  In the hospital, she was noted to have worsening right-sided weakness and was started on Keppra due to concern for seizure.  MRI was done showing no acute stroke.  After discharge home, they continued the antibiotic and the Keppra, but noticed that she was still fairly drowsy even 3 or 4 days after discharge.  Because of this, they stopped the Keppra and she has been slowly improving and alertness.  Currently she is back to her her baseline except for her speech which is still a little slow and slurred, and she is having a hard time managing secretions.  Eating is getting better slowly but appetite is not quite back where it was.  At baseline prior to this hospital admission, was doing okay cognitively.    Past Medical History:     Asthma (HCC)    Coronary atherosclerosis    Diabetes (HCC)    diabetes for 2 yrs    Essential hypertension    Glaucoma    right    Heart attack (HCC)    2005    High blood pressure    High cholesterol    Hyperlipidemia    Osteoarthritis    Sleep apnea    cpap    Stroke (HCC)    30 yrs ago    Visual impairment    left eye edema       Past Surgical History:   Procedure Laterality Date    Anesth,vitrectomy Left 09/18/2017    Pars plana vitrectomy, internal limiting membrane peel, left eye.    Cath bare metal stent (bms)      Colonoscopy      Hysterectomy      Total abdom hysterectomy      Tubal ligation         Social history:  History   Smoking Status    Former    Years: 4.00    Types: Cigarettes    Quit date: 9/14/1980   Smokeless Tobacco    Never     History   Alcohol Use No     History   Drug Use No       Family History   Problem Relation Age of Onset    Cancer Father     Diabetes Mother          Current Outpatient Medications:     atorvastatin 40 MG Oral Tab, , Disp: , Rfl:     Brimonidine Tartrate-Timolol 0.2-0.5 % Ophthalmic Solution, Apply 1 drop to eye 2 (two) times daily., Disp: , Rfl:     levETIRAcetam 500 MG Oral Tab, Take 1 tablet (500 mg total) by mouth 2 (two) times daily., Disp: 60 tablet, Rfl: 1    levoFLOXacin 250 MG Oral Tab, Take 1 tablet (250 mg total) by mouth daily for 7 days., Disp: 7 tablet, Rfl: 0    losartan 50 MG Oral Tab, Take 1 tablet (50 mg total) by mouth daily., Disp: , Rfl:     amLODIPine 10 MG Oral Tab, Take 1 tablet (10 mg total) by mouth at bedtime., Disp: 30 tablet, Rfl: 1    hydrALAZINE 50 MG Oral Tab, Take 1 tablet (50 mg total) by mouth in the morning and 1 tablet (50 mg total) before bedtime., Disp: , Rfl:     aspirin 81 MG Oral Chew Tab, Chew 1 tablet (81 mg total) by mouth daily., Disp: 30 tablet, Rfl: 1    cyanocobalamin 500 MCG Oral Tab, Take 1 tablet (500 mcg total) by mouth daily., Disp: , Rfl:     magnesium oxide 400 MG Oral Tab, Take 1 tablet (400 mg total) by mouth  daily., Disp: 30 tablet, Rfl: 1    spironolactone 25 MG Oral Tab, Take 1 tablet (25 mg total) by mouth daily., Disp: 30 tablet, Rfl: 0    NAMZARIC 28-10 MG Oral Capsule SR 24 Hr, Take 28 mg by mouth daily., Disp: , Rfl:     atorvastatin 20 MG Oral Tab, Take 2 tablets (40 mg total) by mouth nightly., Disp: , Rfl:     MetFORMIN HCl 500 MG Oral Tab, Take 1 tablet (500 mg total) by mouth daily with breakfast., Disp: , Rfl:     carvedilol 25 MG Oral Tab, Take 1 tablet (25 mg total) by mouth 2 (two) times daily with meals., Disp: , Rfl:     brimonidine Tartrate 0.2 % Ophthalmic Solution, Place 1 drop into both eyes 2 (two) times daily., Disp: , Rfl:     Budesonide-Formoterol Fumarate (SYMBICORT) 80-4.5 MCG/ACT Inhalation Aerosol, Inhalation for 30 (Patient not taking: Reported on 11/11/2024), Disp: , Rfl:     allopurinol 100 MG Oral Tab, Take 1 tablet (100 mg total) by mouth 2 (two) times daily. (Patient not taking: Reported on 11/11/2024), Disp: , Rfl:     Albuterol Sulfate 2 MG Oral Tab, Take 1 tablet (2 mg total) by mouth. (Patient not taking: Reported on 11/11/2024), Disp: , Rfl:     albuterol (2.5 MG/3ML) 0.083% Inhalation Nebu Soln, Inhalation for 5 (Patient not taking: Reported on 11/11/2024), Disp: , Rfl:     Allergies[1]    ROS:   As in HPI, the rest of the 14 system review was done and was negative      Physical Exam:  Vitals:    11/11/24 1424   BP: 118/62   Pulse: 77   Weight: 165 lb (74.8 kg)   Height: 66\"       General: No apparent distress, well nourished, well groomed.  Head- Normocephalic, atraumatic  Eyes- No redness or swelling    Neurological:   Mental Status:  Awake but drowsy, nodding off throughout visit, but responds very promptly to verbal stimuli  Follows all commands  Able to states she was at [facility], correctly named month November    Cranial Nerves:  II.- Visual fields full to confrontation, III, IV, VI- EOM intact, V. Facial sensation intact, and VII.  Slight flattening of right  nasolabial fold    Motor Exam:  Strength- upper extremities 5/5 proximally and distally                - lower  extremities 5/5 proximally and distally    Sensory Exam:  Light touch- intact in all 4 extremities    Deep Tendon Reflexes:  Biceps 2+ bilateral symmetric  Triceps 2+ bilateral symmetric  Brachioradialis 2 + bilateral symmetric  Patellar 2+ bilateral symmetric  Ankle jerks absent    Coordination:  No dysmetria with finger to nose bilaterally      Labs:    No results found for: \"TSH\"  Lab Results   Component Value Date    HDL 29 (L) 11/04/2024    LDL 43 11/04/2024    TRIG 80 11/04/2024     Lab Results   Component Value Date    HGB 8.5 (L) 11/06/2024    HCT 27.7 (L) 11/06/2024    MCV 84.7 11/06/2024    WBC 2.9 (L) 11/06/2024    .0 11/06/2024      Lab Results   Component Value Date    BUN 21 11/06/2024    CA 9.1 11/06/2024    ALT 15 09/25/2024    AST 16 09/25/2024    ALB 3.4 09/27/2024     11/06/2024    K 4.5 11/06/2024    K 4.5 11/06/2024     (H) 11/06/2024    CO2 25.0 11/06/2024      I have reviewed labs.    Imaging Studies:  I have independently reviewed imaging.  MRI shows severe chronic ischemic white matter changes and some punctate microhemorrhages on GRE  EEG normal      Assessment   Eliud Fine is a 69 year old female history of diabetes, CAD, hypertension, hyperlipidemia, CEDRICK, who presents for posthospital follow-up of right sided weakness.  MRI brain showed no stroke, but does show fairly severe chronic ischemic white matter changes.  Most likely right-sided weakness was related to record essence of her prior stroke symptoms, given that her prior strokes of all presented with right-sided weakness.  Mental status is slowly improving.  Encephalopathy likely related to UTI, but may have been worsened by drowsiness from Keppra.      1. Sequelae of cerebrovascular disease  -Agree with holding Keppra  -Continue aspirin, atorvastatin, aggressive hypertensive management  -Call [phone]  if acute stroke symptoms occur  -Discussed that drowsiness, slow speech, and drooling are likely related to encephalopathy from UTI which is slowly improving.  Expectation is that she will return to baseline because there was no new stroke on MRI.       Education and counseling provided to patient. Instructed patient to call my office or seek medical attention immediately if symptoms worsen.  Patient verbalized understanding of information given. All questions were answered. All side effects of drugs were discussed.     I have spent 43 min total time on the day of the encounter, including: Total time spent reasons:  Preparing to see the patient, Obtaining and/or reviewing separately obtained history, Performing a medically appropriate examination and/or evaluation, Counseling and educating the patient/family/caregiver, Ordering medications, tests, or procedures, Referring and communicating with other health care professionals, Documenting clinical information in Epic, and Independently interpreting results and communicating results to the patient/family/caregiver    PCP:  [facility]  [phone]    Return to clinic in: No follow-ups on file.    Theresa Gil MD         [1] No Known Allergies

## 2025-01-04 ENCOUNTER — HOSPITAL ENCOUNTER (EMERGENCY)
Facility: HOSPITAL | Age: 70
Discharge: HOME | End: 2025-01-05
Attending: EMERGENCY MEDICINE
Payer: COMMERCIAL

## 2025-01-04 ENCOUNTER — APPOINTMENT (OUTPATIENT)
Dept: MRI IMAGING | Facility: HOSPITAL | Age: 70
End: 2025-01-04
Attending: EMERGENCY MEDICINE
Payer: COMMERCIAL

## 2025-01-04 ENCOUNTER — APPOINTMENT (OUTPATIENT)
Dept: GENERAL RADIOLOGY | Facility: HOSPITAL | Age: 70
End: 2025-01-04
Attending: EMERGENCY MEDICINE
Payer: COMMERCIAL

## 2025-01-04 DIAGNOSIS — M17.12 ARTHRITIS OF LEFT KNEE: Primary | ICD-10-CM

## 2025-01-04 DIAGNOSIS — M25.462 EFFUSION OF LEFT KNEE: ICD-10-CM

## 2025-01-04 LAB
ALBUMIN SERPL-MCNC: 3.9 G/DL (ref 3.2–4.8)
ALP LIVER SERPL-CCNC: 78 U/L
ALT SERPL-CCNC: 16 U/L
ANION GAP SERPL CALC-SCNC: 10 MMOL/L (ref 0–18)
AST SERPL-CCNC: 18 U/L (ref ?–34)
BASOPHILS # BLD AUTO: 0.01 X10(3) UL (ref 0–0.2)
BASOPHILS NFR BLD AUTO: 0.2 %
BASOPHILS NFR SNV: 0 %
BILIRUB DIRECT SERPL-MCNC: 0.2 MG/DL (ref ?–0.3)
BILIRUB SERPL-MCNC: 0.4 MG/DL (ref 0.2–1.1)
BUN BLD-MCNC: 23 MG/DL (ref 9–23)
BUN/CREAT SERPL: 12.9 (ref 10–20)
CALCIUM BLD-MCNC: 9.7 MG/DL (ref 8.7–10.4)
CHLORIDE SERPL-SCNC: 111 MMOL/L (ref 98–112)
CO2 SERPL-SCNC: 27 MMOL/L (ref 21–32)
CREAT BLD-MCNC: 1.78 MG/DL
DEPRECATED RDW RBC AUTO: 42.3 FL (ref 35.1–46.3)
EGFRCR SERPLBLD CKD-EPI 2021: 30 ML/MIN/1.73M2 (ref 60–?)
EOSINOPHIL # BLD AUTO: 0.02 X10(3) UL (ref 0–0.7)
EOSINOPHIL NFR BLD AUTO: 0.3 %
EOSINOPHIL NFR SNV: 0 %
ERYTHROCYTE [DISTWIDTH] IN BLOOD BY AUTOMATED COUNT: 14.2 % (ref 11–15)
FLUAV + FLUBV RNA SPEC NAA+PROBE: NEGATIVE
FLUAV + FLUBV RNA SPEC NAA+PROBE: NEGATIVE
GLUCOSE BLD-MCNC: 138 MG/DL (ref 70–99)
HCT VFR BLD AUTO: 29.8 %
HGB BLD-MCNC: 10 G/DL
IMM GRANULOCYTES # BLD AUTO: 0.02 X10(3) UL (ref 0–1)
IMM GRANULOCYTES NFR BLD: 0.3 %
LIPASE SERPL-CCNC: 53 U/L (ref 12–53)
LYMPHOCYTES # BLD AUTO: 1.01 X10(3) UL (ref 1–4)
LYMPHOCYTES NFR BLD AUTO: 17.5 %
LYMPHOCYTES NFR SNV: 0 %
MCH RBC QN AUTO: 27.7 PG (ref 26–34)
MCHC RBC AUTO-ENTMCNC: 33.6 G/DL (ref 31–37)
MCV RBC AUTO: 82.5 FL
MONOCYTES # BLD AUTO: 0.27 X10(3) UL (ref 0.1–1)
MONOCYTES NFR BLD AUTO: 4.7 %
MONOS+MACROS NFR SNV: 15 %
NEUTROPHILS # BLD AUTO: 4.43 X10 (3) UL (ref 1.5–7.7)
NEUTROPHILS # BLD AUTO: 4.43 X10(3) UL (ref 1.5–7.7)
NEUTROPHILS NFR BLD AUTO: 77 %
NEUTROPHILS NFR FLD: 85 %
OSMOLALITY SERPL CALC.SUM OF ELEC: 312 MOSM/KG (ref 275–295)
PLATELET # BLD AUTO: 271 10(3)UL (ref 150–450)
POTASSIUM SERPL-SCNC: 3.8 MMOL/L (ref 3.5–5.1)
PROT SERPL-MCNC: 6.7 G/DL (ref 5.7–8.2)
RBC # BLD AUTO: 3.61 X10(6)UL
RBC # FLD AUTO: 376 /CUMM (ref ?–1)
RSV RNA SPEC NAA+PROBE: NEGATIVE
SARS-COV-2 RNA RESP QL NAA+PROBE: NOT DETECTED
SODIUM SERPL-SCNC: 148 MMOL/L (ref 136–145)
TOTAL CELLS COUNTED FLD: 100
TOTAL CELLS COUNTED SNV: 995 /CUMM (ref 0–200)
TROPONIN I SERPL HS-MCNC: 14 NG/L
WBC # BLD AUTO: 5.8 X10(3) UL (ref 4–11)
WBC # SNV: 995 /CUMM

## 2025-01-04 PROCEDURE — 93010 ELECTROCARDIOGRAM REPORT: CPT

## 2025-01-04 PROCEDURE — 89060 EXAM SYNOVIAL FLUID CRYSTALS: CPT | Performed by: EMERGENCY MEDICINE

## 2025-01-04 PROCEDURE — 89050 BODY FLUID CELL COUNT: CPT | Performed by: EMERGENCY MEDICINE

## 2025-01-04 PROCEDURE — 96360 HYDRATION IV INFUSION INIT: CPT

## 2025-01-04 PROCEDURE — 0241U SARS-COV-2/FLU A AND B/RSV BY PCR (GENEXPERT): CPT | Performed by: EMERGENCY MEDICINE

## 2025-01-04 PROCEDURE — 93005 ELECTROCARDIOGRAM TRACING: CPT

## 2025-01-04 PROCEDURE — 73560 X-RAY EXAM OF KNEE 1 OR 2: CPT | Performed by: EMERGENCY MEDICINE

## 2025-01-04 PROCEDURE — 70551 MRI BRAIN STEM W/O DYE: CPT | Performed by: EMERGENCY MEDICINE

## 2025-01-04 PROCEDURE — 20610 DRAIN/INJ JOINT/BURSA W/O US: CPT

## 2025-01-04 PROCEDURE — 99285 EMERGENCY DEPT VISIT HI MDM: CPT

## 2025-01-04 PROCEDURE — 80076 HEPATIC FUNCTION PANEL: CPT | Performed by: EMERGENCY MEDICINE

## 2025-01-04 PROCEDURE — 87070 CULTURE OTHR SPECIMN AEROBIC: CPT | Performed by: EMERGENCY MEDICINE

## 2025-01-04 PROCEDURE — 83690 ASSAY OF LIPASE: CPT | Performed by: EMERGENCY MEDICINE

## 2025-01-04 PROCEDURE — 89051 BODY FLUID CELL COUNT: CPT | Performed by: EMERGENCY MEDICINE

## 2025-01-04 PROCEDURE — 80048 BASIC METABOLIC PNL TOTAL CA: CPT | Performed by: EMERGENCY MEDICINE

## 2025-01-04 PROCEDURE — 85025 COMPLETE CBC W/AUTO DIFF WBC: CPT | Performed by: EMERGENCY MEDICINE

## 2025-01-04 PROCEDURE — 87205 SMEAR GRAM STAIN: CPT | Performed by: EMERGENCY MEDICINE

## 2025-01-04 PROCEDURE — 84484 ASSAY OF TROPONIN QUANT: CPT | Performed by: EMERGENCY MEDICINE

## 2025-01-04 RX ORDER — HYDRALAZINE HYDROCHLORIDE 50 MG/1
50 TABLET, FILM COATED ORAL ONCE
Status: COMPLETED | OUTPATIENT
Start: 2025-01-04 | End: 2025-01-04

## 2025-01-04 RX ORDER — AMLODIPINE BESYLATE 5 MG/1
10 TABLET ORAL ONCE
Status: COMPLETED | OUTPATIENT
Start: 2025-01-04 | End: 2025-01-04

## 2025-01-04 RX ORDER — METHYLPREDNISOLONE 4 MG/1
TABLET ORAL
Qty: 1 EACH | Refills: 0 | Status: SHIPPED | OUTPATIENT
Start: 2025-01-04

## 2025-01-05 VITALS
TEMPERATURE: 98 F | DIASTOLIC BLOOD PRESSURE: 57 MMHG | HEART RATE: 75 BPM | BODY MASS INDEX: 26 KG/M2 | RESPIRATION RATE: 17 BRPM | WEIGHT: 160 LBS | SYSTOLIC BLOOD PRESSURE: 127 MMHG | OXYGEN SATURATION: 100 %

## 2025-01-05 RX ORDER — PREDNISONE 20 MG/1
40 TABLET ORAL ONCE
Status: COMPLETED | OUTPATIENT
Start: 2025-01-05 | End: 2025-01-05

## 2025-01-05 NOTE — ED QUICK NOTES
Pt able to state month and year and that she is in a hospital, unclear if pt is saying correct age d/t slurred speech, pt family unsure if it is leftover from prior stroke or if today she sounds worse. Family also unsure if slight weakness in her left side is new or not.

## 2025-01-05 NOTE — ED INITIAL ASSESSMENT (HPI)
Pt arrives via wheelchair to ED for c/o L knee swelling that started Thursday. Pt's family also concerned for stroke, family states residual issues with speech, BLE weakness. Pt's family states speech sounds worse. HX stroke in 2024. Aox2-3, speaking in full sentences, per son baseline for her.     R sided facial droop, possibly left over from last time, some weakness to R UE and RLE, however son unsure if these symptoms are residual from last time pt was admitted. Pts family states symptoms started around 0005am 01/04/2025. LKW: \"last time she was here\".     Charge RN aware

## 2025-01-05 NOTE — ED PROVIDER NOTES
Patient Seen in: [facility] Emergency Department      History     Chief Complaint   Patient presents with    Weakness    Swelling    Fall     Stated Complaint: L knee swelling    Subjective:   HPI      Close presents emergency department complaining of left knee pain and swelling.  Son provides most of the history states that she has been in bed due to left knee pain over the last couple days.  They also wonder whether she has had another stroke as she has had strokes in the past and she seems to be weaker on her right side.  There is no other aggravating or alleviating factors.  There is no fever or chills.  There is no cough or cold symptoms.  There is no trauma.    Objective:     Past Medical History:    Asthma (HCC)    Coronary atherosclerosis    Diabetes (HCC)    diabetes for 2 yrs    Essential hypertension    Glaucoma    right    Heart attack (HCC)        High blood pressure    High cholesterol    Hyperlipidemia    Osteoarthritis    Sleep apnea    cpap    Stroke (HCC)    30 yrs ago    Visual impairment    left eye edema              Past Surgical History:   Procedure Laterality Date    Anesth,vitrectomy Left 2017    Pars plana vitrectomy, internal limiting membrane peel, left eye.    Cath bare metal stent (bms)      Colonoscopy      Hysterectomy      Total abdom hysterectomy      Tubal ligation                  Social History     Socioeconomic History    Marital status: Single   Tobacco Use    Smoking status: Former     Current packs/day: 0.00     Types: Cigarettes     Start date: 1976     Quit date: 1980     Years since quittin.3    Smokeless tobacco: Never   Vaping Use    Vaping status: Never Used   Substance and Sexual Activity    Alcohol use: No    Drug use: No     Social Drivers of Health     Food Insecurity: No Food Insecurity (2024)    Food Insecurity     Food Insecurity: Never true   Transportation Needs: No Transportation Needs (2024)    Transportation Needs      Lack of Transportation: No   Housing Stability: Low Risk  (11/4/2024)    Housing Stability     Housing Instability: No                  Physical Exam     ED Triage Vitals [01/04/25 2008]   BP (!) 130/91   Pulse 69   Resp 20   Temp 97.8 °F (36.6 °C)   Temp src Temporal   SpO2 100 %   O2 Device None (Room air)       Current Vitals:   Vital Signs  BP: 127/57  Pulse: 75  Resp: 17  Temp: 97.8 °F (36.6 °C)  Temp src: Temporal  MAP (mmHg): 78    Oxygen Therapy  SpO2: 100 %  O2 Device: None (Room air)        Physical Exam  Vitals and nursing note reviewed.   Constitutional:       General: She is not in acute distress.     Appearance: She is well-developed.   HENT:      Head: Normocephalic.      Nose: Nose normal.      Mouth/Throat:      Mouth: Mucous membranes are moist.   Eyes:      Conjunctiva/sclera: Conjunctivae normal.   Cardiovascular:      Rate and Rhythm: Normal rate and regular rhythm.      Heart sounds: No murmur heard.  Pulmonary:      Effort: Pulmonary effort is normal. No respiratory distress.      Breath sounds: Normal breath sounds.   Abdominal:      General: There is no distension.      Palpations: Abdomen is soft.      Tenderness: There is no abdominal tenderness.   Musculoskeletal:      Cervical back: Normal range of motion and neck supple.      Comments: The right knee is tender and there is some mild swelling over the anterior prepatellar region without erythema or warmth.  Strong pulses are noted in the lower extremities.  Straight leg raise on the left is limited secondary to pain in the knee.   Skin:     General: Skin is warm and dry.      Findings: No rash.   Neurological:      Mental Status: She is alert and oriented to person, place, and time.             ED Course     Labs Reviewed   CBC WITH DIFFERENTIAL WITH PLATELET - Abnormal; Notable for the following components:       Result Value    RBC 3.61 (*)     HGB 10.0 (*)     HCT 29.8 (*)     All other components within normal limits   BASIC  METABOLIC PANEL (8) - Abnormal; Notable for the following components:    Glucose 138 (*)     Sodium 148 (*)     Creatinine 1.78 (*)     Calculated Osmolality 312 (*)     eGFR-Cr 30 (*)     All other components within normal limits   CELL COUNT SYNOVIAL - Abnormal; Notable for the following components:    TNC, Synovial Fluid 995 (*)     RBC Synovial 376 (*)     All other components within normal limits   HEPATIC FUNCTION PANEL (7) - Normal   LIPASE - Normal   TROPONIN I HIGH SENSITIVITY - Normal   SARS-COV-2/FLU A AND B/RSV BY PCR (GENEXPERT) - Normal    Narrative:     This test is intended for the qualitative detection and differentiation of SARS-CoV-2, influenza A, influenza B, and respiratory syncytial virus (RSV) viral RNA in nasopharyngeal or nares swabs from individuals suspected of respiratory viral infection consistent with COVID-19 by their healthcare provider. Signs and symptoms of respiratory viral infection due to SARS-CoV-2, influenza, and RSV can be similar.    Test performed using the Xpert Xpress SARS-CoV-2/FLU/RSV (real time RT-PCR)  assay on the [employer] instrument, [employer], [place] [address].   This test is being used under the Food and Drug Administration's Emergency Use Authorization.    The authorized Fact Sheet for Healthcare Providers for this assay is available upon request from the laboratory.   CELL CT/DIF SYNOVIAL   CELL CT/DIF & CRYSTAL SYNOVIAL    Narrative:     The following orders were created for panel order Cell Count/Diff & Crystals, Synovial.  Procedure                               Abnormality         Status                     ---------                               -----------         ------                     CRYSTAL EXAM,MISCELLANEO...[[id number]]                      In process                 Cell Count and Diff, Syn...[[id number]]  Abnormal            Final result                 Please view results for these tests on the individual orders.   CRYSTAL EXAM,MISCELLANEOUS FL    BODY FLUID CULT,AEROBIC AND ANAEROBIC     EKG    Rate, intervals and axes as noted on EKG Report.  Rate: 65 bpm  Rhythm: Sinus Rhythm  Reading: LVH, nonspecific changes, abnormal                       MDM              Medical Decision Making  Differential diagnosis considered for gout, arthritis, primary inflammatory effusion, stroke.    Problems Addressed:  Arthritis of left knee: acute illness or injury  Effusion of left knee: acute illness or injury    Amount and/or Complexity of Data Reviewed  Labs: ordered. Decision-making details documented in ED Course.     Details: Labs unremarkable  Radiology: ordered and independent interpretation performed. Decision-making details documented in ED Course.     Details: X-ray shows knee joint effusion with moderate arthritis. MRI without signs of acute infarct  ECG/medicine tests: ordered and independent interpretation performed. Decision-making details documented in ED Course.  Discussion of management or test interpretation with external provider(s): Procedure:  Arthrocentesis:  Verbal informed consent obtained from the patient. Risks explained  including infection and bleeding. Arthrocentesis was performed on the left knee.  The arthrocentesis was performed after the patient was prepped and draped in the usual fashion. Sterile technique:  The joint was anesthetized with 1% lidocaine.  Approximately 40 mL of straw colored clear fluid was obtained and sent to the lab for analysis.  There were no complications.  The procedure was performed by myself.     Risk  Prescription drug management.        Disposition and Plan     Clinical Impression:  1. Arthritis of left knee    2. Effusion of left knee         Disposition:  Discharge  1/4/2025 11:50 pm    Follow-up:  Yao Rodriguez  [address] [facility] [address]  [phone]    Schedule an appointment as soon as possible for a visit      Ross Bojorquez MD  [address] [facility]  [address] [place]  [address]  [phone]    Schedule an appointment as soon as possible for a visit      We recommend that you schedule follow up care with a primary care provider within the next three months to obtain basic health screening including reassessment of your blood pressure.      Medications Prescribed:  Discharge Medication List as of 1/5/2025 12:11 AM        START taking these medications    Details   methylPREDNISolone (MEDROL) 4 MG Oral Tablet Therapy Pack Dosepack: take as directed, Normal, Disp-1 each, R-0                 Supplementary Documentation:

## 2025-01-06 LAB
ATRIAL RATE: 65 BPM
CRYSTALS FLD MICRO: POSITIVE
P AXIS: 24 DEGREES
P-R INTERVAL: 190 MS
Q-T INTERVAL: 438 MS
QRS DURATION: 88 MS
QTC CALCULATION (BEZET): 455 MS
R AXIS: -13 DEGREES
T AXIS: 96 DEGREES
VENTRICULAR RATE: 65 BPM

## 2025-01-14 ENCOUNTER — HOSPITAL ENCOUNTER (EMERGENCY)
Facility: HOSPITAL | Age: 70
Discharge: HOME | End: 2025-01-14
Attending: EMERGENCY MEDICINE
Payer: COMMERCIAL

## 2025-01-14 ENCOUNTER — APPOINTMENT (OUTPATIENT)
Dept: CT IMAGING | Facility: HOSPITAL | Age: 70
End: 2025-01-14
Attending: EMERGENCY MEDICINE
Payer: COMMERCIAL

## 2025-01-14 VITALS
BODY MASS INDEX: 29.23 KG/M2 | DIASTOLIC BLOOD PRESSURE: 78 MMHG | TEMPERATURE: 98 F | WEIGHT: 165 LBS | SYSTOLIC BLOOD PRESSURE: 153 MMHG | RESPIRATION RATE: 18 BRPM | OXYGEN SATURATION: 96 % | HEART RATE: 78 BPM | HEIGHT: 63 IN

## 2025-01-14 DIAGNOSIS — S09.90XA INJURY OF HEAD, INITIAL ENCOUNTER: ICD-10-CM

## 2025-01-14 DIAGNOSIS — N30.00 ACUTE CYSTITIS WITHOUT HEMATURIA: Primary | ICD-10-CM

## 2025-01-14 LAB
BILIRUB UR QL: NEGATIVE
CLARITY UR: CLEAR
GLUCOSE UR-MCNC: NORMAL MG/DL
HGB UR QL STRIP.AUTO: NEGATIVE
KETONES UR-MCNC: NEGATIVE MG/DL
LEUKOCYTE ESTERASE UR QL STRIP.AUTO: 250
NITRITE UR QL STRIP.AUTO: NEGATIVE
PH UR: 5.5 [PH] (ref 5–8)
PROT UR-MCNC: NEGATIVE MG/DL
SP GR UR STRIP: 1.01 (ref 1–1.03)
UROBILINOGEN UR STRIP-ACNC: NORMAL

## 2025-01-14 PROCEDURE — 99285 EMERGENCY DEPT VISIT HI MDM: CPT

## 2025-01-14 PROCEDURE — 87086 URINE CULTURE/COLONY COUNT: CPT | Performed by: EMERGENCY MEDICINE

## 2025-01-14 PROCEDURE — 70450 CT HEAD/BRAIN W/O DYE: CPT | Performed by: EMERGENCY MEDICINE

## 2025-01-14 PROCEDURE — 81001 URINALYSIS AUTO W/SCOPE: CPT | Performed by: EMERGENCY MEDICINE

## 2025-01-14 PROCEDURE — 87077 CULTURE AEROBIC IDENTIFY: CPT | Performed by: EMERGENCY MEDICINE

## 2025-01-14 PROCEDURE — 87186 SC STD MICRODIL/AGAR DIL: CPT | Performed by: EMERGENCY MEDICINE

## 2025-01-14 RX ORDER — CEPHALEXIN 500 MG/1
500 CAPSULE ORAL ONCE
Status: COMPLETED | OUTPATIENT
Start: 2025-01-14 | End: 2025-01-14

## 2025-01-14 RX ORDER — CEPHALEXIN 500 MG/1
500 CAPSULE ORAL 3 TIMES DAILY
Qty: 30 CAPSULE | Refills: 0 | Status: SHIPPED | OUTPATIENT
Start: 2025-01-14 | End: 2025-01-24

## 2025-01-15 NOTE — ED INITIAL ASSESSMENT (HPI)
Patient reports mechanical fall yesterday while trying to get out of bathtub. + head injury without LOC. Denies neck pain. No other injury. History of stroke. Son wants patient to be evaluated. Only report of dizziness after hitting her head. Denies dizziness at this current time. No blood thinners.

## 2025-01-15 NOTE — ED NOTES
Pt medicated, as ordered.  Vital signs reassessed.  Pt cleared for discharge by ED MD.    RN reviewed discharge instructions with pt and family at bedside.  Key points highlighted, RN emphasized home care for treatment and prevention of UTIs.  Medication e-scribed, RN verified that pharmacy is correct.  Prescription and OTC medication education provided.  No questions at this time.    Pt dressed by family.  To lobby via wheelchair.  Left in good condition.

## 2025-01-15 NOTE — ED PROVIDER NOTES
Patient Seen in: [facility] Emergency Department    History     Chief Complaint   Patient presents with    Fall       HPI    Patient presents to the ED after falling while getting out of the bathtub.  She hit her head during the fall.  She denies any loss of conscious.  Complains only of a mild headache at this time.  Denies use of anticoagulants.  Her son states that she has been dizzy after the fall.  He is concerned about a possible UTI.    History reviewed.   Past Medical History:    Asthma (HCC)    Coronary atherosclerosis    Diabetes (HCC)    diabetes for 2 yrs    Essential hypertension    Glaucoma    right    Heart attack (HCC)        High blood pressure    High cholesterol    Hyperlipidemia    Osteoarthritis    Sleep apnea    cpap    Stroke (HCC)    30 yrs ago    Visual impairment    left eye edema       History reviewed.   Past Surgical History:   Procedure Laterality Date    Anesth,vitrectomy Left 2017    Pars plana vitrectomy, internal limiting membrane peel, left eye.    Cath bare metal stent (bms)      Colonoscopy      Hysterectomy      Total abdom hysterectomy      Tubal ligation           Medications :  Prescriptions Prior to Admission[1]     Family History   Problem Relation Age of Onset    Cancer Father     Diabetes Mother        Smoking Status:   Social History     Socioeconomic History    Marital status: Single   Tobacco Use    Smoking status: Former     Current packs/day: 0.00     Types: Cigarettes     Start date: 1976     Quit date: 1980     Years since quittin.3    Smokeless tobacco: Never   Vaping Use    Vaping status: Never Used   Substance and Sexual Activity    Alcohol use: No    Drug use: No       Constitutional and vital signs reviewed.      Social History and Family History elements reviewed from today, pertinent positives to the presenting problem noted.    Physical Exam     ED Triage Vitals [25 1842]   BP (!) 177/71   Pulse 72   Resp 16   Temp 98  °F (36.7 °C)   Temp src Temporal   SpO2 98 %   O2 Device None (Room air)       All measures to prevent infection transmission during my interaction with the patient were taken. Handwashing was performed prior to and after the exam.  Stethoscope and any equipment used during my examination was cleaned with super sani-cloth germicidal wipes following the exam.     Physical Exam  Vitals and nursing note reviewed.   Constitutional:       General: She is not in acute distress.     Appearance: She is well-developed. She is not ill-appearing.   HENT:      Head: Normocephalic.      Comments: Contusion to the superior scalp  Eyes:      General:         Right eye: No discharge.         Left eye: No discharge.      Conjunctiva/sclera: Conjunctivae normal.   Neck:      Trachea: No tracheal deviation.   Cardiovascular:      Rate and Rhythm: Normal rate.   Pulmonary:      Effort: Pulmonary effort is normal. No respiratory distress.      Breath sounds: No stridor.   Abdominal:      General: There is no distension.      Palpations: Abdomen is soft.   Musculoskeletal:         General: No deformity.      Cervical back: Neck supple. No tenderness.   Skin:     General: Skin is warm and dry.   Neurological:      Mental Status: She is alert and oriented to person, place, and time.   Psychiatric:         Mood and Affect: Mood normal.         Behavior: Behavior normal.         ED Course        Labs Reviewed   URINALYSIS WITH CULTURE REFLEX - Abnormal; Notable for the following components:       Result Value    Leukocyte Esterase Urine 250 (*)     WBC Urine 11-20 (*)     RBC Urine 3-5 (*)     Bacteria Urine Rare (*)     Squamous Epi. Cells Few (*)     All other components within normal limits   URINE CULTURE, ROUTINE       As Interpreted by me    Imaging Results Available and Reviewed while in ED: CT BRAIN OR HEAD (CPT=70450)    Result Date: 1/14/2025  CONCLUSION:  1. No acute intracranial process by noncontrast CT technique. 2. Nonspecific  white matter changes involving both cerebral hemispheres that most likely reflect sequelae of chronic microangiopathy. 3. Stable chronic lacunar infarcts in the left thalamus and left subinsular white matter. 4. Intracranial atherosclerosis.   Dictated by (CST): Kelvin Amaya MD on 1/14/2025 at 7:54 PM     Finalized by (CST): Kelvin Amaya MD on 1/14/2025 at 7:58 PM         ED Medications Administered:   Medications   cephALEXin (Keflex) cap 500 mg (500 mg Oral Given 1/14/25 2240)         MDM     Vitals:    01/14/25 2100 01/14/25 2142 01/14/25 2200 01/14/25 2235   BP: 155/64 135/58 (!) 162/54 153/78   Pulse: 61 59 62 78   Resp:    18   Temp:       TempSrc:       SpO2: 99% 99% 98% 96%   Weight:       Height:         *I personally reviewed and interpreted all ED vitals.    Pulse Ox: 96%, Room air, Normal     Differential Diagnosis/ Diagnostic Considerations: Head injury, ICH, UTI, other    Complicating Factors: The patient already has does not have any pertinent problems on file. to contribute to the complexity of this ED evaluation.    Medical Decision Making  Patient presents to the ED after a fall and head injury.  Nondistressed on exam.  CT without ICH.  Urinalysis concerning for UTI.  Past cultures show fairly sensitive E. coli.  Will discharge on Keflex.    Problems Addressed:  Acute cystitis without hematuria: acute illness or injury  Injury of head, initial encounter: acute illness or injury that poses a threat to life or bodily functions    Amount and/or Complexity of Data Reviewed  Independent Historian:      Details: Son provides history details  Labs: ordered. Decision-making details documented in ED Course.  Radiology: ordered and independent interpretation performed. Decision-making details documented in ED Course.     Details: I personally reviewed the patient's CT brain and noted no ICH    Risk  Prescription drug management.        Condition upon leaving the department: Stable    Disposition and  Plan     Clinical Impression:  1. Acute cystitis without hematuria    2. Injury of head, initial encounter        Disposition:  Discharge    Follow-up:  Yao Rodriguez  [address] [place], [place] [id number]  [phone]    Schedule an appointment as soon as possible for a visit in 3 day(s)        Medications Prescribed:  Discharge Medication List as of 1/14/2025 10:44 PM        START taking these medications    Details   cephALEXin (KEFLEX) 500 MG Oral Cap Take 1 capsule (500 mg total) by mouth 3 (three) times daily for 10 days., Normal, Disp-30 capsule, R-0                              [1] (Not in a hospital admission)

## 2025-01-16 RX ORDER — SULFAMETHOXAZOLE AND TRIMETHOPRIM 800; 160 MG/1; MG/1
1 TABLET ORAL 2 TIMES DAILY
Qty: 20 TABLET | Refills: 0 | Status: SHIPPED | OUTPATIENT
Start: 2025-01-16 | End: 2025-01-26

## 2025-01-30 PROCEDURE — 99284 EMERGENCY DEPT VISIT MOD MDM: CPT

## 2025-01-30 PROCEDURE — 96372 THER/PROPH/DIAG INJ SC/IM: CPT

## 2025-01-31 ENCOUNTER — HOSPITAL ENCOUNTER (EMERGENCY)
Facility: HOSPITAL | Age: 70
Discharge: HOME | End: 2025-01-31
Attending: EMERGENCY MEDICINE
Payer: COMMERCIAL

## 2025-01-31 ENCOUNTER — APPOINTMENT (OUTPATIENT)
Dept: ULTRASOUND IMAGING | Facility: HOSPITAL | Age: 70
End: 2025-01-31
Attending: EMERGENCY MEDICINE
Payer: COMMERCIAL

## 2025-01-31 ENCOUNTER — APPOINTMENT (OUTPATIENT)
Dept: GENERAL RADIOLOGY | Facility: HOSPITAL | Age: 70
End: 2025-01-31
Payer: COMMERCIAL

## 2025-01-31 VITALS
RESPIRATION RATE: 18 BRPM | HEART RATE: 63 BPM | BODY MASS INDEX: 27 KG/M2 | WEIGHT: 150 LBS | OXYGEN SATURATION: 100 % | DIASTOLIC BLOOD PRESSURE: 54 MMHG | TEMPERATURE: 98 F | SYSTOLIC BLOOD PRESSURE: 125 MMHG

## 2025-01-31 DIAGNOSIS — M71.22 BAKER CYST, LEFT: ICD-10-CM

## 2025-01-31 DIAGNOSIS — M25.562 LEFT KNEE PAIN, UNSPECIFIED CHRONICITY: Primary | ICD-10-CM

## 2025-01-31 DIAGNOSIS — M17.10 ARTHRITIS OF KNEE: ICD-10-CM

## 2025-01-31 PROCEDURE — 73560 X-RAY EXAM OF KNEE 1 OR 2: CPT | Performed by: EMERGENCY MEDICINE

## 2025-01-31 PROCEDURE — 93971 EXTREMITY STUDY: CPT | Performed by: EMERGENCY MEDICINE

## 2025-01-31 RX ORDER — KETOROLAC TROMETHAMINE 15 MG/ML
15 INJECTION, SOLUTION INTRAMUSCULAR; INTRAVENOUS ONCE
Status: COMPLETED | OUTPATIENT
Start: 2025-01-31 | End: 2025-01-31

## 2025-01-31 RX ORDER — KETOROLAC TROMETHAMINE 10 MG/1
10 TABLET, FILM COATED ORAL EVERY 6 HOURS PRN
Qty: 20 TABLET | Refills: 0 | Status: SHIPPED | OUTPATIENT
Start: 2025-01-31 | End: 2025-01-31

## 2025-01-31 NOTE — ED PROVIDER NOTES
Patient Seen in: [facility] Emergency Department    History   No chief complaint on file.    Stated Complaint: left knee injury     HPI    70-year-old female with past medical history of diabetes, hypertension, dyslipidemia presenting with son for evaluation with two weeks of left suprapatellar pain/swelling in setting of fall onto same.  Patient with similar swelling prior to fall status post arthrocentesis.  No fevers or chills, no preceding infectious complaints.  No medications prior to arrival.    Past Medical History:    Asthma (HCC)    Coronary atherosclerosis    Diabetes (HCC)    diabetes for 2 yrs    Essential hypertension    Glaucoma    right    Heart attack (HCC)        High blood pressure    High cholesterol    Hyperlipidemia    Osteoarthritis    Sleep apnea    cpap    Stroke (HCC)    30 yrs ago    Visual impairment    left eye edema       Past Surgical History:   Procedure Laterality Date    Anesth,vitrectomy Left 2017    Pars plana vitrectomy, internal limiting membrane peel, left eye.    Cath bare metal stent (bms)      Colonoscopy      Hysterectomy      Total abdom hysterectomy      Tubal ligation              Family History   Problem Relation Age of Onset    Cancer Father     Diabetes Mother        Social History     Socioeconomic History    Marital status: Single   Tobacco Use    Smoking status: Former     Current packs/day: 0.00     Types: Cigarettes     Start date: 1976     Quit date: 1980     Years since quittin.4    Smokeless tobacco: Never   Vaping Use    Vaping status: Never Used   Substance and Sexual Activity    Alcohol use: No    Drug use: No     Social Drivers of Health     Food Insecurity: No Food Insecurity (2024)    Food Insecurity     Food Insecurity: Never true   Transportation Needs: No Transportation Needs (2024)    Transportation Needs     Lack of Transportation: No   Housing Stability: Low Risk  (2024)    Housing Stability      Housing Instability: No       Review of Systems :  Constitutional: As per HPI  HENT: Negative for ear pain and rhinorrhea.    Eyes: Negative for discharge and visual disturbance.   Respiratory: Negative for cough and shortness of breath.    Musculoskeletal: Positive for left knee pain/swelling.    Positive for stated complaint: left knee injury  Other systems are as noted in HPI.  Constitutional and vital signs reviewed.      All other systems reviewed and negative except as noted above.    PSFH elements reviewed from today and agreed except as otherwise stated in HPI.    Physical Exam     ED Triage Vitals   BP 01/30/25 2230 112/67   Pulse 01/30/25 2230 92   Resp 01/30/25 2220 16   Temp 01/30/25 2220 97.9 °F (36.6 °C)   Temp src 01/30/25 2220 Temporal   SpO2 01/30/25 2230 100 %   O2 Device 01/30/25 2230 None (Room air)       Current:/67   Pulse 92   Temp 97.9 °F (36.6 °C) (Temporal)   Resp 16   Wt 68 kg   SpO2 100%   BMI 26.57 kg/m²         Physical Exam   Constitutional: No distress.  Nontoxic, well-appearing.  HEENT: MMM.  Head: Normocephalic.   Eyes: No injection.   Cardiovascular: Left lower extremity with 2+ DP/PT pulses.  Pulmonary/Chest: Effort normal.  Musculoskeletal: No gross deformity.  Left left lower extremity with full/intact range of motion to knee with trace suprapatellar effusion with soft compartments without crepitance or cutaneous change.  Neurological: Alert.  Left lower extremity with 5/5 strength proximally/distally.  Skin: Skin is warm.   Psychiatric: Cooperative.  Nursing note and vitals reviewed.        ED Course   Labs Reviewed - No data to display  Left preliminary Radiology Report  [facility], [facility]  [phone] - Phone    [facility]    NAME: ANKUSH HOFFMAN    DATE OF EXAM: 01/31/2025  Patient No:  [id number]  Physician:  ROBER ^Kiki  YOB: 1955    Past Medical History (entered by Technologist):    Reason For Exam (entered by  Technologist):  lt knee/thigh pain  Other Notes (entered by Technologist): no dvt seen lt leg  probable small lt bakers cyst and lt knee effusion?    Additional Information (per [facility]):      US LOWER EXTREMITY DOPPLER UNILATERAL (LEFT)    COMPARISON: None    IMPRESSION:    No evidence of DVT.    Left common femoral, femoral, greater saphenous, and popliteal veins are patent and compressible.   The visualized calf veins are patent.  6 cm Baker's cyst.    Case results were faxed/electronically transmitted at 0353EST.  If there are any questions please feel free to contact me directly at [phone], ext [phone]. If you cannot reach me at this number, do not leave a voicemail. Please call [phone] ext [phone] and ask for the next available radiologist.    Efren Lambert M.D.  This report has been electronically signed and verified by the Radiologist whose name is printed above.  ED Course as of 01/31/25 0713  ------------------------------------------------------------  Time: 01/31 0312  Comment: Sleeping comfortably with improving symptoms.   Preliminary Radiology Report  [facility]  [phone] - Phone    [facility]    NAME: ANKUSH HOFFMAN    DATE OF EXAM: 01/31/2025  Patient No:  [id number]  Physician:  ROBER ^Kiki  YOB: 1955    Past Medical History (entered by Technologist):    Reason For Exam (entered by Technologist):  Intermittent knee swelling.  Other Notes (entered by Technologist): ER Pod 2 Room 24    Additional Information (per [facility]):      Left knee 1 or 2 views    COMPARISON: None    IMPRESSION:    The alignment is normal.  Moderate medial, mild lateral compartment left knee osteoarthritis.  Small left knee joint effusion.  No acute fracture.     Case results were faxed/electronically transmitted at 0354 EST.  If there are any questions please feel free to contact me directly at [phone], ext [phone]. If you cannot reach me at  this number, do not leave a voicemail. Please call [phone] ext [phone] and ask for the next available radiologist.      Efren Lambert M.D.    [facility]   DIFFERENTIAL DIAGNOSIS: After history and physical exam differential diagnosis includes but is not limited to fracture, lytic lesion, DVT.    Pulse ox: 100%:Normal on RA, as independently interpreted by myself    Medical Decision Making  Evaluation for recurrent left knee pain status post fall on arthrocentesis with full/intact range of motion without cutaneous change and with soft compartments; imaging as noted with resolved symptoms after meds; stable for discharge with ortho referral/symptomatic care and ongoing outpatient followup.    Problems Addressed:  Arthritis of knee: undiagnosed new problem with uncertain prognosis  Baker cyst, left: undiagnosed new problem with uncertain prognosis  Left knee pain, unspecified chronicity: acute illness or injury    Amount and/or Complexity of Data Reviewed  Independent Historian: caregiver     Details: Son at bedside for collateral history  External Data Reviewed: labs, radiology, ECG and notes.     Details: 1/4/2025 ED notes/labs/EKG/XR results reviewed  Radiology: ordered and independent interpretation performed. Decision-making details documented in ED Course.     Details: XR without obvious dislocation as independently interpreted by myself    Risk  OTC drugs.        I was wearing at minimum a facemask and eye protection throughout this encounter with handwashing performed prior and after patient evaluation without personal hand/facial/oropharyngeal contact and gloves worn throughout encounter. See note and/or contact this provider for further PPE details.    We recommend that you schedule follow up care with a primary care provider within the next three months to obtain basic health screening including reassessment of your blood pressure.      You had elevated blood pressure today and you need to follow up with  your doctor for a repeat blood pressure check and further discussion of lifestyle modifications that include Weight Reduction - Dietary Sodium Restriction - Increased Physical Activity and Moderation in alcohol (ETOH) Consumption. If possible check your pressure at home and keep a blood pressure log to bring to your physician.    Disposition and Plan     Clinical Impression:  1. Left knee pain, unspecified chronicity    2. Arthritis of knee    3. Baker cyst, left        Disposition:  Discharge    Follow-up:  Vasquez Palencia MD  [address]  [address]  [facility] [id number] [phone]    Call  For orthopedic followup and re-evaluation.    Yao Rodriguez  [address] [place] [id number] [phone]    Call  As needed for followup and re-evaluation.      Medications Prescribed:  Current Discharge Medication List

## 2025-01-31 NOTE — ED INITIAL ASSESSMENT (HPI)
Pt to the emergency room for ongoing intermittent  knee swelling. Pt was seen for the same thing 01/04. They were able to pull 40ml of fluid from the joint, pt was also seen and treat in the er on 01/14 fo acute cystitis. No fevers noted at home. Increased pain with ambulation - unable to stand on her own and requiring lift assist per family.

## 2025-02-12 ENCOUNTER — HOSPITAL ENCOUNTER (INPATIENT)
Facility: HOSPITAL | Age: 70
LOS: 13 days | Discharge: NURSING, REHAB OR LONG-TERM CARE | End: 2025-02-26
Attending: EMERGENCY MEDICINE | Admitting: INTERNAL MEDICINE
Payer: MEDICARE

## 2025-02-12 ENCOUNTER — APPOINTMENT (OUTPATIENT)
Dept: GENERAL RADIOLOGY | Facility: HOSPITAL | Age: 70
End: 2025-02-12
Attending: EMERGENCY MEDICINE
Payer: MEDICARE

## 2025-02-12 ENCOUNTER — APPOINTMENT (OUTPATIENT)
Dept: CT IMAGING | Facility: HOSPITAL | Age: 70
End: 2025-02-12
Attending: EMERGENCY MEDICINE
Payer: MEDICARE

## 2025-02-12 DIAGNOSIS — E86.0 DEHYDRATION: ICD-10-CM

## 2025-02-12 DIAGNOSIS — R62.7 FAILURE TO THRIVE IN ADULT: ICD-10-CM

## 2025-02-12 DIAGNOSIS — R79.89 TROPONIN LEVEL ELEVATED: ICD-10-CM

## 2025-02-12 DIAGNOSIS — E87.5 HYPERKALEMIA: ICD-10-CM

## 2025-02-12 DIAGNOSIS — N17.9 AKI (ACUTE KIDNEY INJURY): Primary | ICD-10-CM

## 2025-02-12 LAB
ALBUMIN SERPL-MCNC: 4.1 G/DL (ref 3.2–4.8)
ALBUMIN/GLOB SERPL: 1.4 {RATIO} (ref 1–2)
ALP LIVER SERPL-CCNC: 94 U/L
ALT SERPL-CCNC: 25 U/L
ANION GAP SERPL CALC-SCNC: 15 MMOL/L (ref 0–18)
AST SERPL-CCNC: 23 U/L (ref ?–34)
BASOPHILS # BLD AUTO: 0.03 X10(3) UL (ref 0–0.2)
BASOPHILS NFR BLD AUTO: 0.3 %
BILIRUB SERPL-MCNC: 0.4 MG/DL (ref 0.2–1.1)
BUN BLD-MCNC: 65 MG/DL (ref 9–23)
BUN/CREAT SERPL: 20.1 (ref 10–20)
CALCIUM BLD-MCNC: 9.9 MG/DL (ref 8.7–10.4)
CHLORIDE SERPL-SCNC: 119 MMOL/L (ref 98–112)
CHOLEST SERPL-MCNC: 111 MG/DL (ref ?–200)
CK SERPL-CCNC: 83 U/L
CO2 SERPL-SCNC: 15 MMOL/L (ref 21–32)
CREAT BLD-MCNC: 3.23 MG/DL
DEPRECATED RDW RBC AUTO: 51.8 FL (ref 35.1–46.3)
EGFRCR SERPLBLD CKD-EPI 2021: 15 ML/MIN/1.73M2 (ref 60–?)
EOSINOPHIL # BLD AUTO: 0.01 X10(3) UL (ref 0–0.7)
EOSINOPHIL NFR BLD AUTO: 0.1 %
ERYTHROCYTE [DISTWIDTH] IN BLOOD BY AUTOMATED COUNT: 17.2 % (ref 11–15)
GLOBULIN PLAS-MCNC: 3 G/DL (ref 2–3.5)
GLUCOSE BLD-MCNC: 143 MG/DL (ref 70–99)
GLUCOSE BLDC GLUCOMTR-MCNC: 146 MG/DL (ref 70–99)
HCT VFR BLD AUTO: 34.3 %
HDLC SERPL-MCNC: 25 MG/DL (ref 40–59)
HGB BLD-MCNC: 10.5 G/DL
IMM GRANULOCYTES # BLD AUTO: 0.07 X10(3) UL (ref 0–1)
IMM GRANULOCYTES NFR BLD: 0.6 %
LDLC SERPL CALC-MCNC: 53 MG/DL (ref ?–100)
LYMPHOCYTES # BLD AUTO: 1.08 X10(3) UL (ref 1–4)
LYMPHOCYTES NFR BLD AUTO: 9.4 %
MAGNESIUM SERPL-MCNC: 2.5 MG/DL (ref 1.6–2.6)
MCH RBC QN AUTO: 26.6 PG (ref 26–34)
MCHC RBC AUTO-ENTMCNC: 30.6 G/DL (ref 31–37)
MCV RBC AUTO: 87.1 FL
MONOCYTES # BLD AUTO: 0.35 X10(3) UL (ref 0.1–1)
MONOCYTES NFR BLD AUTO: 3.1 %
NEUTROPHILS # BLD AUTO: 9.91 X10 (3) UL (ref 1.5–7.7)
NEUTROPHILS # BLD AUTO: 9.91 X10(3) UL (ref 1.5–7.7)
NEUTROPHILS NFR BLD AUTO: 86.5 %
NONHDLC SERPL-MCNC: 86 MG/DL (ref ?–130)
NT-PROBNP SERPL-MCNC: 828 PG/ML (ref ?–125)
OSMOLALITY SERPL CALC.SUM OF ELEC: 329 MOSM/KG (ref 275–295)
PLATELET # BLD AUTO: 360 10(3)UL (ref 150–450)
POTASSIUM SERPL-SCNC: 5.2 MMOL/L (ref 3.5–5.1)
PROT SERPL-MCNC: 7.1 G/DL (ref 5.7–8.2)
RBC # BLD AUTO: 3.94 X10(6)UL
SODIUM SERPL-SCNC: 149 MMOL/L (ref 136–145)
TRIGL SERPL-MCNC: 201 MG/DL (ref 30–149)
TROPONIN I SERPL HS-MCNC: 36 NG/L
VLDLC SERPL CALC-MCNC: 29 MG/DL (ref 0–30)
WBC # BLD AUTO: 11.5 X10(3) UL (ref 4–11)

## 2025-02-12 PROCEDURE — 70450 CT HEAD/BRAIN W/O DYE: CPT | Performed by: EMERGENCY MEDICINE

## 2025-02-12 PROCEDURE — 71045 X-RAY EXAM CHEST 1 VIEW: CPT | Performed by: EMERGENCY MEDICINE

## 2025-02-12 RX ORDER — CLOPIDOGREL BISULFATE 75 MG/1
75 TABLET ORAL DAILY
Qty: 30 TABLET | Refills: 0 | Status: SHIPPED | OUTPATIENT
Start: 2025-02-12 | End: 2025-02-12

## 2025-02-12 RX ORDER — SODIUM CHLORIDE 9 MG/ML
INJECTION, SOLUTION INTRAVENOUS ONCE
Status: COMPLETED | OUTPATIENT
Start: 2025-02-12 | End: 2025-02-13

## 2025-02-12 RX ORDER — ASPIRIN 81 MG/1
81 TABLET ORAL DAILY
Qty: 30 TABLET | Refills: 0 | Status: SHIPPED | OUTPATIENT
Start: 2025-02-12 | End: 2025-02-12

## 2025-02-13 PROBLEM — E87.5 HYPERKALEMIA: Status: ACTIVE | Noted: 2025-02-13

## 2025-02-13 PROBLEM — R62.7 FAILURE TO THRIVE IN ADULT: Status: ACTIVE | Noted: 2025-02-13

## 2025-02-13 PROBLEM — E86.0 DEHYDRATION: Status: ACTIVE | Noted: 2025-02-13

## 2025-02-13 PROBLEM — R79.89 TROPONIN LEVEL ELEVATED: Status: ACTIVE | Noted: 2025-02-13

## 2025-02-13 LAB
ANION GAP SERPL CALC-SCNC: 11 MMOL/L (ref 0–18)
ANION GAP SERPL CALC-SCNC: 12 MMOL/L (ref 0–18)
ANION GAP SERPL CALC-SCNC: 12 MMOL/L (ref 0–18)
ATRIAL RATE: 80 BPM
BILIRUB UR QL: NEGATIVE
BUN BLD-MCNC: 71 MG/DL (ref 9–23)
BUN BLD-MCNC: 78 MG/DL (ref 9–23)
BUN BLD-MCNC: 80 MG/DL (ref 9–23)
BUN/CREAT SERPL: 24.4 (ref 10–20)
BUN/CREAT SERPL: 24.5 (ref 10–20)
BUN/CREAT SERPL: 25.9 (ref 10–20)
C DIFF TOX B STL QL: NEGATIVE
CALCIUM BLD-MCNC: 8.9 MG/DL (ref 8.7–10.4)
CALCIUM BLD-MCNC: 9 MG/DL (ref 8.7–10.4)
CALCIUM BLD-MCNC: 9.1 MG/DL (ref 8.7–10.4)
CHLORIDE SERPL-SCNC: 120 MMOL/L (ref 98–112)
CHLORIDE SERPL-SCNC: 121 MMOL/L (ref 98–112)
CHLORIDE SERPL-SCNC: 123 MMOL/L (ref 98–112)
CO2 SERPL-SCNC: 17 MMOL/L (ref 21–32)
CO2 SERPL-SCNC: 19 MMOL/L (ref 21–32)
CO2 SERPL-SCNC: 19 MMOL/L (ref 21–32)
COLOR UR: YELLOW
CREAT BLD-MCNC: 2.9 MG/DL
CREAT BLD-MCNC: 3.09 MG/DL
CREAT BLD-MCNC: 3.2 MG/DL
EGFRCR SERPLBLD CKD-EPI 2021: 15 ML/MIN/1.73M2 (ref 60–?)
EGFRCR SERPLBLD CKD-EPI 2021: 15 ML/MIN/1.73M2 (ref 60–?)
EGFRCR SERPLBLD CKD-EPI 2021: 17 ML/MIN/1.73M2 (ref 60–?)
EST. AVERAGE GLUCOSE BLD GHB EST-MCNC: 117 MG/DL (ref 68–126)
GLUCOSE BLD-MCNC: 101 MG/DL (ref 70–99)
GLUCOSE BLD-MCNC: 138 MG/DL (ref 70–99)
GLUCOSE BLD-MCNC: 96 MG/DL (ref 70–99)
GLUCOSE BLDC GLUCOMTR-MCNC: 100 MG/DL (ref 70–99)
GLUCOSE BLDC GLUCOMTR-MCNC: 111 MG/DL (ref 70–99)
GLUCOSE BLDC GLUCOMTR-MCNC: 150 MG/DL (ref 70–99)
GLUCOSE BLDC GLUCOMTR-MCNC: 92 MG/DL (ref 70–99)
GLUCOSE UR-MCNC: NORMAL MG/DL
HBA1C MFR BLD: 5.7 % (ref ?–5.7)
HGB UR QL STRIP.AUTO: NEGATIVE
HYALINE CASTS #/AREA URNS AUTO: PRESENT /LPF
KETONES UR-MCNC: NEGATIVE MG/DL
LEUKOCYTE ESTERASE UR QL STRIP.AUTO: NEGATIVE
NITRITE UR QL STRIP.AUTO: NEGATIVE
OSMOLALITY SERPL CALC.SUM OF ELEC: 335 MOSM/KG (ref 275–295)
OSMOLALITY SERPL CALC.SUM OF ELEC: 335 MOSM/KG (ref 275–295)
OSMOLALITY SERPL CALC.SUM OF ELEC: 338 MOSM/KG (ref 275–295)
P AXIS: 66 DEGREES
P-R INTERVAL: 166 MS
PH UR: 5 [PH] (ref 5–8)
POTASSIUM SERPL-SCNC: 4.4 MMOL/L (ref 3.5–5.1)
POTASSIUM SERPL-SCNC: 4.7 MMOL/L (ref 3.5–5.1)
POTASSIUM SERPL-SCNC: 5 MMOL/L (ref 3.5–5.1)
PROT UR-MCNC: 30 MG/DL
Q-T INTERVAL: 392 MS
QRS DURATION: 72 MS
QTC CALCULATION (BEZET): 452 MS
R AXIS: -3 DEGREES
SODIUM SERPL-SCNC: 151 MMOL/L (ref 136–145)
SODIUM SERPL-SCNC: 151 MMOL/L (ref 136–145)
SODIUM SERPL-SCNC: 152 MMOL/L (ref 136–145)
SP GR UR STRIP: 1.02 (ref 1–1.03)
T AXIS: 44 DEGREES
UROBILINOGEN UR STRIP-ACNC: NORMAL
VENTRICULAR RATE: 80 BPM

## 2025-02-13 PROCEDURE — 5A09357 ASSISTANCE WITH RESPIRATORY VENTILATION, LESS THAN 24 CONSECUTIVE HOURS, CONTINUOUS POSITIVE AIRWAY PRESSURE: ICD-10-PCS | Performed by: HOSPITALIST

## 2025-02-13 RX ORDER — SENNOSIDES 8.6 MG
17.2 TABLET ORAL NIGHTLY PRN
Status: DISCONTINUED | OUTPATIENT
Start: 2025-02-13 | End: 2025-02-26

## 2025-02-13 RX ORDER — HYDRALAZINE HYDROCHLORIDE 20 MG/ML
INJECTION INTRAMUSCULAR; INTRAVENOUS
Status: DISPENSED
Start: 2025-02-13 | End: 2025-02-14

## 2025-02-13 RX ORDER — NICOTINE POLACRILEX 4 MG
15 LOZENGE BUCCAL
Status: DISCONTINUED | OUTPATIENT
Start: 2025-02-13 | End: 2025-02-26

## 2025-02-13 RX ORDER — SODIUM CHLORIDE 450 MG/100ML
INJECTION, SOLUTION INTRAVENOUS CONTINUOUS
Status: DISCONTINUED | OUTPATIENT
Start: 2025-02-13 | End: 2025-02-13

## 2025-02-13 RX ORDER — HYDROMORPHONE HYDROCHLORIDE 1 MG/ML
0.4 INJECTION, SOLUTION INTRAMUSCULAR; INTRAVENOUS; SUBCUTANEOUS EVERY 2 HOUR PRN
Status: DISCONTINUED | OUTPATIENT
Start: 2025-02-13 | End: 2025-02-17

## 2025-02-13 RX ORDER — DEXTROSE MONOHYDRATE 50 MG/ML
INJECTION, SOLUTION INTRAVENOUS CONTINUOUS
Status: DISCONTINUED | OUTPATIENT
Start: 2025-02-13 | End: 2025-02-15

## 2025-02-13 RX ORDER — METOCLOPRAMIDE HYDROCHLORIDE 5 MG/ML
5 INJECTION INTRAMUSCULAR; INTRAVENOUS EVERY 8 HOURS PRN
Status: DISCONTINUED | OUTPATIENT
Start: 2025-02-13 | End: 2025-02-26

## 2025-02-13 RX ORDER — ONDANSETRON 2 MG/ML
4 INJECTION INTRAMUSCULAR; INTRAVENOUS EVERY 6 HOURS PRN
Status: DISCONTINUED | OUTPATIENT
Start: 2025-02-13 | End: 2025-02-26

## 2025-02-13 RX ORDER — POLYETHYLENE GLYCOL 3350 17 G/17G
17 POWDER, FOR SOLUTION ORAL DAILY PRN
Status: DISCONTINUED | OUTPATIENT
Start: 2025-02-13 | End: 2025-02-26

## 2025-02-13 RX ORDER — HYDRALAZINE HYDROCHLORIDE 20 MG/ML
10 INJECTION INTRAMUSCULAR; INTRAVENOUS EVERY 6 HOURS PRN
Status: DISCONTINUED | OUTPATIENT
Start: 2025-02-13 | End: 2025-02-16

## 2025-02-13 RX ORDER — ASPIRIN 300 MG/1
300 SUPPOSITORY RECTAL DAILY
Status: DISCONTINUED | OUTPATIENT
Start: 2025-02-13 | End: 2025-02-16

## 2025-02-13 RX ORDER — HYDROMORPHONE HYDROCHLORIDE 1 MG/ML
0.8 INJECTION, SOLUTION INTRAMUSCULAR; INTRAVENOUS; SUBCUTANEOUS EVERY 2 HOUR PRN
Status: DISCONTINUED | OUTPATIENT
Start: 2025-02-13 | End: 2025-02-17

## 2025-02-13 RX ORDER — NICOTINE POLACRILEX 4 MG
30 LOZENGE BUCCAL
Status: DISCONTINUED | OUTPATIENT
Start: 2025-02-13 | End: 2025-02-26

## 2025-02-13 RX ORDER — HYDROMORPHONE HYDROCHLORIDE 1 MG/ML
0.2 INJECTION, SOLUTION INTRAMUSCULAR; INTRAVENOUS; SUBCUTANEOUS EVERY 2 HOUR PRN
Status: DISCONTINUED | OUTPATIENT
Start: 2025-02-13 | End: 2025-02-17

## 2025-02-13 RX ORDER — DEXTROSE MONOHYDRATE 25 G/50ML
50 INJECTION, SOLUTION INTRAVENOUS
Status: DISCONTINUED | OUTPATIENT
Start: 2025-02-13 | End: 2025-02-26

## 2025-02-13 RX ORDER — LEVETIRACETAM 500 MG/5ML
500 INJECTION, SOLUTION, CONCENTRATE INTRAVENOUS EVERY 12 HOURS
Status: DISCONTINUED | OUTPATIENT
Start: 2025-02-13 | End: 2025-02-13

## 2025-02-13 RX ORDER — ACETAMINOPHEN 500 MG
500 TABLET ORAL EVERY 4 HOURS PRN
Status: DISCONTINUED | OUTPATIENT
Start: 2025-02-13 | End: 2025-02-26

## 2025-02-13 RX ORDER — ASPIRIN 81 MG/1
81 TABLET, CHEWABLE ORAL DAILY
Status: DISCONTINUED | OUTPATIENT
Start: 2025-02-13 | End: 2025-02-13

## 2025-02-13 RX ORDER — HEPARIN SODIUM 5000 [USP'U]/ML
5000 INJECTION, SOLUTION INTRAVENOUS; SUBCUTANEOUS EVERY 8 HOURS SCHEDULED
Status: DISCONTINUED | OUTPATIENT
Start: 2025-02-13 | End: 2025-02-26

## 2025-02-13 RX ORDER — BRIMONIDINE TARTRATE 2 MG/ML
1 SOLUTION/ DROPS OPHTHALMIC 2 TIMES DAILY
Status: DISCONTINUED | OUTPATIENT
Start: 2025-02-13 | End: 2025-02-26

## 2025-02-13 RX ORDER — BISACODYL 10 MG
10 SUPPOSITORY, RECTAL RECTAL
Status: DISCONTINUED | OUTPATIENT
Start: 2025-02-13 | End: 2025-02-26

## 2025-02-13 NOTE — SLP NOTE
ADULT SWALLOWING EVALUATION    ASSESSMENT    ASSESSMENT/OVERALL IMPRESSION:  PPE REQUIRED. THIS THERAPIST WORE GLOVES, DROPLET MASK, AND GOGGLES FOR DURATION OF EVALUATION. HANDS WASHED UPON ENTRANCE/EXIT.    SLP BSSE orders received and acknowledged. A swallow evaluation warranted as pt at risk for aspiration. Pt afebrile with weak vocal quality, on room air, with oxygen saturation at 95. Pt with prior hx of dysphagia at [facility] in which last recommended diet was regular/thin. Pt positioned upright in bed. Pt with no complaints of pain, RN aware. Pt with adequate oral acceptance and bilabial seal across all trials. Pt with increased MICKI. Pt's swallow response appears delayed with reduced hyolaryngeal elevation/excursion. Overt signs/symptoms of aspiration observed with puree and mildly thick liquids as evidenced by throat clearing and delayed coughing. Pt began holding bolus in oral cavity and unable to swallow despite max cues. Pt able to expectorate liquids. Oral/buccal cavities clear of residue following all trials.  Oxygen status remained >94 t/o the entire evaluation.     At this time, pt presents with mild-moderate oral dysphagia and probable pharyngeal dysfunction. Recommend strict NPO with oral cares 3x/daily. Results and recommendations reviewed with RN and pt. Pt's RN v/u to all results/recommendations. Recommendations remain written on whiteboard.     PLAN: SLP to reassess to determine safest/least restrictive diet with no CSA and/or further dysphagia goals.     RECOMMENDATIONS   Diet Recommendations - Solids: NPO  Diet Recommendations - Liquids: NPO      Compensatory Strategies Recommended:  (n/a)  Aspiration Precautions:  (n/a)  Medication Administration Recommendations: Non-oral  Treatment Plan/Recommendations: SLP to reassess    HISTORY   MEDICAL HISTORY  Reason for Referral: R/O aspiration    Problem List  Principal Problem:    PAPO (acute kidney injury)  Active Problems:    Dehydration    Failure to  thrive in adult    Troponin level elevated    Hyperkalemia      Past Medical History  Past Medical History:    Asthma (HCC)    Coronary atherosclerosis    Diabetes (HCC)    diabetes for 2 yrs    Essential hypertension    Glaucoma    right    Heart attack (HCC)    2005    High blood pressure    High cholesterol    Hyperlipidemia    Osteoarthritis    Sleep apnea    cpap    Stroke (HCC)    30 yrs ago    Visual impairment    left eye edema       Prior Living Situation: Home with support  Diet Prior to Admission: Regular;Thin liquids  Precautions: Aspiration    Patient/Family Goals: None stated    SWALLOWING HISTORY  Current Diet Consistency: NPO  Dysphagia History:     BSE 1/19/22: bite sized/thin - upgrade to ETC  BSE 7/9/24: regular/thin  BSE 9/23/24: regular/thin  BSE 11/5/24: regular/thin    Imaging Results:     CT BRAIN 2/13/25:  CONCLUSION:      No acute intracranial abnormality.      Chronic microvascular white matter ischemia.  Chronic left thalamic lacunar infarct.      A preliminary report was issued by the [facility] teleradiology service. There are no major discrepancies.            Dictated by (CST): Gabriel Gilmore MD on 2/13/2025 at 8:18 AM       Finalized by (CST): Gabriel Gilmore MD on 2/13/2025 at 8:20 AM       CXR 2/12/25:  CONCLUSION: No acute cardiopulmonary disease.      A preliminary report was issued by the [facility] teleradiology service. There are no major discrepancies.            Dictated by (CST): Gabriel Gilmore MD on 2/13/2025 at 6:54 AM       Finalized by (CST): Gabriel Gilmore MD on 2/13/2025 at 6:55 AM       OBJECTIVE   ORAL MOTOR EXAMINATION     Symmetry: Within Functional Limits  Strength: Overall reduced     Range of Motion: Unable to assess  Rate of Motion: Unable to assess    Voice Quality: Weak  Respiratory Status: Unlabored  Consistencies Trialed: Nectar thick liquids;Puree  Method of Presentation: Staff/Clinician assistance;Spoon;Cup  Patient Positioned:  Upright;Midline    Oral Phase of Swallow: Impaired  Bolus Retrieval: Intact  Bilabial Seal: Intact  Bolus Formation: Impaired  Bolus Propulsion: Impaired     Retention: Impaired    Pharyngeal Phase of Swallow: Appears Impaired  Laryngeal Elevation Timing: Appears impaired  Laryngeal Elevation Strength: Appears impaired  Laryngeal Elevation Coordination: Appears impaired  (Please note: Silent aspiration cannot be evaluated clinically. Videofluoroscopic Swallow Study is required to rule-out silent aspiration.)    Esophageal Phase of Swallow: No complaints consistent with possible esophageal involvement      GOALS  Goal #1 SLP to reassess to determine safest/least restrictive diet with no CSA and/or further dysphagia goals.   In Progress     FOLLOW UP  Treatment Plan/Recommendations: SLP to reassess  Duration: 1 week  Follow Up Needed (Documentation Required): Yes  SLP Follow-up Date: 02/14/25    Thank you for your referral.   If you have any questions, please contact ALICIA Denney M.S. CCC-SLP  Speech Language Pathologist  Phone Number [phone]

## 2025-02-13 NOTE — ED INITIAL ASSESSMENT (HPI)
Pt to ED from home via EMS for decreased intake throughout the day today per family. Pt is non-verbal d/t hx of stroke in 2021. Awaiting for family d/t pt being poor historian.

## 2025-02-13 NOTE — PLAN OF CARE
Pt admitted from ED. Son assisted with admission, states he will call back with a list of her home medications. Oral care done, pt remains NPO. Repositioned throughout shift. All safety measures in place. MRI checklist done with son.     Problem: Patient Centered Care  Goal: Patient preferences are identified and integrated in the patient's plan of care  Description: Interventions:  - What would you like us to know as we care for you? Gokul, son assist with pt's care  - Provide timely, complete, and accurate information to patient/family  - Incorporate patient and family knowledge, values, beliefs, and cultural backgrounds into the planning and delivery of care  - Encourage patient/family to participate in care and decision-making at the level they choose  - Honor patient and family perspectives and choices  Outcome: Progressing     Problem: Diabetes/Glucose Control  Goal: Glucose maintained within prescribed range  Description: INTERVENTIONS:  - Monitor Blood Glucose as ordered  - Assess for signs and symptoms of hyperglycemia and hypoglycemia  - Administer ordered medications to maintain glucose within target range  - Assess barriers to adequate nutritional intake and initiate nutrition consult as needed  - Instruct patient on self management of diabetes  Outcome: Progressing     Problem: Patient/Family Goals  Goal: Patient/Family Long Term Goal  Description: Patient's Long Term Goal: \"get my mom to eat again\"    Interventions:  - iv hydration   -SLP eval   - See additional Care Plan goals for specific interventions  Outcome: Progressing  Goal: Patient/Family Short Term Goal  Description: Patient's Short Term Goal: \"more awake\"    Interventions:   - hydration  -frequent neuro checks   - See additional Care Plan goals for specific interventions  Outcome: Progressing     Problem: SAFETY ADULT - FALL  Goal: Free from fall injury  Description: INTERVENTIONS:  - Assess pt frequently for physical needs  - Identify  cognitive and physical deficits and behaviors that affect risk of falls.  - [place] fall precautions as indicated by assessment.  - Educate pt/family on patient safety including physical limitations  - Instruct pt to call for assistance with activity based on assessment  - Modify environment to reduce risk of injury  - Provide assistive devices as appropriate  - Consider OT/PT consult to assist with strengthening/mobility  - Encourage toileting schedule  Outcome: Progressing     Problem: DISCHARGE PLANNING  Goal: Discharge to home or other facility with appropriate resources  Description: INTERVENTIONS:  - Identify barriers to discharge w/pt and caregiver  - Include patient/family/discharge partner in discharge planning  - Arrange for needed discharge resources and transportation as appropriate  - Identify discharge learning needs (meds, wound care, etc)  - Arrange for interpreters to assist at discharge as needed  - Consider post-discharge preferences of patient/family/discharge partner  - Complete POLST form as appropriate  - Assess patient's ability to be responsible for managing their own health  - Refer to Case Management Department for coordinating discharge planning if the patient needs post-hospital services based on physician/LIP order or complex needs related to functional status, cognitive ability or social support system  Outcome: Progressing     Problem: Altered Communication/Language Barrier  Goal: Patient/Family is able to understand and participate in their care  Description: Interventions:  - Assess communication ability and preferred communication style  - Implement communication aides and strategies  - Use visual cues when possible  - Listen attentively, be patient, do not interrupt  - Minimize distractions  - Allow time for understanding and response  - Establish method for patient to ask for assistance (call light)  - Provide an  as needed  - Communicate barriers and strategies to  overcome with those who interact with patient  Outcome: Progressing     Problem: METABOLIC/FLUID AND ELECTROLYTES - ADULT  Goal: Electrolytes maintained within normal limits  Description: INTERVENTIONS:  - Monitor labs and rhythm and assess patient for signs and symptoms of electrolyte imbalances  - Administer electrolyte replacement as ordered  - Monitor response to electrolyte replacements, including rhythm and repeat lab results as appropriate  - Fluid restriction as ordered  - Instruct patient on fluid and nutrition restrictions as appropriate  Outcome: Progressing  Goal: Hemodynamic stability and optimal renal function maintained  Description: INTERVENTIONS:  - Monitor labs and assess for signs and symptoms of volume excess or deficit  - Monitor intake, output and patient weight  - Monitor urine specific gravity, serum osmolarity and serum sodium as indicated or ordered  - Monitor response to interventions for patient's volume status, including labs, urine output, blood pressure (other measures as available)  - Encourage oral intake as appropriate  - Instruct patient on fluid and nutrition restrictions as appropriate  Outcome: Progressing     Problem: SKIN/TISSUE INTEGRITY - ADULT  Goal: Skin integrity remains intact  Description: INTERVENTIONS  - Assess and document risk factors for pressure ulcer development  - Assess and document skin integrity  - Monitor for areas of redness and/or skin breakdown  - Initiate interventions, skin care algorithm/standards of care as needed  Outcome: Progressing  Goal: Incision(s), wounds(s) or drain site(s) healing without S/S of infection  Description: INTERVENTIONS:  - Assess and document risk factors for pressure ulcer development  - Assess and document skin integrity  - Assess and document dressing/incision, wound bed, drain sites and surrounding tissue  - Implement wound care per orders  - Initiate isolation precautions as appropriate  - Initiate Pressure Ulcer prevention  bundle as indicated  Outcome: Progressing     Problem: NEUROLOGICAL - ADULT  Goal: Achieves stable or improved neurological status  Description: INTERVENTIONS  - Assess for and report changes in neurological status  - Initiate measures to prevent increased intracranial pressure  - Maintain blood pressure and fluid volume within ordered parameters to optimize cerebral perfusion and minimize risk of hemorrhage  - Monitor temperature, glucose, and sodium. Initiate appropriate interventions as ordered  Outcome: Progressing  Goal: Achieves maximal functionality and self care  Description: INTERVENTIONS  - Monitor swallowing and airway patency with patient fatigue and changes in neurological status  - Encourage and assist patient to increase activity and self care with guidance from PT/OT  - Encourage visually impaired, hearing impaired and aphasic patients to use assistive/communication devices  Outcome: Progressing

## 2025-02-13 NOTE — CONSULTS
[facility]  part of [facility]    Report of Consultation    Eliud Fine Patient Status:  Inpatient    1955 MRN [id number]   Location [facility] system 1[facility] Attending Samir Hernadez,    Hosp Day # 0 PCP Yao Rodriguez     Date of Admission:  2025  Date of Consult:  2025  Reason for Consultation:   PAPO on CKD  Hypernatremia    History of Present Illness:   Patient is a 70 year old female with PMH of CVA, DM2, HTN, CKD with baseline creatinine around 1.7 presents to the hospital with weakness decreased PO intake and FTT . Were consulted for PAPO on CKD and hypernatremia    Patient minimally verbal follows simple commands    Labs consistent with PAPO on CKD and Hypernatremia.    Past Medical History  Past Medical History:    Asthma (HCC)    Coronary atherosclerosis    Diabetes (HCC)    diabetes for 2 yrs    Essential hypertension    Glaucoma    right    Heart attack (HCC)        High blood pressure    High cholesterol    Hyperlipidemia    Osteoarthritis    Sleep apnea    cpap    Stroke (HCC)    30 yrs ago    Visual impairment    left eye edema       Past Surgical History  Past Surgical History:   Procedure Laterality Date    Anesth,vitrectomy Left 2017    Pars plana vitrectomy, internal limiting membrane peel, left eye.    Cath bare metal stent (bms)      Colonoscopy      Hysterectomy      Total abdom hysterectomy      Tubal ligation         Family History  Family History   Problem Relation Age of Onset    Cancer Father     Diabetes Mother        Social History  Social History     Socioeconomic History    Marital status: Single   Tobacco Use    Smoking status: Former     Current packs/day: 0.00     Types: Cigarettes     Start date: 1976     Quit date: 1980     Years since quittin.4    Smokeless tobacco: Never   Vaping Use    Vaping status: Never Used   Substance and Sexual Activity    Alcohol use: No    Drug use: No     Social Drivers of Health     Food  Insecurity: No Food Insecurity (2/13/2025)    NCSS - Food Insecurity     Worried About Running Out of Food in the Last Year: No     Ran Out of Food in the Last Year: No   Transportation Needs: No Transportation Needs (2/13/2025)    NCSS - Transportation     Lack of Transportation: No   Housing Stability: Not At Risk (2/13/2025)    NCSS - Housing/Utilities     Has Housing: Yes     Worried About Losing Housing: No     Unable to Get Utilities: No           Current Medications:  Current Facility-Administered Medications   Medication Dose Route Frequency    brimonidine (Alphagan) 0.2 % ophthalmic solution 1 drop  1 drop Both Eyes BID    glucose (Dex4) 15 GM/59ML oral liquid 15 g  15 g Oral Q15 Min PRN    Or    glucose (Glutose) 40% oral gel 15 g  15 g Oral Q15 Min PRN    Or    glucose-vitamin C (Dex-4) chewable tab 4 tablet  4 tablet Oral Q15 Min PRN    Or    dextrose 50% injection 50 mL  50 mL Intravenous Q15 Min PRN    Or    glucose (Dex4) 15 GM/59ML oral liquid 30 g  30 g Oral Q15 Min PRN    Or    glucose (Glutose) 40% oral gel 30 g  30 g Oral Q15 Min PRN    Or    glucose-vitamin C (Dex-4) chewable tab 8 tablet  8 tablet Oral Q15 Min PRN    sodium bicarbonate 50 mEq in dextrose 5% 1,050 mL infusion   Intravenous Continuous    heparin (Porcine) 5000 UNIT/ML injection 5,000 Units  5,000 Units Subcutaneous Q8H KB    acetaminophen (Tylenol Extra Strength) tab 500 mg  500 mg Oral Q4H PRN    HYDROmorphone (Dilaudid) 1 MG/ML injection 0.2 mg  0.2 mg Intravenous Q2H PRN    Or    HYDROmorphone (Dilaudid) 1 MG/ML injection 0.4 mg  0.4 mg Intravenous Q2H PRN    Or    HYDROmorphone (Dilaudid) 1 MG/ML injection 0.8 mg  0.8 mg Intravenous Q2H PRN    melatonin tab 3 mg  3 mg Oral Nightly PRN    ondansetron (Zofran) 4 MG/2ML injection 4 mg  4 mg Intravenous Q6H PRN    metoclopramide (Reglan) 5 mg/mL injection 5 mg  5 mg Intravenous Q8H PRN    polyethylene glycol (PEG 3350) (Miralax) 17 g oral packet 17 g  17 g Oral Daily PRN     sennosides (Senokot) tab 17.2 mg  17.2 mg Oral Nightly PRN    bisacodyl (Dulcolax) 10 MG rectal suppository 10 mg  10 mg Rectal Daily PRN    insulin aspart (NovoLOG) 100 Units/mL FlexPen 1-5 Units  1-5 Units Subcutaneous TID CC and HS    aspirin 300 MG rectal suppository 300 mg  300 mg Rectal Daily    levETIRAcetam (Keppra) 500 mg/5mL injection 500 mg  500 mg Intravenous Q12H     Medications Prior to Admission   Medication Sig    diclofenac 1 % External Gel Apply 2 g topically 4 (four) times daily as needed.    [] sulfamethoxazole-trimethoprim -160 MG Oral Tab per tablet Take 1 tablet by mouth 2 (two) times daily for 10 days.    [] cephALEXin (KEFLEX) 500 MG Oral Cap Take 1 capsule (500 mg total) by mouth 3 (three) times daily for 10 days.    methylPREDNISolone (MEDROL) 4 MG Oral Tablet Therapy Pack Dosepack: take as directed    atorvastatin 40 MG Oral Tab     Brimonidine Tartrate-Timolol 0.2-0.5 % Ophthalmic Solution Apply 1 drop to eye 2 (two) times daily.    Budesonide-Formoterol Fumarate (SYMBICORT) 80-4.5 MCG/ACT Inhalation Aerosol Inhalation for 30 (Patient not taking: Reported on 2024)    allopurinol 100 MG Oral Tab Take 1 tablet (100 mg total) by mouth 2 (two) times daily. (Patient not taking: Reported on 2024)    Albuterol Sulfate 2 MG Oral Tab Take 1 tablet (2 mg total) by mouth. (Patient not taking: Reported on 2024)    albuterol (2.5 MG/3ML) 0.083% Inhalation Nebu Soln Inhalation for 5 (Patient not taking: Reported on 2024)    levETIRAcetam 500 MG Oral Tab Take 1 tablet (500 mg total) by mouth 2 (two) times daily. (Patient not taking: Reported on 2025)    losartan 50 MG Oral Tab Take 1 tablet (50 mg total) by mouth daily.    amLODIPine 10 MG Oral Tab Take 1 tablet (10 mg total) by mouth at bedtime.    hydrALAZINE 50 MG Oral Tab Take 1 tablet (50 mg total) by mouth in the morning and 1 tablet (50 mg total) before bedtime.    aspirin 81 MG Oral Chew Tab Chew  1 tablet (81 mg total) by mouth daily.    cyanocobalamin 500 MCG Oral Tab Take 1 tablet (500 mcg total) by mouth daily.    magnesium oxide 400 MG Oral Tab Take 1 tablet (400 mg total) by mouth daily.    spironolactone 25 MG Oral Tab Take 1 tablet (25 mg total) by mouth daily.    NAMZARIC 28-10 MG Oral Capsule SR 24 Hr Take 28 mg by mouth daily.    atorvastatin 20 MG Oral Tab Take 2 tablets (40 mg total) by mouth nightly.    MetFORMIN HCl 500 MG Oral Tab Take 1 tablet (500 mg total) by mouth daily with breakfast.    carvedilol 25 MG Oral Tab Take 1 tablet (25 mg total) by mouth 2 (two) times daily with meals.    brimonidine Tartrate 0.2 % Ophthalmic Solution Place 1 drop into both eyes 2 (two) times daily.       Allergies  Allergies[1]    Review of Systems:   Review of systems not obtained due to patient factors.    Physical Exam:   Vital Signs:  Blood pressure 132/56, pulse 71, temperature 97.5 °F (36.4 °C), temperature source Axillary, resp. rate 18, weight 121 lb 6.4 oz (55.1 kg), SpO2 96%.  General: cachetic weak minimally verbal  HEENT: Dry mucous membranes.   Respiratory: Clear to auscultation No wheezing Rhonchi or crackles  Cardiovascular: S1, S2. No rubs  Abdomen: Soft, +BS  Ext: No LE edema      Results:     Laboratory Data:  Lab Results   Component Value Date    WBC 11.5 (H) 02/12/2025    HGB 10.5 (L) 02/12/2025    HCT 34.3 (L) 02/12/2025    .0 02/12/2025    CREATSERUM 3.20 (H) 02/13/2025    BUN 78 (H) 02/13/2025     (H) 02/13/2025    K 5.0 02/13/2025     (H) 02/13/2025    CO2 17.0 (L) 02/13/2025     (H) 02/13/2025    CA 9.1 02/13/2025    ALB 4.1 02/12/2025    ALKPHO 94 02/12/2025    TP 7.1 02/12/2025    AST 23 02/12/2025    ALT 25 02/12/2025    PTT 30.4 09/25/2024    INR 1.11 09/25/2024    PTP 15.0 (H) 09/25/2024    LIP 53 01/04/2025    DDIMER 2.40 (H) 10/11/2022    ESRML 37 (H) 12/18/2022    CRP 0.31 (H) 12/18/2022    MG 2.5 02/12/2025    PHOS 2.9 09/27/2024    CK 83 02/12/2025     B12 260 09/25/2024         Imaging:  CT BRAIN OR HEAD (CPT=70450)    Result Date: 2/13/2025  CONCLUSION:   No acute intracranial abnormality.  Chronic microvascular white matter ischemia.  Chronic left thalamic lacunar infarct.  A preliminary report was issued by the [facility] teleradiology service. There are no major discrepancies.    Dictated by (CST): Gabriel Gilmore MD on 2/13/2025 at 8:18 AM     Finalized by (CST): Gabriel Gilmore MD on 2/13/2025 at 8:20 AM          XR CHEST AP PORTABLE  (CPT=71045)    Result Date: 2/13/2025  CONCLUSION: No acute cardiopulmonary disease.  A preliminary report was issued by the [facility] teleradiology service. There are no major discrepancies.    Dictated by (CST): Gabriel Gilmore MD on 2/13/2025 at 6:54 AM     Finalized by (CST): Gabriel Gilmore MD on 2/13/2025 at 6:55 AM              Impression:     70 year old female with PMH of CVA, DM2, HTN, CKD with baseline creatinine around 1.7 presents to the hospital with weakness decreased PO intake and FTT . Were consulted for PAPO on CKD and hypernatremia    PAPO on CKD stage 3:  - Baseline Creatinine 1.7  - PAPO from decreased PO intake   - Avoid Nephrotoxins  - Renally adjust medications  - No acute indication for RRT    Renal artery stenosis:  - seen in the past will repeat renal ultrasound with renal artery duplex to evaluate further.    Hypernatremia:  - 0.45NS@83cc/hr  - repeat renal panel this afternoon.    Hyperkalemia:  - improving with IVF    Metabolic acidosis:  - in the setting of PAPO and volume depletion  - IVF as above.     Thank you for allowing me to participate in the care of your patient.     Marck Galvan MD  [facility]  Nephrology         [1] No Known Allergies

## 2025-02-13 NOTE — ED QUICK NOTES
Pt back from CT via ED tech. No incident during exam or transport. Pt reattached to the monitor, bed is in lowest setting and locked. Safety rails x 2 in place, call button within reach. Son at the bedside.

## 2025-02-13 NOTE — ED QUICK NOTES
Handoff report received by ODALYS Sanches. Per son, pt's primary caretaker, pt has been exhibiting lack of appetite, increased fatigue and weakness. Son states symptoms have been ongoing x 4-5 days. Pt AxOx1 during interview, only able give name - son states this is abnormal for her. PMH includes stroke w/o deficit per son. FAST negative for abnormal findings, however weakness was identified on all extremities - per son these observations are also new for her. Preliminary nursing report indicates pt is nonverbal; incomprehensible sounds are made by pt when attempting to respond to further questions. MD Carrillo made aware of observations. Awaiting urinary and CT results at this time. Pt is additionally to be admitted, pt and son made aware of care plan and are agreeable to it.

## 2025-02-13 NOTE — ED QUICK NOTES
Son at bedside, provided more information on pt. For the past three days son noted pt felt more fatigue, drooling from her left side, not able to take her meds at times. Today, pt was not able to tolerate PO intake. Pt is nonverbal d/t hx of stroke in 2021

## 2025-02-13 NOTE — ED QUICK NOTES
Rec'd report from ODALYS Real. Pt's VSS. Pt is awake and updated on POC. She is making direct eye contact, moaned and nodded her head that she understood. Waiting for bed placement. IVF infusing

## 2025-02-13 NOTE — ED QUICK NOTES
Orders for admission, patient is aware of plan and ready to go upstairs. Any questions, please call ED RN Jessica at extension [phone].     Patient Covid vaccination status: Fully vaccinated     COVID Test Ordered in ED: None    COVID Suspicion at Admission: N/A    Running Infusions:      Mental Status/LOC at time of transport: PATRICIA, per previous RN, she is at baseline    Other pertinent information:   CIWA score: N/A   NIH score:  N/A

## 2025-02-13 NOTE — ED QUICK NOTES
Pt currently sleeping comfortably. Equal chest rise and fall w/ easy respirations observed. Pt in NAD. Safety rails x 2 in place, bed is locked and in lowest setting, call button within reach. Son remains at the pt's bedside. Currently awaiting bed placement.

## 2025-02-13 NOTE — ED QUICK NOTES
Rounding Completed    Plan of Care reviewed. Waiting for bed assignment. Pt able to be awoken for blood specimen collection. Made aware of pending bed status and is agreeable to care plan. Denies any complaint at this time. Pt resumed attempts to sleep. Bed is locked and in lowest position. Call light within reach.

## 2025-02-13 NOTE — H&P
[facility] Hospitalist H&P       CC: failure to thrive, weakness, low PO intake    PCP: Yao Rodriguez    History of Present Illness:   This is a 70-year-old female with a history of multiple strokes in the past, hypertension, CAD, sleep apnea, type 2 diabetes, who presents to the hospital for evaluation of low p.o. intake, weakness, and general failure to thrive.  Patient unable to give history given her speech.  I was able to call the son.  States that overall decline the last 3 days.  Trouble eating and even taking pills.    Review of Systems  Comprehensive ROS reviewed and negative except for what's stated above.     PMH  history of multiple strokes in the past, chronic aphasia, right facial droop, CKD stage 3, CEDRICK, hypertension, CAD, sleep apnea, type 2 diabetes    Past Medical History:    Asthma (HCC)    Coronary atherosclerosis    Diabetes (HCC)    diabetes for 2 yrs    Essential hypertension    Glaucoma    right    Heart attack (HCC)        High blood pressure    High cholesterol    Hyperlipidemia    Osteoarthritis    Sleep apnea    cpap    Stroke (HCC)    30 yrs ago    Visual impairment    left eye edema      PSH  Past Surgical History:   Procedure Laterality Date    Anesth,vitrectomy Left 2017    Pars plana vitrectomy, internal limiting membrane peel, left eye.    Cath bare metal stent (bms)      Colonoscopy      Hysterectomy      Total abdom hysterectomy      Tubal ligation        ALL:  Allergies[1]     Home Medications:  Reviewed     Soc Hx  Social History     Tobacco Use    Smoking status: Former     Current packs/day: 0.00     Types: Cigarettes     Start date: 1976     Quit date: 1980     Years since quittin.4    Smokeless tobacco: Never   Substance Use Topics    Alcohol use: No      Fam Hx  Family History   Problem Relation Age of Onset    Cancer Father     Diabetes Mother      OBJECTIVE:  /61   Pulse 88   Temp 97.2 °F (36.2 °C) (Temporal)   Resp 19    SpO2 96%   General: Alert, no acute distress  Lungs: clear to ausculation bilaterally  Heart: Regular rate and rhythm  Abdomen: soft, non tender  Extremities: No edema  Neuro: right facial droop, aphasia, able to make eye contact, does follow some simple commands, right sided weakness in upper and lower extremities    Diagnostic Data:    CBC/Chem  Recent Labs   Lab 02/12/25 2224   WBC 11.5*   HGB 10.5*   MCV 87.1   .0       Recent Labs   Lab 02/12/25 2224 02/13/25  0643   * 151*   K 5.2* 5.0   * 123*   CO2 15.0* 17.0*   BUN 65* 78*   CREATSERUM 3.23* 3.20*   * 101*   CA 9.9 9.1   MG 2.5  --        Recent Labs   Lab 02/12/25 2224   ALT 25   AST 23   ALB 4.1     Radiology: CT BRAIN OR HEAD (CPT=70450)    Result Date: 2/13/2025  CONCLUSION:   No acute intracranial abnormality.  Chronic microvascular white matter ischemia.  Chronic left thalamic lacunar infarct.  A preliminary report was issued by the [facility] teleradiology service. There are no major discrepancies.    Dictated by (CST): Gabriel Gilmore MD on 2/13/2025 at 8:18 AM     Finalized by (CST): Gabriel Gilmore MD on 2/13/2025 at 8:20 AM          XR CHEST AP PORTABLE  (CPT=71045)    Result Date: 2/13/2025  CONCLUSION: No acute cardiopulmonary disease.  A preliminary report was issued by the [facility] teleradiology service. There are no major discrepancies.    Dictated by (CST): Gabriel Gilmore MD on 2/13/2025 at 6:54 AM     Finalized by (CST): Gabriel Gilmore MD on 2/13/2025 at 6:55 AM             ASSESSMENT / PLAN:   This is a 70-year-old female with a history of multiple strokes in the past, hypertension, CAD, sleep apnea, type 2 diabetes, who presents to the hospital for evaluation of low p.o. intake, weakness, and general failure to thrive.    Failure to thrive  Low PO intake, dehydration with PAPO and hypernatremia  Concern for possible new stroke, MRI brain pending   Hx of multiple strokes in past    -obtain MRI brain, high risk of recurrent stroke   -given few days decline would be high on ddx that she could have had another stroke  -give aspirin rectal   -speech eval, upon my eval this AM I did not see that she could take pills but will need formal eval  -may need neuro consult pending MRI brain     Hypernatremia  PAPO  Metabolic acidosis  -sodium bicarb drip discussed with renal  -severe dehydration  -may need goals of care discussion regarding nutrition (G tube etc)    Hypertension   -check vitals closely, avoid hypotension today     Coronary artery disease  -will order PO meds once able     CEDRICK  -cpap if uses here     Type 2 diabetes   -low dose sliding scale     Hx of seizure vs prophylactic?   -on keppra at baseline, resume 500mg IV Q12hr    Fluids: bicarb drip   Diet: NPO  DVT prophylaxis: heparin sub q  Code status: DNR    Asrar Satya SWENSON  [facility] Hospitalist            [1] No Known Allergies

## 2025-02-13 NOTE — RESPIRATORY THERAPY NOTE
CEDRICK ASSESSMENT:    Pt does have a previous diagnosis of CEDRICK. Pt does routinely use a CPAP device at home.     CPAP INITIATION:    Pt to be placed on CPAP: yes

## 2025-02-13 NOTE — ED PROVIDER NOTES
Patient Seen in: [facility] Emergency Department    History     Chief Complaint   Patient presents with    Failure To Thrive       HPI    70-year-old female with a history of diabetes, hypertension, asthma, previous stroke with residual deficits who is family called 911 because patient has been having decreased appetite and has not drank or ate anything in the last 3 days.  Patient is at her baseline mental status.  All history from EMS, family at the bedside and reviewing of previous medical records  History reviewed.   Past Medical History:    Asthma (HCC)    Coronary atherosclerosis    Diabetes (HCC)    diabetes for 2 yrs    Essential hypertension    Glaucoma    right    Heart attack (HCC)        High blood pressure    High cholesterol    Hyperlipidemia    Osteoarthritis    Sleep apnea    cpap    Stroke (HCC)    30 yrs ago    Visual impairment    left eye edema       History reviewed.   Past Surgical History:   Procedure Laterality Date    Anesth,vitrectomy Left 2017    Pars plana vitrectomy, internal limiting membrane peel, left eye.    Cath bare metal stent (bms)      Colonoscopy      Hysterectomy      Total abdom hysterectomy      Tubal ligation           Medications :  Prescriptions Prior to Admission[1]     Family History   Problem Relation Age of Onset    Cancer Father     Diabetes Mother        Smoking Status:   Social History     Socioeconomic History    Marital status: Single   Tobacco Use    Smoking status: Former     Current packs/day: 0.00     Types: Cigarettes     Start date: 1976     Quit date: 1980     Years since quittin.4    Smokeless tobacco: Never   Vaping Use    Vaping status: Never Used   Substance and Sexual Activity    Alcohol use: No    Drug use: No       Constitutional and vital signs reviewed.      Social History and Family History elements reviewed from today, pertinent positives to the presenting problem noted.    Physical Exam     ED Triage Vitals    BP 02/12/25 2206 128/56   Pulse 02/12/25 2206 82   Resp 02/12/25 2206 17   Temp 02/12/25 2204 97.2 °F (36.2 °C)   Temp src 02/12/25 2204 Temporal   SpO2 02/12/25 2206 99 %   O2 Device 02/12/25 2206 None (Room air)       All measures to prevent infection transmission during my interaction with the patient were taken. Handwashing was performed prior to and after the exam.  Stethoscope and any equipment used during my examination was cleaned with super sani-cloth germicidal wipes following the exam.     Physical Exam  Vitals and nursing note reviewed.   Cardiovascular:      Rate and Rhythm: Normal rate.   Pulmonary:      Breath sounds: Rhonchi present.   Abdominal:      Palpations: Abdomen is soft.   Skin:     General: Skin is warm and dry.   Neurological:      Mental Status: She is alert.         ED Course        Labs Reviewed   CBC WITH DIFFERENTIAL WITH PLATELET - Abnormal; Notable for the following components:       Result Value    WBC 11.5 (*)     HGB 10.5 (*)     HCT 34.3 (*)     MCHC 30.6 (*)     RDW-SD 51.8 (*)     RDW 17.2 (*)     Neutrophil Absolute Prelim 9.91 (*)     Neutrophil Absolute 9.91 (*)     All other components within normal limits   COMP METABOLIC PANEL (14) - Abnormal; Notable for the following components:    Glucose 143 (*)     Sodium 149 (*)     Potassium 5.2 (*)     Chloride 119 (*)     CO2 15.0 (*)     BUN 65 (*)     Creatinine 3.23 (*)     BUN/CREA Ratio 20.1 (*)     Calculated Osmolality 329 (*)     eGFR-Cr 15 (*)     All other components within normal limits   TROPONIN I HIGH SENSITIVITY - Abnormal; Notable for the following components:    Troponin I (High Sensitivity) 36 (*)     All other components within normal limits   PRO BETA NATRIURETIC PEPTIDE - Abnormal; Notable for the following components:    Pro-Beta Natriuretic Peptide 828 (*)     All other components within normal limits   URINALYSIS, ROUTINE - Abnormal; Notable for the following components:    Clarity Urine Turbid (*)      Protein Urine 30 (*)     Squamous Epi. Cells Few (*)     Hyaline Casts Present (*)     All other components within normal limits   LIPID PANEL - Abnormal; Notable for the following components:    HDL Cholesterol 25 (*)     Triglycerides 201 (*)     All other components within normal limits   POCT GLUCOSE - Abnormal; Notable for the following components:    POC Glucose  146 (*)     All other components within normal limits   MAGNESIUM - Normal   CK CREATINE KINASE (NOT CREATININE) - Normal   RAINBOW DRAW LAVENDER   RAINBOW DRAW LIGHT GREEN   RAINBOW DRAW BLUE   RAINBOW DRAW GOLD     EKG    Rate, intervals and axes as noted on EKG Report.  Rate: 80  Rhythm: Sinus Rhythm  Reading: Normal sinus rhythm, heart rate 80, normal intervals, normal axis           As Interpreted by me    Imaging Results Available and Reviewed while in ED: No results found.  Preliminary Radiology Report  [facility], [facility]  [phone] - Phone    [facility]    NAME: ANKUSH HOFFMAN    DATE OF EXAM: 02/13/2025  Patient No:  [id number]  Physician:  MISHEL  YOB: 1955    Past Medical History (entered by Technologist):    Reason For Exam (entered by Technologist):  Decreased intake, transient alteration of awareness.  Other Notes (entered by Technologist): POD 1: [phone]    Additional Information (per [facility]):      CT HEAD WITHOUT IV CONTRAST      Comparison 1/14/2025      IMPRESSION:  -No significant change in the mild ventriculomegaly out of proportion to the sulci, and prominence of the sylvian fissures, a pattern of findings that can be seen in the setting of normal pressure hydrocephalus.     -No acute intracranial hemorrhage or acute large territory infarct.        The results were faxed/finalized only at 0112 hrs ET. If you would like to discuss this case directly please call [phone] (extension [phone]).  If you can't reach me at this number, do not leave a voicemail.  Please call  [phone] ext [phone] and ask for the next available Radiologist.        Michael Vyas M.D.  This report has been electronically signed and verified by the Radiologist whose name is printed above.       This report contains privileged and confidential information and is intended solely for the use of the individual or entity to which it is addressed. If you are not the intended recipient of this report, you are hereby notified that any copying, distribution, dissemination or action taken in relation to the contents of this report is strictly prohibited and may be unlawful. If you have received this report in error, please notify the sender immediately at [phone] and permanently delete the original report and destroy any copies or printouts.    Preliminary Radiology Report  [facility], [facility]  [phone] - Phone    [facility]    NAME: ANKUSH HOFFMAN    DATE OF EXAM: 02/12/2025  Patient No:  [id number]  Physician:  MISHEL  YOB: 1955    Past Medical History (entered by Technologist):    Reason For Exam (entered by Technologist):  Decreased appetite.  Other Notes (entered by Technologist): ED ROOM 16, POD 1    Additional Information (per [facility]):      X-RAY CHEST 1 VIEW 2236 HRS.       IMPRESSION:  -No evidence of acute cardiopulmonary disease.        The results were faxed/finalized only at 0222 hrs ET. If you would like to discuss this case directly please call [phone] (extension [phone]).  If you can't reach me at this number, do not leave a voicemail.  Please call [phone] ext 1 and ask for the next available Radiologist.        Michael Vyas M.D.  This report has been electronically signed and verified by the Radiologist whose name is printed above.       This report contains privileged and confidential information and is intended solely for the use of the individual or entity to which it is addressed. If you are not the intended recipient of this  report, you are hereby notified that any copying, distribution, dissemination or action taken in relation to the contents of this report is strictly prohibited and may be unlawful. If you have received this report in error, please notify the sender immediately at [phone] and permanently delete the original report and destroy any copies or printouts.    ED Medications Administered:   Medications   sodium chloride 0.9 % IV bolus 500 mL (0 mL Intravenous Stopped 2/12/25 5019)   sodium chloride 0.9% infusion ( Intravenous New Bag 2/13/25 0020)         MDM     Vitals:    02/12/25 2206 02/12/25 2230 02/12/25 2300 02/13/25 0000   BP: 128/56 118/53 115/58 119/59   Pulse: 82 81 88 77   Resp: 17 14 17 14   Temp:       TempSrc:       SpO2: 99% 97% 96% 96%     *I personally reviewed and interpreted all ED vitals.    Pulse Ox: 99%, Room air, Normal     Monitor Interpretation:   normal sinus rhythm as interpreted by me.  The cardiac monitor was ordered to monitor cardiac rate and rhythm.    Differential Diagnosis/ Diagnostic Considerations: Failure to thrive, dehydration, electrolyte abnormalities, infection    Complicating Factors: The patient already has does not have any pertinent problems on file. to contribute to the complexity of this ED evaluation.    Medical Decision Making  Amount and/or Complexity of Data Reviewed  Labs: ordered. Decision-making details documented in ED Course.  Radiology: ordered and independent interpretation performed. Decision-making details documented in ED Course.  ECG/medicine tests: ordered and independent interpretation performed. Decision-making details documented in ED Course.    Risk  Decision regarding hospitalization.    Critical Care  Total time providing critical care: 30 minutes      I reviewed all results with patient and her son at the bedside who is her POA.  Patient is severely dehydrated with creatinine at's over 3.  This is causing hypernatremia and hyperchloremia.  Also  causing potassium be slightly elevated.  No EKG changes.  Troponin is only elevated most likely from the acute kidney injury unlikely cardiac in nature.  Reviewed chest x-ray images nothing acute.  CT also did not show anything acute as well.  No urine infection.  Most likely failure to thrive since patient has not eaten or drink anything in 3 days.  Started maintenance fluids and will need to admit for further evaluation.  Son agrees with this plan    I discussed case with Dr. Silverio who accepts admission  Condition upon leaving the department: Stable    Disposition and Plan     Clinical Impression:  1. PAPO (acute kidney injury)    2. Dehydration    3. Failure to thrive in adult    4. Troponin level elevated    5. Hyperkalemia        Disposition:  Admit    Follow-up:  Yao Rodriguez  [address] [place] [id number]  [phone]    Follow up        Medications Prescribed:  Current Discharge Medication List          Hospital Problems       Present on Admission  Date Reviewed: 11/11/2024            ICD-10-CM Noted POA    * (Principal) PAPO (acute kidney injury) N17.9 7/8/2024 Unknown                       [1] (Not in a hospital admission)

## 2025-02-14 ENCOUNTER — APPOINTMENT (OUTPATIENT)
Dept: MRI IMAGING | Facility: HOSPITAL | Age: 70
End: 2025-02-14
Attending: INTERNAL MEDICINE
Payer: MEDICARE

## 2025-02-14 LAB
ALBUMIN SERPL-MCNC: 3.5 G/DL (ref 3.2–4.8)
ANION GAP SERPL CALC-SCNC: 10 MMOL/L (ref 0–18)
BASOPHILS # BLD AUTO: 0.02 X10(3) UL (ref 0–0.2)
BASOPHILS NFR BLD AUTO: 0.5 %
BUN BLD-MCNC: 65 MG/DL (ref 9–23)
BUN/CREAT SERPL: 25.9 (ref 10–20)
CALCIUM BLD-MCNC: 9 MG/DL (ref 8.7–10.4)
CHLORIDE SERPL-SCNC: 119 MMOL/L (ref 98–112)
CO2 SERPL-SCNC: 21 MMOL/L (ref 21–32)
CREAT BLD-MCNC: 2.51 MG/DL
DEPRECATED RDW RBC AUTO: 50.6 FL (ref 35.1–46.3)
EGFRCR SERPLBLD CKD-EPI 2021: 20 ML/MIN/1.73M2 (ref 60–?)
EOSINOPHIL # BLD AUTO: 0.02 X10(3) UL (ref 0–0.7)
EOSINOPHIL NFR BLD AUTO: 0.5 %
ERYTHROCYTE [DISTWIDTH] IN BLOOD BY AUTOMATED COUNT: 16.9 % (ref 11–15)
GLUCOSE BLD-MCNC: 106 MG/DL (ref 70–99)
GLUCOSE BLDC GLUCOMTR-MCNC: 111 MG/DL (ref 70–99)
GLUCOSE BLDC GLUCOMTR-MCNC: 129 MG/DL (ref 70–99)
GLUCOSE BLDC GLUCOMTR-MCNC: 139 MG/DL (ref 70–99)
GLUCOSE BLDC GLUCOMTR-MCNC: 152 MG/DL (ref 70–99)
GLUCOSE BLDC GLUCOMTR-MCNC: 176 MG/DL (ref 70–99)
HCT VFR BLD AUTO: 28.4 %
HGB BLD-MCNC: 9.2 G/DL
IMM GRANULOCYTES # BLD AUTO: 0.07 X10(3) UL (ref 0–1)
IMM GRANULOCYTES NFR BLD: 1.6 %
LYMPHOCYTES # BLD AUTO: 1.05 X10(3) UL (ref 1–4)
LYMPHOCYTES NFR BLD AUTO: 24.2 %
MAGNESIUM SERPL-MCNC: 2.4 MG/DL (ref 1.6–2.6)
MCH RBC QN AUTO: 28 PG (ref 26–34)
MCHC RBC AUTO-ENTMCNC: 32.4 G/DL (ref 31–37)
MCV RBC AUTO: 86.6 FL
MONOCYTES # BLD AUTO: 0.4 X10(3) UL (ref 0.1–1)
MONOCYTES NFR BLD AUTO: 9.2 %
NEUTROPHILS # BLD AUTO: 2.78 X10 (3) UL (ref 1.5–7.7)
NEUTROPHILS # BLD AUTO: 2.78 X10(3) UL (ref 1.5–7.7)
NEUTROPHILS NFR BLD AUTO: 64 %
OSMOLALITY SERPL CALC.SUM OF ELEC: 329 MOSM/KG (ref 275–295)
PHOSPHATE SERPL-MCNC: 3 MG/DL (ref 2.4–5.1)
PLATELET # BLD AUTO: 277 10(3)UL (ref 150–450)
PLATELETS.RETICULATED NFR BLD AUTO: 2.4 % (ref 0–7)
POTASSIUM SERPL-SCNC: 4.2 MMOL/L (ref 3.5–5.1)
RBC # BLD AUTO: 3.28 X10(6)UL
SODIUM SERPL-SCNC: 150 MMOL/L (ref 136–145)
TSI SER-ACNC: 1.12 UIU/ML (ref 0.55–4.78)
WBC # BLD AUTO: 4.3 X10(3) UL (ref 4–11)

## 2025-02-14 PROCEDURE — 70551 MRI BRAIN STEM W/O DYE: CPT | Performed by: INTERNAL MEDICINE

## 2025-02-14 RX ORDER — HYDRALAZINE HYDROCHLORIDE 50 MG/1
50 TABLET, FILM COATED ORAL 2 TIMES DAILY
Status: DISCONTINUED | OUTPATIENT
Start: 2025-02-14 | End: 2025-02-20

## 2025-02-14 RX ORDER — ATORVASTATIN CALCIUM 40 MG/1
40 TABLET, FILM COATED ORAL NIGHTLY
Status: DISCONTINUED | OUTPATIENT
Start: 2025-02-14 | End: 2025-02-26

## 2025-02-14 RX ORDER — CARVEDILOL 25 MG/1
25 TABLET ORAL 2 TIMES DAILY WITH MEALS
Status: DISCONTINUED | OUTPATIENT
Start: 2025-02-14 | End: 2025-02-20

## 2025-02-14 NOTE — SLP NOTE
ADULT SWALLOWING RE-EVALUATION    ASSESSMENT    ASSESSMENT/OVERALL IMPRESSION:      Proper PPE worn. Hands sanitized upon entrance/exit Pt room.         This RE-BSE warranted 2/2 BSE 2/13/25 rec NPO status. Pt admitted 2/12/25 with PAPO. Pt on solid/thin liquids at home, with family. Refer to  initial BSE on 2/13/25 for full PMH. Currently, Pt NPO.      CXR 2/13/25:  CONCLUSION: No acute cardiopulmonary disease.       MRI 2/14/25:  CONCLUSION:   1. No acute intracranial process by noncontrast MR technique.      2. Innumerable foci of supratentorial and infratentorial susceptibility artifact, suggesting foci of remote microhemorrhage. These findings may reflect cerebral amyloid angiopathy.       3. Senescent changes of parenchymal volume loss with sequela of chronic microvascular ischemic disease including chronic lacunar infarcts.      4. Lesser incidental findings as above.             Pt's KAMI improved as session progressed. Pt on room air, afebrile and assessed sitting upright in bed (after consulting with RN). Pt did not indicate a learning preference. No verbal or non-verbal indication of pain. Vocal quality/intensity weak.  Oral motor exam revealed overall reduced labial and lingual skills for rate, ROM and strength. Functional dentition. Pt was fed pureed, moderately-thick, mildly-thick and thin liquids. Bilabial seal adequate; no anterior loss. Lingual skills slow and uncoordinated for bolus formation and preparation of pureed trials with increased MICKI noted. Oral cavity grossly clear post swallows.     Pharyngeal response appeared delayed per hyolaryngeal elevation to completion (considered delayed in strength/rise to palpation). No overt CSA on pureed nor moderately-thick liquids. Throat clearing S/P intermittently mildly-thick liquids; coughing S/P thin liquids. Overall, swallow function appeared weak with reduced coordination. Sp02 ~98% during this assessment.        IMPRESSIONS:    Pt presents with mild  to moderate oral dysphagia and probable pharyngeal dysfunction. Collaborated with RN regarding Pt's swallowing plan of care. BSE results/recommendations discussed with Pt. Pt with fair understanding; would benefit from additional reinforcement. Swallowing precautions written on white board in room for reinforcement/carry-over of skills for Pt, family and staff. Call light within Pt's reach upon SLP discharge from room.            PLAN:    To f/u x4-5 sessions to ensure safe intake of diet and reinforce swallowing/aspiration precautions. To monitor for new CXR results. Diet upgrade as tolerated. VFSS IF appropriate. Family education as available.      RECOMMENDATIONS   Diet Recommendations - Solids: Puree  Diet Recommendations - Liquids: Honey thick liquids/ Moderately thick    Compensatory Strategies Recommended: Liquids via spoon  Aspiration Precautions: Upright position;Slow rate;Small bites;No straw;1:1 feeding  Medication Administration Recommendations: Crushed in puree  Treatment Plan/Recommendations: Aspiration precautions    HISTORY   MEDICAL HISTORY  Reason for Referral: R/O aspiration    Problem List  Principal Problem:    PAPO (acute kidney injury)  Active Problems:    Dehydration    Failure to thrive in adult    Troponin level elevated    Hyperkalemia      Past Medical History  Past Medical History:    Asthma (HCC)    Coronary atherosclerosis    Diabetes (HCC)    diabetes for 2 yrs    Essential hypertension    Glaucoma    right    Heart attack (HCC)    2005    High blood pressure    High cholesterol    Hyperlipidemia    Osteoarthritis    Sleep apnea    cpap    Stroke (HCC)    30 yrs ago    Visual impairment    left eye edema       Prior Living Situation: Home with support  Diet Prior to Admission: Regular;Thin liquids  Precautions: Aspiration    Patient/Family Goals: to eat    SWALLOWING HISTORY  Current Diet Consistency: NPO    OBJECTIVE   ORAL MOTOR EXAMINATION     Symmetry: Within Functional  Limits  Strength: Overall reduced  Range of Motion: Overall reduced  Rate of Motion: Overall reduced    Voice Quality: Weak  Respiratory Status: Unlabored  Consistencies Trialed: Thin liquids;Nectar thick liquids;Honey thick liquids;Puree  Method of Presentation: Staff/Clinician assistance;Spoon;Cup  Patient Positioned: Upright;Midline    Oral Phase of Swallow: Impaired  Bolus Retrieval: Intact  Bilabial Seal: Intact  Bolus Formation: Impaired  Bolus Propulsion: Impaired    Pharyngeal Phase of Swallow: Appears Impaired  Laryngeal Elevation Timing: Appears impaired  Laryngeal Elevation Strength: Appears impaired  Laryngeal Elevation Coordination: Appears impaired  (Please note: Silent aspiration cannot be evaluated clinically. Videofluoroscopic Swallow Study is required to rule-out silent aspiration.)       GOALS  Goal #1 The patient will tolerate PUREED consistency and MODERATELY-THICK liquids without overt signs or symptoms of aspiration with 100 % accuracy over 1-2 session(s).    In Progress   Goal #2 The patient/family/caregiver will demonstrate understanding and implementation of aspiration precautions and swallow strategies independently over 4-5 session(s).    In Progress   Goal #3 The patient will utilize compensatory strategies as outlined by  BSSE (clinical evaluation) including Slow rate, Small bites, CONTROLLED LIQUIDS,No straws, Upright 90 degrees with moderate feeding assistance 90% of the time across 4-5 sessions.  In Progress   Goal #4 The patient will tolerate trial upgrade of BITE SIZE/SOFT/SOLID consistency and MILDLY-THICK/THIN liquids without overt signs or symptoms of aspiration with 100 % accuracy over 3-4 session(s).    In Progress   FOLLOW UP  Treatment Plan/Recommendations: Aspiration precautions  Duration: 2 weeks  Follow Up Needed (Documentation Required): Yes  SLP Follow-up Date: 02/15/25    Thank you for your referral.   If you have any questions, please contact   Sophia Jiménez  M.S. CCC/SLP  Speech-Language Pathologist  [facility]  #[phone]

## 2025-02-14 NOTE — PAYOR COMM NOTE
--------------  ADMISSION REVIEW     Payor: [facility]  Subscriber #:  [id number]  Authorization Number: [id number]    Admit date: 2/13/25  Admit time:  8:44 AM       ED Provider Notes        History   HPI  70-year-old female with a history of diabetes, hypertension, asthma, previous stroke with residual deficits who is family called 911 because patient has been having decreased appetite and has not drank or ate anything in the last 3 days.  Patient is at her baseline mental status.  All history from EMS, family at the bedside and reviewing of previous medical records    ED Triage Vitals   BP 02/12/25 2206 128/56   Pulse 02/12/25 2206 82   Resp 02/12/25 2206 17   Temp 02/12/25 2204 97.2 °F (36.2 °C)   Temp src 02/12/25 2204 Temporal   SpO2 02/12/25 2206 99 %   O2 Device 02/12/25 2206 None (Room air)     Cardiovascular:      Rate and Rhythm: Normal rate.   Pulmonary:      Breath sounds: Rhonchi present.   Abdominal:      Palpations: Abdomen is soft.   Skin:     General: Skin is warm and dry.   Neurological:      Mental Status: She is alert.     Labs Reviewed   CBC WITH DIFFERENTIAL WITH PLATELET - Abnormal; Notable for the following components:       Result Value    WBC 11.5 (*)     HGB 10.5 (*)     HCT 34.3 (*)     MCHC 30.6 (*)     RDW-SD 51.8 (*)     RDW 17.2 (*)     Neutrophil Absolute Prelim 9.91 (*)     Neutrophil Absolute 9.91 (*)     All other components within normal limits   COMP METABOLIC PANEL (14) - Abnormal; Notable for the following components:    Glucose 143 (*)     Sodium 149 (*)     Potassium 5.2 (*)     Chloride 119 (*)     CO2 15.0 (*)     BUN 65 (*)     Creatinine 3.23 (*)     BUN/CREA Ratio 20.1 (*)     Calculated Osmolality 329 (*)     eGFR-Cr 15 (*)     All other components within normal limits   TROPONIN I HIGH SENSITIVITY - Abnormal; Notable for the following components:    Troponin I (High Sensitivity) 36 (*)     All other components within normal limits   PRO BETA NATRIURETIC  PEPTIDE - Abnormal; Notable for the following components:    Pro-Beta Natriuretic Peptide 828 (*)     All other components within normal limits   URINALYSIS, ROUTINE - Abnormal; Notable for the following components:    Clarity Urine Turbid (*)     Protein Urine 30 (*)     Squamous Epi. Cells Few (*)     Hyaline Casts Present (*)     All other components within normal limits   LIPID PANEL - Abnormal; Notable for the following components:    HDL Cholesterol 25 (*)     Triglycerides 201 (*)     All other components within normal limits   POCT GLUCOSE - Abnormal; Notable for the following components:    POC Glucose  146 (*)      ED Medications Administered:   Medications   sodium chloride 0.9 % IV bolus 500 mL (0 mL Intravenous Stopped 2/12/25 4721)   sodium chloride 0.9% infusion ( Intravenous New Bag 2/13/25 0020)      Disposition and Plan     Clinical Impression:  1. PAPO (acute kidney injury)    2. Dehydration    3. Failure to thrive in adult    4. Troponin level elevated    5. Hyperkalemia      Disposition:  Admit         H&P    History of Present Illness:   This is a 70-year-old female with a history of multiple strokes in the past, hypertension, CAD, sleep apnea, type 2 diabetes, who presents to the hospital for evaluation of low p.o. intake, weakness, and general failure to thrive.  Patient unable to give history given her speech.  I was able to call the son.  States that overall decline the last 3 days.  Trouble eating and even taking pills.     /61   Pulse 88   Temp 97.2 °F (36.2 °C) (Temporal)   Resp 19   SpO2 96%   General: Alert  Lungs: clear to ausculation bilaterally  Heart: Regular rate and rhythm  Abdomen: soft, non tender  Extremities: No edema  Neuro: right facial droop, aphasia, able to make eye contact, does follow some simple commands, right sided weakness in upper and lower extremities     Lab 02/12/25 2224   WBC 11.5*   HGB 10.5*   MCV 87.1   .0      Lab 02/12/25 2224  02/13/25  0643   * 151*   K 5.2* 5.0   * 123*   CO2 15.0* 17.0*   BUN 65* 78*   CREATSERUM 3.23* 3.20*   * 101*   CA 9.9 9.1   MG 2.5  --       Lab 02/12/25  2224   ALT 25   AST 23   ALB 4.1       CT BRAIN OR HEAD (CPT=70450)   Result Date: 2/13/2025  CONCLUSION:          No acute intracranial abnormality.  Chronic microvascular white matter ischemia.  Chronic left thalamic lacunar infarct.  A preliminary report was issued by the [facility] teleradiology service. There are no major discrepancies.    Dictated by (CST): Gabriel Gilmore MD on 2/13/2025 at 8:18 AM     Finalized by (CST): Gabriel Gilmore MD on 2/13/2025 at 8:20 AM           XR CHEST AP PORTABLE  (CPT=71045)   Result Date: 2/13/2025  CONCLUSION:        No acute cardiopulmonary disease.  A preliminary report was issued by the [facility] teleradiology service. There are no major discrepancies.    Dictated by (CST): Gabriel Gilmore MD on 2/13/2025 at 6:54 AM     Finalized by (CST): Gabriel Gilmore MD on 2/13/2025 at 6:55 AM               ASSESSMENT / PLAN:   This is a 70-year-old female with a history of multiple strokes in the past, hypertension, CAD, sleep apnea, type 2 diabetes, who presents to the hospital for evaluation of low p.o. intake, weakness, and general failure to thrive.     Failure to thrive  Low PO intake, dehydration with PAPO and hypernatremia  Concern for possible new stroke, MRI brain pending   Hx of multiple strokes in past   -obtain MRI brain, high risk of recurrent stroke   -given few days decline would be high on ddx that she could have had another stroke  -give aspirin rectal   -speech eval, upon my eval this AM I did not see that she could take pills but will need formal eval  -may need neuro consult pending MRI brain      Hypernatremia  PAPO  Metabolic acidosis  -sodium bicarb drip discussed with renal  -severe dehydration  -may need goals of care discussion regarding nutrition (G tube etc)      Hypertension   -check vitals closely, avoid hypotension today      Coronary artery disease  -will order PO meds once able      CEDRICK  -cpap if uses here      Type 2 diabetes   -low dose sliding scale      Hx of seizure vs prophylactic?   -on keppra at baseline, resume 500mg IV Q12hr     Fluids: bicarb drip   Diet: NPO  DVT prophylaxis: heparin sub q                   Medication Administration Report    Legend:       Medications 02/13 02/14   aspirin 300 MG rectal suppository 300 mg  Dose: 300 mg  Freq: Daily Route: RE  Start: 02/13/25 0930    [id number]      0930        brimonidine (Alphagan) 0.2 % ophthalmic solution 1 drop  Dose: 1 drop  Freq: 2 times daily Route: Both Eyes  Start: 02/13/25 0900    90717     20149      07225     2100        dextrose 5% infusion  Rate: 75 mL/hr  Freq: Continuous Route: IV  Start: 02/13/25 2015 20265         heparin (Porcine) 5000 UNIT/ML injection 5,000 Units  Dose: 5,000 Units  Freq: Every 8 hours scheduled Route: SC  Start: 02/13/25 1400    09857     [id number]     8      13492     1400     2200        hydrALAzine (Apresoline) 20 mg/mL injection 10 mg  Dose: 10 mg  Freq: Every 6 hours PRN Route: IV  PRN Reason: Increased blood pressure  Start: 02/13/25 1925   Admin Instructions:   Give for SBP > 170    [id number]     11            Medications 02/13 02/14   sodium chloride 0.9% infusion  Freq: Once Route: IV  Start: 02/12/25 2311 End: 02/13/25 0020    [id number]            Medications 02/13 02/14   levETIRAcetam (Keppra) 500 mg/5mL injection 500 mg  Dose: 500 mg  Freq: Every 12 hours Route: IV  Start: 02/13/25 0930 End: 02/13/25 1832   Admin Instructions:   Administer slow IV push over 2 minutes    [id number]         sodium bicarbonate 50 mEq in dextrose 5% 1,050 mL infusion  Rate: 84 mL/hr  Freq: Continuous Route: IV  Start: 02/13/25 0900 End: 02/13/25 1323    [id number]     [id number]         sodium chloride 0.45% infusion  Rate: 83 mL/hr  Freq: Continuous Route: IV  Start: 02/13/25 1330 End: 02/13/25  2005    [id number]     [id number]                 Vitals (last day)       Date/Time Temp Pulse Resp BP SpO2 Weight O2 Device O2 Flow Rate (L/min) Who    02/14/25 0806 97.5 °F (36.4 °C) 67 19 162/71 98 % -- None (Room air) -- RP    02/14/25 0432 97 °F (36.1 °C) 80 18 142/69 100 % -- CPAP -- GV    02/13/25 2000 -- 75 18 148/85 97 % -- None (Room air) -- GV    02/13/25 1900 96.8 °F (36 °C) 61 18 171/54 100 % -- None (Room air) -- GV    02/13/25 1600 97.8 °F (36.6 °C) 62 18 154/62 95 % -- None (Room air) -- TR    02/13/25 1230 97.8 °F (36.6 °C) 70 22 135/63 98 % -- -- -- TR    02/13/25 0858 97.5 °F (36.4 °C) 71 18 132/56 96 % -- None (Room air) -- TR

## 2025-02-14 NOTE — PLAN OF CARE
Problem: Patient Centered Care  Goal: Patient preferences are identified and integrated in the patient's plan of care  Description: Interventions:  - What would you like us to know as we care for you? Gokul, son assist with pt's care  - Provide timely, complete, and accurate information to patient/family  - Incorporate patient and family knowledge, values, beliefs, and cultural backgrounds into the planning and delivery of care  - Encourage patient/family to participate in care and decision-making at the level they choose  - Honor patient and family perspectives and choices  Outcome: Progressing     Problem: Diabetes/Glucose Control  Goal: Glucose maintained within prescribed range  Description: INTERVENTIONS:  - Monitor Blood Glucose as ordered  - Assess for signs and symptoms of hyperglycemia and hypoglycemia  - Administer ordered medications to maintain glucose within target range  - Assess barriers to adequate nutritional intake and initiate nutrition consult as needed  - Instruct patient on self management of diabetes  Outcome: Progressing     Problem: Patient/Family Goals  Goal: Patient/Family Long Term Goal  Description: Patient's Long Term Goal: \"get my mom to eat again\"    Interventions:  - iv hydration   -SLP eval   - See additional Care Plan goals for specific interventions  Outcome: Not Progressing  Goal: Patient/Family Short Term Goal  Description: Patient's Short Term Goal: \"more awake\"    Interventions:   - hydration  -frequent neuro checks   - See additional Care Plan goals for specific interventions  Outcome: Progressing     Problem: SAFETY ADULT - FALL  Goal: Free from fall injury  Description: INTERVENTIONS:  - Assess pt frequently for physical needs  - Identify cognitive and physical deficits and behaviors that affect risk of falls.  - [place] fall precautions as indicated by assessment.  - Educate pt/family on patient safety including physical limitations  - Instruct pt to call for  assistance with activity based on assessment  - Modify environment to reduce risk of injury  - Provide assistive devices as appropriate  - Consider OT/PT consult to assist with strengthening/mobility  - Encourage toileting schedule  Outcome: Progressing     Problem: DISCHARGE PLANNING  Goal: Discharge to home or other facility with appropriate resources  Description: INTERVENTIONS:  - Identify barriers to discharge w/pt and caregiver  - Include patient/family/discharge partner in discharge planning  - Arrange for needed discharge resources and transportation as appropriate  - Identify discharge learning needs (meds, wound care, etc)  - Arrange for interpreters to assist at discharge as needed  - Consider post-discharge preferences of patient/family/discharge partner  - Complete POLST form as appropriate  - Assess patient's ability to be responsible for managing their own health  - Refer to Case Management Department for coordinating discharge planning if the patient needs post-hospital services based on physician/LIP order or complex needs related to functional status, cognitive ability or social support system  Outcome: Progressing     Problem: Altered Communication/Language Barrier  Goal: Patient/Family is able to understand and participate in their care  Description: Interventions:  - Assess communication ability and preferred communication style  - Implement communication aides and strategies  - Use visual cues when possible  - Listen attentively, be patient, do not interrupt  - Minimize distractions  - Allow time for understanding and response  - Establish method for patient to ask for assistance (call light)  - Provide an  as needed  - Communicate barriers and strategies to overcome with those who interact with patient  Outcome: Progressing     Problem: METABOLIC/FLUID AND ELECTROLYTES - ADULT  Goal: Electrolytes maintained within normal limits  Description: INTERVENTIONS:  - Monitor labs and rhythm  and assess patient for signs and symptoms of electrolyte imbalances  - Administer electrolyte replacement as ordered  - Monitor response to electrolyte replacements, including rhythm and repeat lab results as appropriate  - Fluid restriction as ordered  - Instruct patient on fluid and nutrition restrictions as appropriate  Outcome: Progressing  Goal: Hemodynamic stability and optimal renal function maintained  Description: INTERVENTIONS:  - Monitor labs and assess for signs and symptoms of volume excess or deficit  - Monitor intake, output and patient weight  - Monitor urine specific gravity, serum osmolarity and serum sodium as indicated or ordered  - Monitor response to interventions for patient's volume status, including labs, urine output, blood pressure (other measures as available)  - Encourage oral intake as appropriate  - Instruct patient on fluid and nutrition restrictions as appropriate  Outcome: Progressing     Problem: SKIN/TISSUE INTEGRITY - ADULT  Goal: Skin integrity remains intact  Description: INTERVENTIONS  - Assess and document risk factors for pressure ulcer development  - Assess and document skin integrity  - Monitor for areas of redness and/or skin breakdown  - Initiate interventions, skin care algorithm/standards of care as needed  Outcome: Progressing  Goal: Incision(s), wounds(s) or drain site(s) healing without S/S of infection  Description: INTERVENTIONS:  - Assess and document risk factors for pressure ulcer development  - Assess and document skin integrity  - Assess and document dressing/incision, wound bed, drain sites and surrounding tissue  - Implement wound care per orders  - Initiate isolation precautions as appropriate  - Initiate Pressure Ulcer prevention bundle as indicated  Outcome: Progressing     Problem: NEUROLOGICAL - ADULT  Goal: Achieves stable or improved neurological status  Description: INTERVENTIONS  - Assess for and report changes in neurological status  - Initiate  measures to prevent increased intracranial pressure  - Maintain blood pressure and fluid volume within ordered parameters to optimize cerebral perfusion and minimize risk of hemorrhage  - Monitor temperature, glucose, and sodium. Initiate appropriate interventions as ordered  Outcome: Not Progressing  Goal: Achieves maximal functionality and self care  Description: INTERVENTIONS  - Monitor swallowing and airway patency with patient fatigue and changes in neurological status  - Encourage and assist patient to increase activity and self care with guidance from PT/OT  - Encourage visually impaired, hearing impaired and aphasic patients to use assistive/communication devices  Outcome: Not Progressing     Problem: Delirium  Goal: Minimize duration of delirium  Description: Interventions:  - Encourage use of hearing aids, eye glasses  - Promote highest level of mobility daily  - Provide frequent reorientation  - Promote wakefulness i.e. lights on, blinds open  - Promote sleep, encourage patient's normal rest cycle i.e. lights off, TV off, minimize noise and interruptions  - Encourage family to assist in orientation and promotion of home routines  Outcome: Not Progressing

## 2025-02-14 NOTE — PLAN OF CARE
Problem: Diabetes/Glucose Control  Goal: Glucose maintained within prescribed range  Description: INTERVENTIONS:  - Monitor Blood Glucose as ordered  - Assess for signs and symptoms of hyperglycemia and hypoglycemia  - Administer ordered medications to maintain glucose within target range  - Assess barriers to adequate nutritional intake and initiate nutrition consult as needed  - Instruct patient on self management of diabetes  Outcome: Progressing     Problem: SAFETY ADULT - FALL  Goal: Free from fall injury  Description: INTERVENTIONS:  - Assess pt frequently for physical needs  - Identify cognitive and physical deficits and behaviors that affect risk of falls.  - [place] fall precautions as indicated by assessment.  - Educate pt/family on patient safety including physical limitations  - Instruct pt to call for assistance with activity based on assessment  - Modify environment to reduce risk of injury  - Provide assistive devices as appropriate  - Consider OT/PT consult to assist with strengthening/mobility  - Encourage toileting schedule  Outcome: Progressing     Problem: DISCHARGE PLANNING  Goal: Discharge to home or other facility with appropriate resources  Description: INTERVENTIONS:  - Identify barriers to discharge w/pt and caregiver  - Include patient/family/discharge partner in discharge planning  - Arrange for needed discharge resources and transportation as appropriate  - Identify discharge learning needs (meds, wound care, etc)  - Arrange for interpreters to assist at discharge as needed  - Consider post-discharge preferences of patient/family/discharge partner  - Complete POLST form as appropriate  - Assess patient's ability to be responsible for managing their own health  - Refer to Case Management Department for coordinating discharge planning if the patient needs post-hospital services based on physician/LIP order or complex needs related to functional status, cognitive ability or social  support system  Outcome: Progressing     Problem: Altered Communication/Language Barrier  Goal: Patient/Family is able to understand and participate in their care  Description: Interventions:  - Assess communication ability and preferred communication style  - Implement communication aides and strategies  - Use visual cues when possible  - Listen attentively, be patient, do not interrupt  - Minimize distractions  - Allow time for understanding and response  - Establish method for patient to ask for assistance (call light)  - Provide an  as needed  - Communicate barriers and strategies to overcome with those who interact with patient  Outcome: Progressing     Problem: SKIN/TISSUE INTEGRITY - ADULT  Goal: Incision(s), wounds(s) or drain site(s) healing without S/S of infection  Description: INTERVENTIONS:  - Assess and document risk factors for pressure ulcer development  - Assess and document skin integrity  - Assess and document dressing/incision, wound bed, drain sites and surrounding tissue  - Implement wound care per orders  - Initiate isolation precautions as appropriate  - Initiate Pressure Ulcer prevention bundle as indicated  Outcome: Progressing     Problem: Delirium  Goal: Minimize duration of delirium  Description: Interventions:  - Encourage use of hearing aids, eye glasses  - Promote highest level of mobility daily  - Provide frequent reorientation  - Promote wakefulness i.e. lights on, blinds open  - Promote sleep, encourage patient's normal rest cycle i.e. lights off, TV off, minimize noise and interruptions  - Encourage family to assist in orientation and promotion of home routines  Outcome: Progressing

## 2025-02-14 NOTE — PROGRESS NOTES
[facility] Hospitalist Progress Note     CC: Hospital Follow up    PCP: Yao Rodriguez       Assessment/Plan:   This is a 70-year-old female with a history of multiple strokes in the past, hypertension, CAD, sleep apnea, type 2 diabetes, who presents to the hospital for evaluation of low p.o. intake, weakness, and general failure to thrive.     Failure to thrive  Low PO intake, dehydration with PAPO and hypernatremia  Hx of multiple strokes in past   -obtain MRI brain, high risk of recurrent stroke--->negative for acute stroke  -speech eval     Hypernatremia  PAPO  Metabolic acidosis  -now on D5w  -severe dehydration  -may need goals of care discussion regarding nutrition (G tube etc) if doesn't progress but will monitor intake      Hypertension   -check vitals closely, avoid hypotension today      Coronary artery disease  -will order PO meds once able      CEDRICK  -cpap if uses here      Type 2 diabetes   -low dose sliding scale      Hx of seizure?   -not on keppra anymore, discontinued yesterday     Addendum 330pm:   Discussed at bedside with son at bedside. Mrs. Fine is slowly improving.  She appears a little bit more awake today.  Speech therapy did see her and passed her for puréed and thickened liquid.  Unclear how much she ate however.  MRI brain reviewed with son.   Patient lives with son in the same home.  He states that may be about a month ago she could have probably stood up any maybe even walked?  I told him the only diagnosis I have as of right now is severe dehydration.  No new stroke on MRI brain.  Discussed possibly getting neurology to see her to further workup her findings, especially her lower extremity weakness.   Progression of vascular dementia?  Versus other.  Maybe she needs spine imaging?  Regardless, discussed continuing supportive cares with hydration, improvement of electrolytes, need to adequately document how much calorie intake she is taking, because if patient would want  enteral feeding then we would pursue G-tube.  However when I asked the patient if she would want a feeding tube if she stopped eating it seemed like she grunted \"no\", and son also confirmed.  Adequate nutrition will be extremely important in determining her prognosis.     Will continue to monitor progress     DVT prophylaxis: heparin sub q  Code status: DNR (confirmed with son)     Samir Hernadez DO [facility] Hospitalist        Subjective:     Still appears weak. Does follow commands.     OBJECTIVE:    Blood pressure (!) 162/71, pulse 67, temperature 97.5 °F (36.4 °C), temperature source Axillary, resp. rate 19, weight 121 lb 6.4 oz (55.1 kg), SpO2 98%.    Temp:  [96.8 °F (36 °C)-97.8 °F (36.6 °C)] 97.5 °F (36.4 °C)  Pulse:  [61-80] 67  Resp:  [18-22] 19  BP: (135-171)/(54-85) 162/71  SpO2:  [95 %-100 %] 98 %      Intake/Output:    Intake/Output Summary (Last 24 hours) at 2/14/2025 1159  Last data filed at 2/14/2025 0500  Gross per 24 hour   Intake 367.97 ml   Output 475 ml   Net -107.03 ml       Last 3 Weights   02/13/25 0900 121 lb 6.4 oz (55.1 kg)   01/30/25 2230 150 lb (68 kg)   01/14/25 1842 165 lb (74.8 kg)       BP (!) 162/71 (BP Location: Right arm)   Pulse 67   Temp 97.5 °F (36.4 °C) (Axillary)   Resp 19   Wt 121 lb 6.4 oz (55.1 kg)   SpO2 98%   BMI 21.51 kg/m²   General: Alert, no acute distress  Lungs: clear to ausculation bilaterally  Heart: Regular rate and rhythm  Abdomen: soft, non tender  Extremities: No edema  Neuro: right facial droop, aphasia, able to make eye contact, does follow some simple commands, right sided weakness in upper and lower extremities    Data Review:       Labs:     Recent Labs   Lab 02/12/25  2224 02/14/25  0917   RBC 3.94 3.28*   HGB 10.5* 9.2*   HCT 34.3* 28.4*   MCV 87.1 86.6   MCH 26.6 28.0   MCHC 30.6* 32.4   RDW 17.2* 16.9*   NEPRELIM 9.91* 2.78   WBC 11.5* 4.3   .0 277.0         Recent Labs   Lab 02/13/25  1417 02/13/25  1913 02/14/25  0917   GLU  138* 96 106*   BUN 80* 71* 65*   CREATSERUM 3.09* 2.90* 2.51*   EGFRCR 15* 17* 20*   CA 9.0 8.9 9.0   * 152* 150*   K 4.7 4.4 4.2   * 121* 119*   CO2 19.0* 19.0* 21.0       Recent Labs   Lab 02/12/25  2224 02/14/25  0917   ALT 25  --    AST 23  --    ALB 4.1 3.5         Imaging:  MRI BRAIN (CPT=70551)    Result Date: 2/14/2025  CONCLUSION:  1. No acute intracranial process by noncontrast MR technique.  2. Innumerable foci of supratentorial and infratentorial susceptibility artifact, suggesting foci of remote microhemorrhage. These findings may reflect cerebral amyloid angiopathy.   3. Senescent changes of parenchymal volume loss with sequela of chronic microvascular ischemic disease including chronic lacunar infarcts.  4. Lesser incidental findings as above.      elm-remote.   Dictated by (CST): Rick Patel MD on 2/14/2025 at 10:36 AM     Finalized by (CST): Rick Patel MD on 2/14/2025 at 10:43 AM          CT BRAIN OR HEAD (CPT=70450)    Result Date: 2/13/2025  CONCLUSION:   No acute intracranial abnormality.  Chronic microvascular white matter ischemia.  Chronic left thalamic lacunar infarct.  A preliminary report was issued by the [facility] teleradiology service. There are no major discrepancies.    Dictated by (CST): Gabriel Glimore MD on 2/13/2025 at 8:18 AM     Finalized by (CST): Gabriel Gilmore MD on 2/13/2025 at 8:20 AM          XR CHEST AP PORTABLE  (CPT=71045)    Result Date: 2/13/2025  CONCLUSION: No acute cardiopulmonary disease.  A preliminary report was issued by the [facility] teleradiology service. There are no major discrepancies.    Dictated by (CST): Gabriel Gilmore MD on 2/13/2025 at 6:54 AM     Finalized by (CST): Gabriel Gilmore MD on 2/13/2025 at 6:55 AM             Meds:      brimonidine  1 drop Both Eyes BID    heparin  5,000 Units Subcutaneous Q8H KB    insulin aspart  1-5 Units Subcutaneous TID CC and HS    aspirin  300 mg Rectal Daily       dextrose 75 mL/hr at 02/13/25 2026       glucose **OR** glucose **OR** glucose-vitamin C **OR** dextrose **OR** glucose **OR** glucose **OR** glucose-vitamin C    acetaminophen    HYDROmorphone **OR** HYDROmorphone **OR** HYDROmorphone    melatonin    ondansetron    metoclopramide    polyethylene glycol (PEG 3350)    sennosides    bisacodyl    hydrALAzine

## 2025-02-14 NOTE — PROGRESS NOTES
[facility]  part of [facility]    Progress Note    Eliud Fine Patient Status:  Inpatient    1955 MRN [id number]   Location [facility] 1W Attending Samir Hernadez,    Hosp Day # 1 PCP Yao Rodriguez       Subjective:     Resting in bed feels better  Answering simple questions minimally verbal.    Objective:   Vital Signs:  Blood pressure (!) 162/71, pulse 67, temperature 97.5 °F (36.4 °C), temperature source Axillary, resp. rate 19, weight 121 lb 6.4 oz (55.1 kg), SpO2 98%.  General: cachetic weak minimally verbal  HEENT: Dry mucous membranes.   Respiratory: Clear to auscultation No wheezing Rhonchi or crackles  Cardiovascular: S1, S2. No rubs  Abdomen: Soft, +BS  Ext: No LE edema    Results:     Lab Results   Component Value Date    WBC 4.3 2025    HGB 9.2 (L) 2025    HCT 28.4 (L) 2025    .0 2025    CREATSERUM 2.90 (H) 2025    BUN 71 (H) 2025     (H) 2025    K 4.4 2025     (H) 2025    CO2 19.0 (L) 2025    GLU 96 2025    CA 8.9 2025    ALB 4.1 2025    ALKPHO 94 2025    BILT 0.4 2025    TP 7.1 2025    AST 23 2025    ALT 25 2025    PTT 30.4 2024    INR 1.11 2024    LIP 53 2025    DDIMER 2.40 (H) 10/11/2022    ESRML 37 (H) 2022    CRP 0.31 (H) 2022    MG 2.5 2025    PHOS 2.9 2024    CK 83 2025    B12 260 2024       CT BRAIN OR HEAD (CPT=70450)    Result Date: 2025  CONCLUSION:   No acute intracranial abnormality.  Chronic microvascular white matter ischemia.  Chronic left thalamic lacunar infarct.  A preliminary report was issued by the [facility] teleradiology service. There are no major discrepancies.    Dictated by (CST): Gabriel Gilmore MD on 2025 at 8:18 AM     Finalized by (CST): Gabriel Gilmore MD on 2025 at 8:20 AM          XR CHEST AP PORTABLE  (CPT=71045)    Result  Date: 2/13/2025  CONCLUSION: No acute cardiopulmonary disease.  A preliminary report was issued by the [facility] teleradiology service. There are no major discrepancies.    Dictated by (CST): Gabriel Gilmore MD on 2/13/2025 at 6:54 AM     Finalized by (CST): Gabriel Gilmore MD on 2/13/2025 at 6:55 AM         EKG 12 Lead    Result Date: 2/13/2025  Normal sinus rhythm Minimal voltage criteria for LVH, may be normal variant ( [place] product ) Nonspecific T wave abnormality Abnormal ECG When compared with ECG of 04-JAN-2025 20:45, Premature atrial complexes are no longer Present Confirmed by JALEEL WILSON ([id number]) on 2/13/2025 7:39:11 AM             Marck Galvan MD  2/14/2025    Assessment and Plan:   70 year old female with PMH of CVA, DM2, HTN, CKD with baseline creatinine around 1.7 presents to the hospital with weakness decreased PO intake and FTT . Were consulted for PAPO on CKD and hypernatremia    PAPO on CKD stage 3:  - Baseline Creatinine 1.7  - PAPO from decreased PO intake   - Avoid Nephrotoxins  - Renally adjust medications  - No acute indication for RRT     Renal artery stenosis:  - seen in the past will repeat renal ultrasound with renal artery duplex to evaluate further.     Hypernatremia:  - D5W@75cc/hr  - MRI pending if evidence of new CVA then would discontinue and place NGT ans start FWF.     Hyperkalemia:  - improved with IVF     Metabolic acidosis:  - in the setting of PAPO and volume depletion  - Improving with IVF     Thank you for allowing me to participate in the care of your patient.     Marck Galvan MD  [facility]  Nephrology

## 2025-02-15 LAB
ALBUMIN SERPL-MCNC: 3.6 G/DL (ref 3.2–4.8)
ANION GAP SERPL CALC-SCNC: 10 MMOL/L (ref 0–18)
BUN BLD-MCNC: 49 MG/DL (ref 9–23)
BUN/CREAT SERPL: 23 (ref 10–20)
CALCIUM BLD-MCNC: 9 MG/DL (ref 8.7–10.4)
CHLORIDE SERPL-SCNC: 114 MMOL/L (ref 98–112)
CO2 SERPL-SCNC: 19 MMOL/L (ref 21–32)
CREAT BLD-MCNC: 2.13 MG/DL
EGFRCR SERPLBLD CKD-EPI 2021: 24 ML/MIN/1.73M2 (ref 60–?)
GLUCOSE BLD-MCNC: 164 MG/DL (ref 70–99)
GLUCOSE BLDC GLUCOMTR-MCNC: 101 MG/DL (ref 70–99)
GLUCOSE BLDC GLUCOMTR-MCNC: 110 MG/DL (ref 70–99)
GLUCOSE BLDC GLUCOMTR-MCNC: 158 MG/DL (ref 70–99)
GLUCOSE BLDC GLUCOMTR-MCNC: 96 MG/DL (ref 70–99)
MAGNESIUM SERPL-MCNC: 2.1 MG/DL (ref 1.6–2.6)
OSMOLALITY SERPL CALC.SUM OF ELEC: 313 MOSM/KG (ref 275–295)
PHOSPHATE SERPL-MCNC: 2.4 MG/DL (ref 2.4–5.1)
POTASSIUM SERPL-SCNC: 3.9 MMOL/L (ref 3.5–5.1)
SODIUM SERPL-SCNC: 143 MMOL/L (ref 136–145)
T PALLIDUM AB SER QL IA: NONREACTIVE
T4 FREE SERPL-MCNC: 1.3 NG/DL (ref 0.8–1.7)
THYROGLOB SERPL-MCNC: 22 U/ML (ref ?–60)
THYROPEROXIDASE AB SERPL-ACNC: 40 U/ML (ref ?–60)
TSI SER-ACNC: 1.46 UIU/ML (ref 0.55–4.78)
VIT B12 SERPL-MCNC: 687 PG/ML (ref 211–911)

## 2025-02-15 PROCEDURE — 99223 1ST HOSP IP/OBS HIGH 75: CPT | Performed by: OTHER

## 2025-02-15 RX ORDER — SODIUM CHLORIDE 450 MG/100ML
INJECTION, SOLUTION INTRAVENOUS CONTINUOUS
Status: DISCONTINUED | OUTPATIENT
Start: 2025-02-15 | End: 2025-02-17

## 2025-02-15 NOTE — PLAN OF CARE
Problem: Patient Centered Care  Goal: Patient preferences are identified and integrated in the patient's plan of care  Description: Interventions:  - What would you like us to know as we care for you? Gokul, son assist with pt's care  - Provide timely, complete, and accurate information to patient/family  - Incorporate patient and family knowledge, values, beliefs, and cultural backgrounds into the planning and delivery of care  - Encourage patient/family to participate in care and decision-making at the level they choose  - Honor patient and family perspectives and choices  Outcome: Progressing     Problem: Diabetes/Glucose Control  Goal: Glucose maintained within prescribed range  Description: INTERVENTIONS:  - Monitor Blood Glucose as ordered  - Assess for signs and symptoms of hyperglycemia and hypoglycemia  - Administer ordered medications to maintain glucose within target range  - Assess barriers to adequate nutritional intake and initiate nutrition consult as needed  - Instruct patient on self management of diabetes  Outcome: Progressing     Problem: Patient/Family Goals  Goal: Patient/Family Long Term Goal  Description: Patient's Long Term Goal: \"get my mom to eat again\"    Interventions:  - iv hydration   -SLP eval   - See additional Care Plan goals for specific interventions  Outcome: Progressing  Goal: Patient/Family Short Term Goal  Description: Patient's Short Term Goal: \"more awake\"    Interventions:   - hydration  -frequent neuro checks   - See additional Care Plan goals for specific interventions  Outcome: Progressing     Problem: SAFETY ADULT - FALL  Goal: Free from fall injury  Description: INTERVENTIONS:  - Assess pt frequently for physical needs  - Identify cognitive and physical deficits and behaviors that affect risk of falls.  - [place] fall precautions as indicated by assessment.  - Educate pt/family on patient safety including physical limitations  - Instruct pt to call for assistance  with activity based on assessment  - Modify environment to reduce risk of injury  - Provide assistive devices as appropriate  - Consider OT/PT consult to assist with strengthening/mobility  - Encourage toileting schedule  Outcome: Progressing     Problem: DISCHARGE PLANNING  Goal: Discharge to home or other facility with appropriate resources  Description: INTERVENTIONS:  - Identify barriers to discharge w/pt and caregiver  - Include patient/family/discharge partner in discharge planning  - Arrange for needed discharge resources and transportation as appropriate  - Identify discharge learning needs (meds, wound care, etc)  - Arrange for interpreters to assist at discharge as needed  - Consider post-discharge preferences of patient/family/discharge partner  - Complete POLST form as appropriate  - Assess patient's ability to be responsible for managing their own health  - Refer to Case Management Department for coordinating discharge planning if the patient needs post-hospital services based on physician/LIP order or complex needs related to functional status, cognitive ability or social support system  Outcome: Progressing     Problem: Altered Communication/Language Barrier  Goal: Patient/Family is able to understand and participate in their care  Description: Interventions:  - Assess communication ability and preferred communication style  - Implement communication aides and strategies  - Use visual cues when possible  - Listen attentively, be patient, do not interrupt  - Minimize distractions  - Allow time for understanding and response  - Establish method for patient to ask for assistance (call light)  - Provide an  as needed  - Communicate barriers and strategies to overcome with those who interact with patient  Outcome: Progressing     Problem: METABOLIC/FLUID AND ELECTROLYTES - ADULT  Goal: Electrolytes maintained within normal limits  Description: INTERVENTIONS:  - Monitor labs and rhythm and assess  patient for signs and symptoms of electrolyte imbalances  - Administer electrolyte replacement as ordered  - Monitor response to electrolyte replacements, including rhythm and repeat lab results as appropriate  - Fluid restriction as ordered  - Instruct patient on fluid and nutrition restrictions as appropriate  Outcome: Progressing  Goal: Hemodynamic stability and optimal renal function maintained  Description: INTERVENTIONS:  - Monitor labs and assess for signs and symptoms of volume excess or deficit  - Monitor intake, output and patient weight  - Monitor urine specific gravity, serum osmolarity and serum sodium as indicated or ordered  - Monitor response to interventions for patient's volume status, including labs, urine output, blood pressure (other measures as available)  - Encourage oral intake as appropriate  - Instruct patient on fluid and nutrition restrictions as appropriate  Outcome: Progressing     Problem: SKIN/TISSUE INTEGRITY - ADULT  Goal: Skin integrity remains intact  Description: INTERVENTIONS  - Assess and document risk factors for pressure ulcer development  - Assess and document skin integrity  - Monitor for areas of redness and/or skin breakdown  - Initiate interventions, skin care algorithm/standards of care as needed  Outcome: Progressing  Goal: Incision(s), wounds(s) or drain site(s) healing without S/S of infection  Description: INTERVENTIONS:  - Assess and document risk factors for pressure ulcer development  - Assess and document skin integrity  - Assess and document dressing/incision, wound bed, drain sites and surrounding tissue  - Implement wound care per orders  - Initiate isolation precautions as appropriate  - Initiate Pressure Ulcer prevention bundle as indicated  Outcome: Progressing

## 2025-02-15 NOTE — PROGRESS NOTES
[facility]  part of [facility]    Progress Note    Eliud Fine Patient Status:  Inpatient    1955 MRN [id number]   Location [facility] 1W Attending Samir Hernadez, DO   Hosp Day # 2 PCP Yao Rodriguez       Subjective:     Resting in bed feels better  Answering simple questions minimally verbal.    Objective:   Vital Signs:  Blood pressure 153/66, pulse 78, temperature 97 °F (36.1 °C), temperature source Axillary, resp. rate 20, weight 121 lb 6.4 oz (55.1 kg), SpO2 100%.  General: cachetic weak minimally verbal  HEENT: Dry mucous membranes.   Respiratory: Clear to auscultation No wheezing Rhonchi or crackles  Cardiovascular: S1, S2. No rubs  Abdomen: Soft, +BS  Ext: No LE edema    Results:     Lab Results   Component Value Date    WBC 4.3 2025    HGB 9.2 (L) 2025    HCT 28.4 (L) 2025    .0 2025    CREATSERUM 2.13 (H) 02/15/2025    BUN 49 (H) 02/15/2025     02/15/2025    K 3.9 02/15/2025     (H) 02/15/2025    CO2 19.0 (L) 02/15/2025     (H) 02/15/2025    CA 9.0 02/15/2025    ALB 3.6 02/15/2025    ALKPHO 94 2025    BILT 0.4 2025    TP 7.1 2025    AST 23 2025    ALT 25 2025    PTT 30.4 2024    INR 1.11 2024    T4F 1.3 02/15/2025    TSH 1.457 02/15/2025    LIP 53 2025    DDIMER 2.40 (H) 10/11/2022    ESRML 37 (H) 2022    CRP 0.31 (H) 2022    MG 2.1 02/15/2025    PHOS 2.4 02/15/2025    CK 83 2025    B12 260 2024       MRI BRAIN (CPT=70551)    Result Date: 2025  CONCLUSION:  1. No acute intracranial process by noncontrast MR technique.  2. Innumerable foci of supratentorial and infratentorial susceptibility artifact, suggesting foci of remote microhemorrhage. These findings may reflect cerebral amyloid angiopathy.   3. Senescent changes of parenchymal volume loss with sequela of chronic microvascular ischemic disease including chronic lacunar infarcts.  4.  Lesser incidental findings as above.      elm-remote.   Dictated by (CST): Rick Patel MD on 2/14/2025 at 10:36 AM     Finalized by (CST): Rick Patel MD on 2/14/2025 at 10:43 AM                       Marck Galvan MD  2/14/2025    Assessment and Plan:   70 year old female with PMH of CVA, DM2, HTN, CKD with baseline creatinine around 1.7 presents to the hospital with weakness decreased PO intake and FTT . Were consulted for PAPO on CKD and hypernatremia    PAPO on CKD stage 3:  - Baseline Creatinine 1.7  - PAPO from decreased PO intake   - Avoid Nephrotoxins  - Renally adjust medications  - No acute indication for RRT     Renal artery stenosis:  - seen in the past will repeat renal ultrasound with renal artery duplex to evaluate further.     Hypernatremia:  - improved     Hyperkalemia:  - improved with IVF     Metabolic acidosis:  - from PAPO will monitor     Thank you for allowing me to participate in the care of your patient.     Marck Galvan MD  [facility]  Nephrology

## 2025-02-15 NOTE — CONSULTS
[facility]  [address] [place] [address], [address]  [facility] [address]  [phone]            Eliud Fine Patient Status:  Inpatient    1955 MRN [id number]   Location [facility] 1W Attending Samir Hernadez DO   Hosp Day # 2 PCP Yao Rodriguez     Date of Admission:  2025  Date of Consult:  2/15/2025  Reason for Consultation:   Overall functional decline.    History of Present Illness:   Patient is a 70 year old female who was admitted to the hospital for PAPO (acute kidney injury):  The patient, Eliud Antony, presents with progressive weakness and confusion. She was admitted to the hospital 2 days ago. Her right side is noted to be weaker than the left, with the left side strength estimated at 4-3/5 and the right side at 2-/5. There is also mention of increased confusion, prompting consideration of a possible stroke.    The patient has a history of multiple strokes, with the most recent occurring in 2022, primarily affecting her left-right side. She was previously able to recover, maintaining social interactions and ambulating with a walker while living with her son. In 2024, she experienced worsening symptoms and was hospitalized for right-sided weakness. During that admission, she was started on Keppra for possible seizures, but MRI showed no acute stroke. After discharge, she continued on antibiotics and Keppra, which caused drowsiness. Keppra was subsequently discontinued, leading to improved alertness.    The patient was readmitted in 2025 with failure to thrive, weakness, and low PO intake. Brain imaging revealed multiple micro-hemorrhages without acute changes, raising suspicion for amyloid angiopathy. She was also found to have acute kidney injury, metabolic acidosis, and dehydration.    Medical History  - Diabetes  - Hypertension  - Hyperlipidemia  - Arthrosis  - Sleep apnea  - Multiple strokes, including:    - Most recent stroke  in January 2022    - Right-sided weakness in November 2024  - Hospitalization in November 2024 for right-sided weakness, but MRI was not show any acute changes  - Hospitalization in February 2025 for failure to thrive, weakness, and low PO intake  - Possible seizures (started Keppra in November 2024)    Social History  - Living situation: lives at home with her son  - Social support: son provides care at home    Past Medical History  Past Medical History:    Asthma (HCC)    Coronary atherosclerosis    Diabetes (HCC)    diabetes for 2 yrs    Essential hypertension    Glaucoma    right    Heart attack (HCC)    2005    High blood pressure    High cholesterol    Hyperlipidemia    Osteoarthritis    Sleep apnea    cpap    Stroke (HCC)    30 yrs ago    Visual impairment    left eye edema       Past Surgical History  Past Surgical History:   Procedure Laterality Date    Anesth,vitrectomy Left 09/18/2017    Pars plana vitrectomy, internal limiting membrane peel, left eye.    Cath bare metal stent (bms)      Colonoscopy      Hysterectomy      Total abdom hysterectomy      Tubal ligation         Family History  Family History   Problem Relation Age of Onset    Cancer Father     Diabetes Mother        Social History  Pediatric History   Patient Parents    Not on file     Other Topics Concern    Not on file   Social History Narrative    Not on file           Current Medications:  Current Facility-Administered Medications   Medication Dose Route Frequency    carvedilol (Coreg) tab 25 mg  25 mg Oral BID with meals    atorvastatin (Lipitor) tab 40 mg  40 mg Oral Nightly    hydrALAZINE (Apresoline) tab 50 mg  50 mg Oral BID    brimonidine (Alphagan) 0.2 % ophthalmic solution 1 drop  1 drop Both Eyes BID    glucose (Dex4) 15 GM/59ML oral liquid 15 g  15 g Oral Q15 Min PRN    Or    glucose (Glutose) 40% oral gel 15 g  15 g Oral Q15 Min PRN    Or    glucose-vitamin C (Dex-4) chewable tab 4 tablet  4 tablet Oral Q15 Min PRN    Or     dextrose 50% injection 50 mL  50 mL Intravenous Q15 Min PRN    Or    glucose (Dex4) 15 GM/59ML oral liquid 30 g  30 g Oral Q15 Min PRN    Or    glucose (Glutose) 40% oral gel 30 g  30 g Oral Q15 Min PRN    Or    glucose-vitamin C (Dex-4) chewable tab 8 tablet  8 tablet Oral Q15 Min PRN    heparin (Porcine) 5000 UNIT/ML injection 5,000 Units  5,000 Units Subcutaneous Q8H KB    acetaminophen (Tylenol Extra Strength) tab 500 mg  500 mg Oral Q4H PRN    HYDROmorphone (Dilaudid) 1 MG/ML injection 0.2 mg  0.2 mg Intravenous Q2H PRN    Or    HYDROmorphone (Dilaudid) 1 MG/ML injection 0.4 mg  0.4 mg Intravenous Q2H PRN    Or    HYDROmorphone (Dilaudid) 1 MG/ML injection 0.8 mg  0.8 mg Intravenous Q2H PRN    melatonin tab 3 mg  3 mg Oral Nightly PRN    ondansetron (Zofran) 4 MG/2ML injection 4 mg  4 mg Intravenous Q6H PRN    metoclopramide (Reglan) 5 mg/mL injection 5 mg  5 mg Intravenous Q8H PRN    polyethylene glycol (PEG 3350) (Miralax) 17 g oral packet 17 g  17 g Oral Daily PRN    sennosides (Senokot) tab 17.2 mg  17.2 mg Oral Nightly PRN    bisacodyl (Dulcolax) 10 MG rectal suppository 10 mg  10 mg Rectal Daily PRN    insulin aspart (NovoLOG) 100 Units/mL FlexPen 1-5 Units  1-5 Units Subcutaneous TID CC and HS    aspirin 300 MG rectal suppository 300 mg  300 mg Rectal Daily    hydrALAzine (Apresoline) 20 mg/mL injection 10 mg  10 mg Intravenous Q6H PRN     Medications Prior to Admission   Medication Sig    atorvastatin 40 MG Oral Tab     losartan 50 MG Oral Tab Take 1 tablet (50 mg total) by mouth daily.    hydrALAZINE 50 MG Oral Tab Take 1 tablet (50 mg total) by mouth in the morning and 1 tablet (50 mg total) before bedtime.    aspirin 81 MG Oral Chew Tab Chew 1 tablet (81 mg total) by mouth daily.    magnesium oxide 400 MG Oral Tab Take 1 tablet (400 mg total) by mouth daily.    spironolactone 25 MG Oral Tab Take 1 tablet (25 mg total) by mouth daily.    NAMZARIC 28-10 MG Oral Capsule SR 24 Hr Take 28 mg by mouth  daily.    MetFORMIN HCl 500 MG Oral Tab Take 1 tablet (500 mg total) by mouth daily with breakfast.    carvedilol 25 MG Oral Tab Take 1 tablet (25 mg total) by mouth 2 (two) times daily with meals.    [] diclofenac 1 % External Gel Apply 2 g topically 4 (four) times daily as needed.    Brimonidine Tartrate-Timolol 0.2-0.5 % Ophthalmic Solution Apply 1 drop to eye 2 (two) times daily.    brimonidine Tartrate 0.2 % Ophthalmic Solution Place 1 drop into both eyes 2 (two) times daily.       Allergies  Allergies[1]    Review of Systems:   As in HPI, the rest of the 14 system review was done and was negative    Physical Exam:     Vitals:    25 1738 25 2043 02/15/25 0420 02/15/25 0749   BP: (!) 170/74 160/56 122/62 153/66   Pulse: 66 69 78 78   Resp:  16 20 20   Temp:   97 °F (36.1 °C) 97 °F (36.1 °C)   TempSrc:   Axillary Axillary   SpO2:  100% 100% 100%   Weight:           General: No apparent distress, well nourished, well groomed.  Head- Normocephalic, atraumatic  Eyes- No redness or swelling  ENT- Hearing intake, smell preserved, normal glutition  Neck- No masses or adenopathy  Cv: pulses were palpable and normal, no cyanosis or edema     Neurological:     Mental Status- Alert, but very slurring speech.  Very difficult to understand.  Apparently was able to say her name.  Able to follow some simple commands.  1-2 out of 5 in the right side, only able to wiggle toes on the right side, unable to lift the left leg slightly.  And 4 out of 5 in the left arm.    Results:     Laboratory Data:  Lab Results   Component Value Date    WBC 4.3 2025    HGB 9.2 (L) 2025    HCT 28.4 (L) 2025    .0 2025    CREATSERUM 2.51 (H) 2025    BUN 65 (H) 2025     (H) 2025    K 4.2 2025     (H) 2025    CO2 21.0 2025     (H) 2025    CA 9.0 2025    ALB 3.5 2025    ALKPHO 94 2025    TP 7.1 2025    AST 23 2025     ALT 25 02/12/2025    PTT 30.4 09/25/2024    INR 1.11 09/25/2024    PTP 15.0 (H) 09/25/2024    TSH 1.122 02/14/2025    LIP 53 01/04/2025    DDIMER 2.40 (H) 10/11/2022    ESRML 37 (H) 12/18/2022    CRP 0.31 (H) 12/18/2022    MG 2.4 02/14/2025    PHOS 3.0 02/14/2025    CK 83 02/12/2025    B12 260 09/25/2024         Imaging:    MRI BRAIN (CPT=70551)    Result Date: 2/14/2025  CONCLUSION:  1. No acute intracranial process by noncontrast MR technique.  2. Innumerable foci of supratentorial and infratentorial susceptibility artifact, suggesting foci of remote microhemorrhage. These findings may reflect cerebral amyloid angiopathy.   3. Senescent changes of parenchymal volume loss with sequela of chronic microvascular ischemic disease including chronic lacunar infarcts.  4. Lesser incidental findings as above.      elm-remote.   Dictated by (CST): Rick Patel MD on 2/14/2025 at 10:36 AM     Finalized by (CST): Rick Patel MD on 2/14/2025 at 10:43 AM             MRI of the brain was independently reviewed, no acute changes but innumerable microhemorrhages noted.    Impression:      Failure to thrive in adult    Note: Patient has a history of multiple strokes, with the most recent in January 2022 affecting her left-right side.  Note: MRI in November 2024 showed no acute stroke.  Note: February 2025 brain imaging revealed multiple micro-hemorrhages without acute changes, leading to a strong suspicion of amyloid angiopathy.      Patient's condition appears to be a progressive neurodegenerative process, possibly vascular dementia, (amyloid angiopathy definitely playing a role), there could also be possibility of Alzheimer's disease, therefore amyloid ratio will be ordered to confirm the diagnosis.  Additional other blood work will be done to rule out any other etiology.      Failure to thrive:  - Manage hydration status  - Monitor and correct electrolyte imbalances  - Assess nutritional status and consider nutritional  support if needed  - Assess need for supportive care and potential placement given progressive decline    Thank you for allowing me to participate in the care of your patient.    Chris Chandra MD  2/15/2025           [1] No Known Allergies

## 2025-02-15 NOTE — PROGRESS NOTES
[facility] Hospitalist Progress Note     CC: Hospital Follow up    PCP: Yao Rodriguez       Assessment/Plan:   This is a 70-year-old female with a history of multiple strokes in the past, hypertension, CAD, sleep apnea, type 2 diabetes, who presents to the hospital for evaluation of low p.o. intake, weakness, and general failure to thrive.     Failure to thrive  Low PO intake, dehydration with PAPO and hypernatremia  Hx of multiple strokes in past   -obtained MRI brain, high risk of recurrent stroke--->negative for acute stroke  -speech eval  -continue electrolyte correction   -neuro consult appreciated, could be progression of dementia, neurodegenerative disease, amyloid angiopathy   -may need to consider enteral feeding if her PO intake is not sufficient  -her mentation is improving however  -patient may not want G tube from my discussion on 2/14 but will re-address prn      Hypernatremia  PAPO  Metabolic acidosis  -now on D5w  -severe dehydration  -goals of care discussion regarding nutrition (G tube etc) if doesn't progress but will monitor intake. Met with son on 2/14     Hypertension   -avoid hypotension      Coronary artery disease  -PO meds as able     CEDRICK  -cpap if uses here      Type 2 diabetes   -low dose sliding scale      Hx of seizure?   -not on keppra anymore, discontinued yesterday     Will continue to monitor progress     DVT prophylaxis: heparin sub q  Code status: DNR (confirmed with son)     Samir Badillo [facility] Hospitalist        Subjective:     Talking to me today, first time she spoke to me at least and sounded more clear  Awake.     OBJECTIVE:    Blood pressure 153/66, pulse 78, temperature 97 °F (36.1 °C), temperature source Axillary, resp. rate 20, weight 121 lb 6.4 oz (55.1 kg), SpO2 100%.    Temp:  [97 °F (36.1 °C)] 97 °F (36.1 °C)  Pulse:  [66-78] 78  Resp:  [16-20] 20  BP: (122-170)/(56-74) 153/66  SpO2:  [99 %-100 %] 100 %      Intake/Output:    Intake/Output  Summary (Last 24 hours) at 2/15/2025 1316  Last data filed at 2/15/2025 0749  Gross per 24 hour   Intake 1180 ml   Output 380 ml   Net 800 ml       Last 3 Weights   02/13/25 0900 121 lb 6.4 oz (55.1 kg)   01/30/25 2230 150 lb (68 kg)   01/14/25 1842 165 lb (74.8 kg)       /66 (BP Location: Right arm)   Pulse 78   Temp 97 °F (36.1 °C) (Axillary)   Resp 20   Wt 121 lb 6.4 oz (55.1 kg)   SpO2 100%   BMI 21.51 kg/m²   General: no acute distress  Lungs: clear to ausculation bilaterally  Heart: Regular rate and rhythm  Abdomen: soft, non tender  Extremities: No edema  Neuro: right facial droop, speaking a little more today , able to make eye contact, does follow some simple commands, right sided weakness in upper and lower extremities    Data Review:       Labs:     Recent Labs   Lab 02/12/25 2224 02/14/25  0917   RBC 3.94 3.28*   HGB 10.5* 9.2*   HCT 34.3* 28.4*   MCV 87.1 86.6   MCH 26.6 28.0   MCHC 30.6* 32.4   RDW 17.2* 16.9*   NEPRELIM 9.91* 2.78   WBC 11.5* 4.3   .0 277.0         Recent Labs   Lab 02/13/25 1913 02/14/25  0917 02/15/25  0838   GLU 96 106* 164*   BUN 71* 65* 49*   CREATSERUM 2.90* 2.51* 2.13*   EGFRCR 17* 20* 24*   CA 8.9 9.0 9.0   * 150* 143   K 4.4 4.2 3.9   * 119* 114*   CO2 19.0* 21.0 19.0*       Recent Labs   Lab 02/12/25 2224 02/14/25  0917 02/15/25  0838   ALT 25  --   --    AST 23  --   --    ALB 4.1 3.5 3.6         Imaging:  MRI BRAIN (CPT=70551)    Result Date: 2/14/2025  CONCLUSION:  1. No acute intracranial process by noncontrast MR technique.  2. Innumerable foci of supratentorial and infratentorial susceptibility artifact, suggesting foci of remote microhemorrhage. These findings may reflect cerebral amyloid angiopathy.   3. Senescent changes of parenchymal volume loss with sequela of chronic microvascular ischemic disease including chronic lacunar infarcts.  4. Lesser incidental findings as above.      elm-remote.   Dictated by (CST): Rick Patel MD  on 2/14/2025 at 10:36 AM     Finalized by (CST): Rick Patel MD on 2/14/2025 at 10:43 AM          CT BRAIN OR HEAD (CPT=70450)    Result Date: 2/13/2025  CONCLUSION:   No acute intracranial abnormality.  Chronic microvascular white matter ischemia.  Chronic left thalamic lacunar infarct.  A preliminary report was issued by the [employer] [facility] teleradiology service. There are no major discrepancies.    Dictated by (CST): Gabriel Gilmore MD on 2/13/2025 at 8:18 AM     Finalized by (CST): Gabriel Gilmore MD on 2/13/2025 at 8:20 AM          XR CHEST AP PORTABLE  (CPT=71045)    Result Date: 2/13/2025  CONCLUSION: No acute cardiopulmonary disease.  A preliminary report was issued by the [employer] [facility] teleradiology service. There are no major discrepancies.    Dictated by (CST): Gabriel Gilmore MD on 2/13/2025 at 6:54 AM     Finalized by (CST): Gabriel Gilmore MD on 2/13/2025 at 6:55 AM             Meds:      carvedilol  25 mg Oral BID with meals    atorvastatin  40 mg Oral Nightly    hydrALAZINE  50 mg Oral BID    brimonidine  1 drop Both Eyes BID    heparin  5,000 Units Subcutaneous Q8H KB    insulin aspart  1-5 Units Subcutaneous TID CC and HS    aspirin  300 mg Rectal Daily      sodium chloride 50 mL/hr at 02/15/25 1013       glucose **OR** glucose **OR** glucose-vitamin C **OR** dextrose **OR** glucose **OR** glucose **OR** glucose-vitamin C    acetaminophen    HYDROmorphone **OR** HYDROmorphone **OR** HYDROmorphone    melatonin    ondansetron    metoclopramide    polyethylene glycol (PEG 3350)    sennosides    bisacodyl    hydrALAzine

## 2025-02-16 LAB
ALBUMIN SERPL-MCNC: 3.1 G/DL (ref 3.2–4.8)
ANION GAP SERPL CALC-SCNC: 6 MMOL/L (ref 0–18)
BUN BLD-MCNC: 40 MG/DL (ref 9–23)
BUN/CREAT SERPL: 21.3 (ref 10–20)
CALCIUM BLD-MCNC: 8.6 MG/DL (ref 8.7–10.4)
CHLORIDE SERPL-SCNC: 116 MMOL/L (ref 98–112)
CO2 SERPL-SCNC: 20 MMOL/L (ref 21–32)
CREAT BLD-MCNC: 1.88 MG/DL
EGFRCR SERPLBLD CKD-EPI 2021: 28 ML/MIN/1.73M2 (ref 60–?)
GLUCOSE BLD-MCNC: 87 MG/DL (ref 70–99)
GLUCOSE BLDC GLUCOMTR-MCNC: 100 MG/DL (ref 70–99)
GLUCOSE BLDC GLUCOMTR-MCNC: 124 MG/DL (ref 70–99)
GLUCOSE BLDC GLUCOMTR-MCNC: 152 MG/DL (ref 70–99)
GLUCOSE BLDC GLUCOMTR-MCNC: 184 MG/DL (ref 70–99)
GLUCOSE BLDC GLUCOMTR-MCNC: 89 MG/DL (ref 70–99)
MAGNESIUM SERPL-MCNC: 2 MG/DL (ref 1.6–2.6)
OSMOLALITY SERPL CALC.SUM OF ELEC: 303 MOSM/KG (ref 275–295)
PHOSPHATE SERPL-MCNC: 3.8 MG/DL (ref 2.4–5.1)
PHOSPHATE SERPL-MCNC: 3.8 MG/DL (ref 2.4–5.1)
POTASSIUM SERPL-SCNC: 3.9 MMOL/L (ref 3.5–5.1)
SODIUM SERPL-SCNC: 142 MMOL/L (ref 136–145)

## 2025-02-16 PROCEDURE — 99232 SBSQ HOSP IP/OBS MODERATE 35: CPT | Performed by: OTHER

## 2025-02-16 RX ORDER — ASPIRIN 81 MG/1
81 TABLET ORAL DAILY
Status: DISCONTINUED | OUTPATIENT
Start: 2025-02-17 | End: 2025-02-26

## 2025-02-16 NOTE — PLAN OF CARE
Pt A/Ox1-2, complete. [facility]     Problem: Patient Centered Care  Goal: Patient preferences are identified and integrated in the patient's plan of care  Description: Interventions:  - What would you like us to know as we care for you? Gokul, son assist with pt's care  - Provide timely, complete, and accurate information to patient/family  - Incorporate patient and family knowledge, values, beliefs, and cultural backgrounds into the planning and delivery of care  - Encourage patient/family to participate in care and decision-making at the level they choose  - Honor patient and family perspectives and choices  Outcome: Progressing     Problem: Diabetes/Glucose Control  Goal: Glucose maintained within prescribed range  Description: INTERVENTIONS:  - Monitor Blood Glucose as ordered  - Assess for signs and symptoms of hyperglycemia and hypoglycemia  - Administer ordered medications to maintain glucose within target range  - Assess barriers to adequate nutritional intake and initiate nutrition consult as needed  - Instruct patient on self management of diabetes  Outcome: Progressing     Problem: Patient/Family Goals  Goal: Patient/Family Long Term Goal  Description: Patient's Long Term Goal: \"get my mom to eat again\"    Interventions:  - iv hydration   -SLP eval   - See additional Care Plan goals for specific interventions  Outcome: Progressing  Goal: Patient/Family Short Term Goal  Description: Patient's Short Term Goal: \"more awake\"    Interventions:   - hydration  -frequent neuro checks   - See additional Care Plan goals for specific interventions  Outcome: Progressing     Problem: SAFETY ADULT - FALL  Goal: Free from fall injury  Description: INTERVENTIONS:  - Assess pt frequently for physical needs  - Identify cognitive and physical deficits and behaviors that affect risk of falls.  - [place] fall precautions as indicated by assessment.  - Educate pt/family on patient safety  including physical limitations  - Instruct pt to call for assistance with activity based on assessment  - Modify environment to reduce risk of injury  - Provide assistive devices as appropriate  - Consider OT/PT consult to assist with strengthening/mobility  - Encourage toileting schedule  Outcome: Progressing     Problem: DISCHARGE PLANNING  Goal: Discharge to home or other facility with appropriate resources  Description: INTERVENTIONS:  - Identify barriers to discharge w/pt and caregiver  - Include patient/family/discharge partner in discharge planning  - Arrange for needed discharge resources and transportation as appropriate  - Identify discharge learning needs (meds, wound care, etc)  - Arrange for interpreters to assist at discharge as needed  - Consider post-discharge preferences of patient/family/discharge partner  - Complete POLST form as appropriate  - Assess patient's ability to be responsible for managing their own health  - Refer to Case Management Department for coordinating discharge planning if the patient needs post-hospital services based on physician/LIP order or complex needs related to functional status, cognitive ability or social support system  Outcome: Progressing     Problem: Altered Communication/Language Barrier  Goal: Patient/Family is able to understand and participate in their care  Description: Interventions:  - Assess communication ability and preferred communication style  - Implement communication aides and strategies  - Use visual cues when possible  - Listen attentively, be patient, do not interrupt  - Minimize distractions  - Allow time for understanding and response  - Establish method for patient to ask for assistance (call light)  - Provide an  as needed  - Communicate barriers and strategies to overcome with those who interact with patient  Outcome: Progressing     Problem: METABOLIC/FLUID AND ELECTROLYTES - ADULT  Goal: Electrolytes maintained within normal  limits  Description: INTERVENTIONS:  - Monitor labs and rhythm and assess patient for signs and symptoms of electrolyte imbalances  - Administer electrolyte replacement as ordered  - Monitor response to electrolyte replacements, including rhythm and repeat lab results as appropriate  - Fluid restriction as ordered  - Instruct patient on fluid and nutrition restrictions as appropriate  Outcome: Progressing  Goal: Hemodynamic stability and optimal renal function maintained  Description: INTERVENTIONS:  - Monitor labs and assess for signs and symptoms of volume excess or deficit  - Monitor intake, output and patient weight  - Monitor urine specific gravity, serum osmolarity and serum sodium as indicated or ordered  - Monitor response to interventions for patient's volume status, including labs, urine output, blood pressure (other measures as available)  - Encourage oral intake as appropriate  - Instruct patient on fluid and nutrition restrictions as appropriate  Outcome: Progressing     Problem: SKIN/TISSUE INTEGRITY - ADULT  Goal: Skin integrity remains intact  Description: INTERVENTIONS  - Assess and document risk factors for pressure ulcer development  - Assess and document skin integrity  - Monitor for areas of redness and/or skin breakdown  - Initiate interventions, skin care algorithm/standards of care as needed  Outcome: Progressing  Goal: Incision(s), wounds(s) or drain site(s) healing without S/S of infection  Description: INTERVENTIONS:  - Assess and document risk factors for pressure ulcer development  - Assess and document skin integrity  - Assess and document dressing/incision, wound bed, drain sites and surrounding tissue  - Implement wound care per orders  - Initiate isolation precautions as appropriate  - Initiate Pressure Ulcer prevention bundle as indicated  Outcome: Progressing     Problem: NEUROLOGICAL - ADULT  Goal: Achieves stable or improved neurological status  Description: INTERVENTIONS  -  Assess for and report changes in neurological status  - Initiate measures to prevent increased intracranial pressure  - Maintain blood pressure and fluid volume within ordered parameters to optimize cerebral perfusion and minimize risk of hemorrhage  - Monitor temperature, glucose, and sodium. Initiate appropriate interventions as ordered  Outcome: Progressing  Goal: Achieves maximal functionality and self care  Description: INTERVENTIONS  - Monitor swallowing and airway patency with patient fatigue and changes in neurological status  - Encourage and assist patient to increase activity and self care with guidance from PT/OT  - Encourage visually impaired, hearing impaired and aphasic patients to use assistive/communication devices  Outcome: Progressing     Problem: Delirium  Goal: Minimize duration of delirium  Description: Interventions:  - Encourage use of hearing aids, eye glasses  - Promote highest level of mobility daily  - Provide frequent reorientation  - Promote wakefulness i.e. lights on, blinds open  - Promote sleep, encourage patient's normal rest cycle i.e. lights off, TV off, minimize noise and interruptions  - Encourage family to assist in orientation and promotion of home routines  Outcome: Progressing

## 2025-02-16 NOTE — DIETARY NOTE
ADULT NUTRITION INITIAL ASSESSMENT    Pt is at high nutrition risk.  Pt meets severe malnutrition criteria.      CRITERIA FOR MALNUTRITION DIAGNOSIS:  Criteria for severe malnutrition diagnosis: acute illness/injury related to wt loss greater than 7.5% in 3 months, energy intake less than 50% for greater than 5 days, body fat moderate depletion, and muscle mass moderate depletion.    RECOMMENDATIONS TO MD: See nutrition intervention for ONS (oral nutrition supplements)    ADMITTING DIAGNOSIS:  Dehydration [E86.0]  Hyperkalemia [E87.5]  Failure to thrive in adult [R62.7]  Troponin level elevated [R79.89]  PAPO (acute kidney injury) [N17.9]  PERTINENT PAST MEDICAL HISTORY:   Past Medical History:    Asthma (HCC)    Coronary atherosclerosis    Diabetes (HCC)    diabetes for 2 yrs    Essential hypertension    Glaucoma    right    Heart attack (HCC)    2005    High blood pressure    High cholesterol    Hyperlipidemia    Osteoarthritis    Sleep apnea    cpap    Stroke (HCC)    30 yrs ago    Visual impairment    left eye edema       PATIENT STATUS: Initial 02/16/25: Pt admit for decreased appetite x 3 days. PMH sig for DM, HTN, hx CVA and nonverbal d/t past CVA. Pt assessed due to screening at risk for weight loss d/t poor appetite. Pt visited, son at bedside. Son suspects pt has lost weight recently d/t poor intake, however unsure of how much weight she has lost. Per chart review, pt previously admitted to the hospital in November, weighed 155 lbs on admission. Pt currently 122 lbs (33 lb loss, 21%, x 3 months, significant per guidelines). Son reported pt has not been taking much in recently, however appetite has improved today and was able to have half of a container of applesauce and bits and pieces of other foods. Offered to add ONS to help meet nutritional needs, son agreeable to try. Will order Magic cup for her BID. Noted pt may need nutrition support d/t failure to thrive if intake remains low. Family and pt  declining at this time. Will monitor intake and follow up as appropriate.    FOOD/NUTRITION RELATED HISTORY:  Appetite: Poor to fair  Intake: ~0-50% x7 meals documented since admit  Intake Meeting Needs: No, but oral nutrition supplements (ONS) to maximize  Percent Meals Eaten (last 3 days)       Date/Time Percent Meals Eaten (%)    02/13/25 1000 0 %    02/13/25 1500 0 %    02/14/25 1407 10 %    02/14/25 1900 25 %    02/15/25 0749 15 %    02/15/25 1700 20 %    02/16/25 1000 --     Percent Meals Eaten (%): half a container of apple sauce at 02/16/25 1000           Food Allergies: No Known Food Allergies (NKFA)  Cultural/Ethnic/Jehovah's witness Preferences: None    GASTROINTESTINAL: dysphagia and Loss of appetite    MEDICATIONS: reviewed   carvedilol  25 mg Oral BID with meals    atorvastatin  40 mg Oral Nightly    hydrALAZINE  50 mg Oral BID    brimonidine  1 drop Both Eyes BID    heparin  5,000 Units Subcutaneous Q8H KB    insulin aspart  1-5 Units Subcutaneous TID CC and HS    aspirin  300 mg Rectal Daily      sodium chloride 50 mL/hr at 02/16/25 0441     LABS: reviewed  Recent Labs     02/14/25  0917 02/15/25  0838 02/16/25  0630   * 164* 87   BUN 65* 49* 40*   CREATSERUM 2.51* 2.13* 1.88*   CA 9.0 9.0 8.6*   MG 2.4 2.1 2.0   * 143 142   K 4.2 3.9 3.9   * 114* 116*   CO2 21.0 19.0* 20.0*   PHOS 3.0 2.4 3.8  3.8   OSMOCALC 329* 313* 303*       NUTRITION RELATED PHYSICAL FINDINGS:  - Nutrition Focused Physical Exam (NFPE): moderate depletion body fat triceps region and moderate depletion muscle mass temple region and clavicle region per visual exam  - Fluid Accumulation: none  see RN documentation for details  - Skin Integrity: at risk see RN documentation for details    ANTHROPOMETRICS:  HT: 160 cm (5' 3\")   WT: 55.4 kg (122 lb 1.6 oz)   BMI: Body mass index is 21.63 kg/m².  BMI CLASSIFICATION: 18.5-24.9 kg/m2 - WNL  No data recorded         106% IBW  Usual Body Wt: 160 lbs       76% UBW    WEIGHT  HISTORY: Noted weight loss since last admission in November - 33 lbs x 3 months (21% weight loss, significant)  Patient Weight(s) for the past 336 hrs:   Weight   02/16/25 0500 55.4 kg (122 lb 1.6 oz)   02/13/25 0900 55.1 kg (121 lb 6.4 oz)     Wt Readings from Last 10 Encounters:   02/16/25 55.4 kg (122 lb 1.6 oz)   01/30/25 68 kg (150 lb)   01/14/25 74.8 kg (165 lb)   01/04/25 72.6 kg (160 lb)   11/11/24 74.8 kg (165 lb)   11/06/24 70.3 kg (155 lb)   09/27/24 73.9 kg (162 lb 14.4 oz)   07/08/24 77.6 kg (171 lb)   07/08/24 89 kg (196 lb 3.4 oz)   03/24/24 88 kg (194 lb 0.1 oz)     NUTRITION DIAGNOSIS/PROBLEM:   Severe Malnutrition related to Acute - Physiological causes resulting in anorexia or diminished intake as evidenced by wt loss greater than 7.5% in 3 months, energy intake less than 50% for greater than 5 days, body fat moderate depletion, and muscle mass moderate depletion.    NUTRITION INTERVENTION:     NUTRITION PRESCRIPTION:   Estimated Nutrition needs: --dosing wt of 55.4 kg - wt taken on 2/16  Calories: 1250 calories/day (23 calories per kg [facility] AF 1.2)  Protein: 55 - 83 g protein/day (1.0-1.5 g protein/kg Dosing wt)  Fluid Needs: 1 ml/kcal    - Diet:       Procedures    Cardiac diet Cardiac, No Straw; Fluid Consistency: Honey Thick / Moderately Thick Liquids; Texture Consistency: Pureed; Is Patient on Accuchecks? Yes; Is Patient on Suicide Precautions? No      - RD Malnutrition Care Plan: Encouraged increased PO intake, Encouraged small frequent meals with emphasis on high calorie/high protein, and Initiated ONS (oral nutritional supplements)  - Meals and snacks: Encouraged small frequent meals and Encouraged adequate PO intake  - Medical Food Supplements-RD added Magic Cup (290 calories/ 9 g protein each) BID Vanilla, Chocolate, or Mixed berry. Rational/use of oral supplements discussed.  - Vitamin and mineral supplements: none  - Feeding assistance:  total assist  - Nutrition education:  not appropriate     - Coordination of nutrition care: collaboration with other providers  - Discharge and transfer of nutrition care to new setting or provider: to be determined    MONITOR AND EVALUATE/NUTRITION GOALS:  - Food and Nutrient Intake:      Monitor: adequacy of PO intake and adequacy of supplement intake  - Food and Nutrient Administration:      Monitor: need for temporary enteral nutrition  - Anthropometric Measurement:    Monitor weight  - Nutrition Goals:      halt wt loss, PO and supplement greater than 75% of needs, and intake to meet needs    DIETITIAN FOLLOW UP: RD to follow and monitor nutrition status      Mari Tubbs, MS, RDN, LDN  Clinical Dietitian

## 2025-02-16 NOTE — PLAN OF CARE
Patient's speech is minimal but clear. Patient is a 1:1 feed. Patient ate 40-50% of each tray today. Patient and son updated on POC. Son Gokul verbalized understanding of POC.    Problem: Patient Centered Care  Goal: Patient preferences are identified and integrated in the patient's plan of care  Description: Interventions:  - What would you like us to know as we care for you? Lives at home with alfonso Hodgson who assists with patient's care.  - Provide timely, complete, and accurate information to patient/family  - Incorporate patient and family knowledge, values, beliefs, and cultural backgrounds into the planning and delivery of care  - Encourage patient/family to participate in care and decision-making at the level they choose  - Honor patient and family perspectives and choices  Outcome: Progressing     Problem: Diabetes/Glucose Control  Goal: Glucose maintained within prescribed range  Description: INTERVENTIONS:  - Monitor Blood Glucose as ordered  - Assess for signs and symptoms of hyperglycemia and hypoglycemia  - Administer ordered medications to maintain glucose within target range  - Assess barriers to adequate nutritional intake and initiate nutrition consult as needed  - Instruct patient on self management of diabetes  Outcome: Progressing     Problem: Patient/Family Goals  Goal: Patient/Family Long Term Goal  Description: Patient's Long Term Goal: \"get my mom to eat again\"    Interventions:  - Monitor intake and output  - IV hydration  - See additional Care Plan goals for specific interventions  Outcome: Progressing  Goal: Patient/Family Short Term Goal  Description: Patient's Short Term Goal: \"more awake\"    Interventions:   -Neuro checks   -Monitor labs and vitals signs  -Follow provider recommendations  - See additional Care Plan goals for specific interventions  Outcome: Progressing     Problem: SAFETY ADULT - FALL  Goal: Free from fall injury  Description: INTERVENTIONS:  - Assess pt frequently for  physical needs  - Identify cognitive and physical deficits and behaviors that affect risk of falls.  - Perkins fall precautions as indicated by assessment.  - Educate pt/family on patient safety including physical limitations  - Instruct pt to call for assistance with activity based on assessment  - Modify environment to reduce risk of injury  - Provide assistive devices as appropriate  - Consider OT/PT consult to assist with strengthening/mobility  - Encourage toileting schedule  Outcome: Progressing     Problem: DISCHARGE PLANNING  Goal: Discharge to home or other facility with appropriate resources  Description: INTERVENTIONS:  - Identify barriers to discharge w/pt and caregiver  - Include patient/family/discharge partner in discharge planning  - Arrange for needed discharge resources and transportation as appropriate  - Identify discharge learning needs (meds, wound care, etc)  - Arrange for interpreters to assist at discharge as needed  - Consider post-discharge preferences of patient/family/discharge partner  - Complete POLST form as appropriate  - Assess patient's ability to be responsible for managing their own health  - Refer to Case Management Department for coordinating discharge planning if the patient needs post-hospital services based on physician/LIP order or complex needs related to functional status, cognitive ability or social support system  Outcome: Progressing     Problem: Altered Communication/Language Barrier  Goal: Patient/Family is able to understand and participate in their care  Description: Interventions:  - Assess communication ability and preferred communication style  - Implement communication aides and strategies  - Use visual cues when possible  - Listen attentively, be patient, do not interrupt  - Minimize distractions  - Allow time for understanding and response  - Establish method for patient to ask for assistance (call light)  - Provide an  as needed  - Communicate  barriers and strategies to overcome with those who interact with patient  Outcome: Progressing     Problem: METABOLIC/FLUID AND ELECTROLYTES - ADULT  Goal: Electrolytes maintained within normal limits  Description: INTERVENTIONS:  - Monitor labs and rhythm and assess patient for signs and symptoms of electrolyte imbalances  - Administer electrolyte replacement as ordered  - Monitor response to electrolyte replacements, including rhythm and repeat lab results as appropriate  - Fluid restriction as ordered  - Instruct patient on fluid and nutrition restrictions as appropriate  Outcome: Progressing  Goal: Hemodynamic stability and optimal renal function maintained  Description: INTERVENTIONS:  - Monitor labs and assess for signs and symptoms of volume excess or deficit  - Monitor intake, output and patient weight  - Monitor urine specific gravity, serum osmolarity and serum sodium as indicated or ordered  - Monitor response to interventions for patient's volume status, including labs, urine output, blood pressure (other measures as available)  - Encourage oral intake as appropriate  - Instruct patient on fluid and nutrition restrictions as appropriate  Outcome: Progressing     Problem: SKIN/TISSUE INTEGRITY - ADULT  Goal: Skin integrity remains intact  Description: INTERVENTIONS  - Assess and document risk factors for pressure ulcer development  - Assess and document skin integrity  - Monitor for areas of redness and/or skin breakdown  - Initiate interventions, skin care algorithm/standards of care as needed  Outcome: Progressing  Goal: Incision(s), wounds(s) or drain site(s) healing without S/S of infection  Description: INTERVENTIONS:  - Assess and document risk factors for pressure ulcer development  - Assess and document skin integrity  - Assess and document dressing/incision, wound bed, drain sites and surrounding tissue  - Implement wound care per orders  - Initiate isolation precautions as appropriate  -  Initiate Pressure Ulcer prevention bundle as indicated  Outcome: Progressing     Problem: NEUROLOGICAL - ADULT  Goal: Achieves stable or improved neurological status  Description: INTERVENTIONS  - Assess for and report changes in neurological status  - Initiate measures to prevent increased intracranial pressure  - Maintain blood pressure and fluid volume within ordered parameters to optimize cerebral perfusion and minimize risk of hemorrhage  - Monitor temperature, glucose, and sodium. Initiate appropriate interventions as ordered  Outcome: Progressing  Goal: Achieves maximal functionality and self care  Description: INTERVENTIONS  - Monitor swallowing and airway patency with patient fatigue and changes in neurological status  - Encourage and assist patient to increase activity and self care with guidance from PT/OT  - Encourage visually impaired, hearing impaired and aphasic patients to use assistive/communication devices  Outcome: Progressing     Problem: Delirium  Goal: Minimize duration of delirium  Description: Interventions:  - Encourage use of hearing aids, eye glasses  - Promote highest level of mobility daily  - Provide frequent reorientation  - Promote wakefulness i.e. lights on, blinds open  - Promote sleep, encourage patient's normal rest cycle i.e. lights off, TV off, minimize noise and interruptions  - Encourage family to assist in orientation and promotion of home routines  Outcome: Progressing

## 2025-02-16 NOTE — PROGRESS NOTES
[facility] Hospitalist Progress Note     CC: Hospital Follow up    PCP: Yao Rodriguez       Assessment/Plan:   This is a 70-year-old female with a history of multiple strokes in the past, hypertension, CAD, sleep apnea, type 2 diabetes, who presents to the hospital for evaluation of low p.o. intake, weakness, and general failure to thrive.     Failure to thrive  Low PO intake, dehydration with PAPO and hypernatremia  Hx of multiple strokes in past   -obtained MRI brain, high risk of recurrent stroke--->negative for acute stroke  -speech eval  -continue electrolyte correction   -neuro consult appreciated, could be progression of dementia, neurodegenerative disease, amyloid angiopathy   -may need to consider enteral feeding if her PO intake is not sufficient---monitor calorie intake, however on my discussion 2/14 more likely she would NOT want G tube  -palliative care of hospice could be of consideration pending progress.   -her mentation is improving however as of today.      Hypernatremia  PAPO  Metabolic acidosis  -0.45 saline  -severe dehydration on admisision    Severe malnutrition  -continue caloric intake documentation   -dietitian on consult  -agree with oral nutritional supplementation per dietitian team   -significant weight loss past one year (~70lbs)  -monitor potential need for temporary enteral nutrition      Hypertension   -avoid hypotension      Coronary artery disease  -PO meds as able     CEDRICK  -cpap if uses here      Type 2 diabetes   -A1c 5.7  -likely will stop accucheck/insulin  -should likely stop metformin on discharge vs defer to PCP. Given weight loss and poor nutrition suspect A1c will continue to drop. Risk of hypoglycemia very likely      Hx of seizure?   -not on keppra anymore, discontinued yesterday     Will continue to monitor progress     DVT prophylaxis: heparin sub q  Code status: DNR (confirmed with son)     Samir Badillo [facility] Hospitalist        Subjective:      Speaking more  Still looks tired but at least making more eye const    OBJECTIVE:    Blood pressure 154/64, pulse 63, temperature 97.3 °F (36.3 °C), temperature source Oral, resp. rate 18, weight 122 lb 1.6 oz (55.4 kg), SpO2 99%.    Temp:  [97 °F (36.1 °C)-98.6 °F (37 °C)] 97.3 °F (36.3 °C)  Pulse:  [63-80] 63  Resp:  [16-20] 18  BP: (110-154)/(59-76) 154/64  SpO2:  [98 %-100 %] 99 %      Intake/Output:    Intake/Output Summary (Last 24 hours) at 2/16/2025 1254  Last data filed at 2/16/2025 0441  Gross per 24 hour   Intake 610 ml   Output 500 ml   Net 110 ml       Last 3 Weights   02/16/25 0500 122 lb 1.6 oz (55.4 kg)   02/13/25 0900 121 lb 6.4 oz (55.1 kg)   01/30/25 2230 150 lb (68 kg)   01/14/25 1842 165 lb (74.8 kg)       /64 (BP Location: Right arm)   Pulse 63   Temp 97.3 °F (36.3 °C) (Oral)   Resp 18   Wt 122 lb 1.6 oz (55.4 kg)   SpO2 99%   BMI 21.63 kg/m²   General: no acute distress  Lungs: clear to ausculation bilaterally  Heart: Regular rate and rhythm  Abdomen: soft, non tender  Extremities: No edema  Neuro: right facial droop, speaking a little more today , able to make eye contact, does follow some simple commands, right sided weakness in upper and lower extremities    Data Review:       Labs:     Recent Labs   Lab 02/12/25 2224 02/14/25  0917   RBC 3.94 3.28*   HGB 10.5* 9.2*   HCT 34.3* 28.4*   MCV 87.1 86.6   MCH 26.6 28.0   MCHC 30.6* 32.4   RDW 17.2* 16.9*   NEPRELIM 9.91* 2.78   WBC 11.5* 4.3   .0 277.0         Recent Labs   Lab 02/14/25  0917 02/15/25  0838 02/16/25  0630   * 164* 87   BUN 65* 49* 40*   CREATSERUM 2.51* 2.13* 1.88*   EGFRCR 20* 24* 28*   CA 9.0 9.0 8.6*   * 143 142   K 4.2 3.9 3.9   * 114* 116*   CO2 21.0 19.0* 20.0*       Recent Labs   Lab 02/12/25  2224 02/14/25  0917 02/15/25  0838 02/16/25  0630   ALT 25  --   --   --    AST 23  --   --   --    ALB 4.1 3.5 3.6 3.1*         Imaging:  MRI BRAIN (CPT=70551)    Result Date:  2/14/2025  CONCLUSION:  1. No acute intracranial process by noncontrast MR technique.  2. Innumerable foci of supratentorial and infratentorial susceptibility artifact, suggesting foci of remote microhemorrhage. These findings may reflect cerebral amyloid angiopathy.   3. Senescent changes of parenchymal volume loss with sequela of chronic microvascular ischemic disease including chronic lacunar infarcts.  4. Lesser incidental findings as above.      elm-remote.   Dictated by (CST): Rick Patel MD on 2/14/2025 at 10:36 AM     Finalized by (CST): Rick Patel MD on 2/14/2025 at 10:43 AM             Meds:      carvedilol  25 mg Oral BID with meals    atorvastatin  40 mg Oral Nightly    hydrALAZINE  50 mg Oral BID    brimonidine  1 drop Both Eyes BID    heparin  5,000 Units Subcutaneous Q8H KB    insulin aspart  1-5 Units Subcutaneous TID CC and HS    aspirin  300 mg Rectal Daily      sodium chloride 50 mL/hr at 02/16/25 0441       glucose **OR** glucose **OR** glucose-vitamin C **OR** dextrose **OR** glucose **OR** glucose **OR** glucose-vitamin C    acetaminophen    HYDROmorphone **OR** HYDROmorphone **OR** HYDROmorphone    melatonin    ondansetron    metoclopramide    polyethylene glycol (PEG 3350)    sennosides    bisacodyl    hydrALAzine

## 2025-02-16 NOTE — PROGRESS NOTES
[facility]  [address], [address]  [facility] [address]  [phone]            Eliud Fine Patient Status:  Inpatient    1955 MRN [id number]   Location [facility] 3W/SW Attending Samir Hernadez, DO   Hosp Day # 3 PCP Yao Rodriguez     Subjective:  Eliud Fine is a(n) 70 year old female.    Hospital course to date:     The patient, Eliud Antony, presents with progressive weakness and confusion. She was admitted to the hospital 2 days ago. Her right side is noted to be weaker than the left, with the left side strength estimated at 4-3/5 and the right side at 2-/5. There is also mention of increased confusion, prompting consideration of a possible stroke.     The patient has a history of multiple strokes, with the most recent occurring in 2022, primarily affecting her left-right side. She was previously able to recover, maintaining social interactions and ambulating with a walker while living with her son. In 2024, she experienced worsening symptoms and was hospitalized for right-sided weakness. During that admission, she was started on Keppra for possible seizures, but MRI showed no acute stroke. After discharge, she continued on antibiotics and Keppra, which caused drowsiness. Keppra was subsequently discontinued, leading to improved alertness.     The patient was readmitted in 2025 with failure to thrive, weakness, and low PO intake. Brain imaging revealed multiple micro-hemorrhages without acute changes, raising suspicion for amyloid angiopathy. She was also found to have acute kidney injury, metabolic acidosis, and dehydration.     B12, TSH, RPR, thyroid ab were not revealing.  Patient was slightly more awake next day, but still had hard time communicating.    Current Facility-Administered Medications   Medication Dose Route Frequency    sodium chloride 0.45% infusion   Intravenous Continuous    carvedilol (Coreg) tab  25 mg  25 mg Oral BID with meals    atorvastatin (Lipitor) tab 40 mg  40 mg Oral Nightly    hydrALAZINE (Apresoline) tab 50 mg  50 mg Oral BID    brimonidine (Alphagan) 0.2 % ophthalmic solution 1 drop  1 drop Both Eyes BID    glucose (Dex4) 15 GM/59ML oral liquid 15 g  15 g Oral Q15 Min PRN    Or    glucose (Glutose) 40% oral gel 15 g  15 g Oral Q15 Min PRN    Or    glucose-vitamin C (Dex-4) chewable tab 4 tablet  4 tablet Oral Q15 Min PRN    Or    dextrose 50% injection 50 mL  50 mL Intravenous Q15 Min PRN    Or    glucose (Dex4) 15 GM/59ML oral liquid 30 g  30 g Oral Q15 Min PRN    Or    glucose (Glutose) 40% oral gel 30 g  30 g Oral Q15 Min PRN    Or    glucose-vitamin C (Dex-4) chewable tab 8 tablet  8 tablet Oral Q15 Min PRN    heparin (Porcine) 5000 UNIT/ML injection 5,000 Units  5,000 Units Subcutaneous Q8H KB    acetaminophen (Tylenol Extra Strength) tab 500 mg  500 mg Oral Q4H PRN    HYDROmorphone (Dilaudid) 1 MG/ML injection 0.2 mg  0.2 mg Intravenous Q2H PRN    Or    HYDROmorphone (Dilaudid) 1 MG/ML injection 0.4 mg  0.4 mg Intravenous Q2H PRN    Or    HYDROmorphone (Dilaudid) 1 MG/ML injection 0.8 mg  0.8 mg Intravenous Q2H PRN    melatonin tab 3 mg  3 mg Oral Nightly PRN    ondansetron (Zofran) 4 MG/2ML injection 4 mg  4 mg Intravenous Q6H PRN    metoclopramide (Reglan) 5 mg/mL injection 5 mg  5 mg Intravenous Q8H PRN    polyethylene glycol (PEG 3350) (Miralax) 17 g oral packet 17 g  17 g Oral Daily PRN    sennosides (Senokot) tab 17.2 mg  17.2 mg Oral Nightly PRN    bisacodyl (Dulcolax) 10 MG rectal suppository 10 mg  10 mg Rectal Daily PRN    insulin aspart (NovoLOG) 100 Units/mL FlexPen 1-5 Units  1-5 Units Subcutaneous TID CC and HS    aspirin 300 MG rectal suppository 300 mg  300 mg Rectal Daily    hydrALAzine (Apresoline) 20 mg/mL injection 10 mg  10 mg Intravenous Q6H PRN       Objective:  Blood pressure 138/61, pulse 79, temperature 97.6 °F (36.4 °C), temperature source Oral, resp. rate 18,  weight 122 lb 1.6 oz (55.4 kg), SpO2 100%.    Physical Exam:  Vitals:    02/16/25 0255 02/16/25 0403 02/16/25 0454 02/16/25 0500   BP:  111/59 138/61    Pulse: 73 68 79    Resp:  20 18    Temp:  97 °F (36.1 °C) 97.6 °F (36.4 °C)    TempSrc:  Axillary Oral    SpO2: 99% 100% 100%    Weight:    122 lb 1.6 oz (55.4 kg)       General: No apparent distress, well nourished, well groomed.  Head- Normocephalic, atraumatic  Eyes- No redness or swelling  Neck- No masses or adenopathy  CV: pulses were palpable and normal, no cyanosis or edema     Neurological:     Mental Status- Alert however did not follow any commands.  She will look at me and then she looked away.    Lab Results   Component Value Date    WBC 4.3 02/14/2025    HGB 9.2 (L) 02/14/2025    HCT 28.4 (L) 02/14/2025    .0 02/14/2025    CREATSERUM 1.88 (H) 02/16/2025    BUN 40 (H) 02/16/2025     02/16/2025    K 3.9 02/16/2025     (H) 02/16/2025    CO2 20.0 (L) 02/16/2025    GLU 87 02/16/2025    CA 8.6 (L) 02/16/2025    ALB 3.1 (L) 02/16/2025    ALKPHO 94 02/12/2025    BILT 0.4 02/12/2025    TP 7.1 02/12/2025    AST 23 02/12/2025    ALT 25 02/12/2025    PTT 30.4 09/25/2024    INR 1.11 09/25/2024    T4F 1.3 02/15/2025    TSH 1.457 02/15/2025    LIP 53 01/04/2025    DDIMER 2.40 (H) 10/11/2022    ESRML 37 (H) 12/18/2022    CRP 0.31 (H) 12/18/2022    MG 2.0 02/16/2025    PHOS 3.8 02/16/2025    PHOS 3.8 02/16/2025    CK 83 02/12/2025    B12 687 02/15/2025       No results found.          Assessment:  Patient Active Problem List   Diagnosis    Vision loss, left eye    Cerebrovascular accident (CVA), unspecified mechanism (HCC)    Essential hypertension    COVID-19    Right sided weakness    Aphasia due to recent stroke    Cerebral amyloid angiopathy (HCC)    Toxic encephalopathy    Hypertensive urgency    Acute chest pain    Renal artery stenosis    Cystitis    PAPO (acute kidney injury)    Primary hypertension    Uncontrolled hypertension    CVA (cerebral  vascular accident) (HCC)    Acute CVA (cerebrovascular accident) (HCC)    Slurred speech    Weakness of right upper extremity    Dehydration    Failure to thrive in adult    Troponin level elevated    Hyperkalemia        Failure to thrive in adult     Note: Patient has a history of multiple strokes, with the most recent in January 2022 affecting her left-right side.  Note: MRI in November 2024 showed no acute stroke.  Note: February 2025 brain imaging revealed multiple micro-hemorrhages without acute changes, leading to a strong suspicion of amyloid angiopathy.        Patient's condition appears to be a progressive neurodegenerative process, possibly vascular dementia, (amyloid angiopathy definitely playing a role), there could also be possibility of Alzheimer's disease, therefore amyloid ratio will be ordered to confirm the diagnosis.  Additional other blood work will be done to rule out any other etiology.      Failure to thrive:  - Manage hydration status  - Monitor and correct electrolyte imbalances  - Assess nutritional status and consider nutritional support if needed  - Assess need for supportive care and potential placement given progressive decline  Possibility of hospice could also be entertained.    Chris Chandra MD  2/16/2025  8:35 AM

## 2025-02-16 NOTE — PROGRESS NOTES
[facility]  part of [facility]    Progress Note    Eliud Fine Patient Status:  Inpatient    1955 MRN [id number]   Location [facility] 1W Attending Samir Hernadez,    Hosp Day # 3 PCP Yao Rodriguez       Subjective:     Resting in bed feels better  Answering questions more awake    Objective:   Vital Signs:  Blood pressure 154/64, pulse 63, temperature 97.3 °F (36.3 °C), temperature source Oral, resp. rate 18, weight 122 lb 1.6 oz (55.4 kg), SpO2 99%.  General: cachetic awake today  HEENT: Dry mucous membranes.   Respiratory: Clear to auscultation No wheezing Rhonchi or crackles  Cardiovascular: S1, S2. No rubs  Abdomen: Soft, +BS  Ext: No LE edema    Results:     Lab Results   Component Value Date    WBC 4.3 2025    HGB 9.2 (L) 2025    HCT 28.4 (L) 2025    .0 2025    CREATSERUM 1.88 (H) 2025    BUN 40 (H) 2025     2025    K 3.9 2025     (H) 2025    CO2 20.0 (L) 2025    GLU 87 2025    CA 8.6 (L) 2025    ALB 3.1 (L) 2025    ALKPHO 94 2025    BILT 0.4 2025    TP 7.1 2025    AST 23 2025    ALT 25 2025    PTT 30.4 2024    INR 1.11 2024    T4F 1.3 02/15/2025    TSH 1.457 02/15/2025    LIP 53 2025    DDIMER 2.40 (H) 10/11/2022    ESRML 37 (H) 2022    CRP 0.31 (H) 2022    MG 2.0 2025    PHOS 3.8 2025    PHOS 3.8 2025    CK 83 2025    B12 687 02/15/2025       No results found.                Marck Galvan MD  2025    Assessment and Plan:   70 year old female with PMH of CVA, DM2, HTN, CKD with baseline creatinine around 1.7 presents to the hospital with weakness decreased PO intake and FTT . Were consulted for PAPO on CKD and hypernatremia    PAPO on CKD stage 3:  - Baseline Creatinine 1.7  - PAPO from decreased PO intake   - Avoid Nephrotoxins  - Renally adjust medications  - No acute indication for  RRT     Renal artery stenosis:  - seen in the past will repeat renal ultrasound with renal artery duplex to evaluate further.     Hypernatremia:  - improved     Hyperkalemia:  - improved with IVF     Metabolic acidosis:  - from PAPO will monitor     Thank you for allowing me to participate in the care of your patient.     Marck Galvan MD  [facility]  Nephrology

## 2025-02-16 NOTE — PLAN OF CARE
Problem: Patient Centered Care  Goal: Patient preferences are identified and integrated in the patient's plan of care  Description: Interventions:  - What would you like us to know as we care for you? Gokul, son assist with pt's care  - Provide timely, complete, and accurate information to patient/family  - Incorporate patient and family knowledge, values, beliefs, and cultural backgrounds into the planning and delivery of care  - Encourage patient/family to participate in care and decision-making at the level they choose  - Honor patient and family perspectives and choices  Outcome: Progressing     Problem: Diabetes/Glucose Control  Goal: Glucose maintained within prescribed range  Description: INTERVENTIONS:  - Monitor Blood Glucose as ordered  - Assess for signs and symptoms of hyperglycemia and hypoglycemia  - Administer ordered medications to maintain glucose within target range  - Assess barriers to adequate nutritional intake and initiate nutrition consult as needed  - Instruct patient on self management of diabetes  Outcome: Progressing     Problem: SAFETY ADULT - FALL  Goal: Free from fall injury  Description: INTERVENTIONS:  - Assess pt frequently for physical needs  - Identify cognitive and physical deficits and behaviors that affect risk of falls.  - [place] fall precautions as indicated by assessment.  - Educate pt/family on patient safety including physical limitations  - Instruct pt to call for assistance with activity based on assessment  - Modify environment to reduce risk of injury  - Provide assistive devices as appropriate  - Consider OT/PT consult to assist with strengthening/mobility  - Encourage toileting schedule  Outcome: Progressing     Problem: METABOLIC/FLUID AND ELECTROLYTES - ADULT  Goal: Electrolytes maintained within normal limits  Description: INTERVENTIONS:  - Monitor labs and rhythm and assess patient for signs and symptoms of electrolyte imbalances  - Administer electrolyte  replacement as ordered  - Monitor response to electrolyte replacements, including rhythm and repeat lab results as appropriate  - Fluid restriction as ordered  - Instruct patient on fluid and nutrition restrictions as appropriate  Outcome: Progressing  Goal: Hemodynamic stability and optimal renal function maintained  Description: INTERVENTIONS:  - Monitor labs and assess for signs and symptoms of volume excess or deficit  - Monitor intake, output and patient weight  - Monitor urine specific gravity, serum osmolarity and serum sodium as indicated or ordered  - Monitor response to interventions for patient's volume status, including labs, urine output, blood pressure (other measures as available)  - Encourage oral intake as appropriate  - Instruct patient on fluid and nutrition restrictions as appropriate  Outcome: Progressing     Problem: SKIN/TISSUE INTEGRITY - ADULT  Goal: Skin integrity remains intact  Description: INTERVENTIONS  - Assess and document risk factors for pressure ulcer development  - Assess and document skin integrity  - Monitor for areas of redness and/or skin breakdown  - Initiate interventions, skin care algorithm/standards of care as needed  Outcome: Progressing  Goal: Incision(s), wounds(s) or drain site(s) healing without S/S of infection  Description: INTERVENTIONS:  - Assess and document risk factors for pressure ulcer development  - Assess and document skin integrity  - Assess and document dressing/incision, wound bed, drain sites and surrounding tissue  - Implement wound care per orders  - Initiate isolation precautions as appropriate  - Initiate Pressure Ulcer prevention bundle as indicated  Outcome: Progressing   No acute changes in patient status, vital signs stable, son updated on patient status, patient transferred to room 337, report given to nurse Fried, will notified of transferred

## 2025-02-17 ENCOUNTER — APPOINTMENT (OUTPATIENT)
Dept: GENERAL RADIOLOGY | Facility: HOSPITAL | Age: 70
End: 2025-02-17
Attending: INTERNAL MEDICINE
Payer: MEDICARE

## 2025-02-17 ENCOUNTER — APPOINTMENT (OUTPATIENT)
Dept: ULTRASOUND IMAGING | Facility: HOSPITAL | Age: 70
End: 2025-02-17
Attending: INTERNAL MEDICINE
Payer: MEDICARE

## 2025-02-17 ENCOUNTER — APPOINTMENT (OUTPATIENT)
Dept: CT IMAGING | Facility: HOSPITAL | Age: 70
End: 2025-02-17
Attending: Other
Payer: MEDICARE

## 2025-02-17 ENCOUNTER — APPOINTMENT (OUTPATIENT)
Dept: MRI IMAGING | Facility: HOSPITAL | Age: 70
End: 2025-02-17
Attending: Other
Payer: MEDICARE

## 2025-02-17 ENCOUNTER — APPOINTMENT (OUTPATIENT)
Dept: CT IMAGING | Facility: HOSPITAL | Age: 70
End: 2025-02-17
Attending: INTERNAL MEDICINE
Payer: MEDICARE

## 2025-02-17 LAB
ALBUMIN SERPL-MCNC: 3.2 G/DL (ref 3.2–4.8)
ANION GAP SERPL CALC-SCNC: 9 MMOL/L (ref 0–18)
BASOPHILS # BLD AUTO: 0.01 X10(3) UL (ref 0–0.2)
BASOPHILS NFR BLD AUTO: 0.2 %
BUN BLD-MCNC: 24 MG/DL (ref 9–23)
BUN/CREAT SERPL: 15.8 (ref 10–20)
CALCIUM BLD-MCNC: 8.7 MG/DL (ref 8.7–10.4)
CHLORIDE SERPL-SCNC: 112 MMOL/L (ref 98–112)
CO2 SERPL-SCNC: 18 MMOL/L (ref 21–32)
CREAT BLD-MCNC: 1.52 MG/DL
DEPRECATED RDW RBC AUTO: 49.3 FL (ref 35.1–46.3)
EGFRCR SERPLBLD CKD-EPI 2021: 36 ML/MIN/1.73M2 (ref 60–?)
EOSINOPHIL # BLD AUTO: 0.01 X10(3) UL (ref 0–0.7)
EOSINOPHIL NFR BLD AUTO: 0.2 %
ERYTHROCYTE [DISTWIDTH] IN BLOOD BY AUTOMATED COUNT: 16.7 % (ref 11–15)
GLUCOSE BLD-MCNC: 93 MG/DL (ref 70–99)
GLUCOSE BLDC GLUCOMTR-MCNC: 119 MG/DL (ref 70–99)
GLUCOSE BLDC GLUCOMTR-MCNC: 126 MG/DL (ref 70–99)
GLUCOSE BLDC GLUCOMTR-MCNC: 248 MG/DL (ref 70–99)
GLUCOSE BLDC GLUCOMTR-MCNC: 98 MG/DL (ref 70–99)
HCT VFR BLD AUTO: 26.4 %
HGB BLD-MCNC: 8.3 G/DL
IMM GRANULOCYTES # BLD AUTO: 0.12 X10(3) UL (ref 0–1)
IMM GRANULOCYTES NFR BLD: 2.1 %
LACTATE SERPL-SCNC: 1.9 MMOL/L (ref 0.5–2)
LYMPHOCYTES # BLD AUTO: 0.85 X10(3) UL (ref 1–4)
LYMPHOCYTES NFR BLD AUTO: 15.2 %
MCH RBC QN AUTO: 27 PG (ref 26–34)
MCHC RBC AUTO-ENTMCNC: 31.4 G/DL (ref 31–37)
MCV RBC AUTO: 86 FL
MONOCYTES # BLD AUTO: 0.31 X10(3) UL (ref 0.1–1)
MONOCYTES NFR BLD AUTO: 5.5 %
NEUTROPHILS # BLD AUTO: 4.31 X10 (3) UL (ref 1.5–7.7)
NEUTROPHILS # BLD AUTO: 4.31 X10(3) UL (ref 1.5–7.7)
NEUTROPHILS NFR BLD AUTO: 76.8 %
OSMOLALITY SERPL CALC.SUM OF ELEC: 292 MOSM/KG (ref 275–295)
PHOSPHATE SERPL-MCNC: 2.9 MG/DL (ref 2.4–5.1)
PLATELET # BLD AUTO: 249 10(3)UL (ref 150–450)
POTASSIUM SERPL-SCNC: 4.2 MMOL/L (ref 3.5–5.1)
RBC # BLD AUTO: 3.07 X10(6)UL
SODIUM SERPL-SCNC: 139 MMOL/L (ref 136–145)
WBC # BLD AUTO: 5.6 X10(3) UL (ref 4–11)

## 2025-02-17 PROCEDURE — 93975 VASCULAR STUDY: CPT | Performed by: INTERNAL MEDICINE

## 2025-02-17 PROCEDURE — 99233 SBSQ HOSP IP/OBS HIGH 50: CPT | Performed by: OTHER

## 2025-02-17 PROCEDURE — 70450 CT HEAD/BRAIN W/O DYE: CPT | Performed by: INTERNAL MEDICINE

## 2025-02-17 PROCEDURE — 70551 MRI BRAIN STEM W/O DYE: CPT | Performed by: OTHER

## 2025-02-17 PROCEDURE — 71045 X-RAY EXAM CHEST 1 VIEW: CPT | Performed by: INTERNAL MEDICINE

## 2025-02-17 PROCEDURE — 76775 US EXAM ABDO BACK WALL LIM: CPT | Performed by: INTERNAL MEDICINE

## 2025-02-17 RX ORDER — SODIUM CHLORIDE 450 MG/100ML
INJECTION, SOLUTION INTRAVENOUS CONTINUOUS
Status: DISCONTINUED | OUTPATIENT
Start: 2025-02-17 | End: 2025-02-17

## 2025-02-17 RX ORDER — SODIUM CHLORIDE 9 MG/ML
INJECTION, SOLUTION INTRAVENOUS CONTINUOUS
Status: DISCONTINUED | OUTPATIENT
Start: 2025-02-17 | End: 2025-02-18

## 2025-02-17 NOTE — PHYSICAL THERAPY NOTE
PHYSICAL THERAPY EVALUATION - INPATIENT     Room Number: 337/337-A  Evaluation Date: 2/17/2025  Type of Evaluation: Initial   Physician Order: PT Eval and Treat    Presenting Problem: weakness and confusion  Co-Morbidities : multiple strokes in the past, hypertension, CAD, sleep apnea, type 2 diabetes  Reason for Therapy: Mobility Dysfunction and Discharge Planning    PHYSICAL THERAPY ASSESSMENT   Patient is a 70 year old female admitted 2/12/2025 for weakness and confusion. History of CVA's with L side typically weaker, now with increased R side weakness.  Prior to admission, patient's baseline is unknown- pt limited historian (A&O x 1).  Patient may be functioning below baseline with bed mobility, transfers, gait, stair negotiation, maintaining seated position, standing prolonged periods, and performing household tasks.  Patient is requiring maximum assist and dependent as a result of the following impairments: decreased functional strength, decreased endurance/aerobic capacity, impaired sitting and standing balance, decreased muscular endurance, cognitive deficits (A&O x 1), and medical status.  Physical Therapy will continue to follow for duration of hospitalization.    Patient will benefit from continued skilled PT Services to promote return to prior level of function and safety with continuous assistance and gradual rehabilitative therapy .    PLAN DURING HOSPITALIZATION  Nursing Mobility Recommendation : Lift Equipment  PT Device Recommendation: Mechanical lift  PT Treatment Plan: Bed mobility;Body mechanics;Energy conservation;Patient education;Gait training;Range of motion;Strengthening;Transfer training;Balance training;Family education  Rehab Potential : Guarded  Frequency (Obs): 3-5x/week     PHYSICAL THERAPY MEDICAL/SOCIAL HISTORY     Problem List  Principal Problem:    PAPO (acute kidney injury)  Active Problems:    Cerebral amyloid angiopathy (HCC)    Dehydration    Failure to thrive in adult     Troponin level elevated    Hyperkalemia      HOME SITUATION  Type of Home: House (information from admit 2024)  Home Layout: One level  Stairs to Enter : 16   Railing: Yes              Lives With: Son    Drives: No       SUBJECTIVE  \"Yes\"  when asked if she felt tired    PHYSICAL THERAPY EXAMINATION   OBJECTIVE  Precautions: Bed/chair alarm  Fall Risk: High fall risk  PAIN ASSESSMENT  Ratin          COGNITION  Overall Cognitive Status:  Impaired  Arousal/Alertness:  lethargic  Following Commands:  follows multi-step commands inconsistently  Safety Judgement:  decreased awareness of need for safety  Awareness of Errors:  unable to assess  Awareness of Deficits:  decreased awareness of deficits    RANGE OF MOTION AND STRENGTH ASSESSMENT  Upper extremity ROM and strength are impaired: 2/5 RUE, LUE: 3-/5  Lower extremity ROM is within functional limits   Lower extremity strength is impaired: RLE: 2/5, LLE: 3-/5    BALANCE  Static Sitting: Poor  Dynamic Sitting: Poor -  Static Standing: Not tested  Dynamic Standing: Not tested      NEUROLOGICAL FINDINGS   *R side inattention, L facial droop    ACTIVITY TOLERANCE           BP: 103/56  BP Location: Right arm  BP Method: Automatic  Patient Position: Semi-Jones    O2 WALK  Oxygen Therapy  SPO2% on Room Air at Rest: 99    AM-PAC '6-Clicks' INPATIENT SHORT FORM - BASIC MOBILITY  How much difficulty does the patient currently have...  Patient Difficulty: Turning over in bed (including adjusting bedclothes, sheets and blankets)?: A Lot   Patient Difficulty: Sitting down on and standing up from a chair with arms (e.g., wheelchair, bedside commode, etc.): Unable   Patient Difficulty: Moving from lying on back to sitting on the side of the bed?: A Lot   How much help from another person does the patient currently need...   Help from Another: Moving to and from a bed to a chair (including a wheelchair)?: Total   Help from Another: Need to walk in hospital room?: Total   Help  from Another: Climbing 3-5 steps with a railing?: Total     AM-PAC Score:  Raw Score: 8   Approx Degree of Impairment: 86.62%   Standardized Score (AM-PAC Scale): 28.58   CMS Modifier (G-Code): CM    FUNCTIONAL ABILITY STATUS  Functional Mobility/Gait Assessment  Gait Assistance: Not tested  Supine to Sit: maximum assist. Assist with LE's and trunk. Sat EOB for 7 minutes requiring Mod/Max A to maintain static sitting balance. Cues for anterior weight dispersion and assuming/maintaining midline position. Patient fatigued quickly with activity.   Sit to Supine: dependent      Exercise/Education Provided:  Bed mobility  Body mechanics  Energy conservation  Functional activity tolerated  Posture  ROM  Strengthening    Skilled Therapy Provided: Patient received supine in bed. RN approved activity. Coordinated session with OT. Therapist educated pt on POC and physiological benefits of mobilization. Patient agreeable to participate. A&O x 1. Lethargic. Follows simple commands with increased time. Denies pain. Bilateral UE and LE weakness noted, R side deficits> vs L side. L facial droop. R side inattention.     The patient's Approx Degree of Impairment: 86.62% has been calculated based on documentation in the [facility] '6 clicks' Inpatient Basic Mobility Short Form.  Research supports that patients with this level of impairment may benefit from LTAC, however, PLOF unknown and pt would benefit from rehab.  Final disposition will be made by interdisciplinary medical team.    Patient End of Session: In bed;Needs met;Call light within reach;RN aware of session/findings;Alarm set    CURRENT GOALS  Goals to be met by: 3/3/25  Patient Goal Patient's self-stated goal is: pt did not state   Goal #1 Patient is able to demonstrate supine - sit EOB @ level: moderate assistance     Goal #1   Current Status    Goal #2 Patient is able to demonstrate transfers EOB to/from Chair/Wheelchair at assistance level: moderate assistance with gait  belt and Stand pivot transfer technique     Goal #2  Current Status    Goal #3 Patient is able to tolerate 1 minute of static standing balance with bilateral UE support on RW vs lisa-walker requiring Mod A to maintain   Goal #3   Current Status    Goal #4 Patient to demonstrate independence with home activity/exercise instructions provided to patient in preparation for discharge.   Goal #4   Current Status      Patient Evaluation Complexity Level:  History Moderate - 1 or 2 personal factors and/or co-morbidities   Examination of body systems Moderate - addressing a total of 3 or more elements   Clinical Presentation  Moderate - Evolving   Clinical Decision Making  Moderate Complexity     Therapeutic Activity:  9 minutes

## 2025-02-17 NOTE — PROGRESS NOTES
[facility] Hospitalist Progress Note     CC: Hospital Follow up    PCP: Yao Rodriguez       Assessment/Plan:   This is a 70-year-old female with a history of multiple strokes in the past, hypertension, CAD, sleep apnea, type 2 diabetes, who presents to the hospital for evaluation of low p.o. intake, weakness, and general failure to thrive.    Altered mental status again noted this AM  Hypotension during rounds when seen at 1130am  -STAT brain imaging  -give normal saline bolus, may need second  -order lactic acid, blood cultures, CBC (eval if WBC), hold on antibiotics as no fever.   -obtain CXR  -received BP meds this AM, but has been tolerating  -neuro updated.   -nephrology at bedside as well when rounded, ok for saline boluses (hypernatremic on admission)  -She is DNR/DNI     Failure to thrive  Low PO intake, dehydration with PAPO and hypernatremia  Hx of multiple strokes in past   -obtained MRI brain, high risk of recurrent stroke--->negative for acute stroke  -speech eval  -continue electrolyte correction   -neuro consult appreciated, could be progression of dementia, neurodegenerative disease, amyloid angiopathy   -may need to consider enteral feeding if her PO intake is not sufficient---monitor calorie intake, however on my discussion 2/14 more likely she would NOT want G tube  -palliative care of hospice could be of consideration pending progress.   -worsening mentation this AM     Hypernatremia  PAPO  Metabolic acidosis  -giving saline boluses this AM due to hypotension   -severe dehydration on admisision    Severe malnutrition  -continue caloric intake documentation   -dietitian on consult  -agree with oral nutritional supplementation per dietitian team   -significant weight loss past one year (~70lbs)  -monitor potential need for temporary enteral nutrition      Hypertension   -avoid hypotension      Coronary artery disease  -PO meds as able     CEDRICK  -cpap if uses here      Type 2 diabetes    -A1c 5.7  -stop accucheck/insulin  -should likely stop metformin on discharge vs defer to PCP. Given weight loss and poor nutrition suspect A1c will continue to drop. Risk of hypoglycemia very likely      Hx of seizure?   -not on keppra anymore, discontinued yesterday     Will continue to monitor progress     DVT prophylaxis: heparin sub q  Code status: DNR (confirmed with son)  Will call son now given change in status    Addendum:   Spoke to son again this afternoon.  Came to check on patient.  She is more awake and alert after treatment of her blood pressure.  She was hypotensive and likely this caused transient episode of unresponsiveness.  MRI of his brain however still pending.  I did discuss with the son this conversation about further goals of care.  Patient seems high risk of worsening down the line.  I did notice the nutrition/dietitian notes given her severe weight loss.  I am not sure that she will be able to continue to thrive with the amount of food intake.  The patient and son already acknowledged that I do not think she would want a G-tube placed.  He did establish that her CODE STATUS is no CPR, no intubation, thus DNR/DNI.  I did explain to him the difference between palliative care and hospice care.  I did offer him palliative care consultation during this hospitalization and he agreed.    Critical care time 35 minutes      Samir Badillo [facility] Hospitalist        Subjective:     More lethargic, BP checking during rounds and SBPs in 60s    OBJECTIVE:    Blood pressure 103/56, pulse 68, temperature 97.7 °F (36.5 °C), temperature source Oral, resp. rate 17, weight 122 lb 1.6 oz (55.4 kg), SpO2 100%.    Temp:  [97.1 °F (36.2 °C)-97.7 °F (36.5 °C)] 97.7 °F (36.5 °C)  Pulse:  [68-78] 68  Resp:  [17-18] 17  BP: (103-175)/(55-77) 103/56  SpO2:  [100 %] 100 %      Intake/Output:    Intake/Output Summary (Last 24 hours) at 2/17/2025 1145  Last data filed at 2/17/2025 1000  Gross per 24 hour    Intake 395 ml   Output 800 ml   Net -405 ml       Last 3 Weights   02/16/25 0500 122 lb 1.6 oz (55.4 kg)   02/13/25 0900 121 lb 6.4 oz (55.1 kg)   01/30/25 2230 150 lb (68 kg)   01/14/25 1842 165 lb (74.8 kg)       /56 (BP Location: Right arm)   Pulse 68   Temp 97.7 °F (36.5 °C) (Oral)   Resp 17   Wt 122 lb 1.6 oz (55.4 kg)   SpO2 100%   BMI 21.63 kg/m²   General: lethargic   Lungs: clear to ausculation bilaterally  Heart: Regular rate and rhythm  Abdomen: soft, non tender  Extremities: No edema  Neuro: unable to follow commands this AM    Data Review:       Labs:     Recent Labs   Lab 02/12/25 2224 02/14/25  0917   RBC 3.94 3.28*   HGB 10.5* 9.2*   HCT 34.3* 28.4*   MCV 87.1 86.6   MCH 26.6 28.0   MCHC 30.6* 32.4   RDW 17.2* 16.9*   NEPRELIM 9.91* 2.78   WBC 11.5* 4.3   .0 277.0         Recent Labs   Lab 02/15/25  0838 02/16/25  0630 02/17/25  0758   * 87 93   BUN 49* 40* 24*   CREATSERUM 2.13* 1.88* 1.52*   EGFRCR 24* 28* 36*   CA 9.0 8.6* 8.7    142 139   K 3.9 3.9 4.2   * 116* 112   CO2 19.0* 20.0* 18.0*       Recent Labs   Lab 02/12/25 2224 02/14/25  0917 02/15/25  0838 02/16/25  0630 02/17/25  0758   ALT 25  --   --   --   --    AST 23  --   --   --   --    ALB 4.1 3.5 3.6 3.1* 3.2         Imaging:  No results found.      Meds:      sodium chloride  1,000 mL Intravenous Once    sodium chloride  1,000 mL Intravenous Once    aspirin  81 mg Oral Daily    [Held by provider] carvedilol  25 mg Oral BID with meals    atorvastatin  40 mg Oral Nightly    [Held by provider] hydrALAZINE  50 mg Oral BID    brimonidine  1 drop Both Eyes BID    heparin  5,000 Units Subcutaneous Q8H [facility]           glucose **OR** glucose **OR** glucose-vitamin C **OR** dextrose **OR** glucose **OR** glucose **OR** glucose-vitamin C    acetaminophen    melatonin    ondansetron    metoclopramide    polyethylene glycol (PEG 3350)    sennosides    bisacodyl

## 2025-02-17 NOTE — CM/SW NOTE
Anticipated therapy need: Gradual Rehabilitative Therapy.    Social work sent a [facility]IC[facility] referral in [place] and uploaded PASRR.    Social work will speak with the patient and her son once options are available.    SW/CM to remain available for support and/or discharge planning.     Malika Leonardo MSW, LSW  Discharge Planner [id number]

## 2025-02-17 NOTE — OCCUPATIONAL THERAPY NOTE
OCCUPATIONAL THERAPY EVALUATION - INPATIENT     Room Number: 337/337-A  Evaluation Date: 2/17/2025  Type of Evaluation: Initial  Presenting Problem: PAPO, dehydration, failure to thrive, elevated troponin, hyperkalemia  Hx CVA 2021 w/ chronic aphasia, DM2, HTN, CAD, seizure     Physician Order: IP Consult to Occupational Therapy  Reason for Therapy: ADL/IADL Dysfunction and Discharge Planning    OCCUPATIONAL THERAPY ASSESSMENT   Patient is a 70 year old female admitted 2/12/2025 for PAPO, dehydration, failure to thrive, elevated troponin, hyperkalemia. Patient's functional baseline unclear as patient is unreliable historian. Per notes from November 2024 admission, patient independent with ADLs and MI for fx mobility using rollator.    Patient is currently functioning below baseline with ADLs and fx mobility/transfers.  Patient is requiring up to total assist for ADLs as a result of the following impairments: decreased functional strength, decreased functional reach, decreased endurance, impaired balance, impaired coordination, impaired motor planning, decreased muscular endurance, limited B UE/LE ROM, R side inattention, decreased insight to deficits, and decreased safety awareness. Occupational Therapy will continue to follow for duration of hospitalization.    Patient will benefit from continued skilled OT Services to promote return to prior level of function and safety with continuous assistance and gradual rehabilitative therapy.    PLAN DURING HOSPITALIZATION     OT Treatment Plan: Balance activities;ADL training;Energy conservation/work simplification techniques;Functional transfer training;Endurance training;Patient/Family education;Patient/Family training;Equipment eval/education;Compensatory technique education;UE strengthening/ROM;Neuromuscluar reeducation;Cognitive reorientation     OCCUPATIONAL THERAPY MEDICAL/SOCIAL HISTORY   Problem List  Principal Problem:    PAPO (acute kidney injury)  Active  Problems:    Cerebral amyloid angiopathy (HCC)    Dehydration    Failure to thrive in adult    Troponin level elevated    Hyperkalemia    HOME SITUATION  Type of Home: House (home setup from 2024 admission)  Home Layout: One level  Lives With: Son  Drives: No  Use of Assistive Device(s): Rollator    SUBJECTIVE  Patient agreeable to OT evaluation.    OCCUPATIONAL THERAPY EXAMINATION      OBJECTIVE  Precautions: Bed/chair alarm  Fall Risk: High fall risk      PAIN ASSESSMENT  Ratin      ACTIVITY TOLERANCE           BP: 103/56 (after sitting up at EOB)  BP Location: Right arm  BP Method: Automatic  Patient Position: Semi-Jones    O2 SATURATIONS  Oxygen Therapy  SPO2% on Room Air at Rest: 99    COGNITION  Overall Cognitive Status:  Impaired  Orientation Level:  oriented to self; disoriented to place, mo/year, and situation  Following Commands:  follows one step commands with increased time and follows one step commands with repetition  Initiation: cues to initiate tasks and hand over hand to initiate tasks    RANGE OF MOTION / STRENGTH ASSESSMENT  Upper extremity ROM/strength below functional baseline.  L shoulder flexion AROM <60 degrees, R shoulder flexion AROM 0 degrees s/t weakness - tolerated up to ~60 degrees PROM.  L  strength (4/5) > R  strength (3/5).    Demonstrated L facial droop, cervical spine resting in L lateral flexion at rest, and R side inattention. Patient able to visually track and rotate C-spine to R past midline with cues.     ACTIVITIES OF DAILY LIVING ASSESSMENT  AM-PAC ‘6-Clicks’ Inpatient Daily Activity Short Form  How much help from another person does the patient currently need…  -   Putting on and taking off regular lower body clothing?: Total  -   Bathing (including washing, rinsing, drying)?: Total  -   Toileting, which includes using toilet, bedpan or urinal? : Total  -   Putting on and taking off regular upper body clothing?: A Lot  -   Taking care of personal  grooming such as brushing teeth?: A Lot  -   Eating meals?: A Lot    AM-PAC Score:  Score: 9  Approx Degree of Impairment: 79.59%  Standardized Score (AM-PAC Scale): 25.33  CMS Modifier (G-Code): CL    BED MOBILITY  Supine to Sit: max assist  Sit to Supine: max assist  Comments:  Tolerated sitting at EOB ~5 min with max regressing to total assist for static sitting balance s/t posterior lean. Patient unable to functionally use R hand on bedrail to facilitate unsupported sitting at EOB. Max assist x2 for boosting closer to HOB once supine.    FUNCTIONAL TRANSFER ASSESSMENT  Unable to safely progress mobility at this time.    ACTIVITIES OF DAILY LIVING  Eating: max assist (per obs)   Grooming: max assist (per obs)   UB Dressing: max assist (per obs)   LB Dressing: total assist  Toileting: total assist    EDUCATION PROVIDED  Patient Education : Role of Occupational Therapy; Plan of Care; Discharge Recommendations; Posture/Positioning; Proper Body Mechanics; Edema Reduction  Patient's Response to Education: Demonstrates Poor Carry Over to Information    The patient's Approx Degree of Impairment: 79.59% has been calculated based on documentation in the [facility] '6 clicks' Inpatient Daily Activity Short Form.  Research supports that patients with this level of impairment may benefit from LTC.  Final disposition will be made by interdisciplinary medical team.     Patient End of Session: In bed;Needs met;Call light within reach;RN aware of session/findings;All patient questions and concerns addressed;Hospital anti-slip socks;Alarm set    OT Goals  Patient's self stated goal is: none stated     Patient will complete functional transfer with Max A x1  Comment:     Patient will sit EOB with Min A in prep for ADLs  Comment:     Patient will tolerate standing for 1-2 minutes in prep for ADLs with Max A x1  Comment:    Patient will complete oral/facial grooming task in supported sitting with Min A  Comment:           Goals   on: 3/3/25  Frequency: 3x/week    Patient Evaluation Complexity Level:   Occupational Profile/Medical History MODERATE - Expanded review of history including review of medical or therapy record   Specific performance deficits impacting engagement in ADL/IADL HIGH  5+ performance deficits    Client Assessment/Performance Deficits HIGH - Comorbidities and significant modifications of tasks    Clinical Decision Making MODERATE - Analysis of occupational profile, detailed assessments, several treatment options    Overall Complexity MODERATE     OT Session Time  Therapeutic Activity: 14 minutes    LAYLA Turk/L  [facility]  #[id number]

## 2025-02-17 NOTE — PROGRESS NOTES
[facility]  [address], [address]  [facility] [address]  [phone]            Eliud Fine Patient Status:  Inpatient    1955 MRN [id number]   Location [facility] 3W/SW Attending Samir Hernadez, DO   Hosp Day # 4 PCP Yao Rodriguez     Subjective:  Eliud Fine is a(n) 70 year old female.    Hospital course to date:     The patient, Eliud Antony, presents with progressive weakness and confusion. She was admitted to the hospital 2 days ago. Her right side is noted to be weaker than the left, with the left side strength estimated at 4-3/5 and the right side at 2-/5. There is also mention of increased confusion, prompting consideration of a possible stroke.     The patient has a history of multiple strokes, with the most recent occurring in 2022, primarily affecting her left-right side. She was previously able to recover, maintaining social interactions and ambulating with a walker while living with her son. In 2024, she experienced worsening symptoms and was hospitalized for right-sided weakness. During that admission, she was started on Keppra for possible seizures, but MRI showed no acute stroke. After discharge, she continued on antibiotics and Keppra, which caused drowsiness. Keppra was subsequently discontinued, leading to improved alertness.     The patient was readmitted in 2025 with failure to thrive, weakness, and low PO intake. Brain imaging revealed multiple micro-hemorrhages without acute changes, raising suspicion for amyloid angiopathy. She was also found to have acute kidney injury, metabolic acidosis, and dehydration.     B12, TSH, RPR, thyroid ab were not revealing.  Patient was slightly more awake next day, but still had hard time communicating.  However by late morning on Monday she was declining again.  She became hypotensive.  Much less responsive again.  But able to open eyes and look around.  MRI was  repeated and again did not show any acute changes but still showed significant amount of mL of idiopathic microhemorrhages low.    Current Facility-Administered Medications   Medication Dose Route Frequency    sodium chloride 0.9 % IV bolus 1,000 mL  1,000 mL Intravenous Once    sodium chloride 0.9 % IV bolus 1,000 mL  1,000 mL Intravenous Once    sodium chloride 0.45% infusion   Intravenous Continuous    aspirin DR tab 81 mg  81 mg Oral Daily    [Held by provider] carvedilol (Coreg) tab 25 mg  25 mg Oral BID with meals    atorvastatin (Lipitor) tab 40 mg  40 mg Oral Nightly    [Held by provider] hydrALAZINE (Apresoline) tab 50 mg  50 mg Oral BID    brimonidine (Alphagan) 0.2 % ophthalmic solution 1 drop  1 drop Both Eyes BID    glucose (Dex4) 15 GM/59ML oral liquid 15 g  15 g Oral Q15 Min PRN    Or    glucose (Glutose) 40% oral gel 15 g  15 g Oral Q15 Min PRN    Or    glucose-vitamin C (Dex-4) chewable tab 4 tablet  4 tablet Oral Q15 Min PRN    Or    dextrose 50% injection 50 mL  50 mL Intravenous Q15 Min PRN    Or    glucose (Dex4) 15 GM/59ML oral liquid 30 g  30 g Oral Q15 Min PRN    Or    glucose (Glutose) 40% oral gel 30 g  30 g Oral Q15 Min PRN    Or    glucose-vitamin C (Dex-4) chewable tab 8 tablet  8 tablet Oral Q15 Min PRN    heparin (Porcine) 5000 UNIT/ML injection 5,000 Units  5,000 Units Subcutaneous Q8H KB    acetaminophen (Tylenol Extra Strength) tab 500 mg  500 mg Oral Q4H PRN    melatonin tab 3 mg  3 mg Oral Nightly PRN    ondansetron (Zofran) 4 MG/2ML injection 4 mg  4 mg Intravenous Q6H PRN    metoclopramide (Reglan) 5 mg/mL injection 5 mg  5 mg Intravenous Q8H PRN    polyethylene glycol (PEG 3350) (Miralax) 17 g oral packet 17 g  17 g Oral Daily PRN    sennosides (Senokot) tab 17.2 mg  17.2 mg Oral Nightly PRN    bisacodyl (Dulcolax) 10 MG rectal suppository 10 mg  10 mg Rectal Daily PRN       Objective:  Blood pressure 97/50, pulse 69, temperature 97.7 °F (36.5 °C), temperature source Oral,  resp. rate 15, weight 122 lb 1.6 oz (55.4 kg), SpO2 100%.    Physical Exam:  Vitals:    02/17/25 0930 02/17/25 1015 02/17/25 1140 02/17/25 1159   BP: 157/62 103/56 (!) 67/38 97/50   Pulse: 68  69    Resp: 17  15    Temp: 97.7 °F (36.5 °C)      TempSrc: Oral      SpO2: 100%  100%    Weight:           General: No apparent distress, well nourished, well groomed.  Head- Normocephalic, atraumatic  Eyes- No redness or swelling  Neck- No masses or adenopathy  CV: pulses were palpable and normal, no cyanosis or edema     Neurological:     Mental Status- Alert however did not follow any commands.  She will look at me and then she looked away.    Lab Results   Component Value Date    WBC 4.3 02/14/2025    HGB 9.2 (L) 02/14/2025    HCT 28.4 (L) 02/14/2025    .0 02/14/2025    CREATSERUM 1.52 (H) 02/17/2025    BUN 24 (H) 02/17/2025     02/17/2025    K 4.2 02/17/2025     02/17/2025    CO2 18.0 (L) 02/17/2025    GLU 93 02/17/2025    CA 8.7 02/17/2025    ALB 3.2 02/17/2025    ALKPHO 94 02/12/2025    BILT 0.4 02/12/2025    TP 7.1 02/12/2025    AST 23 02/12/2025    ALT 25 02/12/2025    PTT 30.4 09/25/2024    INR 1.11 09/25/2024    T4F 1.3 02/15/2025    TSH 1.457 02/15/2025    LIP 53 01/04/2025    DDIMER 2.40 (H) 10/11/2022    ESRML 37 (H) 12/18/2022    CRP 0.31 (H) 12/18/2022    MG 2.0 02/16/2025    PHOS 2.9 02/17/2025    CK 83 02/12/2025    B12 687 02/15/2025       No results found.      MRI of the brain was independently reviewed as above.      Assessment:  Patient Active Problem List   Diagnosis    Vision loss, left eye    Cerebrovascular accident (CVA), unspecified mechanism (HCC)    Essential hypertension    COVID-19    Right sided weakness    Aphasia due to recent stroke    Cerebral amyloid angiopathy (HCC)    Toxic encephalopathy    Hypertensive urgency    Acute chest pain    Renal artery stenosis    Cystitis    PAPO (acute kidney injury)    Primary hypertension    Uncontrolled hypertension    CVA (cerebral  vascular accident) (HCC)    Acute CVA (cerebrovascular accident) (HCC)    Slurred speech    Weakness of right upper extremity    Dehydration    Failure to thrive in adult    Troponin level elevated    Hyperkalemia        Failure to thrive in adult     Note: Patient has a history of multiple strokes, with the most recent in January 2022 affecting her left-right side.  Note: MRI in November 2024 showed no acute stroke.  Note: February 2025 brain imaging revealed multiple micro-hemorrhages without acute changes, leading to a strong suspicion of amyloid angiopathy.        Patient's condition appears to be a progressive neurodegenerative process, possibly vascular dementia, (amyloid angiopathy definitely playing a role), there could also be possibility of Alzheimer's disease, therefore amyloid ratio will be ordered to confirm the diagnosis.  Additional other blood work was ordered to rule out any other etiology.  So far it was not revealing.    Worsening during hospitalization.  CT of the head was repeated to rule out any possibility of development of hemorrhages as well as of the MRI of the brain was  done again to rule out any new acute strokes.  Repeat testing so far was not revealing for any new changes.  Patient is not a candidate for thrombolytics due to her significant burden of the amyloid angiopathy disease.    Some possibility of fluctuating status due to subclinical seizures, therefore EEG will be done and after discussing pros and cons of starting antiepileptic medications with the family but did agree to start with the 250 mg of Keppra twice a day.  Have discussed that possibly her fluctuating and overall decline is relating to her vascular dementia annual image apathy and not necessarily seizures.      Failure to thrive:  - Manage hydration status  - Monitor and correct electrolyte imbalances  - Assess nutritional status and consider nutritional support if needed  - Assess need for supportive care and  potential placement given progressive decline  Possibility of hospice could also be entertained.    Chris Chandra MD

## 2025-02-17 NOTE — PLAN OF CARE
Pt A/Ox2, Complete. IVF continued.     Problem: Patient Centered Care  Goal: Patient preferences are identified and integrated in the patient's plan of care  Description: Interventions:  - What would you like us to know as we care for you? Lives at home with son Gokul who assists with patient's care.  - Provide timely, complete, and accurate information to patient/family  - Incorporate patient and family knowledge, values, beliefs, and cultural backgrounds into the planning and delivery of care  - Encourage patient/family to participate in care and decision-making at the level they choose  - Honor patient and family perspectives and choices  Outcome: Progressing     Problem: Diabetes/Glucose Control  Goal: Glucose maintained within prescribed range  Description: INTERVENTIONS:  - Monitor Blood Glucose as ordered  - Assess for signs and symptoms of hyperglycemia and hypoglycemia  - Administer ordered medications to maintain glucose within target range  - Assess barriers to adequate nutritional intake and initiate nutrition consult as needed  - Instruct patient on self management of diabetes  Outcome: Progressing     Problem: Patient/Family Goals  Goal: Patient/Family Long Term Goal  Description: Patient's Long Term Goal: \"get my mom to eat again\"    Interventions:  - Monitor intake and output  - IV hydration  - See additional Care Plan goals for specific interventions  Outcome: Progressing  Goal: Patient/Family Short Term Goal  Description: Patient's Short Term Goal: \"more awake\"    Interventions:   -Neuro checks   -Monitor labs and vitals signs  -Follow provider recommendations  - See additional Care Plan goals for specific interventions  Outcome: Progressing     Problem: SAFETY ADULT - FALL  Goal: Free from fall injury  Description: INTERVENTIONS:  - Assess pt frequently for physical needs  - Identify cognitive and physical deficits and behaviors that affect risk of falls.  - [place] fall precautions as indicated  by assessment.  - Educate pt/family on patient safety including physical limitations  - Instruct pt to call for assistance with activity based on assessment  - Modify environment to reduce risk of injury  - Provide assistive devices as appropriate  - Consider OT/PT consult to assist with strengthening/mobility  - Encourage toileting schedule  Outcome: Progressing     Problem: DISCHARGE PLANNING  Goal: Discharge to home or other facility with appropriate resources  Description: INTERVENTIONS:  - Identify barriers to discharge w/pt and caregiver  - Include patient/family/discharge partner in discharge planning  - Arrange for needed discharge resources and transportation as appropriate  - Identify discharge learning needs (meds, wound care, etc)  - Arrange for interpreters to assist at discharge as needed  - Consider post-discharge preferences of patient/family/discharge partner  - Complete POLST form as appropriate  - Assess patient's ability to be responsible for managing their own health  - Refer to Case Management Department for coordinating discharge planning if the patient needs post-hospital services based on physician/LIP order or complex needs related to functional status, cognitive ability or social support system  Outcome: Progressing     Problem: Altered Communication/Language Barrier  Goal: Patient/Family is able to understand and participate in their care  Description: Interventions:  - Assess communication ability and preferred communication style  - Implement communication aides and strategies  - Use visual cues when possible  - Listen attentively, be patient, do not interrupt  - Minimize distractions  - Allow time for understanding and response  - Establish method for patient to ask for assistance (call light)  - Provide an  as needed  - Communicate barriers and strategies to overcome with those who interact with patient  Outcome: Progressing     Problem: METABOLIC/FLUID AND ELECTROLYTES -  ADULT  Goal: Electrolytes maintained within normal limits  Description: INTERVENTIONS:  - Monitor labs and rhythm and assess patient for signs and symptoms of electrolyte imbalances  - Administer electrolyte replacement as ordered  - Monitor response to electrolyte replacements, including rhythm and repeat lab results as appropriate  - Fluid restriction as ordered  - Instruct patient on fluid and nutrition restrictions as appropriate  Outcome: Progressing  Goal: Hemodynamic stability and optimal renal function maintained  Description: INTERVENTIONS:  - Monitor labs and assess for signs and symptoms of volume excess or deficit  - Monitor intake, output and patient weight  - Monitor urine specific gravity, serum osmolarity and serum sodium as indicated or ordered  - Monitor response to interventions for patient's volume status, including labs, urine output, blood pressure (other measures as available)  - Encourage oral intake as appropriate  - Instruct patient on fluid and nutrition restrictions as appropriate  Outcome: Progressing     Problem: SKIN/TISSUE INTEGRITY - ADULT  Goal: Skin integrity remains intact  Description: INTERVENTIONS  - Assess and document risk factors for pressure ulcer development  - Assess and document skin integrity  - Monitor for areas of redness and/or skin breakdown  - Initiate interventions, skin care algorithm/standards of care as needed  Outcome: Progressing  Goal: Incision(s), wounds(s) or drain site(s) healing without S/S of infection  Description: INTERVENTIONS:  - Assess and document risk factors for pressure ulcer development  - Assess and document skin integrity  - Assess and document dressing/incision, wound bed, drain sites and surrounding tissue  - Implement wound care per orders  - Initiate isolation precautions as appropriate  - Initiate Pressure Ulcer prevention bundle as indicated  Outcome: Progressing     Problem: NEUROLOGICAL - ADULT  Goal: Achieves stable or improved  neurological status  Description: INTERVENTIONS  - Assess for and report changes in neurological status  - Initiate measures to prevent increased intracranial pressure  - Maintain blood pressure and fluid volume within ordered parameters to optimize cerebral perfusion and minimize risk of hemorrhage  - Monitor temperature, glucose, and sodium. Initiate appropriate interventions as ordered  Outcome: Progressing  Goal: Achieves maximal functionality and self care  Description: INTERVENTIONS  - Monitor swallowing and airway patency with patient fatigue and changes in neurological status  - Encourage and assist patient to increase activity and self care with guidance from PT/OT  - Encourage visually impaired, hearing impaired and aphasic patients to use assistive/communication devices  Outcome: Progressing     Problem: Delirium  Goal: Minimize duration of delirium  Description: Interventions:  - Encourage use of hearing aids, eye glasses  - Promote highest level of mobility daily  - Provide frequent reorientation  - Promote wakefulness i.e. lights on, blinds open  - Promote sleep, encourage patient's normal rest cycle i.e. lights off, TV off, minimize noise and interruptions  - Encourage family to assist in orientation and promotion of home routines  Outcome: Progressing

## 2025-02-17 NOTE — PLAN OF CARE
Patient alert and oriented x 1 on room air. Patient denies pain. Patient had altered mental status and was lethargic this morning, MD notified and IVF bolus given. CT head, CXR, and renal ultrasound done today. Plan for MRI brain. IVF infusing. Call light within reach and safety precautions in place.     Problem: Patient Centered Care  Goal: Patient preferences are identified and integrated in the patient's plan of care  Description: Interventions:  - What would you like us to know as we care for you? Lives at home with son Gokul who assists with patient's care.  - Provide timely, complete, and accurate information to patient/family  - Incorporate patient and family knowledge, values, beliefs, and cultural backgrounds into the planning and delivery of care  - Encourage patient/family to participate in care and decision-making at the level they choose  - Honor patient and family perspectives and choices  Outcome: Progressing     Problem: Diabetes/Glucose Control  Goal: Glucose maintained within prescribed range  Description: INTERVENTIONS:  - Monitor Blood Glucose as ordered  - Assess for signs and symptoms of hyperglycemia and hypoglycemia  - Administer ordered medications to maintain glucose within target range  - Assess barriers to adequate nutritional intake and initiate nutrition consult as needed  - Instruct patient on self management of diabetes  Outcome: Progressing     Problem: Patient/Family Goals  Goal: Patient/Family Long Term Goal  Description: Patient's Long Term Goal: \"get my mom to eat again\"    Interventions:  - Monitor intake and output  - IV hydration  - See additional Care Plan goals for specific interventions  Outcome: Progressing  Goal: Patient/Family Short Term Goal  Description: Patient's Short Term Goal: \"more awake\"    Interventions:   -Neuro checks   -Monitor labs and vitals signs  -Follow provider recommendations  - See additional Care Plan goals for specific interventions  Outcome:  Progressing     Problem: SAFETY ADULT - FALL  Goal: Free from fall injury  Description: INTERVENTIONS:  - Assess pt frequently for physical needs  - Identify cognitive and physical deficits and behaviors that affect risk of falls.  - [place] fall precautions as indicated by assessment.  - Educate pt/family on patient safety including physical limitations  - Instruct pt to call for assistance with activity based on assessment  - Modify environment to reduce risk of injury  - Provide assistive devices as appropriate  - Consider OT/PT consult to assist with strengthening/mobility  - Encourage toileting schedule  Outcome: Progressing     Problem: DISCHARGE PLANNING  Goal: Discharge to home or other facility with appropriate resources  Description: INTERVENTIONS:  - Identify barriers to discharge w/pt and caregiver  - Include patient/family/discharge partner in discharge planning  - Arrange for needed discharge resources and transportation as appropriate  - Identify discharge learning needs (meds, wound care, etc)  - Arrange for interpreters to assist at discharge as needed  - Consider post-discharge preferences of patient/family/discharge partner  - Complete POLST form as appropriate  - Assess patient's ability to be responsible for managing their own health  - Refer to Case Management Department for coordinating discharge planning if the patient needs post-hospital services based on physician/LIP order or complex needs related to functional status, cognitive ability or social support system  Outcome: Progressing     Problem: Altered Communication/Language Barrier  Goal: Patient/Family is able to understand and participate in their care  Description: Interventions:  - Assess communication ability and preferred communication style  - Implement communication aides and strategies  - Use visual cues when possible  - Listen attentively, be patient, do not interrupt  - Minimize distractions  - Allow time for understanding  and response  - Establish method for patient to ask for assistance (call light)  - Provide an  as needed  - Communicate barriers and strategies to overcome with those who interact with patient  Outcome: Progressing     Problem: METABOLIC/FLUID AND ELECTROLYTES - ADULT  Goal: Electrolytes maintained within normal limits  Description: INTERVENTIONS:  - Monitor labs and rhythm and assess patient for signs and symptoms of electrolyte imbalances  - Administer electrolyte replacement as ordered  - Monitor response to electrolyte replacements, including rhythm and repeat lab results as appropriate  - Fluid restriction as ordered  - Instruct patient on fluid and nutrition restrictions as appropriate  Outcome: Progressing  Goal: Hemodynamic stability and optimal renal function maintained  Description: INTERVENTIONS:  - Monitor labs and assess for signs and symptoms of volume excess or deficit  - Monitor intake, output and patient weight  - Monitor urine specific gravity, serum osmolarity and serum sodium as indicated or ordered  - Monitor response to interventions for patient's volume status, including labs, urine output, blood pressure (other measures as available)  - Encourage oral intake as appropriate  - Instruct patient on fluid and nutrition restrictions as appropriate  Outcome: Progressing     Problem: SKIN/TISSUE INTEGRITY - ADULT  Goal: Skin integrity remains intact  Description: INTERVENTIONS  - Assess and document risk factors for pressure ulcer development  - Assess and document skin integrity  - Monitor for areas of redness and/or skin breakdown  - Initiate interventions, skin care algorithm/standards of care as needed  Outcome: Progressing  Goal: Incision(s), wounds(s) or drain site(s) healing without S/S of infection  Description: INTERVENTIONS:  - Assess and document risk factors for pressure ulcer development  - Assess and document skin integrity  - Assess and document dressing/incision, wound  bed, drain sites and surrounding tissue  - Implement wound care per orders  - Initiate isolation precautions as appropriate  - Initiate Pressure Ulcer prevention bundle as indicated  Outcome: Progressing     Problem: NEUROLOGICAL - ADULT  Goal: Achieves stable or improved neurological status  Description: INTERVENTIONS  - Assess for and report changes in neurological status  - Initiate measures to prevent increased intracranial pressure  - Maintain blood pressure and fluid volume within ordered parameters to optimize cerebral perfusion and minimize risk of hemorrhage  - Monitor temperature, glucose, and sodium. Initiate appropriate interventions as ordered  Outcome: Progressing  Goal: Achieves maximal functionality and self care  Description: INTERVENTIONS  - Monitor swallowing and airway patency with patient fatigue and changes in neurological status  - Encourage and assist patient to increase activity and self care with guidance from PT/OT  - Encourage visually impaired, hearing impaired and aphasic patients to use assistive/communication devices  Outcome: Progressing     Problem: Delirium  Goal: Minimize duration of delirium  Description: Interventions:  - Encourage use of hearing aids, eye glasses  - Promote highest level of mobility daily  - Provide frequent reorientation  - Promote wakefulness i.e. lights on, blinds open  - Promote sleep, encourage patient's normal rest cycle i.e. lights off, TV off, minimize noise and interruptions  - Encourage family to assist in orientation and promotion of home routines  Outcome: Progressing

## 2025-02-18 PROBLEM — Z51.5 PALLIATIVE CARE BY SPECIALIST: Status: ACTIVE | Noted: 2025-02-18

## 2025-02-18 PROBLEM — Z71.89 ADVANCE CARE PLANNING: Status: ACTIVE | Noted: 2025-02-18

## 2025-02-18 PROBLEM — Z71.89 GOALS OF CARE, COUNSELING/DISCUSSION: Status: ACTIVE | Noted: 2025-02-18

## 2025-02-18 LAB
ANION GAP SERPL CALC-SCNC: 10 MMOL/L (ref 0–18)
BUN BLD-MCNC: 26 MG/DL (ref 9–23)
BUN/CREAT SERPL: 17.4 (ref 10–20)
CALCIUM BLD-MCNC: 8.5 MG/DL (ref 8.7–10.4)
CHLORIDE SERPL-SCNC: 115 MMOL/L (ref 98–112)
CO2 SERPL-SCNC: 19 MMOL/L (ref 21–32)
CREAT BLD-MCNC: 1.49 MG/DL
EGFRCR SERPLBLD CKD-EPI 2021: 37 ML/MIN/1.73M2 (ref 60–?)
GLUCOSE BLD-MCNC: 103 MG/DL (ref 70–99)
GLUCOSE BLDC GLUCOMTR-MCNC: 113 MG/DL (ref 70–99)
GLUCOSE BLDC GLUCOMTR-MCNC: 124 MG/DL (ref 70–99)
GLUCOSE BLDC GLUCOMTR-MCNC: 87 MG/DL (ref 70–99)
GLUCOSE BLDC GLUCOMTR-MCNC: 90 MG/DL (ref 70–99)
OSMOLALITY SERPL CALC.SUM OF ELEC: 303 MOSM/KG (ref 275–295)
POTASSIUM SERPL-SCNC: 3.9 MMOL/L (ref 3.5–5.1)
SODIUM SERPL-SCNC: 144 MMOL/L (ref 136–145)

## 2025-02-18 PROCEDURE — 95816 EEG AWAKE AND DROWSY: CPT | Performed by: OTHER

## 2025-02-18 PROCEDURE — 99222 1ST HOSP IP/OBS MODERATE 55: CPT | Performed by: REGISTERED NURSE

## 2025-02-18 PROCEDURE — 99232 SBSQ HOSP IP/OBS MODERATE 35: CPT | Performed by: OTHER

## 2025-02-18 RX ORDER — SODIUM CHLORIDE 9 MG/ML
INJECTION, SOLUTION INTRAVENOUS CONTINUOUS
Status: ACTIVE | OUTPATIENT
Start: 2025-02-18 | End: 2025-02-18

## 2025-02-18 RX ORDER — LEVETIRACETAM 250 MG/1
250 TABLET ORAL 2 TIMES DAILY
Status: DISCONTINUED | OUTPATIENT
Start: 2025-02-18 | End: 2025-02-26

## 2025-02-18 NOTE — PLAN OF CARE
Patient alert and oriented x 1 on room air. Patient denies pain. IVF continued. Continuous EEG in place. Oral intake frequently encouraged. Call light within reach and safety precautions in place.     Problem: Patient Centered Care  Goal: Patient preferences are identified and integrated in the patient's plan of care  Description: Interventions:  - What would you like us to know as we care for you? Lives at home with son Gokul who assists with patient's care.  - Provide timely, complete, and accurate information to patient/family  - Incorporate patient and family knowledge, values, beliefs, and cultural backgrounds into the planning and delivery of care  - Encourage patient/family to participate in care and decision-making at the level they choose  - Honor patient and family perspectives and choices  Outcome: Progressing     Problem: Diabetes/Glucose Control  Goal: Glucose maintained within prescribed range  Description: INTERVENTIONS:  - Monitor Blood Glucose as ordered  - Assess for signs and symptoms of hyperglycemia and hypoglycemia  - Administer ordered medications to maintain glucose within target range  - Assess barriers to adequate nutritional intake and initiate nutrition consult as needed  - Instruct patient on self management of diabetes  Outcome: Progressing     Problem: Patient/Family Goals  Goal: Patient/Family Long Term Goal  Description: Patient's Long Term Goal: \"get my mom to eat again\"    Interventions:  - Monitor intake and output  - IV hydration  - See additional Care Plan goals for specific interventions  Outcome: Progressing  Goal: Patient/Family Short Term Goal  Description: Patient's Short Term Goal: \"more awake\"    Interventions:   -Neuro checks   -Monitor labs and vitals signs  -Follow provider recommendations  - See additional Care Plan goals for specific interventions  Outcome: Progressing     Problem: SAFETY ADULT - FALL  Goal: Free from fall injury  Description: INTERVENTIONS:  -  Assess pt frequently for physical needs  - Identify cognitive and physical deficits and behaviors that affect risk of falls.  - [place] fall precautions as indicated by assessment.  - Educate pt/family on patient safety including physical limitations  - Instruct pt to call for assistance with activity based on assessment  - Modify environment to reduce risk of injury  - Provide assistive devices as appropriate  - Consider OT/PT consult to assist with strengthening/mobility  - Encourage toileting schedule  Outcome: Progressing     Problem: DISCHARGE PLANNING  Goal: Discharge to home or other facility with appropriate resources  Description: INTERVENTIONS:  - Identify barriers to discharge w/pt and caregiver  - Include patient/family/discharge partner in discharge planning  - Arrange for needed discharge resources and transportation as appropriate  - Identify discharge learning needs (meds, wound care, etc)  - Arrange for interpreters to assist at discharge as needed  - Consider post-discharge preferences of patient/family/discharge partner  - Complete POLST form as appropriate  - Assess patient's ability to be responsible for managing their own health  - Refer to Case Management Department for coordinating discharge planning if the patient needs post-hospital services based on physician/LIP order or complex needs related to functional status, cognitive ability or social support system  Outcome: Progressing     Problem: Altered Communication/Language Barrier  Goal: Patient/Family is able to understand and participate in their care  Description: Interventions:  - Assess communication ability and preferred communication style  - Implement communication aides and strategies  - Use visual cues when possible  - Listen attentively, be patient, do not interrupt  - Minimize distractions  - Allow time for understanding and response  - Establish method for patient to ask for assistance (call light)  - Provide an   as needed  - Communicate barriers and strategies to overcome with those who interact with patient  Outcome: Progressing     Problem: METABOLIC/FLUID AND ELECTROLYTES - ADULT  Goal: Electrolytes maintained within normal limits  Description: INTERVENTIONS:  - Monitor labs and rhythm and assess patient for signs and symptoms of electrolyte imbalances  - Administer electrolyte replacement as ordered  - Monitor response to electrolyte replacements, including rhythm and repeat lab results as appropriate  - Fluid restriction as ordered  - Instruct patient on fluid and nutrition restrictions as appropriate  Outcome: Progressing  Goal: Hemodynamic stability and optimal renal function maintained  Description: INTERVENTIONS:  - Monitor labs and assess for signs and symptoms of volume excess or deficit  - Monitor intake, output and patient weight  - Monitor urine specific gravity, serum osmolarity and serum sodium as indicated or ordered  - Monitor response to interventions for patient's volume status, including labs, urine output, blood pressure (other measures as available)  - Encourage oral intake as appropriate  - Instruct patient on fluid and nutrition restrictions as appropriate  Outcome: Progressing     Problem: SKIN/TISSUE INTEGRITY - ADULT  Goal: Skin integrity remains intact  Description: INTERVENTIONS  - Assess and document risk factors for pressure ulcer development  - Assess and document skin integrity  - Monitor for areas of redness and/or skin breakdown  - Initiate interventions, skin care algorithm/standards of care as needed  Outcome: Progressing  Goal: Incision(s), wounds(s) or drain site(s) healing without S/S of infection  Description: INTERVENTIONS:  - Assess and document risk factors for pressure ulcer development  - Assess and document skin integrity  - Assess and document dressing/incision, wound bed, drain sites and surrounding tissue  - Implement wound care per orders  - Initiate isolation precautions  as appropriate  - Initiate Pressure Ulcer prevention bundle as indicated  Outcome: Progressing     Problem: NEUROLOGICAL - ADULT  Goal: Achieves stable or improved neurological status  Description: INTERVENTIONS  - Assess for and report changes in neurological status  - Initiate measures to prevent increased intracranial pressure  - Maintain blood pressure and fluid volume within ordered parameters to optimize cerebral perfusion and minimize risk of hemorrhage  - Monitor temperature, glucose, and sodium. Initiate appropriate interventions as ordered  Outcome: Progressing  Goal: Achieves maximal functionality and self care  Description: INTERVENTIONS  - Monitor swallowing and airway patency with patient fatigue and changes in neurological status  - Encourage and assist patient to increase activity and self care with guidance from PT/OT  - Encourage visually impaired, hearing impaired and aphasic patients to use assistive/communication devices  Outcome: Progressing     Problem: Delirium  Goal: Minimize duration of delirium  Description: Interventions:  - Encourage use of hearing aids, eye glasses  - Promote highest level of mobility daily  - Provide frequent reorientation  - Promote wakefulness i.e. lights on, blinds open  - Promote sleep, encourage patient's normal rest cycle i.e. lights off, TV off, minimize noise and interruptions  - Encourage family to assist in orientation and promotion of home routines  Outcome: Progressing

## 2025-02-18 NOTE — CM/SW NOTE
02/18/25 1000   CM/SW Referral Data   Referral Source Social Work (self-referral)   Reason for Referral Discharge planning   Informant Son   Medical Hx   Does patient have an established PCP? Yes   Significant Past Medical/Mental Health Hx Asthma, DM, HTN, Glaucoma, HLD, CEDRICK, Arthritis   Patient Info   Patient's Current Mental Status at Time of Assessment Alert;Oriented   Patient's Home Environment House   Number of Levels in Home 1   Number of Stair in Home   (8 stairs outside)   Patient lives with Son   Patient Status Prior to Admission   Independent with ADLs and Mobility Yes   Services in place prior to admission DME/Supplies at home   Type of DME/Supplies Wheeled Walker   Discharge Needs   Anticipated D/C needs Subacute rehab   Services Requested   Submitted to [facility] Yes   PASRR Level 1 Submitted Yes   Choice of Post-Acute Provider   Informed patient of right to choose their preferred provider Yes   List of appropriate post-acute services provided to patient/family with quality data Yes   Information given to Son     Social work was able to speak to the patient's son via phone regarding discharge planning.    The patient lives in a 1 level home with her son.  The patient is independent with all ADLs at baseline.  The patient uses a walker.    Social work advised the son that the Anticipated therapy need: Gradual Rehabilitative Therapy.  The patient's son is in agreement.    Social work provided the patient's son with the ALICIA list at bedside.    Social work will follow up for choice.    ENRIQUE/CM to remain available for support and/or discharge planning.     Malika Leonardo MSW, LSW  Discharge Planner [id number]

## 2025-02-18 NOTE — PLAN OF CARE
Aox1, follows commands. Room air, CPAP overnight. Max assist. BP elevated 174/56, per MD pause fluids until morning recheck.     Problem: SKIN/TISSUE INTEGRITY - ADULT  Goal: Skin integrity remains intact  Description: INTERVENTIONS  - Assess and document risk factors for pressure ulcer development  - Assess and document skin integrity  - Monitor for areas of redness and/or skin breakdown  - Initiate interventions, skin care algorithm/standards of care as needed  Outcome: Progressing     Problem: Diabetes/Glucose Control  Goal: Glucose maintained within prescribed range  Description: INTERVENTIONS:  - Monitor Blood Glucose as ordered  - Assess for signs and symptoms of hyperglycemia and hypoglycemia  - Administer ordered medications to maintain glucose within target range  - Assess barriers to adequate nutritional intake and initiate nutrition consult as needed  - Instruct patient on self management of diabetes  Outcome: Progressing     Problem: DISCHARGE PLANNING  Goal: Discharge to home or other facility with appropriate resources  Description: INTERVENTIONS:  - Identify barriers to discharge w/pt and caregiver  - Include patient/family/discharge partner in discharge planning  - Arrange for needed discharge resources and transportation as appropriate  - Identify discharge learning needs (meds, wound care, etc)  - Arrange for interpreters to assist at discharge as needed  - Consider post-discharge preferences of patient/family/discharge partner  - Complete POLST form as appropriate  - Assess patient's ability to be responsible for managing their own health  - Refer to Case Management Department for coordinating discharge planning if the patient needs post-hospital services based on physician/LIP order or complex needs related to functional status, cognitive ability or social support system  Outcome: Progressing

## 2025-02-18 NOTE — PROGRESS NOTES
[facility]  part of [facility]    Progress Note        Subjective:     More awake and alert today   Denies SOB or pain   Son at beside      Objective:   Vital Signs:  Blood pressure (!) 175/62, pulse 71, temperature 97.6 °F (36.4 °C), temperature source Axillary, resp. rate 17, weight 132 lb 14.4 oz (60.3 kg), SpO2 98%.  General: NAD  HEENT: NCAT   Respiratory: Clear to auscultation   Cardiovascular: Normal S1S2   Abdomen: Soft, NT/ND    Ext: No LE edema  Neuro: awake, sleepy      Results:     Recent Labs   Lab 02/16/25  0630 02/17/25  0758 02/18/25  0833   GLU 87 93 103*   BUN 40* 24* 26*   CREATSERUM 1.88* 1.52* 1.49*   EGFRCR 28* 36* 37*   CA 8.6* 8.7 8.5*    139 144   K 3.9 4.2 3.9   * 112 115*   CO2 20.0* 18.0* 19.0*     Recent Labs   Lab 02/12/25  2224 02/14/25  0917 02/17/25  1228   RBC 3.94 3.28* 3.07*   HGB 10.5* 9.2* 8.3*   HCT 34.3* 28.4* 26.4*   MCV 87.1 86.6 86.0   MCH 26.6 28.0 27.0   MCHC 30.6* 32.4 31.4   RDW 17.2* 16.9* 16.7*   NEPRELIM 9.91* 2.78 4.31   WBC 11.5* 4.3 5.6   .0 277.0 249.0         Assessment and Plan:   70 year old female with PMH of CVA, DM2, HTN, CKD with baseline creatinine around 1.7 presents to the hospital with weakness, decreased PO intake and FTT . Nephrology is consulted for PAPO on CKD and hypernatremia    PAPO on CKD stage 3:  - Baseline Creatinine 1.7  - PAPO from decreased PO intake   - PAPO resolved - creatinine below baseline    - encourage oral hydration    - Avoid Nephrotoxins  - Renally adjust medications  - No acute indication for RRT     Renal artery stenosis:  - limited study, however no evidence of ISABEL.  Neg for hydronephrosis     Acidosis:   - may be due to PAPO and IVFs     - monitor, may be need bicarbonate if worsening    - mildly improved today     Hypernatremia:  - resolved       Hyperkalemia:  - resolved    Failure to thrive   Worsening mentation  - per primary and neuro     We will continue to follow     Belén Santiago,  MD  [facility]  - Nephrology

## 2025-02-18 NOTE — SLP NOTE
SPEECH DAILY NOTE - INPATIENT    ASSESSMENT & PLAN   ASSESSMENT    Proper PPE worn. Hands sanitized upon entrance/exit Pt room.       Pt alert, afebrile and on room air. Pt seen for swallow analysis per RE-BSE recommendations (after consulting with RN). Son present. Pt agreed to participate. Pt's preferred method of learning verbal. Pt upright in bed; observed with current diet of pureed/mildly-thick liquids for monitoring diet tolerance. Additionally, Pt seen with soft solids, mildly-thick and thin liquids trials for candidacy of diet upgrade. Swallowing precautions/strategies discussed as SLP directed oral trials.  Prolonged effortful mastication on soft solids. Lingual skills functional for bolus control of all liquids and pureed consistency; increased MICKI on soft solids. No significant oral retention. Pharyngeal response appeared to trigger within 1-2 sec  per hyolaryngeal elevation to completion (functional rise/strength per palpation). No overt CSA on soft solid, pureed, moderately-thick, mildly-thick nor thin liquids. Collaborated with RN regarding Pt's swallowing plan of care. Diet to be upgraded to BITE SIZE/THIN liquids/NO STRAW/pills whole in pureed. Per RN, Pt with good tolerance of current diet. No report of difficulty taking meds. Sp02 ~98% during this session. Call light within Pt's reach upon SLP discharge from     CXR 2/17/25:  CONCLUSION:   1. No acute cardiopulmonary finding.       PLAN:    To f/u x2-3 sessions to ensure safe intake of NEW UPGRADED diet and reinforce swallowing/aspiration precautions. To monitor for new CXR results. Diet to oontinue to be upgraded as tolerated. Additional family education as available.        Diet Recommendations - Solids: Mechanical soft chopped/ Soft & Bite Sized  Diet Recommendations - Liquids: Thin Liquids    Compensatory Strategies Recommended: Alternate consistencies  Aspiration Precautions: Upright position;Slow rate;Small bites;No straw;1:1  feeding  Medication Administration Recommendations: Whole in puree    Patient Experiencing Pain: No  Treatment Plan  Treatment Plan/Recommendations: Aspiration precautions  Interdisciplinary Communication: Discussed with RN  Plan posted at bedside           GOALS  Goal #1 The patient will tolerate PUREED consistency and MODERATELY-THICK liquids without overt signs or symptoms of aspiration with 100 % accuracy over 1-2 session(s).    No CSA on current diet.       GOAL MET 2/18     Goal #2 The patient/family/caregiver will demonstrate understanding and implementation of aspiration precautions and swallow strategies independently over 4-5 session(s).    Swallowing precautions posted in room. Education completed with Pt's son; v/u.     In Progress   Goal #3 The patient will utilize compensatory strategies as outlined by  BSSE (clinical evaluation) including Slow rate, Small bites, CONTROLLED LIQUIDS,No straws, Upright 90 degrees with moderate feeding assistance 90% of the time across 4-5 sessions.    SLP fed Pt; strategies executed.     In Progress   Goal #4 The patient will tolerate trial upgrade of BITE SIZE/SOFT/SOLID consistency and MILDLY-THICK/THIN liquids without overt signs or symptoms of aspiration with 100 % accuracy over 3-4 session(s).    Upgraded to BITE SIZE/THIN liquids. To f/u for tolerance and possible continue upgrade of food consistency.     In Progress   FOLLOW UP  Follow Up Needed (Documentation Required): Yes  SLP Follow-up Date: 02/19/25  Duration: 2 weeks    Session: 1 S/P RE-BSE    If you have any questions, please contact   Sophia Jiménez M.S. CCC/SLP  Speech-Language Pathologist  [facility]  #[phone]

## 2025-02-18 NOTE — CM/SW NOTE
Social work received a consult to send a palliative care referral.    Social work sent a palliative care referral in [place].    Social work will follow up on acceptance.    SW/CM to remain available for support and/or discharge planning.     Malika Leonardo MSW, LSW  Discharge Planner [id number]

## 2025-02-18 NOTE — CONSULTS
[facility]  part of [facility]  Palliative Care Initial Consult Note    Eliud Fine Patient Status:  Inpatient    1955 MRN [id number]   Location [facility] 3W/SW Attending Puneet Thomas MD   Hosp Day # 5 PCP Yao Rodriguez     Date of Consult: 2025  Patient seen at: [facility]    The  Cures Act makes medical notes like these available to patients in the interest of transparency. Please be advised this is a medical document. Medical documents are intended to carry relevant information, facts as evident, and the clinical opinion of the practitioner. The medical note is intended as peer to peer communication and may appear blunt or direct. It is written in medical language and may contain abbreviations or verbiage that are unfamiliar.     Reason for Consultation: Consult ordered by:: Dr. Hernadez for evaluation of Palliative Care needs and Establish palliative care;Goals of care discussion.    Subjective     History of Present Illness: Eliud Fine is a 70 year old female with history of multiple CVA's, HTN, CAD, sleep apnea, DM type II, HLD, CKD, glaucoma, asthma who was admitted on 2025 for decreased po intake, and weakness with inability to ambulate. See below for reviewed labs and imaging. Pt was admitted for treatment and evaluation of failure to thrive, increased confusion with AMS, hypernatremia, hyperkalemia, and PAPO/dehydration. See below for reviewed imaging.     She is being followed by renal and neurology services.    I reviewed labs and imaging-see below. History was obtained from [facility] and pt's son Gokul as pt is unable to provide any history.  Today is day 6 of hospitalization. This is her 3rd hospitalization in the past 5 months.    Pt is listed as DNAR/DNI-selective in Epic,  and reviewed previously completed POLST which shows wishes for DNAR/DNI-selective. I also reviewed previously completed HCPOA paperwork which shows  her son Gokul as HCPOA.     Reviewed symptom needs past 24 hrs: none    Patient was seen and examined in bed with her son Gokul at bedside. Pt is drowsy, but easily arousable to command. Pt is minimally verbal and she has dysarthria. She is able to follow commands, but falls asleep without stimulation. She appears comfortable. Breathing is non-labored on RA. Pt denies any pain issues. Appetite has been very poor on modified diet per SLP with puree/honey thick liquids, pt needs to be fed. Moved her bowels 3 days ago. VSS. See physical exam and ROS below.    Review of Systems/Palliative Care symptom needs assessed:   Unable to obtain ROS due to pt factors above    Medical History: obtained from [facility]  Past Medical History:    Asthma (HCC)    Coronary atherosclerosis    Diabetes (HCC)    diabetes for 2 yrs    Essential hypertension    Glaucoma    right    Heart attack (HCC)    2005    High blood pressure    High cholesterol    Hyperlipidemia    Osteoarthritis    Sleep apnea    cpap    Stroke (HCC)    30 yrs ago    Visual impairment    left eye edema     Past Surgical History:   Procedure Laterality Date    Anesth,vitrectomy Left 09/18/2017    Pars plana vitrectomy, internal limiting membrane peel, left eye.    Cath bare metal stent (bms)      Colonoscopy      Hysterectomy      Total abdom hysterectomy      Tubal ligation         Family History: obtained from [facility]  Family History   Problem Relation Age of Onset    Cancer Father     Diabetes Mother        Palliative Care Social History:   Marital Status: single  Children: 2 kids  Living Situation Prior to Admit: lives with son Gokul  Occupational History: Retired, teacher    Substance History:   reports that she quit smoking about 44 years ago. Her smoking use included cigarettes. She started smoking about 48 years ago. She has never used smokeless tobacco.  reports no history of alcohol use.  reports no history of drug use.  Hx of Substance Use/Abuse: No    Spiritual  Assessment:   Mandaeism: Gnosticism   requested: family declined    Allergies:  Allergies[1]    Medications:     Current Facility-Administered Medications:     levETIRAcetam (Keppra) tab 250 mg, 250 mg, Oral, BID    sodium chloride 0.9% infusion, , Intravenous, Continuous    aspirin DR tab 81 mg, 81 mg, Oral, Daily    [Held by provider] carvedilol (Coreg) tab 25 mg, 25 mg, Oral, BID with meals    atorvastatin (Lipitor) tab 40 mg, 40 mg, Oral, Nightly    [Held by provider] hydrALAZINE (Apresoline) tab 50 mg, 50 mg, Oral, BID    brimonidine (Alphagan) 0.2 % ophthalmic solution 1 drop, 1 drop, Both Eyes, BID    glucose (Dex4) 15 GM/59ML oral liquid 15 g, 15 g, Oral, Q15 Min PRN **OR** glucose (Glutose) 40% oral gel 15 g, 15 g, Oral, Q15 Min PRN **OR** glucose-vitamin C (Dex-4) chewable tab 4 tablet, 4 tablet, Oral, Q15 Min PRN **OR** dextrose 50% injection 50 mL, 50 mL, Intravenous, Q15 Min PRN **OR** glucose (Dex4) 15 GM/59ML oral liquid 30 g, 30 g, Oral, Q15 Min PRN **OR** glucose (Glutose) 40% oral gel 30 g, 30 g, Oral, Q15 Min PRN **OR** glucose-vitamin C (Dex-4) chewable tab 8 tablet, 8 tablet, Oral, Q15 Min PRN    heparin (Porcine) 5000 UNIT/ML injection 5,000 Units, 5,000 Units, Subcutaneous, Q8H KB    acetaminophen (Tylenol Extra Strength) tab 500 mg, 500 mg, Oral, Q4H PRN    melatonin tab 3 mg, 3 mg, Oral, Nightly PRN    ondansetron (Zofran) 4 MG/2ML injection 4 mg, 4 mg, Intravenous, Q6H PRN    metoclopramide (Reglan) 5 mg/mL injection 5 mg, 5 mg, Intravenous, Q8H PRN    polyethylene glycol (PEG 3350) (Miralax) 17 g oral packet 17 g, 17 g, Oral, Daily PRN    sennosides (Senokot) tab 17.2 mg, 17.2 mg, Oral, Nightly PRN    bisacodyl (Dulcolax) 10 MG rectal suppository 10 mg, 10 mg, Rectal, Daily PRN    Nutritional status:  BMI: 23 Weight: 132  Weight loss: down 35 pounds in 5 months per EPIC documentation  Current Appetite: Poor  Dysphagia: Yes, on modified diet per SLP    Functional Status History:  ADLs:  now in bed and needs help with all ADL's, previously was walking with walker short distances    Palliative Performance Scale:   Prior to admission: 50%  Observed during hospitalization: 30%  % Ambulation Activity Level Self-Care Intake Consciousness   100 Full  Normal  No Disease Full Normal Full   90 Full  Normal  Some Disease Full Normal Full   80 Full  Normal w/effort  Some Disease Full Normal or reduced Full   70 Reduced  Can't Perform Job  Some Disease Full Normal or reduced Full   60 Reduced  Can't Perform Hobby   Significant Disease Occ Assist Normal or reduced Full or confused   50 Mainly sit/lie Can't do any work  Extensive Disease Partial Assist Normal or reduced Full or confused   40 Mainly in bed Can't do any work  Extensive Disease Mainly Assist Normal or reduced Full or confused   30 Bed Bound Can't do any work  Extensive Disease Max Assist  Total Care Reduced  Drowsy/confused   20 Bed Bound Can't do any work  Extensive Disease Max Assist  Total Care Minimal  Drowsy/confused   10 Bed Bound Can't do any work  Extensive Disease Max Assist  Total Care Mouth Care  Drowsy/confused   0 Death        Objective      Vital Signs:  Blood pressure (!) 175/62, pulse 71, temperature 97.6 °F (36.4 °C), temperature source Axillary, resp. rate 17, weight 132 lb 14.4 oz (60.3 kg), SpO2 98%.  Body mass index is 23.54 kg/m².  Present Level of pain: PATRICIA  Non-verbal signs of pain present: No    Physical Exam:  General:  Drowsy and in no apparent distress. Weak, frail, chronically-ill appearing  HEENT: No focal deficits.  Cardiac: Regular rate and rhythm, S1, S2 normal, no murmur, rub or gallop.  Lungs: Clear without wheezes, rales, rhonchi or dullness.  Normal excursions and effort.  Abdomen: Soft, non-tender, normal bowel sounds X 4 quadrants, no rebound or guarding  Extremities: Without clubbing, cyanosis. Peripheral pulses are 2+. BULE Edema present  Neurologic: Drowsy, arouses to verbal command, minimally verbal,  +dysarthria  Skin: Warm and dry.    Hematology:  Lab Results   Component Value Date    WBC 5.6 02/17/2025    HGB 8.3 (L) 02/17/2025    HCT 26.4 (L) 02/17/2025    .0 02/17/2025       Coags:  Lab Results   Component Value Date    INR 1.11 09/25/2024    PTT 30.4 09/25/2024       Chemistry:  Lab Results   Component Value Date    CREATSERUM 1.49 (H) 02/18/2025    BUN 26 (H) 02/18/2025     02/18/2025    K 3.9 02/18/2025     (H) 02/18/2025    CO2 19.0 (L) 02/18/2025     (H) 02/18/2025    CA 8.5 (L) 02/18/2025    ALB 3.2 02/17/2025    ALKPHO 94 02/12/2025    BILT 0.4 02/12/2025    TP 7.1 02/12/2025    AST 23 02/12/2025    ALT 25 02/12/2025    DDIMER 2.40 (H) 10/11/2022    MG 2.0 02/16/2025    PHOS 2.9 02/17/2025       Imaging:  XR CHEST AP PORTABLE  (CPT=71045)    Result Date: 2/17/2025  CONCLUSION:  1. No acute cardiopulmonary finding.    Dictated by (CST): Alexis Rabago MD on 2/17/2025 at 3:22 PM     Finalized by (CST): Alexis Rabago MD on 2/17/2025 at 3:23 PM          CT BRAIN OR HEAD (CPT=70450)    Result Date: 2/17/2025  CONCLUSION:  1. No acute intracranial process by noncontrast CT technique. If there is clinical concern for acute ischemia, follow-up MRI would have greater sensitivity.  2. Senescent changes of parenchymal volume loss with sequela of chronic microvascular ischemic disease.  3. There is large vessel atherosclerosis involvement of the anterior and posterior circulations.  4. Lesser incidental findings as above.      elm-remote.   Dictated by (CST): Rick Patel MD on 2/17/2025 at 1:14 PM     Finalized by (CST): Rick Patel MD on 2/17/2025 at 1:21 PM            2/14/25 MRI Brain  CONCLUSION:   1. No acute intracranial process by noncontrast MR technique.      2. Innumerable foci of supratentorial and infratentorial susceptibility artifact, suggesting foci of remote microhemorrhage. These findings may reflect cerebral amyloid angiopathy.       3. Senescent changes of  parenchymal volume loss with sequela of chronic microvascular ischemic disease including chronic lacunar infarcts.      4. Lesser incidental findings as above.           Summary of GOC Discussion        I discussed reason for palliative care consultation with patient's son Gokul in person and dtr [facility] (she lives in [place]) via face time. I confirmed Gokul is her HCPOA.     I differentiated the palliative treatment-focus model versus the hospice comfort-focused philosophy of care. I informed the patient/family that having palliative care support does not limit medical treatment options or decisions to those who wish to continue curative or restorative medical therapies. I discussed the benefits of palliative care to include assistance with arising symptom management needs, an extra layer of support, to ensure GOC are respected throughout healthcare continuum, and assist with transition to hospice care when appropriate.  Palliative care handout provided.    Outpatient/Community Palliative Care Services:  Usually visit once per 4 weeks depending on contracted agency guidelines  Focus on GOC and symptom management   Palliative Care criteria:  Not altered by prognosis   Does not limit curative or restorative therapies      Outpatient Hospice services:  24/7 phone triage services   RN visit one or more times per week depending on need  Home health aid to assist in ADLs/hygiene   Hospice criteria:  Less than six-month prognosis   Must forego most life-prolonging  measures/treatments   Focus solely on comfort   Must sign onto hospice benefit with agency     Background provided by family:  -Son tells me pt lives with him and he is her primary CG at home. He talked about pt's recent decline with increased weakness and unable to ambulate, she was previously walking short distances with walker. Pt has had poor po intake for some time with wt loss. Pt has some baseline confusion with short term memory deficits, but still  remembers her family.     Prognostic awareness/understanding: Still in informational gathering stage    -I  discussed current clinical condition and explored previous discussions with MDs. I reinforced her overall guarded prognosis.    -I discussed the normal disease trajectory of CVA, possible neurodegenerative/dementia, severe malnutrition/dysphagia with associated symptoms and decline over time.     Hopes/goals/concerns:   -We talked about pt's dysphagia issues with severe malnutrition with wt loss. Discussed concern with maintaining hydration/nutrition status with modified/honey thick liquids which puts her at risk for recurrent dehydration/PAPO. Pt needs 1 to 1 feeding assistance as well. Family tells me she would not want peg placement. Family is hoping she will increase her po intake and \"turn around\" as she has done in the past.    -Pt's family is hoping pt can go to [facility] on dc with giving her a chance to rebuild strength. I recommended having a community palliative care program following. I encouraged family to hope for the best, but also be prepared for the worst. I did provide education on the option for comfort care with hospice. They are NOT ready for hospice right now.     -I confirmed wishes for DNAR/DNI-selective, no peg placement and continue supportive care.     -I encouraged keeping pt's QOL/comfort a priority when making decisions. Ongoing GOC discussions will be needed and they are agreeable to palliative care following.     -Provided emotional support to pt/family who are coping adequately.     Procedures:  No intubation  No g-tube    Assessment and Recommendations      Problem List:    Failure to thrive  Possible neurodegenerative process/vascular dementia/amyloid angiopathy  H/o multiple CVA's  Hypernatremia  Severe Malnutrition  Dysphagia  HTN  CAD  CEDRICK  DM type II  Weakness  Anemia    Goals of care counseling  -see above for details  -Confirmed wishes for DNAR/DNI-selective, no peg placement  and continue supportive care.  -Pt's family is aware of option for comfort care with hospice if her condition worsens-NOT ready for his right now.  -Ongoing GOC discussions will be needed through clinical course and over time.  -Agreeable to palliative care following  -Dispo:  Family hoping for [facility] on dc and recommended community palliative care program to follow on dc. SW to help with dc planning.   -Provided emotional support to pt/family who are coping adequately.    Advance care planning  -see above for details  -Pt's son Gokul Fine is HCPOA #[phone].  -Previously completed POLST/HCPOA paperwork on file in [employer].     Palliative Performance Scale 30%    Emotion support provided to patient/family today: Yes    A total of 70 mins were spent on this consult, which included all of the following:direct face to face contact, history taking, physical examination, and >50% was spent counseling and coordinating care.    Discussed today's visit with message to Dr. Thomas, Dr. Chandra, ENRIQUE Baker and Bisi MORROW.    I will continue to follow clinically.      Thank you for allowing Palliative Care services to participate in the care of Ms. Eliud Fine.       Katerin Gamboa, SARA-BC, ACHPN [id number]  2/18/2025  12:05 PM  Palliative Care Services          [1] No Known Allergies

## 2025-02-19 LAB
ANION GAP SERPL CALC-SCNC: 8 MMOL/L (ref 0–18)
BASOPHILS # BLD AUTO: 0.04 X10(3) UL (ref 0–0.2)
BASOPHILS NFR BLD AUTO: 1 %
BETA-AMYLOID 40: 344 PG/ML
BETA-AMYLOID 40: 344 PG/ML
BETA-AMYLOID 42/40 RATIO: 0.13
BETA-AMYLOID 42/40 RATIO: 0.13
BETA-AMYLOID 42: 43.77 PG/ML
BETA-AMYLOID 42: 43.77 PG/ML
BUN BLD-MCNC: 22 MG/DL (ref 9–23)
BUN/CREAT SERPL: 15.5 (ref 10–20)
CALCIUM BLD-MCNC: 8.6 MG/DL (ref 8.7–10.4)
CHLORIDE SERPL-SCNC: 115 MMOL/L (ref 98–112)
CO2 SERPL-SCNC: 21 MMOL/L (ref 21–32)
CREAT BLD-MCNC: 1.42 MG/DL
DEPRECATED RDW RBC AUTO: 51.5 FL (ref 35.1–46.3)
EGFRCR SERPLBLD CKD-EPI 2021: 39 ML/MIN/1.73M2 (ref 60–?)
EOSINOPHIL # BLD AUTO: 0.03 X10(3) UL (ref 0–0.7)
EOSINOPHIL NFR BLD AUTO: 0.7 %
ERYTHROCYTE [DISTWIDTH] IN BLOOD BY AUTOMATED COUNT: 17.1 % (ref 11–15)
GLUCOSE BLD-MCNC: 82 MG/DL (ref 70–99)
GLUCOSE BLDC GLUCOMTR-MCNC: 106 MG/DL (ref 70–99)
GLUCOSE BLDC GLUCOMTR-MCNC: 123 MG/DL (ref 70–99)
GLUCOSE BLDC GLUCOMTR-MCNC: 132 MG/DL (ref 70–99)
GLUCOSE BLDC GLUCOMTR-MCNC: 203 MG/DL (ref 70–99)
GLUCOSE BLDC GLUCOMTR-MCNC: 77 MG/DL (ref 70–99)
HCT VFR BLD AUTO: 23.8 %
HGB BLD-MCNC: 7.5 G/DL
IMM GRANULOCYTES # BLD AUTO: 0.13 X10(3) UL (ref 0–1)
IMM GRANULOCYTES NFR BLD: 3.2 %
LYMPHOCYTES # BLD AUTO: 1.53 X10(3) UL (ref 1–4)
LYMPHOCYTES NFR BLD AUTO: 37.1 %
MAGNESIUM SERPL-MCNC: 1.7 MG/DL (ref 1.6–2.6)
MCH RBC QN AUTO: 27.5 PG (ref 26–34)
MCHC RBC AUTO-ENTMCNC: 31.5 G/DL (ref 31–37)
MCV RBC AUTO: 87.2 FL
MONOCYTES # BLD AUTO: 0.34 X10(3) UL (ref 0.1–1)
MONOCYTES NFR BLD AUTO: 8.3 %
NEUTROPHILS # BLD AUTO: 2.05 X10 (3) UL (ref 1.5–7.7)
NEUTROPHILS # BLD AUTO: 2.05 X10(3) UL (ref 1.5–7.7)
NEUTROPHILS NFR BLD AUTO: 49.7 %
OSMOLALITY SERPL CALC.SUM OF ELEC: 300 MOSM/KG (ref 275–295)
PLATELET # BLD AUTO: 236 10(3)UL (ref 150–450)
POTASSIUM SERPL-SCNC: 3.9 MMOL/L (ref 3.5–5.1)
RBC # BLD AUTO: 2.73 X10(6)UL
SODIUM SERPL-SCNC: 144 MMOL/L (ref 136–145)
WBC # BLD AUTO: 4.1 X10(3) UL (ref 4–11)

## 2025-02-19 PROCEDURE — 95720 EEG PHY/QHP EA INCR W/VEEG: CPT | Performed by: OTHER

## 2025-02-19 PROCEDURE — 99231 SBSQ HOSP IP/OBS SF/LOW 25: CPT | Performed by: REGISTERED NURSE

## 2025-02-19 PROCEDURE — 99232 SBSQ HOSP IP/OBS MODERATE 35: CPT | Performed by: OTHER

## 2025-02-19 RX ORDER — MAGNESIUM OXIDE 400 MG/1
400 TABLET ORAL ONCE
Status: COMPLETED | OUTPATIENT
Start: 2025-02-19 | End: 2025-02-19

## 2025-02-19 NOTE — PALLIATIVE CARE NOTE
[facility]  part of [facility]  Palliative Care Progress Note    Eliud Fine Patient Status:  Inpatient    1955 MRN [id number]   Location [facility] 3W/SW Attending Puneet Thomas MD   Hosp Day # 6 PCP Yao Rodriguez     The  Cures Act makes medical notes like these available to patients in the interest of transparency. Please be advised this is a medical document. Medical documents are intended to carry relevant information, facts as evident, and the clinical opinion of the practitioner. The medical note is intended as peer to peer communication and may appear blunt or direct. It is written in medical language and may contain abbreviations or verbiage that are unfamiliar.       Subjective     Reviewed symptom medications past 24 hrs: none    Patient was seen and examined in bed with no family at the bedside. Pt is more alert today and smiling. She is nodding her head to yes/no questions, but not verbalizing much. She denies any dyspnea or pain symptoms. Diet liberalized per SLP to thin liquids, denies abdominal pain, n/v and moved her bowels 3 days ago.    See summary of discussion below.     Review of Systems:  Unable to obtain ROS due to pt factors above    Allergies:  Allergies[1]    Medications:     Current Facility-Administered Medications:     levETIRAcetam (Keppra) tab 250 mg, 250 mg, Oral, BID    aspirin DR tab 81 mg, 81 mg, Oral, Daily    [Held by provider] carvedilol (Coreg) tab 25 mg, 25 mg, Oral, BID with meals    atorvastatin (Lipitor) tab 40 mg, 40 mg, Oral, Nightly    [Held by provider] hydrALAZINE (Apresoline) tab 50 mg, 50 mg, Oral, BID    brimonidine (Alphagan) 0.2 % ophthalmic solution 1 drop, 1 drop, Both Eyes, BID    glucose (Dex4) 15 GM/59ML oral liquid 15 g, 15 g, Oral, Q15 Min PRN **OR** glucose (Glutose) 40% oral gel 15 g, 15 g, Oral, Q15 Min PRN **OR** glucose-vitamin C (Dex-4) chewable tab 4 tablet, 4 tablet, Oral, Q15 Min PRN **OR**  dextrose 50% injection 50 mL, 50 mL, Intravenous, Q15 Min PRN **OR** glucose (Dex4) 15 GM/59ML oral liquid 30 g, 30 g, Oral, Q15 Min PRN **OR** glucose (Glutose) 40% oral gel 30 g, 30 g, Oral, Q15 Min PRN **OR** glucose-vitamin C (Dex-4) chewable tab 8 tablet, 8 tablet, Oral, Q15 Min PRN    heparin (Porcine) 5000 UNIT/ML injection 5,000 Units, 5,000 Units, Subcutaneous, Q8H KB    acetaminophen (Tylenol Extra Strength) tab 500 mg, 500 mg, Oral, Q4H PRN    melatonin tab 3 mg, 3 mg, Oral, Nightly PRN    ondansetron (Zofran) 4 MG/2ML injection 4 mg, 4 mg, Intravenous, Q6H PRN    metoclopramide (Reglan) 5 mg/mL injection 5 mg, 5 mg, Intravenous, Q8H PRN    polyethylene glycol (PEG 3350) (Miralax) 17 g oral packet 17 g, 17 g, Oral, Daily PRN    sennosides (Senokot) tab 17.2 mg, 17.2 mg, Oral, Nightly PRN    bisacodyl (Dulcolax) 10 MG rectal suppository 10 mg, 10 mg, Rectal, Daily PRN    Objective     Vital Signs:  Blood pressure 135/75, pulse 82, temperature 97.3 °F (36.3 °C), temperature source Axillary, resp. rate 16, weight 130 lb 14.4 oz (59.4 kg), SpO2 98%.  Body mass index is 23.19 kg/m².  Present Level of pain: 0/10  Non-verbal signs of pain present: No    Physical Exam:  General: Alert and in no apparent distress. Weak, frail/thin appearing  HEENT: No focal deficits.  Cardiac: Regular rate and rhythm, S1, S2 normal, no murmur, rub or gallop.  Lungs: Clear without wheezes, rales, rhonchi or dullness.  Normal excursions and effort.  Abdomen: Soft, non-tender, normal bowel sounds X 4 quadrants, no rebound or guarding  Extremities: Without clubbing, cyanosis. Peripheral pulses are 2+. BLE Edema not present  Neurologic: Alert and oriented, normal affect.  Skin: Warm and dry.    Prior to admission Palliative performance scale PPSv2 (%): 40    Current PPS 30%    Hematology:  Lab Results   Component Value Date    WBC 4.1 02/19/2025    HGB 7.5 (L) 02/19/2025    HCT 23.8 (L) 02/19/2025    .0 02/19/2025        Coags:  Lab Results   Component Value Date    INR 1.11 09/25/2024    PTT 30.4 09/25/2024       Chemistry:  Lab Results   Component Value Date    CREATSERUM 1.42 (H) 02/19/2025    BUN 22 02/19/2025     02/19/2025    K 3.9 02/19/2025     (H) 02/19/2025    CO2 21.0 02/19/2025    GLU 82 02/19/2025    CA 8.6 (L) 02/19/2025    ALB 3.2 02/17/2025    ALKPHO 94 02/12/2025    BILT 0.4 02/12/2025    TP 7.1 02/12/2025    AST 23 02/12/2025    ALT 25 02/12/2025    DDIMER 2.40 (H) 10/11/2022    MG 1.7 02/19/2025    PHOS 2.9 02/17/2025       Imaging:  XR CHEST AP PORTABLE  (CPT=71045)    Result Date: 2/17/2025  CONCLUSION:  1. No acute cardiopulmonary finding.    Dictated by (CST): Alexis Rabago MD on 2/17/2025 at 3:22 PM     Finalized by (CST): Alexis Rabago MD on 2/17/2025 at 3:23 PM          CT BRAIN OR HEAD (CPT=70450)    Result Date: 2/17/2025  CONCLUSION:  1. No acute intracranial process by noncontrast CT technique. If there is clinical concern for acute ischemia, follow-up MRI would have greater sensitivity.  2. Senescent changes of parenchymal volume loss with sequela of chronic microvascular ischemic disease.  3. There is large vessel atherosclerosis involvement of the anterior and posterior circulations.  4. Lesser incidental findings as above.      elm-remote.   Dictated by (CST): Rick Patel MD on 2/17/2025 at 1:14 PM     Finalized by (CST): Rick Patel MD on 2/17/2025 at 1:21 PM           Follow up GOC Discussion      2/19/25-no family at bedside today. I had GOC discussion yesterday with son/dtr and planning for continued supportive care. Family aware of the option for comfort care with hospice-not ready for this now. Confirmed wishes for DNAR/DNI-selective, no feeding tubes and POLST/HCPOA paperwork previously completed. Plan for [facility] with [facility] on dc.     Discussed with Patient and Family: Yes  Patient's preference about sharing medical information: speak to pt's son Gokul/dtr  Unique  Patient's decision making preferences: speak to pt's son Gokul  Code status: DNAR/DNI-selective  Have advanced directives been discussed with patient or healthcare power of : Yes        Healthcare Agent Appointed: Yes  Healthcare Agent's Name: Gokul Fine  Healthcare Agent's Phone Number: [phone]          Spiritual needs addressed: Patient/family declined Spiritual Care    Palliative disposition: Community Palliative Care      Procedures:  No intubation  No g-tube    Palliative Care Assessment and Recommendations     Problem List:     Failure to thrive  Possible neurodegenerative process/vascular dementia/amyloid angiopathy  H/o multiple CVA's  Hypernatremia  Severe Malnutrition  Dysphagia  HTN  CAD  CEDRICK  DM type II  Weakness  Anemia     Goals of care counseling  -see above for details  -Confirmed wishes for DNAR/DNI-selective, no peg placement and continue supportive care.  -Pt's family is aware of option for comfort care with hospice if her condition worsens-NOT ready for his right now.  -Ongoing GOC discussions will be needed through clinical course and over time.  -Agreeable to palliative care following  -Dispo:  Family hoping for [facility] on dc and recommended community palliative care program to follow on dc. SW to help with dc planning.   -Provided emotional support to pt/family who are coping adequately.     Advance care planning  -see above for details  -Pt's son Gokul Fine is [facility] #[phone].  -Previously completed POLST/HCPOA paperwork on file in [employer].      Palliative Performance Scale 30%     Emotion support provided to patient/family today: Yes     A total of 25 mins were spent on this consult, which included all of the following: direct face to face contact, history taking, physical examination, and >50% was spent counseling and coordinating care.      I will continue to follow clinically.      Katerin Gamboa, ANP-BC, ACHPN [id number]  2/19/2025  11:02 AM  Palliative Care  Services        [1] No Known Allergies

## 2025-02-19 NOTE — PLAN OF CARE
Patient on Continuous EEG monitoring; IVF therapy stopped @2315; Will continue to monitor  Problem: Patient Centered Care  Goal: Patient preferences are identified and integrated in the patient's plan of care  Description: Interventions:  - What would you like us to know as we care for you? Lives at home with son Gokul who assists with patient's care.  - Provide timely, complete, and accurate information to patient/family  - Incorporate patient and family knowledge, values, beliefs, and cultural backgrounds into the planning and delivery of care  - Encourage patient/family to participate in care and decision-making at the level they choose  - Honor patient and family perspectives and choices  Outcome: Progressing     Problem: Diabetes/Glucose Control  Goal: Glucose maintained within prescribed range  Description: INTERVENTIONS:  - Monitor Blood Glucose as ordered  - Assess for signs and symptoms of hyperglycemia and hypoglycemia  - Administer ordered medications to maintain glucose within target range  - Assess barriers to adequate nutritional intake and initiate nutrition consult as needed  - Instruct patient on self management of diabetes  Outcome: Progressing     Problem: Patient/Family Goals  Goal: Patient/Family Long Term Goal  Description: Patient's Long Term Goal: \"get my mom to eat again\"    Interventions:  - Monitor intake and output  - IV hydration  - See additional Care Plan goals for specific interventions  Outcome: Progressing  Goal: Patient/Family Short Term Goal  Description: Patient's Short Term Goal: \"more awake\"    Interventions:   -Neuro checks   -Monitor labs and vitals signs  -Follow provider recommendations  - See additional Care Plan goals for specific interventions  Outcome: Progressing     Problem: SAFETY ADULT - FALL  Goal: Free from fall injury  Description: INTERVENTIONS:  - Assess pt frequently for physical needs  - Identify cognitive and physical deficits and behaviors that affect risk  of falls.  - [place] fall precautions as indicated by assessment.  - Educate pt/family on patient safety including physical limitations  - Instruct pt to call for assistance with activity based on assessment  - Modify environment to reduce risk of injury  - Provide assistive devices as appropriate  - Consider OT/PT consult to assist with strengthening/mobility  - Encourage toileting schedule  Outcome: Progressing     Problem: DISCHARGE PLANNING  Goal: Discharge to home or other facility with appropriate resources  Description: INTERVENTIONS:  - Identify barriers to discharge w/pt and caregiver  - Include patient/family/discharge partner in discharge planning  - Arrange for needed discharge resources and transportation as appropriate  - Identify discharge learning needs (meds, wound care, etc)  - Arrange for interpreters to assist at discharge as needed  - Consider post-discharge preferences of patient/family/discharge partner  - Complete POLST form as appropriate  - Assess patient's ability to be responsible for managing their own health  - Refer to Case Management Department for coordinating discharge planning if the patient needs post-hospital services based on physician/LIP order or complex needs related to functional status, cognitive ability or social support system  Outcome: Progressing     Problem: Altered Communication/Language Barrier  Goal: Patient/Family is able to understand and participate in their care  Description: Interventions:  - Assess communication ability and preferred communication style  - Implement communication aides and strategies  - Use visual cues when possible  - Listen attentively, be patient, do not interrupt  - Minimize distractions  - Allow time for understanding and response  - Establish method for patient to ask for assistance (call light)  - Provide an  as needed  - Communicate barriers and strategies to overcome with those who interact with patient  Outcome:  Progressing     Problem: METABOLIC/FLUID AND ELECTROLYTES - ADULT  Goal: Electrolytes maintained within normal limits  Description: INTERVENTIONS:  - Monitor labs and rhythm and assess patient for signs and symptoms of electrolyte imbalances  - Administer electrolyte replacement as ordered  - Monitor response to electrolyte replacements, including rhythm and repeat lab results as appropriate  - Fluid restriction as ordered  - Instruct patient on fluid and nutrition restrictions as appropriate  Outcome: Progressing  Goal: Hemodynamic stability and optimal renal function maintained  Description: INTERVENTIONS:  - Monitor labs and assess for signs and symptoms of volume excess or deficit  - Monitor intake, output and patient weight  - Monitor urine specific gravity, serum osmolarity and serum sodium as indicated or ordered  - Monitor response to interventions for patient's volume status, including labs, urine output, blood pressure (other measures as available)  - Encourage oral intake as appropriate  - Instruct patient on fluid and nutrition restrictions as appropriate  Outcome: Progressing     Problem: SKIN/TISSUE INTEGRITY - ADULT  Goal: Skin integrity remains intact  Description: INTERVENTIONS  - Assess and document risk factors for pressure ulcer development  - Assess and document skin integrity  - Monitor for areas of redness and/or skin breakdown  - Initiate interventions, skin care algorithm/standards of care as needed  Outcome: Progressing  Goal: Incision(s), wounds(s) or drain site(s) healing without S/S of infection  Description: INTERVENTIONS:  - Assess and document risk factors for pressure ulcer development  - Assess and document skin integrity  - Assess and document dressing/incision, wound bed, drain sites and surrounding tissue  - Implement wound care per orders  - Initiate isolation precautions as appropriate  - Initiate Pressure Ulcer prevention bundle as indicated  Outcome: Progressing     Problem:  NEUROLOGICAL - ADULT  Goal: Achieves stable or improved neurological status  Description: INTERVENTIONS  - Assess for and report changes in neurological status  - Initiate measures to prevent increased intracranial pressure  - Maintain blood pressure and fluid volume within ordered parameters to optimize cerebral perfusion and minimize risk of hemorrhage  - Monitor temperature, glucose, and sodium. Initiate appropriate interventions as ordered  Outcome: Progressing  Goal: Achieves maximal functionality and self care  Description: INTERVENTIONS  - Monitor swallowing and airway patency with patient fatigue and changes in neurological status  - Encourage and assist patient to increase activity and self care with guidance from PT/OT  - Encourage visually impaired, hearing impaired and aphasic patients to use assistive/communication devices  Outcome: Progressing     Problem: Delirium  Goal: Minimize duration of delirium  Description: Interventions:  - Encourage use of hearing aids, eye glasses  - Promote highest level of mobility daily  - Provide frequent reorientation  - Promote wakefulness i.e. lights on, blinds open  - Promote sleep, encourage patient's normal rest cycle i.e. lights off, TV off, minimize noise and interruptions  - Encourage family to assist in orientation and promotion of home routines  Outcome: Progressing

## 2025-02-19 NOTE — PROGRESS NOTES
[facility] Hospitalist Progress Note     CC: Hospital Follow up    PCP: Yao Rodriguez       Assessment/Plan:   Ms. Fine is a 70-year-old female with a history of multiple strokes in the past, hypertension, CAD, sleep apnea, type 2 diabetes, who presents to the hospital for evaluation of low p.o. intake, weakness, and general failure to thrive.    Altered mental status on 2/17  Initial hypotension  -STAT CT without acute changes, MRI brain (read pending but per neuro, no new changes)  -BP improved  -LA neg   -CXR negative  -neuro ordered EEG, discussed with Neuro, EEG neg, keppra empirically started  -Much  improved today     Failure to thrive  Low PO intake, dehydration with PAPO and hypernatremia  Hx of multiple strokes in past   -obtained MRI brain, high risk of recurrent stroke--->negative for acute stroke  -speech eval  -continue electrolyte correction   -neuro consult appreciated, could be progression of vascular dementia, neurodegenerative disease, amyloid angiopathy   -palliative consulted      Hypernatremia  PAPO  Metabolic acidosis  -giving saline boluses this AM due to hypotension   -severe dehydration on admisision    Severe malnutrition  -continue caloric intake documentation   -dietitian on consult  -agree with oral nutritional supplementation per dietitian team   -significant weight loss past one year (~70lbs)  -monitor potential need for temporary enteral nutrition      Hypertension   -avoid hypotension      Coronary artery disease  -PO meds as able     CEDRICK  -cpap if uses here      Type 2 diabetes   -A1c 5.7  -stop accucheck/insulin  -should likely stop metformin on discharge vs defer to PCP. Given weight loss and poor nutrition suspect A1c will continue to drop. Risk of hypoglycemia very likely      Hx of seizure?   -not on keppra anymore, discontinued yesterday     Will continue to monitor progress     DVT prophylaxis: heparin sub q  Code status: DNR select    Discussed with son at  bedside on 2/18    GOC: Palliative care consulted, DNR select     Puneet Thomas MD  [facility] Hospitalist        Subjective:     Alert and awake, eating breakfast, denies cp or sob, no fevers or chills, no nausea or vomiting,     Alert to self, and that she was in a hospital,     OBJECTIVE:    Blood pressure 135/75, pulse 82, temperature 97.3 °F (36.3 °C), temperature source Axillary, resp. rate 16, weight 130 lb 14.4 oz (59.4 kg), SpO2 98%.    Temp:  [96.6 °F (35.9 °C)-97.3 °F (36.3 °C)] 97.3 °F (36.3 °C)  Pulse:  [71-82] 82  Resp:  [16-18] 16  BP: (135-162)/(61-75) 135/75  SpO2:  [98 %-99 %] 98 %      Intake/Output:    Intake/Output Summary (Last 24 hours) at 2/19/2025 1426  Last data filed at 2/19/2025 0918  Gross per 24 hour   Intake 847.5 ml   Output 200 ml   Net 647.5 ml       Last 3 Weights   02/19/25 0500 130 lb 14.4 oz (59.4 kg)   02/18/25 0606 132 lb 14.4 oz (60.3 kg)   02/16/25 0500 122 lb 1.6 oz (55.4 kg)   02/13/25 0900 121 lb 6.4 oz (55.1 kg)   01/30/25 2230 150 lb (68 kg)   01/14/25 1842 165 lb (74.8 kg)       /75 (BP Location: Right arm)   Pulse 82   Temp 97.3 °F (36.3 °C) (Axillary)   Resp 16   Wt 130 lb 14.4 oz (59.4 kg)   SpO2 98%   BMI 23.19 kg/m²   General: alert    Lungs: clear to ausculation bilaterally  Heart: Regular rate and rhythm  Abdomen: soft, non tender  Extremities: No edema  Neuro: following some commands    Data Review:       Labs:     Recent Labs   Lab 02/14/25  0917 02/17/25  1228 02/19/25  0805   RBC 3.28* 3.07* 2.73*   HGB 9.2* 8.3* 7.5*   HCT 28.4* 26.4* 23.8*   MCV 86.6 86.0 87.2   MCH 28.0 27.0 27.5   MCHC 32.4 31.4 31.5   RDW 16.9* 16.7* 17.1*   NEPRELIM 2.78 4.31 2.05   WBC 4.3 5.6 4.1   .0 249.0 236.0         Recent Labs   Lab 02/17/25  0758 02/18/25  0833 02/19/25  0805   GLU 93 103* 82   BUN 24* 26* 22   CREATSERUM 1.52* 1.49* 1.42*   EGFRCR 36* 37* 39*   CA 8.7 8.5* 8.6*    144 144   K 4.2 3.9 3.9    115* 115*   CO2 18.0* 19.0* 21.0        Recent Labs   Lab 02/12/25  2224 02/14/25  0917 02/15/25  0838 02/16/25  0630 02/17/25  0758   ALT 25  --   --   --   --    AST 23  --   --   --   --    ALB 4.1 3.5 3.6 3.1* 3.2         Imaging:  XR CHEST AP PORTABLE  (CPT=71045)    Result Date: 2/17/2025  CONCLUSION:  1. No acute cardiopulmonary finding.    Dictated by (CST): Alexis Rabago MD on 2/17/2025 at 3:22 PM     Finalized by (CST): Alexis Rabago MD on 2/17/2025 at 3:23 PM          CT BRAIN OR HEAD (CPT=70450)    Result Date: 2/17/2025  CONCLUSION:  1. No acute intracranial process by noncontrast CT technique. If there is clinical concern for acute ischemia, follow-up MRI would have greater sensitivity.  2. Senescent changes of parenchymal volume loss with sequela of chronic microvascular ischemic disease.  3. There is large vessel atherosclerosis involvement of the anterior and posterior circulations.  4. Lesser incidental findings as above.      elm-remote.   Dictated by (CST): Rick Patel MD on 2/17/2025 at 1:14 PM     Finalized by (CST): Rick Patel MD on 2/17/2025 at 1:21 PM             Meds:      magnesium oxide  400 mg Oral Once    levETIRAcetam  250 mg Oral BID    aspirin  81 mg Oral Daily    [Held by provider] carvedilol  25 mg Oral BID with meals    atorvastatin  40 mg Oral Nightly    [Held by provider] hydrALAZINE  50 mg Oral BID    brimonidine  1 drop Both Eyes BID    heparin  5,000 Units Subcutaneous Q8H [facility]             glucose **OR** glucose **OR** glucose-vitamin C **OR** dextrose **OR** glucose **OR** glucose **OR** glucose-vitamin C    acetaminophen    melatonin    ondansetron    metoclopramide    polyethylene glycol (PEG 3350)    sennosides    bisacodyl

## 2025-02-19 NOTE — CM/SW NOTE
Per request of PETRA Austin, ENRIQUE met w/ pt's son at bedside. Provided ALICIA list of quality data and explained.    All questions addressed/answered. Explained locations may be limited by pt's Insurance. Informed him ENRIQUE/PTERA will likely f/up w/ him tomorrow for choice. He is agreeable.      MS CynthiaW, LSW [id number]

## 2025-02-19 NOTE — PROGRESS NOTES
[facility]  part of [facility]    Progress Note        Subjective:     Doing ok   No new complaints          Objective:   Vital Signs:  Blood pressure 135/75, pulse 82, temperature 97.3 °F (36.3 °C), temperature source Axillary, resp. rate 16, weight 130 lb 14.4 oz (59.4 kg), SpO2 98%.  General: NAD  HEENT: NCAT   Respiratory: Clear to auscultation   Cardiovascular: Normal S1S2   Abdomen: Soft, NT/ND    Ext: No LE edema  Neuro: awake, alert     Results:     Recent Labs   Lab 02/17/25  0758 02/18/25  0833 02/19/25  0805   GLU 93 103* 82   BUN 24* 26* 22   CREATSERUM 1.52* 1.49* 1.42*   EGFRCR 36* 37* 39*   CA 8.7 8.5* 8.6*    144 144   K 4.2 3.9 3.9    115* 115*   CO2 18.0* 19.0* 21.0     Recent Labs   Lab 02/14/25  0917 02/17/25  1228 02/19/25  0805   RBC 3.28* 3.07* 2.73*   HGB 9.2* 8.3* 7.5*   HCT 28.4* 26.4* 23.8*   MCV 86.6 86.0 87.2   MCH 28.0 27.0 27.5   MCHC 32.4 31.4 31.5   RDW 16.9* 16.7* 17.1*   NEPRELIM 2.78 4.31 2.05   WBC 4.3 5.6 4.1   .0 249.0 236.0         Assessment and Plan:   70 year old female with PMH of CVA, DM2, HTN, CKD with baseline creatinine around 1.7 presents to the hospital with weakness, decreased PO intake and FTT . Nephrology is consulted for PAPO on CKD and hypernatremia    PAPO on CKD stage 3:  - Baseline Creatinine 1.7  - PAPO from decreased PO intake   - PAPO resolved - creatinine below baseline    - encourage oral hydration    - Avoid Nephrotoxins  - Renally adjust medications  - No acute indication for RRT     Renal artery stenosis:  - noted on previous imaging   - limited study, however no evidence of ISABEL on renal US.  Neg for hydronephrosis     Acidosis:   - resolved     Hypernatremia:  - resolved       Hyperkalemia:  - resolved    Failure to thrive   Worsening mentation  - per primary and neuro     Dispo: Nephrology will sign off. Thank you for the consult.     Belén Santiago MD  [place]  - Nephrology

## 2025-02-19 NOTE — PROGRESS NOTES
[facility]  [address] [place] [address], [address]  [facility] [id number]  [phone]            Eliud Fine Patient Status:  Inpatient    1955 MRN [id number]   Location [facility] 3W/SW Attending Samir Hernadez, DO   Hosp Day # 6 PCP Yao Rodriguez     Subjective:  Eliud Fine is a(n) 70 year old female.    Hospital course to date:     The patient, Eliud Antony, presents with progressive weakness and confusion. She was admitted to the hospital 2 days ago. Her right side is noted to be weaker than the left, with the left side strength estimated at 4-3/5 and the right side at 2-/5. There is also mention of increased confusion, prompting consideration of a possible stroke.     The patient has a history of multiple strokes, with the most recent occurring in 2022, primarily affecting her left-right side. She was previously able to recover, maintaining social interactions and ambulating with a walker while living with her son. In 2024, she experienced worsening symptoms and was hospitalized for right-sided weakness. During that admission, she was started on Keppra for possible seizures, but MRI showed no acute stroke. After discharge, she continued on antibiotics and Keppra, which caused drowsiness. Keppra was subsequently discontinued, leading to improved alertness.     The patient was readmitted in 2025 with failure to thrive, weakness, and low PO intake. Brain imaging revealed multiple micro-hemorrhages without acute changes, raising suspicion for amyloid angiopathy. She was also found to have acute kidney injury, metabolic acidosis, and dehydration.     B12, TSH, RPR, thyroid ab were not revealing.  Patient was slightly more awake next day, but still had hard time communicating.  However by late morning on Monday she was declining again.  She became hypotensive.  Much less responsive again.  But able to open eyes and look around.  MRI was  repeated and again did not show any acute changes but still showed significant amount of mL of idiopathic microhemorrhages low.    After discussing pros and cons with the family of starting Keppra again and it was decided to try a small dose of to 50 mg twice a day and by the next day patient was much more awake and able to eat breakfast.  However continuous EEG overnight did not show any obvious epileptiform activity.    Current Facility-Administered Medications   Medication Dose Route Frequency    levETIRAcetam (Keppra) tab 250 mg  250 mg Oral BID    aspirin DR tab 81 mg  81 mg Oral Daily    [Held by provider] carvedilol (Coreg) tab 25 mg  25 mg Oral BID with meals    atorvastatin (Lipitor) tab 40 mg  40 mg Oral Nightly    [Held by provider] hydrALAZINE (Apresoline) tab 50 mg  50 mg Oral BID    brimonidine (Alphagan) 0.2 % ophthalmic solution 1 drop  1 drop Both Eyes BID    glucose (Dex4) 15 GM/59ML oral liquid 15 g  15 g Oral Q15 Min PRN    Or    glucose (Glutose) 40% oral gel 15 g  15 g Oral Q15 Min PRN    Or    glucose-vitamin C (Dex-4) chewable tab 4 tablet  4 tablet Oral Q15 Min PRN    Or    dextrose 50% injection 50 mL  50 mL Intravenous Q15 Min PRN    Or    glucose (Dex4) 15 GM/59ML oral liquid 30 g  30 g Oral Q15 Min PRN    Or    glucose (Glutose) 40% oral gel 30 g  30 g Oral Q15 Min PRN    Or    glucose-vitamin C (Dex-4) chewable tab 8 tablet  8 tablet Oral Q15 Min PRN    heparin (Porcine) 5000 UNIT/ML injection 5,000 Units  5,000 Units Subcutaneous Q8H [facility]    acetaminophen (Tylenol Extra Strength) tab 500 mg  500 mg Oral Q4H PRN    melatonin tab 3 mg  3 mg Oral Nightly PRN    ondansetron (Zofran) 4 MG/2ML injection 4 mg  4 mg Intravenous Q6H PRN    metoclopramide (Reglan) 5 mg/mL injection 5 mg  5 mg Intravenous Q8H PRN    polyethylene glycol (PEG 3350) (Miralax) 17 g oral packet 17 g  17 g Oral Daily PRN    sennosides (Senokot) tab 17.2 mg  17.2 mg Oral Nightly PRN    bisacodyl (Dulcolax) 10 MG rectal  suppository 10 mg  10 mg Rectal Daily PRN       Objective:  Blood pressure 135/75, pulse 82, temperature 97.3 °F (36.3 °C), temperature source Axillary, resp. rate 16, weight 130 lb 14.4 oz (59.4 kg), SpO2 98%.    Physical Exam:  Vitals:    02/18/25 2128 02/19/25 0500 02/19/25 0615 02/19/25 0843   BP: (!) 162/69  144/61 135/75   Pulse: 76  71 82   Resp: 16  18 16   Temp: 96.6 °F (35.9 °C)  97.3 °F (36.3 °C) 97.3 °F (36.3 °C)   TempSrc: Axillary  Axillary Axillary   SpO2: 99%  98% 98%   Weight:  130 lb 14.4 oz (59.4 kg)         General: No apparent distress, well nourished, well groomed.  Head- Normocephalic, atraumatic  Eyes- No redness or swelling  Neck- No masses or adenopathy  CV: pulses were palpable and normal, no cyanosis or edema     Neurological:     Mental Status- Alert however did not follow any commands.  She will look at me and then she looked away.    Lab Results   Component Value Date    WBC 4.1 02/19/2025    HGB 7.5 (L) 02/19/2025    HCT 23.8 (L) 02/19/2025    .0 02/19/2025    CREATSERUM 1.42 (H) 02/19/2025    BUN 22 02/19/2025     02/19/2025    K 3.9 02/19/2025     (H) 02/19/2025    CO2 21.0 02/19/2025    GLU 82 02/19/2025    CA 8.6 (L) 02/19/2025    ALB 3.2 02/17/2025    ALKPHO 94 02/12/2025    BILT 0.4 02/12/2025    TP 7.1 02/12/2025    AST 23 02/12/2025    ALT 25 02/12/2025    PTT 30.4 09/25/2024    INR 1.11 09/25/2024    T4F 1.3 02/15/2025    TSH 1.457 02/15/2025    LIP 53 01/04/2025    DDIMER 2.40 (H) 10/11/2022    ESRML 37 (H) 12/18/2022    CRP 0.31 (H) 12/18/2022    MG 1.7 02/19/2025    PHOS 2.9 02/17/2025    CK 83 02/12/2025    B12 687 02/15/2025       XR CHEST AP PORTABLE  (CPT=71045)    Result Date: 2/17/2025  CONCLUSION:  1. No acute cardiopulmonary finding.    Dictated by (CST): Alexis Rabago MD on 2/17/2025 at 3:22 PM     Finalized by (CST): Alexis Rabago MD on 2/17/2025 at 3:23 PM          CT BRAIN OR HEAD (CPT=70450)    Result Date: 2/17/2025  CONCLUSION:  1. No  acute intracranial process by noncontrast CT technique. If there is clinical concern for acute ischemia, follow-up MRI would have greater sensitivity.  2. Senescent changes of parenchymal volume loss with sequela of chronic microvascular ischemic disease.  3. There is large vessel atherosclerosis involvement of the anterior and posterior circulations.  4. Lesser incidental findings as above.      elm-remote.   Dictated by (CST): Rick Patel MD on 2/17/2025 at 1:14 PM     Finalized by (CST): Rick Patel MD on 2/17/2025 at 1:21 PM             MRI of the brain was independently reviewed as above.      Assessment:  Patient Active Problem List   Diagnosis    Vision loss, left eye    Cerebrovascular accident (CVA), unspecified mechanism (HCC)    Essential hypertension    COVID-19    Right sided weakness    Aphasia due to recent stroke    Cerebral amyloid angiopathy (HCC)    Toxic encephalopathy    Hypertensive urgency    Acute chest pain    Renal artery stenosis    Cystitis    PAPO (acute kidney injury)    Primary hypertension    Uncontrolled hypertension    CVA (cerebral vascular accident) (HCC)    Acute CVA (cerebrovascular accident) (HCC)    Slurred speech    Weakness of right upper extremity    Dehydration    Failure to thrive in adult    Troponin level elevated    Hyperkalemia    Goals of care, counseling/discussion    Advance care planning    Palliative care by specialist        Failure to thrive in adult     Note: Patient has a history of multiple strokes, with the most recent in January 2022 affecting her left-right side.  Note: MRI in November 2024 showed no acute stroke.  Note: February 2025 brain imaging revealed multiple micro-hemorrhages without acute changes, leading to a strong suspicion of amyloid angiopathy.     Patient's condition appears to be a progressive neurodegenerative process, possibly vascular dementia, (amyloid angiopathy definitely playing a role), there could also be possibility of  Alzheimer's disease, therefore amyloid ratio will be ordered to confirm the diagnosis.  Additional other blood work was ordered to rule out any other etiology.  So far it was not revealing.    Worsening during hospitalization.  CT of the head was repeated to rule out any possibility of development of hemorrhages as well as of the MRI of the brain was  done again to rule out any new acute strokes.  Repeat testing so far was not revealing for any new changes.  Patient is not a candidate for thrombolytics due to her significant burden of the amyloid angiopathy disease.    Some possibility of fluctuating status due to subclinical seizures, therefore EEG will be done and after discussing pros and cons of starting antiepileptic medications with the family but did agree to start with the 250 mg of Keppra twice a day.    By the next day patient was better: able to eat breakfast unable to communicate and was much more awake.      Failure to thrive:  - Manage hydration status  - Monitor and correct electrolyte imbalances  - Assess nutritional status and consider nutritional support if needed  - Assess need for supportive care and potential placement given progressive decline  Possibility of hospice could also be entertained in the future since the progression of the disease most likely going to happen eventually.    Chris Chandra MD

## 2025-02-19 NOTE — PROCEDURES
EEG report    REFERRING PHYSICIAN: Puneet Thomas MD    PCP and phone number:  Yao Rodriguez  [phone]    TECHNIQUE: 21 channels of EEG, 2 channels of EOG, and 1 channel of EKG were recorded utilizing the International 10/20 System. The recording was performed in a digitized monopolar referential format and playback was reformatted into various referential and bipolar montages utilizing appropriate filter settings. Automatic seizure and spike detection programs were utilized throughout the recording.  Video was not recorded during the study    CLINICAL DATA:  Patient is sent for the evaluation of possible seizures.    MEDICATION:  Continuous Medications:   sodium chloride 50 mL/hr at 02/18/25 1345       Scheduled Medications:  No current outpatient medications on file.    PRN Medications:    glucose **OR** glucose **OR** glucose-vitamin C **OR** dextrose **OR** glucose **OR** glucose **OR** glucose-vitamin C    acetaminophen    melatonin    ondansetron    metoclopramide    polyethylene glycol (PEG 3350)    sennosides    bisacodyl    ACTIVATION:  Hyperventilation: Not done  Photic stimulation: Not done  Sleep: Normal sleep architecture was seen.    BACKGROUND  While the patient was awake, the posterior dominant rhythm consisted of poorly-regulated 7-8 Hz rhythmic waveforms, symmetrically distributed over both posterior quadrants and was reactive to eye opening.    EEG ABNORMALITY  None    IMPRESSION:  This is a slightly abnormal EEG (PDR was slower than normal). No focal, lateralized, or epileptiform features are noted. Clinical correlation required.

## 2025-02-19 NOTE — CM/SW NOTE
LM with son Gokul to discuss ALICIA choice. Await a call back.     16:00  Spoke with son Gokul and offered to email ALICIA choice to [email or web address] stating that he did not receive it. List is not in spam.  Reviewed list but still would like to get handout.     Geovanna Narvaez, MITCHEL RN, CM  [phone]

## 2025-02-19 NOTE — PROCEDURES
Continuous video EEG report    REFERRING PHYSICIAN: Puneet Thomas MD    PCP and phone number:  Yao Rodriguez  [phone]    TECHNIQUE: 21 channels of EEG, 2 channels of EOG, and 1 channel of EKG were recorded utilizing the International 10/20 System. The recording was performed in a digitized monopolar referential format and playback was reformatted into various referential and bipolar montages utilizing appropriate filter settings. Automatic seizure and spike detection programs were utilized throughout the recording.  Video was recorded during the study    Total recording 16 hours    CLINICAL DATA:  Patient is sent for the evaluation of possible seizures.    MEDICATION:  Continuous Medications:        Scheduled Medications:  No current outpatient medications on file.    PRN Medications:    glucose **OR** glucose **OR** glucose-vitamin C **OR** dextrose **OR** glucose **OR** glucose **OR** glucose-vitamin C    acetaminophen    melatonin    ondansetron    metoclopramide    polyethylene glycol (PEG 3350)    sennosides    bisacodyl    ACTIVATION:  Hyperventilation: Not done  Photic stimulation: Not done  Sleep: Normal sleep architecture was seen.    BACKGROUND  While the patient was awake, the posterior dominant rhythm consisted of poorly-regulated 7-8 Hz rhythmic waveforms, symmetrically distributed over both posterior quadrants and was reactive to eye opening.    EEG ABNORMALITY  None    IMPRESSION:  This is a slightly abnormal EEG (PDR was slower than normal). No focal, lateralized, or epileptiform features are noted. Clinical correlation required.

## 2025-02-19 NOTE — SLP NOTE
SPEECH DAILY NOTE - INPATIENT    ASSESSMENT & PLAN   ASSESSMENT  PPE REQUIRED. THIS THERAPIST WORE GLOVES FOR DURATION OF SESSION. HANDS WASHED UPON ENTRANCE/EXIT.    SLP f/u for ongoing dysphagia tx/meal assessment per recommendations of soft bite sized/thin liquids per swallow f/u. RN reports pt tolerates diet and medication well with no overt clinical s/s aspiration. Pt denies any swallowing challenges.     Pt positioned upright in bed, alert/cooperative. Pt afebrile, tolerating room air with oxygen status 98% prior to the start of oral trials. SLP reviewed aspiration precautions and safe swallowing compensatory strategies with the patient. Safe swallow guidelines remain written on the white board in purple. Patient v/u. Provided 1:1 assistance, pt tolerates hard solids and thin liquids via cup sips with no overt clinical signs/symptoms of aspiration. Oxygen status remained stable t/o the entire session. Recommend remain on current diet with adherence to aspiration precautions and swallow strategies.    SLP to f/u with meal assessment x1-2, monitor  CXR, and VFSS if any overt CSA and/or decline in CXR. RN alerted with results and recommendations.     MOST RECENT CXR 2/17  CONCLUSION:   1. No acute cardiopulmonary finding.         Diet Recommendations - Solids: Mechanical soft chopped/ Soft & Bite Sized  Diet Recommendations - Liquids: Thin Liquids    Compensatory Strategies Recommended: Alternate consistencies  Aspiration Precautions: Upright position;Slow rate;Small bites;No straw;1:1 feeding  Medication Administration Recommendations: Whole in puree    Patient Experiencing Pain: No              Treatment Plan  Treatment Plan/Recommendations: Aspiration precautions    Interdisciplinary Communication: Plan posted at bedside          GOALS  Goal #1 The patient will tolerate soft bite sized consistency and thin liquids without overt signs or symptoms of aspiration with 100 % accuracy over 1-2 session(s).   Revised  2/18     Goal #2 The patient/family/caregiver will demonstrate understanding and implementation of aspiration precautions and swallow strategies independently over 4-5 session(s).    In Progress   Goal #3 The patient will utilize compensatory strategies as outlined by  BSSE (clinical evaluation) including Slow rate, Small bites, CONTROLLED LIQUIDS,No straws, Upright 90 degrees with moderate feeding assistance 90% of the time across 4-5 sessions.    SLP fed Pt; strategies executed.     In Progress   Goal #4 The patient will tolerate trial upgrade of SOFT/SOLID consistency and THIN liquids without overt signs or symptoms of aspiration with 100 % accuracy over 3-4 session(s).    Revised 2/18     FOLLOW UP  Follow Up Needed (Documentation Required): Yes  SLP Follow-up Date: 02/20/25  Duration: 2 weeks    Session: 2    If you have any questions, please contact ALICIA Gaitan M.S. CCC-SLP  Speech Language Pathologist  Phone Number [phone]. [phone]

## 2025-02-20 LAB
ANION GAP SERPL CALC-SCNC: 8 MMOL/L (ref 0–18)
BASOPHILS # BLD AUTO: 0.02 X10(3) UL (ref 0–0.2)
BASOPHILS NFR BLD AUTO: 0.5 %
BUN BLD-MCNC: 23 MG/DL (ref 9–23)
BUN/CREAT SERPL: 14.7 (ref 10–20)
CALCIUM BLD-MCNC: 8.6 MG/DL (ref 8.7–10.4)
CHLORIDE SERPL-SCNC: 115 MMOL/L (ref 98–112)
CO2 SERPL-SCNC: 22 MMOL/L (ref 21–32)
CREAT BLD-MCNC: 1.56 MG/DL
DEPRECATED RDW RBC AUTO: 53.8 FL (ref 35.1–46.3)
EGFRCR SERPLBLD CKD-EPI 2021: 35 ML/MIN/1.73M2 (ref 60–?)
EOSINOPHIL # BLD AUTO: 0.01 X10(3) UL (ref 0–0.7)
EOSINOPHIL NFR BLD AUTO: 0.3 %
ERYTHROCYTE [DISTWIDTH] IN BLOOD BY AUTOMATED COUNT: 17.6 % (ref 11–15)
GLUCOSE BLD-MCNC: 92 MG/DL (ref 70–99)
GLUCOSE BLDC GLUCOMTR-MCNC: 109 MG/DL (ref 70–99)
GLUCOSE BLDC GLUCOMTR-MCNC: 117 MG/DL (ref 70–99)
GLUCOSE BLDC GLUCOMTR-MCNC: 166 MG/DL (ref 70–99)
GLUCOSE BLDC GLUCOMTR-MCNC: 173 MG/DL (ref 70–99)
HCT VFR BLD AUTO: 24.6 %
HGB BLD-MCNC: 7.6 G/DL
IMM GRANULOCYTES # BLD AUTO: 0.1 X10(3) UL (ref 0–1)
IMM GRANULOCYTES NFR BLD: 2.6 %
LYMPHOCYTES # BLD AUTO: 1.63 X10(3) UL (ref 1–4)
LYMPHOCYTES NFR BLD AUTO: 41.6 %
MAGNESIUM SERPL-MCNC: 1.9 MG/DL (ref 1.6–2.6)
MCH RBC QN AUTO: 27 PG (ref 26–34)
MCHC RBC AUTO-ENTMCNC: 30.9 G/DL (ref 31–37)
MCV RBC AUTO: 87.5 FL
MONOCYTES # BLD AUTO: 0.36 X10(3) UL (ref 0.1–1)
MONOCYTES NFR BLD AUTO: 9.2 %
NEUTROPHILS # BLD AUTO: 1.8 X10 (3) UL (ref 1.5–7.7)
NEUTROPHILS # BLD AUTO: 1.8 X10(3) UL (ref 1.5–7.7)
NEUTROPHILS NFR BLD AUTO: 45.8 %
OSMOLALITY SERPL CALC.SUM OF ELEC: 303 MOSM/KG (ref 275–295)
PLATELET # BLD AUTO: 246 10(3)UL (ref 150–450)
POTASSIUM SERPL-SCNC: 4.1 MMOL/L (ref 3.5–5.1)
RBC # BLD AUTO: 2.81 X10(6)UL
SODIUM SERPL-SCNC: 145 MMOL/L (ref 136–145)
WBC # BLD AUTO: 3.9 X10(3) UL (ref 4–11)

## 2025-02-20 RX ORDER — CARVEDILOL 12.5 MG/1
12.5 TABLET ORAL 2 TIMES DAILY WITH MEALS
Status: DISCONTINUED | OUTPATIENT
Start: 2025-02-20 | End: 2025-02-25

## 2025-02-20 RX ORDER — CARVEDILOL 25 MG/1
12.5 TABLET ORAL 2 TIMES DAILY WITH MEALS
Status: SHIPPED | COMMUNITY
Start: 2025-02-20 | End: 2025-02-26

## 2025-02-20 RX ORDER — LEVETIRACETAM 250 MG/1
250 TABLET ORAL 2 TIMES DAILY
Qty: 60 TABLET | Refills: 0 | Status: SHIPPED | OUTPATIENT
Start: 2025-02-20 | End: 2025-03-22

## 2025-02-20 RX ORDER — LOSARTAN POTASSIUM 50 MG/1
25 TABLET ORAL DAILY
Status: SHIPPED | COMMUNITY
Start: 2025-02-20

## 2025-02-20 NOTE — PROGRESS NOTES
[facility] Hospitalist Progress Note     CC: Hospital Follow up    PCP: Yao Rodriguez       Assessment/Plan:   Ms. Fine is a 70-year-old female with a history of multiple strokes in the past, hypertension, CAD, sleep apnea, type 2 diabetes, who presents to the hospital for evaluation of low p.o. intake, weakness, and general failure to thrive.    Metabolic encephalopathy 2/2 dehydration  Initial hypotension  -STAT CT without acute changes, MRI brain (read pending but per neuro, no new changes)  -BP improved  -LA neg   -CXR negative  -neuro ordered EEG, discussed with Neuro, EEG neg, keppra empirically started  -Much  improved now  -will continue keppra long term per neuro     Failure to thrive  Low PO intake, dehydration with PAPO and hypernatremia  Hx of multiple strokes in past   -obtained MRI brain, high risk of recurrent stroke--->negative for acute stroke  -speech eval  -continue electrolyte correction   -neuro consult appreciated, could be progression of vascular dementia, neurodegenerative disease, amyloid angiopathy   -palliative consulted     Acute on chronic Anemia  - hgb down trending   - no bleeding noted  - poss diultional?  - check iron studies     Hypernatremia  PAPO  Metabolic acidosis  -giving saline boluses this AM due to hypotension   -severe dehydration on admisision    Severe malnutrition  -continue caloric intake documentation   -dietitian on consult  -agree with oral nutritional supplementation per dietitian team   -significant weight loss past one year (~70lbs)  -monitor potential need for temporary enteral nutrition      Hypertension   -stopped hydralazine  -holding arb  -decreased coreg     Coronary artery disease  -PO meds       CEDRICK  -cpap if uses here      Type 2 diabetes   -A1c 5.7  -stop accucheck/insulin  -stop metformin on DC     Hx of seizure?   -not on keppra anymore,resume keppra per neuro       DVT prophylaxis: heparin sub q  Code status: DNR select    Discussed with  son and daughter over phone on 2/20    Dispo: ok for DC to [facility]: Palliative care consulted, DNR select     Puneet Thomas MD  [facility] Hospitalist        Subjective:     Alert and awake,  denies cp or sob, no fevers or chills, no nausea or vomiting,     Alert to self, and that she was in a hospital,     OBJECTIVE:    Blood pressure 113/62, pulse 91, temperature 98 °F (36.7 °C), temperature source Axillary, resp. rate 18, weight 131 lb 4.8 oz (59.6 kg), SpO2 99%.    Temp:  [97.7 °F (36.5 °C)-99.6 °F (37.6 °C)] 98 °F (36.7 °C)  Pulse:  [68-98] 91  Resp:  [16-18] 18  BP: (113-155)/(62-77) 113/62  SpO2:  [94 %-99 %] 99 %      Intake/Output:    Intake/Output Summary (Last 24 hours) at 2/20/2025 0937  Last data filed at 2/20/2025 0335  Gross per 24 hour   Intake 240 ml   Output 600 ml   Net -360 ml       Last 3 Weights   02/20/25 0503 131 lb 4.8 oz (59.6 kg)   02/19/25 0500 130 lb 14.4 oz (59.4 kg)   02/18/25 0606 132 lb 14.4 oz (60.3 kg)   02/16/25 0500 122 lb 1.6 oz (55.4 kg)   02/13/25 0900 121 lb 6.4 oz (55.1 kg)   01/30/25 2230 150 lb (68 kg)   01/14/25 1842 165 lb (74.8 kg)       /62 (BP Location: Right arm)   Pulse 91   Temp 98 °F (36.7 °C) (Axillary)   Resp 18   Wt 131 lb 4.8 oz (59.6 kg)   SpO2 99%   BMI 23.26 kg/m²   General: alert    Lungs: clear to ausculation bilaterally  Heart: Regular rate and rhythm  Abdomen: soft, non tender  Extremities: No edema  Neuro: following some commands    Data Review:       Labs:     Recent Labs   Lab 02/17/25  1228 02/19/25  0805 02/20/25  0707   RBC 3.07* 2.73* 2.81*   HGB 8.3* 7.5* 7.6*   HCT 26.4* 23.8* 24.6*   MCV 86.0 87.2 87.5   MCH 27.0 27.5 27.0   MCHC 31.4 31.5 30.9*   RDW 16.7* 17.1* 17.6*   NEPRELIM 4.31 2.05 1.80   WBC 5.6 4.1 3.9*   .0 236.0 246.0         Recent Labs   Lab 02/18/25  0833 02/19/25  0805 02/20/25  0707   * 82 92   BUN 26* 22 23   CREATSERUM 1.49* 1.42* 1.56*   EGFRCR 37* 39* 35*   CA 8.5* 8.6* 8.6*    144  145   K 3.9 3.9 4.1   * 115* 115*   CO2 19.0* 21.0 22.0       Recent Labs   Lab 02/14/25  0917 02/15/25  0838 02/16/25  0630 02/17/25  0758   ALB 3.5 3.6 3.1* 3.2         Imaging:  XR CHEST AP PORTABLE  (CPT=71045)    Result Date: 2/17/2025  CONCLUSION:  1. No acute cardiopulmonary finding.    Dictated by (CST): Alexis Rabago MD on 2/17/2025 at 3:22 PM     Finalized by (CST): Alexis Rabago MD on 2/17/2025 at 3:23 PM          CT BRAIN OR HEAD (CPT=70450)    Result Date: 2/17/2025  CONCLUSION:  1. No acute intracranial process by noncontrast CT technique. If there is clinical concern for acute ischemia, follow-up MRI would have greater sensitivity.  2. Senescent changes of parenchymal volume loss with sequela of chronic microvascular ischemic disease.  3. There is large vessel atherosclerosis involvement of the anterior and posterior circulations.  4. Lesser incidental findings as above.      elm-remote.   Dictated by (CST): Rick Patel MD on 2/17/2025 at 1:14 PM     Finalized by (CST): Rick Patel MD on 2/17/2025 at 1:21 PM             Meds:      carvedilol  12.5 mg Oral BID with meals    levETIRAcetam  250 mg Oral BID    aspirin  81 mg Oral Daily    atorvastatin  40 mg Oral Nightly    brimonidine  1 drop Both Eyes BID    heparin  5,000 Units Subcutaneous Q8H [facility]             glucose **OR** glucose **OR** glucose-vitamin C **OR** dextrose **OR** glucose **OR** glucose **OR** glucose-vitamin C    acetaminophen    melatonin    ondansetron    metoclopramide    polyethylene glycol (PEG 3350)    sennosides    bisacodyl

## 2025-02-20 NOTE — DIETARY NOTE
ADULT NUTRITION REASSESSMENT    Pt is at high nutrition risk.  Pt meets severe malnutrition criteria.      CRITERIA FOR MALNUTRITION DIAGNOSIS:  Criteria for severe malnutrition diagnosis: acute illness/injury related to wt loss greater than 7.5% in 3 months, energy intake less than 50% for greater than 5 days, body fat moderate depletion, and muscle mass moderate depletion.    RECOMMENDATIONS TO MD: See nutrition intervention for ONS (oral nutrition supplements) CPM    ADMITTING DIAGNOSIS:  Dehydration [E86.0]  Hyperkalemia [E87.5]  Failure to thrive in adult [R62.7]  Troponin level elevated [R79.89]  PAPO (acute kidney injury) [N17.9]  PERTINENT PAST MEDICAL HISTORY:   Past Medical History:    Asthma (HCC)    Coronary atherosclerosis    Diabetes (HCC)    diabetes for 2 yrs    Essential hypertension    Glaucoma    right    Heart attack (HCC)    2005    High blood pressure    High cholesterol    Hyperlipidemia    Osteoarthritis    Sleep apnea    cpap    Stroke (HCC)    30 yrs ago    Visual impairment    left eye edema     PATIENT STATUS: Initial 02/16/25: Pt admit for decreased appetite x 3 days. PMH sig for DM, HTN, hx CVA and nonverbal d/t past CVA. Pt assessed due to screening at risk for weight loss d/t poor appetite. Pt visited, son at bedside. Son suspects pt has lost weight recently d/t poor intake, however unsure of how much weight she has lost. Per chart review, pt previously admitted to the hospital in November, weighed 155 lbs on admission. Pt currently 122 lbs (33 lb loss, 21%, x 3 months, significant per guidelines). Son reported pt has not been taking much in recently, however appetite has improved today and was able to have half of a container of applesauce and bits and pieces of other foods. Offered to add ONS to help meet nutritional needs, son agreeable to try. Will order Magic cup for her BID. Noted pt may need nutrition support d/t failure to thrive if intake remains low. Family and pt declining  at this time. Will monitor intake and follow up as appropriate.    Update 02/20/25: RA completed per protocol. Chart reviewed. Discussion with RN, no concerns, eating well. Intakes reviewed, 0-100% x 11 meals (averaging 55% overall - fair). Intake appears slightly improved over admission. Palliative care consulted, note reviewed - no PEG per family. Current code status: DNAR/Select    FOOD/NUTRITION RELATED HISTORY:  Appetite: Fair -some improvement noted  Intake: ~0-100% x11 meals documented since last visit - averaging 55% overall (Fair) + 50-80% x 2 ONS per flowsheet review  Intake Meeting Needs: Marginal, ONS in place to maximize  Percent Meals Eaten (last 6 days)       Date/Time Percent Meals Eaten (%)    02/14/25 1407 10 %    02/14/25 1900 25 %    02/15/25 0749 15 %    02/15/25 1700 20 %    02/16/25 1000 40 %     Percent Meals Eaten (%): half a container of apple sauce at 02/16/25 1000    02/16/25 1535 50 %    02/16/25 1800 --     Percent Meals Eaten (%): 80% of soup, 30% of yogurt at 02/16/25 1800    02/17/25 0930 30 %     Percent Meals Eaten (%): 100% of cornflakes, 10% eggs, 80% magic cup at 02/17/25 0930    02/17/25 1419 0 %     Percent Meals Eaten (%): no appetite at 02/17/25 1419    02/17/25 1758 30 %    02/18/25 0522 100 %     Percent Meals Eaten (%): pudding cup at 02/18/25 0522    02/18/25 0930 40 %     Percent Meals Eaten (%): 40% of eggs, potatoes and grits at 02/18/25 0930    02/18/25 1822 40 %    02/19/25 0918 75 %     Percent Meals Eaten (%): 100% of magic cup, cream of wheat, [place] toast. 0% of scrambled eggs (did not like the taste) at 02/19/25 0918    02/19/25 1507 80 %    02/20/25 0943 75 %          Food Allergies: No Known Food Allergies (NKFA)  Cultural/Ethnic/Advent Preferences: None    GASTROINTESTINAL: +BM 2/15/25 - large;soft and Swallow evaluation noted    MEDICATIONS: reviewed Electrolyte replacement per protocol (non-cardiac)   carvedilol  12.5 mg Oral BID with meals     levETIRAcetam  250 mg Oral BID    aspirin  81 mg Oral Daily    atorvastatin  40 mg Oral Nightly    brimonidine  1 drop Both Eyes BID    heparin  5,000 Units Subcutaneous Q8H KB     LABS: reviewed A1c 5.7% on 2/13/25  POC Glucose reviewed  Recent Labs     02/17/25  0758 02/18/25  0833 02/19/25  0805 02/20/25  0707   GLU 93 103* 82 92   BUN 24* 26* 22 23   CREATSERUM 1.52* 1.49* 1.42* 1.56*   CA 8.7 8.5* 8.6* 8.6*   MG  --   --  1.7 1.9    144 144 145   K 4.2 3.9 3.9 4.1    115* 115* 115*   CO2 18.0* 19.0* 21.0 22.0   PHOS 2.9  --   --   --    OSMOCALC 292 303* 300* 303*     NUTRITION RELATED PHYSICAL FINDINGS:  - Nutrition Focused Physical Exam (NFPE): moderate depletion body fat triceps region and moderate depletion muscle mass temple region and clavicle region per visual exam  - Fluid Accumulation: none  per flowsheet review -->  see RN documentation for details  - Skin Integrity: Pressure Injury: Stage 2 on sacrum and at risk per flowsheet review --> see RN documentation for details    ANTHROPOMETRICS:  HT: 160 cm (5' 3\")   WT: 59.6 kg (131 lb 4.8 oz) - weight gain noted, monitor accuracy  Last Visit Wt: 122# 2 oz on 2/16/25  Admit Wt: 121# 6 oz on 2/13/25  BMI: Body mass index is 23.26 kg/m².  BMI CLASSIFICATION: 18.5-24.9 kg/m2 - WNL  No data recorded         114% IBW  Usual Body Wt: 160 lbs       82% UBW    WEIGHT HISTORY: Noted weight loss since last admission in November - 33 lbs x 3 months (21% weight loss, significant)  Patient Weight(s) for the past 336 hrs:   Weight   02/20/25 0503 59.6 kg (131 lb 4.8 oz)   02/19/25 0500 59.4 kg (130 lb 14.4 oz)   02/18/25 0606 60.3 kg (132 lb 14.4 oz)   02/16/25 0500 55.4 kg (122 lb 1.6 oz)   02/13/25 0900 55.1 kg (121 lb 6.4 oz)     Wt Readings from Last 10 Encounters:   02/20/25 59.6 kg (131 lb 4.8 oz)   01/30/25 68 kg (150 lb)   01/14/25 74.8 kg (165 lb)   01/04/25 72.6 kg (160 lb)   11/11/24 74.8 kg (165 lb)   11/06/24 70.3 kg (155 lb)   09/27/24 73.9 kg  (162 lb 14.4 oz)   07/08/24 77.6 kg (171 lb)   07/08/24 89 kg (196 lb 3.4 oz)   03/24/24 88 kg (194 lb 0.1 oz)     NUTRITION DIAGNOSIS/PROBLEM:   Severe Malnutrition related to Acute - Physiological causes resulting in anorexia or diminished intake as evidenced by wt loss greater than 7.5% in 3 months, energy intake less than 50% for greater than 5 days, body fat moderate depletion, and muscle mass moderate depletion.    NUTRITION DIAGNOSIS PROGRESS:  Slight Improvement (unresolved) - some improvement in po intake w/ ONS to help maximize     NUTRITION INTERVENTION:   NUTRITION PRESCRIPTION:   Estimated Nutrition needs: --dosing wt of 55.4 kg - wt taken on 2/16  Calories: 1250 calories/day (23 calories per kg [place] Jeor AF 1.2)  Protein: 55 - 83 g protein/day (1.0-1.5 g protein/kg Dosing wt)  Fluid Needs: 1 ml/kcal    - Diet:       Procedures    Regular/General diet Fluid Consistency: Thin Liquids ; Texture Consistency: Chopped / Soft & Bite Sized; Is Patient on Accuchecks? Yes; Is Patient on Suicide Precautions? No; Misc Restriction: Cardiac, No Straw      - RD Malnutrition Care Plan: Encouraged increased PO intake, Encouraged small frequent meals with emphasis on high calorie/high protein, and Initiated ONS (oral nutritional supplements)  - Meals and snacks: Encouraged small frequent meals and Encouraged adequate PO intake  - Medical Food Supplements-RD continued Magic Cup (290 calories/ 9 g protein each) BID Vanilla, Chocolate, or Mixed berry. Rational/use of oral supplements discussed.  - Vitamin and mineral supplements: none  - Feeding assistance: meal set up and feed  - Nutrition education: not appropriate     - Coordination of nutrition care: collaboration with other providers  - Discharge and transfer of nutrition care to new setting or provider: monitor plans Plan to discharge to [facility] pending insurance auth    MONITOR AND EVALUATE/NUTRITION GOALS:  - Food and Nutrient Intake:       Monitor: adequacy of PO intake and adequacy of supplement intake  - Food and Nutrient Administration:      Monitor: N/A no PEG per palliative care note  - Anthropometric Measurement:    Monitor weight  - Nutrition Goals:      halt wt loss, gradual wt gain as able, PO and supplement greater than 75% of needs, good supplement intake, labs within acceptable limits, prevent skin breakdown, and improved GI status    DIETITIAN FOLLOW UP: RD to follow and monitor nutrition status    Kira Huston MS, PANFILO, RDN, LDN  Clinical Dietitian  P: [phone]

## 2025-02-20 NOTE — PHYSICAL THERAPY NOTE
PHYSICAL THERAPY TREATMENT NOTE - INPATIENT     Room Number: 337/337-A       Presenting Problem: weakness and confusion  Co-Morbidities : Hx nonverbal d/t CVA 2021, DM2, HTN, CAD, seizure    Problem List  Principal Problem:    PAPO (acute kidney injury)  Active Problems:    Cerebral amyloid angiopathy (HCC)    Dehydration    Failure to thrive in adult    Troponin level elevated    Hyperkalemia    Goals of care, counseling/discussion    Advance care planning    Palliative care by specialist      PHYSICAL THERAPY ASSESSMENT   Patient demonstrates limited progress this session, goals  remain in progress.      Patient is requiring maximum assist and assist x 2  as a result of the following impairments: decreased functional strength, decreased endurance/aerobic capacity, pain, impaired sitting balance, decreased muscular endurance, and medical status.     Patient continues to function below baseline with bed mobility, transfers, gait, maintaining seated position, and standing prolonged periods.  Next session anticipate patient to progress bed mobility, transfers, maintaining seated position, and standing prolonged periods.  Physical Therapy will continue to follow patient for duration of hospitalization.    Patient continues to benefit from continued skilled PT services: to promote return to prior level of function and safety with continuous assistance and gradual rehabilitative therapy .    PLAN DURING HOSPITALIZATION  Nursing Mobility Recommendation : Lift Equipment  PT Device Recommendation: Mechanical lift  PT Treatment Plan: Bed mobility;Body mechanics;Energy conservation;Patient education;Gait training;Range of motion;Strengthening;Transfer training;Balance training;Family education  Frequency (Obs): 3-5x/week     SUBJECTIVE  Smiling; non-verbal throughout session    OBJECTIVE  Precautions: Bed/chair alarm    PAIN ASSESSMENT   Rating: Unable to rate  Location: RLE  Management Techniques: Activity promotion;Body  mechanics;Repositioning    BALANCE  Static Sitting: Poor  Dynamic Sitting: Poor -  Static Standing: Not tested  Dynamic Standing: Not tested    AM-PAC '6-Clicks' INPATIENT SHORT FORM - BASIC MOBILITY  How much difficulty does the patient currently have...  Patient Difficulty: Turning over in bed (including adjusting bedclothes, sheets and blankets)?: A Lot   Patient Difficulty: Sitting down on and standing up from a chair with arms (e.g., wheelchair, bedside commode, etc.): Unable   Patient Difficulty: Moving from lying on back to sitting on the side of the bed?: A Lot   How much help from another person does the patient currently need...   Help from Another: Moving to and from a bed to a chair (including a wheelchair)?: Total   Help from Another: Need to walk in hospital room?: Total   Help from Another: Climbing 3-5 steps with a railing?: Total     AM-PAC Score:  Raw Score: 8   Approx Degree of Impairment: 86.62%   Standardized Score (AM-PAC Scale): 28.58   CMS Modifier (G-Code): CM    FUNCTIONAL ABILITY STATUS  Functional Mobility/Gait Assessment  Gait Assistance: Not tested  Supine to Sit: maximum assist x 2. Patient tolerated static sitting <5 minutes with intermittent UE support and Max A in order to maintain static sitting balance. Patient demonstrating right posterolateral lean, requiring assistance to correct.   Sit to Supine: maximum assist x 2    Skilled Therapy Provided: Patient in bed upon arrival. RN approved activity. Educated patient on POC and benefits of mobilization. Agreeable to participate. Patient reporting RLE pain, not quantified per the pain scale. Patient benefits from increased time, cues, and physical assistance in order to complete functional mobility tasks.    The patient's Approx Degree of Impairment: 86.62% has been calculated based on documentation in the [facility] '6 clicks' Inpatient Daily Activity Short Form.  Research supports that patients with this level of impairment may benefit  from LTAC.  Final disposition will be made by interdisciplinary medical team.    Patient End of Session: In bed;Needs met;Call light within reach;RN aware of session/findings;All patient questions and concerns addressed;Hospital anti-slip socks;Alarm set    CURRENT GOALS   Goals to be met by: 3/3/25  Patient Goal Patient's self-stated goal is: pt did not state   Goal #1 Patient is able to demonstrate supine - sit EOB @ level: moderate assistance      Goal #1   Current Status  Max A x 2   Goal #2 Patient is able to demonstrate transfers EOB to/from Chair/Wheelchair at assistance level: moderate assistance with gait belt and Stand pivot transfer technique      Goal #2  Current Status  NT   Goal #3 Patient is able to tolerate 1 minute of static standing balance with bilateral UE support on RW vs lisa-walker requiring Mod A to maintain   Goal #3   Current Status  NT   Goal #4 Patient to demonstrate independence with home activity/exercise instructions provided to patient in preparation for discharge.   Goal #4   Current Status  Ongoing     Therapeutic Activity: 15 minutes

## 2025-02-20 NOTE — SPIRITUAL CARE NOTE
Spiritual Care Visit Note    Patient Name: Eliud Fine Date of Spiritual Care Visit: 25   : 1955 Primary Dx: PAPO (acute kidney injury)       Referred By: Referral From:     Spiritual Care Taxonomy:    Intended Effects: Build relationship of care and support    Methods: Educate care team about cultural and Lutheran values;Offer spiritual/Lutheran support    Interventions: Acknowledge current situation;Acknowledge response to difficult experience;Active listening;Ask guided questions;Ask guided questions about mary;Prayer for healing;Provide hospitality;Explain  role    Visit Type/Summary:     - Spiritual Care: Responded to a request for spiritual care and assessed the patient for spiritual care needs. Consulted with RN prior to visit. Offered empathic listening and emotional support. Patient and family expressed appreciation for  visit. Provided information regarding how to contact Spiritual Care and left a Spiritual Care information card. Provided support for Patient's spiritual/Lutheran requests. Asked for a Sabbath kit.  remains available for follow up.    Spiritual Care support can be requested via an Epic consult. For urgent/immediate needs, please contact the On Call  at: [place]: ext [phone]    Chaplain Resident, Marysol Veras PhD

## 2025-02-20 NOTE — OCCUPATIONAL THERAPY NOTE
OCCUPATIONAL THERAPY TREATMENT NOTE - INPATIENT        Room Number: 337/337-A     Presenting Problem: PAPO, dehydration, failure to thrive, elevated troponin, hyperkalemia    Problem List  Principal Problem:    PAPO (acute kidney injury)  Active Problems:    Cerebral amyloid angiopathy (HCC)    Dehydration    Failure to thrive in adult    Troponin level elevated    Hyperkalemia    Goals of care, counseling/discussion    Advance care planning    Palliative care by specialist      OCCUPATIONAL THERAPY ASSESSMENT   Patient demonstrates limited progress this session, goals remain in progress.    Patient is requiring up to total A as a result of the following impairments: cognition, safety, initation, weakness, balance, mobility.    Patient continues to function below baseline with self care and basic mobiltiy.  Next session anticipate patient to progress with OT POC.  Occupational Therapy will continue to follow patient for duration of hospitalization.    Patient continues to benefit from continued skilled OT services: to promote return to prior level of function and safety with continuous assistance and gradual rehabilitative therapy.     PLAN DURING HOSPITALIZATION     OT Treatment Plan: Balance activities;ADL training;Energy conservation/work simplification techniques;Functional transfer training;Endurance training;Patient/Family education;Patient/Family training;Equipment eval/education;Compensatory technique education;UE strengthening/ROM;Neuromuscluar reeducation;Cognitive reorientation     SUBJECTIVE  Patient smiling at therapist; nonverbal     OBJECTIVE  Precautions: Bed/chair alarm    WEIGHT BEARING RESTRICTION     PAIN ASSESSMENT  [place]    ACTIVITY TOLERANCE            [id number]             O2 SATURATIONS       ACTIVITIES OF DAILY LIVING ASSESSMENT  AM-PAC ‘6-Clicks’ Inpatient Daily Activity Short Form  How much help from another person does the patient currently need…  -   Putting on and taking off regular  lower body clothing?: Total  -   Bathing (including washing, rinsing, drying)?: Total  -   Toileting, which includes using toilet, bedpan or urinal? : Total  -   Putting on and taking off regular upper body clothing?: Total  -   Taking care of personal grooming such as brushing teeth?: A Lot  -   Eating meals?: A Lot    AM-PAC Score:  Score: 8  Approx Degree of Impairment: 85.69%  Standardized Score (AM-PAC Scale): 22.86  CMS Modifier (G-Code): CM        Skilled Therapy Provided: ADL assessment: Self feeding: Max A ; Grooming Max A  LE/UE dressing- Max-Total; Toileting: Total Bathing: Max; pt required x2 person assist to complete bed mobility; max a for sitting balance with posterior lean; tolerating <5 minutes with assist; returned back to bed.     EDUCATION PROVIDED  Patient Education : Role of Occupational Therapy; Plan of Care; Discharge Recommendations; Posture/Positioning; Proper Body Mechanics; Edema Reduction  Patient's Response to Education: Demonstrates Poor Carry Over to Information    The patient's Approx Degree of Impairment: 85.69% has been calculated based on documentation in the [facility] '6 clicks' Inpatient Daily Activity Short Form.  Research supports that patients with this level of impairment may benefit from LTC.  Final disposition will be made by interdisciplinary medical team.    Patient End of Session: In bed    OT Goals:    OT Goals  Patient's self stated goal is: none stated     Patient will complete functional transfer with Max A x1  Comment: ongoing    Patient will sit EOB with Min A in prep for ADLs  Comment: ongoing    Patient will tolerate standing for 1-2 minutes in prep for ADLs with Max A x1  Comment:ongoing    Patient will complete oral/facial grooming task in supported sitting with Min A  Comment:ongoing           Goals  on: 3/3/25  Frequency: 3x/week    OT Session Time: 15 minutes  Self-Care Home Management: 0 minutes  Therapeutic Activity: 15 minutes    Nino Meyer  Occupational Therapist, OTR/L ext [phone]

## 2025-02-20 NOTE — PLAN OF CARE
Patient is alert and oriented x2 on room air able to roll in bed with assistance. Patient medically cleared for discharge today. Plan for [facility], awaiting insurance authorization.     Problem: Patient Centered Care  Goal: Patient preferences are identified and integrated in the patient's plan of care  Description: Interventions:  - What would you like us to know as we care for you? Lives at home with son Gokul who assists with patient's care.  - Provide timely, complete, and accurate information to patient/family  - Incorporate patient and family knowledge, values, beliefs, and cultural backgrounds into the planning and delivery of care  - Encourage patient/family to participate in care and decision-making at the level they choose  - Honor patient and family perspectives and choices  Outcome: Progressing     Problem: Diabetes/Glucose Control  Goal: Glucose maintained within prescribed range  Description: INTERVENTIONS:  - Monitor Blood Glucose as ordered  - Assess for signs and symptoms of hyperglycemia and hypoglycemia  - Administer ordered medications to maintain glucose within target range  - Assess barriers to adequate nutritional intake and initiate nutrition consult as needed  - Instruct patient on self management of diabetes  Outcome: Progressing     Problem: Patient/Family Goals  Goal: Patient/Family Long Term Goal  Description: Patient's Long Term Goal: \"get my mom to eat again\"    Interventions:  - Monitor intake and output  - IV hydration  - See additional Care Plan goals for specific interventions  Outcome: Progressing  Goal: Patient/Family Short Term Goal  Description: Patient's Short Term Goal: \"more awake\"    Interventions:   -Neuro checks   -Monitor labs and vitals signs  -Follow provider recommendations  - See additional Care Plan goals for specific interventions  Outcome: Progressing     Problem: SAFETY ADULT - FALL  Goal: Free from fall injury  Description: INTERVENTIONS:  - Assess pt frequently for  physical needs  - Identify cognitive and physical deficits and behaviors that affect risk of falls.  - [place] fall precautions as indicated by assessment.  - Educate pt/family on patient safety including physical limitations  - Instruct pt to call for assistance with activity based on assessment  - Modify environment to reduce risk of injury  - Provide assistive devices as appropriate  - Consider OT/PT consult to assist with strengthening/mobility  - Encourage toileting schedule  Outcome: Progressing     Problem: DISCHARGE PLANNING  Goal: Discharge to home or other facility with appropriate resources  Description: INTERVENTIONS:  - Identify barriers to discharge w/pt and caregiver  - Include patient/family/discharge partner in discharge planning  - Arrange for needed discharge resources and transportation as appropriate  - Identify discharge learning needs (meds, wound care, etc)  - Arrange for interpreters to assist at discharge as needed  - Consider post-discharge preferences of patient/family/discharge partner  - Complete POLST form as appropriate  - Assess patient's ability to be responsible for managing their own health  - Refer to Case Management Department for coordinating discharge planning if the patient needs post-hospital services based on physician/LIP order or complex needs related to functional status, cognitive ability or social support system  Outcome: Progressing     Problem: Altered Communication/Language Barrier  Goal: Patient/Family is able to understand and participate in their care  Description: Interventions:  - Assess communication ability and preferred communication style  - Implement communication aides and strategies  - Use visual cues when possible  - Listen attentively, be patient, do not interrupt  - Minimize distractions  - Allow time for understanding and response  - Establish method for patient to ask for assistance (call light)  - Provide an  as needed  - Communicate  barriers and strategies to overcome with those who interact with patient  Outcome: Progressing     Problem: METABOLIC/FLUID AND ELECTROLYTES - ADULT  Goal: Electrolytes maintained within normal limits  Description: INTERVENTIONS:  - Monitor labs and rhythm and assess patient for signs and symptoms of electrolyte imbalances  - Administer electrolyte replacement as ordered  - Monitor response to electrolyte replacements, including rhythm and repeat lab results as appropriate  - Fluid restriction as ordered  - Instruct patient on fluid and nutrition restrictions as appropriate  Outcome: Progressing  Goal: Hemodynamic stability and optimal renal function maintained  Description: INTERVENTIONS:  - Monitor labs and assess for signs and symptoms of volume excess or deficit  - Monitor intake, output and patient weight  - Monitor urine specific gravity, serum osmolarity and serum sodium as indicated or ordered  - Monitor response to interventions for patient's volume status, including labs, urine output, blood pressure (other measures as available)  - Encourage oral intake as appropriate  - Instruct patient on fluid and nutrition restrictions as appropriate  Outcome: Progressing     Problem: SKIN/TISSUE INTEGRITY - ADULT  Goal: Skin integrity remains intact  Description: INTERVENTIONS  - Assess and document risk factors for pressure ulcer development  - Assess and document skin integrity  - Monitor for areas of redness and/or skin breakdown  - Initiate interventions, skin care algorithm/standards of care as needed  Outcome: Progressing  Goal: Incision(s), wounds(s) or drain site(s) healing without S/S of infection  Description: INTERVENTIONS:  - Assess and document risk factors for pressure ulcer development  - Assess and document skin integrity  - Assess and document dressing/incision, wound bed, drain sites and surrounding tissue  - Implement wound care per orders  - Initiate isolation precautions as appropriate  -  Initiate Pressure Ulcer prevention bundle as indicated  Outcome: Progressing     Problem: NEUROLOGICAL - ADULT  Goal: Achieves stable or improved neurological status  Description: INTERVENTIONS  - Assess for and report changes in neurological status  - Initiate measures to prevent increased intracranial pressure  - Maintain blood pressure and fluid volume within ordered parameters to optimize cerebral perfusion and minimize risk of hemorrhage  - Monitor temperature, glucose, and sodium. Initiate appropriate interventions as ordered  Outcome: Progressing  Goal: Achieves maximal functionality and self care  Description: INTERVENTIONS  - Monitor swallowing and airway patency with patient fatigue and changes in neurological status  - Encourage and assist patient to increase activity and self care with guidance from PT/OT  - Encourage visually impaired, hearing impaired and aphasic patients to use assistive/communication devices  Outcome: Progressing     Problem: Delirium  Goal: Minimize duration of delirium  Description: Interventions:  - Encourage use of hearing aids, eye glasses  - Promote highest level of mobility daily  - Provide frequent reorientation  - Promote wakefulness i.e. lights on, blinds open  - Promote sleep, encourage patient's normal rest cycle i.e. lights off, TV off, minimize noise and interruptions  - Encourage family to assist in orientation and promotion of home routines  Outcome: Progressing

## 2025-02-20 NOTE — CM/SW NOTE
Spoke with son Gokul regarding [facility] choice, [facility] chosen. Reserved on Aidin and requested they start insurance auth.     Geovanna Narvaez, MITCHEL RN, CM  [phone]

## 2025-02-20 NOTE — PLAN OF CARE
Pt A&O 2. On CPAP overnight. Denies pain overnight. Safety precautions in place.     Problem: Patient Centered Care  Goal: Patient preferences are identified and integrated in the patient's plan of care  Description: Interventions:  - What would you like us to know as we care for you? Lives at home with son Gokul who assists with patient's care.  - Provide timely, complete, and accurate information to patient/family  - Incorporate patient and family knowledge, values, beliefs, and cultural backgrounds into the planning and delivery of care  - Encourage patient/family to participate in care and decision-making at the level they choose  - Honor patient and family perspectives and choices  Outcome: Progressing     Problem: Diabetes/Glucose Control  Goal: Glucose maintained within prescribed range  Description: INTERVENTIONS:  - Monitor Blood Glucose as ordered  - Assess for signs and symptoms of hyperglycemia and hypoglycemia  - Administer ordered medications to maintain glucose within target range  - Assess barriers to adequate nutritional intake and initiate nutrition consult as needed  - Instruct patient on self management of diabetes  Outcome: Progressing     Problem: Patient/Family Goals  Goal: Patient/Family Long Term Goal  Description: Patient's Long Term Goal: \"get my mom to eat again\"    Interventions:  - Monitor intake and output  - IV hydration  - See additional Care Plan goals for specific interventions  Outcome: Progressing  Goal: Patient/Family Short Term Goal  Description: Patient's Short Term Goal: \"more awake\"    Interventions:   -Neuro checks   -Monitor labs and vitals signs  -Follow provider recommendations  - See additional Care Plan goals for specific interventions  Outcome: Progressing     Problem: SAFETY ADULT - FALL  Goal: Free from fall injury  Description: INTERVENTIONS:  - Assess pt frequently for physical needs  - Identify cognitive and physical deficits and behaviors that affect risk of  falls.  - [place] fall precautions as indicated by assessment.  - Educate pt/family on patient safety including physical limitations  - Instruct pt to call for assistance with activity based on assessment  - Modify environment to reduce risk of injury  - Provide assistive devices as appropriate  - Consider OT/PT consult to assist with strengthening/mobility  - Encourage toileting schedule  Outcome: Progressing     Problem: DISCHARGE PLANNING  Goal: Discharge to home or other facility with appropriate resources  Description: INTERVENTIONS:  - Identify barriers to discharge w/pt and caregiver  - Include patient/family/discharge partner in discharge planning  - Arrange for needed discharge resources and transportation as appropriate  - Identify discharge learning needs (meds, wound care, etc)  - Arrange for interpreters to assist at discharge as needed  - Consider post-discharge preferences of patient/family/discharge partner  - Complete POLST form as appropriate  - Assess patient's ability to be responsible for managing their own health  - Refer to Case Management Department for coordinating discharge planning if the patient needs post-hospital services based on physician/LIP order or complex needs related to functional status, cognitive ability or social support system  Outcome: Progressing     Problem: Altered Communication/Language Barrier  Goal: Patient/Family is able to understand and participate in their care  Description: Interventions:  - Assess communication ability and preferred communication style  - Implement communication aides and strategies  - Use visual cues when possible  - Listen attentively, be patient, do not interrupt  - Minimize distractions  - Allow time for understanding and response  - Establish method for patient to ask for assistance (call light)  - Provide an  as needed  - Communicate barriers and strategies to overcome with those who interact with patient  Outcome:  Progressing     Problem: METABOLIC/FLUID AND ELECTROLYTES - ADULT  Goal: Electrolytes maintained within normal limits  Description: INTERVENTIONS:  - Monitor labs and rhythm and assess patient for signs and symptoms of electrolyte imbalances  - Administer electrolyte replacement as ordered  - Monitor response to electrolyte replacements, including rhythm and repeat lab results as appropriate  - Fluid restriction as ordered  - Instruct patient on fluid and nutrition restrictions as appropriate  Outcome: Progressing  Goal: Hemodynamic stability and optimal renal function maintained  Description: INTERVENTIONS:  - Monitor labs and assess for signs and symptoms of volume excess or deficit  - Monitor intake, output and patient weight  - Monitor urine specific gravity, serum osmolarity and serum sodium as indicated or ordered  - Monitor response to interventions for patient's volume status, including labs, urine output, blood pressure (other measures as available)  - Encourage oral intake as appropriate  - Instruct patient on fluid and nutrition restrictions as appropriate  Outcome: Progressing     Problem: SKIN/TISSUE INTEGRITY - ADULT  Goal: Skin integrity remains intact  Description: INTERVENTIONS  - Assess and document risk factors for pressure ulcer development  - Assess and document skin integrity  - Monitor for areas of redness and/or skin breakdown  - Initiate interventions, skin care algorithm/standards of care as needed  Outcome: Progressing  Goal: Incision(s), wounds(s) or drain site(s) healing without S/S of infection  Description: INTERVENTIONS:  - Assess and document risk factors for pressure ulcer development  - Assess and document skin integrity  - Assess and document dressing/incision, wound bed, drain sites and surrounding tissue  - Implement wound care per orders  - Initiate isolation precautions as appropriate  - Initiate Pressure Ulcer prevention bundle as indicated  Outcome: Progressing     Problem:  NEUROLOGICAL - ADULT  Goal: Achieves stable or improved neurological status  Description: INTERVENTIONS  - Assess for and report changes in neurological status  - Initiate measures to prevent increased intracranial pressure  - Maintain blood pressure and fluid volume within ordered parameters to optimize cerebral perfusion and minimize risk of hemorrhage  - Monitor temperature, glucose, and sodium. Initiate appropriate interventions as ordered  Outcome: Progressing  Goal: Achieves maximal functionality and self care  Description: INTERVENTIONS  - Monitor swallowing and airway patency with patient fatigue and changes in neurological status  - Encourage and assist patient to increase activity and self care with guidance from PT/OT  - Encourage visually impaired, hearing impaired and aphasic patients to use assistive/communication devices  Outcome: Progressing     Problem: Delirium  Goal: Minimize duration of delirium  Description: Interventions:  - Encourage use of hearing aids, eye glasses  - Promote highest level of mobility daily  - Provide frequent reorientation  - Promote wakefulness i.e. lights on, blinds open  - Promote sleep, encourage patient's normal rest cycle i.e. lights off, TV off, minimize noise and interruptions  - Encourage family to assist in orientation and promotion of home routines  Outcome: Progressing

## 2025-02-20 NOTE — SLP NOTE
SPEECH DAILY NOTE - INPATIENT    ASSESSMENT & PLAN   ASSESSMENT  PPE REQUIRED. THIS THERAPIST WORE GLOVES FOR DURATION OF SESSION. HANDS WASHED UPON ENTRANCE/EXIT.    SLP f/u for ongoing dysphagia tx/meal assessment per recommendations of soft bite sized/thin liquids per swallow f/u. RN reports pt tolerates diet and medication well with no overt clinical s/s aspiration. Pt denies any swallowing challenges.     Pt positioned upright in bed, alert/cooperative. Pt afebrile, tolerating room air with oxygen status 99% prior to the start of oral trials. SLP reviewed aspiration precautions and safe swallowing compensatory strategies with the patient. Safe swallow guidelines remain written on the white board in purple. Patient v/u, no family present. Provided 1:1 assistance, pt tolerates hard solids and thin liquids via cup with no overt clinical signs/symptoms of aspiration. Oxygen status remained stable t/o the entire session. No further swallow services warranted at this time. Please re consult if needed. RN alerted with results and recommendations.     MOST RECENT CXR 2/17  CONCLUSION:   1. No acute cardiopulmonary finding.       Diet Recommendations - Solids: Mechanical soft chopped/ Soft & Bite Sized  Diet Recommendations - Liquids: Thin Liquids    Compensatory Strategies Recommended: Alternate consistencies  Aspiration Precautions: Upright position;Slow rate;Small bites;No straw;1:1 feeding  Medication Administration Recommendations:  (as tolerated)    Patient Experiencing Pain: No              Treatment Plan  Treatment Plan/Recommendations: No further inpatient SLP service warranted;Aspiration precautions    Interdisciplinary Communication: Plan posted at bedside          GOALS  Goal #1 The patient will tolerate soft bite sized consistency and thin liquids without overt signs or symptoms of aspiration with 100 % accuracy over 1-2 session(s).   Revised 2/18/Met 2/20     Goal #2 The patient/family/caregiver will  demonstrate understanding and implementation of aspiration precautions and swallow strategies independently over 4-5 session(s).    Met 2/20   Goal #3 The patient will utilize compensatory strategies as outlined by  BSSE (clinical evaluation) including Slow rate, Small bites, CONTROLLED LIQUIDS,No straws, Upright 90 degrees with moderate feeding assistance 90% of the time across 4-5 sessions.    SLP fed Pt; strategies executed.     Met 2/20   Goal #4 The patient will tolerate trial upgrade of SOFT/SOLID consistency and THIN liquids without overt signs or symptoms of aspiration with 100 % accuracy over 3-4 session(s).    Discontinue 2/20     FOLLOW UP  Follow Up Needed (Documentation Required): No  SLP Follow-up Date: 02/20/25  Duration: 2 weeks    Session: 3    If you have any questions, please contact ALICIA Gaitan M.S. CCC-SLP  Speech Language Pathologist  Phone Number Ext. [phone]     No

## 2025-02-21 LAB
ANION GAP SERPL CALC-SCNC: 8 MMOL/L (ref 0–18)
BASOPHILS # BLD AUTO: 0.02 X10(3) UL (ref 0–0.2)
BASOPHILS NFR BLD AUTO: 0.4 %
BUN BLD-MCNC: 22 MG/DL (ref 9–23)
BUN/CREAT SERPL: 15.3 (ref 10–20)
CALCIUM BLD-MCNC: 8.6 MG/DL (ref 8.7–10.4)
CHLORIDE SERPL-SCNC: 114 MMOL/L (ref 98–112)
CO2 SERPL-SCNC: 22 MMOL/L (ref 21–32)
COPPER: 85 UG/DL
CREAT BLD-MCNC: 1.44 MG/DL
DEPRECATED HBV CORE AB SER IA-ACNC: 488 NG/ML
DEPRECATED RDW RBC AUTO: 54.4 FL (ref 35.1–46.3)
EGFRCR SERPLBLD CKD-EPI 2021: 39 ML/MIN/1.73M2 (ref 60–?)
EOSINOPHIL # BLD AUTO: 0.04 X10(3) UL (ref 0–0.7)
EOSINOPHIL NFR BLD AUTO: 0.9 %
ERYTHROCYTE [DISTWIDTH] IN BLOOD BY AUTOMATED COUNT: 18.2 % (ref 11–15)
GLUCOSE BLD-MCNC: 82 MG/DL (ref 70–99)
HCT VFR BLD AUTO: 23.7 %
HGB BLD-MCNC: 7.6 G/DL
IMM GRANULOCYTES # BLD AUTO: 0.1 X10(3) UL (ref 0–1)
IMM GRANULOCYTES NFR BLD: 2.2 %
IRON SATN MFR SERPL: 34 %
IRON SERPL-MCNC: 66 UG/DL
LYMPHOCYTES # BLD AUTO: 1.6 X10(3) UL (ref 1–4)
LYMPHOCYTES NFR BLD AUTO: 35.6 %
MCH RBC QN AUTO: 27.7 PG (ref 26–34)
MCHC RBC AUTO-ENTMCNC: 32.1 G/DL (ref 31–37)
MCV RBC AUTO: 86.5 FL
MONOCYTES # BLD AUTO: 0.36 X10(3) UL (ref 0.1–1)
MONOCYTES NFR BLD AUTO: 8 %
NEUTROPHILS # BLD AUTO: 2.38 X10 (3) UL (ref 1.5–7.7)
NEUTROPHILS # BLD AUTO: 2.38 X10(3) UL (ref 1.5–7.7)
NEUTROPHILS NFR BLD AUTO: 52.9 %
OSMOLALITY SERPL CALC.SUM OF ELEC: 300 MOSM/KG (ref 275–295)
PLATELET # BLD AUTO: 247 10(3)UL (ref 150–450)
POTASSIUM SERPL-SCNC: 4.2 MMOL/L (ref 3.5–5.1)
RBC # BLD AUTO: 2.74 X10(6)UL
SODIUM SERPL-SCNC: 144 MMOL/L (ref 136–145)
TOTAL IRON BINDING CAPACITY: 192 UG/DL (ref 250–425)
TRANSFERRIN SERPL-MCNC: 140 MG/DL (ref 250–380)
WBC # BLD AUTO: 4.5 X10(3) UL (ref 4–11)

## 2025-02-21 NOTE — CM/SW NOTE
Multiple calls and messages received stating that family does not want pt to go to [facility] they want her to go to [facility].    CM called and spoke to son Gokul. He confirms he wants pt to go to [facility].    [facility] placed on Will call through the weekend.  [facility] res via Aidin and notified to begin ins auth.    Plan: [facility] pending ins auth    / to remain available for support and/or discharge planning.   Shantell Gallegos RN, BSN  Nurse   [phone]

## 2025-02-21 NOTE — DISCHARGE SUMMARY
General Medicine Discharge Summary     Patient ID:  Eliud Fine  70 year old  1/22/1955    Admit date: 2/12/2025    Discharge date and time: 2/21/2025    Attending Physician: Puneet Thomas MD     Consults: IP CONSULT TO NEPHROLOGY  NURSING CONSULT TO DIETITIAN  IP CONSULT TO NEUROLOGY  IP CONSULT PALLIATIVE CARE  IP CONSULT TO SOCIAL WORK    Primary Care Physician: Yao Rodriguez     Reason for admission: dehydration     Risk For Readmission: low     Discharge Diagnoses: Dehydration [E86.0]  Hyperkalemia [E87.5]  Failure to thrive in adult [R62.7]  Troponin level elevated [R79.89]  PAPO (acute kidney injury) [N17.9]  See Additional Discharge Diagnoses in Hospital Course    Discharged Condition: good    Follow-up with labs/images appointments: FU PCP, and neurology     Exam  Gen: No acute distress  Pulm: Lungs clear, normal respiratory effort  CV: Heart with regular rate and rhythm  Abd: Abdomen soft,   EXT: no edema     HPI:   Per Dr. Hernadez:  History of Present Illness:   This is a 70-year-old female with a history of multiple strokes in the past, hypertension, CAD, sleep apnea, type 2 diabetes, who presents to the hospital for evaluation of low p.o. intake, weakness, and general failure to thrive.  Patient unable to give history given her speech.  I was able to call the son.  States that overall decline the last 3 days.  Trouble eating and even taking pills.       Hospital Course:   Ms. Fine is a 70-year-old female with a history of multiple strokes in the past has amyloid angiopathy, HTN, CAD, CEDRICK, type 2 diabetes, vascular dementia, who presents to the hospital for evaluation poor p.o. intake, weakness, and general failure to thrive, extensive work up noted below, IVF given with improvement, patient with likely progressive vascular dementia, and precipitous decline, palliative consulted, DNR, family would like to patient to go to [facility], to try and improve function status, [facility] at [facility].        Failure to thrive  Low PO intake, dehydration with PAPO and hypernatremia  Hx of multiple strokes in past   -obtained MRI brain, high risk of recurrent stroke--->negative for acute stroke  -speech eval  -continue electrolyte correction   -neuro consult appreciated, could be progression of vascular dementia, neurodegenerative disease, amyloid angiopathy   -palliative consulted      Metabolic encephalopathy 2/2 dehydration  Initial hypotension  -STAT CT without acute changes, MRI brain (read pending but per neuro, no new changes)  -BP improved  -LA neg   -CXR negative  -neuro ordered EEG, discussed with Neuro, EEG neg, keppra empirically started  -Much  improved now  -will continue keppra long term per neuro     Acute on chronic Anemia  - hgb down trending   - no bleeding noted  - poss diultional?  - check iron studies -> ferritin normal     Hypernatremia  PAPO  Metabolic acidosis  -giving saline boluses this AM due to hypotension   -severe dehydration on admission  - improved oral intake      Severe malnutrition  -continue caloric intake documentation   -dietitian on consult  -agree with oral nutritional supplementation per dietitian team   -significant weight loss past one year (~70lbs)     Hypertension   -stopped hydralazine  -holding arb  -decreased coreg     Coronary artery disease  -PO meds       CEDRICK  -cpap if uses here      Type 2 diabetes   -A1c 5.7  -resume metformin     Hx of seizure?   -not on keppra PTA, resume keppra per neuro       Operative Procedures:      Imaging: No results found.      Disposition: home    Activity: activity as tolerated  Diet: regular diet  Wound Care: as directed  Code Status: DNAR/Selective Treatment       Home Medication Changes:      Med list     Medication List        START taking these medications      levETIRAcetam 250 MG Tabs  Commonly known as: Keppra  Take 1 tablet (250 mg total) by mouth 2 (two) times daily.            CHANGE how you take these medications      carvedilol  25 MG Tabs  Commonly known as: Coreg  What changed: how much to take     losartan 50 MG Tabs  Commonly known as: Cozaar  What changed: how much to take            CONTINUE taking these medications      aspirin 81 MG Chew  Chew 1 tablet (81 mg total) by mouth daily.     atorvastatin 40 MG Tabs  Commonly known as: Lipitor     brimonidine 0.2 % Soln  Commonly known as: Alphagan     Brimonidine Tartrate-Timolol 0.2-0.5 % Soln  Commonly known as: COMBIGAN     magnesium oxide 400 MG Tabs  Commonly known as: Mag-Ox  Take 1 tablet (400 mg total) by mouth daily.            STOP taking these medications      diclofenac 1 % Gel  Commonly known as: Voltaren     hydrALAZINE 50 MG Tabs  Commonly known as: Apresoline     metFORMIN 500 MG Tabs  Commonly known as: Glucophage     Namzaric 28-10 MG Cp24  Generic drug: Memantine HCl-Donepezil HCl     spironolactone 25 MG Tabs  Commonly known as: Aldactone               Where to Get Your Medications        You can get these medications from any pharmacy    Bring a paper prescription for each of these medications  levETIRAcetam 250 MG Tabs         FU   Follow-up Information       Yao Rodriguez Schedule an appointment as soon as possible for a visit.    Specialty: Physician Assistant  Contact information:  [address] [place] [address] [id number] [phone]               Chris Chandra MD. Schedule an appointment as soon as possible for a visit in 1 week(s).    Specialty: NEUROLOGY  Contact information:  [address] [place] [id number]  [phone]                             DC instructions:      Other Discharge Instructions:         Follow up with Neurology and PCP in 1 week.         I reconciled current and discharge medications on day of discharge, discussed changes with patient and noted changes above.       Total Time Coordinating Care: Greater than 30 minutes    Patient had opportunity to ask questions and state understand and agree with therapeutic plan as  outlined    Thank You,    Puneet Thomas MD   Hospitalist with [facility]

## 2025-02-21 NOTE — CM/SW NOTE
02/21/25 1101   Discharge disposition   Expected discharge disposition subacute   Post Acute Care Provider SNF Other  ([facility])   Discharge transportation [facility]     Sw received MDO for DC today 2/21. [facility]. Nicholas then called [facility] to reserve an ambulance. Nicholas notified Suma and RN.    Plan: DC to [facility] 2/21    SW to remain available for support and/or discharge planning.    Alesia Fan MSW, LSW   X

## 2025-02-21 NOTE — PLAN OF CARE
Problem: Patient Centered Care  Goal: Patient preferences are identified and integrated in the patient's plan of care  Description: Interventions:  - What would you like us to know as we care for you? Lives at home with son Gokul who assists with patient's care.  - Provide timely, complete, and accurate information to patient/family  - Incorporate patient and family knowledge, values, beliefs, and cultural backgrounds into the planning and delivery of care  - Encourage patient/family to participate in care and decision-making at the level they choose  - Honor patient and family perspectives and choices  Outcome: Progressing     Problem: Diabetes/Glucose Control  Goal: Glucose maintained within prescribed range  Description: INTERVENTIONS:  - Monitor Blood Glucose as ordered  - Assess for signs and symptoms of hyperglycemia and hypoglycemia  - Administer ordered medications to maintain glucose within target range  - Assess barriers to adequate nutritional intake and initiate nutrition consult as needed  - Instruct patient on self management of diabetes  Outcome: Progressing     Problem: Patient/Family Goals  Goal: Patient/Family Long Term Goal  Description: Patient's Long Term Goal: \"get my mom to eat again\"    Interventions:  - Monitor intake and output  - IV hydration  - See additional Care Plan goals for specific interventions  Outcome: Progressing  Goal: Patient/Family Short Term Goal  Description: Patient's Short Term Goal: \"more awake\"    Interventions:   -Neuro checks   -Monitor labs and vitals signs  -Follow provider recommendations  - See additional Care Plan goals for specific interventions  Outcome: Progressing     Problem: SAFETY ADULT - FALL  Goal: Free from fall injury  Description: INTERVENTIONS:  - Assess pt frequently for physical needs  - Identify cognitive and physical deficits and behaviors that affect risk of falls.  - Campbell fall precautions as indicated by assessment.  - Educate pt/family  on patient safety including physical limitations  - Instruct pt to call for assistance with activity based on assessment  - Modify environment to reduce risk of injury  - Provide assistive devices as appropriate  - Consider OT/PT consult to assist with strengthening/mobility  - Encourage toileting schedule  Outcome: Progressing     Problem: DISCHARGE PLANNING  Goal: Discharge to home or other facility with appropriate resources  Description: INTERVENTIONS:  - Identify barriers to discharge w/pt and caregiver  - Include patient/family/discharge partner in discharge planning  - Arrange for needed discharge resources and transportation as appropriate  - Identify discharge learning needs (meds, wound care, etc)  - Arrange for interpreters to assist at discharge as needed  - Consider post-discharge preferences of patient/family/discharge partner  - Complete POLST form as appropriate  - Assess patient's ability to be responsible for managing their own health  - Refer to Case Management Department for coordinating discharge planning if the patient needs post-hospital services based on physician/LIP order or complex needs related to functional status, cognitive ability or social support system  Outcome: Progressing     Problem: Altered Communication/Language Barrier  Goal: Patient/Family is able to understand and participate in their care  Description: Interventions:  - Assess communication ability and preferred communication style  - Implement communication aides and strategies  - Use visual cues when possible  - Listen attentively, be patient, do not interrupt  - Minimize distractions  - Allow time for understanding and response  - Establish method for patient to ask for assistance (call light)  - Provide an  as needed  - Communicate barriers and strategies to overcome with those who interact with patient  Outcome: Progressing     Problem: METABOLIC/FLUID AND ELECTROLYTES - ADULT  Goal: Electrolytes maintained  within normal limits  Description: INTERVENTIONS:  - Monitor labs and rhythm and assess patient for signs and symptoms of electrolyte imbalances  - Administer electrolyte replacement as ordered  - Monitor response to electrolyte replacements, including rhythm and repeat lab results as appropriate  - Fluid restriction as ordered  - Instruct patient on fluid and nutrition restrictions as appropriate  Outcome: Progressing  Goal: Hemodynamic stability and optimal renal function maintained  Description: INTERVENTIONS:  - Monitor labs and assess for signs and symptoms of volume excess or deficit  - Monitor intake, output and patient weight  - Monitor urine specific gravity, serum osmolarity and serum sodium as indicated or ordered  - Monitor response to interventions for patient's volume status, including labs, urine output, blood pressure (other measures as available)  - Encourage oral intake as appropriate  - Instruct patient on fluid and nutrition restrictions as appropriate  Outcome: Progressing     Problem: SKIN/TISSUE INTEGRITY - ADULT  Goal: Skin integrity remains intact  Description: INTERVENTIONS  - Assess and document risk factors for pressure ulcer development  - Assess and document skin integrity  - Monitor for areas of redness and/or skin breakdown  - Initiate interventions, skin care algorithm/standards of care as needed  Outcome: Progressing  Goal: Incision(s), wounds(s) or drain site(s) healing without S/S of infection  Description: INTERVENTIONS:  - Assess and document risk factors for pressure ulcer development  - Assess and document skin integrity  - Assess and document dressing/incision, wound bed, drain sites and surrounding tissue  - Implement wound care per orders  - Initiate isolation precautions as appropriate  - Initiate Pressure Ulcer prevention bundle as indicated  Outcome: Progressing     Problem: NEUROLOGICAL - ADULT  Goal: Achieves stable or improved neurological status  Description:  INTERVENTIONS  - Assess for and report changes in neurological status  - Initiate measures to prevent increased intracranial pressure  - Maintain blood pressure and fluid volume within ordered parameters to optimize cerebral perfusion and minimize risk of hemorrhage  - Monitor temperature, glucose, and sodium. Initiate appropriate interventions as ordered  Outcome: Progressing  Goal: Achieves maximal functionality and self care  Description: INTERVENTIONS  - Monitor swallowing and airway patency with patient fatigue and changes in neurological status  - Encourage and assist patient to increase activity and self care with guidance from PT/OT  - Encourage visually impaired, hearing impaired and aphasic patients to use assistive/communication devices  Outcome: Progressing     Problem: Delirium  Goal: Minimize duration of delirium  Description: Interventions:  - Encourage use of hearing aids, eye glasses  - Promote highest level of mobility daily  - Provide frequent reorientation  - Promote wakefulness i.e. lights on, blinds open  - Promote sleep, encourage patient's normal rest cycle i.e. lights off, TV off, minimize noise and interruptions  - Encourage family to assist in orientation and promotion of home routines  Outcome: Progressing

## 2025-02-21 NOTE — SPIRITUAL CARE NOTE
Spiritual Care Visit Note    Patient Name: Eliud Fine Date of Spiritual Care Visit: 23   : 1955 Primary Dx: PAPO (acute kidney injury)       Referred By: Referral From:     Spiritual Care Taxonomy:    Intended Effects: Helping someone feel comforted    Methods: Collaborate with care team member;Offer emotional support    Interventions: Acknowledge response to difficult experience;Active listening;Ask guided questions;Discuss concerns;Silent prayer    Visit Type/Summary:     - Spiritual Care: Consulted with RN prior to visit. Offered empathic listening and emotional support. Patient expressed appreciation for  visit. Provided information regarding how to contact Spiritual Care and left a Spiritual Care information card.  remains available as needed for follow up.     Reno Hawk, MAT CAMII   [id number]     Spiritual Care support can be requested via an [facility] consult. For urgent/immediate needs, please contact the On Call  at: [place]: ext [phone]

## 2025-02-21 NOTE — PROGRESS NOTES
[facility] Hospitalist Progress Note     CC: Hospital Follow up    PCP: Yao Rodriguez       Assessment/Plan:   Ms. Fine is a 70-year-old female with a history of multiple strokes in the past has amyloid angiopathy, HTN, CAD, CEDRICK, type 2 diabetes, vascular dementia, who presents to the hospital for evaluation poor p.o. intake, weakness, and general failure to thrive, extensive work up noted below, IVF given with improvement, patient with likely progressive vascular dementia, and precipitous decline, palliative consulted, DNR, family would like to patient to go to [facility], to try and improve function status, [facility] pending.      Failure to thrive  Low PO intake, dehydration with PAPO and hypernatremia  Hx of multiple strokes in past   -obtained MRI brain, high risk of recurrent stroke--->negative for acute stroke  -speech eval  -continue electrolyte correction   -neuro consult appreciated, could be progression of vascular dementia, neurodegenerative disease, amyloid angiopathy   -palliative consulted     Metabolic encephalopathy 2/2 dehydration  Initial hypotension  -STAT CT without acute changes, MRI brain (read pending but per neuro, no new changes)  -BP improved  -LA neg   -CXR negative  -neuro ordered EEG, discussed with Neuro, EEG neg, keppra empirically started  -Much  improved now  -will continue keppra long term per neuro    Acute on chronic Anemia  - hgb down trending   - no bleeding noted  - poss diultional?  - check iron studies -> ferritin normal     Hypernatremia  PAPO  Metabolic acidosis  -giving saline boluses this AM due to hypotension   -severe dehydration on admisision    Severe malnutrition  -continue caloric intake documentation   -dietitian on consult  -agree with oral nutritional supplementation per dietitian team   -significant weight loss past one year (~70lbs)  -monitor potential need for temporary enteral nutrition      Hypertension   -stopped hydralazine  -holding arb  -decreased  coreg     Coronary artery disease  -PO meds       CEDRICK  -cpap if uses here      Type 2 diabetes   -A1c 5.7  -stop accucheck/insulin  -stop metformin on DC     Hx of seizure?   -not on keppra PTA, resume keppra per neuro       DVT prophylaxis: heparin sub q  Code status: DNR select    Discussed with son and daughter over phone on 2/20    Dispo: ok for DC to [facility]: Palliative care consulted, DNR select     Puneet Thomas MD  [facility] Hospitalist        Subjective:     Alert and awake,  denies cp or sob, no fevers or chills, no nausea or vomiting,       OBJECTIVE:    Blood pressure 123/50, pulse 66, temperature 97.5 °F (36.4 °C), temperature source Oral, resp. rate 16, weight 131 lb 4.8 oz (59.6 kg), SpO2 99%.    Temp:  [97.5 °F (36.4 °C)-98.4 °F (36.9 °C)] 97.5 °F (36.4 °C)  Pulse:  [66-87] 66  Resp:  [16-18] 16  BP: (123-170)/(50-69) 123/50  SpO2:  [93 %-100 %] 99 %      Intake/Output:    Intake/Output Summary (Last 24 hours) at 2/21/2025 1423  Last data filed at 2/21/2025 0900  Gross per 24 hour   Intake 240 ml   Output 300 ml   Net -60 ml       Last 3 Weights   02/20/25 0503 131 lb 4.8 oz (59.6 kg)   02/19/25 0500 130 lb 14.4 oz (59.4 kg)   02/18/25 0606 132 lb 14.4 oz (60.3 kg)   02/16/25 0500 122 lb 1.6 oz (55.4 kg)   02/13/25 0900 121 lb 6.4 oz (55.1 kg)   01/30/25 2230 150 lb (68 kg)   01/14/25 1842 165 lb (74.8 kg)       /50 (BP Location: Right arm)   Pulse 66   Temp 97.5 °F (36.4 °C) (Oral)   Resp 16   Wt 131 lb 4.8 oz (59.6 kg)   SpO2 99%   BMI 23.26 kg/m²   General: alert    Lungs: clear to ausculation bilaterally  Heart: Regular rate and rhythm  Abdomen: soft, non tender  Extremities: No edema  Neuro: following some commands    Data Review:       Labs:     Recent Labs   Lab 02/19/25  0805 02/20/25  0707 02/21/25  0850   RBC 2.73* 2.81* 2.74*   HGB 7.5* 7.6* 7.6*   HCT 23.8* 24.6* 23.7*   MCV 87.2 87.5 86.5   MCH 27.5 27.0 27.7   MCHC 31.5 30.9* 32.1   RDW 17.1* 17.6* 18.2*   NEPRELIM  2.05 1.80 2.38   WBC 4.1 3.9* 4.5   .0 246.0 247.0         Recent Labs   Lab 02/19/25  0805 02/20/25  0707 02/21/25  0655   GLU 82 92 82   BUN 22 23 22   CREATSERUM 1.42* 1.56* 1.44*   EGFRCR 39* 35* 39*   CA 8.6* 8.6* 8.6*    145 144   K 3.9 4.1 4.2   * 115* 114*   CO2 21.0 22.0 22.0       Recent Labs   Lab 02/15/25  0838 02/16/25  0630 02/17/25  0758   ALB 3.6 3.1* 3.2         Imaging:  No results found.      Meds:      carvedilol  12.5 mg Oral BID with meals    levETIRAcetam  250 mg Oral BID    aspirin  81 mg Oral Daily    atorvastatin  40 mg Oral Nightly    brimonidine  1 drop Both Eyes BID    heparin  5,000 Units Subcutaneous Q8H KB             glucose **OR** glucose **OR** glucose-vitamin C **OR** dextrose **OR** glucose **OR** glucose **OR** glucose-vitamin C    acetaminophen    melatonin    ondansetron    metoclopramide    polyethylene glycol (PEG 3350)    sennosides    bisacodyl

## 2025-02-21 NOTE — PLAN OF CARE
Problem: Patient Centered Care  Goal: Patient preferences are identified and integrated in the patient's plan of care  Description: Interventions:  - What would you like us to know as we care for you? Lives at home with son Gokul who assists with patient's care.  - Provide timely, complete, and accurate information to patient/family  - Incorporate patient and family knowledge, values, beliefs, and cultural backgrounds into the planning and delivery of care  - Encourage patient/family to participate in care and decision-making at the level they choose  - Honor patient and family perspectives and choices  Outcome: Progressing     Problem: Diabetes/Glucose Control  Goal: Glucose maintained within prescribed range  Description: INTERVENTIONS:  - Monitor Blood Glucose as ordered  - Assess for signs and symptoms of hyperglycemia and hypoglycemia  - Administer ordered medications to maintain glucose within target range  - Assess barriers to adequate nutritional intake and initiate nutrition consult as needed  - Instruct patient on self management of diabetes  Outcome: Progressing     Problem: Patient/Family Goals  Goal: Patient/Family Long Term Goal  Description: Patient's Long Term Goal: \"get my mom to eat again\"    Interventions:  - Monitor intake and output  - IV hydration  - See additional Care Plan goals for specific interventions  Outcome: Progressing  Goal: Patient/Family Short Term Goal  Description: Patient's Short Term Goal: \"more awake\"    Interventions:   -Neuro checks   -Monitor labs and vitals signs  -Follow provider recommendations  - See additional Care Plan goals for specific interventions  Outcome: Progressing     Problem: SAFETY ADULT - FALL  Goal: Free from fall injury  Description: INTERVENTIONS:  - Assess pt frequently for physical needs  - Identify cognitive and physical deficits and behaviors that affect risk of falls.  - [place] fall precautions as indicated by assessment.  - Educate pt/family  on patient safety including physical limitations  - Instruct pt to call for assistance with activity based on assessment  - Modify environment to reduce risk of injury  - Provide assistive devices as appropriate  - Consider OT/PT consult to assist with strengthening/mobility  - Encourage toileting schedule  Outcome: Progressing     Problem: DISCHARGE PLANNING  Goal: Discharge to home or other facility with appropriate resources  Description: INTERVENTIONS:  - Identify barriers to discharge w/pt and caregiver  - Include patient/family/discharge partner in discharge planning  - Arrange for needed discharge resources and transportation as appropriate  - Identify discharge learning needs (meds, wound care, etc)  - Arrange for interpreters to assist at discharge as needed  - Consider post-discharge preferences of patient/family/discharge partner  - Complete POLST form as appropriate  - Assess patient's ability to be responsible for managing their own health  - Refer to Case Management Department for coordinating discharge planning if the patient needs post-hospital services based on physician/LIP order or complex needs related to functional status, cognitive ability or social support system  Outcome: Progressing     Problem: Altered Communication/Language Barrier  Goal: Patient/Family is able to understand and participate in their care  Description: Interventions:  - Assess communication ability and preferred communication style  - Implement communication aides and strategies  - Use visual cues when possible  - Listen attentively, be patient, do not interrupt  - Minimize distractions  - Allow time for understanding and response  - Establish method for patient to ask for assistance (call light)  - Provide an  as needed  - Communicate barriers and strategies to overcome with those who interact with patient  Outcome: Progressing     Problem: METABOLIC/FLUID AND ELECTROLYTES - ADULT  Goal: Electrolytes maintained  within normal limits  Description: INTERVENTIONS:  - Monitor labs and rhythm and assess patient for signs and symptoms of electrolyte imbalances  - Administer electrolyte replacement as ordered  - Monitor response to electrolyte replacements, including rhythm and repeat lab results as appropriate  - Fluid restriction as ordered  - Instruct patient on fluid and nutrition restrictions as appropriate  Outcome: Progressing  Goal: Hemodynamic stability and optimal renal function maintained  Description: INTERVENTIONS:  - Monitor labs and assess for signs and symptoms of volume excess or deficit  - Monitor intake, output and patient weight  - Monitor urine specific gravity, serum osmolarity and serum sodium as indicated or ordered  - Monitor response to interventions for patient's volume status, including labs, urine output, blood pressure (other measures as available)  - Encourage oral intake as appropriate  - Instruct patient on fluid and nutrition restrictions as appropriate  Outcome: Progressing     Problem: SKIN/TISSUE INTEGRITY - ADULT  Goal: Skin integrity remains intact  Description: INTERVENTIONS  - Assess and document risk factors for pressure ulcer development  - Assess and document skin integrity  - Monitor for areas of redness and/or skin breakdown  - Initiate interventions, skin care algorithm/standards of care as needed  Outcome: Progressing  Goal: Incision(s), wounds(s) or drain site(s) healing without S/S of infection  Description: INTERVENTIONS:  - Assess and document risk factors for pressure ulcer development  - Assess and document skin integrity  - Assess and document dressing/incision, wound bed, drain sites and surrounding tissue  - Implement wound care per orders  - Initiate isolation precautions as appropriate  - Initiate Pressure Ulcer prevention bundle as indicated  Outcome: Progressing     Problem: NEUROLOGICAL - ADULT  Goal: Achieves stable or improved neurological status  Description:  INTERVENTIONS  - Assess for and report changes in neurological status  - Initiate measures to prevent increased intracranial pressure  - Maintain blood pressure and fluid volume within ordered parameters to optimize cerebral perfusion and minimize risk of hemorrhage  - Monitor temperature, glucose, and sodium. Initiate appropriate interventions as ordered  Outcome: Progressing  Goal: Achieves maximal functionality and self care  Description: INTERVENTIONS  - Monitor swallowing and airway patency with patient fatigue and changes in neurological status  - Encourage and assist patient to increase activity and self care with guidance from PT/OT  - Encourage visually impaired, hearing impaired and aphasic patients to use assistive/communication devices  Outcome: Progressing     Problem: Delirium  Goal: Minimize duration of delirium  Description: Interventions:  - Encourage use of hearing aids, eye glasses  - Promote highest level of mobility daily  - Provide frequent reorientation  - Promote wakefulness i.e. lights on, blinds open  - Promote sleep, encourage patient's normal rest cycle i.e. lights off, TV off, minimize noise and interruptions  - Encourage family to assist in orientation and promotion of home routines  Outcome: Progressing     Pt alert and oriented X 2. Pt on room air. No complaints throughout the day. Safety precautions in place, call light within reach.

## 2025-02-22 NOTE — PROGRESS NOTES
[facility] Hospitalist Progress Note     CC: Hospital Follow up    PCP: Yao Rodriguez       Assessment/Plan:     Ms. Fine is a 70-year-old female with a history of multiple strokes in the past has amyloid angiopathy, HTN, CAD, CEDRICK, type 2 diabetes, vascular dementia, who presents to the hospital for evaluation poor p.o. intake, weakness, and general failure to thrive, extensive work up noted below, IVF given with improvement, patient with likely progressive vascular dementia, and precipitous decline, palliative consulted, DNR, family would like to patient to go to [facility], to try and improve function status, [facility] pending insurance approval    Failure to thrive  Low PO intake, dehydration with PAPO and hypernatremia  Hx of multiple strokes in past   -obtained MRI brain, high risk of recurrent stroke--->negative for acute stroke  -speech eval  -continue electrolyte correction   -neuro consult appreciated, could be progression of vascular dementia, neurodegenerative disease, amyloid angiopathy   -palliative consulted     Metabolic encephalopathy 2/2 dehydration  Initial hypotension  -STAT CT without acute changes, MRI brain (read pending but per neuro, no new changes)  -BP improved  -LA neg   -CXR negative  -neuro ordered EEG, discussed with Neuro, EEG neg, keppra empirically started  -Much  improved now  -will continue keppra long term per neuro    Acute on chronic Anemia  - hgb down trending   - no bleeding noted  - poss diultional?  - check iron studies -> ferritin normal     Hypernatremia  PAPO  Metabolic acidosis  -giving saline boluses this AM due to hypotension   -severe dehydration on admisision    Severe malnutrition  -continue caloric intake documentation   -dietitian on consult  -agree with oral nutritional supplementation per dietitian team   -significant weight loss past one year (~70lbs)  -monitor potential need for temporary enteral nutrition      Hypertension   -stopped hydralazine  -holding  arb  -decreased coreg     Coronary artery disease  -PO meds       CEDRICK  -cpap if uses here      Type 2 diabetes   -A1c 5.7  -stop accucheck/insulin  -stop metformin on DC     Hx of seizure?   -not on keppra PTA, resume keppra per neuro       DVT prophylaxis: heparin sub q  Code status: DNR select    Dr. Thomas Discussed with son and daughter over phone on 2/20    Dispo: medically stable for discharge to [facility] when insurance approved    GOC: Palliative care consulted, DNR select     Nidhi Mohan MD  [facility]        Subjective:     Denies complaints.     OBJECTIVE:    Blood pressure 140/82, pulse 92, temperature 99.2 °F (37.3 °C), temperature source Axillary, resp. rate 20, weight 131 lb 4.8 oz (59.6 kg), SpO2 99%.    Temp:  [97.5 °F (36.4 °C)-99.2 °F (37.3 °C)] 99.2 °F (37.3 °C)  Pulse:  [66-92] 92  Resp:  [16-20] 20  BP: (123-160)/(50-89) 140/82  SpO2:  [97 %-100 %] 99 %      Intake/Output:    Intake/Output Summary (Last 24 hours) at 2/22/2025 1056  Last data filed at 2/21/2025 2212  Gross per 24 hour   Intake 105 ml   Output --   Net 105 ml       Last 3 Weights   02/20/25 0503 131 lb 4.8 oz (59.6 kg)   02/19/25 0500 130 lb 14.4 oz (59.4 kg)   02/18/25 0606 132 lb 14.4 oz (60.3 kg)   02/16/25 0500 122 lb 1.6 oz (55.4 kg)   02/13/25 0900 121 lb 6.4 oz (55.1 kg)   01/30/25 2230 150 lb (68 kg)   01/14/25 1842 165 lb (74.8 kg)       /82 (BP Location: Right arm)   Pulse 92   Temp 99.2 °F (37.3 °C) (Axillary)   Resp 20   Wt 131 lb 4.8 oz (59.6 kg)   SpO2 99%   BMI 23.26 kg/m²     General: alert    Lungs: clear to ausculation bilaterally  Heart: Regular rate and rhythm  Abdomen: soft, non tender  Extremities: No edema  Neuro: following some commands    Data Review:       Labs:     Recent Labs   Lab 02/19/25  0805 02/20/25  0707 02/21/25  0850   RBC 2.73* 2.81* 2.74*   HGB 7.5* 7.6* 7.6*   HCT 23.8* 24.6* 23.7*   MCV 87.2 87.5 86.5   MCH 27.5 27.0 27.7   MCHC 31.5 30.9* 32.1   RDW 17.1*  17.6* 18.2*   NEPRELIM 2.05 1.80 2.38   WBC 4.1 3.9* 4.5   .0 246.0 247.0         Recent Labs   Lab 02/19/25  0805 02/20/25  0707 02/21/25  0655   GLU 82 92 82   BUN 22 23 22   CREATSERUM 1.42* 1.56* 1.44*   EGFRCR 39* 35* 39*   CA 8.6* 8.6* 8.6*    145 144   K 3.9 4.1 4.2   * 115* 114*   CO2 21.0 22.0 22.0       Recent Labs   Lab 02/16/25  0630 02/17/25  0758   ALB 3.1* 3.2         Imaging:  No results found.      Meds:      carvedilol  12.5 mg Oral BID with meals    levETIRAcetam  250 mg Oral BID    aspirin  81 mg Oral Daily    atorvastatin  40 mg Oral Nightly    brimonidine  1 drop Both Eyes BID    heparin  5,000 Units Subcutaneous [facility]         glucose **OR** glucose **OR** glucose-vitamin C **OR** dextrose **OR** glucose **OR** glucose **OR** glucose-vitamin C    acetaminophen    melatonin    ondansetron    metoclopramide    polyethylene glycol (PEG 3350)    sennosides    bisacodyl

## 2025-02-22 NOTE — PLAN OF CARE
Bed locked in low position, belongings in reach, bed alarm on, call light in reach. Frequent rounding done, all patient needs met. Non-slip socks on/at bedside. No complaints of pain this shift. Awaiting approval for transfer to [facility] of choice.    Problem: Patient Centered Care  Goal: Patient preferences are identified and integrated in the patient's plan of care  Description: Interventions:  - What would you like us to know as we care for you? Lives at home with son Gokul who assists with patient's care.  - Provide timely, complete, and accurate information to patient/family  - Incorporate patient and family knowledge, values, beliefs, and cultural backgrounds into the planning and delivery of care  - Encourage patient/family to participate in care and decision-making at the level they choose  - Honor patient and family perspectives and choices  Outcome: Progressing     Problem: Diabetes/Glucose Control  Goal: Glucose maintained within prescribed range  Description: INTERVENTIONS:  - Monitor Blood Glucose as ordered  - Assess for signs and symptoms of hyperglycemia and hypoglycemia  - Administer ordered medications to maintain glucose within target range  - Assess barriers to adequate nutritional intake and initiate nutrition consult as needed  - Instruct patient on self management of diabetes  Outcome: Progressing     Problem: SAFETY ADULT - FALL  Goal: Free from fall injury  Description: INTERVENTIONS:  - Assess pt frequently for physical needs  - Identify cognitive and physical deficits and behaviors that affect risk of falls.  - [place] fall precautions as indicated by assessment.  - Educate pt/family on patient safety including physical limitations  - Instruct pt to call for assistance with activity based on assessment  - Modify environment to reduce risk of injury  - Provide assistive devices as appropriate  - Consider OT/PT consult to assist with strengthening/mobility  - Encourage toileting  schedule  Outcome: Progressing     Problem: DISCHARGE PLANNING  Goal: Discharge to home or other facility with appropriate resources  Description: INTERVENTIONS:  - Identify barriers to discharge w/pt and caregiver  - Include patient/family/discharge partner in discharge planning  - Arrange for needed discharge resources and transportation as appropriate  - Identify discharge learning needs (meds, wound care, etc)  - Arrange for interpreters to assist at discharge as needed  - Consider post-discharge preferences of patient/family/discharge partner  - Complete POLST form as appropriate  - Assess patient's ability to be responsible for managing their own health  - Refer to Case Management Department for coordinating discharge planning if the patient needs post-hospital services based on physician/LIP order or complex needs related to functional status, cognitive ability or social support system  Outcome: Progressing     Problem: Altered Communication/Language Barrier  Goal: Patient/Family is able to understand and participate in their care  Description: Interventions:  - Assess communication ability and preferred communication style  - Implement communication aides and strategies  - Use visual cues when possible  - Listen attentively, be patient, do not interrupt  - Minimize distractions  - Allow time for understanding and response  - Establish method for patient to ask for assistance (call light)  - Provide an  as needed  - Communicate barriers and strategies to overcome with those who interact with patient  Outcome: Progressing     Problem: METABOLIC/FLUID AND ELECTROLYTES - ADULT  Goal: Electrolytes maintained within normal limits  Description: INTERVENTIONS:  - Monitor labs and rhythm and assess patient for signs and symptoms of electrolyte imbalances  - Administer electrolyte replacement as ordered  - Monitor response to electrolyte replacements, including rhythm and repeat lab results as  appropriate  - Fluid restriction as ordered  - Instruct patient on fluid and nutrition restrictions as appropriate  Outcome: Progressing  Goal: Hemodynamic stability and optimal renal function maintained  Description: INTERVENTIONS:  - Monitor labs and assess for signs and symptoms of volume excess or deficit  - Monitor intake, output and patient weight  - Monitor urine specific gravity, serum osmolarity and serum sodium as indicated or ordered  - Monitor response to interventions for patient's volume status, including labs, urine output, blood pressure (other measures as available)  - Encourage oral intake as appropriate  - Instruct patient on fluid and nutrition restrictions as appropriate  Outcome: Progressing     Problem: SKIN/TISSUE INTEGRITY - ADULT  Goal: Skin integrity remains intact  Description: INTERVENTIONS  - Assess and document risk factors for pressure ulcer development  - Assess and document skin integrity  - Monitor for areas of redness and/or skin breakdown  - Initiate interventions, skin care algorithm/standards of care as needed  Outcome: Progressing  Goal: Incision(s), wounds(s) or drain site(s) healing without S/S of infection  Description: INTERVENTIONS:  - Assess and document risk factors for pressure ulcer development  - Assess and document skin integrity  - Assess and document dressing/incision, wound bed, drain sites and surrounding tissue  - Implement wound care per orders  - Initiate isolation precautions as appropriate  - Initiate Pressure Ulcer prevention bundle as indicated  Outcome: Progressing     Problem: NEUROLOGICAL - ADULT  Goal: Achieves stable or improved neurological status  Description: INTERVENTIONS  - Assess for and report changes in neurological status  - Initiate measures to prevent increased intracranial pressure  - Maintain blood pressure and fluid volume within ordered parameters to optimize cerebral perfusion and minimize risk of hemorrhage  - Monitor temperature,  glucose, and sodium. Initiate appropriate interventions as ordered  Outcome: Progressing  Goal: Achieves maximal functionality and self care  Description: INTERVENTIONS  - Monitor swallowing and airway patency with patient fatigue and changes in neurological status  - Encourage and assist patient to increase activity and self care with guidance from PT/OT  - Encourage visually impaired, hearing impaired and aphasic patients to use assistive/communication devices  Outcome: Progressing     Problem: Delirium  Goal: Minimize duration of delirium  Description: Interventions:  - Encourage use of hearing aids, eye glasses  - Promote highest level of mobility daily  - Provide frequent reorientation  - Promote wakefulness i.e. lights on, blinds open  - Promote sleep, encourage patient's normal rest cycle i.e. lights off, TV off, minimize noise and interruptions  - Encourage family to assist in orientation and promotion of home routines  Outcome: Progressing

## 2025-02-22 NOTE — PLAN OF CARE
Pt is alertx4, family updated on plan, plan for pt to DC to [facility]; pending insurance auth.   Problem: Diabetes/Glucose Control  Goal: Glucose maintained within prescribed range  Description: INTERVENTIONS:  - Monitor Blood Glucose as ordered  - Assess for signs and symptoms of hyperglycemia and hypoglycemia  - Administer ordered medications to maintain glucose within target range  - Assess barriers to adequate nutritional intake and initiate nutrition consult as needed  - Instruct patient on self management of diabetes  Outcome: Progressing     Problem: SAFETY ADULT - FALL  Goal: Free from fall injury  Description: INTERVENTIONS:  - Assess pt frequently for physical needs  - Identify cognitive and physical deficits and behaviors that affect risk of falls.  - [place] fall precautions as indicated by assessment.  - Educate pt/family on patient safety including physical limitations  - Instruct pt to call for assistance with activity based on assessment  - Modify environment to reduce risk of injury  - Provide assistive devices as appropriate  - Consider OT/PT consult to assist with strengthening/mobility  - Encourage toileting schedule  Outcome: Progressing     Problem: DISCHARGE PLANNING  Goal: Discharge to home or other facility with appropriate resources  Description: INTERVENTIONS:  - Identify barriers to discharge w/pt and caregiver  - Include patient/family/discharge partner in discharge planning  - Arrange for needed discharge resources and transportation as appropriate  - Identify discharge learning needs (meds, wound care, etc)  - Arrange for interpreters to assist at discharge as needed  - Consider post-discharge preferences of patient/family/discharge partner  - Complete POLST form as appropriate  - Assess patient's ability to be responsible for managing their own health  - Refer to Case Management Department for coordinating discharge planning if the patient needs post-hospital services based  on physician/LIP order or complex needs related to functional status, cognitive ability or social support system  Outcome: Progressing     Problem: METABOLIC/FLUID AND ELECTROLYTES - ADULT  Goal: Electrolytes maintained within normal limits  Description: INTERVENTIONS:  - Monitor labs and rhythm and assess patient for signs and symptoms of electrolyte imbalances  - Administer electrolyte replacement as ordered  - Monitor response to electrolyte replacements, including rhythm and repeat lab results as appropriate  - Fluid restriction as ordered  - Instruct patient on fluid and nutrition restrictions as appropriate  Outcome: Progressing  Goal: Hemodynamic stability and optimal renal function maintained  Description: INTERVENTIONS:  - Monitor labs and assess for signs and symptoms of volume excess or deficit  - Monitor intake, output and patient weight  - Monitor urine specific gravity, serum osmolarity and serum sodium as indicated or ordered  - Monitor response to interventions for patient's volume status, including labs, urine output, blood pressure (other measures as available)  - Encourage oral intake as appropriate  - Instruct patient on fluid and nutrition restrictions as appropriate  Outcome: Progressing     Problem: SKIN/TISSUE INTEGRITY - ADULT  Goal: Skin integrity remains intact  Description: INTERVENTIONS  - Assess and document risk factors for pressure ulcer development  - Assess and document skin integrity  - Monitor for areas of redness and/or skin breakdown  - Initiate interventions, skin care algorithm/standards of care as needed  Outcome: Progressing  Goal: Incision(s), wounds(s) or drain site(s) healing without S/S of infection  Description: INTERVENTIONS:  - Assess and document risk factors for pressure ulcer development  - Assess and document skin integrity  - Assess and document dressing/incision, wound bed, drain sites and surrounding tissue  - Implement wound care per orders  - Initiate  isolation precautions as appropriate  - Initiate Pressure Ulcer prevention bundle as indicated  Outcome: Progressing

## 2025-02-23 RX ORDER — MEMANTINE AND DONEPEZIL 28; 10 MG/1; MG/1
28 CAPSULE, EXTENDED RELEASE ORAL DAILY
Status: DISCONTINUED | OUTPATIENT
Start: 2025-02-23 | End: 2025-02-23 | Stop reason: ALTCHOICE

## 2025-02-23 RX ORDER — MEMANTINE HYDROCHLORIDE 10 MG/1
10 TABLET ORAL 2 TIMES DAILY
Status: DISCONTINUED | OUTPATIENT
Start: 2025-02-23 | End: 2025-02-26

## 2025-02-23 RX ORDER — DONEPEZIL HYDROCHLORIDE 10 MG/1
10 TABLET, FILM COATED ORAL NIGHTLY
Status: DISCONTINUED | OUTPATIENT
Start: 2025-02-23 | End: 2025-02-26

## 2025-02-23 NOTE — PROGRESS NOTES
[facility] Hospitalist Progress Note     CC: Hospital Follow up    PCP: Yao Rodriguez       Assessment/Plan:     Ms. Fine is a 70-year-old female with a history of multiple strokes in the past has amyloid angiopathy, HTN, CAD, CEDRICK, type 2 diabetes, vascular dementia, who presents to the hospital for evaluation poor p.o. intake, weakness, and general failure to thrive, extensive work up noted below, IVF given with improvement, patient with likely progressive vascular dementia, and precipitous decline, palliative consulted, DNR, family would like to patient to go to [facility], to try and improve function status, [facility] pending insurance approval    Failure to thrive  Low PO intake, dehydration with PAPO and hypernatremia  Hx of multiple strokes in past   -obtained MRI brain, high risk of recurrent stroke--->negative for acute stroke  -speech eval  -continue electrolyte correction   -neuro consult appreciated, could be progression of vascular dementia, neurodegenerative disease, amyloid angiopathy   -palliative consulted     Metabolic encephalopathy 2/2 dehydration  Initial hypotension  -STAT CT without acute changes, MRI brain (read pending but per neuro, no new changes)  -BP improved  -LA neg   -CXR negative  -neuro ordered EEG, discussed with Neuro, EEG neg, keppra empirically started  -Much  improved now  -will continue keppra long term per neuro    Acute on chronic Anemia  - hgb down trending   - no bleeding noted  - poss diultional?  - check iron studies -> ferritin normal     Hypernatremia  PAPO  Metabolic acidosis  -giving saline boluses this AM due to hypotension   -severe dehydration on admisision    Severe malnutrition  -continue caloric intake documentation   -dietitian on consult  -agree with oral nutritional supplementation per dietitian team   -significant weight loss past one year (~70lbs)  -monitor potential need for temporary enteral nutrition      Hypertension   -stopped hydralazine  -holding  arb  -decreased coreg     Coronary artery disease  -PO meds       CEDRICK  -cpap if uses here      Type 2 diabetes   -A1c 5.7  -stop accucheck/insulin  -stop metformin on DC     Hx of seizure?   -not on keppra PTA, resume keppra per neuro       DVT prophylaxis: heparin sub q  Code status: DNR select    Dispo: medically stable for discharge to [facility] when insurance approved    GOC: Palliative care consulted, DNR select     Nidhi Mohan MD  [facility] Hospitalist        Subjective:     No new issues, transferred to medical floor    OBJECTIVE:    Blood pressure 154/78, pulse 79, temperature 97.2 °F (36.2 °C), temperature source Axillary, resp. rate 18, weight 131 lb 4.8 oz (59.6 kg), SpO2 98%.    Temp:  [97.2 °F (36.2 °C)-99 °F (37.2 °C)] 97.2 °F (36.2 °C)  Pulse:  [71-80] 79  Resp:  [16-18] 18  BP: (136-157)/(54-78) 154/78  SpO2:  [98 %-99 %] 98 %      Intake/Output:    Intake/Output Summary (Last 24 hours) at 2/23/2025 1024  Last data filed at 2/23/2025 1013  Gross per 24 hour   Intake 280 ml   Output 900 ml   Net -620 ml       Last 3 Weights   02/20/25 0503 131 lb 4.8 oz (59.6 kg)   02/19/25 0500 130 lb 14.4 oz (59.4 kg)   02/18/25 0606 132 lb 14.4 oz (60.3 kg)   02/16/25 0500 122 lb 1.6 oz (55.4 kg)   02/13/25 0900 121 lb 6.4 oz (55.1 kg)   01/30/25 2230 150 lb (68 kg)   01/14/25 1842 165 lb (74.8 kg)       /78 (BP Location: Right arm)   Pulse 79   Temp 97.2 °F (36.2 °C) (Axillary)   Resp 18   Wt 131 lb 4.8 oz (59.6 kg)   SpO2 98%   BMI 23.26 kg/m²     General: alert    Lungs: clear to ausculation bilaterally  Heart: Regular rate and rhythm  Abdomen: soft, non tender  Extremities: No edema  Neuro: following some commands    Data Review:       Labs:     Recent Labs   Lab 02/19/25  0805 02/20/25  0707 02/21/25  0850   RBC 2.73* 2.81* 2.74*   HGB 7.5* 7.6* 7.6*   HCT 23.8* 24.6* 23.7*   MCV 87.2 87.5 86.5   MCH 27.5 27.0 27.7   MCHC 31.5 30.9* 32.1   RDW 17.1* 17.6* 18.2*   NEPRELIM 2.05 1.80 2.38    WBC 4.1 3.9* 4.5   .0 246.0 247.0         Recent Labs   Lab 02/19/25  0805 02/20/25  0707 02/21/25  0655   GLU 82 92 82   BUN 22 23 22   CREATSERUM 1.42* 1.56* 1.44*   EGFRCR 39* 35* 39*   CA 8.6* 8.6* 8.6*    145 144   K 3.9 4.1 4.2   * 115* 114*   CO2 21.0 22.0 22.0       Recent Labs   Lab 02/17/25  0758   ALB 3.2         Imaging:  No results found.      Meds:      carvedilol  12.5 mg Oral BID with meals    levETIRAcetam  250 mg Oral BID    aspirin  81 mg Oral Daily    atorvastatin  40 mg Oral Nightly    brimonidine  1 drop Both Eyes BID    heparin  5,000 Units Subcutaneous Q8H KB         glucose **OR** glucose **OR** glucose-vitamin C **OR** dextrose **OR** glucose **OR** glucose **OR** glucose-vitamin C    acetaminophen    melatonin    ondansetron    metoclopramide    polyethylene glycol (PEG 3350)    sennosides    bisacodyl

## 2025-02-23 NOTE — PROGRESS NOTES
This RN gave report to ODALYS Dunbar for hand-off and patient was transferred to room 451 tonight. Personal belongings and chart given to transport. Family aware of room change.

## 2025-02-23 NOTE — PLAN OF CARE
Patient alert and oriented x2. Cardiac chopped soft bite sized diet, tolerating well. 1 to 1 feed. Denies pain. Saline locked. Purewick in place. Call light in reach. Frequent rounding performed.     Problem: Patient Centered Care  Goal: Patient preferences are identified and integrated in the patient's plan of care  Description: Interventions:  - What would you like us to know as we care for you? Lives at home with son Gokul who assists with patient's care.  - Provide timely, complete, and accurate information to patient/family  - Incorporate patient and family knowledge, values, beliefs, and cultural backgrounds into the planning and delivery of care  - Encourage patient/family to participate in care and decision-making at the level they choose  - Honor patient and family perspectives and choices  Outcome: Not Progressing     Problem: Diabetes/Glucose Control  Goal: Glucose maintained within prescribed range  Description: INTERVENTIONS:  - Monitor Blood Glucose as ordered  - Assess for signs and symptoms of hyperglycemia and hypoglycemia  - Administer ordered medications to maintain glucose within target range  - Assess barriers to adequate nutritional intake and initiate nutrition consult as needed  - Instruct patient on self management of diabetes  Outcome: Not Progressing     Problem: Patient/Family Goals  Goal: Patient/Family Long Term Goal  Description: Patient's Long Term Goal: \"get my mom to eat again\"    Interventions:  - Monitor intake and output  - IV hydration  - See additional Care Plan goals for specific interventions  Outcome: Not Progressing  Goal: Patient/Family Short Term Goal  Description: Patient's Short Term Goal: \"more awake\"    Interventions:   -Neuro checks   -Monitor labs and vitals signs  -Follow provider recommendations  - See additional Care Plan goals for specific interventions  Outcome: Not Progressing     Problem: SAFETY ADULT - FALL  Goal: Free from fall injury  Description:  INTERVENTIONS:  - Assess pt frequently for physical needs  - Identify cognitive and physical deficits and behaviors that affect risk of falls.  - [place] fall precautions as indicated by assessment.  - Educate pt/family on patient safety including physical limitations  - Instruct pt to call for assistance with activity based on assessment  - Modify environment to reduce risk of injury  - Provide assistive devices as appropriate  - Consider OT/PT consult to assist with strengthening/mobility  - Encourage toileting schedule  Outcome: Not Progressing     Problem: DISCHARGE PLANNING  Goal: Discharge to home or other facility with appropriate resources  Description: INTERVENTIONS:  - Identify barriers to discharge w/pt and caregiver  - Include patient/family/discharge partner in discharge planning  - Arrange for needed discharge resources and transportation as appropriate  - Identify discharge learning needs (meds, wound care, etc)  - Arrange for interpreters to assist at discharge as needed  - Consider post-discharge preferences of patient/family/discharge partner  - Complete POLST form as appropriate  - Assess patient's ability to be responsible for managing their own health  - Refer to Case Management Department for coordinating discharge planning if the patient needs post-hospital services based on physician/LIP order or complex needs related to functional status, cognitive ability or social support system  Outcome: Not Progressing     Problem: Altered Communication/Language Barrier  Goal: Patient/Family is able to understand and participate in their care  Description: Interventions:  - Assess communication ability and preferred communication style  - Implement communication aides and strategies  - Use visual cues when possible  - Listen attentively, be patient, do not interrupt  - Minimize distractions  - Allow time for understanding and response  - Establish method for patient to ask for assistance (call  light)  - Provide an  as needed  - Communicate barriers and strategies to overcome with those who interact with patient  Outcome: Not Progressing     Problem: METABOLIC/FLUID AND ELECTROLYTES - ADULT  Goal: Electrolytes maintained within normal limits  Description: INTERVENTIONS:  - Monitor labs and rhythm and assess patient for signs and symptoms of electrolyte imbalances  - Administer electrolyte replacement as ordered  - Monitor response to electrolyte replacements, including rhythm and repeat lab results as appropriate  - Fluid restriction as ordered  - Instruct patient on fluid and nutrition restrictions as appropriate  Outcome: Not Progressing  Goal: Hemodynamic stability and optimal renal function maintained  Description: INTERVENTIONS:  - Monitor labs and assess for signs and symptoms of volume excess or deficit  - Monitor intake, output and patient weight  - Monitor urine specific gravity, serum osmolarity and serum sodium as indicated or ordered  - Monitor response to interventions for patient's volume status, including labs, urine output, blood pressure (other measures as available)  - Encourage oral intake as appropriate  - Instruct patient on fluid and nutrition restrictions as appropriate  Outcome: Not Progressing     Problem: SKIN/TISSUE INTEGRITY - ADULT  Goal: Skin integrity remains intact  Description: INTERVENTIONS  - Assess and document risk factors for pressure ulcer development  - Assess and document skin integrity  - Monitor for areas of redness and/or skin breakdown  - Initiate interventions, skin care algorithm/standards of care as needed  Outcome: Not Progressing  Goal: Incision(s), wounds(s) or drain site(s) healing without S/S of infection  Description: INTERVENTIONS:  - Assess and document risk factors for pressure ulcer development  - Assess and document skin integrity  - Assess and document dressing/incision, wound bed, drain sites and surrounding tissue  - Implement wound  care per orders  - Initiate isolation precautions as appropriate  - Initiate Pressure Ulcer prevention bundle as indicated  Outcome: Not Progressing     Problem: NEUROLOGICAL - ADULT  Goal: Achieves stable or improved neurological status  Description: INTERVENTIONS  - Assess for and report changes in neurological status  - Initiate measures to prevent increased intracranial pressure  - Maintain blood pressure and fluid volume within ordered parameters to optimize cerebral perfusion and minimize risk of hemorrhage  - Monitor temperature, glucose, and sodium. Initiate appropriate interventions as ordered  Outcome: Not Progressing  Goal: Achieves maximal functionality and self care  Description: INTERVENTIONS  - Monitor swallowing and airway patency with patient fatigue and changes in neurological status  - Encourage and assist patient to increase activity and self care with guidance from PT/OT  - Encourage visually impaired, hearing impaired and aphasic patients to use assistive/communication devices  Outcome: Not Progressing     Problem: Delirium  Goal: Minimize duration of delirium  Description: Interventions:  - Encourage use of hearing aids, eye glasses  - Promote highest level of mobility daily  - Provide frequent reorientation  - Promote wakefulness i.e. lights on, blinds open  - Promote sleep, encourage patient's normal rest cycle i.e. lights off, TV off, minimize noise and interruptions  - Encourage family to assist in orientation and promotion of home routines  Outcome: Not Progressing

## 2025-02-23 NOTE — PLAN OF CARE
Patient transferred from . [facility] in place. CPAP in use overnight. Call light within reach, bed alarm active.  Problem: Patient Centered Care  Goal: Patient preferences are identified and integrated in the patient's plan of care  Description: Interventions:  - What would you like us to know as we care for you? Lives at home with son Gokul who assists with patient's care.  - Provide timely, complete, and accurate information to patient/family  - Incorporate patient and family knowledge, values, beliefs, and cultural backgrounds into the planning and delivery of care  - Encourage patient/family to participate in care and decision-making at the level they choose  - Honor patient and family perspectives and choices  Outcome: Progressing     Problem: Diabetes/Glucose Control  Goal: Glucose maintained within prescribed range  Description: INTERVENTIONS:  - Monitor Blood Glucose as ordered  - Assess for signs and symptoms of hyperglycemia and hypoglycemia  - Administer ordered medications to maintain glucose within target range  - Assess barriers to adequate nutritional intake and initiate nutrition consult as needed  - Instruct patient on self management of diabetes  Outcome: Progressing     Problem: Patient/Family Goals  Goal: Patient/Family Long Term Goal  Description: Patient's Long Term Goal: \"get my mom to eat again\"    Interventions:  - Monitor intake and output  - IV hydration  - See additional Care Plan goals for specific interventions  Outcome: Progressing  Goal: Patient/Family Short Term Goal  Description: Patient's Short Term Goal: \"more awake\"    Interventions:   -Neuro checks   -Monitor labs and vitals signs  -Follow provider recommendations  - See additional Care Plan goals for specific interventions  Outcome: Progressing     Problem: SAFETY ADULT - FALL  Goal: Free from fall injury  Description: INTERVENTIONS:  - Assess pt frequently for physical needs  - Identify cognitive and physical deficits and  behaviors that affect risk of falls.  - [place] fall precautions as indicated by assessment.  - Educate pt/family on patient safety including physical limitations  - Instruct pt to call for assistance with activity based on assessment  - Modify environment to reduce risk of injury  - Provide assistive devices as appropriate  - Consider OT/PT consult to assist with strengthening/mobility  - Encourage toileting schedule  Outcome: Progressing     Problem: DISCHARGE PLANNING  Goal: Discharge to home or other facility with appropriate resources  Description: INTERVENTIONS:  - Identify barriers to discharge w/pt and caregiver  - Include patient/family/discharge partner in discharge planning  - Arrange for needed discharge resources and transportation as appropriate  - Identify discharge learning needs (meds, wound care, etc)  - Arrange for interpreters to assist at discharge as needed  - Consider post-discharge preferences of patient/family/discharge partner  - Complete POLST form as appropriate  - Assess patient's ability to be responsible for managing their own health  - Refer to Case Management Department for coordinating discharge planning if the patient needs post-hospital services based on physician/LIP order or complex needs related to functional status, cognitive ability or social support system  Outcome: Progressing     Problem: Altered Communication/Language Barrier  Goal: Patient/Family is able to understand and participate in their care  Description: Interventions:  - Assess communication ability and preferred communication style  - Implement communication aides and strategies  - Use visual cues when possible  - Listen attentively, be patient, do not interrupt  - Minimize distractions  - Allow time for understanding and response  - Establish method for patient to ask for assistance (call light)  - Provide an  as needed  - Communicate barriers and strategies to overcome with those who interact with  patient  Outcome: Progressing     Problem: METABOLIC/FLUID AND ELECTROLYTES - ADULT  Goal: Electrolytes maintained within normal limits  Description: INTERVENTIONS:  - Monitor labs and rhythm and assess patient for signs and symptoms of electrolyte imbalances  - Administer electrolyte replacement as ordered  - Monitor response to electrolyte replacements, including rhythm and repeat lab results as appropriate  - Fluid restriction as ordered  - Instruct patient on fluid and nutrition restrictions as appropriate  Outcome: Progressing  Goal: Hemodynamic stability and optimal renal function maintained  Description: INTERVENTIONS:  - Monitor labs and assess for signs and symptoms of volume excess or deficit  - Monitor intake, output and patient weight  - Monitor urine specific gravity, serum osmolarity and serum sodium as indicated or ordered  - Monitor response to interventions for patient's volume status, including labs, urine output, blood pressure (other measures as available)  - Encourage oral intake as appropriate  - Instruct patient on fluid and nutrition restrictions as appropriate  Outcome: Progressing     Problem: SKIN/TISSUE INTEGRITY - ADULT  Goal: Skin integrity remains intact  Description: INTERVENTIONS  - Assess and document risk factors for pressure ulcer development  - Assess and document skin integrity  - Monitor for areas of redness and/or skin breakdown  - Initiate interventions, skin care algorithm/standards of care as needed  Outcome: Progressing  Goal: Incision(s), wounds(s) or drain site(s) healing without S/S of infection  Description: INTERVENTIONS:  - Assess and document risk factors for pressure ulcer development  - Assess and document skin integrity  - Assess and document dressing/incision, wound bed, drain sites and surrounding tissue  - Implement wound care per orders  - Initiate isolation precautions as appropriate  - Initiate Pressure Ulcer prevention bundle as indicated  Outcome:  Progressing     Problem: NEUROLOGICAL - ADULT  Goal: Achieves stable or improved neurological status  Description: INTERVENTIONS  - Assess for and report changes in neurological status  - Initiate measures to prevent increased intracranial pressure  - Maintain blood pressure and fluid volume within ordered parameters to optimize cerebral perfusion and minimize risk of hemorrhage  - Monitor temperature, glucose, and sodium. Initiate appropriate interventions as ordered  Outcome: Progressing  Goal: Achieves maximal functionality and self care  Description: INTERVENTIONS  - Monitor swallowing and airway patency with patient fatigue and changes in neurological status  - Encourage and assist patient to increase activity and self care with guidance from PT/OT  - Encourage visually impaired, hearing impaired and aphasic patients to use assistive/communication devices  Outcome: Progressing     Problem: Delirium  Goal: Minimize duration of delirium  Description: Interventions:  - Encourage use of hearing aids, eye glasses  - Promote highest level of mobility daily  - Provide frequent reorientation  - Promote wakefulness i.e. lights on, blinds open  - Promote sleep, encourage patient's normal rest cycle i.e. lights off, TV off, minimize noise and interruptions  - Encourage family to assist in orientation and promotion of home routines  Outcome: Progressing

## 2025-02-24 RX ORDER — DONEPEZIL HYDROCHLORIDE 10 MG/1
10 TABLET, FILM COATED ORAL NIGHTLY
Status: SHIPPED | COMMUNITY
Start: 2025-02-24

## 2025-02-24 RX ORDER — MEMANTINE HYDROCHLORIDE 10 MG/1
10 TABLET ORAL 2 TIMES DAILY
Status: SHIPPED | COMMUNITY
Start: 2025-02-24

## 2025-02-24 NOTE — CM/SW NOTE
MDO for dc    CM sent inquiry to [facility]ity liaison and lvm checking ststus of ins auth. Await reply    1525  CM spoke with liaision, auth was not started as requested on Friday. She will start now.    CM notified PT of need for upd PT&OT notes asap for auth, last note 2/20.    [facility] will need order/perameters for CPAP, CM req RN place order with above details. Order sent    1220  PT note added to ref    1550  PETRA was notified by RN -Son said just now...he wants [facility] [place].    Pt was reserved at son's preferred [facility] on 2/21 and ins auth is pending.     Cm sent ref to [facility] but informed RN that pt might need to go to [facility] and their SW xfer to [facility] once bed confirmed and new auth obtained.    2/25 0900  [facility] accepted pt, CM res'd and requested they start ins auth STAT as pt is dc ready    [facility] ref cx and liaison notified.    Plan  [facility] pend auth     / to remain available for support and/or discharge planning.     Radha Valadez, RN    Ext [phone]

## 2025-02-24 NOTE — SLP NOTE
ADULT SWALLOWING RE EVALUATION    ASSESSMENT    ASSESSMENT/OVERALL IMPRESSION:  PPE REQUIRED. THIS THERAPIST WORE GLOVES FOR DURATION OF EVALUATION. HANDS WASHED UPON ENTRANCE/EXIT.    SLP RE BSSE orders received and acknowledged. A swallow re evaluation warranted per \"evaluate pt with thin liquids\". Pt afebrile with weak vocal quality, on room air, with oxygen saturation at 99%. Pt with hx of dysphagia at [facility], BSE 11/5/24 with recommendations for regular/thin liquids.   Pt positioned 90 degrees in bed, fatigued/cooperative. Pt with no complaints of pain. Oral motor examination revealed reduced strength, ROM, and rate of motion. Pt presented with trials of soft solids, mildly thick and thin liquids via cup. Pt with adequate oral acceptance and bilabial seal across all trials. Pt with intact bite, prolonged mastication of soft solids, and delayed A-P transit. Pt's swallow response appears delayed with reduced hyolaryngeal elevation/excursion. 1x delayed cough observed after thin liquids. No clinical signs of aspiration (e.g., immediate/delayed throat clear, immediate/delayed cough, wet vocal quality, increased O2 effort) observed across soft solid and mildly thick liquid trials. 2/17 CXR indicates \"No acute cardiopulmonary finding\". Oxygen status remained stable t/o the entire evaluation.     At this time, pt presents with mild/moderate oral dysphagia and probable pharyngeal dysfunction. Recommend a soft bite sized diet and mildly thick liquids liquids with strict adherence to safe swallowing compensatory strategies. Results and recommendations reviewed with RN, pt. Pt v/u to all results/recommendations. Recommendations remain written on whiteboard. SLP collaborated with RN for MD diet orders.     PLAN: SLP to f/u x2  meal assessments, monitor imaging, and VFSS if clinically indicated       RECOMMENDATIONS   Diet Recommendations - Solids: Mechanical soft chopped/ Soft & Bite Sized  Diet Recommendations - Liquids:  Nectar thick liquids/ Mildly thick                    Compensatory Strategies Recommended: Alternate consistencies  Aspiration Precautions: Upright position;Slow rate;Small bites;No straw;1:1 feeding  Medication Administration Recommendations:  (as tolerated)  Treatment Plan/Recommendations: Aspiration precautions    HISTORY   MEDICAL HISTORY  Reason for Referral:  (\"to evaluate pt with thin liquids\")    Problem List  Principal Problem:    PAPO (acute kidney injury)  Active Problems:    Cerebral amyloid angiopathy (HCC)    Dehydration    Failure to thrive in adult    Troponin level elevated    Hyperkalemia    Goals of care, counseling/discussion    Advance care planning    Palliative care by specialist      Past Medical History  Past Medical History:    Asthma (HCC)    Coronary atherosclerosis    Diabetes (HCC)    diabetes for 2 yrs    Essential hypertension    Glaucoma    right    Heart attack (HCC)    2005    High blood pressure    High cholesterol    Hyperlipidemia    Osteoarthritis    Sleep apnea    cpap    Stroke (HCC)    30 yrs ago    Visual impairment    left eye edema       Prior Living Situation: Home with support  Diet Prior to Admission: Regular;Thin liquids  Precautions: Aspiration    Patient/Family Goals: did not state    SWALLOWING HISTORY  Current Diet Consistency: Mechanical soft chopped/ Soft & Bite Sized;Thin liquids  Dysphagia History: see above  Imaging Results: 2/17 MRI BRAIN  CONCLUSION:   1. No acute infarct or hemorrhage.   2. Multiple remote microhemorrhages in both cerebral hemispheres, and few in the brainstem and left cerebellum.  Findings are suggestive of cerebral amyloid angiopathy.   3. Advanced changes of chronic small vessel disease in both cerebral hemispheres.   4. Less pronounced changes of chronic small vessel disease in donnell, and cerebellum.     SUBJECTIVE       OBJECTIVE   ORAL MOTOR EXAMINATION  Dentition: Functional  Symmetry: Reduced right facial  Strength: Overall reduced      Range of Motion: Overall reduced  Rate of Motion: Reduced    Voice Quality: Weak  Respiratory Status: Unlabored  Consistencies Trialed: Thin liquids;Nectar thick liquids;Soft solid  Method of Presentation: Staff/Clinician assistance;Cup  Patient Positioned: Upright;Midline    Oral Phase of Swallow: Impaired  Bolus Retrieval: Intact  Bilabial Seal: Intact  Bolus Formation: Impaired  Bolus Propulsion: Impaired  Mastication: Impaired  Retention: Impaired    Pharyngeal Phase of Swallow: Appears Impaired  Laryngeal Elevation Timing: Appears impaired  Laryngeal Elevation Strength: Appears impaired  Laryngeal Elevation Coordination: Appears intact  (Please note: Silent aspiration cannot be evaluated clinically. Videofluoroscopic Swallow Study is required to rule-out silent aspiration.)    Esophageal Phase of Swallow: No complaints consistent with possible esophageal involvement  Comments: NA          GOALS  Goal #1 The patient will tolerate soft bite sized consistency and mildly thick liquids without overt signs or symptoms of aspiration with 100 % accuracy over 1-2 session(s).  In Progress   Goal #2 The patient/family/caregiver will demonstrate understanding and implementation of aspiration precautions and swallow strategies independently over 1-2 session(s).    In Progress   Goal #3 The patient will utilize compensatory strategies as outlined by  BSSE (clinical evaluation) including Slow rate, Small bites, Small sips, Alternate liquids/solids, No straws, Upright 90 degrees, Upright 90 degrees 30 mins after meal, Eliminate distractions, Feed patient with PRN assistance 100 % of the time across 2 sessions.  In Progress     FOLLOW UP  Treatment Plan/Recommendations: Aspiration precautions  Duration: 1 week  Follow Up Needed (Documentation Required): Yes  SLP Follow-up Date: 02/25/25    Thank you for your referral.   If you have any questions, please contact ALICIA Gaitan M.S. CCC-SLP  Speech Language  Pathologist  Phone Number Ext. [phone]

## 2025-02-24 NOTE — PROGRESS NOTES
[facility] Hospitalist Progress Note     CC: Hospital Follow up    PCP: Yao Rodriguez       Assessment/Plan:     Ms. Fine is a 70-year-old female with a history of multiple strokes in the past has amyloid angiopathy, HTN, CAD, CEDRICK, type 2 diabetes, vascular dementia, who presents to the hospital for evaluation poor p.o. intake, weakness, and general failure to thrive, extensive work up noted below, IVF given with improvement, patient with likely progressive vascular dementia, and precipitous decline, palliative consulted, DNR, family would like to patient to go to [facility], to try and improve function status, [facility] pending insurance approval    Failure to thrive  Low PO intake, dehydration with PAPO and hypernatremia  Hx of multiple strokes in past   -obtained MRI brain, high risk of recurrent stroke--->negative for acute stroke  -speech eval  -continue electrolyte correction   -neuro consult appreciated, could be progression of vascular dementia, neurodegenerative disease, amyloid angiopathy   -palliative consulted     Metabolic encephalopathy 2/2 dehydration  Initial hypotension  -STAT CT without acute changes, MRI brain (read pending but per neuro, no new changes)  -BP improved  -LA neg   -CXR negative  -neuro ordered EEG, discussed with Neuro, EEG neg, keppra empirically started  -Much  improved now  -will continue keppra long term per neuro    Acute on chronic Anemia  - hgb down trending   - no bleeding noted  - poss diultional?  - check iron studies -> ferritin normal     Hypernatremia  PAPO  Metabolic acidosis  -giving saline boluses this AM due to hypotension   -severe dehydration on admisision    Severe malnutrition  -continue caloric intake documentation   -dietitian on consult  -agree with oral nutritional supplementation per dietitian team   -significant weight loss past one year (~70lbs)  -monitor potential need for temporary enteral nutrition      Hypertension   -stopped hydralazine  -holding  arb  -decreased coreg     Coronary artery disease  -PO meds       CEDRICK  -cpap if uses here      Type 2 diabetes   -A1c 5.7  -stop accucheck/insulin  -stop metformin on DC     Hx of seizure?   -not on keppra PTA, resume keppra per neuro       DVT prophylaxis: heparin sub q  Code status: DNR select    Dispo: medically stable for discharge to [facility] [place] when insurance approved    GOC: Palliative care consulted, DNR select     Puneet Thomas MD  [facility] Hospitalist        Subjective:     No new issues,  no complaints, no nausea,     OBJECTIVE:    Blood pressure 125/62, pulse 95, temperature 97.5 °F (36.4 °C), temperature source Temporal, resp. rate 16, weight 131 lb 4.8 oz (59.6 kg), SpO2 99%.    Temp:  [97.5 °F (36.4 °C)-99.4 °F (37.4 °C)] 97.5 °F (36.4 °C)  Pulse:  [66-95] 95  Resp:  [16-18] 16  BP: (125-165)/(58-82) 125/62  SpO2:  [97 %-100 %] 99 %      Intake/Output:  No intake or output data in the 24 hours ending 02/24/25 1429      Last 3 Weights   02/20/25 0503 131 lb 4.8 oz (59.6 kg)   02/19/25 0500 130 lb 14.4 oz (59.4 kg)   02/18/25 0606 132 lb 14.4 oz (60.3 kg)   02/16/25 0500 122 lb 1.6 oz (55.4 kg)   02/13/25 0900 121 lb 6.4 oz (55.1 kg)   01/30/25 2230 150 lb (68 kg)   01/14/25 1842 165 lb (74.8 kg)       /62 (BP Location: Right arm)   Pulse 95   Temp 97.5 °F (36.4 °C) (Temporal)   Resp 16   Wt 131 lb 4.8 oz (59.6 kg)   SpO2 99%   BMI 23.26 kg/m²     General: alert    Lungs: clear to ausculation bilaterally  Heart: Regular rate and rhythm  Abdomen: soft, non tender  Extremities: No edema  Neuro: following some commands    Data Review:       Labs:     Recent Labs   Lab 02/19/25  0805 02/20/25  0707 02/21/25  0850   RBC 2.73* 2.81* 2.74*   HGB 7.5* 7.6* 7.6*   HCT 23.8* 24.6* 23.7*   MCV 87.2 87.5 86.5   MCH 27.5 27.0 27.7   MCHC 31.5 30.9* 32.1   RDW 17.1* 17.6* 18.2*   NEPRELIM 2.05 1.80 2.38   WBC 4.1 3.9* 4.5   .0 246.0 247.0         Recent Labs   Lab 02/19/25  0805 02/20/25  0707  02/21/25  0655   GLU 82 92 82   BUN 22 23 22   CREATSERUM 1.42* 1.56* 1.44*   EGFRCR 39* 35* 39*   CA 8.6* 8.6* 8.6*    145 144   K 3.9 4.1 4.2   * 115* 114*   CO2 21.0 22.0 22.0       No results for input(s): \"ALT\", \"AST\", \"ALB\", \"AMYLASE\", \"LIPASE\", \"LDH\" in the last 168 hours.    Invalid input(s): \"ALPHOS\", \"TBIL\", \"DBIL\", \"TPROT\"        Imaging:  No results found.      Meds:      memantine  10 mg Oral BID    And    donepezil  10 mg Oral Nightly    carvedilol  12.5 mg Oral BID with meals    levETIRAcetam  250 mg Oral BID    aspirin  81 mg Oral Daily    atorvastatin  40 mg Oral Nightly    brimonidine  1 drop Both Eyes BID    heparin  5,000 Units Subcutaneous Q8H [facility]         glucose **OR** glucose **OR** glucose-vitamin C **OR** dextrose **OR** glucose **OR** glucose **OR** glucose-vitamin C    acetaminophen    melatonin    ondansetron    metoclopramide    polyethylene glycol (PEG 3350)    sennosides    bisacodyl

## 2025-02-24 NOTE — PAYOR COMM NOTE
--------------  CONTINUED STAY REVIEW    Payor: [email or web address] [facility]  Subscriber #:  [id number]  Authorization Number: [id number]    Admit date: 2/13/25  Admit time:  8:44 AM    2/23/25           Assessment/Plan:      Ms. Fine is a 70-year-old female with a history of multiple strokes in the past has amyloid angiopathy, HTN, CAD, CEDRICK, type 2 diabetes, vascular dementia, who presents to the hospital for evaluation poor p.o. intake, weakness, and general failure to thrive, extensive work up noted below, IVF given with improvement, patient with likely progressive vascular dementia, and precipitous decline, palliative consulted, DNR, family would like to patient to go to [facility], to try and improve function status, [facility] pending insurance approval     Failure to thrive  Low PO intake, dehydration with PAPO and hypernatremia  Hx of multiple strokes in past   -obtained MRI brain, high risk of recurrent stroke--->negative for acute stroke  -speech eval  -continue electrolyte correction   -neuro consult appreciated, could be progression of vascular dementia, neurodegenerative disease, amyloid angiopathy   -palliative consulted      Metabolic encephalopathy 2/2 dehydration  Initial hypotension  -STAT CT without acute changes, MRI brain (read pending but per neuro, no new changes)  -BP improved  -LA neg   -CXR negative  -neuro ordered EEG, discussed with Neuro, EEG neg, keppra empirically started  -Much  improved now  -will continue keppra long term per neuro     Acute on chronic Anemia  - hgb down trending   - no bleeding noted  - poss diultional?  - check iron studies -> ferritin normal     Hypernatremia  PAPO  Metabolic acidosis  -giving saline boluses this AM due to hypotension   -severe dehydration on admisision     Severe malnutrition  -continue caloric intake documentation   -dietitian on consult  -agree with oral nutritional supplementation per dietitian team   -significant weight loss past one year (~70lbs)  -monitor  potential need for temporary enteral nutrition      Hypertension   -stopped hydralazine  -holding arb  -decreased coreg     Coronary artery disease  -PO meds       CEDRICK  -cpap if uses here      Type 2 diabetes   -A1c 5.7  -stop accucheck/insulin  -stop metformin on DC     Hx of seizure?   -not on keppra PTA, resume keppra per neuro        DVT prophylaxis: heparin sub q  Code status: DNR select        /78 (BP Location: Right arm)   Pulse 79   Temp 97.2 °F (36.2 °C) (Axillary)   Resp 18   Wt 131 lb 4.8 oz (59.6 kg)   SpO2 98%   BMI 23.26 kg/m²      General: alert    Lungs: clear to ausculation bilaterally  Heart: Regular rate and rhythm  Abdomen: soft, non tender  Extremities: No edema  Neuro: following some commands     Lab 02/19/25  0805 02/20/25  0707 02/21/25  0850   RBC 2.73* 2.81* 2.74*   HGB 7.5* 7.6* 7.6*   HCT 23.8* 24.6* 23.7*   MCV 87.2 87.5 86.5   MCH 27.5 27.0 27.7   MCHC 31.5 30.9* 32.1   RDW 17.1* 17.6* 18.2*   NEPRELIM 2.05 1.80 2.38   WBC 4.1 3.9* 4.5   .0 246.0 247.0      Lab 02/19/25  0805 02/20/25  0707 02/21/25  0655   GLU 82 92 82   BUN 22 23 22   CREATSERUM 1.42* 1.56* 1.44*   EGFRCR 39* 35* 39*   CA 8.6* 8.6* 8.6*    145 144   K 3.9 4.1 4.2   * 115* 114*   CO2 21.0 22.0 22.0        Meds:       carvedilol  12.5 mg Oral BID with meals    levETIRAcetam  250 mg Oral BID    aspirin  81 mg Oral Daily    atorvastatin  40 mg Oral Nightly    brimonidine  1 drop Both Eyes BID    heparin  5,000 Units Subcutaneous Q8H KB                         MEDICATIONS ADMINISTERED IN LAST 1 DAY:  aspirin DR tab 81 mg       Date Action Dose Route User    2/24/2025 0932 Given 81 mg Oral Koby Robles, RN          atorvastatin (Lipitor) tab 40 mg       Date Action Dose Route User    2/23/2025 2136 Given 40 mg Oral Leobardo Major, RN          brimonidine (Alphagan) 0.2 % ophthalmic solution 1 drop       Date Action Dose Route User    2/24/2025 0957 Given 1 drop Both Eyes Koby Robles, RN     2/23/2025 2136 Given 1 drop Both Eyes Leobardo Major RN          carvedilol (Coreg) tab 12.5 mg       Date Action Dose Route User    2/24/2025 0932 Given 12.5 mg Oral Koby Robles RN    2/23/2025 1727 Given 12.5 mg Oral Svetlana Ortega RN          heparin (Porcine) 5000 UNIT/ML injection 5,000 Units       Date Action Dose Route User    2/24/2025 0526 Given 5,000 Units Subcutaneous (Right Upper Arm) Leobardo Major RN    2/23/2025 2136 Given 5,000 Units Subcutaneous (Right Upper Arm) Leobardo Major RN          levETIRAcetam (Keppra) tab 250 mg       Date Action Dose Route User    2/24/2025 0932 Given 250 mg Oral Koby Robles RN    2/23/2025 2136 Given 250 mg Oral Leobardo Major RN            Vitals (last day)       Date/Time Temp Pulse Resp BP SpO2 Weight O2 Device O2 Flow Rate (L/min) Who    02/24/25 1100 -- 95 -- -- -- -- -- -- DF    02/24/25 0933 -- 94 16 125/62 99 % -- None (Room air) -- RP    02/24/25 0500 97.5 °F (36.4 °C) 82 16 153/82 97 % -- CPAP -- JH    02/23/25 2229 -- 78 -- -- 98 % -- -- -- SN    02/23/25 2132 99.4 °F (37.4 °C) 75 16 165/58 97 % -- None (Room air) -- IG    02/23/25 1847 -- 70 16 155/60 100 % -- None (Room air) -- BS    02/23/25 1729 -- 66 18 164/61 100 % -- None (Room air) -- KV    02/23/25 1231 98.4 °F (36.9 °C) 69 16 121/59 99 % -- None (Room air) -- BS    02/23/25 0947 -- 79 18 154/78 98 % -- None (Room air) -- KV    02/23/25 0550 97.2 °F (36.2 °C) -- 16 157/64 98 % -- None (Room air) -- NH

## 2025-02-24 NOTE — PHYSICAL THERAPY NOTE
PHYSICAL THERAPY TREATMENT NOTE - INPATIENT     Room Number: 451/451-A       Presenting Problem: weakness and confusion  Co-Morbidities : Hx nonverbal d/t CVA 2021, DM2, HTN, CAD, seizure    Problem List  Principal Problem:    PAPO (acute kidney injury)  Active Problems:    Cerebral amyloid angiopathy (HCC)    Dehydration    Failure to thrive in adult    Troponin level elevated    Hyperkalemia    Goals of care, counseling/discussion    Advance care planning    Palliative care by specialist      PHYSICAL THERAPY ASSESSMENT   Patient demonstrates fair progress this session, goals  updated to reflect patient performance.      Patient is requiring maximum assist as a result of the following impairments: decreased functional strength, decreased endurance/aerobic capacity, impaired standing, sitting balance, impaired coordination, impaired motor planning, decreased muscular endurance, cognitive deficits (A/O x 1), and medical status.     Patient continues to function below baseline with bed mobility, transfers, maintaining seated position, standing prolonged periods, performing household tasks, and wheelchair mobility.  Next session anticipate patient to progress bed mobility, transfers, maintaining seated position, standing prolonged periods, performing household tasks, and wheelchair mobility.  Physical Therapy will continue to follow patient for duration of hospitalization.    Patient continues to benefit from continued skilled PT services: to promote return to prior level of function and safety with continuous assistance and gradual rehabilitative therapy .    PLAN DURING HOSPITALIZATION  Nursing Mobility Recommendation : Lift Equipment  PT Device Recommendation: Mechanical lift  PT Treatment Plan: Bed mobility;Coordination;Body mechanics;Energy conservation;Endurance;Range of motion;Neuromuscular re-educate;Strengthening;Transfer training;Balance training  Frequency (Obs): 3-5x/week     SUBJECTIVE  Nodding yes or no  minimal expressive langauge    OBJECTIVE  Precautions: Bed/chair alarm    WEIGHT BEARING RESTRICTION       PAIN ASSESSMENT   Rating: Unable to rate  Location: RLE  Management Techniques: Activity promotion;Body mechanics;Repositioning    BALANCE  Static Sitting: Poor  Dynamic Sitting: Poor -  Static Standing: Poor -  Dynamic Standing: Poor -    ACTIVITY TOLERANCE  Pulse: 95                       O2 WALK  Oxygen Therapy  SPO2% on Room Air at Rest: 98    AM-PAC '6-Clicks' INPATIENT SHORT FORM - BASIC MOBILITY  How much difficulty does the patient currently have...  Patient Difficulty: Turning over in bed (including adjusting bedclothes, sheets and blankets)?: A Lot   Patient Difficulty: Sitting down on and standing up from a chair with arms (e.g., wheelchair, bedside commode, etc.): Unable   Patient Difficulty: Moving from lying on back to sitting on the side of the bed?: A Lot   How much help from another person does the patient currently need...   Help from Another: Moving to and from a bed to a chair (including a wheelchair)?: Total   Help from Another: Need to walk in hospital room?: Total   Help from Another: Climbing 3-5 steps with a railing?: Total     AM-PAC Score:  Raw Score: 8   Approx Degree of Impairment: 86.62%   Standardized Score (AM-PAC Scale): 28.58   CMS Modifier (G-Code): CM    FUNCTIONAL ABILITY STATUS  Functional Mobility/Gait Assessment  Gait Assistance: Maximum assistance (SPT to chair)  Rolling: moderate assist  Supine to Sit: maximum assist  Sit to Supine: maximum assist  Sit to Stand: maximum assist    Skilled Therapy Provided: Pt ed with bed mobility with mod a to roll pt just waking up and presenting as drowsy. Pt ed with transfers to chair with max a to SPT to a chair with PT assisting patient with upright supported postures with pillows. Pt ed with Therex in chair for LE PETERSON for LE strength and ROM. Pt sitting in chair with chair alarm in place with meal try in front of her and pt  requires assist for all mobility and ADL support at this time. GR with PT, OT, SLP is recommended.     The patient's Approx Degree of Impairment: 86.62% has been calculated based on documentation in the [facility] '6 clicks' Inpatient Daily Activity Short Form.  Research supports that patients with this level of impairment may benefit from GR with PT, OT.    Final disposition will be made by interdisciplinary medical team.    THERAPEUTIC EXERCISES  Lower Extremity Ankle pumps  Heel slides     Position Sitting & Standing       Patient End of Session: Up in chair;With  staff;Needs met;Call light within reach;RN aware of session/findings;All patient questions and concerns addressed;Alarm set    CURRENT GOALS   Goals to be met by: 3/3/25  Patient Goal Patient's self-stated goal is: pt did not state   Goal #1 Patient is able to demonstrate supine - sit EOB @ level: moderate assistance      Goal #1   Current Status  Max A x 2   Goal #2 Patient is able to demonstrate transfers EOB to/from Chair/Wheelchair at assistance level: moderate assistance with gait belt and Stand pivot transfer technique      Goal #2  Current Status  Max A SPT to chair poor balance   Goal #3 Patient is able to tolerate 1 minute of static standing balance with bilateral UE support on RW vs lisa-walker requiring Mod A to maintain   Goal #3   Current Status  Ongoing    Goal #4 Patient to demonstrate independence with home activity/exercise instructions provided to patient in preparation for discharge.   Goal #4   Current Status  Ongoing     Therapeutic Activity: 15 minutes

## 2025-02-24 NOTE — DIETARY NOTE
ADULT NUTRITION REASSESSMENT    Pt is at high nutrition risk.  Pt meets severe malnutrition criteria.      CRITERIA FOR MALNUTRITION DIAGNOSIS:  Criteria for severe malnutrition diagnosis: acute illness/injury related to wt loss greater than 7.5% in 3 months, energy intake less than 50% for greater than 5 days, body fat moderate depletion, and muscle mass moderate depletion.    RECOMMENDATIONS TO MD: See nutrition intervention for ONS (oral nutrition supplements) CPM    ADMITTING DIAGNOSIS:  Dehydration [E86.0]  Hyperkalemia [E87.5]  Failure to thrive in adult [R62.7]  Troponin level elevated [R79.89]  PAPO (acute kidney injury) [N17.9]  PERTINENT PAST MEDICAL HISTORY:   Past Medical History:    Asthma (HCC)    Coronary atherosclerosis    Diabetes (HCC)    diabetes for 2 yrs    Essential hypertension    Glaucoma    right    Heart attack (HCC)    2005    High blood pressure    High cholesterol    Hyperlipidemia    Osteoarthritis    Sleep apnea    cpap    Stroke (HCC)    30 yrs ago    Visual impairment    left eye edema     PATIENT STATUS: Initial 02/16/25: Pt admit for decreased appetite x 3 days. PMH sig for DM, HTN, hx CVA and nonverbal d/t past CVA. Pt assessed due to screening at risk for weight loss d/t poor appetite. Pt visited, son at bedside. Son suspects pt has lost weight recently d/t poor intake, however unsure of how much weight she has lost. Per chart review, pt previously admitted to the hospital in November, weighed 155 lbs on admission. Pt currently 122 lbs (33 lb loss, 21%, x 3 months, significant per guidelines). Son reported pt has not been taking much in recently, however appetite has improved today and was able to have half of a container of applesauce and bits and pieces of other foods. Offered to add ONS to help meet nutritional needs, son agreeable to try. Will order Magic cup for her BID. Noted pt may need nutrition support d/t failure to thrive if intake remains low. Family and pt declining  at this time. Will monitor intake and follow up as appropriate.    Update 02/20/25: RA completed per protocol. Chart reviewed. Discussion with RN, no concerns, eating well. Intakes reviewed, 0-100% x 11 meals (averaging 55% overall - fair). Intake appears slightly improved over admission. Palliative care consulted, note reviewed - no PEG per family. Current code status: DNAR/Select    Update 2/24/25: RA completed per protocol. Chart reviewed. Pt intake 30 - 100% x 7 (averaging 60% overall - fair). Intake slightly improving. Visited pt, son in room. Son said pt is picky and will not eat certain things. Pt likes cream of chicken soup, apple sauce, meatloaf, and sweet potatoes. Advised son to order meals for pt in advance to ensure pt receives preferred foods. Pt's son requested Magic cup TID. Pt's son brought in Ensure high protein from home.     FOOD/NUTRITION RELATED HISTORY:  Appetite: Fair -some improvement noted  Intake: ~30 -100% x7 meals documented since RA on 2/20  - averaging 60% overall (Fair) + 50-80% x 2 ONS per flowsheet review, pt's son said is eating.  Intake Meeting Needs: Marginal, ONS in place to maximize  Percent Meals Eaten (last 6 days)       Date/Time Percent Meals Eaten (%)    02/18/25 0522 100 %     Percent Meals Eaten (%): pudding cup at 02/18/25 0522    02/18/25 0930 40 %     Percent Meals Eaten (%): 40% of eggs, potatoes and grits at 02/18/25 0930    02/18/25 1822 40 %    02/19/25 0918 75 %     Percent Meals Eaten (%): 100% of magic cup, cream of wheat, Turkish toast. 0% of scrambled eggs (did not like the taste) at 02/19/25 0918    02/19/25 1507 80 %    02/20/25 0943 75 %    02/20/25 1338 30 %    02/20/25 1900 40 %    02/21/25 0900 90 %    02/21/25 1754 25 %    02/23/25 1013 100 %    02/23/25 1419 60 %          Food Allergies: No Known Food Allergies (NKFA)  Cultural/Ethnic/Anabaptist Preferences: None    GASTROINTESTINAL: +BM 2/15/25 - large;soft and Swallow evaluation noted    MEDICATIONS:  reviewed Electrolyte replacement per protocol (non-cardiac)   memantine  10 mg Oral BID    And    donepezil  10 mg Oral Nightly    carvedilol  12.5 mg Oral BID with meals    levETIRAcetam  250 mg Oral BID    aspirin  81 mg Oral Daily    atorvastatin  40 mg Oral Nightly    brimonidine  1 drop Both Eyes BID    heparin  5,000 Units Subcutaneous Q8H KB     LABS: reviewed A1c 5.7% on 2/13/25  POC Glucose reviewed  Recent Labs     02/21/25  0655   GLU 82   BUN 22   CREATSERUM 1.44*   CA 8.6*      K 4.2   *   CO2 22.0   OSMOCALC 300*     NUTRITION RELATED PHYSICAL FINDINGS:  - Nutrition Focused Physical Exam (NFPE): moderate depletion body fat triceps region and moderate depletion muscle mass temple region and clavicle region per visual exam  - Fluid Accumulation: none  per flowsheet review -->  see RN documentation for details  - Skin Integrity: Pressure Injury: Stage 2 on sacrum and at risk per flowsheet review --> see RN documentation for details    ANTHROPOMETRICS:  HT: 160 cm (5' 3\")   WT: 59.6 kg (131 lb 4.8 oz) - weight gain noted, monitor accuracy  Last Visit Wt: 122# 2 oz on 2/16/25  Admit Wt: 121# 6 oz on 2/13/25  BMI: Body mass index is 23.26 kg/m².  BMI CLASSIFICATION: 18.5-24.9 kg/m2 - WNL  No data recorded         114% IBW  Usual Body Wt: 160 lbs       82% UBW    WEIGHT HISTORY: Noted weight loss since last admission in November - 33 lbs x 3 months (21% weight loss, significant)  Patient Weight(s) for the past 336 hrs:   Weight   02/20/25 0503 59.6 kg (131 lb 4.8 oz)   02/19/25 0500 59.4 kg (130 lb 14.4 oz)   02/18/25 0606 60.3 kg (132 lb 14.4 oz)   02/16/25 0500 55.4 kg (122 lb 1.6 oz)   02/13/25 0900 55.1 kg (121 lb 6.4 oz)     Wt Readings from Last 10 Encounters:   02/20/25 59.6 kg (131 lb 4.8 oz)   01/30/25 68 kg (150 lb)   01/14/25 74.8 kg (165 lb)   01/04/25 72.6 kg (160 lb)   11/11/24 74.8 kg (165 lb)   11/06/24 70.3 kg (155 lb)   09/27/24 73.9 kg (162 lb 14.4 oz)   07/08/24 77.6 kg (171  lb)   07/08/24 89 kg (196 lb 3.4 oz)   03/24/24 88 kg (194 lb 0.1 oz)     NUTRITION DIAGNOSIS/PROBLEM:   Severe Malnutrition related to Acute - Physiological causes resulting in anorexia or diminished intake as evidenced by wt loss greater than 7.5% in 3 months, energy intake less than 50% for greater than 5 days, body fat moderate depletion, and muscle mass moderate depletion.    NUTRITION DIAGNOSIS PROGRESS:  Slight Improvement (unresolved) - some improvement in po intake w/ ONS to help maximize     NUTRITION INTERVENTION:   NUTRITION PRESCRIPTION:   Estimated Nutrition needs: --dosing wt of 55.4 kg - wt taken on 2/16  Calories: 1250 calories/day (23 calories per kg [place] Jeor AF 1.2)  Protein: 55 - 83 g protein/day (1.0-1.5 g protein/kg Dosing wt)  Fluid Needs: 1 ml/kcal    - Diet:       Procedures    Regular/General diet Fluid Consistency: Nectar Thick / Mildly Thick Liquids; Texture Consistency: Chopped / Soft & Bite Sized; Is Patient on Accuchecks? Yes; Is Patient on Suicide Precautions? No; Misc Restriction: Cardiac, No Straw      - RD Malnutrition Care Plan: Encouraged increased PO intake, Encouraged small frequent meals with emphasis on high calorie/high protein, and Initiated ONS (oral nutritional supplements)  - Meals and snacks: Encouraged small frequent meals and Encouraged adequate PO intake  - Medical Food Supplements-RD continued Magic Cup (290 calories/ 9 g protein each) TID Mixed berry. Rational/use of oral supplements discussed.  - Vitamin and mineral supplements: none  - Feeding assistance: meal set up and feed  - Nutrition education: not appropriate     - Coordination of nutrition care: collaboration with other providers  - Discharge and transfer of nutrition care to new setting or provider: monitor plans Plan to discharge to [facility] pending insurance auth    MONITOR AND EVALUATE/NUTRITION GOALS:  - Food and Nutrient Intake:      Monitor: adequacy of PO intake and adequacy  of supplement intake  - Food and Nutrient Administration:      Monitor: N/A no PEG per palliative care note  - Anthropometric Measurement:    Monitor weight  - Nutrition Goals:      halt wt loss, gradual wt gain as able, PO and supplement greater than 75% of needs, good supplement intake, labs within acceptable limits, prevent skin breakdown, and improved GI status    DIETITIAN FOLLOW UP: RD to follow and monitor nutrition status    Lisette Maradiaga  Dietetic Intern  Phone: [phone]

## 2025-02-25 RX ORDER — CARVEDILOL 12.5 MG/1
12.5 TABLET ORAL ONCE
Status: COMPLETED | OUTPATIENT
Start: 2025-02-25 | End: 2025-02-25

## 2025-02-25 RX ORDER — CARVEDILOL 25 MG/1
25 TABLET ORAL 2 TIMES DAILY WITH MEALS
Status: DISCONTINUED | OUTPATIENT
Start: 2025-02-26 | End: 2025-02-26

## 2025-02-25 NOTE — PLAN OF CARE
Pt is A&Ox 2 - delayed responses, at times says \"yes\" and \"no\", nods, grunts, or does not make any acknowledgment of a question. room air, tolerating soft easy to chew bit sized diet NO STRAWS, seen by speech today - liquids changed to nectar/mild thick. Voiding freely via purewick, denies pain and appears comfortable, max assist. BP lower this afternoon - MD notified - 500cc bolus ordered and given. Call light within reach, calls appropriately, I-bed awareness/alarm on.       Problem: Patient Centered Care  Goal: Patient preferences are identified and integrated in the patient's plan of care  Description: Interventions:  - What would you like us to know as we care for you? Lives at home with son Gokul who assists with patient's care.  - Provide timely, complete, and accurate information to patient/family  - Incorporate patient and family knowledge, values, beliefs, and cultural backgrounds into the planning and delivery of care  - Encourage patient/family to participate in care and decision-making at the level they choose  - Honor patient and family perspectives and choices  Outcome: Progressing     Problem: Diabetes/Glucose Control  Goal: Glucose maintained within prescribed range  Description: INTERVENTIONS:  - Monitor Blood Glucose as ordered  - Assess for signs and symptoms of hyperglycemia and hypoglycemia  - Administer ordered medications to maintain glucose within target range  - Assess barriers to adequate nutritional intake and initiate nutrition consult as needed  - Instruct patient on self management of diabetes  Outcome: Progressing     Problem: Patient/Family Goals  Goal: Patient/Family Long Term Goal  Description: Patient's Long Term Goal: \"get my mom to eat again\"    Interventions:  - Monitor intake and output  - IV hydration  - See additional Care Plan goals for specific interventions  Outcome: Progressing  Goal: Patient/Family Short Term Goal  Description: Patient's Short Term Goal: \"more  awake\"    Interventions:   -Neuro checks   -Monitor labs and vitals signs  -Follow provider recommendations  - See additional Care Plan goals for specific interventions  Outcome: Progressing     Problem: SAFETY ADULT - FALL  Goal: Free from fall injury  Description: INTERVENTIONS:  - Assess pt frequently for physical needs  - Identify cognitive and physical deficits and behaviors that affect risk of falls.  - [place] fall precautions as indicated by assessment.  - Educate pt/family on patient safety including physical limitations  - Instruct pt to call for assistance with activity based on assessment  - Modify environment to reduce risk of injury  - Provide assistive devices as appropriate  - Consider OT/PT consult to assist with strengthening/mobility  - Encourage toileting schedule  Outcome: Progressing     Problem: DISCHARGE PLANNING  Goal: Discharge to home or other facility with appropriate resources  Description: INTERVENTIONS:  - Identify barriers to discharge w/pt and caregiver  - Include patient/family/discharge partner in discharge planning  - Arrange for needed discharge resources and transportation as appropriate  - Identify discharge learning needs (meds, wound care, etc)  - Arrange for interpreters to assist at discharge as needed  - Consider post-discharge preferences of patient/family/discharge partner  - Complete POLST form as appropriate  - Assess patient's ability to be responsible for managing their own health  - Refer to Case Management Department for coordinating discharge planning if the patient needs post-hospital services based on physician/LIP order or complex needs related to functional status, cognitive ability or social support system  Outcome: Progressing     Problem: Altered Communication/Language Barrier  Goal: Patient/Family is able to understand and participate in their care  Description: Interventions:  - Assess communication ability and preferred communication style  - Implement  communication aides and strategies  - Use visual cues when possible  - Listen attentively, be patient, do not interrupt  - Minimize distractions  - Allow time for understanding and response  - Establish method for patient to ask for assistance (call light)  - Provide an  as needed  - Communicate barriers and strategies to overcome with those who interact with patient  Outcome: Progressing     Problem: METABOLIC/FLUID AND ELECTROLYTES - ADULT  Goal: Electrolytes maintained within normal limits  Description: INTERVENTIONS:  - Monitor labs and rhythm and assess patient for signs and symptoms of electrolyte imbalances  - Administer electrolyte replacement as ordered  - Monitor response to electrolyte replacements, including rhythm and repeat lab results as appropriate  - Fluid restriction as ordered  - Instruct patient on fluid and nutrition restrictions as appropriate  Outcome: Progressing  Goal: Hemodynamic stability and optimal renal function maintained  Description: INTERVENTIONS:  - Monitor labs and assess for signs and symptoms of volume excess or deficit  - Monitor intake, output and patient weight  - Monitor urine specific gravity, serum osmolarity and serum sodium as indicated or ordered  - Monitor response to interventions for patient's volume status, including labs, urine output, blood pressure (other measures as available)  - Encourage oral intake as appropriate  - Instruct patient on fluid and nutrition restrictions as appropriate  Outcome: Progressing     Problem: SKIN/TISSUE INTEGRITY - ADULT  Goal: Skin integrity remains intact  Description: INTERVENTIONS  - Assess and document risk factors for pressure ulcer development  - Assess and document skin integrity  - Monitor for areas of redness and/or skin breakdown  - Initiate interventions, skin care algorithm/standards of care as needed  Outcome: Progressing  Goal: Incision(s), wounds(s) or drain site(s) healing without S/S of  infection  Description: INTERVENTIONS:  - Assess and document risk factors for pressure ulcer development  - Assess and document skin integrity  - Assess and document dressing/incision, wound bed, drain sites and surrounding tissue  - Implement wound care per orders  - Initiate isolation precautions as appropriate  - Initiate Pressure Ulcer prevention bundle as indicated  Outcome: Progressing     Problem: NEUROLOGICAL - ADULT  Goal: Achieves stable or improved neurological status  Description: INTERVENTIONS  - Assess for and report changes in neurological status  - Initiate measures to prevent increased intracranial pressure  - Maintain blood pressure and fluid volume within ordered parameters to optimize cerebral perfusion and minimize risk of hemorrhage  - Monitor temperature, glucose, and sodium. Initiate appropriate interventions as ordered  Outcome: Progressing  Goal: Achieves maximal functionality and self care  Description: INTERVENTIONS  - Monitor swallowing and airway patency with patient fatigue and changes in neurological status  - Encourage and assist patient to increase activity and self care with guidance from PT/OT  - Encourage visually impaired, hearing impaired and aphasic patients to use assistive/communication devices  Outcome: Progressing     Problem: Delirium  Goal: Minimize duration of delirium  Description: Interventions:  - Encourage use of hearing aids, eye glasses  - Promote highest level of mobility daily  - Provide frequent reorientation  - Promote wakefulness i.e. lights on, blinds open  - Promote sleep, encourage patient's normal rest cycle i.e. lights off, TV off, minimize noise and interruptions  - Encourage family to assist in orientation and promotion of home routines  Outcome: Progressing

## 2025-02-25 NOTE — OCCUPATIONAL THERAPY NOTE
OCCUPATIONAL THERAPY TREATMENT NOTE - INPATIENT        Room Number: 451/451-A     Presenting Problem: PAPO, dehydration, failure to thrive, elevated troponin, hyperkalemia    Problem List  Principal Problem:    PAPO (acute kidney injury)  Active Problems:    Cerebral amyloid angiopathy (HCC)    Dehydration    Failure to thrive in adult    Troponin level elevated    Hyperkalemia    Goals of care, counseling/discussion    Advance care planning    Palliative care by specialist      OCCUPATIONAL THERAPY ASSESSMENT   Patient demonstrates limited progress this session, goals remain in progress.    Patient is requiring maximum assist and dependent as a result of the following impairments: decreased functional strength, decreased functional reach, decreased endurance, impaired sitting/standing balance, decreased muscular endurance, medical status, and limited BUE ROM.    Patient continues to function below baseline with bed mobility, functional transfers, and ADLs.  Next session anticipate patient to progress bed mobility, static sitting balance, and dynamic sitting balance.  Occupational Therapy will continue to follow patient for duration of hospitalization.    Patient continues to benefit from continued skilled OT services: to promote return to prior level of function and safety with continuous assistance and gradual rehabilitative therapy.     PLAN DURING HOSPITALIZATION     OT Treatment Plan: Balance activities;Energy conservation/work simplification techniques;ADL training;Functional transfer training;Patient/Family education;Patient/Family training;Compensatory technique education     SUBJECTIVE  \"Yes\"     OBJECTIVE  Precautions: Bed/chair alarm       PAIN ASSESSMENT  [place]    ACTIVITIES OF DAILY LIVING ASSESSMENT  AM-PAC ‘6-Clicks’ Inpatient Daily Activity Short Form  How much help from another person does the patient currently need…  -   Putting on and taking off regular lower body clothing?: Total  -   Bathing  (including washing, rinsing, drying)?: Total  -   Toileting, which includes using toilet, bedpan or urinal? : Total  -   Putting on and taking off regular upper body clothing?: A Lot  -   Taking care of personal grooming such as brushing teeth?: A Lot  -   Eating meals?: A Lot    AM-PAC Score:  Score: 9  Approx Degree of Impairment: 79.59%  Standardized Score (AM-PAC Scale): 25.33  CMS Modifier (G-Code): CL    FUNCTIONAL TRANSFER ASSESSMENT   Dependent lift bed <> chair transfer     BED MOBILITY  Rolling: Maximum Assist    BALANCE ASSESSMENT  Static Sitting: Moderate Assist    FUNCTIONAL ADL ASSESSMENT  LB Dressing Seated: Dependent  Toileting: Dependent (purewick)     THERAPEUTIC EXERCISE   Ankle pumps: 10x   Bilateral hand gross grasp/release: 10x    Skilled Therapy Provided:   RN cleared pt for participation. Pt received in bed with no visitors present. Pt agreeable to participation in therapy. Pt tolerated treatment fairly well but continues to required max assist x1-2 to total assist for functional tasks. Pt required max assist to roll to both R/L sides and max assist to maintain side lying for ~15 secs to position lift sling. Pt demo impaired sitting balance once bed<>chair lift transfer completed, with L lean requiring mod assist to correct. Pt able to maintain static sitting in supported recliner for ~20 seconds before requiring physical assist to correct and position for best body mechanics. Pt completed seated BUE/UE strengthening exercises as reported above. Pt remained in recliner with all needs in reach and alarm on at end of session. RN aware.       EDUCATION PROVIDED  Patient Education : Role of Occupational Therapy; Plan of Care; Functional Transfer Techniques; Posture/Positioning; Proper Body Mechanics  Patient's Response to Education: Verbalized Understanding; Returned Demonstration    The patient's Approx Degree of Impairment: 79.59% has been calculated based on documentation in the [facility] '6  clicks' Inpatient Daily Activity Short Form.  Research supports that patients with this level of impairment may benefit from LTAC.  Final disposition will be made by interdisciplinary medical team.    Patient End of Session: Up in chair;Needs met;Call light within reach;RN aware of session/findings;All patient questions and concerns addressed;Hospital anti-slip socks;Alarm set    OT Goals:  Patient's self stated goal is: none stated     Patient will complete functional transfer with Max A x1  Comment: ongoing    Patient will sit EOB with Min A in prep for ADLs  Comment: ongoing    Patient will tolerate standing for 1-2 minutes in prep for ADLs with Max A x1  Comment:ongoing    Patient will complete oral/facial grooming task in supported sitting with Min A  Comment:ongoing           Goals  on: 3/3/25  Frequency: 3x/week  OT Session Time: 15 minutes  Therapeutic Activity: 15 minutes

## 2025-02-25 NOTE — PLAN OF CARE
No acute changes throughout shift. Alert x1-2. Vitals stable. Denies pain.  Tolerating modified diet. Voiding via urinal. Max assist. Family at bedside. Plan to discharge to [facility] once auth is obtained.   Problem: Patient Centered Care  Goal: Patient preferences are identified and integrated in the patient's plan of care  Description: Interventions:  - What would you like us to know as we care for you? Lives at home with son Gokul who assists with patient's care.  - Provide timely, complete, and accurate information to patient/family  - Incorporate patient and family knowledge, values, beliefs, and cultural backgrounds into the planning and delivery of care  - Encourage patient/family to participate in care and decision-making at the level they choose  - Honor patient and family perspectives and choices  2/25/2025 1012 by Dave Gibson RN  Outcome: Progressing  2/25/2025 0956 by Dave Gibson, RN  Outcome: Progressing     Problem: Diabetes/Glucose Control  Goal: Glucose maintained within prescribed range  Description: INTERVENTIONS:  - Monitor Blood Glucose as ordered  - Assess for signs and symptoms of hyperglycemia and hypoglycemia  - Administer ordered medications to maintain glucose within target range  - Assess barriers to adequate nutritional intake and initiate nutrition consult as needed  - Instruct patient on self management of diabetes  2/25/2025 1012 by Dave Gibson, RN  Outcome: Progressing  2/25/2025 0956 by Dave Gibson, RN  Outcome: Progressing     Problem: Diabetes/Glucose Control  Goal: Glucose maintained within prescribed range  Description: INTERVENTIONS:  - Monitor Blood Glucose as ordered  - Assess for signs and symptoms of hyperglycemia and hypoglycemia  - Administer ordered medications to maintain glucose within target range  - Assess barriers to adequate nutritional intake and initiate nutrition consult as needed  - Instruct patient on self management of diabetes  2/25/2025 1012  by Gibson, Dave, RN  Outcome: Progressing  2/25/2025 0956 by Dave Gibson RN  Outcome: Progressing     Problem: Patient/Family Goals  Goal: Patient/Family Long Term Goal  Description: Patient's Long Term Goal: \"get my mom to eat again\"    Interventions:  - Monitor intake and output  - IV hydration  - See additional Care Plan goals for specific interventions  2/25/2025 1012 by Dave Gibson RN  Outcome: Progressing  2/25/2025 0956 by Dave Gibson RN  Outcome: Progressing  Goal: Patient/Family Short Term Goal  Description: Patient's Short Term Goal: \"more awake\"    Interventions:   -Neuro checks   -Monitor labs and vitals signs  -Follow provider recommendations  - See additional Care Plan goals for specific interventions  2/25/2025 1012 by Dave Gibson RN  Outcome: Progressing  2/25/2025 0956 by Dave Gibson RN  Outcome: Progressing     Problem: SAFETY ADULT - FALL  Goal: Free from fall injury  Description: INTERVENTIONS:  - Assess pt frequently for physical needs  - Identify cognitive and physical deficits and behaviors that affect risk of falls.  - [place] fall precautions as indicated by assessment.  - Educate pt/family on patient safety including physical limitations  - Instruct pt to call for assistance with activity based on assessment  - Modify environment to reduce risk of injury  - Provide assistive devices as appropriate  - Consider OT/PT consult to assist with strengthening/mobility  - Encourage toileting schedule  2/25/2025 1012 by Dave Gibson RN  Outcome: Progressing  2/25/2025 0956 by Dave Gibson RN  Outcome: Progressing     Problem: DISCHARGE PLANNING  Goal: Discharge to home or other facility with appropriate resources  Description: INTERVENTIONS:  - Identify barriers to discharge w/pt and caregiver  - Include patient/family/discharge partner in discharge planning  - Arrange for needed discharge resources and transportation as appropriate  - Identify discharge learning needs (meds, wound  care, etc)  - Arrange for interpreters to assist at discharge as needed  - Consider post-discharge preferences of patient/family/discharge partner  - Complete POLST form as appropriate  - Assess patient's ability to be responsible for managing their own health  - Refer to Case Management Department for coordinating discharge planning if the patient needs post-hospital services based on physician/LIP order or complex needs related to functional status, cognitive ability or social support system  2/25/2025 1012 by Dave Gibson RN  Outcome: Progressing  2/25/2025 0956 by Dave Gibson RN  Outcome: Progressing     Problem: Altered Communication/Language Barrier  Goal: Patient/Family is able to understand and participate in their care  Description: Interventions:  - Assess communication ability and preferred communication style  - Implement communication aides and strategies  - Use visual cues when possible  - Listen attentively, be patient, do not interrupt  - Minimize distractions  - Allow time for understanding and response  - Establish method for patient to ask for assistance (call light)  - Provide an  as needed  - Communicate barriers and strategies to overcome with those who interact with patient  2/25/2025 1012 by Dave Gibson RN  Outcome: Progressing  2/25/2025 0956 by Dave Gibson RN  Outcome: Progressing     Problem: METABOLIC/FLUID AND ELECTROLYTES - ADULT  Goal: Electrolytes maintained within normal limits  Description: INTERVENTIONS:  - Monitor labs and rhythm and assess patient for signs and symptoms of electrolyte imbalances  - Administer electrolyte replacement as ordered  - Monitor response to electrolyte replacements, including rhythm and repeat lab results as appropriate  - Fluid restriction as ordered  - Instruct patient on fluid and nutrition restrictions as appropriate  2/25/2025 1012 by Dave Gibson RN  Outcome: Progressing  2/25/2025 0956 by Dave Gibson RN  Outcome:  Progressing  Goal: Hemodynamic stability and optimal renal function maintained  Description: INTERVENTIONS:  - Monitor labs and assess for signs and symptoms of volume excess or deficit  - Monitor intake, output and patient weight  - Monitor urine specific gravity, serum osmolarity and serum sodium as indicated or ordered  - Monitor response to interventions for patient's volume status, including labs, urine output, blood pressure (other measures as available)  - Encourage oral intake as appropriate  - Instruct patient on fluid and nutrition restrictions as appropriate  2/25/2025 1012 by Dave Gibson RN  Outcome: Progressing  2/25/2025 0956 by Dave Gibson RN  Outcome: Progressing     Problem: SKIN/TISSUE INTEGRITY - ADULT  Goal: Skin integrity remains intact  Description: INTERVENTIONS  - Assess and document risk factors for pressure ulcer development  - Assess and document skin integrity  - Monitor for areas of redness and/or skin breakdown  - Initiate interventions, skin care algorithm/standards of care as needed  2/25/2025 1012 by Dave Gibson RN  Outcome: Progressing  2/25/2025 0956 by Dave Gibson RN  Outcome: Progressing  Goal: Incision(s), wounds(s) or drain site(s) healing without S/S of infection  Description: INTERVENTIONS:  - Assess and document risk factors for pressure ulcer development  - Assess and document skin integrity  - Assess and document dressing/incision, wound bed, drain sites and surrounding tissue  - Implement wound care per orders  - Initiate isolation precautions as appropriate  - Initiate Pressure Ulcer prevention bundle as indicated  2/25/2025 1012 by Dave Gibson RN  Outcome: Progressing  2/25/2025 0956 by Dave Gibson RN  Outcome: Progressing     Problem: NEUROLOGICAL - ADULT  Goal: Achieves stable or improved neurological status  Description: INTERVENTIONS  - Assess for and report changes in neurological status  - Initiate measures to prevent increased intracranial pressure  -  Maintain blood pressure and fluid volume within ordered parameters to optimize cerebral perfusion and minimize risk of hemorrhage  - Monitor temperature, glucose, and sodium. Initiate appropriate interventions as ordered  2/25/2025 1012 by Dave Gibson RN  Outcome: Progressing  2/25/2025 0956 by Dave Gibson RN  Outcome: Progressing  Goal: Achieves maximal functionality and self care  Description: INTERVENTIONS  - Monitor swallowing and airway patency with patient fatigue and changes in neurological status  - Encourage and assist patient to increase activity and self care with guidance from PT/OT  - Encourage visually impaired, hearing impaired and aphasic patients to use assistive/communication devices  2/25/2025 1012 by Dave Gibson RN  Outcome: Progressing  2/25/2025 0956 by Dave Gibson RN  Outcome: Progressing     Problem: Delirium  Goal: Minimize duration of delirium  Description: Interventions:  - Encourage use of hearing aids, eye glasses  - Promote highest level of mobility daily  - Provide frequent reorientation  - Promote wakefulness i.e. lights on, blinds open  - Promote sleep, encourage patient's normal rest cycle i.e. lights off, TV off, minimize noise and interruptions  - Encourage family to assist in orientation and promotion of home routines  2/25/2025 1012 by Dave Gibson RN  Outcome: Progressing  2/25/2025 0956 by Dave Gibson RN  Outcome: Progressing

## 2025-02-25 NOTE — PLAN OF CARE
Patient is alert & oriented x1-2. On room air, CPAP at night. Oral suctioning prn. Vitals stable. Denies pain. Tolerating soft bite sized with nectar thick liquids. 1:1 feed. Voiding with purewick. Max assist. Plan for [facility] pending ins auth.       Problem: Patient Centered Care  Goal: Patient preferences are identified and integrated in the patient's plan of care  Description: Interventions:  - What would you like us to know as we care for you? Lives at home with son Gokul who assists with patient's care.  - Provide timely, complete, and accurate information to patient/family  - Incorporate patient and family knowledge, values, beliefs, and cultural backgrounds into the planning and delivery of care  - Encourage patient/family to participate in care and decision-making at the level they choose  - Honor patient and family perspectives and choices  Outcome: Progressing     Problem: Diabetes/Glucose Control  Goal: Glucose maintained within prescribed range  Description: INTERVENTIONS:  - Monitor Blood Glucose as ordered  - Assess for signs and symptoms of hyperglycemia and hypoglycemia  - Administer ordered medications to maintain glucose within target range  - Assess barriers to adequate nutritional intake and initiate nutrition consult as needed  - Instruct patient on self management of diabetes  Outcome: Progressing     Problem: Patient/Family Goals  Goal: Patient/Family Long Term Goal  Description: Patient's Long Term Goal: \"get my mom to eat again\"    Interventions:  - Monitor intake and output  - IV hydration  - See additional Care Plan goals for specific interventions  Outcome: Progressing  Goal: Patient/Family Short Term Goal  Description: Patient's Short Term Goal: \"more awake\"    Interventions:   -Neuro checks   -Monitor labs and vitals signs  -Follow provider recommendations  - See additional Care Plan goals for specific interventions  Outcome: Progressing     Problem: SAFETY ADULT - FALL  Goal:  Free from fall injury  Description: INTERVENTIONS:  - Assess pt frequently for physical needs  - Identify cognitive and physical deficits and behaviors that affect risk of falls.  - [place] fall precautions as indicated by assessment.  - Educate pt/family on patient safety including physical limitations  - Instruct pt to call for assistance with activity based on assessment  - Modify environment to reduce risk of injury  - Provide assistive devices as appropriate  - Consider OT/PT consult to assist with strengthening/mobility  - Encourage toileting schedule  Outcome: Progressing     Problem: DISCHARGE PLANNING  Goal: Discharge to home or other facility with appropriate resources  Description: INTERVENTIONS:  - Identify barriers to discharge w/pt and caregiver  - Include patient/family/discharge partner in discharge planning  - Arrange for needed discharge resources and transportation as appropriate  - Identify discharge learning needs (meds, wound care, etc)  - Arrange for interpreters to assist at discharge as needed  - Consider post-discharge preferences of patient/family/discharge partner  - Complete POLST form as appropriate  - Assess patient's ability to be responsible for managing their own health  - Refer to Case Management Department for coordinating discharge planning if the patient needs post-hospital services based on physician/LIP order or complex needs related to functional status, cognitive ability or social support system  Outcome: Progressing     Problem: Altered Communication/Language Barrier  Goal: Patient/Family is able to understand and participate in their care  Description: Interventions:  - Assess communication ability and preferred communication style  - Implement communication aides and strategies  - Use visual cues when possible  - Listen attentively, be patient, do not interrupt  - Minimize distractions  - Allow time for understanding and response  - Establish method for patient to ask  for assistance (call light)  - Provide an  as needed  - Communicate barriers and strategies to overcome with those who interact with patient  Outcome: Progressing

## 2025-02-25 NOTE — PROGRESS NOTES
[facility] Hospitalist Progress Note     CC: Hospital Follow up    PCP: Yao Rodriguez       Assessment/Plan:     Ms. Fine is a 70-year-old female with a history of multiple strokes in the past has amyloid angiopathy, HTN, CAD, CEDRICK, type 2 diabetes, vascular dementia, who presents to the hospital for evaluation poor p.o. intake, weakness, and general failure to thrive, extensive work up noted below, IVF given with improvement, patient with likely progressive vascular dementia, and precipitous decline, palliative consulted, DNR, family would like to patient to go to [facility], to try and improve function status, [facility] pending insurance approval    Failure to thrive  Low PO intake, dehydration with PAPO and hypernatremia  Hx of multiple strokes in past   -obtained MRI brain, high risk of recurrent stroke--->negative for acute stroke  -speech eval  -continue electrolyte correction   -neuro consult appreciated, could be progression of vascular dementia, neurodegenerative disease, amyloid angiopathy   -palliative consulted, DNR, CCP to follow at [facility]    Metabolic encephalopathy 2/2 dehydration  Initial hypotension  -STAT CT without acute changes, MRI brain (read pending but per neuro, no new changes)  -BP improved  -LA neg   -CXR negative  -neuro ordered EEG, discussed with Neuro, EEG neg, keppra empirically started  -Much  improved now  -will continue keppra long term per neuro    Acute on chronic Anemia  - hgb down trending   - no bleeding noted  - poss diultional?  - check iron studies -> ferritin normal     Hypernatremia  PAPO  Metabolic acidosis  -giving saline boluses this AM due to hypotension   -severe dehydration on admission  -oral intake much improved    Severe malnutrition  -continue caloric intake documentation   -dietitian on consult  -agree with oral nutritional supplementation per dietitian team   -significant weight loss past one year (~70lbs)  -oral intake much improved      Hypertension   -stopped  hydralazine  -holding arb  -decreased coreg     Coronary artery disease  -PO meds       CEDRICK  -cpap if uses here      Type 2 diabetes   -A1c 5.7  -stop accucheck/insulin  -stop metformin on DC     Hx of seizure?   -not on keppra PTA, resume keppra per neuro       DVT prophylaxis: heparin sub q  Code status: DNR select    Dispo: medically stable for discharge to [facility] when insurance approved    GOC: Palliative care consulted, DNR select     Puneet Thomas MD  [facility] Hospitalist        Subjective:     No new issues,  no complaints, no nausea,     OBJECTIVE:    Blood pressure 149/59, pulse 66, temperature 97.8 °F (36.6 °C), temperature source Axillary, resp. rate 16, weight 131 lb 4.8 oz (59.6 kg), SpO2 97%.    Temp:  [97.8 °F (36.6 °C)-98.5 °F (36.9 °C)] 97.8 °F (36.6 °C)  Pulse:  [62-80] 66  Resp:  [14-16] 16  BP: ()/(40-64) 149/59  SpO2:  [97 %-100 %] 97 %      Intake/Output:  No intake or output data in the 24 hours ending 02/25/25 1241      Last 3 Weights   02/20/25 0503 131 lb 4.8 oz (59.6 kg)   02/19/25 0500 130 lb 14.4 oz (59.4 kg)   02/18/25 0606 132 lb 14.4 oz (60.3 kg)   02/16/25 0500 122 lb 1.6 oz (55.4 kg)   02/13/25 0900 121 lb 6.4 oz (55.1 kg)   01/30/25 2230 150 lb (68 kg)   01/14/25 1842 165 lb (74.8 kg)       /59 (BP Location: Left arm)   Pulse 66   Temp 97.8 °F (36.6 °C) (Axillary)   Resp 16   Wt 131 lb 4.8 oz (59.6 kg)   SpO2 97%   BMI 23.26 kg/m²     General: alert    Lungs: clear to ausculation bilaterally  Heart: Regular rate and rhythm  Abdomen: soft, non tender  Extremities: No edema  Neuro: following some commands    Data Review:       Labs:     Recent Labs   Lab 02/19/25  0805 02/20/25  0707 02/21/25  0850   RBC 2.73* 2.81* 2.74*   HGB 7.5* 7.6* 7.6*   HCT 23.8* 24.6* 23.7*   MCV 87.2 87.5 86.5   MCH 27.5 27.0 27.7   MCHC 31.5 30.9* 32.1   RDW 17.1* 17.6* 18.2*   NEPRELIM 2.05 1.80 2.38   WBC 4.1 3.9* 4.5   .0 246.0 247.0         Recent Labs   Lab 02/19/25  0805  02/20/25  0707 02/21/25  0655   GLU 82 92 82   BUN 22 23 22   CREATSERUM 1.42* 1.56* 1.44*   EGFRCR 39* 35* 39*   CA 8.6* 8.6* 8.6*    145 144   K 3.9 4.1 4.2   * 115* 114*   CO2 21.0 22.0 22.0       No results for input(s): \"ALT\", \"AST\", \"ALB\", \"AMYLASE\", \"LIPASE\", \"LDH\" in the last 168 hours.    Invalid input(s): \"ALPHOS\", \"TBIL\", \"DBIL\", \"TPROT\"        Imaging:  No results found.      Meds:      memantine  10 mg Oral BID    And    donepezil  10 mg Oral Nightly    carvedilol  12.5 mg Oral BID with meals    levETIRAcetam  250 mg Oral BID    aspirin  81 mg Oral Daily    atorvastatin  40 mg Oral Nightly    brimonidine  1 drop Both Eyes BID    heparin  5,000 Units Subcutaneous Q8H [facility]         glucose **OR** glucose **OR** glucose-vitamin C **OR** dextrose **OR** glucose **OR** glucose **OR** glucose-vitamin C    acetaminophen    melatonin    ondansetron    metoclopramide    polyethylene glycol (PEG 3350)    sennosides    bisacodyl

## 2025-02-26 VITALS
SYSTOLIC BLOOD PRESSURE: 127 MMHG | HEART RATE: 72 BPM | RESPIRATION RATE: 20 BRPM | OXYGEN SATURATION: 99 % | TEMPERATURE: 99 F | BODY MASS INDEX: 23 KG/M2 | WEIGHT: 131.31 LBS | DIASTOLIC BLOOD PRESSURE: 56 MMHG

## 2025-02-26 RX ORDER — LOSARTAN POTASSIUM 25 MG/1
25 TABLET ORAL DAILY
Status: DISCONTINUED | OUTPATIENT
Start: 2025-02-26 | End: 2025-02-26

## 2025-02-26 RX ORDER — CARVEDILOL 25 MG/1
25 TABLET ORAL 2 TIMES DAILY WITH MEALS
Status: SHIPPED | COMMUNITY
Start: 2025-02-26

## 2025-02-26 NOTE — DISCHARGE SUMMARY
General Medicine Discharge Summary     Patient ID:  Eliud Fine  70 year old  1/22/1955    Admit date: 2/12/2025    Discharge date and time: 2/26/2025    Attending Physician: Puneet Thomas MD     Consults: IP CONSULT TO NEPHROLOGY  NURSING CONSULT TO DIETITIAN  IP CONSULT TO NEUROLOGY  IP CONSULT PALLIATIVE CARE  IP CONSULT TO SOCIAL WORK  IP CONSULT TO SOCIAL WORK    Primary Care Physician: Yao Rodriguez     Reason for admission: FTT    Risk For Readmission: low    Discharge Diagnoses: Dehydration [E86.0]  Hyperkalemia [E87.5]  Failure to thrive in adult [R62.7]  Troponin level elevated [R79.89]  PAPO (acute kidney injury) [N17.9]  See Additional Discharge Diagnoses in Hospital Course    Discharged Condition: good    Follow-up with labs/images appointments: FU with PCP and neurology    Exam  Gen: No acute distress  Pulm: Lungs clear, normal respiratory effort  CV: Heart with regular rate and rhythm  Abd: Abdomen soft,   EXT: no edema     HPI:   Per Dr. Hernadez:  History of Present Illness:   This is a 70-year-old female with a history of multiple strokes in the past, hypertension, CAD, sleep apnea, type 2 diabetes, who presents to the hospital for evaluation of low p.o. intake, weakness, and general failure to thrive.  Patient unable to give history given her speech.  I was able to call the son.  States that overall decline the last 3 days.  Trouble eating and even taking pills.    Hospital Course:   Ms. Fine is a 70-year-old female with a history of multiple strokes in the past has amyloid angiopathy, HTN, CAD, CEDRICK, type 2 diabetes, vascular dementia, who presents to the hospital for evaluation poor p.o. intake, weakness, and general failure to thrive, extensive work up noted below, IVF given with improvement, patient with likely progressive vascular dementia, and precipitous decline, palliative consulted, DNR, family would like to patient to go to [facility], to try and improve function status, [facility] approved, plan to  transfer to [facility] [place].       Failure to thrive  Low PO intake, dehydration with PAPO and hypernatremia  Hx of multiple strokes in past   -obtained MRI brain, high risk of recurrent stroke--->negative for acute stroke  -speech eval  -continue electrolyte correction   -neuro consult appreciated, could be progression of vascular dementia, neurodegenerative disease, amyloid angiopathy   -palliative consulted, DNR, CCP to follow at [facility]     Metabolic encephalopathy 2/2 dehydration  Initial hypotension  -STAT CT without acute changes, MRI brain (read pending but per neuro, no new changes)  -BP improved  -LA neg   -CXR negative  -neuro ordered EEG, discussed with Neuro, EEG neg, keppra empirically started  -Much  improved now  -will continue keppra long term per neuro     Acute on chronic Anemia  - hgb down trending -> now stable, continue to fu as Outpatient may need GI work up no melena or hematochezia   - no bleeding noted  - FU with PCP  - check iron studies -> ferritin normal     Hypernatremia  PAPO  Metabolic acidosis  -giving saline boluses this AM due to hypotension   -severe dehydration on admission  -oral intake much improved     Severe malnutrition  -continue caloric intake documentation   -dietitian on consult  -agree with oral nutritional supplementation per dietitian team   -significant weight loss past one year (~70lbs)  -oral intake much improved      Hypertension   -stopped hydralazine  -resume arb  -increased coreg  -BP improved     Coronary artery disease  -PO meds       CEDRICK  -cpap if uses here      Type 2 diabetes   -A1c 5.7  -stop accucheck/insulin  -stop metformin on DC     Hx of seizure?   -not on keppra PTA, resume keppra per neuro    Operative Procedures:      Imaging: No results found.      Disposition: SNF    Activity: activity as tolerated  Diet: regular diet  Wound Care: as directed  Code Status: DNAR/Selective Treatment      Home Medication Changes:     Med list     Medication List         START taking these medications      donepezil 10 MG Tabs  Commonly known as: Aricept  Notes to patient: FOR EVENING     levETIRAcetam 250 MG Tabs  Commonly known as: Keppra  Take 1 tablet (250 mg total) by mouth 2 (two) times daily.  Notes to patient: SEIZURE PREVENTION     memantine 10 MG Tabs  Commonly known as: Namenda  Notes to patient: FOR MEMORY            CHANGE how you take these medications      losartan 50 MG Tabs  Commonly known as: Cozaar  What changed: how much to take            CONTINUE taking these medications      aspirin 81 MG Chew  Chew 1 tablet (81 mg total) by mouth daily.     atorvastatin 40 MG Tabs  Commonly known as: Lipitor     brimonidine 0.2 % Soln  Commonly known as: Alphagan     Brimonidine Tartrate-Timolol 0.2-0.5 % Soln  Commonly known as: COMBIGAN     carvedilol 25 MG Tabs  Commonly known as: Coreg     magnesium oxide 400 MG Tabs  Commonly known as: Mag-Ox  Take 1 tablet (400 mg total) by mouth daily.            STOP taking these medications      diclofenac 1 % Gel  Commonly known as: Voltaren     hydrALAZINE 50 MG Tabs  Commonly known as: Apresoline     metFORMIN 500 MG Tabs  Commonly known as: Glucophage     Namzaric 28-10 MG Cp24  Generic drug: Memantine HCl-Donepezil HCl     spironolactone 25 MG Tabs  Commonly known as: Aldactone               Where to Get Your Medications        You can get these medications from any pharmacy    Bring a paper prescription for each of these medications  levETIRAcetam 250 MG Tabs         FU   Follow-up Information       Yao Rodriguez Schedule an appointment as soon as possible for a visit.    Specialty: Physician Assistant  Contact information:  [address] [facility] [address] [phone]               Chris Chandra MD. Schedule an appointment as soon as possible for a visit in 1 week(s).    Specialty: NEUROLOGY  Contact information:  [address] [place], [place] [address]  [facility] [id number] [phone]                             [place]  instructions:      Other Discharge Instructions:         Follow up with Neurology and PCP in 1 week.         I reconciled current and discharge medications on day of discharge, discussed changes with patient and noted changes above.       Total Time Coordinating Care: Greater than 30 minutes    Patient had opportunity to ask questions and state understand and agree with therapeutic plan as outlined    Thank You,    Puneet Thomas MD   Hospitalist with [facility]

## 2025-02-26 NOTE — PAYOR COMM NOTE
--------------  CONTINUED STAY REVIEW    Payor: [employer] [facility]  Subscriber #:  [id number]  Authorization Number: [id number]    Admit date: 2/13/25  Admit time:  8:44 AM    2/24/25           Assessment/Plan:      Ms. Fine is a 70-year-old female with a history of multiple strokes in the past has amyloid angiopathy, HTN, CAD, CEDRICK, type 2 diabetes, vascular dementia, who presents to the hospital for evaluation poor p.o. intake, weakness, and general failure to thrive, extensive work up noted below, IVF given with improvement, patient with likely progressive vascular dementia, and precipitous decline, palliative consulted, DNR, family would like to patient to go to [facility], to try and improve function status, [facility] pending insurance approval     Failure to thrive  Low PO intake, dehydration with PAPO and hypernatremia  Hx of multiple strokes in past   -obtained MRI brain, high risk of recurrent stroke--->negative for acute stroke  -speech eval  -continue electrolyte correction   -neuro consult appreciated, could be progression of vascular dementia, neurodegenerative disease, amyloid angiopathy   -palliative consulted      Metabolic encephalopathy 2/2 dehydration  Initial hypotension  -STAT CT without acute changes, MRI brain (read pending but per neuro, no new changes)  -BP improved  -LA neg   -CXR negative  -neuro ordered EEG, discussed with Neuro, EEG neg, keppra empirically started  -Much  improved now  -will continue keppra long term per neuro     Acute on chronic Anemia  - hgb down trending   - no bleeding noted  - poss diultional?  - check iron studies -> ferritin normal     Hypernatremia  PAPO  Metabolic acidosis  -giving saline boluses this AM due to hypotension   -severe dehydration on admisision     Severe malnutrition  -continue caloric intake documentation   -dietitian on consult  -agree with oral nutritional supplementation per dietitian team   -significant weight loss past one year (~70lbs)  -monitor  potential need for temporary enteral nutrition      Hypertension   -stopped hydralazine  -holding arb  -decreased coreg     Coronary artery disease  -PO meds       CEDRICK  -cpap if uses here      Type 2 diabetes   -A1c 5.7  -stop accucheck/insulin  -stop metformin on DC     Hx of seizure?   -not on keppra PTA, resume keppra per neuro        DVT prophylaxis: heparin sub q     Blood pressure 125/62, pulse 95, temperature 97.5 °F (36.4 °C), temperature source Temporal, resp. rate 16, weight 131 lb 4.8 oz (59.6 kg), SpO2 99%.     Lab 02/19/25  0805 02/20/25  0707 02/21/25  0850   RBC 2.73* 2.81* 2.74*   HGB 7.5* 7.6* 7.6*   HCT 23.8* 24.6* 23.7*   MCV 87.2 87.5 86.5   MCH 27.5 27.0 27.7   MCHC 31.5 30.9* 32.1   RDW 17.1* 17.6* 18.2*   NEPRELIM 2.05 1.80 2.38   WBC 4.1 3.9* 4.5   .0 246.0 247.0                            2/25/25            Assessment/Plan:      Ms. Fine is a 70-year-old female with a history of multiple strokes in the past has amyloid angiopathy, HTN, CAD, CEDRICK, type 2 diabetes, vascular dementia, who presents to the hospital for evaluation poor p.o. intake, weakness, and general failure to thrive, extensive work up noted below, IVF given with improvement, patient with likely progressive vascular dementia, and precipitous decline, palliative consulted, DNR, family would like to patient to go to [facility], to try and improve function status, [facility] pending insurance approval     Failure to thrive  Low PO intake, dehydration with PAPO and hypernatremia  Hx of multiple strokes in past   -obtained MRI brain, high risk of recurrent stroke--->negative for acute stroke  -speech eval  -continue electrolyte correction   -neuro consult appreciated, could be progression of vascular dementia, neurodegenerative disease, amyloid angiopathy   -palliative consulted, DNR, CCP to follow at [facility]     Metabolic encephalopathy 2/2 dehydration  Initial hypotension  -STAT CT without acute changes, MRI brain (read pending but per  neuro, no new changes)  -BP improved  -LA neg   -CXR negative  -neuro ordered EEG, discussed with Neuro, EEG neg, keppra empirically started  -Much  improved now  -will continue keppra long term per neuro     Acute on chronic Anemia  - hgb down trending   - no bleeding noted  - poss diultional?  - check iron studies -> ferritin normal     Hypernatremia  PAPO  Metabolic acidosis  -giving saline boluses this AM due to hypotension   -severe dehydration on admission  -oral intake much improved     Severe malnutrition  -continue caloric intake documentation   -dietitian on consult  -agree with oral nutritional supplementation per dietitian team   -significant weight loss past one year (~70lbs)  -oral intake much improved      Hypertension   -stopped hydralazine  -holding arb  -decreased coreg     Coronary artery disease  -PO meds       CEDRICK  -cpap if uses here      Type 2 diabetes   -A1c 5.7  -stop accucheck/insulin  -stop metformin on DC     Hx of seizure?   -not on keppra PTA, resume keppra per neuro        DVT prophylaxis: heparin sub q  Code status: DNR select     Dispo: medically stable for discharge to [facility] when insurance approved     Blood pressure 149/59, pulse 66, temperature 97.8 °F (36.6 °C), temperature source Axillary, resp. rate 16, weight 131 lb 4.8 oz (59.6 kg), SpO2 97%.     Lab 02/19/25  0805 02/20/25  0707 02/21/25  0850   RBC 2.73* 2.81* 2.74*   HGB 7.5* 7.6* 7.6*   HCT 23.8* 24.6* 23.7*   MCV 87.2 87.5 86.5   MCH 27.5 27.0 27.7   MCHC 31.5 30.9* 32.1   RDW 17.1* 17.6* 18.2*   NEPRELIM 2.05 1.80 2.38   WBC 4.1 3.9* 4.5   .0 246.0 247.0                   MEDICATIONS ADMINISTERED IN LAST 1 DAY:  aspirin DR tab 81 mg       Date Action Dose Route User    2/26/2025 0940 Given 81 mg Oral Anders Lewis, RN          atorvastatin (Lipitor) tab 40 mg       Date Action Dose Route User    2/25/2025 2100 Given 40 mg Oral Leobardo Major, RN          brimonidine (Alphagan) 0.2 % ophthalmic solution 1 drop        Date Action Dose Route User    2/26/2025 0942 Given 1 drop Both Eyes Anders Lewis RN    2/25/2025 2101 Given 1 drop Both Eyes Leobardo Major RN          carvedilol (Coreg) tab 12.5 mg       Date Action Dose Route User    2/25/2025 1801 Given 12.5 mg Oral Dave Gibson RN          carvedilol (Coreg) tab 12.5 mg       Date Action Dose Route User    2/25/2025 1838 Given 12.5 mg Oral Dave Gibson RN          carvedilol (Coreg) tab 25 mg       Date Action Dose Route User    2/26/2025 0940 Given 25 mg Oral Anders Lewis RN          heparin (Porcine) 5000 UNIT/ML injection 5,000 Units       Date Action Dose Route User    2/26/2025 0536 Given 5,000 Units Subcutaneous (Right Upper Arm) Evelyn Swartz RN    2/25/2025 2101 Given 5,000 Units Subcutaneous (Right Upper Arm) Leobardo Major RN          levETIRAcetam (Keppra) tab 250 mg       Date Action Dose Route User    2/26/2025 0940 Given 250 mg Oral Anders Lewis RN    2/25/2025 2101 Given 250 mg Oral Leobardo Major RN          memantine (Namenda) tab 10 mg       Date Action Dose Route User    2/26/2025 0940 Given 10 mg Oral Anders Lewis RN            Vitals (last day)       Date/Time Temp Pulse Resp BP SpO2 Weight O2 Device O2 Flow Rate (L/min) Who    02/26/25 1214 98.6 °F (37 °C) 72 20 127/56 99 % -- -- -- GH    02/26/25 1115 -- 76 18 125/58 99 % -- None (Room air) -- KA    02/26/25 0421 98.7 °F (37.1 °C) 73 16 139/59 96 % -- CPAP -- KJ    02/25/25 2021 98.8 °F (37.1 °C) 69 18 146/61 100 % -- None (Room air) -- KJ    02/25/25 1819 -- -- -- 189/68 98 % -- None (Room air) -- TR    02/25/25 1219 97.8 °F (36.6 °C) 66 16 149/59 97 % -- CPAP -- KA    02/25/25 0457 98.5 °F (36.9 °C) 80 14 158/63 98 % -- CPAP -- KJ

## 2025-02-26 NOTE — PLAN OF CARE
Patient is alert & oriented x1-2. On room air, CPAP at night. Oral suctioning prn. Vitals stable. Denies pain. Tolerating soft bite sized with nectar thick liquids. 1:1 feed. Voiding with purewick. Max assist. Plan for [facility] pending ins auth.           Problem: Patient Centered Care  Goal: Patient preferences are identified and integrated in the patient's plan of care  Description: Interventions:  - What would you like us to know as we care for you? Lives at home with son Gokul who assists with patient's care.  - Provide timely, complete, and accurate information to patient/family  - Incorporate patient and family knowledge, values, beliefs, and cultural backgrounds into the planning and delivery of care  - Encourage patient/family to participate in care and decision-making at the level they choose  - Honor patient and family perspectives and choices  Outcome: Progressing     Problem: Diabetes/Glucose Control  Goal: Glucose maintained within prescribed range  Description: INTERVENTIONS:  - Monitor Blood Glucose as ordered  - Assess for signs and symptoms of hyperglycemia and hypoglycemia  - Administer ordered medications to maintain glucose within target range  - Assess barriers to adequate nutritional intake and initiate nutrition consult as needed  - Instruct patient on self management of diabetes  Outcome: Progressing     Problem: Patient/Family Goals  Goal: Patient/Family Long Term Goal  Description: Patient's Long Term Goal: \"get my mom to eat again\"    Interventions:  - Monitor intake and output  - IV hydration  - See additional Care Plan goals for specific interventions  Outcome: Progressing  Goal: Patient/Family Short Term Goal  Description: Patient's Short Term Goal: \"more awake\"    Interventions:   -Neuro checks   -Monitor labs and vitals signs  -Follow provider recommendations  - See additional Care Plan goals for specific interventions  Outcome: Progressing     Problem: SAFETY ADULT -  FALL  Goal: Free from fall injury  Description: INTERVENTIONS:  - Assess pt frequently for physical needs  - Identify cognitive and physical deficits and behaviors that affect risk of falls.  - Eugene fall precautions as indicated by assessment.  - Educate pt/family on patient safety including physical limitations  - Instruct pt to call for assistance with activity based on assessment  - Modify environment to reduce risk of injury  - Provide assistive devices as appropriate  - Consider OT/PT consult to assist with strengthening/mobility  - Encourage toileting schedule  Outcome: Progressing     Problem: DISCHARGE PLANNING  Goal: Discharge to home or other facility with appropriate resources  Description: INTERVENTIONS:  - Identify barriers to discharge w/pt and caregiver  - Include patient/family/discharge partner in discharge planning  - Arrange for needed discharge resources and transportation as appropriate  - Identify discharge learning needs (meds, wound care, etc)  - Arrange for interpreters to assist at discharge as needed  - Consider post-discharge preferences of patient/family/discharge partner  - Complete POLST form as appropriate  - Assess patient's ability to be responsible for managing their own health  - Refer to Case Management Department for coordinating discharge planning if the patient needs post-hospital services based on physician/LIP order or complex needs related to functional status, cognitive ability or social support system  Outcome: Progressing     Problem: Altered Communication/Language Barrier  Goal: Patient/Family is able to understand and participate in their care  Description: Interventions:  - Assess communication ability and preferred communication style  - Implement communication aides and strategies  - Use visual cues when possible  - Listen attentively, be patient, do not interrupt  - Minimize distractions  - Allow time for understanding and response  - Establish method for  patient to ask for assistance (call light)  - Provide an  as needed  - Communicate barriers and strategies to overcome with those who interact with patient  Outcome: Progressing     Problem: METABOLIC/FLUID AND ELECTROLYTES - ADULT  Goal: Electrolytes maintained within normal limits  Description: INTERVENTIONS:  - Monitor labs and rhythm and assess patient for signs and symptoms of electrolyte imbalances  - Administer electrolyte replacement as ordered  - Monitor response to electrolyte replacements, including rhythm and repeat lab results as appropriate  - Fluid restriction as ordered  - Instruct patient on fluid and nutrition restrictions as appropriate  Outcome: Progressing  Goal: Hemodynamic stability and optimal renal function maintained  Description: INTERVENTIONS:  - Monitor labs and assess for signs and symptoms of volume excess or deficit  - Monitor intake, output and patient weight  - Monitor urine specific gravity, serum osmolarity and serum sodium as indicated or ordered  - Monitor response to interventions for patient's volume status, including labs, urine output, blood pressure (other measures as available)  - Encourage oral intake as appropriate  - Instruct patient on fluid and nutrition restrictions as appropriate  Outcome: Progressing     Problem: SKIN/TISSUE INTEGRITY - ADULT  Goal: Skin integrity remains intact  Description: INTERVENTIONS  - Assess and document risk factors for pressure ulcer development  - Assess and document skin integrity  - Monitor for areas of redness and/or skin breakdown  - Initiate interventions, skin care algorithm/standards of care as needed  Outcome: Progressing  Goal: Incision(s), wounds(s) or drain site(s) healing without S/S of infection  Description: INTERVENTIONS:  - Assess and document risk factors for pressure ulcer development  - Assess and document skin integrity  - Assess and document dressing/incision, wound bed, drain sites and surrounding  tissue  - Implement wound care per orders  - Initiate isolation precautions as appropriate  - Initiate Pressure Ulcer prevention bundle as indicated  Outcome: Progressing     Problem: NEUROLOGICAL - ADULT  Goal: Achieves stable or improved neurological status  Description: INTERVENTIONS  - Assess for and report changes in neurological status  - Initiate measures to prevent increased intracranial pressure  - Maintain blood pressure and fluid volume within ordered parameters to optimize cerebral perfusion and minimize risk of hemorrhage  - Monitor temperature, glucose, and sodium. Initiate appropriate interventions as ordered  Outcome: Progressing  Goal: Achieves maximal functionality and self care  Description: INTERVENTIONS  - Monitor swallowing and airway patency with patient fatigue and changes in neurological status  - Encourage and assist patient to increase activity and self care with guidance from PT/OT  - Encourage visually impaired, hearing impaired and aphasic patients to use assistive/communication devices  Outcome: Progressing

## 2025-02-26 NOTE — PLAN OF CARE
Pt is A&Ox 2, RA during the day Cpap at night, tolerating pureed diet with nectar thick liquids - 1:1 feed, suction at bedside to help with secretions, voiding freely via purewick, denies pain and appears comfortable, ambulating max assist. Call light within reach, I-bed awareness/alarm on while admitted, family at bedside for most of day.  Pt cleared for discharge by MD. Education provided to pt and pt daughter- all questions answered to the best of my ability.   Report called to [facility] [place], spoke with Elin MORROW for report, all questions answered to the best of my ability, call back number provided.      Problem: Patient Centered Care  Goal: Patient preferences are identified and integrated in the patient's plan of care  Description: Interventions:  - What would you like us to know as we care for you? Lives at home with son Gokul who assists with patient's care.  - Provide timely, complete, and accurate information to patient/family  - Incorporate patient and family knowledge, values, beliefs, and cultural backgrounds into the planning and delivery of care  - Encourage patient/family to participate in care and decision-making at the level they choose  - Honor patient and family perspectives and choices  2/26/2025 1743 by Claudia Calrton  Outcome: Adequate for Discharge  2/26/2025 1428 by Claudia Carlton  Outcome: Progressing     Problem: Diabetes/Glucose Control  Goal: Glucose maintained within prescribed range  Description: INTERVENTIONS:  - Monitor Blood Glucose as ordered  - Assess for signs and symptoms of hyperglycemia and hypoglycemia  - Administer ordered medications to maintain glucose within target range  - Assess barriers to adequate nutritional intake and initiate nutrition consult as needed  - Instruct patient on self management of diabetes  2/26/2025 1743 by Claudia Carlton  Outcome: Adequate for Discharge  2/26/2025 1428 by Claudia Cralton  Outcome: Progressing     Problem: Patient/Family  Goals  Goal: Patient/Family Long Term Goal  Description: Patient's Long Term Goal: \"get my mom to eat again\"    Interventions:  - Monitor intake and output  - IV hydration  - See additional Care Plan goals for specific interventions  2/26/2025 1743 by Claudia Carlton  Outcome: Adequate for Discharge  2/26/2025 1428 by Claudia Carlton  Outcome: Progressing  Goal: Patient/Family Short Term Goal  Description: Patient's Short Term Goal: \"more awake\"    Interventions:   -Neuro checks   -Monitor labs and vitals signs  -Follow provider recommendations  - See additional Care Plan goals for specific interventions  2/26/2025 1743 by Claudia Carlton  Outcome: Adequate for Discharge  2/26/2025 1428 by Claudia Carlton  Outcome: Progressing     Problem: SAFETY ADULT - FALL  Goal: Free from fall injury  Description: INTERVENTIONS:  - Assess pt frequently for physical needs  - Identify cognitive and physical deficits and behaviors that affect risk of falls.  - [place] fall precautions as indicated by assessment.  - Educate pt/family on patient safety including physical limitations  - Instruct pt to call for assistance with activity based on assessment  - Modify environment to reduce risk of injury  - Provide assistive devices as appropriate  - Consider OT/PT consult to assist with strengthening/mobility  - Encourage toileting schedule  2/26/2025 1743 by Claudia Carlton  Outcome: Adequate for Discharge  2/26/2025 1428 by Claudia Carlton  Outcome: Progressing     Problem: DISCHARGE PLANNING  Goal: Discharge to home or other facility with appropriate resources  Description: INTERVENTIONS:  - Identify barriers to discharge w/pt and caregiver  - Include patient/family/discharge partner in discharge planning  - Arrange for needed discharge resources and transportation as appropriate  - Identify discharge learning needs (meds, wound care, etc)  - Arrange for interpreters to assist at discharge as needed  - Consider post-discharge  preferences of patient/family/discharge partner  - Complete POLST form as appropriate  - Assess patient's ability to be responsible for managing their own health  - Refer to Case Management Department for coordinating discharge planning if the patient needs post-hospital services based on physician/LIP order or complex needs related to functional status, cognitive ability or social support system  2/26/2025 1743 by Claudia Carlton  Outcome: Adequate for Discharge  2/26/2025 1428 by Claudia Carlton  Outcome: Progressing     Problem: Altered Communication/Language Barrier  Goal: Patient/Family is able to understand and participate in their care  Description: Interventions:  - Assess communication ability and preferred communication style  - Implement communication aides and strategies  - Use visual cues when possible  - Listen attentively, be patient, do not interrupt  - Minimize distractions  - Allow time for understanding and response  - Establish method for patient to ask for assistance (call light)  - Provide an  as needed  - Communicate barriers and strategies to overcome with those who interact with patient  2/26/2025 1743 by Claudia Carlton  Outcome: Adequate for Discharge  2/26/2025 1428 by Claudia Carlton  Outcome: Progressing     Problem: METABOLIC/FLUID AND ELECTROLYTES - ADULT  Goal: Electrolytes maintained within normal limits  Description: INTERVENTIONS:  - Monitor labs and rhythm and assess patient for signs and symptoms of electrolyte imbalances  - Administer electrolyte replacement as ordered  - Monitor response to electrolyte replacements, including rhythm and repeat lab results as appropriate  - Fluid restriction as ordered  - Instruct patient on fluid and nutrition restrictions as appropriate  2/26/2025 1743 by Claudia Carlton  Outcome: Adequate for Discharge  2/26/2025 1428 by Claudia Carlton  Outcome: Progressing  Goal: Hemodynamic stability and optimal renal function  maintained  Description: INTERVENTIONS:  - Monitor labs and assess for signs and symptoms of volume excess or deficit  - Monitor intake, output and patient weight  - Monitor urine specific gravity, serum osmolarity and serum sodium as indicated or ordered  - Monitor response to interventions for patient's volume status, including labs, urine output, blood pressure (other measures as available)  - Encourage oral intake as appropriate  - Instruct patient on fluid and nutrition restrictions as appropriate  2/26/2025 1743 by Claudia Carlton  Outcome: Adequate for Discharge  2/26/2025 1428 by Claudia Carlton  Outcome: Progressing     Problem: SKIN/TISSUE INTEGRITY - ADULT  Goal: Skin integrity remains intact  Description: INTERVENTIONS  - Assess and document risk factors for pressure ulcer development  - Assess and document skin integrity  - Monitor for areas of redness and/or skin breakdown  - Initiate interventions, skin care algorithm/standards of care as needed  2/26/2025 1743 by Claudia Carlton  Outcome: Adequate for Discharge  2/26/2025 1428 by Claudia Carlton  Outcome: Progressing  Goal: Incision(s), wounds(s) or drain site(s) healing without S/S of infection  Description: INTERVENTIONS:  - Assess and document risk factors for pressure ulcer development  - Assess and document skin integrity  - Assess and document dressing/incision, wound bed, drain sites and surrounding tissue  - Implement wound care per orders  - Initiate isolation precautions as appropriate  - Initiate Pressure Ulcer prevention bundle as indicated  2/26/2025 1743 by Claudia Carlton  Outcome: Adequate for Discharge  2/26/2025 1428 by Claudia Carlton  Outcome: Progressing     Problem: NEUROLOGICAL - ADULT  Goal: Achieves stable or improved neurological status  Description: INTERVENTIONS  - Assess for and report changes in neurological status  - Initiate measures to prevent increased intracranial pressure  - Maintain blood pressure and fluid volume  within ordered parameters to optimize cerebral perfusion and minimize risk of hemorrhage  - Monitor temperature, glucose, and sodium. Initiate appropriate interventions as ordered  2/26/2025 1743 by Claudia Carlton  Outcome: Adequate for Discharge  2/26/2025 1428 by Claudia Carlton  Outcome: Progressing  Goal: Achieves maximal functionality and self care  Description: INTERVENTIONS  - Monitor swallowing and airway patency with patient fatigue and changes in neurological status  - Encourage and assist patient to increase activity and self care with guidance from PT/OT  - Encourage visually impaired, hearing impaired and aphasic patients to use assistive/communication devices  2/26/2025 1743 by Claudia Carlton  Outcome: Adequate for Discharge  2/26/2025 1428 by Claudia Carlton  Outcome: Progressing     Problem: Delirium  Goal: Minimize duration of delirium  Description: Interventions:  - Encourage use of hearing aids, eye glasses  - Promote highest level of mobility daily  - Provide frequent reorientation  - Promote wakefulness i.e. lights on, blinds open  - Promote sleep, encourage patient's normal rest cycle i.e. lights off, TV off, minimize noise and interruptions  - Encourage family to assist in orientation and promotion of home routines  2/26/2025 1743 by Claudia Carlton  Outcome: Adequate for Discharge  2/26/2025 1428 by Claudia Carlton  Outcome: Progressing

## 2025-02-26 NOTE — CM/SW NOTE
Ins auth pending at this time    1445  Ins auth approved  RN to notify MD and family    Plan  [facility] 5:30pm  Amb  Pcs  RN report [phone]     / to remain available for support and/or discharge planning.     Radha Valadez, RN    Ext [phone]

## 2025-02-26 NOTE — PROGRESS NOTES
[facility] Hospitalist Progress Note     CC: Hospital Follow up    PCP: Yao Rodriguez       Assessment/Plan:     Ms. Fine is a 70-year-old female with a history of multiple strokes in the past has amyloid angiopathy, HTN, CAD, CEDRICK, type 2 diabetes, vascular dementia, who presents to the hospital for evaluation poor p.o. intake, weakness, and general failure to thrive, extensive work up noted below, IVF given with improvement, patient with likely progressive vascular dementia, and precipitous decline, palliative consulted, DNR, family would like to patient to go to [facility], to try and improve function status, [facility] pending insurance approval    Failure to thrive  Low PO intake, dehydration with PAPO and hypernatremia  Hx of multiple strokes in past   -obtained MRI brain, high risk of recurrent stroke--->negative for acute stroke  -speech eval  -continue electrolyte correction   -neuro consult appreciated, could be progression of vascular dementia, neurodegenerative disease, amyloid angiopathy   -palliative consulted, DNR, CCP to follow at [facility]    Metabolic encephalopathy 2/2 dehydration  Initial hypotension  -STAT CT without acute changes, MRI brain (read pending but per neuro, no new changes)  -BP improved  -LA neg   -CXR negative  -neuro ordered EEG, discussed with Neuro, EEG neg, keppra empirically started  -Much  improved now  -will continue keppra long term per neuro    Acute on chronic Anemia  - hgb down trending   - no bleeding noted  - poss diultional?  - check iron studies -> ferritin normal     Hypernatremia  PAPO  Metabolic acidosis  -giving saline boluses this AM due to hypotension   -severe dehydration on admission  -oral intake much improved    Severe malnutrition  -continue caloric intake documentation   -dietitian on consult  -agree with oral nutritional supplementation per dietitian team   -significant weight loss past one year (~70lbs)  -oral intake much improved      Hypertension   -stopped  hydralazine  -resume arb  -increased coreg  -BP improved     Coronary artery disease  -PO meds       CEDRICK  -cpap if uses here      Type 2 diabetes   -A1c 5.7  -stop accucheck/insulin  -stop metformin on DC     Hx of seizure?   -not on keppra PTA, resume keppra per neuro       DVT prophylaxis: heparin sub q  Code status: DNR select    Dispo: medically stable for discharge to [facility] when insurance approved    GOC: Palliative care consulted, DNR select     Puneet Thomas MD  [facility] Hospitalist        Subjective:     No new issues,  no complaints, no nausea,     OBJECTIVE:    Blood pressure 139/59, pulse 73, temperature 98.7 °F (37.1 °C), temperature source Axillary, resp. rate 16, weight 131 lb 4.8 oz (59.6 kg), SpO2 96%.    Temp:  [97.8 °F (36.6 °C)-98.8 °F (37.1 °C)] 98.7 °F (37.1 °C)  Pulse:  [66-73] 73  Resp:  [16-18] 16  BP: (139-189)/(59-68) 139/59  SpO2:  [96 %-100 %] 96 %      Intake/Output:    Intake/Output Summary (Last 24 hours) at 2/26/2025 1005  Last data filed at 2/26/2025 0421  Gross per 24 hour   Intake --   Output 400 ml   Net -400 ml         Last 3 Weights   02/20/25 0503 131 lb 4.8 oz (59.6 kg)   02/19/25 0500 130 lb 14.4 oz (59.4 kg)   02/18/25 0606 132 lb 14.4 oz (60.3 kg)   02/16/25 0500 122 lb 1.6 oz (55.4 kg)   02/13/25 0900 121 lb 6.4 oz (55.1 kg)   01/30/25 2230 150 lb (68 kg)   01/14/25 1842 165 lb (74.8 kg)       /59 (BP Location: Right arm)   Pulse 73   Temp 98.7 °F (37.1 °C) (Axillary)   Resp 16   Wt 131 lb 4.8 oz (59.6 kg)   SpO2 96%   BMI 23.26 kg/m²     General: alert    Lungs: clear to ausculation bilaterally  Heart: Regular rate and rhythm  Abdomen: soft, non tender  Extremities: No edema  Neuro: following some commands    Data Review:       Labs:     Recent Labs   Lab 02/20/25  0707 02/21/25  0850   RBC 2.81* 2.74*   HGB 7.6* 7.6*   HCT 24.6* 23.7*   MCV 87.5 86.5   MCH 27.0 27.7   MCHC 30.9* 32.1   RDW 17.6* 18.2*   NEPRELIM 1.80 2.38   WBC 3.9* 4.5   .0 247.0          Recent Labs   Lab 02/20/25  0707 02/21/25  0655   GLU 92 82   BUN 23 22   CREATSERUM 1.56* 1.44*   EGFRCR 35* 39*   CA 8.6* 8.6*    144   K 4.1 4.2   * 114*   CO2 22.0 22.0       No results for input(s): \"ALT\", \"AST\", \"ALB\", \"AMYLASE\", \"LIPASE\", \"LDH\" in the last 168 hours.    Invalid input(s): \"ALPHOS\", \"TBIL\", \"DBIL\", \"TPROT\"        Imaging:  No results found.      Meds:      carvedilol  25 mg Oral BID with meals    memantine  10 mg Oral BID    And    donepezil  10 mg Oral Nightly    levETIRAcetam  250 mg Oral BID    aspirin  81 mg Oral Daily    atorvastatin  40 mg Oral Nightly    brimonidine  1 drop Both Eyes BID    heparin  5,000 Units Subcutaneous Q8H [facility]         glucose **OR** glucose **OR** glucose-vitamin C **OR** dextrose **OR** glucose **OR** glucose **OR** glucose-vitamin C    acetaminophen    melatonin    ondansetron    metoclopramide    polyethylene glycol (PEG 3350)    sennosides    bisacodyl

## 2025-02-28 NOTE — PROGRESS NOTES
Provider Clarification    Additional information on the significance of the lab value/diagnostic findings.    Cachexia due to severe malnutrition     This note is part of the patient's medical record.

## 2025-02-28 NOTE — PAYOR COMM NOTE
Payor: [facility]  Subscriber #:  [id number]  Authorization Number: [id number]    Admit date: 2/13/25  Admit time:   8:44 AM  Discharge Date: 2/26/2025  6:01 PM       General Medicine Discharge Summary     Admit date: 2/12/2025    Discharge date and time: 2/26/2025  Risk For Readmission: low    Discharge Diagnoses: Dehydration [E86.0]  Hyperkalemia [E87.5]  Failure to thrive in adult [R62.7]  Troponin level elevated [R79.89]  PAPO (acute kidney injury) [N17.9]  See Additional Discharge Diagnoses in Hospital Course    Discharged Condition: good    Follow-up with labs/images appointments: FU with PCP and neurology    Exam  Gen: No acute distress  Pulm: Lungs clear, normal respiratory effort  CV: Heart with regular rate and rhythm  Abd: Abdomen soft,   EXT: no edema     HPI:   Per Dr. Herndaez:  History of Present Illness:   This is a 70-year-old female with a history of multiple strokes in the past, hypertension, CAD, sleep apnea, type 2 diabetes, who presents to the hospital for evaluation of low p.o. intake, weakness, and general failure to thrive.  Patient unable to give history given her speech.  I was able to call the son.  States that overall decline the last 3 days.  Trouble eating and even taking pills.    Hospital Course:   Ms. Fine is a 70-year-old female with a history of multiple strokes in the past has amyloid angiopathy, HTN, CAD, CEDRICK, type 2 diabetes, vascular dementia, who presents to the hospital for evaluation poor p.o. intake, weakness, and general failure to thrive, extensive work up noted below, IVF given with improvement, patient with likely progressive vascular dementia, and precipitous decline, palliative consulted, DNR, family would like to patient to go to [facility], to try and improve function status, [facility] approved, plan to transfer to [facility].       Failure to thrive  Low PO intake, dehydration with PAPO and hypernatremia  Hx of multiple strokes in past   -obtained MRI  brain, high risk of recurrent stroke--->negative for acute stroke  -speech eval  -continue electrolyte correction   -neuro consult appreciated, could be progression of vascular dementia, neurodegenerative disease, amyloid angiopathy   -palliative consulted, DNR, CCP to follow at [facility]     Metabolic encephalopathy 2/2 dehydration  Initial hypotension  -STAT CT without acute changes, MRI brain (read pending but per neuro, no new changes)  -BP improved  -LA neg   -CXR negative  -neuro ordered EEG, discussed with Neuro, EEG neg, keppra empirically started  -Much  improved now  -will continue keppra long term per neuro     Acute on chronic Anemia  - hgb down trending -> now stable, continue to fu as Outpatient may need GI work up no melena or hematochezia   - no bleeding noted  - FU with PCP  - check iron studies -> ferritin normal     Hypernatremia  PAPO  Metabolic acidosis  -giving saline boluses this AM due to hypotension   -severe dehydration on admission  -oral intake much improved     Severe malnutrition  -continue caloric intake documentation   -dietitian on consult  -agree with oral nutritional supplementation per dietitian team   -significant weight loss past one year (~70lbs)  -oral intake much improved      Hypertension   -stopped hydralazine  -resume arb  -increased coreg  -BP improved     Coronary artery disease  -PO meds       CEDRICK  -cpap if uses here      Type 2 diabetes   -A1c 5.7  -stop accucheck/insulin  -stop metformin on DC     Hx of seizure?   -not on keppra PTA, resume keppra per neuro    Disposition: SNF    Activity: activity as tolerated  Diet: regular diet  Wound Care: as directed

## 2025-03-02 LAB
ACHR BINDING ABS: <0.03 NMOL/L
ACHR BLOCKING ABS: 18 %
ACHR-MODULATING AB: 6 %
MUSK ABS, SERUM: <1 U/ML
STRIATIONAL ABS, SERUM: NEGATIVE

## 2025-03-07 NOTE — PROGRESS NOTES
RN placed call to pt regarding final labresults. Pt verified with identifiers x 2. Pt informed of  results. Verbalized understanding. Denies any questions at this time. Encouraged to follow up with PCP if symptoms persist.      [facility]  part of [facility]    Progress Note        Subjective:     Unresponsive this AM, opens eyes briefly   Hypotensive with SBP in the 60s     Objective:   Vital Signs:  Blood pressure 103/56, pulse 68, temperature 97.7 °F (36.5 °C), temperature source Oral, resp. rate 17, weight 122 lb 1.6 oz (55.4 kg), SpO2 100%.  General: NAD  HEENT: NCAT   Respiratory: Clear to auscultation   Cardiovascular: Normal S1S2   Abdomen: Soft, NT/ND    Ext: No LE edema  NAD: unresponsive, opens eyes    Results:     Recent Labs   Lab 02/15/25  0838 02/16/25  0630 02/17/25  0758   * 87 93   BUN 49* 40* 24*   CREATSERUM 2.13* 1.88* 1.52*   EGFRCR 24* 28* 36*   CA 9.0 8.6* 8.7    142 139   K 3.9 3.9 4.2   * 116* 112   CO2 19.0* 20.0* 18.0*     Recent Labs   Lab 02/12/25  2224 02/14/25  0917   RBC 3.94 3.28*   HGB 10.5* 9.2*   HCT 34.3* 28.4*   MCV 87.1 86.6   MCH 26.6 28.0   MCHC 30.6* 32.4   RDW 17.2* 16.9*   NEPRELIM 9.91* 2.78   WBC 11.5* 4.3   .0 277.0         Assessment and Plan:   70 year old female with PMH of CVA, DM2, HTN, CKD with baseline creatinine around 1.7 presents to the hospital with weakness, decreased PO intake and FTT . Nephrology is consulted for PAPO on CKD and hypernatremia    PAPO on CKD stage 3:  - Baseline Creatinine 1.7  - PAPO from decreased PO intake   - PAPO resolved - creatinine back to baseline   - Avoid Nephrotoxins  - Renally adjust medications  - No acute indication for RRT     Renal artery stenosis:  - repeat renal ultrasound with renal artery duplex - pending    Acidosis:   - may be due to PAPO and IVFs     - monitor, may be need bicarbonate if worsening      Hypernatremia:  - resolved       Hyperkalemia:  - resolved    Hypotension   Unresponsiveness   - ok for fluid boluses from a renal standpoint  - further work up and management per primary      Dw Dr. Hernadez and RN     We will continue to follow     Belén Santiago MD  [facility]  - Nephrology

## 2025-03-13 ENCOUNTER — APPOINTMENT (OUTPATIENT)
Dept: CT IMAGING | Facility: HOSPITAL | Age: 70
End: 2025-03-13
Attending: EMERGENCY MEDICINE
Payer: MEDICARE

## 2025-03-13 ENCOUNTER — HOSPITAL ENCOUNTER (EMERGENCY)
Facility: HOSPITAL | Age: 70
Discharge: HOME | End: 2025-03-14
Attending: EMERGENCY MEDICINE
Payer: MEDICARE

## 2025-03-13 DIAGNOSIS — R40.4 TRANSIENT ALTERATION OF AWARENESS: Primary | ICD-10-CM

## 2025-03-13 LAB
ANION GAP SERPL CALC-SCNC: 7 MMOL/L (ref 0–18)
BASOPHILS # BLD AUTO: 0.02 X10(3) UL (ref 0–0.2)
BASOPHILS NFR BLD AUTO: 0.4 %
BILIRUB UR QL: NEGATIVE
BUN BLD-MCNC: 17 MG/DL (ref 9–23)
BUN/CREAT SERPL: 14.2 (ref 10–20)
CALCIUM BLD-MCNC: 8.8 MG/DL (ref 8.7–10.4)
CHLORIDE SERPL-SCNC: 107 MMOL/L (ref 98–112)
CLARITY UR: CLEAR
CO2 SERPL-SCNC: 30 MMOL/L (ref 21–32)
COLOR UR: YELLOW
CREAT BLD-MCNC: 1.2 MG/DL
DEPRECATED RDW RBC AUTO: 55.1 FL (ref 35.1–46.3)
EGFRCR SERPLBLD CKD-EPI 2021: 48 ML/MIN/1.73M2 (ref 60–?)
EOSINOPHIL # BLD AUTO: 0.09 X10(3) UL (ref 0–0.7)
EOSINOPHIL NFR BLD AUTO: 1.7 %
ERYTHROCYTE [DISTWIDTH] IN BLOOD BY AUTOMATED COUNT: 17.8 % (ref 11–15)
GLUCOSE BLD-MCNC: 97 MG/DL (ref 70–99)
GLUCOSE BLDC GLUCOMTR-MCNC: 99 MG/DL (ref 70–99)
GLUCOSE UR-MCNC: NEGATIVE MG/DL
HCT VFR BLD AUTO: 27.7 %
HGB BLD-MCNC: 8.6 G/DL
HGB UR QL STRIP.AUTO: NEGATIVE
IMM GRANULOCYTES # BLD AUTO: 0.03 X10(3) UL (ref 0–1)
IMM GRANULOCYTES NFR BLD: 0.6 %
KETONES UR-MCNC: NEGATIVE MG/DL
LEUKOCYTE ESTERASE UR QL STRIP.AUTO: NEGATIVE
LYMPHOCYTES # BLD AUTO: 1.66 X10(3) UL (ref 1–4)
LYMPHOCYTES NFR BLD AUTO: 31.9 %
MCH RBC QN AUTO: 26.5 PG (ref 26–34)
MCHC RBC AUTO-ENTMCNC: 31 G/DL (ref 31–37)
MCV RBC AUTO: 85.2 FL
MONOCYTES # BLD AUTO: 0.37 X10(3) UL (ref 0.1–1)
MONOCYTES NFR BLD AUTO: 7.1 %
NEUTROPHILS # BLD AUTO: 3.04 X10 (3) UL (ref 1.5–7.7)
NEUTROPHILS # BLD AUTO: 3.04 X10(3) UL (ref 1.5–7.7)
NEUTROPHILS NFR BLD AUTO: 58.3 %
NITRITE UR QL STRIP.AUTO: NEGATIVE
OSMOLALITY SERPL CALC.SUM OF ELEC: 299 MOSM/KG (ref 275–295)
PH UR: 7.5 [PH] (ref 5–8)
PLATELET # BLD AUTO: 324 10(3)UL (ref 150–450)
POTASSIUM SERPL-SCNC: 3.8 MMOL/L (ref 3.5–5.1)
PROT UR-MCNC: NEGATIVE MG/DL
RBC # BLD AUTO: 3.25 X10(6)UL
SODIUM SERPL-SCNC: 144 MMOL/L (ref 136–145)
SP GR UR STRIP: 1.02 (ref 1–1.03)
UROBILINOGEN UR STRIP-ACNC: 0.2
WBC # BLD AUTO: 5.2 X10(3) UL (ref 4–11)

## 2025-03-13 PROCEDURE — 99285 EMERGENCY DEPT VISIT HI MDM: CPT

## 2025-03-13 PROCEDURE — 80048 BASIC METABOLIC PNL TOTAL CA: CPT | Performed by: EMERGENCY MEDICINE

## 2025-03-13 PROCEDURE — 96361 HYDRATE IV INFUSION ADD-ON: CPT

## 2025-03-13 PROCEDURE — 93005 ELECTROCARDIOGRAM TRACING: CPT

## 2025-03-13 PROCEDURE — 82962 GLUCOSE BLOOD TEST: CPT

## 2025-03-13 PROCEDURE — 96360 HYDRATION IV INFUSION INIT: CPT

## 2025-03-13 PROCEDURE — 93010 ELECTROCARDIOGRAM REPORT: CPT

## 2025-03-13 PROCEDURE — 85025 COMPLETE CBC W/AUTO DIFF WBC: CPT | Performed by: EMERGENCY MEDICINE

## 2025-03-13 PROCEDURE — 99284 EMERGENCY DEPT VISIT MOD MDM: CPT

## 2025-03-13 PROCEDURE — 70450 CT HEAD/BRAIN W/O DYE: CPT | Performed by: EMERGENCY MEDICINE

## 2025-03-13 PROCEDURE — 81003 URINALYSIS AUTO W/O SCOPE: CPT | Performed by: EMERGENCY MEDICINE

## 2025-03-14 VITALS
TEMPERATURE: 97 F | SYSTOLIC BLOOD PRESSURE: 186 MMHG | OXYGEN SATURATION: 99 % | DIASTOLIC BLOOD PRESSURE: 56 MMHG | WEIGHT: 131.38 LBS | RESPIRATION RATE: 16 BRPM | BODY MASS INDEX: 23 KG/M2 | HEART RATE: 59 BPM

## 2025-03-14 LAB
ATRIAL RATE: 73 BPM
P AXIS: 56 DEGREES
P-R INTERVAL: 176 MS
Q-T INTERVAL: 436 MS
QRS DURATION: 80 MS
QTC CALCULATION (BEZET): 480 MS
R AXIS: -19 DEGREES
T AXIS: 83 DEGREES
VENTRICULAR RATE: 73 BPM

## 2025-03-14 NOTE — ED QUICK NOTES
Rounding Completed    Plan of Care reviewed. Waiting for CT results and disposition.  Elimination needs assessed, patient tolerated straight cath well to obtain urine sample.  Patient respirations regular and unlabored.    Son at bedside.    Bed is locked and in lowest position. Call light within reach.

## 2025-03-14 NOTE — ED INITIAL ASSESSMENT (HPI)
Patient presents to the ED via EMS for AMS and lethargy. Last known well was Sunday per pt's son. Hx of dementia

## 2025-03-14 NOTE — ED QUICK NOTES
[facility] contacted for additional information regarding     Per ODALYS Benjamin, son was with patient today doing her nails and spent a few hours there. Per RN, patient is at baseline since having her stroke and being sent to [facility] for rehab. Per RN, patient has limited vocalization and nods mostly in response to conversation. RN stated she was at the RN station when the son approached her stating \"she is not herself and needs to go to the ER now\". RN stated she tried to educate patient that she has been at her current baseline with no changes that she has noted.    Dr. Dougherty updated with information given by RN.

## 2025-03-14 NOTE — ED PROVIDER NOTES
Patient Seen in: [facility] Emergency Department    History     Chief Complaint   Patient presents with    Altered Mental Status       HPI    History is provided by patient/independent historian: EMS   70 year old female with history of CAD, diabetes, hypertension, hyperlipidemia, here with complaints of altered mental status.  Last known normal was  per EMS.  Accu-Chek was within normal limits.  She was hypertensive.  She can normally converse, but is now very lethargic.  Patient arrives essentially nonverbal, moaning, unable to obtain further history.    History reviewed.   Past Medical History:    Asthma (HCC)    Coronary atherosclerosis    Diabetes (HCC)    diabetes for 2 yrs    Essential hypertension    Glaucoma    right    Heart attack (HCC)        High blood pressure    High cholesterol    Hyperlipidemia    Osteoarthritis    Sleep apnea    cpap    Stroke (HCC)    30 yrs ago    Visual impairment    left eye edema         History reviewed.   Past Surgical History:   Procedure Laterality Date    Anesth,vitrectomy Left 2017    Pars plana vitrectomy, internal limiting membrane peel, left eye.    Cath bare metal stent (bms)      Colonoscopy      Hysterectomy      Total abdom hysterectomy      Tubal ligation           Home Medications reviewed :  Prescriptions Prior to Admission[1]      History reviewed.   Social History     Socioeconomic History    Marital status: Single   Tobacco Use    Smoking status: Former     Current packs/day: 0.00     Types: Cigarettes     Start date: 1976     Quit date: 1980     Years since quittin.5    Smokeless tobacco: Never   Vaping Use    Vaping status: Never Used   Substance and Sexual Activity    Alcohol use: No    Drug use: No         ROS  Review of Systems   Unable to perform ROS: Patient nonverbal      All other pertinent organ systems are reviewed and are negative.      Physical Exam     ED Triage Vitals   BP 25 2100 (!) 183/58    Pulse 03/13/25 2055 65   Resp 03/13/25 2055 12   Temp 03/13/25 2055 97.4 °F (36.3 °C)   Temp src 03/13/25 2055 Temporal   SpO2 03/13/25 2055 98 %   O2 Device 03/13/25 2100 None (Room air)     Vital signs reviewed.      Physical Exam  Vitals and nursing note reviewed.   Cardiovascular:      Pulses: Normal pulses.   Pulmonary:      Effort: No respiratory distress.   Abdominal:      General: There is no distension.   Neurological:      Mental Status: She is alert.      Comments: Eyes open to voice, moving all extremities spontaneously but does not follow commands, no obvious facial droop, moaning         ED Course       Labs:     Labs Reviewed   CBC WITH DIFFERENTIAL WITH PLATELET - Abnormal; Notable for the following components:       Result Value    RBC 3.25 (*)     HGB 8.6 (*)     HCT 27.7 (*)     RDW-SD 55.1 (*)     RDW 17.8 (*)     All other components within normal limits   BASIC METABOLIC PANEL (8) - Abnormal; Notable for the following components:    Creatinine 1.20 (*)     Calculated Osmolality 299 (*)     eGFR-Cr 48 (*)     All other components within normal limits   POCT GLUCOSE - Normal   URINALYSIS WITH CULTURE REFLEX   RAINBOW DRAW LAVENDER   RAINBOW DRAW LIGHT GREEN   RAINBOW DRAW BLUE   RAINBOW DRAW GOLD         My EKG Interpretation: EKG    Rate, intervals and axes as noted on EKG Report.  Rate: 73  Rhythm: Sinus Rhythm with PVC  Reading: normal for rate, abnormal for rhythm, no acute ST elevation           As reviewed and Interpreted by me      Imaging Results Available and Reviewed while in ED:   No results found.  CT head without contrast      IMPRESSION:  -No acute change compared to most recent prior CT head dated 2/17/2025.  -No acute intracranial hemorrhage, mass-effect, midline shift, hydrocephalus, or herniation.  -No acute calvarial fracture.  -No noncontrast CT evidence of acute ischemia.  Of note, noncontrast CT is insensitive for acute ischemia and more sensitive evaluation could be  considered with MRI if clinically indicated.  -Mild generalized parenchymal atrophy.  -Hypoattenuation of the periventricular white matter, nonspecific could be seen in the setting of chronic microvascular ischemia.  -Normal aeration of the paranasal sinuses and mastoid air cells.      Case dictated and faxed at 11:16 PM EST.  If you would like to discuss this case directly please call [phone]  ext [phone].  If you can't reach me at this number, do not leave a voicemail.  Please call [phone] ext [phone] and ask for the next available Radiologist.     Hiram Gonzales MD  This report has been electronically signed and verified by the Radiologist whose name is printed above.  My review and independent interpretation of CT images: no ICH. Radiology report corroborates this in addition to other details as reported by them.      Decision rules/scores evaluated: none      Diagnostic labs/tests considered but not ordered: none    ED Medications Administered:   Medications   sodium chloride 0.9 % IV bolus 1,000 mL (1,000 mL Intravenous New Bag 3/13/25 2100)            - on return from CT, pt spit out a bunch of saliva, and is now conversational    MDM       Medical Decision Making      Differential Diagnosis: After obtaining the patient's history, performing the physical exam and reviewing the diagnostics, multiple initial diagnoses were considered based on the presenting problem including UTI, vascular dementia, viral syndrome, anemia, PAPO    External document review: I personally reviewed available external medical records for any recent pertinent discharge summaries, testing, and procedures - the findings are as follows: 2/12/25 admission for dehydration    Complicating Factors: The patient already  has a past medical history of Asthma ([facility]), Coronary atherosclerosis, Diabetes (HCC), Essential hypertension, Glaucoma, Heart attack (HCC), High blood pressure, High cholesterol, Hyperlipidemia, Osteoarthritis, Sleep  apnea, Stroke (HCC), and Visual impairment. to contribute to the complexity of this ED evaluation.    Procedures performed: none    Discussed management with physician/appropriate source:     Considered admission/deescalation of care for: AMS    Social determinants of health affecting patient care: none    Prescription medications considered: discussed continuing current medication regimen    The patient requires continuous monitoring for: AMS    Shared decision making: discussed possible admission          Disposition and Plan     Clinical Impression:  1. Transient alteration of awareness        Disposition:  Discharge    Follow-up:  Yao Rodriguez  [address] [place] [id number]  [phone]    Follow up        Medications Prescribed:  Current Discharge Medication List                         [1] (Not in a hospital admission)

## 2025-05-30 ENCOUNTER — HOSPITAL ENCOUNTER (EMERGENCY)
Facility: HOSPITAL | Age: 70
Discharge: HOME | End: 2025-05-30
Attending: EMERGENCY MEDICINE
Payer: MEDICARE

## 2025-05-30 VITALS
RESPIRATION RATE: 16 BRPM | DIASTOLIC BLOOD PRESSURE: 63 MMHG | HEART RATE: 86 BPM | TEMPERATURE: 98 F | OXYGEN SATURATION: 99 % | SYSTOLIC BLOOD PRESSURE: 129 MMHG

## 2025-05-30 DIAGNOSIS — R41.82 ALTERED MENTAL STATUS, UNSPECIFIED ALTERED MENTAL STATUS TYPE: Primary | ICD-10-CM

## 2025-05-30 LAB
ATRIAL RATE: 98 BPM
BILIRUB UR QL: NEGATIVE
COLOR UR: YELLOW
GLUCOSE BLDC GLUCOMTR-MCNC: 183 MG/DL (ref 70–99)
GLUCOSE UR-MCNC: NORMAL MG/DL
HGB UR QL STRIP.AUTO: NEGATIVE
LEUKOCYTE ESTERASE UR QL STRIP.AUTO: 25
NITRITE UR QL STRIP.AUTO: NEGATIVE
P AXIS: 90 DEGREES
P-R INTERVAL: 186 MS
PH UR: 5.5 [PH] (ref 5–8)
PROT UR-MCNC: 30 MG/DL
Q-T INTERVAL: 368 MS
QRS DURATION: 82 MS
QTC CALCULATION (BEZET): 469 MS
R AXIS: 12 DEGREES
SP GR UR STRIP: 1.02 (ref 1–1.03)
T AXIS: 138 DEGREES
UROBILINOGEN UR STRIP-ACNC: 3
VENTRICULAR RATE: 98 BPM

## 2025-05-30 PROCEDURE — 93010 ELECTROCARDIOGRAM REPORT: CPT

## 2025-05-30 PROCEDURE — 99283 EMERGENCY DEPT VISIT LOW MDM: CPT

## 2025-05-30 PROCEDURE — 81001 URINALYSIS AUTO W/SCOPE: CPT | Performed by: EMERGENCY MEDICINE

## 2025-05-30 PROCEDURE — 87086 URINE CULTURE/COLONY COUNT: CPT | Performed by: EMERGENCY MEDICINE

## 2025-05-30 PROCEDURE — 87077 CULTURE AEROBIC IDENTIFY: CPT | Performed by: EMERGENCY MEDICINE

## 2025-05-30 PROCEDURE — 99284 EMERGENCY DEPT VISIT MOD MDM: CPT

## 2025-05-30 PROCEDURE — 82962 GLUCOSE BLOOD TEST: CPT

## 2025-05-30 PROCEDURE — 93005 ELECTROCARDIOGRAM TRACING: CPT

## 2025-05-30 NOTE — ED QUICK NOTES
Spoke with the pt's son over the phone; updated him on DC and results. He will be waiting for her at home.

## 2025-05-30 NOTE — ED PROVIDER NOTES
Patient Seen in: [facility] Emergency Department    History     Chief Complaint   Patient presents with    Altered Mental Status       HPI    Patient presents to the ED via ambulance after family noted that she was not acting herself this morning.  EMS states that she is usually alert and oriented x 2 and apparently today was her staring at the wall.  Patient has a history of stroke with chronic left-sided deficits.  Patient was seen on 3/13 for similar symptoms.    History reviewed. Past Medical History[1]    History reviewed. Past Surgical History[2]      Medications :  Prescriptions Prior to Admission[3]     Family History[4]    Smoking Status: Social Hx on file[5]    Constitutional and vital signs reviewed.      Social History and Family History elements reviewed from today, pertinent positives to the presenting problem noted.    Physical Exam     ED Triage Vitals [05/30/25 0546]   /59   Pulse 93   Resp 16   Temp 98.1 °F (36.7 °C)   Temp src Oral   SpO2 99 %   O2 Device None (Room air)       All measures to prevent infection transmission during my interaction with the patient were taken. Handwashing was performed prior to and after the exam.  Stethoscope and any equipment used during my examination was cleaned with super sani-cloth germicidal wipes following the exam.     Physical Exam  Vitals and nursing note reviewed.   Constitutional:       General: She is not in acute distress.     Appearance: She is well-developed.   HENT:      Head: Normocephalic and atraumatic.   Eyes:      General:         Right eye: No discharge.         Left eye: No discharge.      Conjunctiva/sclera: Conjunctivae normal.   Neck:      Trachea: No tracheal deviation.   Cardiovascular:      Rate and Rhythm: Normal rate.   Pulmonary:      Effort: Pulmonary effort is normal. No respiratory distress.      Breath sounds: No stridor.   Abdominal:      General: There is no distension.      Palpations: Abdomen is soft.    Musculoskeletal:         General: No deformity.   Skin:     General: Skin is warm and dry.   Neurological:      Mental Status: She is alert.      Motor: Weakness present.      Comments: Able to state her name.  Right facial droop.   Psychiatric:         Mood and Affect: Mood normal.         Behavior: Behavior normal.         ED Course        Labs Reviewed   URINALYSIS WITH CULTURE REFLEX - Abnormal; Notable for the following components:       Result Value    Clarity Urine Turbid (*)     Ketones Urine Trace (*)     Protein Urine 30 (*)     Urobilinogen Urine 3 (*)     Leukocyte Esterase Urine 25 (*)     Bacteria Urine 2+ (*)     Squamous Epi. Cells Few (*)     All other components within normal limits   POCT GLUCOSE - Abnormal; Notable for the following components:    POC Glucose  183 (*)     All other components within normal limits   RAINBOW DRAW LAVENDER   RAINBOW DRAW LIGHT GREEN   RAINBOW DRAW BLUE   RAINBOW DRAW GOLD   URINE CULTURE, ROUTINE     EKG    Rate, intervals and axes as noted on EKG Report.  Rate: Normal, 98 bpm  Rhythm: Sinus Rhythm  Reading: LVH, nonspecific T wave abnormality, abnormal EKG           As Interpreted by me    Imaging Results Available and Reviewed while in ED: No results found.  ED Medications Administered: Medications - No data to display      MDM     Vitals:    05/30/25 0546   BP: 112/59   Pulse: 93   Resp: 16   Temp: 98.1 °F (36.7 °C)   TempSrc: Oral   SpO2: 99%     *I personally reviewed and interpreted all ED vitals.    Pulse Ox: 99%, Room air, Normal     Monitor Interpretation:   normal sinus rhythm as interpreted by me.  The cardiac monitor was ordered to monitor heart rate.    Differential Diagnosis/ Diagnostic Considerations: UTI, AMS, other    Complicating Factors: The patient already has does not have any pertinent problems on file. to contribute to the complexity of this ED evaluation.    Medical Decision Making  Patient presents to the ED with confusion noted this  morning by family.  Patient with minimal verbal response initially on examination in the ED but after laboratory testing returned the patient is responding normally to questioning.  She appears back to her mental baseline at this time.  Urinalysis without infection.  Stable for discharge.    Problems Addressed:  Altered mental status, unspecified altered mental status type: acute illness or injury    Amount and/or Complexity of Data Reviewed  Independent Historian: EMS     Details: EMS provides history details  Labs: ordered. Decision-making details documented in ED Course.        Condition upon leaving the department: Stable    Disposition and Plan     Clinical Impression:  1. Altered mental status, unspecified altered mental status type        Disposition:  Discharge    Follow-up:  Yao Rodriguez  [address] [place] [address]  [phone]    Schedule an appointment as soon as possible for a visit in 3 day(s)        Medications Prescribed:  Current Discharge Medication List                           [1]   Past Medical History:   Asthma (HCC)    Coronary atherosclerosis    Diabetes (HCC)    diabetes for 2 yrs    Essential hypertension    Glaucoma    right    Heart attack (HCC)    2005    High blood pressure    High cholesterol    Hyperlipidemia    Osteoarthritis    Sleep apnea    cpap    Stroke (HCC)    30 yrs ago    Visual impairment    left eye edema   [2]   Past Surgical History:  Procedure Laterality Date    Anesth,vitrectomy Left 09/18/2017    Pars plana vitrectomy, internal limiting membrane peel, left eye.    Cath bare metal stent (bms)      Colonoscopy      Hysterectomy      Total abdom hysterectomy      Tubal ligation     [3] (Not in a hospital admission)   [4]   Family History  Problem Relation Age of Onset    Cancer Father     Diabetes Mother    [5]   Social History  Socioeconomic History    Marital status: Single   Tobacco Use    Smoking status: Former     Current packs/day: 0.00     Types:  Cigarettes     Start date: 1976     Quit date: 1980     Years since quittin.7    Smokeless tobacco: Never   Vaping Use    Vaping status: Never Used   Substance and Sexual Activity    Alcohol use: No    Drug use: No

## 2025-05-30 NOTE — ED INITIAL ASSESSMENT (HPI)
Per family/EMS, a/ox2 at baseline. Patient woke up staring at wall and wasn't able to answer questions as usual. Hx of stroke.

## 2025-05-30 NOTE — ED QUICK NOTES
RN attempted to call patient family. Patient mobile/son phone number in chart is incorrect. No answer at \"home\" phone in chart.

## 2025-06-09 ENCOUNTER — HOSPITAL ENCOUNTER (EMERGENCY)
Facility: HOSPITAL | Age: 70
Discharge: HOME | End: 2025-06-10
Attending: EMERGENCY MEDICINE
Payer: MEDICARE

## 2025-06-09 DIAGNOSIS — N30.00 ACUTE CYSTITIS WITHOUT HEMATURIA: Primary | ICD-10-CM

## 2025-06-09 LAB
BILIRUB UR QL: NEGATIVE
COLOR UR: YELLOW
GLUCOSE UR-MCNC: NORMAL MG/DL
HGB UR QL STRIP.AUTO: NEGATIVE
KETONES UR-MCNC: 10 MG/DL
LEUKOCYTE ESTERASE UR QL STRIP.AUTO: 75
NITRITE UR QL STRIP.AUTO: NEGATIVE
PH UR: 6 [PH] (ref 5–8)
PROT UR-MCNC: 20 MG/DL
SP GR UR STRIP: 1.02 (ref 1–1.03)
UROBILINOGEN UR STRIP-ACNC: 6

## 2025-06-09 PROCEDURE — 99284 EMERGENCY DEPT VISIT MOD MDM: CPT

## 2025-06-09 PROCEDURE — 87186 SC STD MICRODIL/AGAR DIL: CPT | Performed by: EMERGENCY MEDICINE

## 2025-06-09 PROCEDURE — 87086 URINE CULTURE/COLONY COUNT: CPT | Performed by: EMERGENCY MEDICINE

## 2025-06-09 PROCEDURE — 96360 HYDRATION IV INFUSION INIT: CPT

## 2025-06-09 PROCEDURE — 87077 CULTURE AEROBIC IDENTIFY: CPT | Performed by: EMERGENCY MEDICINE

## 2025-06-09 PROCEDURE — 81001 URINALYSIS AUTO W/SCOPE: CPT | Performed by: EMERGENCY MEDICINE

## 2025-06-09 RX ORDER — NITROFURANTOIN 25; 75 MG/1; MG/1
100 CAPSULE ORAL 2 TIMES DAILY
Qty: 14 CAPSULE | Refills: 0 | Status: SHIPPED | OUTPATIENT
Start: 2025-06-09 | End: 2025-06-16

## 2025-06-09 RX ORDER — NITROFURANTOIN 25; 75 MG/1; MG/1
100 CAPSULE ORAL 2 TIMES DAILY
Qty: 14 CAPSULE | Refills: 0 | Status: SHIPPED | OUTPATIENT
Start: 2025-06-09 | End: 2025-06-09

## 2025-06-09 RX ORDER — NITROFURANTOIN 25; 75 MG/1; MG/1
100 CAPSULE ORAL ONCE
Status: COMPLETED | OUTPATIENT
Start: 2025-06-09 | End: 2025-06-09

## 2025-06-10 VITALS
TEMPERATURE: 98 F | SYSTOLIC BLOOD PRESSURE: 141 MMHG | OXYGEN SATURATION: 99 % | HEART RATE: 70 BPM | DIASTOLIC BLOOD PRESSURE: 68 MMHG | RESPIRATION RATE: 20 BRPM

## 2025-06-10 NOTE — ED QUICK NOTES
Discharge instructions went over with son at bedside. All questions answered and no concerns at time of discharge.  Vitals stable and denies SOB/CP/Dizziness and no pain. Patient transported home by superior.

## 2025-06-10 NOTE — ED PROVIDER NOTES
Patient Seen in: [facility] Emergency Department        History  Chief Complaint   Patient presents with    Urinary Symptoms     Stated Complaint: UTI    Subjective:   HPI            70-year-old female mostly nonverbal who comes from home where son takes care of her for concern for possible UTI.  Per EMS the son stated that he went to change her today and noticed a foul smell.  She does have a small sacral decubitus ulcer as well.  No fever.  Patient is reportedly at baseline otherwise.      Objective:     Past Medical History:    Asthma (HCC)    Coronary atherosclerosis    Diabetes (HCC)    diabetes for 2 yrs    Essential hypertension    Glaucoma    right    Heart attack (HCC)        High blood pressure    High cholesterol    Hyperlipidemia    Osteoarthritis    Sleep apnea    cpap    Stroke (HCC)    30 yrs ago    Visual impairment    left eye edema              Past Surgical History:   Procedure Laterality Date    Anesth,vitrectomy Left 2017    Pars plana vitrectomy, internal limiting membrane peel, left eye.    Cath bare metal stent (bms)      Colonoscopy      Hysterectomy      Total abdom hysterectomy      Tubal ligation                  Social History     Socioeconomic History    Marital status: Single   Tobacco Use    Smoking status: Former     Current packs/day: 0.00     Types: Cigarettes     Start date: 1976     Quit date: 1980     Years since quittin.7    Smokeless tobacco: Never   Vaping Use    Vaping status: Never Used   Substance and Sexual Activity    Alcohol use: No    Drug use: No     Social Drivers of Health     Food Insecurity: No Food Insecurity (2025)    NCSS - Food Insecurity     Worried About Running Out of Food in the Last Year: No     Ran Out of Food in the Last Year: No   Transportation Needs: No Transportation Needs (2025)    NCSS - Transportation     Lack of Transportation: No   Housing Stability: Not At Risk (2025)    NCSS - Housing/Utilities      Has Housing: Yes     Worried About Losing Housing: No     Unable to Get Utilities: No                                Physical Exam    ED Triage Vitals [06/09/25 2058]   /75   Pulse 82   Resp 18   Temp 98.2 °F (36.8 °C)   Temp src Temporal   SpO2 98 %   O2 Device None (Room air)       Current Vitals:   Vital Signs  BP: 136/66  Pulse: 81  Resp: 13  Temp: 98.2 °F (36.8 °C)  Temp src: Temporal  MAP (mmHg): 87    Oxygen Therapy  SpO2: 98 %  O2 Device: None (Room air)            Physical Exam  Constitutional: Awake alert and calm.  Does answer yes or no to simple questions.  Head: Normocephalic and atraumatic.   Eyes: Conjunctivae are normal. Pupils are equal, round, and reactive to light.   Neck: Normal range of motion. Neck supple.   Cardiovascular: Normal rate, regular rhythm and intact distal pulses.    Pulmonary/Chest: Effort normal. No respiratory distress.   Abdominal: Soft. There is no tenderness. There is no guarding.   : Chaperoned by nurse Bisi.  External exam is grossly unremarkable.  There is a stage II 2 x 3 cm overall well-appearing sacral decubitus wound without gross signs of infection around it.  Musculoskeletal: Normal range of motion. No edema or tenderness.   Neurological:  no obvious focality  Skin: Skin is warm and dry.   Psychiatric: Normal mood and affect.  Behavior is normal.   Nursing note and vitals reviewed.    Differential diagnosis includes UTI, sacral decubitus wound.          ED Course  Labs Reviewed   URINALYSIS WITH CULTURE REFLEX - Abnormal; Notable for the following components:       Result Value    Clarity Urine Turbid (*)     Ketones Urine 10 (*)     Protein Urine 20 (*)     Urobilinogen Urine 6 (*)     Leukocyte Esterase Urine 75 (*)     WBC Urine 6-10 (*)     Bacteria Urine 1+ (*)     Squamous Epi. Cells Many (*)     Transitional Cells Few (*)     All other components within normal limits   RAINBOW DRAW LAVENDER   RAINBOW DRAW LIGHT GREEN   RAINBOW DRAW BLUE   URINE  CULTURE, ROUTINE                            [facility]             Medical Decision Making  Patient is clinically stable.  Will treat given positive urine culture about 10 days ago.  Macrobid.  Continue other prescribed antibiotics.  Tylenol.  No indication for admission.  Patient is hemodynamically stable.    Problems Addressed:  Acute cystitis without hematuria: acute illness or injury with systemic symptoms    Amount and/or Complexity of Data Reviewed  External Data Reviewed: labs.     Details:  you urine culture from 5/31/2025 showed greater than 100,000 Aerococcus in the urine  Labs: ordered. Decision-making details documented in ED Course.    Risk  OTC drugs.  Prescription drug management.  Decision regarding hospitalization.  Diagnosis or treatment significantly limited by social determinants of health.        Disposition and Plan     Clinical Impression:  1. Acute cystitis without hematuria         Disposition:  Discharge  6/9/2025 10:43 pm    Follow-up:  Yao Rodriguez  [address] [place] [id number]  [phone]    Call      We recommend that you schedule follow up care with a primary care provider within the next three months to obtain basic health screening including reassessment of your blood pressure.      Medications Prescribed:  Current Discharge Medication List        START taking these medications    Details   nitrofurantoin monohydrate macro 100 MG Oral Cap Take 1 capsule (100 mg total) by mouth 2 (two) times daily for 7 days.  Qty: 14 capsule, Refills: 0                   Supplementary Documentation:

## 2025-06-10 NOTE — ED INITIAL ASSESSMENT (HPI)
Patient to ED via EMS. Per EMS son was changing patient when he noticed a strong smell coming from her perineal area.  Son wants to rule out UTI.  Per medics patient also has a bedsore on her bottom (unknown stage).    Nonverbal per baseline    Hx of stroke

## 2025-06-10 NOTE — ED QUICK NOTES
Patient on cardiac,bp,spo2 monitoring. Son at bedside.  Plan of Care reviewed. Waiting for lab results.  Straight cath performed w/ Lizzie RN.    Bed is locked and in lowest position. Call light within reach.

## 2025-07-13 ENCOUNTER — HOSPITAL ENCOUNTER (INPATIENT)
Facility: HOSPITAL | Age: 70
LOS: 9 days | Discharge: HOME | End: 2025-07-22
Attending: EMERGENCY MEDICINE | Admitting: INTERNAL MEDICINE

## 2025-07-13 DIAGNOSIS — N30.00 ACUTE CYSTITIS WITHOUT HEMATURIA: Primary | ICD-10-CM

## 2025-07-13 DIAGNOSIS — D64.9 SEVERE ANEMIA: ICD-10-CM

## 2025-07-13 LAB
ALBUMIN SERPL-MCNC: 2.8 G/DL (ref 3.2–4.8)
ALBUMIN SERPL-MCNC: 2.9 G/DL (ref 3.2–4.8)
ALBUMIN/GLOB SERPL: 0.9 (ref 1–2)
ALBUMIN/GLOB SERPL: 1 (ref 1–2)
ALP LIVER SERPL-CCNC: 56 U/L (ref 55–142)
ALP LIVER SERPL-CCNC: 61 U/L (ref 55–142)
ALT SERPL-CCNC: 16 U/L (ref 10–49)
ALT SERPL-CCNC: 19 U/L (ref 10–49)
ANION GAP SERPL CALC-SCNC: 7 MMOL/L (ref 0–18)
ANION GAP SERPL CALC-SCNC: 8 MMOL/L (ref 0–18)
ANTIBODY SCREEN: NEGATIVE
AST SERPL-CCNC: 19 U/L (ref ?–34)
AST SERPL-CCNC: 20 U/L (ref ?–34)
BASOPHILS # BLD AUTO: 0.01 X10(3) UL (ref 0–0.2)
BASOPHILS NFR BLD AUTO: 0.2 %
BILIRUB SERPL-MCNC: 0.4 MG/DL (ref 0.2–1.1)
BILIRUB SERPL-MCNC: 0.4 MG/DL (ref 0.2–1.1)
BILIRUB UR QL: NEGATIVE
BUN BLD-MCNC: 18 MG/DL (ref 9–23)
BUN BLD-MCNC: 19 MG/DL (ref 9–23)
BUN/CREAT SERPL: 20.7 (ref 10–20)
BUN/CREAT SERPL: 21.4 (ref 10–20)
CALCIUM BLD-MCNC: 7.9 MG/DL (ref 8.7–10.4)
CALCIUM BLD-MCNC: 8.8 MG/DL (ref 8.7–10.4)
CHLORIDE SERPL-SCNC: 105 MMOL/L (ref 98–112)
CHLORIDE SERPL-SCNC: 107 MMOL/L (ref 98–112)
CO2 SERPL-SCNC: 29 MMOL/L (ref 21–32)
CO2 SERPL-SCNC: 30 MMOL/L (ref 21–32)
COLOR UR: YELLOW
CREAT BLD-MCNC: 0.84 MG/DL (ref 0.55–1.02)
CREAT BLD-MCNC: 0.92 MG/DL (ref 0.55–1.02)
DEPRECATED RDW RBC AUTO: 48.6 FL (ref 35.1–46.3)
EGFRCR SERPLBLD CKD-EPI 2021: 66 ML/MIN/1.73M2 (ref 60–?)
EGFRCR SERPLBLD CKD-EPI 2021: 74 ML/MIN/1.73M2 (ref 60–?)
EOSINOPHIL # BLD AUTO: 0.06 X10(3) UL (ref 0–0.7)
EOSINOPHIL NFR BLD AUTO: 1.2 %
ERYTHROCYTE [DISTWIDTH] IN BLOOD BY AUTOMATED COUNT: 18.4 % (ref 11–15)
EST. AVERAGE GLUCOSE BLD GHB EST-MCNC: 114 MG/DL (ref 68–126)
GLOBULIN PLAS-MCNC: 2.9 G/DL (ref 2–3.5)
GLOBULIN PLAS-MCNC: 3.1 G/DL (ref 2–3.5)
GLUCOSE BLD-MCNC: 117 MG/DL (ref 70–99)
GLUCOSE BLD-MCNC: 124 MG/DL (ref 70–99)
GLUCOSE BLDC GLUCOMTR-MCNC: 125 MG/DL (ref 70–99)
GLUCOSE BLDC GLUCOMTR-MCNC: 132 MG/DL (ref 70–99)
GLUCOSE BLDC GLUCOMTR-MCNC: 144 MG/DL (ref 70–99)
GLUCOSE BLDC GLUCOMTR-MCNC: 182 MG/DL (ref 70–99)
GLUCOSE BLDC GLUCOMTR-MCNC: 184 MG/DL (ref 70–99)
GLUCOSE UR-MCNC: NORMAL MG/DL
HBA1C MFR BLD: 5.6 % (ref ?–5.7)
HCT VFR BLD AUTO: 21.8 % (ref 35–48)
HCT VFR BLD AUTO: 27.6 % (ref 35–48)
HGB BLD-MCNC: 6.6 G/DL (ref 12–16)
HGB BLD-MCNC: 8.8 G/DL (ref 12–16)
HGB UR QL STRIP.AUTO: NEGATIVE
IMM GRANULOCYTES # BLD AUTO: 0.02 X10(3) UL (ref 0–1)
IMM GRANULOCYTES NFR BLD: 0.4 %
LEUKOCYTE ESTERASE UR QL STRIP.AUTO: 75
LYMPHOCYTES # BLD AUTO: 1.44 X10(3) UL (ref 1–4)
LYMPHOCYTES NFR BLD AUTO: 27.8 %
MCH RBC QN AUTO: 22.1 PG (ref 26–34)
MCHC RBC AUTO-ENTMCNC: 30.3 G/DL (ref 31–37)
MCV RBC AUTO: 73.2 FL (ref 80–100)
MONOCYTES # BLD AUTO: 0.34 X10(3) UL (ref 0.1–1)
MONOCYTES NFR BLD AUTO: 6.6 %
NEUTROPHILS # BLD AUTO: 3.31 X10 (3) UL (ref 1.5–7.7)
NEUTROPHILS # BLD AUTO: 3.31 X10(3) UL (ref 1.5–7.7)
NEUTROPHILS NFR BLD AUTO: 63.8 %
NITRITE UR QL STRIP.AUTO: NEGATIVE
OSMOLALITY SERPL CALC.SUM OF ELEC: 298 MOSM/KG (ref 275–295)
OSMOLALITY SERPL CALC.SUM OF ELEC: 301 MOSM/KG (ref 275–295)
PH UR: 6 (ref 5–8)
PLATELET # BLD AUTO: 354 10(3)UL (ref 150–450)
POTASSIUM SERPL-SCNC: 3.4 MMOL/L (ref 3.5–5.1)
POTASSIUM SERPL-SCNC: 3.5 MMOL/L (ref 3.5–5.1)
PROT SERPL-MCNC: 5.7 G/DL (ref 5.7–8.2)
PROT SERPL-MCNC: 6 G/DL (ref 5.7–8.2)
PROT UR-MCNC: 30 MG/DL
RBC # BLD AUTO: 2.98 X10(6)UL (ref 3.8–5.3)
RH BLOOD TYPE: POSITIVE
SODIUM SERPL-SCNC: 142 MMOL/L (ref 136–145)
SODIUM SERPL-SCNC: 144 MMOL/L (ref 136–145)
SP GR UR STRIP: 1.02 (ref 1–1.03)
UROBILINOGEN UR STRIP-ACNC: 6
WBC # BLD AUTO: 5.2 X10(3) UL (ref 4–11)

## 2025-07-13 PROCEDURE — 30233N1 TRANSFUSION OF NONAUTOLOGOUS RED BLOOD CELLS INTO PERIPHERAL VEIN, PERCUTANEOUS APPROACH: ICD-10-PCS | Performed by: EMERGENCY MEDICINE

## 2025-07-13 RX ORDER — ACETAMINOPHEN 650 MG/1
650 SUPPOSITORY RECTAL ONCE
Status: COMPLETED | OUTPATIENT
Start: 2025-07-13 | End: 2025-07-13

## 2025-07-13 RX ORDER — DEXTROSE MONOHYDRATE 25 G/50ML
50 INJECTION, SOLUTION INTRAVENOUS
Status: DISCONTINUED | OUTPATIENT
Start: 2025-07-13 | End: 2025-07-22

## 2025-07-13 RX ORDER — NICOTINE POLACRILEX 4 MG
30 LOZENGE BUCCAL
Status: DISCONTINUED | OUTPATIENT
Start: 2025-07-13 | End: 2025-07-22

## 2025-07-13 RX ORDER — DEXTROSE MONOHYDRATE AND SODIUM CHLORIDE 5; .45 G/100ML; G/100ML
INJECTION, SOLUTION INTRAVENOUS CONTINUOUS
Status: DISCONTINUED | OUTPATIENT
Start: 2025-07-13 | End: 2025-07-16

## 2025-07-13 RX ORDER — HYDRALAZINE HYDROCHLORIDE 20 MG/ML
10 INJECTION INTRAMUSCULAR; INTRAVENOUS EVERY 6 HOURS PRN
Status: DISCONTINUED | OUTPATIENT
Start: 2025-07-13 | End: 2025-07-22

## 2025-07-13 RX ORDER — ASPIRIN 81 MG/1
81 TABLET, CHEWABLE ORAL DAILY
Status: DISCONTINUED | OUTPATIENT
Start: 2025-07-13 | End: 2025-07-17

## 2025-07-13 RX ORDER — ACETAMINOPHEN 325 MG/1
650 TABLET ORAL EVERY 6 HOURS PRN
Status: DISCONTINUED | OUTPATIENT
Start: 2025-07-13 | End: 2025-07-22

## 2025-07-13 RX ORDER — LOSARTAN POTASSIUM 25 MG/1
25 TABLET ORAL DAILY
Status: DISCONTINUED | OUTPATIENT
Start: 2025-07-13 | End: 2025-07-22

## 2025-07-13 RX ORDER — CARVEDILOL 25 MG/1
25 TABLET ORAL EVERY MORNING
Status: DISCONTINUED | OUTPATIENT
Start: 2025-07-13 | End: 2025-07-19

## 2025-07-13 RX ORDER — ATORVASTATIN CALCIUM 40 MG/1
40 TABLET, FILM COATED ORAL NIGHTLY
Status: DISCONTINUED | OUTPATIENT
Start: 2025-07-13 | End: 2025-07-22

## 2025-07-13 RX ORDER — NICOTINE POLACRILEX 4 MG
15 LOZENGE BUCCAL
Status: DISCONTINUED | OUTPATIENT
Start: 2025-07-13 | End: 2025-07-22

## 2025-07-13 NOTE — SLP NOTE
ADULT SWALLOWING EVALUATION    ASSESSMENT    ASSESSMENT/OVERALL IMPRESSION:  PPE REQUIRED. THIS THERAPIST WORE GLOVES, DROPLET MASK, AND GOGGLES FOR DURATION OF EVALUATION. HANDS WASHED UPON ENTRANCE/EXIT.    SLP BSSE orders received and acknowledged. A swallow evaluation warranted as pt at risk for aspiration and has been pocketing food. Pt afebrile, no verbalizations throughout evaluation, on room air, with oxygen saturation at 99. Pt with prior hx of dysphagia at [facility] in which last recommended diet was bite sized/mildly thick liquids per tx in 2/2025. Pt positioned upright in bed, leaning to right despite repositioning. Pt with no verbal or non-verbal complaints of pain, RN aware. Pt with adequate oral acceptance and bilabial seal across all trials. Pt with increased MICKI. Pt's swallow response appears delayed with reduced hyolaryngeal elevation/excursion. No clinical signs of aspiration (e.g., immediate/delayed throat clear, immediate/delayed cough, wet vocal quality, increased O2 effort) observed across all trials of puree, moderately thick liquids, and mildly thick liquids. Pt requires moderate cues to re-swallow with liquid rinse and clear R side of oral cavity. Advanced trials not administered due to fluctuating KAMI. Oxygen status remained >98 t/o the entire evaluation.     At this time, pt presents with mild-moderate oral dysphagia and probable pharyngeal dysfunction. Recommend a puree diet and mildly thick liquids with strict adherence to safe swallowing compensatory strategies. Results and recommendations reviewed with RN and pt. Pt's RN v/u to all results/recommendations. Recommendations remain written on whiteboard. SLP collaborated with RN for MD diet orders.      PLAN: SLP to f/u x3-4 meal assessments, monitor imaging, and VFSS if clinically warranted.     RECOMMENDATIONS   Diet Recommendations - Solids: Puree  Diet Recommendations - Liquids: Nectar thick liquids/ Mildly thick    Compensatory Strategies  Recommended: Alternate consistencies, Multiple swallows  Aspiration Precautions: Upright position, Slow rate, Small bites, Small sips, No straw, 1:1 feeding  Medication Administration Recommendations: Crushed in puree  Treatment Plan/Recommendations: Aspiration precautions    HISTORY   MEDICAL HISTORY  Reason for Referral: R/O aspiration (pocketing)    Problem List  Principal Problem:    Acute cystitis without hematuria  Active Problems:    Severe anemia      Past Medical History  Past Medical History[1]    Prior Living Situation: Home with support  Diet Prior to Admission: Unknown  Precautions: Aspiration    Patient/Family Goals: None stated    SWALLOWING HISTORY  Current Diet Consistency: NPO  Dysphagia History:     BSE 1/19/22: Bite sized/thin  BSE 7/9/24: regular/thin  BSE 9/23/24: regular/thin  BSE 11/5/25: regular/thin  BSE 2/13/25: NPO - upgraded to bite sized/mildly thick      OBJECTIVE   ORAL MOTOR EXAMINATION  Dentition: Functional  Symmetry: Unable to assess  Strength: Overall reduced     Range of Motion: Unable to assess  Rate of Motion: Unable to assess    Voice Quality:  (non-verbal)  Respiratory Status: Unlabored  Consistencies Trialed: Nectar thick liquids, Honey thick liquids, Puree  Method of Presentation: Staff/Clinician assistance, Spoon, Cup, Single sips  Patient Positioned: Upright, Midline    Oral Phase of Swallow: Impaired  Bolus Retrieval: Intact  Bilabial Seal: Intact  Bolus Formation: Impaired  Bolus Propulsion: Impaired     Retention: Impaired    Pharyngeal Phase of Swallow: Appears Impaired  Laryngeal Elevation Timing: Appears impaired  Laryngeal Elevation Strength: Appears impaired  Laryngeal Elevation Coordination: Appears impaired  (Please note: Silent aspiration cannot be evaluated clinically. Videofluoroscopic Swallow Study is required to rule-out silent aspiration.)    Esophageal Phase of Swallow: No complaints consistent with possible esophageal involvement        GOALS  Goal #1  The patient will tolerate puree consistency and mildly thick liquids without overt signs or symptoms of aspiration with 100 % accuracy over 2 session(s).  In Progress   Goal #2 The patient/family/caregiver will demonstrate understanding and implementation of aspiration precautions and swallow strategies independently over 3-4 session(s).    In Progress   Goal #3 The patient will tolerate trial upgrade of minced and moist/bite sized consistency and mildly thick liquids without overt signs or symptoms of aspiration with 100 % accuracy over 3-4 session(s).  In Progress   Goal #4 The patient will utilize compensatory strategies as outlined by  BSSE (clinical evaluation) including Slow rate, Small bites, Small sips, Alternate liquids/solids, No straws, Upright 90 degrees, Feed patient with 1:1 feed assistance 100 % of the time across 2 sessions.    In Progress       FOLLOW UP  Treatment Plan/Recommendations: Aspiration precautions  Duration: 1 week  Follow Up Needed (Documentation Required): Yes  SLP Follow-up Date: 07/14/25    Thank you for your referral.   If you have any questions, please contact ALICIA Denney M.S. CCC-SLP  Speech Language Pathologist  Phone Number Ext. [phone]         [1]   Past Medical History:   Asthma (HCC)    Coronary atherosclerosis    Diabetes (HCC)    diabetes for 2 yrs    Essential hypertension    Glaucoma    right    Heart attack (HCC)    2005    High blood pressure    High cholesterol    Hyperlipidemia    Osteoarthritis    Sleep apnea    cpap    Stroke (HCC)    30 yrs ago    Visual impairment    left eye edema

## 2025-07-13 NOTE — PROGRESS NOTES
Patient admitted from the ED with hgb of 6.6, no active bleeding observed. SBP elevated  but all other vital signs are stable on room air. Patient's son stated that he was unable to administered her BP medications today because she is holding everything in her cheeks. 1 unit of PRBC ordered, waiting for type and screen results. Skin check completed with ODALYS Reardon, wounds observed-see flowsheets.     Dr. Deal informed of patient's arrival, pocketing of food, medications, and saliva. Speech evaluation ordered, medications reviewed and ordered

## 2025-07-13 NOTE — ED PROVIDER NOTES
Patient Seen in: [facility] Emergency Department    History     Chief Complaint   Patient presents with    Hypertension       HPI    Patient presents to the ED for decreased mental status.  Family called and stated that patient has not been talking today which she usually does.  History of UTIs in the past which has caused similar symptoms.    History reviewed. Past Medical History[1]    History reviewed. Past Surgical History[2]      Medications :  Prescriptions Prior to Admission[3]     Family History[4]    Smoking Status: Social Hx on file[5]    Constitutional and vital signs reviewed.      Social History and Family History elements reviewed from today, pertinent positives to the presenting problem noted.    Physical Exam     ED Triage Vitals [07/13/25 0046]   BP (!) 181/71   Pulse 84   Resp 18   Temp 99.8 °F (37.7 °C)   Temp src Temporal   SpO2 99 %   O2 Device None (Room air)       All measures to prevent infection transmission during my interaction with the patient were taken. Handwashing was performed prior to and after the exam.  Stethoscope and any equipment used during my examination was cleaned with super sani-cloth germicidal wipes following the exam.     Physical Exam  Vitals and nursing note reviewed.   Constitutional:       General: She is not in acute distress.     Appearance: She is well-developed. She is not ill-appearing or toxic-appearing.   HENT:      Head: Normocephalic and atraumatic.   Eyes:      General:         Right eye: No discharge.         Left eye: No discharge.      Conjunctiva/sclera: Conjunctivae normal.   Neck:      Trachea: No tracheal deviation.   Cardiovascular:      Rate and Rhythm: Normal rate and regular rhythm.   Pulmonary:      Effort: Pulmonary effort is normal. No respiratory distress.      Breath sounds: No stridor.   Abdominal:      General: There is no distension.      Palpations: Abdomen is soft.   Musculoskeletal:         General: No deformity.   Skin:      General: Skin is warm and dry.   Neurological:      Comments: Very minimal verbal response.  Says name x 1.  1 yes answer to a question.         ED Course        Labs Reviewed   URINALYSIS WITH CULTURE REFLEX - Abnormal; Notable for the following components:       Result Value    Clarity Urine Ex.Turbid (*)     Ketones Urine Trace (*)     Protein Urine 30 (*)     Urobilinogen Urine 6 (*)     Leukocyte Esterase Urine 75 (*)     WBC Urine 6-10 (*)     RBC Urine 6-10 (*)     Bacteria Urine 3+ (*)     Squamous Epi. Cells Many (*)     All other components within normal limits   CBC WITH DIFFERENTIAL WITH PLATELET - Abnormal; Notable for the following components:    RBC 2.86 (*)     HGB 6.4 (*)     HCT 21.0 (*)     MCV 73.4 (*)     MCH 22.4 (*)     MCHC 30.5 (*)     RDW-SD 49.0 (*)     RDW 18.3 (*)     All other components within normal limits   COMP METABOLIC PANEL (14) - Abnormal; Notable for the following components:    Glucose 117 (*)     Potassium 3.4 (*)     BUN/CREA Ratio 21.4 (*)     Calcium, Total 7.9 (*)     Calculated Osmolality 301 (*)     Albumin 2.8 (*)     All other components within normal limits   CBC WITH DIFFERENTIAL WITH PLATELET - Abnormal; Notable for the following components:    RBC 2.98 (*)     HGB 6.6 (*)     HCT 21.8 (*)     MCV 73.2 (*)     MCH 22.1 (*)     MCHC 30.3 (*)     RDW-SD 48.6 (*)     RDW 18.4 (*)     All other components within normal limits   COMP METABOLIC PANEL (14) - Abnormal; Notable for the following components:    Glucose 124 (*)     BUN/CREA Ratio 20.7 (*)     Calculated Osmolality 298 (*)     Albumin 2.9 (*)     A/G Ratio 0.9 (*)     All other components within normal limits   POCT GLUCOSE - Abnormal; Notable for the following components:    POC Glucose  132 (*)     All other components within normal limits   MD BLOOD SMEAR CONSULT   HEMOGLOBIN A1C   TYPE AND SCREEN    Narrative:     The following orders were created for panel order Type and screen.                  Procedure                                Abnormality         Status                                     ---------                               -----------         ------                                     ABORH (Blood Type)[[id number]]                               Final result                               Antibody Screen[[id number]]                                  Final result                                                 Please view results for these tests on the individual orders.   PREPARE RBC   ABORH (BLOOD TYPE)   ANTIBODY SCREEN   URINE CULTURE, ROUTINE   OCCULT BLOOD, STOOL       As Interpreted by me    Imaging Results Available and Reviewed while in ED: No results found.  ED Medications Administered:   Medications   glucose (Dex4) 15 GM/59ML oral liquid 15 g (has no administration in time range)     Or   glucose (Glutose) 40% oral gel 15 g (has no administration in time range)     Or   glucose-vitamin C (Dex-4) chewable tab 4 tablet (has no administration in time range)     Or   dextrose 50% injection 50 mL (has no administration in time range)     Or   glucose (Dex4) 15 GM/59ML oral liquid 30 g (has no administration in time range)     Or   glucose (Glutose) 40% oral gel 30 g (has no administration in time range)     Or   glucose-vitamin C (Dex-4) chewable tab 8 tablet (has no administration in time range)   acetaminophen (Tylenol) tab 650 mg (has no administration in time range)   aspirin chewable tab 81 mg ( Oral Automatically Held 7/16/25 0930)   atorvastatin (Lipitor) tab 40 mg (has no administration in time range)   losartan (Cozaar) tab 25 mg (has no administration in time range)   carvedilol (Coreg) tab 25 mg (has no administration in time range)   hydrALAzine (Apresoline) 20 mg/mL injection 10 mg (10 mg Intravenous Given 7/13/25 0615)   pantoprazole (Protonix) 40 mg in sodium chloride 0.9% PF 10 mL IV push (40 mg Intravenous Given 7/13/25 0614)   dextrose 5%-sodium chloride 0.45% infusion (has no  administration in time range)   acetaminophen (Tylenol) rectal suppository 650 mg (650 mg Rectal Given 7/13/25 0134)   cefTRIAXone (Rocephin) 1 g in sodium chloride 0.9% 100 mL IVPB-ADDV (0 g Intravenous Stopped 7/13/25 0258)         MDM     Vitals:    07/13/25 0548 07/13/25 0605 07/13/25 0620 07/13/25 0648   BP: (!) 167/60 (!) 182/57 (!) 161/67 (!) 135/95   BP Location:   Right arm    Pulse: 69 65 71 82   Resp: 16 16  17   Temp: 98.4 °F (36.9 °C) 99 °F (37.2 °C)  98.9 °F (37.2 °C)   TempSrc: Axillary Axillary  Axillary   SpO2: 100% 97%  100%   Weight:       Height:         *I personally reviewed and interpreted all ED vitals.    Pulse Ox: 100%, Room air, Normal     Monitor Interpretation:   normal sinus rhythm as interpreted by me.  The cardiac monitor was ordered to monitor heart rate.    Differential Diagnosis/ Diagnostic Considerations: Altered mental status, UTI, other    Complicating Factors: The patient already has does not have any pertinent problems on file. to contribute to the complexity of this ED evaluation.    Medical Decision Making  Patient presents to the ED with decreased mental status from her baseline.  Evidence for urinary tract infection on workup.  Patient also more anemic than usual.  Hemoglobin 6.6, usually 7-8.  Plan for admission for IV antibiotics and for a blood transfusion.  I discussed with Dr. Deal for admission.    Problems Addressed:  Acute cystitis without hematuria: acute illness or injury  Severe anemia: chronic illness or injury with exacerbation, progression, or side effects of treatment    Amount and/or Complexity of Data Reviewed  Independent Historian:      Details: Family provides history details  Labs: ordered. Decision-making details documented in ED Course.  Discussion of management or test interpretation with external provider(s): I discussed with Dr. Deal for admission        Condition upon leaving the department: Stable    Disposition and Plan     Clinical  Impression:  1. Acute cystitis without hematuria    2. Severe anemia        Disposition:  Admit    Follow-up:  No follow-up provider specified.    Medications Prescribed:  Current Discharge Medication List          Hospital Problems       Present on Admission  Date Reviewed: 2024          ICD-10-CM Noted POA    * (Principal) Acute cystitis without hematuria N30.00 2025 Unknown    Severe anemia D64.9 2025 Unknown                       [1]   Past Medical History:   Asthma (HCC)    Coronary atherosclerosis    Diabetes (HCC)    diabetes for 2 yrs    Essential hypertension    Glaucoma    right    Heart attack (HCC)        High blood pressure    High cholesterol    Hyperlipidemia    Osteoarthritis    Sleep apnea    cpap    Stroke (HCC)    30 yrs ago    Visual impairment    left eye edema   [2]   Past Surgical History:  Procedure Laterality Date    Anesth,vitrectomy Left 2017    Pars plana vitrectomy, internal limiting membrane peel, left eye.    Cath bare metal stent (bms)      Colonoscopy      Hysterectomy      Total abdom hysterectomy      Tubal ligation     [3]   Medications Prior to Admission   Medication Sig Dispense Refill Last Dose/Taking    carvedilol 25 MG Oral Tab Take 1 tablet (25 mg total) by mouth in the morning and 1 tablet (25 mg total) in the evening. Take with meals.   Past Week    losartan 50 MG Oral Tab Take 0.5 tablets (25 mg total) by mouth in the morning.   Past Week    atorvastatin 40 MG Oral Tab    2025    Brimonidine Tartrate-Timolol 0.2-0.5 % Ophthalmic Solution Apply 1 drop to eye in the morning and 1 drop before bedtime.   2025    aspirin 81 MG Oral Chew Tab Chew 1 tablet (81 mg total) by mouth daily. 30 tablet 1 Past Week    [] nitrofurantoin monohydrate macro 100 MG Oral Cap Take 1 capsule (100 mg total) by mouth 2 (two) times daily for 7 days. (Patient not taking: Reported on 2025) 14 capsule 0 Not Taking    memantine 10 MG Oral Tab Take 1  tablet (10 mg total) by mouth 2 (two) times daily. (Patient not taking: Reported on 2025)   Not Taking    donepezil 10 MG Oral Tab Take 1 tablet (10 mg total) by mouth nightly. (Patient not taking: Reported on 2025)   Not Taking    magnesium oxide 400 MG Oral Tab Take 1 tablet (400 mg total) by mouth daily. (Patient not taking: Reported on 2025) 30 tablet 1 Not Taking   [4]   Family History  Problem Relation Age of Onset    Cancer Father     Diabetes Mother    [5]   Social History  Socioeconomic History    Marital status: Single   Tobacco Use    Smoking status: Former     Current packs/day: 0.00     Types: Cigarettes     Start date: 1976     Quit date: 1980     Years since quittin.8    Smokeless tobacco: Never   Vaping Use    Vaping status: Never Used   Substance and Sexual Activity    Alcohol use: No    Drug use: No

## 2025-07-13 NOTE — ED QUICK NOTES
Orders for admission, patient is aware of plan and ready to go upstairs. Any questions, please call ED ODALYS thapa at extension [phone].     Patient Covid vaccination status: Fully vaccinated     COVID Test Ordered in ED: None    COVID Suspicion at Admission: N/A    Running Infusions: Medication Infusions[1] None    Mental Status/LOC at time of transport: difficult to ascertain as patient is currently mostly non verbal    Other pertinent information:   CIWA score: N/A   NIH score:  N/A             [1]

## 2025-07-13 NOTE — H&P
[facility]  part of [facility]    History & Physical    Eliud Fine Patient Status:  Inpatient    1955 MRN [id number]   Location [facility] 5SW/SE Attending Angelina Deal MD   Hosp Day # 0 PCP Yao Rodriguez     Date:  2025  Date of Admission:  2025    History provided by: Review of the medical records.    Chief Complaint:     Chief Complaint   Patient presents with    Hypertension   Altered mental status    HPI:   Eliud Fine is a(n) 70 year old female hypertension, diabetes mellitus type 2, coronary artery disease, hyperlipidemia, osteoarthritis, history of CVA, also to the emergency room by the family because they noticed her to have some change in mental status.  And patient although responding.  In the concern that patient could be having UTI.  Patient usually has a change in mental status when she has UTI.  Patient is otherwise comfortable.  Nursing staff reports no complaints.      History   Past Medical History[1]  Past Surgical History[2]  Family History[3]  Social History:  Short Social Hx on File[4]  Allergies/Medications:   Allergies: Allergies[5]  Prescriptions Prior to Admission[6]    Review of Systems:   Pertinent items are noted in HPI.    Physical Exam:   Vital Signs:  Blood pressure (!) 174/76, pulse 76, temperature 98.3 °F (36.8 °C), temperature source Axillary, resp. rate 16, height 5' 7\" (1.702 m), weight 115 lb 6 oz (52.3 kg), SpO2 99%.     Patient lying comfortably in bed patient is alert.  HEENT: Pallor noted no icterus.  Neck no JVD no carotid bruit.  Heart S1 and S2 regular in rate and rhythm.  The lungs clear to auscultation  Abdomen is soft bowel sounds are present  Extremities no pedal edema CNS examination patient has a CVA with hemiparesis.      Cervical Papanicolaou contraindicated    Results:     Lab Results   Component Value Date    WBC 5.2 2025    HGB 6.6 (LL) 2025    HCT 21.8 (L) 2025    .0  07/13/2025    CREATSERUM 0.92 07/13/2025    BUN 19 07/13/2025     07/13/2025    K 3.5 07/13/2025     07/13/2025    CO2 30.0 07/13/2025     (H) 07/13/2025    CA 8.8 07/13/2025    ALB 2.9 (L) 07/13/2025    ALKPHO 61 07/13/2025    BILT 0.4 07/13/2025    TP 6.0 07/13/2025    AST 19 07/13/2025    ALT 19 07/13/2025    PTT 30.4 09/25/2024    INR 1.11 09/25/2024    T4F 1.3 02/15/2025    TSH 1.457 02/15/2025    LIP 53 01/04/2025    DDIMER 2.40 (H) 10/11/2022    ESRML 37 (H) 12/18/2022    CRP 0.31 (H) 12/18/2022    MG 1.9 02/20/2025    PHOS 2.9 02/17/2025    CK 83 02/12/2025    B12 687 02/15/2025       No results found.        Assessment/Plan:     Acute cystitis without hematuria  Continue antibiotics    Altered mental status.  Most likely acute toxic metabolic encephalopathy secondary to UTI.        Severe anemia  Most likely anemia of the chronic disease.  Will evaluate for any bleeding.  No signs of bleeding.    Diabetes mellitus type 2.  Continue insulin.    Coronary artery disease.  Stable.    Hyperlipidemia continue statin.          Active Orders   LAB    Potassium     Frequency: Tomorrow AM draw     Number of Occurrences: 1 Occurrences     Order Comments: DO NOT DISCONTINUE MUST REMAIN FOR ELECTROLYTE PROTOCOL       OT    OT eval and treat     Frequency: Once     Number of Occurrences: 1 Occurrences   PT    PT eval and treat     Frequency: Once     Number of Occurrences: 1 Occurrences   Microbiology    Occult Blood Stool, Diagnostic     Frequency: Once     Number of Occurrences: 1 Occurrences   Diet    Regular/General diet Fluid Consistency: Nectar Thick / Mildly Thick Liquids; Texture Consistency: Pureed; Is Patient on Accuchecks? Yes; Misc Restriction: No Straw     Frequency: Effective Now     Number of Occurrences: Until Specified     Order Comments: meds crushed in puree     Nursing    Accucheck     Frequency: PRN     Number of Occurrences: Until Specified     Order Comments: For symptoms or  suspicion of hypoglycemia      Accucheck     Frequency: TID AC and HS     Number of Occurrences: Until Specified    Discontinue all oral diabetic agents     Frequency: Once     Number of Occurrences: 1 Occurrences    Initiate electrolyte protocol     Frequency: Until Discontinued     Number of Occurrences: Until Specified    Initiate Hypoglycemia Algorithm for Adults     Frequency: Until Discontinued     Number of Occurrences: Until Specified     Order Comments: For blood glucose less than 70      Please communicate patient's home diabetic medications when speaking with physician     Frequency: Once     Number of Occurrences: 1 Occurrences    Provide diabetes patient education guide     Frequency: Once     Number of Occurrences: 1 Occurrences     Order Comments: Initiate education for new onset diabetes or pre-existing diabetes with knowledge deficits.      Teach blood glucose monitoring with bedside glucometer     Frequency: Once     Number of Occurrences: 1 Occurrences     Order Comments: If patient does not have a home glucometer, notify physician to order for discharge home.      Transfuse Orders to be completed     Frequency: If condition met     Number of Occurrences: Until Specified     Order Comments: Outstanding Blood Transfusion orders     Consult    Consult to Wound Ostomy     Frequency: Once     Number of Occurrences: 1 Occurrences    Nursing consult to Dietitian     Frequency: Once     Number of Occurrences: 1 Occurrences    Social work     Frequency: Once     Number of Occurrences: 1 Occurrences     Order Comments: Wants to keep same  company     Medications    acetaminophen (Tylenol) tab 650 mg     Frequency: Q6H PRN     Dose: 650 mg     Route: Oral    aspirin chewable tab 81 mg     Frequency: Daily     Dose: 81 mg     Route: Oral    atorvastatin (Lipitor) tab 40 mg     Frequency: Nightly     Dose: 40 mg     Route: Oral    carvedilol (Coreg) tab 25 mg     Frequency: QAM     Dose: 25 mg     Route:  Oral    dextrose 5%-sodium chloride 0.45% infusion     Frequency: Continuous     Route: Intravenous    dextrose 50% injection 50 mL     Linked Order: Or     Frequency: Q15 Min PRN     Dose: 50 mL     Route: Intravenous    glucose (Dex4) 15 GM/59ML oral liquid 15 g     Linked Order: Or     Frequency: Q15 Min PRN     Dose: 15 g     Route: Oral    glucose (Dex4) 15 GM/59ML oral liquid 30 g     Linked Order: Or     Frequency: Q15 Min PRN     Dose: 30 g     Route: Oral    glucose (Glutose) 40% oral gel 15 g     Linked Order: Or     Frequency: Q15 Min PRN     Dose: 15 g     Route: Oral    glucose (Glutose) 40% oral gel 30 g     Linked Order: Or     Frequency: Q15 Min PRN     Dose: 30 g     Route: Oral    glucose-vitamin C (Dex-4) chewable tab 4 tablet     Linked Order: Or     Frequency: Q15 Min PRN     Dose: 4 tablet     Route: Oral    glucose-vitamin C (Dex-4) chewable tab 8 tablet     Linked Order: Or     Frequency: Q15 Min PRN     Dose: 8 tablet     Route: Oral    hydrALAzine (Apresoline) 20 mg/mL injection 10 mg     Frequency: Q6H PRN     Dose: 10 mg     Route: Intravenous    losartan (Cozaar) tab 25 mg     Frequency: Daily     Dose: 25 mg     Route: Oral    pantoprazole (Protonix) 40 mg in sodium chloride 0.9% PF 10 mL IV push     Frequency: Daily     Dose: 40 mg     Route: Intravenous    potassium chloride 40 mEq in 250mL sodium chloride 0.9% IVPB premix     Frequency: Once     Dose: 40 mEq     Route: Intravenous     Order Comments: Administer through peripheral line       Angelina Deal MD  7/13/2025          [1]   Past Medical History:   Asthma (HCC)    Coronary atherosclerosis    Diabetes (HCC)    diabetes for 2 yrs    Essential hypertension    Glaucoma    right    Heart attack (HCC)    2005    High blood pressure    High cholesterol    Hyperlipidemia    Osteoarthritis    Sleep apnea    cpap    Stroke (HCC)    30 yrs ago    Visual impairment    left eye edema   [2]   Past Surgical History:  Procedure  Laterality Date    Anesth,vitrectomy Left 2017    Pars plana vitrectomy, internal limiting membrane peel, left eye.    Cath bare metal stent (bms)      Colonoscopy      Hysterectomy      Total abdom hysterectomy      Tubal ligation     [3]   Family History  Problem Relation Age of Onset    Cancer Father     Diabetes Mother    [4]   Social History  Socioeconomic History    Marital status: Single   Tobacco Use    Smoking status: Former     Current packs/day: 0.00     Types: Cigarettes     Start date: 1976     Quit date: 1980     Years since quittin.8    Smokeless tobacco: Never   Vaping Use    Vaping status: Never Used   Substance and Sexual Activity    Alcohol use: No    Drug use: No     Social Drivers of Health     Food Insecurity: No Food Insecurity (2025)    NCSS - Food Insecurity     Worried About Running Out of Food in the Last Year: No     Ran Out of Food in the Last Year: No   Transportation Needs: No Transportation Needs (2025)    NCSS - Transportation     Lack of Transportation: No   Housing Stability: Not At Risk (2025)    NCSS - Housing/Utilities     Has Housing: Yes     Worried About Losing Housing: No     Unable to Get Utilities: No   [5] No Known Allergies  [6]   Medications Prior to Admission   Medication Sig    carvedilol 25 MG Oral Tab Take 1 tablet (25 mg total) by mouth in the morning and 1 tablet (25 mg total) in the evening. Take with meals.    losartan 50 MG Oral Tab Take 0.5 tablets (25 mg total) by mouth in the morning.    atorvastatin 40 MG Oral Tab     Brimonidine Tartrate-Timolol 0.2-0.5 % Ophthalmic Solution Apply 1 drop to eye in the morning and 1 drop before bedtime.    aspirin 81 MG Oral Chew Tab Chew 1 tablet (81 mg total) by mouth daily.    [] nitrofurantoin monohydrate macro 100 MG Oral Cap Take 1 capsule (100 mg total) by mouth 2 (two) times daily for 7 days. (Patient not taking: Reported on 2025)    memantine 10 MG Oral Tab Take 1  tablet (10 mg total) by mouth 2 (two) times daily. (Patient not taking: Reported on 7/13/2025)    donepezil 10 MG Oral Tab Take 1 tablet (10 mg total) by mouth nightly. (Patient not taking: Reported on 7/13/2025)    magnesium oxide 400 MG Oral Tab Take 1 tablet (400 mg total) by mouth daily. (Patient not taking: Reported on 7/13/2025)

## 2025-07-13 NOTE — PLAN OF CARE
Receiving IV fluids per MD order. Voiding freely. Vital signs monitored. No acute changes noted throughout shift. Fall precautions in place- bed in lowest position, call light and personal belongings within reach, non-skid socks in place. Frequent rounding by nursing staff.    Problem: Patient Centered Care  Goal: Patient preferences are identified and integrated in the patient's plan of care  Description: Interventions:  - What would you like us to know as we care for you? History of CVA with aphasia  - Provide timely, complete, and accurate information to patient/family  - Incorporate patient and family knowledge, values, beliefs, and cultural backgrounds into the planning and delivery of care  - Encourage patient/family to participate in care and decision-making at the level they choose  - Honor patient and family perspectives and choices  Outcome: Progressing     Problem: PAIN - ADULT  Goal: Verbalizes/displays adequate comfort level or patient's stated pain goal  Description: INTERVENTIONS:  - Encourage pt to monitor pain and request assistance  - Assess pain using appropriate pain scale  - Administer analgesics based on type and severity of pain and evaluate response  - Implement non-pharmacological measures as appropriate and evaluate response  - Consider cultural and social influences on pain and pain management  - Manage/alleviate anxiety  - Utilize distraction and/or relaxation techniques  - Monitor for opioid side effects  - Notify MD/LIP if interventions unsuccessful or patient reports new pain  - Anticipate increased pain with activity and pre-medicate as appropriate  Outcome: Progressing     Problem: RISK FOR INFECTION - ADULT  Goal: Absence of fever/infection during anticipated neutropenic period  Description: INTERVENTIONS  - Monitor WBC  - Administer growth factors as ordered  - Implement neutropenic guidelines  Outcome: Progressing     Problem: SAFETY ADULT - FALL  Goal: Free from fall  injury  Description: INTERVENTIONS:  - Assess pt frequently for physical needs  - Identify cognitive and physical deficits and behaviors that affect risk of falls.  - Wayne fall precautions as indicated by assessment.  - Educate pt/family on patient safety including physical limitations  - Instruct pt to call for assistance with activity based on assessment  - Modify environment to reduce risk of injury  - Provide assistive devices as appropriate  - Consider OT/PT consult to assist with strengthening/mobility  - Encourage toileting schedule  Outcome: Progressing     Problem: DISCHARGE PLANNING  Goal: Discharge to home or other facility with appropriate resources  Description: INTERVENTIONS:  - Identify barriers to discharge w/pt and caregiver  - Include patient/family/discharge partner in discharge planning  - Arrange for needed discharge resources and transportation as appropriate  - Identify discharge learning needs (meds, wound care, etc)  - Arrange for interpreters to assist at discharge as needed  - Consider post-discharge preferences of patient/family/discharge partner  - Complete POLST form as appropriate  - Assess patient's ability to be responsible for managing their own health  - Refer to Case Management Department for coordinating discharge planning if the patient needs post-hospital services based on physician/LIP order or complex needs related to functional status, cognitive ability or social support system  Outcome: Progressing     Problem: Altered Communication/Language Barrier  Goal: Patient/Family is able to understand and participate in their care  Description: Interventions:  - Assess communication ability and preferred communication style  - Implement communication aides and strategies  - Use visual cues when possible  - Listen attentively, be patient, do not interrupt  - Minimize distractions  - Allow time for understanding and response  - Establish method for patient to ask for assistance  (call light)  - Provide an  as needed  - Communicate barriers and strategies to overcome with those who interact with patient  Outcome: Progressing     Problem: GASTROINTESTINAL - ADULT  Goal: Maintains adequate nutritional intake (undernourished)  Description: INTERVENTIONS:  - Monitor percentage of each meal consumed  - Identify factors contributing to decreased intake, treat as appropriate  - Assist with meals as needed  - Monitor I&O, WT and lab values  - Obtain nutritional consult as needed  - Optimize oral hygiene and moisture  - Encourage food from home; allow for food preferences  - Enhance eating environment  Outcome: Progressing     Problem: GENITOURINARY - ADULT  Goal: Absence of urinary retention  Description: INTERVENTIONS:  - Assess patient’s ability to void and empty bladder  - Monitor intake/output and perform bladder scan as needed  - Follow urinary retention protocol/standard of care  - Consider collaborating with pharmacy to review patient's medication profile  - Implement strategies to promote bladder emptying  Outcome: Progressing     Problem: METABOLIC/FLUID AND ELECTROLYTES - ADULT  Goal: Glucose maintained within prescribed range  Description: INTERVENTIONS:  - Monitor Blood Glucose as ordered  - Assess for signs and symptoms of hyperglycemia and hypoglycemia  - Administer ordered medications to maintain glucose within target range  - Assess barriers to adequate nutritional intake and initiate nutrition consult as needed  - Instruct patient on self management of diabetes  Outcome: Progressing  Goal: Electrolytes maintained within normal limits  Description: INTERVENTIONS:  - Monitor labs and rhythm and assess patient for signs and symptoms of electrolyte imbalances  - Administer electrolyte replacement as ordered  - Monitor response to electrolyte replacements, including rhythm and repeat lab results as appropriate  - Fluid restriction as ordered  - Instruct patient on fluid and  nutrition restrictions as appropriate  Outcome: Progressing  Goal: Hemodynamic stability and optimal renal function maintained  Description: INTERVENTIONS:  - Monitor labs and assess for signs and symptoms of volume excess or deficit  - Monitor intake, output and patient weight  - Monitor urine specific gravity, serum osmolarity and serum sodium as indicated or ordered  - Monitor response to interventions for patient's volume status, including labs, urine output, blood pressure (other measures as available)  - Encourage oral intake as appropriate  - Instruct patient on fluid and nutrition restrictions as appropriate  Outcome: Progressing     Problem: SKIN/TISSUE INTEGRITY - ADULT  Goal: Skin integrity remains intact  Description: INTERVENTIONS  - Assess and document risk factors for pressure ulcer development  - Assess and document skin integrity  - Monitor for areas of redness and/or skin breakdown  - Initiate interventions, skin care algorithm/standards of care as needed  Outcome: Progressing  Goal: Incision(s), wounds(s) or drain site(s) healing without S/S of infection  Description: INTERVENTIONS:  - Assess and document risk factors for pressure ulcer development  - Assess and document skin integrity  - Assess and document dressing/incision, wound bed, drain sites and surrounding tissue  - Implement wound care per orders  - Initiate isolation precautions as appropriate  - Initiate Pressure Ulcer prevention bundle as indicated  Outcome: Progressing  Goal: Oral mucous membranes remain intact  Description: INTERVENTIONS  - Assess oral mucosa and hygiene practices  - Implement preventative oral hygiene regimen  - Implement oral medicated treatments as ordered  Outcome: Progressing

## 2025-07-14 LAB
BASOPHILS # BLD AUTO: 0.01 X10(3) UL (ref 0–0.2)
BASOPHILS NFR BLD AUTO: 0.2 %
BLOOD TYPE BARCODE: 8400
DEPRECATED RDW RBC AUTO: 49 FL (ref 35.1–46.3)
EOSINOPHIL # BLD AUTO: 0.05 X10(3) UL (ref 0–0.7)
EOSINOPHIL NFR BLD AUTO: 1 %
ERYTHROCYTE [DISTWIDTH] IN BLOOD BY AUTOMATED COUNT: 18.3 % (ref 11–15)
GLUCOSE BLDC GLUCOMTR-MCNC: 110 MG/DL (ref 70–99)
GLUCOSE BLDC GLUCOMTR-MCNC: 137 MG/DL (ref 70–99)
GLUCOSE BLDC GLUCOMTR-MCNC: 153 MG/DL (ref 70–99)
GLUCOSE BLDC GLUCOMTR-MCNC: 161 MG/DL (ref 70–99)
HCT VFR BLD AUTO: 21 % (ref 35–48)
HCT VFR BLD AUTO: 26.8 % (ref 35–48)
HGB BLD-MCNC: 6.4 G/DL (ref 12–16)
HGB BLD-MCNC: 8.4 G/DL (ref 12–16)
IMM GRANULOCYTES # BLD AUTO: 0.02 X10(3) UL (ref 0–1)
IMM GRANULOCYTES NFR BLD: 0.4 %
LYMPHOCYTES # BLD AUTO: 1.28 X10(3) UL (ref 1–4)
LYMPHOCYTES NFR BLD AUTO: 24.4 %
MCH RBC QN AUTO: 22.4 PG (ref 26–34)
MCHC RBC AUTO-ENTMCNC: 30.5 G/DL (ref 31–37)
MCV RBC AUTO: 73.4 FL (ref 80–100)
MONOCYTES # BLD AUTO: 0.32 X10(3) UL (ref 0.1–1)
MONOCYTES NFR BLD AUTO: 6.1 %
NEUTROPHILS # BLD AUTO: 3.57 X10 (3) UL (ref 1.5–7.7)
NEUTROPHILS # BLD AUTO: 3.57 X10(3) UL (ref 1.5–7.7)
NEUTROPHILS NFR BLD AUTO: 67.9 %
PLATELET # BLD AUTO: 373 10(3)UL (ref 150–450)
POTASSIUM SERPL-SCNC: 3.6 MMOL/L (ref 3.5–5.1)
RBC # BLD AUTO: 2.86 X10(6)UL (ref 3.8–5.3)
UNIT VOLUME: 350 ML
WBC # BLD AUTO: 5.3 X10(3) UL (ref 4–11)

## 2025-07-14 RX ORDER — SODIUM HYPOCHLORITE 1.25 MG/ML
SOLUTION TOPICAL EVERY 12 HOURS
Status: DISCONTINUED | OUTPATIENT
Start: 2025-07-14 | End: 2025-07-22

## 2025-07-14 NOTE — H&P
[facility]  part of [facility]    Progress Note    Eliud Fine Patient Status:  Inpatient    1955 MRN [id number]   Location [facility] 5SW/SE Attending Angelina Deal MD   Hosp Day # 1 PCP Yao Rodriguez       SUBJECTIVE:    OBJECTIVE:  Vital signs in last 24 hours:  BP (!) 163/67 (BP Location: Left arm)   Pulse 77   Temp 97.8 °F (36.6 °C) (Axillary)   Resp 16   Ht 5' 7\" (1.702 m)   Wt 115 lb 6 oz (52.3 kg)   SpO2 100%   BMI 18.07 kg/m²     Intake/Output:    Intake/Output Summary (Last 24 hours) at 2025 0551  Last data filed at 2025 2114  Gross per 24 hour   Intake 1286.25 ml   Output 60 ml   Net 1226.25 ml       Wt Readings from Last 3 Encounters:   25 115 lb 6 oz (52.3 kg)   25 131 lb 6.3 oz (59.6 kg)   25 131 lb 4.8 oz (59.6 kg)       Exam  Gen: No acute distress, alert and oriented x3,  HEENT:   Pulm: Lungs clear, normal respiratory effort  CV: Heart with regular rate and rhythm, no peripheral edema  Abd: Abdomen soft, nontender, nondistended, no organomegaly, bowel sounds present  MSK: Full range of motion in extremities, no clubbing, no cyanosis  Skin: no rashes or lesions  CNS:     Data Review:     Labs:   Lab Results   Component Value Date    HGB 8.8 2025    HCT 27.6 2025       LABS  Recent Labs   Lab 25  0133 25  0155 25  0959   RBC 2.86* 2.98*  --    HGB 6.4* 6.6* 8.8*   HCT 21.0* 21.8* 27.6*   MCV 73.4* 73.2*  --    MCH 22.4* 22.1*  --    MCHC 30.5* 30.3*  --    RDW 18.3* 18.4*  --    NEPRELIM 3.57 3.31  --    WBC 5.3 5.2  --    .0 354.0  --    AST 20 19  --    ALT 16 19  --    * 124*  --        Imaging:  ***    Meds:   Current Hospital Medications[1]    Assessment  Problem List[2]    Plan:   ***   Active Orders   LAB    Potassium     Frequency: Tomorrow AM draw     Number of Occurrences: 1 Occurrences     Order Comments: DO NOT DISCONTINUE MUST REMAIN FOR ELECTROLYTE  PROTOCOL       OT    OT eval and treat     Frequency: Once     Number of Occurrences: 1 Occurrences   PT    PT eval and treat     Frequency: Once     Number of Occurrences: 1 Occurrences   Microbiology    Occult Blood Stool, Diagnostic     Frequency: Once     Number of Occurrences: 1 Occurrences   Diet    Regular/General diet Fluid Consistency: Nectar Thick / Mildly Thick Liquids; Texture Consistency: Pureed; Is Patient on Accuchecks? Yes; Misc Restriction: No Straw     Frequency: Effective Now     Number of Occurrences: Until Specified     Order Comments: meds crushed in puree     Nursing    Accucheck     Frequency: PRN     Number of Occurrences: Until Specified     Order Comments: For symptoms or suspicion of hypoglycemia      Accucheck     Frequency: TID AC and HS     Number of Occurrences: Until Specified    Discontinue all oral diabetic agents     Frequency: Once     Number of Occurrences: 1 Occurrences    Initiate electrolyte protocol     Frequency: Until Discontinued     Number of Occurrences: Until Specified    Initiate Hypoglycemia Algorithm for Adults     Frequency: Until Discontinued     Number of Occurrences: Until Specified     Order Comments: For blood glucose less than 70      Please communicate patient's home diabetic medications when speaking with physician     Frequency: Once     Number of Occurrences: 1 Occurrences    Provide diabetes patient education guide     Frequency: Once     Number of Occurrences: 1 Occurrences     Order Comments: Initiate education for new onset diabetes or pre-existing diabetes with knowledge deficits.      Teach blood glucose monitoring with bedside glucometer     Frequency: Once     Number of Occurrences: 1 Occurrences     Order Comments: If patient does not have a home glucometer, notify physician to order for discharge home.      Transfuse Orders to be completed     Frequency: If condition met     Number of Occurrences: Until Specified     Order Comments: Outstanding  Blood Transfusion orders     Consult    Consult to Wound Ostomy     Frequency: Once     Number of Occurrences: 1 Occurrences    Nursing consult to Dietitian     Frequency: Once     Number of Occurrences: 1 Occurrences    Social work     Frequency: Once     Number of Occurrences: 1 Occurrences     Order Comments: Wants to keep same HH company     Medications    acetaminophen (Tylenol) tab 650 mg     Frequency: Q6H PRN     Dose: 650 mg     Route: Oral    aspirin chewable tab 81 mg     Frequency: Daily     Dose: 81 mg     Route: Oral    atorvastatin (Lipitor) tab 40 mg     Frequency: Nightly     Dose: 40 mg     Route: Oral    carvedilol (Coreg) tab 25 mg     Frequency: QAM     Dose: 25 mg     Route: Oral    dextrose 5%-sodium chloride 0.45% infusion     Frequency: Continuous     Route: Intravenous    dextrose 50% injection 50 mL     Linked Order: Or     Frequency: Q15 Min PRN     Dose: 50 mL     Route: Intravenous    glucose (Dex4) 15 GM/59ML oral liquid 15 g     Linked Order: Or     Frequency: Q15 Min PRN     Dose: 15 g     Route: Oral    glucose (Dex4) 15 GM/59ML oral liquid 30 g     Linked Order: Or     Frequency: Q15 Min PRN     Dose: 30 g     Route: Oral    glucose (Glutose) 40% oral gel 15 g     Linked Order: Or     Frequency: Q15 Min PRN     Dose: 15 g     Route: Oral    glucose (Glutose) 40% oral gel 30 g     Linked Order: Or     Frequency: Q15 Min PRN     Dose: 30 g     Route: Oral    glucose-vitamin C (Dex-4) chewable tab 4 tablet     Linked Order: Or     Frequency: Q15 Min PRN     Dose: 4 tablet     Route: Oral    glucose-vitamin C (Dex-4) chewable tab 8 tablet     Linked Order: Or     Frequency: Q15 Min PRN     Dose: 8 tablet     Route: Oral    hydrALAzine (Apresoline) 20 mg/mL injection 10 mg     Frequency: Q6H PRN     Dose: 10 mg     Route: Intravenous    losartan (Cozaar) tab 25 mg     Frequency: Daily     Dose: 25 mg     Route: Oral    pantoprazole (Protonix) 40 mg in sodium chloride 0.9% PF 10 mL IV  push     Frequency: Daily     Dose: 40 mg     Route: Intravenous     Prophylaxis:   DVT with ***  GI with ***  Atrophy Prophylaxis ***    Dispo: ***    Questions/concerns were discussed with patient and/or family by bedside.    Total Time spent with patient and coordinating care:  *** minutes    Angelina Deal MD  7/14/2025  5:51 AM         [1]   Current Facility-Administered Medications   Medication Dose Route Frequency    glucose (Dex4) 15 GM/59ML oral liquid 15 g  15 g Oral Q15 Min PRN    Or    glucose (Glutose) 40% oral gel 15 g  15 g Oral Q15 Min PRN    Or    glucose-vitamin C (Dex-4) chewable tab 4 tablet  4 tablet Oral Q15 Min PRN    Or    dextrose 50% injection 50 mL  50 mL Intravenous Q15 Min PRN    Or    glucose (Dex4) 15 GM/59ML oral liquid 30 g  30 g Oral Q15 Min PRN    Or    glucose (Glutose) 40% oral gel 30 g  30 g Oral Q15 Min PRN    Or    glucose-vitamin C (Dex-4) chewable tab 8 tablet  8 tablet Oral Q15 Min PRN    acetaminophen (Tylenol) tab 650 mg  650 mg Oral Q6H PRN    [Held by provider] aspirin chewable tab 81 mg  81 mg Oral Daily    atorvastatin (Lipitor) tab 40 mg  40 mg Oral Nightly    losartan (Cozaar) tab 25 mg  25 mg Oral Daily    carvedilol (Coreg) tab 25 mg  25 mg Oral QAM    hydrALAzine (Apresoline) 20 mg/mL injection 10 mg  10 mg Intravenous Q6H PRN    pantoprazole (Protonix) 40 mg in sodium chloride 0.9% PF 10 mL IV push  40 mg Intravenous Daily    dextrose 5%-sodium chloride 0.45% infusion   Intravenous Continuous   [2]   Patient Active Problem List  Diagnosis    Vision loss, left eye    Cerebrovascular accident (CVA), unspecified mechanism (HCC)    Essential hypertension    COVID-19    Right sided weakness    Aphasia due to recent stroke    Cerebral amyloid angiopathy (HCC)    Toxic encephalopathy    Hypertensive urgency    Acute chest pain    Renal artery stenosis    Cystitis    PAPO (acute kidney injury)    Primary hypertension    Uncontrolled hypertension    CVA (cerebral  vascular accident) (HCC)    Acute CVA (cerebrovascular accident) (HCC)    Slurred speech    Weakness of right upper extremity    Dehydration    Failure to thrive in adult    Troponin level elevated    Hyperkalemia    Goals of care, counseling/discussion    Advance care planning    Palliative care by specialist    Acute cystitis without hematuria    Severe anemia

## 2025-07-14 NOTE — OCCUPATIONAL THERAPY NOTE
OCCUPATIONAL THERAPY EVALUATION - INPATIENT     Room Number: 565/565-A  Evaluation Date: 7/14/2025  Type of Evaluation: Initial       Physician Order: IP Consult to Occupational Therapy  Reason for Therapy: ADL/IADL Dysfunction and Discharge Planning    OCCUPATIONAL THERAPY ASSESSMENT   RN cleared pt for participation in OT session, which was completed in collaboration with PT. Upon arrival, pt was supine in bed and agreeable to activity. No visitors present during session. Pt was left in bed and alarm was activated. Call light and all needs left in reach. Handoff given to RN.    Patient is a 70 year old female admitted 7/13/2025 for acute cystitis without hematuria. Pt is bedbound and total A overall for ADLs at baseline; family uses w/c to bump pt up stairs and andrew lifts her to recliner. Son wants to pt to DC home. OT to sign off.    Pulse: 84                    Oxygen Therapy  SPO2% on Room Air at Rest: 100    Education provided  Educated pt's son about pressure relieving mattress, he verbalized understanding; wants to take pt home    OCCUPATIONAL THERAPY MEDICAL/SOCIAL HISTORY     Problem List  Principal Problem:    Acute cystitis without hematuria  Active Problems:    Severe anemia      Past Medical History  Past Medical History[1]    Past Surgical History  Past Surgical History[2]    HOME SITUATION  Type of Home: House  Home Layout: Multi-level  Lives With: Son                      Drives: No  Patient Regularly Uses: Mechanical lift    SUBJECTIVE   Non-verba;    OCCUPATIONAL THERAPY EXAMINATION      OBJECTIVE  Precautions: Cardiac, Bed/chair alarm  Fall Risk: High fall risk    PAIN ASSESSMENT  Rating: Unable to rate          Cognition; smiles but is non-verbal    COORDINATION  Contracted BUE    ACTIVITIES OF DAILY LIVING ASSESSMENT  AM-PAC ‘6-Clicks’ Inpatient Daily Activity Short Form  How much help from another person does the patient currently need…  -   Putting on and taking off regular lower body  clothing?: Total  -   Bathing (including washing, rinsing, drying)?: Total  -   Toileting, which includes using toilet, bedpan or urinal? : Total  -   Putting on and taking off regular upper body clothing?: Total  -   Taking care of personal grooming such as brushing teeth?: Total  -   Eating meals?: A Lot    AM-PAC Score:  Score: 7  Approx Degree of Impairment: 92.44%  Standardized Score (AM-PAC Scale): 20.13  CMS Modifier (G-Code): CM       FUNCTIONAL TRANSFER ASSESSMENT  Supine to Sit : Dependent     Sit to supine: dependent       FUNCTIONAL ADL ASSESSMENT  Overall total A    The patient's Approx Degree of Impairment: 92.44% has been calculated based on documentation in the [facility] '6 clicks' Inpatient Daily Activity Short Form.  Research supports that patients with this level of impairment may benefit from LTAC. Final disposition will be made by interdisciplinary medical team.         Patient Evaluation Complexity Level:   Occupational Profile/Medical History LOW - Brief history including review of medical or therapy records    Specific performance deficits impacting engagement in ADL/IADL LOW  1 - 3 performance deficits    Client Assessment/Performance Deficits LOW - No comorbidities nor modifications of tasks    Clinical Decision Making LOW - Analysis of occupational profile, problem-focused assessments, limited treatment options    Overall Complexity LOW     OT Session Time: 15 minutes  Self-Care Home Management: 10 minutes       Mari Corral OT  [facility]  Inpatient Rehabilitation  Occupational Therapy  [phone]         [1]   Past Medical History:   Asthma (HCC)    Coronary atherosclerosis    Diabetes (HCC)    diabetes for 2 yrs    Essential hypertension    Glaucoma    right    Heart attack (HCC)    2005    High blood pressure    High cholesterol    Hyperlipidemia    Osteoarthritis    Sleep apnea    cpap    Stroke ([facility])    30 yrs ago    Visual impairment    left eye edema   [2]   Past  Surgical History:  Procedure Laterality Date    Anesth,vitrectomy Left 09/18/2017    Pars plana vitrectomy, internal limiting membrane peel, left eye.    Cath bare metal stent (bms)      Colonoscopy      Hysterectomy      Total abdom hysterectomy      Tubal ligation

## 2025-07-14 NOTE — PROGRESS NOTES
07/14/25 1350   Wound 07/13/25 Ankle Left;Lateral   Date First Assessed/Time First Assessed: 07/13/25 0330   Present on Original Admission: Yes  Primary Wound Type: Pressure Injury  Location: Ankle  Wound Location Orientation: Left;Lateral   Wound Image    Site Assessment Yellow;Pink;Brown;Fragile;Moist;Painful   Closure Not approximated   Drainage Amount Scant   Drainage Description Yellow   Treatments Cleansed;Saline   Dressing 4x4s;Aquacel Foam   Dressing Changed Changed   Dressing Status Dressing Changed;Removed;Old drainage   Michelle-wound Assessment Clean;Dry;Intact;Fragile   Wound Granulation Tissue Pink   Wound Bed Granulation (%) 50 %   Wound Bed Slough (%) 50 %   State of Healing Early/partial granulation   Wound Odor None   Pressure Injury Stage U   Wound 07/13/25 Heel Right   Date First Assessed/Time First Assessed: 07/13/25 0330   Present on Original Admission: Yes  Primary Wound Type: Pressure Injury  Location: Heel  Wound Location Orientation: Right   Wound Image    Site Assessment Fragile;Moist;Painful;Black;Red   Closure Not approximated   Drainage Amount Scant   Drainage Description Sanguineous   Treatments Betadine   Dressing 4x4s;Aquacel Foam   Dressing Changed Changed   Dressing Status Dressing Changed;Removed;Old drainage   Michelle-wound Assessment Clean;Dry;Fragile;Painful   Wound Granulation Tissue Red   Wound Bed Granulation (%) 20 %   Wound Bed Eschar (%) 80 %   State of Healing Eschar   Wound Odor None   Pressure Injury Stage U   [REMOVED] Wound 07/13/25 Buttocks Left   Final Assessment Date: 07/14/25  Date First Assessed/Time First Assessed: 07/13/25 0330   Present on Original Admission: Yes  Location: Buttocks  Wound Location Orientation: Left  Wound Outcome: Healed   Wound Image    Site Assessment Clean;Dry;Intact;Epithelialization   Closure Approximated   Drainage Amount None   Non-staged Wound Description Not applicable   Michelle-wound Assessment Clean;Dry;Intact   Wound Bed Epithelium (%)  100 %   State of Healing Epithelialized   Wound Odor None   Wound 07/13/25 Sacrum Medial   Date First Assessed/Time First Assessed: 07/13/25 0330   Present on Original Admission: Yes  Primary Wound Type: Pressure Injury  Location: Sacrum  Wound Location Orientation: Medial   Wound Image    Site Assessment Moist;Fragile;Painful;Yellow;Tan;Pink;Red;Brown   Closure Not approximated   Drainage Amount Scant   Drainage Description Odor;Yellow;Serosanguineous   Treatments Cleansed;Saline   Dressing 4x4s;Aquacel Foam   Dressing Changed Changed   Dressing Status Dressing Changed;Removed;Old drainage   Michelle-wound Assessment Clean;Dry;Fragile   Wound Granulation Tissue Red;Pink   Wound Bed Granulation (%) 50 %   Wound Bed Slough (%) 50 %   State of Healing Non-healing   Wound Odor Strong   Pressure Injury Stage U   Wound Follow Up   Follow up needed Yes   Comfort and Environment Interventions   Specialty Bed/Mattress Other (Comment)  (ISOTOUR GEL W AIR PUMP)       WOUND CARE NOTE    History:  Past Medical History[1]  Past Surgical History[2]   Short Social Hx on File[3]    PLAN   Recommendations:  Dietary consult for recommendations for nutrition to optimize wound healing  Turn schedules  Specialty bed: ISOTOUR GEL W AIR PUMP  Heels elevated using pillows, heel wedge or heel boots to offload heels  To prevent sliding: decrease head of bed and elevate foot of bed as medical condition tolerates  Prompt incontinence care  Moisture barrier for incontinence. May consider ZINC    Wound(s)  Location: SACRUM, LEFT LAT ANKLE  Topical DAKINS MOIST GAUZE  Dressings DRY GAUZE  Secure with BORDERED FOAM  Frequency Q 12 H   Location: RIGHT HEEL  Topical BETADINE  Dressings DRY GAUZE AND FOAM HEEL CUP  Secure with KERLIX ROLL  Frequency DAILY     OBJECTIVE   RN Consult new  Reason for Consult SACRUM, ANKLE AND HEEL      ASSESSMENT   Azam Score:  Azam Scale Score: 15    Chart Reviewed: yes    Wound(s):  Pt seen for above wounds which were  home, present on admit from home. Air pump applied to gel mattress. The sacrum has slough and malodor, recommend dakins moist dressings for odor and slough management. The left lateral ankle has adherent slough in the wound bed, dakins moist gauze for debridement purposes. The right heel has adherent black eschar, recommend betadine, dry gauze and foam heel cup daily. Bilateral heel boots replaced, pillows used for off loading sacrum. Bedside Rn updated to wound care.     Allergies: Patient has no known allergies.    Labs:   Lab Results   Component Value Date    WBC 5.2 07/13/2025    HGB 8.4 (L) 07/14/2025    HCT 26.8 (L) 07/14/2025    .0 07/13/2025    CREATSERUM 0.92 07/13/2025    BUN 19 07/13/2025     07/13/2025    K 3.6 07/14/2025     07/13/2025    CO2 30.0 07/13/2025     (H) 07/13/2025    CA 8.8 07/13/2025    ALB 2.9 (L) 07/13/2025    ALKPHO 61 07/13/2025    BILT 0.4 07/13/2025    TP 6.0 07/13/2025    AST 19 07/13/2025    ALT 19 07/13/2025    LIP 53 01/04/2025    MG 1.9 02/20/2025    PHOS 2.9 02/17/2025     No results found for: \"PREALBUMIN\"      Time Spent 30 Minutes.    Sophia Sánchez, RN, BSN, [facility] Wound [facility]  [facility]  [phone]         [1]   Past Medical History:   Asthma (HCC)    Coronary atherosclerosis    Diabetes (HCC)    diabetes for 2 yrs    Essential hypertension    Glaucoma    right    Heart attack (HCC)    2005    High blood pressure    High cholesterol    Hyperlipidemia    Osteoarthritis    Sleep apnea    cpap    Stroke (HCC)    30 yrs ago    Visual impairment    left eye edema   [2]   Past Surgical History:  Procedure Laterality Date    Anesth,vitrectomy Left 09/18/2017    Pars plana vitrectomy, internal limiting membrane peel, left eye.    Cath bare metal stent (bms)      Colonoscopy      Hysterectomy      Total abdom hysterectomy      Tubal ligation     [3]   Social History  Socioeconomic History    Marital status: Single   Tobacco  Use    Smoking status: Former     Current packs/day: 0.00     Types: Cigarettes     Start date: 1976     Quit date: 1980     Years since quittin.8    Smokeless tobacco: Never   Vaping Use    Vaping status: Never Used   Substance and Sexual Activity    Alcohol use: No    Drug use: No     Social Drivers of Health     Food Insecurity: No Food Insecurity (2025)    NCSS - Food Insecurity     Worried About Running Out of Food in the Last Year: No     Ran Out of Food in the Last Year: No   Transportation Needs: No Transportation Needs (2025)    NCSS - Transportation     Lack of Transportation: No   Housing Stability: Not At Risk (2025)    NCSS - Housing/Utilities     Has Housing: Yes     Worried About Losing Housing: No     Unable to Get Utilities: No

## 2025-07-14 NOTE — DISCHARGE INSTRUCTIONS
[facility]  Phone: [phone]  Fax: [phone]    Get complete  blood count , basic metabolic panel  next week with your primary care physician.     Call MD or  come to emergency room for any fever chills chest pain shortness of breath or any new symptom

## 2025-07-14 NOTE — PLAN OF CARE
Problem: Patient Centered Care  Goal: Patient preferences are identified and integrated in the patient's plan of care  Description: Interventions:  - What would you like us to know as we care for you? History of CVA with aphasia  - Provide timely, complete, and accurate information to patient/family  - Incorporate patient and family knowledge, values, beliefs, and cultural backgrounds into the planning and delivery of care  - Encourage patient/family to participate in care and decision-making at the level they choose  - Honor patient and family perspectives and choices  Outcome: Progressing     Problem: PAIN - ADULT  Goal: Verbalizes/displays adequate comfort level or patient's stated pain goal  Description: INTERVENTIONS:  - Encourage pt to monitor pain and request assistance  - Assess pain using appropriate pain scale  - Administer analgesics based on type and severity of pain and evaluate response  - Implement non-pharmacological measures as appropriate and evaluate response  - Consider cultural and social influences on pain and pain management  - Manage/alleviate anxiety  - Utilize distraction and/or relaxation techniques  - Monitor for opioid side effects  - Notify MD/LIP if interventions unsuccessful or patient reports new pain  - Anticipate increased pain with activity and pre-medicate as appropriate  Outcome: Progressing     Problem: RISK FOR INFECTION - ADULT  Goal: Absence of fever/infection during anticipated neutropenic period  Description: INTERVENTIONS  - Monitor WBC  - Administer growth factors as ordered  - Implement neutropenic guidelines  Outcome: Progressing     Problem: SAFETY ADULT - FALL  Goal: Free from fall injury  Description: INTERVENTIONS:  - Assess pt frequently for physical needs  - Identify cognitive and physical deficits and behaviors that affect risk of falls.  - [place] fall precautions as indicated by assessment.  - Educate pt/family on patient safety including physical  limitations  - Instruct pt to call for assistance with activity based on assessment  - Modify environment to reduce risk of injury  - Provide assistive devices as appropriate  - Consider OT/PT consult to assist with strengthening/mobility  - Encourage toileting schedule  Outcome: Progressing     Problem: DISCHARGE PLANNING  Goal: Discharge to home or other facility with appropriate resources  Description: INTERVENTIONS:  - Identify barriers to discharge w/pt and caregiver  - Include patient/family/discharge partner in discharge planning  - Arrange for needed discharge resources and transportation as appropriate  - Identify discharge learning needs (meds, wound care, etc)  - Arrange for interpreters to assist at discharge as needed  - Consider post-discharge preferences of patient/family/discharge partner  - Complete POLST form as appropriate  - Assess patient's ability to be responsible for managing their own health  - Refer to Case Management Department for coordinating discharge planning if the patient needs post-hospital services based on physician/LIP order or complex needs related to functional status, cognitive ability or social support system  Outcome: Progressing     Problem: Altered Communication/Language Barrier  Goal: Patient/Family is able to understand and participate in their care  Description: Interventions:  - Assess communication ability and preferred communication style  - Implement communication aides and strategies  - Use visual cues when possible  - Listen attentively, be patient, do not interrupt  - Minimize distractions  - Allow time for understanding and response  - Establish method for patient to ask for assistance (call light)  - Provide an  as needed  - Communicate barriers and strategies to overcome with those who interact with patient  Outcome: Progressing     Problem: GASTROINTESTINAL - ADULT  Goal: Maintains adequate nutritional intake (undernourished)  Description:  INTERVENTIONS:  - Monitor percentage of each meal consumed  - Identify factors contributing to decreased intake, treat as appropriate  - Assist with meals as needed  - Monitor I&O, WT and lab values  - Obtain nutritional consult as needed  - Optimize oral hygiene and moisture  - Encourage food from home; allow for food preferences  - Enhance eating environment  Outcome: Progressing     Problem: GENITOURINARY - ADULT  Goal: Absence of urinary retention  Description: INTERVENTIONS:  - Assess patient’s ability to void and empty bladder  - Monitor intake/output and perform bladder scan as needed  - Follow urinary retention protocol/standard of care  - Consider collaborating with pharmacy to review patient's medication profile  - Implement strategies to promote bladder emptying  Outcome: Progressing     Problem: METABOLIC/FLUID AND ELECTROLYTES - ADULT  Goal: Glucose maintained within prescribed range  Description: INTERVENTIONS:  - Monitor Blood Glucose as ordered  - Assess for signs and symptoms of hyperglycemia and hypoglycemia  - Administer ordered medications to maintain glucose within target range  - Assess barriers to adequate nutritional intake and initiate nutrition consult as needed  - Instruct patient on self management of diabetes  Outcome: Progressing  Goal: Electrolytes maintained within normal limits  Description: INTERVENTIONS:  - Monitor labs and rhythm and assess patient for signs and symptoms of electrolyte imbalances  - Administer electrolyte replacement as ordered  - Monitor response to electrolyte replacements, including rhythm and repeat lab results as appropriate  - Fluid restriction as ordered  - Instruct patient on fluid and nutrition restrictions as appropriate  Outcome: Progressing  Goal: Hemodynamic stability and optimal renal function maintained  Description: INTERVENTIONS:  - Monitor labs and assess for signs and symptoms of volume excess or deficit  - Monitor intake, output and patient  weight  - Monitor urine specific gravity, serum osmolarity and serum sodium as indicated or ordered  - Monitor response to interventions for patient's volume status, including labs, urine output, blood pressure (other measures as available)  - Encourage oral intake as appropriate  - Instruct patient on fluid and nutrition restrictions as appropriate  Outcome: Progressing     Problem: SKIN/TISSUE INTEGRITY - ADULT  Goal: Skin integrity remains intact  Description: INTERVENTIONS  - Assess and document risk factors for pressure ulcer development  - Assess and document skin integrity  - Monitor for areas of redness and/or skin breakdown  - Initiate interventions, skin care algorithm/standards of care as needed  Outcome: Progressing  Goal: Incision(s), wounds(s) or drain site(s) healing without S/S of infection  Description: INTERVENTIONS:  - Assess and document risk factors for pressure ulcer development  - Assess and document skin integrity  - Assess and document dressing/incision, wound bed, drain sites and surrounding tissue  - Implement wound care per orders  - Initiate isolation precautions as appropriate  - Initiate Pressure Ulcer prevention bundle as indicated  Outcome: Progressing  Goal: Oral mucous membranes remain intact  Description: INTERVENTIONS  - Assess oral mucosa and hygiene practices  - Implement preventative oral hygiene regimen  - Implement oral medicated treatments as ordered  Outcome: Progressing

## 2025-07-14 NOTE — PLAN OF CARE
Problem: Patient Centered Care  Goal: Patient preferences are identified and integrated in the patient's plan of care  Description: Interventions:  - What would you like us to know as we care for you? History of CVA with aphasia  - Provide timely, complete, and accurate information to patient/family  - Incorporate patient and family knowledge, values, beliefs, and cultural backgrounds into the planning and delivery of care  - Encourage patient/family to participate in care and decision-making at the level they choose  - Honor patient and family perspectives and choices  Outcome: Progressing     Problem: PAIN - ADULT  Goal: Verbalizes/displays adequate comfort level or patient's stated pain goal  Description: INTERVENTIONS:  - Encourage pt to monitor pain and request assistance  - Assess pain using appropriate pain scale  - Administer analgesics based on type and severity of pain and evaluate response  - Implement non-pharmacological measures as appropriate and evaluate response  - Consider cultural and social influences on pain and pain management  - Manage/alleviate anxiety  - Utilize distraction and/or relaxation techniques  - Monitor for opioid side effects  - Notify MD/LIP if interventions unsuccessful or patient reports new pain  - Anticipate increased pain with activity and pre-medicate as appropriate  Outcome: Progressing     Problem: RISK FOR INFECTION - ADULT  Goal: Absence of fever/infection during anticipated neutropenic period  Description: INTERVENTIONS  - Monitor WBC  - Administer growth factors as ordered  - Implement neutropenic guidelines  Outcome: Progressing     Problem: SAFETY ADULT - FALL  Goal: Free from fall injury  Description: INTERVENTIONS:  - Assess pt frequently for physical needs  - Identify cognitive and physical deficits and behaviors that affect risk of falls.  - [place] fall precautions as indicated by assessment.  - Educate pt/family on patient safety including physical  limitations  - Instruct pt to call for assistance with activity based on assessment  - Modify environment to reduce risk of injury  - Provide assistive devices as appropriate  - Consider OT/PT consult to assist with strengthening/mobility  - Encourage toileting schedule  Outcome: Progressing     Problem: DISCHARGE PLANNING  Goal: Discharge to home or other facility with appropriate resources  Description: INTERVENTIONS:  - Identify barriers to discharge w/pt and caregiver  - Include patient/family/discharge partner in discharge planning  - Arrange for needed discharge resources and transportation as appropriate  - Identify discharge learning needs (meds, wound care, etc)  - Arrange for interpreters to assist at discharge as needed  - Consider post-discharge preferences of patient/family/discharge partner  - Complete POLST form as appropriate  - Assess patient's ability to be responsible for managing their own health  - Refer to Case Management Department for coordinating discharge planning if the patient needs post-hospital services based on physician/LIP order or complex needs related to functional status, cognitive ability or social support system  Outcome: Progressing     Problem: Altered Communication/Language Barrier  Goal: Patient/Family is able to understand and participate in their care  Description: Interventions:  - Assess communication ability and preferred communication style  - Implement communication aides and strategies  - Use visual cues when possible  - Listen attentively, be patient, do not interrupt  - Minimize distractions  - Allow time for understanding and response  - Establish method for patient to ask for assistance (call light)  - Provide an  as needed  - Communicate barriers and strategies to overcome with those who interact with patient  Outcome: Progressing     Problem: GASTROINTESTINAL - ADULT  Goal: Maintains adequate nutritional intake (undernourished)  Description:  INTERVENTIONS:  - Monitor percentage of each meal consumed  - Identify factors contributing to decreased intake, treat as appropriate  - Assist with meals as needed  - Monitor I&O, WT and lab values  - Obtain nutritional consult as needed  - Optimize oral hygiene and moisture  - Encourage food from home; allow for food preferences  - Enhance eating environment  Outcome: Progressing     Problem: GENITOURINARY - ADULT  Goal: Absence of urinary retention  Description: INTERVENTIONS:  - Assess patient’s ability to void and empty bladder  - Monitor intake/output and perform bladder scan as needed  - Follow urinary retention protocol/standard of care  - Consider collaborating with pharmacy to review patient's medication profile  - Implement strategies to promote bladder emptying  Outcome: Progressing     Problem: METABOLIC/FLUID AND ELECTROLYTES - ADULT  Goal: Glucose maintained within prescribed range  Description: INTERVENTIONS:  - Monitor Blood Glucose as ordered  - Assess for signs and symptoms of hyperglycemia and hypoglycemia  - Administer ordered medications to maintain glucose within target range  - Assess barriers to adequate nutritional intake and initiate nutrition consult as needed  - Instruct patient on self management of diabetes  Outcome: Progressing  Goal: Electrolytes maintained within normal limits  Description: INTERVENTIONS:  - Monitor labs and rhythm and assess patient for signs and symptoms of electrolyte imbalances  - Administer electrolyte replacement as ordered  - Monitor response to electrolyte replacements, including rhythm and repeat lab results as appropriate  - Fluid restriction as ordered  - Instruct patient on fluid and nutrition restrictions as appropriate  Outcome: Progressing  Goal: Hemodynamic stability and optimal renal function maintained  Description: INTERVENTIONS:  - Monitor labs and assess for signs and symptoms of volume excess or deficit  - Monitor intake, output and patient  weight  - Monitor urine specific gravity, serum osmolarity and serum sodium as indicated or ordered  - Monitor response to interventions for patient's volume status, including labs, urine output, blood pressure (other measures as available)  - Encourage oral intake as appropriate  - Instruct patient on fluid and nutrition restrictions as appropriate  Outcome: Progressing     Problem: SKIN/TISSUE INTEGRITY - ADULT  Goal: Skin integrity remains intact  Description: INTERVENTIONS  - Assess and document risk factors for pressure ulcer development  - Assess and document skin integrity  - Monitor for areas of redness and/or skin breakdown  - Initiate interventions, skin care algorithm/standards of care as needed  Outcome: Progressing  Goal: Incision(s), wounds(s) or drain site(s) healing without S/S of infection  Description: INTERVENTIONS:  - Assess and document risk factors for pressure ulcer development  - Assess and document skin integrity  - Assess and document dressing/incision, wound bed, drain sites and surrounding tissue  - Implement wound care per orders  - Initiate isolation precautions as appropriate  - Initiate Pressure Ulcer prevention bundle as indicated  Outcome: Progressing  Goal: Oral mucous membranes remain intact  Description: INTERVENTIONS  - Assess oral mucosa and hygiene practices  - Implement preventative oral hygiene regimen  - Implement oral medicated treatments as ordered  Outcome: Progressing

## 2025-07-14 NOTE — CM/SW NOTE
SW received MDO for HHE/ Current with HH services. Per care everywhere, patient is arranged with [facility]. SW sent a referral via aidin to [facility]. SW was informed that patient is current with [facility] for RN/PT. SW entered [facility].     Plan: Pending medical clearance, DC to Home with [facility], [facility] entered.     SW/CM to remain available for support and/or discharge planning.     Meghna RUIZ, MSW, LSW   x [phone]

## 2025-07-14 NOTE — PROGRESS NOTES
[facility]  part of [facility]    Progress Note    Eliud Fine Patient Status:  Inpatient    1955 MRN [id number]   Location [facility] 5SW/SE Attending Angelina Deal MD   Hosp Day # 1 PCP Yao Rodriguez       SUBJECTIVE:  Alert.  Smiling.  Patient at the time of examination was eating.  She was being prescribed.  I am the PCP reported to me that patient is eating well today    OBJECTIVE:  Vital signs in last 24 hours:  /58 (BP Location: Right arm)   Pulse 84   Temp 97.5 °F (36.4 °C) (Oral)   Resp 18   Ht 5' 7\" (1.702 m)   Wt 115 lb 6 oz (52.3 kg)   SpO2 100%   BMI 18.07 kg/m²     Intake/Output:    Intake/Output Summary (Last 24 hours) at 2025 1520  Last data filed at 2025 0853  Gross per 24 hour   Intake 1037.5 ml   Output 60 ml   Net 977.5 ml       Wt Readings from Last 3 Encounters:   25 115 lb 6 oz (52.3 kg)   25 131 lb 6.3 oz (59.6 kg)   25 131 lb 4.8 oz (59.6 kg)       Exam  Gen: No acute distress, alert and oriented x3,  HEENT: Pallor noted  Pulm: Lungs clear, normal respiratory effort  CV: Heart with regular rate and rhythm, no peripheral edema  Abd: Abdomen soft, nontender, nondistended, no organomegaly, bowel sounds present  MSK: Full range of motion in extremities, no clubbing, no cyanosis  Skin: no rashes or lesions  CNS: Alert    Data Review:     Labs:   Lab Results   Component Value Date    HGB 8.4 2025    HCT 26.8 2025    K 3.6 2025       LABS  Recent Labs   Lab 25  0133 25  0155 25  0959 25  1204   RBC 2.86* 2.98*  --   --    HGB 6.4* 6.6* 8.8* 8.4*   HCT 21.0* 21.8* 27.6* 26.8*   MCV 73.4* 73.2*  --   --    MCH 22.4* 22.1*  --   --    MCHC 30.5* 30.3*  --   --    RDW 18.3* 18.4*  --   --    NEPRELIM 3.57 3.31  --   --    WBC 5.3 5.2  --   --    .0 354.0  --   --    AST 20 19  --   --    ALT 16 19  --   --    * 124*  --   --        Imaging:      Meds:    Current Hospital Medications[1]    Assessment  Problem List[2]    Plan:   Continue IV antibiotics.  Will check a stool for occult blood.  Hemoglobin is stable.  Discussed with the son.     Active Orders   LAB    Basic Metabolic Panel (8)     Frequency: Tomorrow AM draw     Number of Occurrences: 1 Occurrences    Basic Metabolic Panel (8)     Frequency: Tomorrow AM draw     Number of Occurrences: 1 Occurrences    CBC With Differential With Platelet     Frequency: Tomorrow AM draw     Number of Occurrences: 1 Occurrences    CBC With Differential With Platelet     Frequency: Tomorrow AM draw     Number of Occurrences: 1 Occurrences   Nourishments    Dietary Nutrition Supplements TID     Frequency: TID     Number of Occurrences: Until Specified     Order Comments: Vanilla SF mighty shake BID (@ bfast and lunch) and vanilla magic cup daily (@ dinner)     Microbiology    Occult Blood Stool, Diagnostic     Frequency: Once     Number of Occurrences: 1 Occurrences    Occult Blood Stool, Diagnostic     Frequency: Once     Number of Occurrences: 1 Occurrences   Diet    Regular/General diet Fluid Consistency: Nectar Thick / Mildly Thick Liquids; Texture Consistency: Pureed; Is Patient on Accuchecks? Yes; Misc Restriction: No Straw     Frequency: Effective Now     Number of Occurrences: Until Specified     Order Comments: meds crushed in puree     Nursing    Accucheck     Frequency: PRN     Number of Occurrences: Until Specified     Order Comments: For symptoms or suspicion of hypoglycemia      Accucheck     Frequency: TID AC and HS     Number of Occurrences: Until Specified    Apply dressing Other (Betadine with Dry) Daily     Frequency: Daily     Number of Occurrences: Until Specified     Order Comments: ~Paint RIGHT HEEL wound with Betadine and apply dry dressing daily and PRN      Apply dressing Other (Dakins) Q12H     Frequency: Q12H     Number of Occurrences: Until Specified     Order Comments: ~ Obtain Dakins from  Pharmacy  ~ Dakin Instructions to__LEFT LATERAL ANKLE, SACRUM_________  ~ Remove old dressingS  ~ Dampen gauze with Dakin Solution  ~ Dressing change Q 12 hours and PRN  ~ Make sure to pack wound depth or tunnels  ~ Cover with dry gauze and secure in place W BORDERED FOAMS      Apply dressing Other (Sensicare Ointment) PRN     Frequency: PRN     Number of Occurrences: Until Specified     Order Comments: ~ Obtain Zinc/Protective Ointment from [facility]  ~ Clean area with soap and water  ~ Apply to ___PERI AREA______ BID and PRN      Apply Heel Boot     Frequency: Until Discontinued     Number of Occurrences: Until Specified     Order Comments: Remove boot every shift to asses skin.  Send boot with patient on transfer/discharge.      Discontinue all oral diabetic agents     Frequency: Once     Number of Occurrences: 1 Occurrences    Elevate heels off of bed     Frequency: Until Discontinued     Number of Occurrences: Until Specified    Incontinence Care     Frequency: PRN     Number of Occurrences: Until Specified     Order Comments: Prompt      Initiate electrolyte protocol     Frequency: Until Discontinued     Number of Occurrences: Until Specified    Initiate Hypoglycemia Algorithm for Adults     Frequency: Until Discontinued     Number of Occurrences: Until Specified     Order Comments: For blood glucose less than 70      Lotion to skin     Frequency: Daily     Number of Occurrences: Until Specified    Nursing communication (specify)     Frequency: Until Discontinued     Number of Occurrences: Until Specified     Order Comments: AIR PUMP TO ISOTOUR GEL MATTRESS      Please communicate patient's home diabetic medications when speaking with physician     Frequency: Once     Number of Occurrences: 1 Occurrences    Provide diabetes patient education guide     Frequency: Once     Number of Occurrences: 1 Occurrences     Order Comments: Initiate education for new onset diabetes or pre-existing diabetes with  knowledge deficits.      Teach blood glucose monitoring with bedside glucometer     Frequency: Once     Number of Occurrences: 1 Occurrences     Order Comments: If patient does not have a home glucometer, notify physician to order for discharge home.      Transfuse Orders to be completed     Frequency: If condition met     Number of Occurrences: Until Specified     Order Comments: Outstanding Blood Transfusion orders      Turn and reposition patient     Frequency: Q2H     Number of Occurrences: 2 Weeks   Medications    acetaminophen (Tylenol) tab 650 mg     Frequency: Q6H PRN     Dose: 650 mg     Route: Oral    aspirin chewable tab 81 mg     Frequency: Daily     Dose: 81 mg     Route: Oral    atorvastatin (Lipitor) tab 40 mg     Frequency: Nightly     Dose: 40 mg     Route: Oral    carvedilol (Coreg) tab 25 mg     Frequency: QAM     Dose: 25 mg     Route: Oral    cefTRIAXone (Rocephin) 1 g in sodium chloride 0.9% 100 mL IVPB-ADDV     Frequency: Q24H     Dose: 1 g     Route: Intravenous    dextrose 5%-sodium chloride 0.45% infusion     Frequency: Continuous     Route: Intravenous    dextrose 50% injection 50 mL     Linked Order: Or     Frequency: Q15 Min PRN     Dose: 50 mL     Route: Intravenous    glucose (Dex4) 15 GM/59ML oral liquid 15 g     Linked Order: Or     Frequency: Q15 Min PRN     Dose: 15 g     Route: Oral    glucose (Dex4) 15 GM/59ML oral liquid 30 g     Linked Order: Or     Frequency: Q15 Min PRN     Dose: 30 g     Route: Oral    glucose (Glutose) 40% oral gel 15 g     Linked Order: Or     Frequency: Q15 Min PRN     Dose: 15 g     Route: Oral    glucose (Glutose) 40% oral gel 30 g     Linked Order: Or     Frequency: Q15 Min PRN     Dose: 30 g     Route: Oral    glucose-vitamin C (Dex-4) chewable tab 4 tablet     Linked Order: Or     Frequency: Q15 Min PRN     Dose: 4 tablet     Route: Oral    glucose-vitamin C (Dex-4) chewable tab 8 tablet     Linked Order: Or     Frequency: Q15 Min PRN     Dose: 8  tablet     Route: Oral    hydrALAzine (Apresoline) 20 mg/mL injection 10 mg     Frequency: Q6H PRN     Dose: 10 mg     Route: Intravenous    losartan (Cozaar) tab 25 mg     Frequency: Daily     Dose: 25 mg     Route: Oral    pantoprazole (Protonix) 40 mg in sodium chloride 0.9% PF 10 mL IV push     Frequency: Daily     Dose: 40 mg     Route: Intravenous    sodium hypochlorite (Dakin's) 0.125 % external solution     Frequency: Q12H     Route: Topical       Angelina Deal MD  7/14/2025  3:20 PM         [1]   Current Facility-Administered Medications   Medication Dose Route Frequency    sodium hypochlorite (Dakin's) 0.125 % external solution   Topical Q12H    cefTRIAXone (Rocephin) 1 g in sodium chloride 0.9% 100 mL IVPB-ADDV  1 g Intravenous Q24H    glucose (Dex4) 15 GM/59ML oral liquid 15 g  15 g Oral Q15 Min PRN    Or    glucose (Glutose) 40% oral gel 15 g  15 g Oral Q15 Min PRN    Or    glucose-vitamin C (Dex-4) chewable tab 4 tablet  4 tablet Oral Q15 Min PRN    Or    dextrose 50% injection 50 mL  50 mL Intravenous Q15 Min PRN    Or    glucose (Dex4) 15 GM/59ML oral liquid 30 g  30 g Oral Q15 Min PRN    Or    glucose (Glutose) 40% oral gel 30 g  30 g Oral Q15 Min PRN    Or    glucose-vitamin C (Dex-4) chewable tab 8 tablet  8 tablet Oral Q15 Min PRN    acetaminophen (Tylenol) tab 650 mg  650 mg Oral Q6H PRN    [Held by provider] aspirin chewable tab 81 mg  81 mg Oral Daily    atorvastatin (Lipitor) tab 40 mg  40 mg Oral Nightly    losartan (Cozaar) tab 25 mg  25 mg Oral Daily    carvedilol (Coreg) tab 25 mg  25 mg Oral QAM    hydrALAzine (Apresoline) 20 mg/mL injection 10 mg  10 mg Intravenous Q6H PRN    pantoprazole (Protonix) 40 mg in sodium chloride 0.9% PF 10 mL IV push  40 mg Intravenous Daily    dextrose 5%-sodium chloride 0.45% infusion   Intravenous Continuous   [2]   Patient Active Problem List  Diagnosis    Vision loss, left eye    Cerebrovascular accident (CVA), unspecified mechanism (HCC)     Essential hypertension    COVID-19    Right sided weakness    Aphasia due to recent stroke    Cerebral amyloid angiopathy (HCC)    Toxic encephalopathy    Hypertensive urgency    Acute chest pain    Renal artery stenosis    Cystitis    PAPO (acute kidney injury)    Primary hypertension    Uncontrolled hypertension    CVA (cerebral vascular accident) (HCC)    Acute CVA (cerebrovascular accident) (HCC)    Slurred speech    Weakness of right upper extremity    Dehydration    Failure to thrive in adult    Troponin level elevated    Hyperkalemia    Goals of care, counseling/discussion    Advance care planning    Palliative care by specialist    Acute cystitis without hematuria    Severe anemia

## 2025-07-14 NOTE — DIETARY NOTE
ADULT NUTRITION INITIAL ASSESSMENT    Pt is at high nutrition risk.  Pt meets severe malnutrition criteria.      CRITERIA FOR MALNUTRITION DIAGNOSIS:  Criteria for severe malnutrition diagnosis: chronic illness related to wt loss greater than 10% in 6 months, energy intake less than 75% for greater than 1 month, and body fat moderate depletion, and muscle mass moderate depletion.     RECOMMENDATIONS TO MD: See nutrition intervention for ONS (oral nutrition supplements)    ADMITTING DIAGNOSIS:  Acute cystitis without hematuria [N30.00]  Severe anemia [D64.9]  PERTINENT PAST MEDICAL HISTORY: Past Medical History[1]    PATIENT STATUS: Initial 07/14/25: Pt assessed due to screening at risk, low BMI of 18.1 and MST score of 3 for weight loss and poor PO/appetite. Noted multiple pressure injuries per flowsheet. Pt admitted to ED for decreased mental status. PMH of T2DM, CAD, HLD, osteoarthritis, and hx of CVA. Noted pt with expressive aphasia. SLP recommended puree diet with nectar thick/mildly thick liquids. Met with pt bedside, pt unable to provide hx. Completed NFPE, see below. Noted pt eating 10-75% PO x last 4 meals. Called pt's son Gokul, who provided hx on the pt. Son reports pt has has an ok appetite. He noticed she began eating less starting in February. He typically feeds pt soft foods such as mac n cheese, mashed potatoes, apple sauce, etc. He reports pt has lost quite a bit of weight, he reports a UBW of ~160 lbs. Per EMR review, pt last weighed 165 lbs 1/14/25, current weight at 115 lbs. 30% weight loss in 6 months, significant and severe. Discussed ONS, son reports pt drinks ensure at home. Agreeable to magic cup daily and SF mighty shake BID (vanilla). Will monitor PO + ONS intake.     FOOD/NUTRITION RELATED HISTORY:  Appetite: Fair  Intake: 10-75% PO x last 4 meals (44% avg. PO)   Intake Meeting Needs: Marginal, added oral nutrition supplements (ONS)  Percent Meals Eaten (last 6 days)       Date/Time  Percent Meals Eaten (%)    07/13/25 1026 60 %    07/13/25 1415 30 %    07/13/25 1900 10 %    07/14/25 0853 75 %           Food Allergies: No Known Food Allergies (NKFA)  Cultural/Ethnic/Faith Preferences: Not Obtained    GASTROINTESTINAL: Swallow evaluation noted    MEDICATIONS: reviewed  Scheduled Medications[2]  Medication Infusions[3]     LABS: reviewed  POC gluc x 24 hrs-> 125-184   Recent Labs     07/13/25  0133 07/13/25  0155 07/14/25  0738   * 124*  --    BUN 18 19  --    CREATSERUM 0.84 0.92  --    CA 7.9* 8.8  --     142  --    K 3.4* 3.5 3.6    105  --    CO2 29.0 30.0  --    OSMOCALC 301* 298*  --        WEIGHT HISTORY:  Patient Weight(s) for the past 336 hrs:   Weight   07/13/25 0330 52.3 kg (115 lb 6 oz)     Wt Readings from Last 10 Encounters:   07/13/25 52.3 kg (115 lb 6 oz)   03/13/25 59.6 kg (131 lb 6.3 oz)   02/20/25 59.6 kg (131 lb 4.8 oz)   01/30/25 68 kg (150 lb)   01/14/25 74.8 kg (165 lb)   01/04/25 72.6 kg (160 lb)   11/11/24 74.8 kg (165 lb)   11/06/24 70.3 kg (155 lb)   09/27/24 73.9 kg (162 lb 14.4 oz)   07/08/24 77.6 kg (171 lb)       ANTHROPOMETRICS:  HT: 170.2 cm (5' 7\")  Wt Readings from Last 1 Encounters:   07/13/25 52.3 kg (115 lb 6 oz)     Last weight: Likely accurate  BMI: Body mass index is 18.07 kg/m².  Dosing Weight: 52.3 kg - admission weight, utilized for anthropometric calculations  BMI CLASSIFICATION: less than 18.5 kg/m2 - underweight  IBW/lbs (Calculated) Female: 135 lbs             86% IBW  Usual Body Wt: ~160 lbs       72% UBW    NUTRITION RELATED PHYSICAL FINDINGS:  - Nutrition Focused Physical Exam (NFPE): moderate depletion body fat triceps region and moderate depletion muscle mass temple region, clavicle region, scapular region, shoulder region, and calf region  - Fluid Accumulation: none . See RN documentation for details  - Skin Integrity: Multiple pressure injuries (not staged) -> PI on right ankle, right heel, left buttocks, and sacrum. See  RN documentation for details      NUTRITION DIAGNOSIS/PROBLEM:   Severe Malnutrition related to Chronic - Physiological causes resulting in anorexia or diminished intake as evidenced by 30% weight loss in 6 months, energy intake <75% for greater than 1 month, body fat moderate depletion, muscle mass moderate depletion, and low BMI of 18.     NUTRITION INTERVENTION:     NUTRITION PRESCRIPTION:   Estimated Nutrition needs: --dosing wt of 52.3 kg - wt taken on 7/13/25  Calories: 8350-1987 calories/day (30-32 calories per kg Dosing wt)  Protein: 63-78 g protein/day (1.2-1.5 g protein/kg Dosing wt)  Fluid Needs: 1 mL/kcal    - Diet:       Procedures    Regular/General diet Fluid Consistency: Nectar Thick / Mildly Thick Liquids; Texture Consistency: Pureed; Is Patient on Accuchecks? Yes; Misc Restriction: No Straw      - Nutrition Care Plan: Initiated ONS (oral nutritional supplements)  - ONS (Oral Nutrition Supplements)/Meals/Snacks: Magic Cup (290 calories/ 9 g protein each) Daily Vanilla and SF Shake (200 calories/ 7 g protein each) BID Vanilla   - Vitamin and mineral supplements: none  - Feeding assistance: meal set up and feed  - Nutrition education: not appropriate at this time    - Coordination of nutrition care: collaboration with other providers  - Discharge and transfer of nutrition care to new setting or provider: monitor plans    MONITOR AND EVALUATE/NUTRITION GOALS:  - Food and Nutrient Intake:      Monitor: adequacy of PO intake and adequacy of supplement intake  - Food and Nutrient Administration:      Monitor: N/A  - Anthropometric Measurement:    Monitor weight  - Nutrition Goals:      halt wt loss, PO and supplement greater than 75% of needs, intake to meet needs, good supplement intake, euglycemia, minimize lean body mass loss, prevent skin breakdown, promote healing, support wound healing, and support body systems    DIETITIAN FOLLOW UP: RD to follow and monitor nutrition status    Lauren Lion MS,  RDN, ADRIENNEN  Clinical Dietitian  [phone]          [1]   Past Medical History:   Asthma (HCC)    Coronary atherosclerosis    Diabetes (HCC)    diabetes for 2 yrs    Essential hypertension    Glaucoma    right    Heart attack (HCC)    2005    High blood pressure    High cholesterol    Hyperlipidemia    Osteoarthritis    Sleep apnea    cpap    Stroke (HCC)    30 yrs ago    Visual impairment    left eye edema   [2]    [Held by provider] aspirin  81 mg Oral Daily    atorvastatin  40 mg Oral Nightly    losartan  25 mg Oral Daily    carvedilol  25 mg Oral QAM    pantoprazole  40 mg Intravenous Daily   [3]    dextrose 5%-sodium chloride 0.45% 75 mL/hr (07/14/25 0946)

## 2025-07-14 NOTE — PAYOR COMM NOTE
--------------  ADMISSION REVIEW     Payor: MARJORIE DAVE [facility] [place]  Subscriber #:  [id number]  Authorization Number: [id number]    Admit date: 7/13/25  Admit time:  3:23 AM       REVIEW DOCUMENTATION:     ED Provider Notes        ED Provider Notes signed by Joao Nascimento MD at 7/13/2025  7:25 AM      Patient Seen in: [facility] Emergency Department    History     Chief Complaint   Patient presents with    Hypertension       HPI    Patient presents to the ED for decreased mental status.  Family called and stated that patient has not been talking today which she usually does.  History of UTIs in the past which has caused similar symptoms.    History reviewed. Past Medical History  Past Medical History:   Asthma (HCC)    Coronary atherosclerosis    Diabetes (HCC)    diabetes for 2 yrs    Essential hypertension    Glaucoma    right    Heart attack (HCC)    2005    High blood pressure    High cholesterol    Hyperlipidemia    Osteoarthritis    Sleep apnea    cpap    Stroke (HCC)    30 yrs ago    Visual impairment    left eye edema     Physical Exam     ED Triage Vitals [07/13/25 0046]   BP (!) 181/71   Pulse 84   Resp 18   Temp 99.8 °F (37.7 °C)   Temp src Temporal   SpO2 99 %   O2 Device None (Room air)       All measures to prevent infection transmission during my interaction with the patient were taken. Handwashing was performed prior to and after the exam.  Stethoscope and any equipment used during my examination was cleaned with super sani-cloth germicidal wipes following the exam.     Physical Exam  Vitals and nursing note reviewed.   Constitutional:       General: She is not in acute distress.     Appearance: She is well-developed. She is not ill-appearing or toxic-appearing.   HENT:      Head: Normocephalic and atraumatic.   Eyes:      General:         Right eye: No discharge.         Left eye: No discharge.      Conjunctiva/sclera: Conjunctivae normal.   Neck:      Trachea: No tracheal deviation.    Cardiovascular:      Rate and Rhythm: Normal rate and regular rhythm.   Pulmonary:      Effort: Pulmonary effort is normal. No respiratory distress.      Breath sounds: No stridor.   Abdominal:      General: There is no distension.      Palpations: Abdomen is soft.   Musculoskeletal:         General: No deformity.   Skin:     General: Skin is warm and dry.   Neurological:      Comments: Very minimal verbal response.  Says name x 1.  1 yes answer to a question.         ED Course        Labs Reviewed   URINALYSIS WITH CULTURE REFLEX - Abnormal; Notable for the following components:       Result Value    Clarity Urine Ex.Turbid (*)     Ketones Urine Trace (*)     Protein Urine 30 (*)     Urobilinogen Urine 6 (*)     Leukocyte Esterase Urine 75 (*)     WBC Urine 6-10 (*)     RBC Urine 6-10 (*)     Bacteria Urine 3+ (*)     Squamous Epi. Cells Many (*)     All other components within normal limits   CBC WITH DIFFERENTIAL WITH PLATELET - Abnormal; Notable for the following components:    RBC 2.86 (*)     HGB 6.4 (*)     HCT 21.0 (*)     MCV 73.4 (*)     MCH 22.4 (*)     MCHC 30.5 (*)     RDW-SD 49.0 (*)     RDW 18.3 (*)     All other components within normal limits   COMP METABOLIC PANEL (14) - Abnormal; Notable for the following components:    Glucose 117 (*)     Potassium 3.4 (*)     BUN/CREA Ratio 21.4 (*)     Calcium, Total 7.9 (*)     Calculated Osmolality 301 (*)     Albumin 2.8 (*)     All other components within normal limits   CBC WITH DIFFERENTIAL WITH PLATELET - Abnormal; Notable for the following components:    RBC 2.98 (*)     HGB 6.6 (*)     HCT 21.8 (*)     MCV 73.2 (*)     MCH 22.1 (*)     MCHC 30.3 (*)     RDW-SD 48.6 (*)     RDW 18.4 (*)     All other components within normal limits   COMP METABOLIC PANEL (14) - Abnormal; Notable for the following components:    Glucose 124 (*)     BUN/CREA Ratio 20.7 (*)     Calculated Osmolality 298 (*)     Albumin 2.9 (*)     A/G Ratio 0.9 (*)     All other  components within normal limits   POCT GLUCOSE - Abnormal; Notable for the following components:    POC Glucose  132 (*)     All other components within normal limits   MD BLOOD SMEAR CONSULT   HEMOGLOBIN A1C   TYPE AND SCREEN    Narrative:     The following orders were created for panel order Type and screen.                  Procedure                               Abnormality         Status                                     ---------                               -----------         ------                                     ABORH (Blood Type)[[id number]]                               Final result                               Antibody Screen[[id number]]                                  Final result                                                 Please view results for these tests on the individual orders.   PREPARE RBC   ABORH (BLOOD TYPE)   ANTIBODY SCREEN   URINE CULTURE, ROUTINE   OCCULT BLOOD, STOOL       As Interpreted by me    Imaging Results Available and Reviewed while in ED: No results found.  ED Medications Administered:   Medications   glucose (Dex4) 15 GM/59ML oral liquid 15 g (has no administration in time range)     Or   glucose (Glutose) 40% oral gel 15 g (has no administration in time range)     Or   glucose-vitamin C (Dex-4) chewable tab 4 tablet (has no administration in time range)     Or   dextrose 50% injection 50 mL (has no administration in time range)     Or   glucose (Dex4) 15 GM/59ML oral liquid 30 g (has no administration in time range)     Or   glucose (Glutose) 40% oral gel 30 g (has no administration in time range)     Or   glucose-vitamin C (Dex-4) chewable tab 8 tablet (has no administration in time range)   acetaminophen (Tylenol) tab 650 mg (has no administration in time range)   aspirin chewable tab 81 mg ( Oral Automatically Held 7/16/25 0930)   atorvastatin (Lipitor) tab 40 mg (has no administration in time range)   losartan (Cozaar) tab 25 mg (has no administration in time  range)   carvedilol (Coreg) tab 25 mg (has no administration in time range)   hydrALAzine (Apresoline) 20 mg/mL injection 10 mg (10 mg Intravenous Given 7/13/25 0615)   pantoprazole (Protonix) 40 mg in sodium chloride 0.9% PF 10 mL IV push (40 mg Intravenous Given 7/13/25 0614)   dextrose 5%-sodium chloride 0.45% infusion (has no administration in time range)   acetaminophen (Tylenol) rectal suppository 650 mg (650 mg Rectal Given 7/13/25 0134)   cefTRIAXone (Rocephin) 1 g in sodium chloride 0.9% 100 mL IVPB-ADDV (0 g Intravenous Stopped 7/13/25 0258)         MDM     Vitals:    07/13/25 0548 07/13/25 0605 07/13/25 0620 07/13/25 0648   BP: (!) 167/60 (!) 182/57 (!) 161/67 (!) 135/95   BP Location:   Right arm    Pulse: 69 65 71 82   Resp: 16 16  17   Temp: 98.4 °F (36.9 °C) 99 °F (37.2 °C)  98.9 °F (37.2 °C)   TempSrc: Axillary Axillary  Axillary   SpO2: 100% 97%  100%   Weight:       Height:         *I personally reviewed and interpreted all ED vitals.    Pulse Ox: 100%, Room air, Normal     Monitor Interpretation:   normal sinus rhythm as interpreted by me.  The cardiac monitor was ordered to monitor heart rate.    Differential Diagnosis/ Diagnostic Considerations: Altered mental status, UTI, other    Complicating Factors: The patient already has does not have any pertinent problems on file. to contribute to the complexity of this ED evaluation.    Medical Decision Making  Patient presents to the ED with decreased mental status from her baseline.  Evidence for urinary tract infection on workup.  Patient also more anemic than usual.  Hemoglobin 6.6, usually 7-8.  Plan for admission for IV antibiotics and for a blood transfusion.  I discussed with Dr. Deal for admission.    Problems Addressed:  Acute cystitis without hematuria: acute illness or injury  Severe anemia: chronic illness or injury with exacerbation, progression, or side effects of treatment    Amount and/or Complexity of Data Reviewed  Independent  Historian:      Details: Family provides history details  Labs: ordered. Decision-making details documented in ED Course.  Discussion of management or test interpretation with external provider(s): I discussed with Dr. Deal for admission        Condition upon leaving the department: Stable    Disposition and Plan     Clinical Impression:  1. Acute cystitis without hematuria    2. Severe anemia        Disposition:  Admit    Follow-up:  No follow-up provider specified.    Medications Prescribed:  Current Discharge Medication List          Hospital Problems       Present on Admission  Date Reviewed: 11/11/2024          ICD-10-CM Noted POA    * (Principal) Acute cystitis without hematuria N30.00 7/13/2025 Unknown    Severe anemia D64.9 7/13/2025 Unknown          Signed by Joao Nascimento MD on 7/13/2025  7:25 AM         History and Physical    H&P signed by Angelina Deal MD at 7/14/2025  5:50 AM    [facility]  part of [facility]     History & Physical        Date:  7/13/2025  Date of Admission:  7/13/2025     History provided by: Review of the medical records.     Chief Complaint:          Chief Complaint   Patient presents with    Hypertension   Altered mental status     HPI:   Eliud Fine is a(n) 70 year old female hypertension, diabetes mellitus type 2, coronary artery disease, hyperlipidemia, osteoarthritis, history of CVA, also to the emergency room by the family because they noticed her to have some change in mental status.  And patient although responding.  In the concern that patient could be having UTI.  Patient usually has a change in mental status when she has UTI.  Patient is otherwise comfortable.  Nursing staff reports no complaints.        Assessment/Plan:     Acute cystitis without hematuria  Continue antibiotics     Altered mental status.  Most likely acute toxic metabolic encephalopathy secondary to UTI.          Severe anemia  Most likely anemia of the chronic  disease.  Will evaluate for any bleeding.  No signs of bleeding.     Diabetes mellitus type 2.  Continue insulin.     Coronary artery disease.  Stable.     Hyperlipidemia continue statin.                       MEDICATIONS ADMINISTERED IN LAST 1 DAY:  atorvastatin (Lipitor) tab 40 mg       Date Action Dose Route User    7/13/2025 2057 Given 40 mg Oral Emilee Posadas RN          carvedilol (Coreg) tab 25 mg       Date Action Dose Route User    7/14/2025 0827 Given 25 mg Oral Sharon Bronson RN          dextrose 5%-sodium chloride 0.45% infusion       Date Action Dose Route User    7/14/2025 0946 New Bag 75 mL/hr Intravenous Sharon Bronson RN    7/13/2025 2102 New Bag (none) Intravenous Emilee Posadas RN          hydrALAzine (Apresoline) 20 mg/mL injection 10 mg       Date Action Dose Route User    7/14/2025 0509 Given 10 mg Intravenous Jennifer Hutson RN    7/13/2025 2103 Given 10 mg Intravenous Emilee Posadas RN          losartan (Cozaar) tab 25 mg       Date Action Dose Route User    7/14/2025 0827 Given 25 mg Oral Sharon Bronson RN          pantoprazole (Protonix) 40 mg in sodium chloride 0.9% PF 10 mL IV push       Date Action Dose Route User    7/14/2025 0827 Given 40 mg Intravenous Sharon Bronson RN            Vitals (last day)       Date/Time Temp Pulse Resp BP SpO2 Weight O2 Device O2 Flow Rate (L/min) [facility]    07/14/25 1154 -- 69 -- 134/60 -- -- -- -- [place]    07/14/25 0905 -- 84 -- -- -- -- -- --     07/14/25 0900 -- 84 -- -- -- -- -- -- [place]    07/14/25 0826 98.6 °F (37 °C) 76 18 163/69 100 % -- None (Room air) -- [place]    07/14/25 0500 97.8 °F (36.6 °C) 77 16 163/67 100 % -- None (Room air) -- [place]    07/13/25 2225 -- -- -- 143/70 -- -- -- --     07/13/25 2053 98.4 °F (36.9 °C) 83 16 184/85 100 % -- None (Room air) -- SM    07/13/25 1415 98.6 °F (37 °C) 75 16 149/59 98 % -- None (Room air) -- MB    07/13/25 0855 98.3 °F (36.8 °C) 76 16 174/76 99 % -- -- -- MB    07/13/25 0748 97.4 °F (36.3 °C) 82  16 178/78 100 % -- -- -- MB    07/13/25 0648 98.9 °F (37.2 °C) 82 17 135/95 100 % -- -- --     07/13/25 0620 -- 71 -- 161/67 -- -- -- --     07/13/25 0605 99 °F (37.2 °C) 65 16 182/57 97 % -- None (Room air) --     07/13/25 0548 98.4 °F (36.9 °C) 69 16 167/60 100 % -- None (Room air) --     07/13/25 0344 -- -- -- 161/66 -- -- -- --     07/13/25 0333 98.3 °F (36.8 °C) -- -- -- -- -- -- --     07/13/25 0330 -- -- -- -- -- 115 lb 6 oz (52.3 kg) -- --     07/13/25 0330 -- 72 -- 171/61 98 % -- None (Room air) --     07/13/25 0259 -- -- -- -- -- -- None (Room air) --     07/13/25 0220 -- 75 15 172/68 99 % -- None (Room air) --     07/13/25 0130 -- 83 20 148/75 100 % -- -- --     07/13/25 0100 -- 76 15 139/63 99 % -- -- --     07/13/25 0048 100.2 °F (37.9 °C) -- -- -- -- -- -- -- AW    07/13/25 0046 99.8 °F (37.7 °C) 84 18 181/71 99 % -- None (Room air) -- AW              Blood Transfusion Record       Product Unit Status Volume Start End            Transfuse RBC       25  [id number]  [id number] Completed 07/13/25 0855 456.25 mL 07/13/25 0550 07/13/25 0855

## 2025-07-14 NOTE — PHYSICAL THERAPY NOTE
PHYSICAL THERAPY EVALUATION - INPATIENT     Room Number: 565/565-A  Evaluation Date: 7/14/2025  Type of Evaluation: Initial   Physician Order: PT Eval and Treat    Presenting Problem: AMS / HTN / dehydration /UTI and severe anemia .   PMH : DM / CAD / HTN / HL / OA / CVA --w/c bound on admission dependent ADL and mobility needs     Reason for Therapy: Mobility Dysfunction and Discharge Planning    PHYSICAL THERAPY ASSESSMENT   Chart reviewed , Co session with OT .  RN approve participation.  Pt resting in bed . Pt is awake remaining overall non verbal throughout session , yes no unreliable. Therapy contacting pt son who reports he is the primary caregiver for home situation / PLF and family goals of care .     Gokul Fine Son   [phone]     Patient is a 70 year old female admitted 7/13/2025 for Presenting Problem: AMS / HTN / dehydration /UTI and severe anemia .   PMH : DM / CAD / HTN / HL / OA / CVA --w/c bound on admission dependent ADL and mobility needs.  Prior to admission, patient's baseline is dependent ADL and fxn mobility . Family uses a andrew lift for transfers ,Family has hospital bed and w/c .  Son also reports pt intermittently sits in recliner chair .  Son reports they lift pt in a w/c up the stairs to the second floor where pt stays.   Son reports he is the primary cg with a goal for pt to return to home setting with the family.        Patient is currently functioning it appears at  her baseline with bed mobility and transfers.  Patient is requiring dependent as a result of the following impairments: decreased functional strength, decreased endurance/aerobic capacity, impaired sit balance, decreased muscular endurance, cognitive deficits (non verbal / awake / hx of CVA ), medical status, and limited B UE and LE  ROM.  Physical Therapy will continue to follow for duration of hospitalization.    Pt will require 24hr skilled care at [facility] . Pt will benefit from frequent turning and  skin protection  mattress/ cushions.  Pt currently wearing blue protected boots on B distal LE .  Patient will benefit from continued skilled PT Services of  PT for family training and assess other equipment .   Therapy will follow up as needed for family training to facilitate DC back to home setting.   Per son goal is for pt to return home.   No family present during session --communication was via phone.   Discussed via phone briefly with son importance of frequent turning for skin protection and possible need for specialty mattress and cushions for skin breakdown protection.   consult recommended to help pt /pt family with optimal DC Plans.   Recommend medical transport for pt if plan remains for DC to home setting.      PLAN DURING HOSPITALIZATION  Nursing Mobility Recommendation : Lift Equipment  PT Device Recommendation: Mechanical lift  PT Treatment Plan: Bed mobility, Range of motion  Rehab Potential : Guarded  Frequency (Obs):  (f/u prn this admission for family training and readiness for DC to home setting)     PHYSICAL THERAPY MEDICAL/SOCIAL HISTORY   History related to current admission:     From primary care :     HPI:   Eliud Fine is a(n) 70 year old female hypertension, diabetes mellitus type 2, coronary artery disease, hyperlipidemia, osteoarthritis, history of CVA, also to the emergency room by the family because they noticed her to have some change in mental status.  And patient although responding.  In the concern that patient could be having UTI.  Patient usually has a change in mental status when she has UTI.  Patient is otherwise comfortable.  Nursing staff reports no complaints.    ssessment/Plan:     Acute cystitis without hematuria  Continue antibiotics     Altered mental status.  Most likely acute toxic metabolic encephalopathy secondary to UTI.          Severe anemia  Most likely anemia of the chronic disease.  Will evaluate for any bleeding.  No signs of bleeding.     Diabetes mellitus type  2.  Continue insulin.     Coronary artery disease.  Stable.     Hyperlipidemia continue statin.                 Problem List  Principal Problem:    Acute cystitis without hematuria  Active Problems:    Severe anemia      HOME SITUATION  Type of Home: House  Home Layout: Multi-level (per son , pt lives on second floor of the home .  Family lifts pt up / down stairs with w/c per son)                     Lives With: Family (son is patient primary cg)    Drives: No   Patient Regularly Uses: Mechanical lift         SUBJECTIVE  Non verbal     PHYSICAL THERAPY EXAMINATION   OBJECTIVE  Precautions: Cardiac, Bed/chair alarm (Skin protection precautions)  Fall Risk: High fall risk    WEIGHT BEARING RESTRICTION       PAIN ASSESSMENT  Rating: Unable to rate          COGNITION  Overall Cognitive Status:  Impaired    RANGE OF MOTION AND STRENGTH ASSESSMENT    Pt with hx of CVA ( expressive / global aphasia ??)   presenting with abnormal tone and loss of ROM in B UE and LE .  Pt resistant to movements of B UE with loss of B shoulder and elbow  ROM observed with attempts at moving UE .   Pt resistant to movements with increase tone left LE , right LE with less tone noted --- gentle PROM of B LE provided with respect for tone and pt resistance.    Pt did not volitionally move UE or LE during session.         BALANCE  Static Sitting: Dependent  Dynamic Sitting: Not tested  Static Standing: Not tested  Dynamic Standing: Not tested  NEUROLOGICAL FINDINGS      HX of CVA with global aphasia observed   Loss of B UE and LE ROM and strength with abnormal tone   Pt remained overall nonverbal throughout session    This therapist is not aware of pt baseline , pt unable to provide , call with son . Pt required use of andrew lift at [facility] for mobility  w/c and bed bound.                  ACTIVITY TOLERANCE  Pulse: 84        BP:  (encouraged RN to recheck BP as assessed during session using LE  212/77  RN informed and will reassess)              O2 WALK  Oxygen Therapy  SPO2% on Room Air at Rest: 100    AM-PAC '6-Clicks' INPATIENT SHORT FORM - BASIC MOBILITY  How much difficulty does the patient currently have...  Patient Difficulty: Turning over in bed (including adjusting bedclothes, sheets and blankets)?: A Lot   Patient Difficulty: Sitting down on and standing up from a chair with arms (e.g., wheelchair, bedside commode, etc.): Unable   Patient Difficulty: Moving from lying on back to sitting on the side of the bed?: A Lot   How much help from another person does the patient currently need...   Help from Another: Moving to and from a bed to a chair (including a wheelchair)?: Total   Help from Another: Need to walk in hospital room?: Total   Help from Another: Climbing 3-5 steps with a railing?: Total     AM-PAC Score:  Raw Score: 8   Approx Degree of Impairment: 86.62%   Standardized Score (AM-PAC Scale): 28.58   CMS Modifier (G-Code): CM    FUNCTIONAL ABILITY STATUS  Functional Mobility/Gait Assessment  Gait Assistance: Not tested  Rolling: dependent  Supine to Sit: dependent  Sit to Supine: dependent---Pt sat briefly at EOB with max support required.         Skilled Therapy Provided: PT jose burrell mobility screening , communication with pt son and RN ,       The patient's Approx Degree of Impairment: 86.62% has been calculated based on documentation in the [facility] '6 clicks' Inpatient Basic Mobility Short Form.  Research supports that patients with this level of impairment may benefit from LTAC .  Final disposition will be made by interdisciplinary medical team.    Patient End of Session: In bed, Needs met, Call light within reach, RN aware of session/findings, All patient questions and concerns addressed, Alarm set    CURRENT GOALS  Goals to be met by: 7/30/25  Patient Goal Patient's self-stated goal is: per son family wants pt to return home    Goal #1  Max assist of one for rolling and repositioning    Goal #1   Current Status    Goal #2 Family  training as needed for DC plan of home including discussion of frequent turning , PROM , positioning , equipment needs    Goal #2  Current Status    Goal #3    Goal #3   Current Status    Goal #4    Goal #4   Current Status    Goal #5    Goal #5   Current Status    Goal #6    Goal #6  Current Status      Patient Evaluation Complexity Level:  History Moderate - 1 or 2 personal factors and/or co-morbidities   Examination of body systems Low -  addressing 1-2 elements   Clinical Presentation  Moderate - Evolving   Clinical Decision Making  Low Complexity   PT eval and therapy activity 1 unit

## 2025-07-14 NOTE — PLAN OF CARE
Problem: Patient Centered Care  Goal: Patient preferences are identified and integrated in the patient's plan of care  Description: Interventions:  - What would you like us to know as we care for you? History of CVA with aphasia  - Provide timely, complete, and accurate information to patient/family  - Incorporate patient and family knowledge, values, beliefs, and cultural backgrounds into the planning and delivery of care  - Encourage patient/family to participate in care and decision-making at the level they choose  - Honor patient and family perspectives and choices  Outcome: Progressing     Problem: PAIN - ADULT  Goal: Verbalizes/displays adequate comfort level or patient's stated pain goal  Description: INTERVENTIONS:  - Encourage pt to monitor pain and request assistance  - Assess pain using appropriate pain scale  - Administer analgesics based on type and severity of pain and evaluate response  - Implement non-pharmacological measures as appropriate and evaluate response  - Consider cultural and social influences on pain and pain management  - Manage/alleviate anxiety  - Utilize distraction and/or relaxation techniques  - Monitor for opioid side effects  - Notify MD/LIP if interventions unsuccessful or patient reports new pain  - Anticipate increased pain with activity and pre-medicate as appropriate  Outcome: Progressing     Problem: RISK FOR INFECTION - ADULT  Goal: Absence of fever/infection during anticipated neutropenic period  Description: INTERVENTIONS  - Monitor WBC  - Administer growth factors as ordered  - Implement neutropenic guidelines  Outcome: Progressing     Problem: SAFETY ADULT - FALL  Goal: Free from fall injury  Description: INTERVENTIONS:  - Assess pt frequently for physical needs  - Identify cognitive and physical deficits and behaviors that affect risk of falls.  - [place] fall precautions as indicated by assessment.  - Educate pt/family on patient safety including physical  limitations  - Instruct pt to call for assistance with activity based on assessment  - Modify environment to reduce risk of injury  - Provide assistive devices as appropriate  - Consider OT/PT consult to assist with strengthening/mobility  - Encourage toileting schedule  Outcome: Progressing     Problem: DISCHARGE PLANNING  Goal: Discharge to home or other facility with appropriate resources  Description: INTERVENTIONS:  - Identify barriers to discharge w/pt and caregiver  - Include patient/family/discharge partner in discharge planning  - Arrange for needed discharge resources and transportation as appropriate  - Identify discharge learning needs (meds, wound care, etc)  - Arrange for interpreters to assist at discharge as needed  - Consider post-discharge preferences of patient/family/discharge partner  - Complete POLST form as appropriate  - Assess patient's ability to be responsible for managing their own health  - Refer to Case Management Department for coordinating discharge planning if the patient needs post-hospital services based on physician/LIP order or complex needs related to functional status, cognitive ability or social support system  Outcome: Progressing     Problem: Altered Communication/Language Barrier  Goal: Patient/Family is able to understand and participate in their care  Description: Interventions:  - Assess communication ability and preferred communication style  - Implement communication aides and strategies  - Use visual cues when possible  - Listen attentively, be patient, do not interrupt  - Minimize distractions  - Allow time for understanding and response  - Establish method for patient to ask for assistance (call light)  - Provide an  as needed  - Communicate barriers and strategies to overcome with those who interact with patient  Outcome: Progressing     Problem: GASTROINTESTINAL - ADULT  Goal: Maintains adequate nutritional intake (undernourished)  Description:  INTERVENTIONS:  - Monitor percentage of each meal consumed  - Identify factors contributing to decreased intake, treat as appropriate  - Assist with meals as needed  - Monitor I&O, WT and lab values  - Obtain nutritional consult as needed  - Optimize oral hygiene and moisture  - Encourage food from home; allow for food preferences  - Enhance eating environment  Outcome: Progressing     Problem: GENITOURINARY - ADULT  Goal: Absence of urinary retention  Description: INTERVENTIONS:  - Assess patient’s ability to void and empty bladder  - Monitor intake/output and perform bladder scan as needed  - Follow urinary retention protocol/standard of care  - Consider collaborating with pharmacy to review patient's medication profile  - Implement strategies to promote bladder emptying  Outcome: Progressing     Problem: METABOLIC/FLUID AND ELECTROLYTES - ADULT  Goal: Glucose maintained within prescribed range  Description: INTERVENTIONS:  - Monitor Blood Glucose as ordered  - Assess for signs and symptoms of hyperglycemia and hypoglycemia  - Administer ordered medications to maintain glucose within target range  - Assess barriers to adequate nutritional intake and initiate nutrition consult as needed  - Instruct patient on self management of diabetes  Outcome: Progressing  Goal: Electrolytes maintained within normal limits  Description: INTERVENTIONS:  - Monitor labs and rhythm and assess patient for signs and symptoms of electrolyte imbalances  - Administer electrolyte replacement as ordered  - Monitor response to electrolyte replacements, including rhythm and repeat lab results as appropriate  - Fluid restriction as ordered  - Instruct patient on fluid and nutrition restrictions as appropriate  Outcome: Progressing  Goal: Hemodynamic stability and optimal renal function maintained  Description: INTERVENTIONS:  - Monitor labs and assess for signs and symptoms of volume excess or deficit  - Monitor intake, output and patient  weight  - Monitor urine specific gravity, serum osmolarity and serum sodium as indicated or ordered  - Monitor response to interventions for patient's volume status, including labs, urine output, blood pressure (other measures as available)  - Encourage oral intake as appropriate  - Instruct patient on fluid and nutrition restrictions as appropriate  Outcome: Progressing     Problem: SKIN/TISSUE INTEGRITY - ADULT  Goal: Skin integrity remains intact  Description: INTERVENTIONS  - Assess and document risk factors for pressure ulcer development  - Assess and document skin integrity  - Monitor for areas of redness and/or skin breakdown  - Initiate interventions, skin care algorithm/standards of care as needed  Outcome: Progressing  Goal: Incision(s), wounds(s) or drain site(s) healing without S/S of infection  Description: INTERVENTIONS:  - Assess and document risk factors for pressure ulcer development  - Assess and document skin integrity  - Assess and document dressing/incision, wound bed, drain sites and surrounding tissue  - Implement wound care per orders  - Initiate isolation precautions as appropriate  - Initiate Pressure Ulcer prevention bundle as indicated  Outcome: Progressing  Goal: Oral mucous membranes remain intact  Description: INTERVENTIONS  - Assess oral mucosa and hygiene practices  - Implement preventative oral hygiene regimen  - Implement oral medicated treatments as ordered  Outcome: Progressing

## 2025-07-15 LAB
ANION GAP SERPL CALC-SCNC: 9 MMOL/L (ref 0–18)
BUN BLD-MCNC: 14 MG/DL (ref 9–23)
BUN/CREAT SERPL: 17.7 (ref 10–20)
CALCIUM BLD-MCNC: 8.3 MG/DL (ref 8.7–10.4)
CHLORIDE SERPL-SCNC: 104 MMOL/L (ref 98–112)
CO2 SERPL-SCNC: 25 MMOL/L (ref 21–32)
CREAT BLD-MCNC: 0.79 MG/DL (ref 0.55–1.02)
EGFRCR SERPLBLD CKD-EPI 2021: 79 ML/MIN/1.73M2 (ref 60–?)
GLUCOSE BLD-MCNC: 131 MG/DL (ref 70–99)
GLUCOSE BLDC GLUCOMTR-MCNC: 123 MG/DL (ref 70–99)
GLUCOSE BLDC GLUCOMTR-MCNC: 145 MG/DL (ref 70–99)
GLUCOSE BLDC GLUCOMTR-MCNC: 154 MG/DL (ref 70–99)
OSMOLALITY SERPL CALC.SUM OF ELEC: 288 MOSM/KG (ref 275–295)
POTASSIUM SERPL-SCNC: 3.3 MMOL/L (ref 3.5–5.1)
SODIUM SERPL-SCNC: 138 MMOL/L (ref 136–145)

## 2025-07-15 NOTE — PROGRESS NOTES
[facility]  part of [facility]    Progress Note    Eliud Fine Patient Status:  Inpatient    1955 MRN [id number]   Location [facility] 5SW/SE Attending Angelina Deal MD   Hosp Day # 2 PCP aYo Rodriguez       SUBJECTIVE:  Alert.  Smiling.  Nursing staff reports no complaints  OBJECTIVE:  Vital signs in last 24 hours:  /54 (BP Location: Right arm)   Pulse 90   Temp 98.7 °F (37.1 °C) (Oral)   Resp 18   Ht 5' 7\" (1.702 m)   Wt 115 lb 6 oz (52.3 kg)   SpO2 99%   BMI 18.07 kg/m²     Intake/Output:    Intake/Output Summary (Last 24 hours) at 7/15/2025 1157  Last data filed at 7/15/2025 0758  Gross per 24 hour   Intake 1715.42 ml   Output 330 ml   Net 1385.42 ml       Wt Readings from Last 3 Encounters:   25 115 lb 6 oz (52.3 kg)   25 131 lb 6.3 oz (59.6 kg)   25 131 lb 4.8 oz (59.6 kg)       Exam  Gen: No acute distress, alert  HEENT: Pallor noted  Pulm: Lungs clear, normal respiratory effort  CV: Heart with regular rate and rhythm, no peripheral edema  Abd: Abdomen soft, nontender, nondistended, no organomegaly, bowel sounds present  Skin: no rashes or lesions  CNS: Alert    Data Review:     Labs:   Lab Results   Component Value Date    HGB 8.4 2025    HCT 26.8 2025    CREATSERUM 0.79 07/15/2025    BUN 14 07/15/2025     07/15/2025    K 3.3 07/15/2025     07/15/2025    CO2 25.0 07/15/2025     07/15/2025    CA 8.3 07/15/2025       LABS  Recent Labs   Lab 25  0133 25  0155 25  0959 25  1204 07/15/25  0617   RBC 2.86* 2.98*  --   --   --    HGB 6.4* 6.6* 8.8* 8.4*  --    HCT 21.0* 21.8* 27.6* 26.8*  --    MCV 73.4* 73.2*  --   --   --    MCH 22.4* 22.1*  --   --   --    MCHC 30.5* 30.3*  --   --   --    RDW 18.3* 18.4*  --   --   --    NEPRELIM 3.57 3.31  --   --   --    WBC 5.3 5.2  --   --   --    .0 354.0  --   --   --    AST 20 19  --   --   --    ALT 16 19  --   --   --    GLU  117* 124*  --   --  131*       Imaging:      Meds:   Current Hospital Medications[1]    Assessment  Problem List[2]    Plan:   Continue IV antibiotics.  Will check a stool for occult blood.  Hemoglobin is stable     Active Orders   Nourishments    Dietary Nutrition Supplements TID     Frequency: TID     Number of Occurrences: Until Specified     Order Comments: Vanilla SF mighty shake BID (@ bfast and lunch) and vanilla magic cup daily (@ dinner)     Microbiology    Occult Blood Stool, Diagnostic     Frequency: Once     Number of Occurrences: 1 Occurrences    Occult Blood Stool, Diagnostic     Frequency: Once     Number of Occurrences: 1 Occurrences   Diet    Regular/General diet Fluid Consistency: Nectar Thick / Mildly Thick Liquids; Texture Consistency: Pureed; Is Patient on Accuchecks? Yes; Misc Restriction: No Straw     Frequency: Effective Now     Number of Occurrences: Until Specified     Order Comments: meds crushed in puree     Nursing    Accucheck     Frequency: PRN     Number of Occurrences: Until Specified     Order Comments: For symptoms or suspicion of hypoglycemia      Accucheck     Frequency: TID AC and HS     Number of Occurrences: Until Specified    Apply dressing Other (Betadine with Dry) Daily     Frequency: Daily     Number of Occurrences: Until Specified     Order Comments: ~Paint RIGHT HEEL wound with Betadine and apply dry dressing daily and PRN      Apply dressing Other (Dakins) Q12H     Frequency: Q12H     Number of Occurrences: Until Specified     Order Comments: ~ Obtain Dakins from Pharmacy  ~ Dakin Instructions to__LEFT LATERAL ANKLE, SACRUM_________  ~ Remove old dressingS  ~ Dampen gauze with Dakin Solution  ~ Dressing change Q 12 hours and PRN  ~ Make sure to pack wound depth or tunnels  ~ Cover with dry gauze and secure in place W BORDERED FOAMS      Apply dressing Other (Sensicare Ointment) PRN     Frequency: PRN     Number of Occurrences: Until Specified     Order Comments: ~  Obtain Zinc/Protective Ointment from [facility]  ~ Clean area with soap and water  ~ Apply to ___PERI AREA______ BID and PRN      Apply Heel Boot     Frequency: Until Discontinued     Number of Occurrences: Until Specified     Order Comments: Remove boot every shift to asses skin.  Send boot with patient on transfer/discharge.      Discontinue all oral diabetic agents     Frequency: Once     Number of Occurrences: 1 Occurrences    Elevate heels off of bed     Frequency: Until Discontinued     Number of Occurrences: Until Specified    Incontinence Care     Frequency: PRN     Number of Occurrences: Until Specified     Order Comments: Prompt      Initiate electrolyte protocol     Frequency: Until Discontinued     Number of Occurrences: Until Specified    Initiate Hypoglycemia Algorithm for Adults     Frequency: Until Discontinued     Number of Occurrences: Until Specified     Order Comments: For blood glucose less than 70      Lotion to skin     Frequency: Daily     Number of Occurrences: Until Specified    Nursing communication (specify)     Frequency: Until Discontinued     Number of Occurrences: Until Specified     Order Comments: AIR PUMP TO ISOTOUR GEL MATTRESS      Please communicate patient's home diabetic medications when speaking with physician     Frequency: Once     Number of Occurrences: 1 Occurrences    Provide diabetes patient education guide     Frequency: Once     Number of Occurrences: 1 Occurrences     Order Comments: Initiate education for new onset diabetes or pre-existing diabetes with knowledge deficits.      Teach blood glucose monitoring with bedside glucometer     Frequency: Once     Number of Occurrences: 1 Occurrences     Order Comments: If patient does not have a home glucometer, notify physician to order for discharge home.      Transfuse Orders to be completed     Frequency: If condition met     Number of Occurrences: Until Specified     Order Comments: Outstanding Blood Transfusion  orders      Turn and reposition patient     Frequency: Q2H     Number of Occurrences: 2 Weeks   Medications    acetaminophen (Tylenol) tab 650 mg     Frequency: Q6H PRN     Dose: 650 mg     Route: Oral    aspirin chewable tab 81 mg     Frequency: Daily     Dose: 81 mg     Route: Oral    atorvastatin (Lipitor) tab 40 mg     Frequency: Nightly     Dose: 40 mg     Route: Oral    carvedilol (Coreg) tab 25 mg     Frequency: QAM     Dose: 25 mg     Route: Oral    cefTRIAXone (Rocephin) 1 g in sodium chloride 0.9% 100 mL IVPB-ADDV     Frequency: Q24H     Dose: 1 g     Route: Intravenous    dextrose 5%-sodium chloride 0.45% infusion     Frequency: Continuous     Route: Intravenous    dextrose 50% injection 50 mL     Linked Order: Or     Frequency: Q15 Min PRN     Dose: 50 mL     Route: Intravenous    glucose (Dex4) 15 GM/59ML oral liquid 15 g     Linked Order: Or     Frequency: Q15 Min PRN     Dose: 15 g     Route: Oral    glucose (Dex4) 15 GM/59ML oral liquid 30 g     Linked Order: Or     Frequency: Q15 Min PRN     Dose: 30 g     Route: Oral    glucose (Glutose) 40% oral gel 15 g     Linked Order: Or     Frequency: Q15 Min PRN     Dose: 15 g     Route: Oral    glucose (Glutose) 40% oral gel 30 g     Linked Order: Or     Frequency: Q15 Min PRN     Dose: 30 g     Route: Oral    glucose-vitamin C (Dex-4) chewable tab 4 tablet     Linked Order: Or     Frequency: Q15 Min PRN     Dose: 4 tablet     Route: Oral    glucose-vitamin C (Dex-4) chewable tab 8 tablet     Linked Order: Or     Frequency: Q15 Min PRN     Dose: 8 tablet     Route: Oral    hydrALAzine (Apresoline) 20 mg/mL injection 10 mg     Frequency: Q6H PRN     Dose: 10 mg     Route: Intravenous    losartan (Cozaar) tab 25 mg     Frequency: Daily     Dose: 25 mg     Route: Oral    pantoprazole (Protonix) 40 mg in sodium chloride 0.9% PF 10 mL IV push     Frequency: Daily     Dose: 40 mg     Route: Intravenous    sodium hypochlorite (Dakin's) 0.125 % external solution      Frequency: Q12H     Route: Topical       Angelina Deal MD         [1]   Current Facility-Administered Medications   Medication Dose Route Frequency    sodium hypochlorite (Dakin's) 0.125 % external solution   Topical Q12H    cefTRIAXone (Rocephin) 1 g in sodium chloride 0.9% 100 mL IVPB-ADDV  1 g Intravenous Q24H    glucose (Dex4) 15 GM/59ML oral liquid 15 g  15 g Oral Q15 Min PRN    Or    glucose (Glutose) 40% oral gel 15 g  15 g Oral Q15 Min PRN    Or    glucose-vitamin C (Dex-4) chewable tab 4 tablet  4 tablet Oral Q15 Min PRN    Or    dextrose 50% injection 50 mL  50 mL Intravenous Q15 Min PRN    Or    glucose (Dex4) 15 GM/59ML oral liquid 30 g  30 g Oral Q15 Min PRN    Or    glucose (Glutose) 40% oral gel 30 g  30 g Oral Q15 Min PRN    Or    glucose-vitamin C (Dex-4) chewable tab 8 tablet  8 tablet Oral Q15 Min PRN    acetaminophen (Tylenol) tab 650 mg  650 mg Oral Q6H PRN    [Held by provider] aspirin chewable tab 81 mg  81 mg Oral Daily    atorvastatin (Lipitor) tab 40 mg  40 mg Oral Nightly    losartan (Cozaar) tab 25 mg  25 mg Oral Daily    carvedilol (Coreg) tab 25 mg  25 mg Oral QAM    hydrALAzine (Apresoline) 20 mg/mL injection 10 mg  10 mg Intravenous Q6H PRN    pantoprazole (Protonix) 40 mg in sodium chloride 0.9% PF 10 mL IV push  40 mg Intravenous Daily    dextrose 5%-sodium chloride 0.45% infusion   Intravenous Continuous   [2]   Patient Active Problem List  Diagnosis    Vision loss, left eye    Cerebrovascular accident (CVA), unspecified mechanism (HCC)    Essential hypertension    COVID-19    Right sided weakness    Aphasia due to recent stroke    Cerebral amyloid angiopathy (HCC)    Toxic encephalopathy    Hypertensive urgency    Acute chest pain    Renal artery stenosis    Cystitis    PAPO (acute kidney injury)    Primary hypertension    Uncontrolled hypertension    CVA (cerebral vascular accident) (HCC)    Acute CVA (cerebrovascular accident) (HCC)    Slurred speech    Weakness of right  upper extremity    Dehydration    Failure to thrive in adult    Troponin level elevated    Hyperkalemia    Goals of care, counseling/discussion    Advance care planning    Palliative care by specialist    Acute cystitis without hematuria    Severe anemia

## 2025-07-15 NOTE — PLAN OF CARE
Problem: Patient Centered Care  Goal: Patient preferences are identified and integrated in the patient's plan of care  Description: Interventions:  - What would you like us to know as we care for you? History of CVA with aphasia  - Provide timely, complete, and accurate information to patient/family  - Incorporate patient and family knowledge, values, beliefs, and cultural backgrounds into the planning and delivery of care  - Encourage patient/family to participate in care and decision-making at the level they choose  - Honor patient and family perspectives and choices  Outcome: Progressing     Problem: PAIN - ADULT  Goal: Verbalizes/displays adequate comfort level or patient's stated pain goal  Description: INTERVENTIONS:  - Encourage pt to monitor pain and request assistance  - Assess pain using appropriate pain scale  - Administer analgesics based on type and severity of pain and evaluate response  - Implement non-pharmacological measures as appropriate and evaluate response  - Consider cultural and social influences on pain and pain management  - Manage/alleviate anxiety  - Utilize distraction and/or relaxation techniques  - Monitor for opioid side effects  - Notify MD/LIP if interventions unsuccessful or patient reports new pain  - Anticipate increased pain with activity and pre-medicate as appropriate  Outcome: Progressing     Problem: RISK FOR INFECTION - ADULT  Goal: Absence of fever/infection during anticipated neutropenic period  Description: INTERVENTIONS  - Monitor WBC  - Administer growth factors as ordered  - Implement neutropenic guidelines  Outcome: Progressing     Problem: SAFETY ADULT - FALL  Goal: Free from fall injury  Description: INTERVENTIONS:  - Assess pt frequently for physical needs  - Identify cognitive and physical deficits and behaviors that affect risk of falls.  - [place] fall precautions as indicated by assessment.  - Educate pt/family on patient safety including physical  limitations  - Instruct pt to call for assistance with activity based on assessment  - Modify environment to reduce risk of injury  - Provide assistive devices as appropriate  - Consider OT/PT consult to assist with strengthening/mobility  - Encourage toileting schedule  Outcome: Progressing     Problem: DISCHARGE PLANNING  Goal: Discharge to home or other facility with appropriate resources  Description: INTERVENTIONS:  - Identify barriers to discharge w/pt and caregiver  - Include patient/family/discharge partner in discharge planning  - Arrange for needed discharge resources and transportation as appropriate  - Identify discharge learning needs (meds, wound care, etc)  - Arrange for interpreters to assist at discharge as needed  - Consider post-discharge preferences of patient/family/discharge partner  - Complete POLST form as appropriate  - Assess patient's ability to be responsible for managing their own health  - Refer to Case Management Department for coordinating discharge planning if the patient needs post-hospital services based on physician/LIP order or complex needs related to functional status, cognitive ability or social support system  Outcome: Progressing     Problem: Altered Communication/Language Barrier  Goal: Patient/Family is able to understand and participate in their care  Description: Interventions:  - Assess communication ability and preferred communication style  - Implement communication aides and strategies  - Use visual cues when possible  - Listen attentively, be patient, do not interrupt  - Minimize distractions  - Allow time for understanding and response  - Establish method for patient to ask for assistance (call light)  - Provide an  as needed  - Communicate barriers and strategies to overcome with those who interact with patient  Outcome: Progressing     Problem: GASTROINTESTINAL - ADULT  Goal: Maintains adequate nutritional intake (undernourished)  Description:  INTERVENTIONS:  - Monitor percentage of each meal consumed  - Identify factors contributing to decreased intake, treat as appropriate  - Assist with meals as needed  - Monitor I&O, WT and lab values  - Obtain nutritional consult as needed  - Optimize oral hygiene and moisture  - Encourage food from home; allow for food preferences  - Enhance eating environment  Outcome: Progressing     Problem: GENITOURINARY - ADULT  Goal: Absence of urinary retention  Description: INTERVENTIONS:  - Assess patient’s ability to void and empty bladder  - Monitor intake/output and perform bladder scan as needed  - Follow urinary retention protocol/standard of care  - Consider collaborating with pharmacy to review patient's medication profile  - Implement strategies to promote bladder emptying  Outcome: Progressing     Problem: METABOLIC/FLUID AND ELECTROLYTES - ADULT  Goal: Glucose maintained within prescribed range  Description: INTERVENTIONS:  - Monitor Blood Glucose as ordered  - Assess for signs and symptoms of hyperglycemia and hypoglycemia  - Administer ordered medications to maintain glucose within target range  - Assess barriers to adequate nutritional intake and initiate nutrition consult as needed  - Instruct patient on self management of diabetes  Outcome: Progressing  Goal: Electrolytes maintained within normal limits  Description: INTERVENTIONS:  - Monitor labs and rhythm and assess patient for signs and symptoms of electrolyte imbalances  - Administer electrolyte replacement as ordered  - Monitor response to electrolyte replacements, including rhythm and repeat lab results as appropriate  - Fluid restriction as ordered  - Instruct patient on fluid and nutrition restrictions as appropriate  Outcome: Progressing  Goal: Hemodynamic stability and optimal renal function maintained  Description: INTERVENTIONS:  - Monitor labs and assess for signs and symptoms of volume excess or deficit  - Monitor intake, output and patient  weight  - Monitor urine specific gravity, serum osmolarity and serum sodium as indicated or ordered  - Monitor response to interventions for patient's volume status, including labs, urine output, blood pressure (other measures as available)  - Encourage oral intake as appropriate  - Instruct patient on fluid and nutrition restrictions as appropriate  Outcome: Progressing     Problem: SKIN/TISSUE INTEGRITY - ADULT  Goal: Skin integrity remains intact  Description: INTERVENTIONS  - Assess and document risk factors for pressure ulcer development  - Assess and document skin integrity  - Monitor for areas of redness and/or skin breakdown  - Initiate interventions, skin care algorithm/standards of care as needed  Outcome: Progressing  Goal: Incision(s), wounds(s) or drain site(s) healing without S/S of infection  Description: INTERVENTIONS:  - Assess and document risk factors for pressure ulcer development  - Assess and document skin integrity  - Assess and document dressing/incision, wound bed, drain sites and surrounding tissue  - Implement wound care per orders  - Initiate isolation precautions as appropriate  - Initiate Pressure Ulcer prevention bundle as indicated  Outcome: Progressing  Goal: Oral mucous membranes remain intact  Description: INTERVENTIONS  - Assess oral mucosa and hygiene practices  - Implement preventative oral hygiene regimen  - Implement oral medicated treatments as ordered  Outcome: Progressing

## 2025-07-15 NOTE — PAYOR COMM NOTE
--------------  CONTINUED STAY REVIEW FOR 7/14 7/15      Payor: [employer]  Subscriber #:  [id number]  Authorization Number: [id number]    Admit date: 7/13/25  Admit time:  3:23 AM    7/14           Date of Service: 7/14/2025  3:20 PM     Signed            [facility]  part of [facility]     Progress Note           SUBJECTIVE:  Alert.  Smiling.  Patient at the time of examination was eating.  She was being prescribed.  I am the PCP reported to me that patient is eating well today     OBJECTIVE:  Vital signs in last 24 hours:  /58 (BP Location: Right arm)   Pulse 84   Temp 97.5 °F (36.4 °C) (Oral)   Resp 18   Ht 5' 7\" (1.702 m)   Wt 115 lb 6 oz (52.3 kg)   SpO2 100%   BMI 18.07 kg/m²      Intake/Output:     Intake/Output Summary (Last 24 hours) at 7/14/2025 1520  Last data filed at 7/14/2025 0853      Gross per 24 hour   Intake 1037.5 ml   Output 60 ml   Net 977.5 ml             Wt Readings from Last 3 Encounters:   07/13/25 115 lb 6 oz (52.3 kg)   03/13/25 131 lb 6.3 oz (59.6 kg)   02/20/25 131 lb 4.8 oz (59.6 kg)         Exam  Gen: No acute distress, alert and oriented x3,  HEENT: Pallor noted  Pulm: Lungs clear, normal respiratory effort  CV: Heart with regular rate and rhythm, no peripheral edema  Abd: Abdomen soft, nontender, nondistended, no organomegaly, bowel sounds present  MSK: Full range of motion in extremities, no clubbing, no cyanosis  Skin: no rashes or lesions  CNS: Alert     Data Review:      Labs:         Lab Results   Component Value Date     HGB 8.4 07/14/2025     HCT 26.8 07/14/2025     K 3.6 07/14/2025         LABS         Recent Labs   Lab 07/13/25  0133 07/13/25  0155 07/13/25  0959 07/14/25  1204   RBC 2.86* 2.98*  --   --    HGB 6.4* 6.6* 8.8* 8.4*   HCT 21.0* 21.8* 27.6* 26.8*   MCV 73.4* 73.2*  --   --    MCH 22.4* 22.1*  --   --    MCHC 30.5* 30.3*  --   --    RDW 18.3* 18.4*  --   --    NEPRELIM 3.57 3.31  --   --    WBC 5.3 5.2  --   --    .0  354.0  --   --    AST 20 19  --   --    ALT 16 19  --   --    * 124*  --   --          Imaging:        Meds:   [Current Hospital Medications]    [Current Hospital Medications]         Current Facility-Administered Medications   Medication Dose Route Frequency    sodium hypochlorite (Dakin's) 0.125 % external solution   Topical Q12H    cefTRIAXone (Rocephin) 1 g in sodium chloride 0.9% 100 mL IVPB-ADDV  1 g Intravenous Q24H    glucose (Dex4) 15 GM/59ML oral liquid 15 g  15 g Oral Q15 Min PRN     Or    glucose (Glutose) 40% oral gel 15 g  15 g Oral Q15 Min PRN     Or    glucose-vitamin C (Dex-4) chewable tab 4 tablet  4 tablet Oral Q15 Min PRN     Or    dextrose 50% injection 50 mL  50 mL Intravenous Q15 Min PRN     Or    glucose (Dex4) 15 GM/59ML oral liquid 30 g  30 g Oral Q15 Min PRN     Or    glucose (Glutose) 40% oral gel 30 g  30 g Oral Q15 Min PRN     Or    glucose-vitamin C (Dex-4) chewable tab 8 tablet  8 tablet Oral Q15 Min PRN    acetaminophen (Tylenol) tab 650 mg  650 mg Oral Q6H PRN    [Held by provider] aspirin chewable tab 81 mg  81 mg Oral Daily    atorvastatin (Lipitor) tab 40 mg  40 mg Oral Nightly    losartan (Cozaar) tab 25 mg  25 mg Oral Daily    carvedilol (Coreg) tab 25 mg  25 mg Oral QAM    hydrALAzine (Apresoline) 20 mg/mL injection 10 mg  10 mg Intravenous Q6H PRN    pantoprazole (Protonix) 40 mg in sodium chloride 0.9% PF 10 mL IV push  40 mg Intravenous Daily    dextrose 5%-sodium chloride 0.45% infusion   Intravenous Continuous        Assessment  [Problem List]    [Problem List]  Patient Active Problem List      Diagnosis    Vision loss, left eye    Cerebrovascular accident (CVA), unspecified mechanism (HCC)    Essential hypertension    COVID-19    Right sided weakness    Aphasia due to recent stroke    Cerebral amyloid angiopathy (HCC)    Toxic encephalopathy    Hypertensive urgency    Acute chest pain    Renal artery stenosis    Cystitis    PAPO (acute kidney injury)    Primary  hypertension    Uncontrolled hypertension    CVA (cerebral vascular accident) (HCC)    Acute CVA (cerebrovascular accident) (HCC)    Slurred speech    Weakness of right upper extremity    Dehydration    Failure to thrive in adult    Troponin level elevated    Hyperkalemia    Goals of care, counseling/discussion    Advance care planning    Palliative care by specialist    Acute cystitis without hematuria    Severe anemia        Plan:   Continue IV antibiotics.  Will check a stool for occult blood.  Hemoglobin is stable.  Discussed with the son.                        Angelina Deal MD  7/14/2025  3:20 PM                    7/15            Date of Service: 7/15/2025 11:57 AM     Signed            [facility]  part of [facility]     Progress Note           SUBJECTIVE:  Alert.  Smiling.  Nursing staff reports no complaints  OBJECTIVE:  Vital signs in last 24 hours:  /54 (BP Location: Right arm)   Pulse 90   Temp 98.7 °F (37.1 °C) (Oral)   Resp 18   Ht 5' 7\" (1.702 m)   Wt 115 lb 6 oz (52.3 kg)   SpO2 99%   BMI 18.07 kg/m²      Intake/Output:     Intake/Output Summary (Last 24 hours) at 7/15/2025 1157  Last data filed at 7/15/2025 0758      Gross per 24 hour   Intake 1715.42 ml   Output 330 ml   Net 1385.42 ml             Wt Readings from Last 3 Encounters:   07/13/25 115 lb 6 oz (52.3 kg)   03/13/25 131 lb 6.3 oz (59.6 kg)   02/20/25 131 lb 4.8 oz (59.6 kg)         Exam  Gen: No acute distress, alert  HEENT: Pallor noted  Pulm: Lungs clear, normal respiratory effort  CV: Heart with regular rate and rhythm, no peripheral edema  Abd: Abdomen soft, nontender, nondistended, no organomegaly, bowel sounds present  Skin: no rashes or lesions  CNS: Alert     Data Review:      Labs:         Lab Results   Component Value Date     HGB 8.4 07/14/2025     HCT 26.8 07/14/2025     CREATSERUM 0.79 07/15/2025     BUN 14 07/15/2025      07/15/2025     K 3.3 07/15/2025      07/15/2025      CO2 25.0 07/15/2025      07/15/2025     CA 8.3 07/15/2025         LABS          Recent Labs   Lab 07/13/25  0133 07/13/25  0155 07/13/25  0959 07/14/25  1204 07/15/25  0617   RBC 2.86* 2.98*  --   --   --    HGB 6.4* 6.6* 8.8* 8.4*  --    HCT 21.0* 21.8* 27.6* 26.8*  --    MCV 73.4* 73.2*  --   --   --    MCH 22.4* 22.1*  --   --   --    MCHC 30.5* 30.3*  --   --   --    RDW 18.3* 18.4*  --   --   --    NEPRELIM 3.57 3.31  --   --   --    WBC 5.3 5.2  --   --   --    .0 354.0  --   --   --    AST 20 19  --   --   --    ALT 16 19  --   --   --    * 124*  --   --  131*         Imaging:        Meds:   [Current Hospital Medications]    [Current Hospital Medications]         Current Facility-Administered Medications   Medication Dose Route Frequency    sodium hypochlorite (Dakin's) 0.125 % external solution   Topical Q12H    cefTRIAXone (Rocephin) 1 g in sodium chloride 0.9% 100 mL IVPB-ADDV  1 g Intravenous Q24H    glucose (Dex4) 15 GM/59ML oral liquid 15 g  15 g Oral Q15 Min PRN     Or    glucose (Glutose) 40% oral gel 15 g  15 g Oral Q15 Min PRN     Or    glucose-vitamin C (Dex-4) chewable tab 4 tablet  4 tablet Oral Q15 Min PRN     Or    dextrose 50% injection 50 mL  50 mL Intravenous Q15 Min PRN     Or    glucose (Dex4) 15 GM/59ML oral liquid 30 g  30 g Oral Q15 Min PRN     Or    glucose (Glutose) 40% oral gel 30 g  30 g Oral Q15 Min PRN     Or    glucose-vitamin C (Dex-4) chewable tab 8 tablet  8 tablet Oral Q15 Min PRN    acetaminophen (Tylenol) tab 650 mg  650 mg Oral Q6H PRN    [Held by provider] aspirin chewable tab 81 mg  81 mg Oral Daily    atorvastatin (Lipitor) tab 40 mg  40 mg Oral Nightly    losartan (Cozaar) tab 25 mg  25 mg Oral Daily    carvedilol (Coreg) tab 25 mg  25 mg Oral QAM    hydrALAzine (Apresoline) 20 mg/mL injection 10 mg  10 mg Intravenous Q6H PRN    pantoprazole (Protonix) 40 mg in sodium chloride 0.9% PF 10 mL IV push  40 mg Intravenous Daily    dextrose 5%-sodium  chloride 0.45% infusion   Intravenous Continuous        Assessment  [Problem List]    [Problem List]  Patient Active Problem List      Diagnosis    Vision loss, left eye    Cerebrovascular accident (CVA), unspecified mechanism (HCC)    Essential hypertension    COVID-19    Right sided weakness    Aphasia due to recent stroke    Cerebral amyloid angiopathy (HCC)    Toxic encephalopathy    Hypertensive urgency    Acute chest pain    Renal artery stenosis    Cystitis    PAPO (acute kidney injury)    Primary hypertension    Uncontrolled hypertension    CVA (cerebral vascular accident) (HCC)    Acute CVA (cerebrovascular accident) (HCC)    Slurred speech    Weakness of right upper extremity    Dehydration    Failure to thrive in adult    Troponin level elevated    Hyperkalemia    Goals of care, counseling/discussion    Advance care planning    Palliative care by specialist    Acute cystitis without hematuria    Severe anemia        Plan:   Continue IV antibiotics.  Will check a stool for occult blood.  Hemoglobin is stable                   Angelina Deal MD                       MEDICATIONS ADMINISTERED IN LAST 1 DAY:  atorvastatin (Lipitor) tab 40 mg       Date Action Dose Route User    7/14/2025 2032 Given 40 mg Oral Karla Mg RN          carvedilol (Coreg) tab 25 mg       Date Action Dose Route User    7/15/2025 0845 Given 25 mg Oral Sharon Bronson RN          cefTRIAXone (Rocephin) 1 g in sodium chloride 0.9% 100 mL IVPB-ADDV       Date Action Dose Route User    7/14/2025 1717 New Bag 1 g Intravenous Sharon Bronson RN          sodium hypochlorite (Dakin's) 0.125 % external solution       Date Action Dose Route User    7/15/2025 0513 Given (none) Topical Karla Mg RN    7/14/2025 1717 Given (none) Topical Sharon Bronson RN          dextrose 5%-sodium chloride 0.45% infusion       Date Action Dose Route User    7/15/2025 1327 New Bag 125 mL/hr Intravenous Sharon Bronson RN    7/15/2025 0579  New Bag (none) Intravenous Karla Mg RN    7/14/2025 2032 New Bag (none) Intravenous Karla Mg RN    7/14/2025 1730 Rate/Dose Change 125 mL/hr Intravenous Sharon Bronson RN    7/14/2025 1639 Rate/Dose Change 50 mL/hr Intravenous Sharon Bronson RN          hydrALAzine (Apresoline) 20 mg/mL injection 10 mg       Date Action Dose Route User    7/15/2025 0541 Given 10 mg Intravenous Karla Mg RN    7/14/2025 2032 Given 10 mg Intravenous Karla Mg RN          losartan (Cozaar) tab 25 mg       Date Action Dose Route User    7/15/2025 0845 Given 25 mg Oral Sharon Bronson RN          pantoprazole (Protonix) 40 mg in sodium chloride 0.9% PF 10 mL IV push       Date Action Dose Route User    7/15/2025 0844 Given 40 mg Intravenous Sharon Bronson RN            Vitals (last day)       Date/Time Temp Pulse Resp BP SpO2 Weight O2 Device O2 Flow Rate (L/min) [facility]    07/15/25 0843 98.7 °F (37.1 °C) 90 18 130/54 99 % -- None (Room air) -- [place]    07/15/25 0626 -- -- -- 139/52 -- -- -- -- [place]    07/15/25 0536 98.5 °F (36.9 °C) 69 18 165/53 100 % -- None (Room air) -- [place]    07/15/25 0026 -- -- -- 147/53 -- -- -- -- [place]    07/14/25 2019 98 °F (36.7 °C) 79 18 162/66 100 % -- None (Room air) -- [place]    07/14/25 1501 97.5 °F (36.4 °C) 84 18 134/58 100 % -- None (Room air) -- [place]    07/14/25 1154 -- 69 -- 134/60 -- -- -- -- [place]    07/14/25 0905 -- 84 -- -- -- -- -- --     07/14/25 0900 -- 84 -- -- -- -- -- -- [place]    07/14/25 0826 98.6 °F (37 °C) 76 18 163/69 100 % -- None (Room air) -- [place]    07/14/25 0500 97.8 °F (36.6 °C) 77 16 163/67 100 % -- None (Room air) -- [place]          CIWA Scores (since admission)       None          Blood Transfusion Record       Product Unit Status Volume Start End            Transfuse RBC       25  [id number]  [id number] Completed 07/13/25 0855 456.25 mL 07/13/25 0550 07/13/25 0855

## 2025-07-15 NOTE — PLAN OF CARE
Problem: Patient Centered Care  Goal: Patient preferences are identified and integrated in the patient's plan of care  Description: Interventions:  - What would you like us to know as we care for you? History of CVA with aphasia  - Provide timely, complete, and accurate information to patient/family  - Incorporate patient and family knowledge, values, beliefs, and cultural backgrounds into the planning and delivery of care  - Encourage patient/family to participate in care and decision-making at the level they choose  - Honor patient and family perspectives and choices  Outcome: Progressing     Problem: PAIN - ADULT  Goal: Verbalizes/displays adequate comfort level or patient's stated pain goal  Description: INTERVENTIONS:  - Encourage pt to monitor pain and request assistance  - Assess pain using appropriate pain scale  - Administer analgesics based on type and severity of pain and evaluate response  - Implement non-pharmacological measures as appropriate and evaluate response  - Consider cultural and social influences on pain and pain management  - Manage/alleviate anxiety  - Utilize distraction and/or relaxation techniques  - Monitor for opioid side effects  - Notify MD/LIP if interventions unsuccessful or patient reports new pain  - Anticipate increased pain with activity and pre-medicate as appropriate  Outcome: Progressing     Problem: RISK FOR INFECTION - ADULT  Goal: Absence of fever/infection during anticipated neutropenic period  Description: INTERVENTIONS  - Monitor WBC  - Administer growth factors as ordered  - Implement neutropenic guidelines  Outcome: Progressing     Problem: SAFETY ADULT - FALL  Goal: Free from fall injury  Description: INTERVENTIONS:  - Assess pt frequently for physical needs  - Identify cognitive and physical deficits and behaviors that affect risk of falls.  - [place] fall precautions as indicated by assessment.  - Educate pt/family on patient safety including physical  limitations  - Instruct pt to call for assistance with activity based on assessment  - Modify environment to reduce risk of injury  - Provide assistive devices as appropriate  - Consider OT/PT consult to assist with strengthening/mobility  - Encourage toileting schedule  Outcome: Progressing     Problem: DISCHARGE PLANNING  Goal: Discharge to home or other facility with appropriate resources  Description: INTERVENTIONS:  - Identify barriers to discharge w/pt and caregiver  - Include patient/family/discharge partner in discharge planning  - Arrange for needed discharge resources and transportation as appropriate  - Identify discharge learning needs (meds, wound care, etc)  - Arrange for interpreters to assist at discharge as needed  - Consider post-discharge preferences of patient/family/discharge partner  - Complete POLST form as appropriate  - Assess patient's ability to be responsible for managing their own health  - Refer to Case Management Department for coordinating discharge planning if the patient needs post-hospital services based on physician/LIP order or complex needs related to functional status, cognitive ability or social support system  Outcome: Progressing     Problem: Altered Communication/Language Barrier  Goal: Patient/Family is able to understand and participate in their care  Description: Interventions:  - Assess communication ability and preferred communication style  - Implement communication aides and strategies  - Use visual cues when possible  - Listen attentively, be patient, do not interrupt  - Minimize distractions  - Allow time for understanding and response  - Establish method for patient to ask for assistance (call light)  - Provide an  as needed  - Communicate barriers and strategies to overcome with those who interact with patient  Outcome: Progressing     Problem: GASTROINTESTINAL - ADULT  Goal: Maintains adequate nutritional intake (undernourished)  Description:  INTERVENTIONS:  - Monitor percentage of each meal consumed  - Identify factors contributing to decreased intake, treat as appropriate  - Assist with meals as needed  - Monitor I&O, WT and lab values  - Obtain nutritional consult as needed  - Optimize oral hygiene and moisture  - Encourage food from home; allow for food preferences  - Enhance eating environment  Outcome: Progressing     Problem: GENITOURINARY - ADULT  Goal: Absence of urinary retention  Description: INTERVENTIONS:  - Assess patient’s ability to void and empty bladder  - Monitor intake/output and perform bladder scan as needed  - Follow urinary retention protocol/standard of care  - Consider collaborating with pharmacy to review patient's medication profile  - Implement strategies to promote bladder emptying  Outcome: Progressing     Problem: METABOLIC/FLUID AND ELECTROLYTES - ADULT  Goal: Glucose maintained within prescribed range  Description: INTERVENTIONS:  - Monitor Blood Glucose as ordered  - Assess for signs and symptoms of hyperglycemia and hypoglycemia  - Administer ordered medications to maintain glucose within target range  - Assess barriers to adequate nutritional intake and initiate nutrition consult as needed  - Instruct patient on self management of diabetes  Outcome: Progressing  Goal: Electrolytes maintained within normal limits  Description: INTERVENTIONS:  - Monitor labs and rhythm and assess patient for signs and symptoms of electrolyte imbalances  - Administer electrolyte replacement as ordered  - Monitor response to electrolyte replacements, including rhythm and repeat lab results as appropriate  - Fluid restriction as ordered  - Instruct patient on fluid and nutrition restrictions as appropriate  Outcome: Progressing  Goal: Hemodynamic stability and optimal renal function maintained  Description: INTERVENTIONS:  - Monitor labs and assess for signs and symptoms of volume excess or deficit  - Monitor intake, output and patient  weight  - Monitor urine specific gravity, serum osmolarity and serum sodium as indicated or ordered  - Monitor response to interventions for patient's volume status, including labs, urine output, blood pressure (other measures as available)  - Encourage oral intake as appropriate  - Instruct patient on fluid and nutrition restrictions as appropriate  Outcome: Progressing     Problem: SKIN/TISSUE INTEGRITY - ADULT  Goal: Skin integrity remains intact  Description: INTERVENTIONS  - Assess and document risk factors for pressure ulcer development  - Assess and document skin integrity  - Monitor for areas of redness and/or skin breakdown  - Initiate interventions, skin care algorithm/standards of care as needed  Outcome: Progressing  Goal: Incision(s), wounds(s) or drain site(s) healing without S/S of infection  Description: INTERVENTIONS:  - Assess and document risk factors for pressure ulcer development  - Assess and document skin integrity  - Assess and document dressing/incision, wound bed, drain sites and surrounding tissue  - Implement wound care per orders  - Initiate isolation precautions as appropriate  - Initiate Pressure Ulcer prevention bundle as indicated  Outcome: Progressing  Goal: Oral mucous membranes remain intact  Description: INTERVENTIONS  - Assess oral mucosa and hygiene practices  - Implement preventative oral hygiene regimen  - Implement oral medicated treatments as ordered  Outcome: Progressing

## 2025-07-15 NOTE — SLP NOTE
SPEECH DAILY NOTE - INPATIENT    ASSESSMENT & PLAN   ASSESSMENT    Proper PPE worn. Hands sanitized upon entrance/exit Pt room.         Pt alert, afebrile and on room air. Pt seen for swallow analysis per BSE recommendations (after consulting with RN). Pt agreed to participate. Pt's preferred method of learning verbal with demonstration. Pt placed upright in bed; observed with current diet of pureed/mildly-thick liquids for monitoring diet tolerance. Swallowing precautions/strategies discussed as SLP fed Pt oral trials. Functional oral skills on these modified consistencies. No significant oral retention. Pharyngeal response appeared to trigger within 1-2 sec per hyolaryngeal elevation to completion (functional rise/strength per palpation). No overt CSA on pureed nor mildly-thick liquids. No additional consistencies trialed d/t Pt's fatigue level. Collaborated with RN regarding Pt's swallowing plan of care. No report of difficulty taking meds. No new CXR completed. Sp02 ~99% during this session. Call light within Pt's reach upon SLP discharge from room.        No CXR completed.           PLAN:    To f/u x2-3 sessions to ensure safe intake of diet and reinforce swallowing/aspiration precautions. To monitor for new CXR results. Diet upgrade as tolerated. VFSS IF appropriate. Family education as available.        Diet Recommendations - Solids: Puree  Diet Recommendations - Liquids: Nectar thick liquids/ Mildly thick    Compensatory Strategies Recommended: Alternate consistencies, Multiple swallows  Aspiration Precautions: Upright position, Slow rate, Small bites, Small sips, No straw, 1:1 feeding  Medication Administration Recommendations: Crushed in puree    Patient Experiencing Pain: No    Treatment Plan  Treatment Plan/Recommendations: Aspiration precautions    Interdisciplinary Communication: Discussed with RN  Plan posted at bedside      GOALS  Goal #1 The patient will tolerate puree consistency and mildly thick  liquids without overt signs or symptoms of aspiration with 100 % accuracy over 2 session(s).    No CSA on current diet.        In Progress   Goal #2 The patient/family/caregiver will demonstrate understanding and implementation of aspiration precautions and swallow strategies independently over 3-4 session(s).    Swallowing precautions posted in room.     In Progress   Goal #3 The patient will tolerate trial upgrade of minced and moist/bite sized consistency and mildly thick liquids without overt signs or symptoms of aspiration with 100 % accuracy over 3-4 session(s).    No diet upgrade this session.     In Progress   Goal #4 The patient will utilize compensatory strategies as outlined by  BSSE (clinical evaluation) including Slow rate, Small bites, Small sips, Alternate liquids/solids, No straws, Upright 90 degrees, Feed patient with 1:1 feed assistance 100 % of the time across 2 sessions.    SLP fed Pt; strategies executed.       In Progress   FOLLOW UP  Follow Up Needed (Documentation Required): Yes  SLP Follow-up Date: 07/16/25  Duration: 1 week    Session: 1    If you have any questions, please contact   Sophia Jiménez M.S. CCC/SLP  Speech-Language Pathologist  [facility]  #[phone]

## 2025-07-16 LAB
ANION GAP SERPL CALC-SCNC: 7 MMOL/L (ref 0–18)
BASOPHILS # BLD AUTO: 0.01 X10(3) UL (ref 0–0.2)
BASOPHILS NFR BLD AUTO: 0.2 %
BUN BLD-MCNC: 14 MG/DL (ref 9–23)
BUN/CREAT SERPL: 18.4 (ref 10–20)
CALCIUM BLD-MCNC: 8 MG/DL (ref 8.7–10.4)
CHLORIDE SERPL-SCNC: 108 MMOL/L (ref 98–112)
CO2 SERPL-SCNC: 25 MMOL/L (ref 21–32)
CREAT BLD-MCNC: 0.76 MG/DL (ref 0.55–1.02)
DEPRECATED RDW RBC AUTO: 51.7 FL (ref 35.1–46.3)
EGFRCR SERPLBLD CKD-EPI 2021: 83 ML/MIN/1.73M2 (ref 60–?)
EOSINOPHIL # BLD AUTO: 0.08 X10(3) UL (ref 0–0.7)
EOSINOPHIL NFR BLD AUTO: 1.7 %
ERYTHROCYTE [DISTWIDTH] IN BLOOD BY AUTOMATED COUNT: 18.6 % (ref 11–15)
GLUCOSE BLD-MCNC: 105 MG/DL (ref 70–99)
GLUCOSE BLDC GLUCOMTR-MCNC: 110 MG/DL (ref 70–99)
GLUCOSE BLDC GLUCOMTR-MCNC: 111 MG/DL (ref 70–99)
GLUCOSE BLDC GLUCOMTR-MCNC: 129 MG/DL (ref 70–99)
GLUCOSE BLDC GLUCOMTR-MCNC: 140 MG/DL (ref 70–99)
GLUCOSE BLDC GLUCOMTR-MCNC: 149 MG/DL (ref 70–99)
HCT VFR BLD AUTO: 23.9 % (ref 35–48)
HGB BLD-MCNC: 7.5 G/DL (ref 12–16)
IMM GRANULOCYTES # BLD AUTO: 0.01 X10(3) UL (ref 0–1)
IMM GRANULOCYTES NFR BLD: 0.2 %
LYMPHOCYTES # BLD AUTO: 1.33 X10(3) UL (ref 1–4)
LYMPHOCYTES NFR BLD AUTO: 28.9 %
MCH RBC QN AUTO: 23.7 PG (ref 26–34)
MCHC RBC AUTO-ENTMCNC: 31.4 G/DL (ref 31–37)
MCV RBC AUTO: 75.4 FL (ref 80–100)
MONOCYTES # BLD AUTO: 0.29 X10(3) UL (ref 0.1–1)
MONOCYTES NFR BLD AUTO: 6.3 %
NEUTROPHILS # BLD AUTO: 2.88 X10 (3) UL (ref 1.5–7.7)
NEUTROPHILS # BLD AUTO: 2.88 X10(3) UL (ref 1.5–7.7)
NEUTROPHILS NFR BLD AUTO: 62.7 %
OSMOLALITY SERPL CALC.SUM OF ELEC: 291 MOSM/KG (ref 275–295)
PLATELET # BLD AUTO: 336 10(3)UL (ref 150–450)
POTASSIUM SERPL-SCNC: 3.7 MMOL/L (ref 3.5–5.1)
POTASSIUM SERPL-SCNC: 3.7 MMOL/L (ref 3.5–5.1)
RBC # BLD AUTO: 3.17 X10(6)UL (ref 3.8–5.3)
SODIUM SERPL-SCNC: 140 MMOL/L (ref 136–145)
WBC # BLD AUTO: 4.6 X10(3) UL (ref 4–11)

## 2025-07-16 RX ORDER — POTASSIUM CHLORIDE 1500 MG/1
40 TABLET, EXTENDED RELEASE ORAL ONCE
Status: COMPLETED | OUTPATIENT
Start: 2025-07-16 | End: 2025-07-16

## 2025-07-16 NOTE — PLAN OF CARE
Problem: PAIN - ADULT  Goal: Verbalizes/displays adequate comfort level or patient's stated pain goal  Description: INTERVENTIONS:  - Encourage pt to monitor pain and request assistance  - Assess pain using appropriate pain scale  - Administer analgesics based on type and severity of pain and evaluate response  - Implement non-pharmacological measures as appropriate and evaluate response  - Consider cultural and social influences on pain and pain management  - Manage/alleviate anxiety  - Utilize distraction and/or relaxation techniques  - Monitor for opioid side effects  - Notify MD/LIP if interventions unsuccessful or patient reports new pain  - Anticipate increased pain with activity and pre-medicate as appropriate  Outcome: Progressing     Problem: RISK FOR INFECTION - ADULT  Goal: Absence of fever/infection during anticipated neutropenic period  Description: INTERVENTIONS  - Monitor WBC  - Administer growth factors as ordered  - Implement neutropenic guidelines  Outcome: Progressing     Problem: SAFETY ADULT - FALL  Goal: Free from fall injury  Description: INTERVENTIONS:  - Assess pt frequently for physical needs  - Identify cognitive and physical deficits and behaviors that affect risk of falls.  - [place] fall precautions as indicated by assessment.  - Educate pt/family on patient safety including physical limitations  - Instruct pt to call for assistance with activity based on assessment  - Modify environment to reduce risk of injury  - Provide assistive devices as appropriate  - Consider OT/PT consult to assist with strengthening/mobility  - Encourage toileting schedule  Outcome: Progressing     Problem: DISCHARGE PLANNING  Goal: Discharge to home or other facility with appropriate resources  Description: INTERVENTIONS:  - Identify barriers to discharge w/pt and caregiver  - Include patient/family/discharge partner in discharge planning  - Arrange for needed discharge resources and transportation as  appropriate  - Identify discharge learning needs (meds, wound care, etc)  - Arrange for interpreters to assist at discharge as needed  - Consider post-discharge preferences of patient/family/discharge partner  - Complete POLST form as appropriate  - Assess patient's ability to be responsible for managing their own health  - Refer to Case Management Department for coordinating discharge planning if the patient needs post-hospital services based on physician/LIP order or complex needs related to functional status, cognitive ability or social support system  Outcome: Progressing     Problem: Altered Communication/Language Barrier  Goal: Patient/Family is able to understand and participate in their care  Description: Interventions:  - Assess communication ability and preferred communication style  - Implement communication aides and strategies  - Use visual cues when possible  - Listen attentively, be patient, do not interrupt  - Minimize distractions  - Allow time for understanding and response  - Establish method for patient to ask for assistance (call light)  - Provide an  as needed  - Communicate barriers and strategies to overcome with those who interact with patient  Outcome: Progressing     Problem: GASTROINTESTINAL - ADULT  Goal: Maintains adequate nutritional intake (undernourished)  Description: INTERVENTIONS:  - Monitor percentage of each meal consumed  - Identify factors contributing to decreased intake, treat as appropriate  - Assist with meals as needed  - Monitor I&O, WT and lab values  - Obtain nutritional consult as needed  - Optimize oral hygiene and moisture  - Encourage food from home; allow for food preferences  - Enhance eating environment  Outcome: Progressing     Problem: GENITOURINARY - ADULT  Goal: Absence of urinary retention  Description: INTERVENTIONS:  - Assess patient’s ability to void and empty bladder  - Monitor intake/output and perform bladder scan as needed  - Follow  urinary retention protocol/standard of care  - Consider collaborating with pharmacy to review patient's medication profile  - Implement strategies to promote bladder emptying  Outcome: Progressing     Problem: METABOLIC/FLUID AND ELECTROLYTES - ADULT  Goal: Glucose maintained within prescribed range  Description: INTERVENTIONS:  - Monitor Blood Glucose as ordered  - Assess for signs and symptoms of hyperglycemia and hypoglycemia  - Administer ordered medications to maintain glucose within target range  - Assess barriers to adequate nutritional intake and initiate nutrition consult as needed  - Instruct patient on self management of diabetes  Outcome: Progressing  Goal: Electrolytes maintained within normal limits  Description: INTERVENTIONS:  - Monitor labs and rhythm and assess patient for signs and symptoms of electrolyte imbalances  - Administer electrolyte replacement as ordered  - Monitor response to electrolyte replacements, including rhythm and repeat lab results as appropriate  - Fluid restriction as ordered  - Instruct patient on fluid and nutrition restrictions as appropriate  Outcome: Progressing  Goal: Hemodynamic stability and optimal renal function maintained  Description: INTERVENTIONS:  - Monitor labs and assess for signs and symptoms of volume excess or deficit  - Monitor intake, output and patient weight  - Monitor urine specific gravity, serum osmolarity and serum sodium as indicated or ordered  - Monitor response to interventions for patient's volume status, including labs, urine output, blood pressure (other measures as available)  - Encourage oral intake as appropriate  - Instruct patient on fluid and nutrition restrictions as appropriate  Outcome: Progressing     Problem: SKIN/TISSUE INTEGRITY - ADULT  Goal: Skin integrity remains intact  Description: INTERVENTIONS  - Assess and document risk factors for pressure ulcer development  - Assess and document skin integrity  - Monitor for areas of  redness and/or skin breakdown  - Initiate interventions, skin care algorithm/standards of care as needed  Outcome: Progressing  Goal: Incision(s), wounds(s) or drain site(s) healing without S/S of infection  Description: INTERVENTIONS:  - Assess and document risk factors for pressure ulcer development  - Assess and document skin integrity  - Assess and document dressing/incision, wound bed, drain sites and surrounding tissue  - Implement wound care per orders  - Initiate isolation precautions as appropriate  - Initiate Pressure Ulcer prevention bundle as indicated  Outcome: Progressing  Goal: Oral mucous membranes remain intact  Description: INTERVENTIONS  - Assess oral mucosa and hygiene practices  - Implement preventative oral hygiene regimen  - Implement oral medicated treatments as ordered  Outcome: Progressing

## 2025-07-16 NOTE — PROGRESS NOTES
Provider Clarification     Additional information on the patient's nutritional status.  Severe protein calorie malnutrition present on admission    This note is part of the patient's medical record.

## 2025-07-16 NOTE — PLAN OF CARE
Problem: Patient Centered Care  Goal: Patient preferences are identified and integrated in the patient's plan of care  Description: Interventions:  - What would you like us to know as we care for you? History of CVA with aphasia  - Provide timely, complete, and accurate information to patient/family  - Incorporate patient and family knowledge, values, beliefs, and cultural backgrounds into the planning and delivery of care  - Encourage patient/family to participate in care and decision-making at the level they choose  - Honor patient and family perspectives and choices  Outcome: Progressing     Problem: PAIN - ADULT  Goal: Verbalizes/displays adequate comfort level or patient's stated pain goal  Description: INTERVENTIONS:  - Encourage pt to monitor pain and request assistance  - Assess pain using appropriate pain scale  - Administer analgesics based on type and severity of pain and evaluate response  - Implement non-pharmacological measures as appropriate and evaluate response  - Consider cultural and social influences on pain and pain management  - Manage/alleviate anxiety  - Utilize distraction and/or relaxation techniques  - Monitor for opioid side effects  - Notify MD/LIP if interventions unsuccessful or patient reports new pain  - Anticipate increased pain with activity and pre-medicate as appropriate  Outcome: Progressing     Problem: RISK FOR INFECTION - ADULT  Goal: Absence of fever/infection during anticipated neutropenic period  Description: INTERVENTIONS  - Monitor WBC  - Administer growth factors as ordered  - Implement neutropenic guidelines  Outcome: Progressing     Problem: SAFETY ADULT - FALL  Goal: Free from fall injury  Description: INTERVENTIONS:  - Assess pt frequently for physical needs  - Identify cognitive and physical deficits and behaviors that affect risk of falls.  - [place] fall precautions as indicated by assessment.  - Educate pt/family on patient safety including physical  limitations  - Instruct pt to call for assistance with activity based on assessment  - Modify environment to reduce risk of injury  - Provide assistive devices as appropriate  - Consider OT/PT consult to assist with strengthening/mobility  - Encourage toileting schedule  Outcome: Progressing     Problem: DISCHARGE PLANNING  Goal: Discharge to home or other facility with appropriate resources  Description: INTERVENTIONS:  - Identify barriers to discharge w/pt and caregiver  - Include patient/family/discharge partner in discharge planning  - Arrange for needed discharge resources and transportation as appropriate  - Identify discharge learning needs (meds, wound care, etc)  - Arrange for interpreters to assist at discharge as needed  - Consider post-discharge preferences of patient/family/discharge partner  - Complete POLST form as appropriate  - Assess patient's ability to be responsible for managing their own health  - Refer to Case Management Department for coordinating discharge planning if the patient needs post-hospital services based on physician/LIP order or complex needs related to functional status, cognitive ability or social support system  Outcome: Progressing     Problem: Altered Communication/Language Barrier  Goal: Patient/Family is able to understand and participate in their care  Description: Interventions:  - Assess communication ability and preferred communication style  - Implement communication aides and strategies  - Use visual cues when possible  - Listen attentively, be patient, do not interrupt  - Minimize distractions  - Allow time for understanding and response  - Establish method for patient to ask for assistance (call light)  - Provide an  as needed  - Communicate barriers and strategies to overcome with those who interact with patient  Outcome: Progressing     Problem: GASTROINTESTINAL - ADULT  Goal: Maintains adequate nutritional intake (undernourished)  Description:  INTERVENTIONS:  - Monitor percentage of each meal consumed  - Identify factors contributing to decreased intake, treat as appropriate  - Assist with meals as needed  - Monitor I&O, WT and lab values  - Obtain nutritional consult as needed  - Optimize oral hygiene and moisture  - Encourage food from home; allow for food preferences  - Enhance eating environment  Outcome: Progressing     Problem: GENITOURINARY - ADULT  Goal: Absence of urinary retention  Description: INTERVENTIONS:  - Assess patient’s ability to void and empty bladder  - Monitor intake/output and perform bladder scan as needed  - Follow urinary retention protocol/standard of care  - Consider collaborating with pharmacy to review patient's medication profile  - Implement strategies to promote bladder emptying  Outcome: Progressing     Problem: METABOLIC/FLUID AND ELECTROLYTES - ADULT  Goal: Glucose maintained within prescribed range  Description: INTERVENTIONS:  - Monitor Blood Glucose as ordered  - Assess for signs and symptoms of hyperglycemia and hypoglycemia  - Administer ordered medications to maintain glucose within target range  - Assess barriers to adequate nutritional intake and initiate nutrition consult as needed  - Instruct patient on self management of diabetes  Outcome: Progressing  Goal: Electrolytes maintained within normal limits  Description: INTERVENTIONS:  - Monitor labs and rhythm and assess patient for signs and symptoms of electrolyte imbalances  - Administer electrolyte replacement as ordered  - Monitor response to electrolyte replacements, including rhythm and repeat lab results as appropriate  - Fluid restriction as ordered  - Instruct patient on fluid and nutrition restrictions as appropriate  Outcome: Progressing  Goal: Hemodynamic stability and optimal renal function maintained  Description: INTERVENTIONS:  - Monitor labs and assess for signs and symptoms of volume excess or deficit  - Monitor intake, output and patient  weight  - Monitor urine specific gravity, serum osmolarity and serum sodium as indicated or ordered  - Monitor response to interventions for patient's volume status, including labs, urine output, blood pressure (other measures as available)  - Encourage oral intake as appropriate  - Instruct patient on fluid and nutrition restrictions as appropriate  Outcome: Progressing     Problem: SKIN/TISSUE INTEGRITY - ADULT  Goal: Skin integrity remains intact  Description: INTERVENTIONS  - Assess and document risk factors for pressure ulcer development  - Assess and document skin integrity  - Monitor for areas of redness and/or skin breakdown  - Initiate interventions, skin care algorithm/standards of care as needed  Outcome: Progressing  Goal: Incision(s), wounds(s) or drain site(s) healing without S/S of infection  Description: INTERVENTIONS:  - Assess and document risk factors for pressure ulcer development  - Assess and document skin integrity  - Assess and document dressing/incision, wound bed, drain sites and surrounding tissue  - Implement wound care per orders  - Initiate isolation precautions as appropriate  - Initiate Pressure Ulcer prevention bundle as indicated  Outcome: Progressing  Goal: Oral mucous membranes remain intact  Description: INTERVENTIONS  - Assess oral mucosa and hygiene practices  - Implement preventative oral hygiene regimen  - Implement oral medicated treatments as ordered  Outcome: Progressing

## 2025-07-16 NOTE — PROGRESS NOTES
Physician Clarification    Additional information related to the patient's  skin status - deep tissue injury     Unstageable pressure injury to left ankle, right heel and mid sacrum , present on admission    This note is part of the patient's medical record.

## 2025-07-16 NOTE — PROGRESS NOTES
[facility]  part of [facility]    Progress Note    Eliud Fine Patient Status:  Inpatient    1955 MRN [id number]   Location [facility] 5SW/SE Attending Angelina Deal MD   Hosp Day # 3 PCP Yao Rodriguez       SUBJECTIVE:  Alert.    Son is at the bedside feeding patient  Nursing staff reports patient has not had any bowel movement  OBJECTIVE:  Vital signs in last 24 hours:  /70 (BP Location: Right arm)   Pulse 86   Temp 98.7 °F (37.1 °C) (Oral)   Resp 18   Ht 5' 7\" (1.702 m)   Wt 133 lb 9.6 oz (60.6 kg)   SpO2 99%   BMI 20.92 kg/m²     Intake/Output:    Intake/Output Summary (Last 24 hours) at 2025 1500  Last data filed at 2025 1453  Gross per 24 hour   Intake 3646 ml   Output 1600 ml   Net 2046 ml       Wt Readings from Last 3 Encounters:   25 133 lb 9.6 oz (60.6 kg)   25 131 lb 6.3 oz (59.6 kg)   25 131 lb 4.8 oz (59.6 kg)       Exam  Gen: No acute distress, alert  HEENT: Pallor noted  Pulm: Lungs clear, normal respiratory effort  CV: Heart with regular rate and rhythm, no peripheral edema  Abd: Abdomen soft, nontender, nondistended, no organomegaly, bowel sounds present  Skin: no rashes or lesions  CNS: Alert    Data Review:     Labs:   Lab Results   Component Value Date    WBC 4.6 2025    HGB 7.5 2025    HCT 23.9 2025    .0 2025    CREATSERUM 0.76 2025    BUN 14 2025     2025    K 3.7 2025    K 3.7 2025     2025    CO2 25.0 2025     2025    CA 8.0 2025       LABS  Recent Labs   Lab 25  0133 25  0155 25  0959 25  1204 07/15/25  0617 25  0616   RBC 2.86* 2.98*  --   --   --  3.17*   HGB 6.4* 6.6* 8.8* 8.4*  --  7.5*   HCT 21.0* 21.8* 27.6* 26.8*  --  23.9*   MCV 73.4* 73.2*  --   --   --  75.4*   MCH 22.4* 22.1*  --   --   --  23.7*   MCHC 30.5* 30.3*  --   --   --  31.4   RDW 18.3* 18.4*  --    --   --  18.6*   NEPRELIM 3.57 3.31  --   --   --  2.88   WBC 5.3 5.2  --   --   --  4.6   .0 354.0  --   --   --  336.0   AST 20 19  --   --   --   --    ALT 16 19  --   --   --   --    * 124*  --   --  131* 105*       Imaging:      Meds:   Current Hospital Medications[1]    Assessment  Problem List[2]  Anemia.  Hemoglobin 7.5 today.  Could be dilutional but patient is eating well according to the nursing staff will stop the IV fluids.  Await stool for occult blood.  If the occult blood is positive patient may need endoscopy studies.  If hemoglobin is stable possible discharge tomorrow  Plan:   Continue IV antibiotics.       Active Orders   LAB    Basic Metabolic Panel (8)     Frequency: Tomorrow AM draw     Number of Occurrences: 1 Occurrences    CBC With Differential With Platelet     Frequency: Tomorrow AM draw     Number of Occurrences: 1 Occurrences    Potassium     Frequency: Tomorrow AM draw     Number of Occurrences: 1 Occurrences     Order Comments: DO NOT DISCONTINUE MUST REMAIN FOR ELECTROLYTE PROTOCOL       Nourishments    Dietary Nutrition Supplements TID     Frequency: TID     Number of Occurrences: Until Specified     Order Comments: Vanilla SF mighty shake BID (@ bfast and lunch) and vanilla magic cup daily (@ dinner)      Room Service Eligibility Until Discontinued     Frequency: Until Discontinued     Number of Occurrences: Until Specified    Room Service Notify RN Until Discontinued     Frequency: Until Discontinued     Number of Occurrences: Until Specified   Respiratory Care    Auto CPAP When Sleeping     Frequency: When Sleeping     Number of Occurrences: Until Specified   Microbiology    Occult Blood Stool, Diagnostic     Frequency: Once     Number of Occurrences: 1 Occurrences    Occult Blood Stool, Diagnostic     Frequency: Once     Number of Occurrences: 1 Occurrences   Diet    Regular/General diet Fluid Consistency: Nectar Thick / Mildly Thick Liquids; Texture Consistency:  Pureed; Is Patient on Accuchecks? Yes; Misc Restriction: No Straw     Frequency: Effective Now     Number of Occurrences: Until Specified     Order Comments: meds crushed in puree     Nursing    Accucheck     Frequency: PRN     Number of Occurrences: Until Specified     Order Comments: For symptoms or suspicion of hypoglycemia      Accucheck     Frequency: TID AC and HS     Number of Occurrences: Until Specified    Apply dressing Other (Betadine with Dry) Daily     Frequency: Daily     Number of Occurrences: Until Specified     Order Comments: ~Paint RIGHT HEEL wound with Betadine and apply dry dressing daily and PRN      Apply dressing Other (Dakins) Q12H     Frequency: Q12H     Number of Occurrences: Until Specified     Order Comments: ~ Obtain Dakins from Pharmacy  ~ Dakin Instructions to__LEFT LATERAL ANKLE, SACRUM_________  ~ Remove old dressingS  ~ Dampen gauze with Dakin Solution  ~ Dressing change Q 12 hours and PRN  ~ Make sure to pack wound depth or tunnels  ~ Cover with dry gauze and secure in place W BORDERED FOAMS      Apply dressing Other (Sensicare Ointment) PRN     Frequency: PRN     Number of Occurrences: Until Specified     Order Comments: ~ Obtain Zinc/Protective Ointment from [facility]  ~ Clean area with soap and water  ~ Apply to ___PERI AREA______ BID and PRN      Apply Heel Boot     Frequency: Until Discontinued     Number of Occurrences: Until Specified     Order Comments: Remove boot every shift to asses skin.  Send boot with patient on transfer/discharge.      Discontinue all oral diabetic agents     Frequency: Once     Number of Occurrences: 1 Occurrences    Elevate heels off of bed     Frequency: Until Discontinued     Number of Occurrences: Until Specified    Incontinence Care     Frequency: PRN     Number of Occurrences: Until Specified     Order Comments: Prompt      Initiate electrolyte protocol     Frequency: Until Discontinued     Number of Occurrences: Until Specified     Initiate Hypoglycemia Algorithm for Adults     Frequency: Until Discontinued     Number of Occurrences: Until Specified     Order Comments: For blood glucose less than 70      Lotion to skin     Frequency: Daily     Number of Occurrences: Until Specified    Nursing communication (specify)     Frequency: Until Discontinued     Number of Occurrences: Until Specified     Order Comments: AIR PUMP TO ISOTOUR GEL MATTRESS      Please communicate patient's home diabetic medications when speaking with physician     Frequency: Once     Number of Occurrences: 1 Occurrences    Provide diabetes patient education guide     Frequency: Once     Number of Occurrences: 1 Occurrences     Order Comments: Initiate education for new onset diabetes or pre-existing diabetes with knowledge deficits.      Teach blood glucose monitoring with bedside glucometer     Frequency: Once     Number of Occurrences: 1 Occurrences     Order Comments: If patient does not have a home glucometer, notify physician to order for discharge home.      Transfuse Orders to be completed     Frequency: If condition met     Number of Occurrences: Until Specified     Order Comments: Outstanding Blood Transfusion orders      Turn and reposition patient     Frequency: Q2H     Number of Occurrences: 2 Weeks   Medications    acetaminophen (Tylenol) tab 650 mg     Frequency: Q6H PRN     Dose: 650 mg     Route: Oral    aspirin chewable tab 81 mg     Frequency: Daily     Dose: 81 mg     Route: Oral    atorvastatin (Lipitor) tab 40 mg     Frequency: Nightly     Dose: 40 mg     Route: Oral    carvedilol (Coreg) tab 25 mg     Frequency: QAM     Dose: 25 mg     Route: Oral    cefTRIAXone (Rocephin) 1 g in sodium chloride 0.9% 100 mL IVPB-ADDV     Frequency: Q24H     Dose: 1 g     Route: Intravenous    dextrose 50% injection 50 mL     Linked Order: Or     Frequency: Q15 Min PRN     Dose: 50 mL     Route: Intravenous    glucose (Dex4) 15 GM/59ML oral liquid 15 g     Linked  Order: Or     Frequency: Q15 Min PRN     Dose: 15 g     Route: Oral    glucose (Dex4) 15 GM/59ML oral liquid 30 g     Linked Order: Or     Frequency: Q15 Min PRN     Dose: 30 g     Route: Oral    glucose (Glutose) 40% oral gel 15 g     Linked Order: Or     Frequency: Q15 Min PRN     Dose: 15 g     Route: Oral    glucose (Glutose) 40% oral gel 30 g     Linked Order: Or     Frequency: Q15 Min PRN     Dose: 30 g     Route: Oral    glucose-vitamin C (Dex-4) chewable tab 4 tablet     Linked Order: Or     Frequency: Q15 Min PRN     Dose: 4 tablet     Route: Oral    glucose-vitamin C (Dex-4) chewable tab 8 tablet     Linked Order: Or     Frequency: Q15 Min PRN     Dose: 8 tablet     Route: Oral    hydrALAzine (Apresoline) 20 mg/mL injection 10 mg     Frequency: Q6H PRN     Dose: 10 mg     Route: Intravenous    losartan (Cozaar) tab 25 mg     Frequency: Daily     Dose: 25 mg     Route: Oral    pantoprazole (Protonix) 40 mg in sodium chloride 0.9% PF 10 mL IV push     Frequency: Daily     Dose: 40 mg     Route: Intravenous    sodium hypochlorite (Dakin's) 0.125 % external solution     Frequency: Q12H     Route: Topical       Angelina Deal MD         [1]   Current Facility-Administered Medications   Medication Dose Route Frequency    sodium hypochlorite (Dakin's) 0.125 % external solution   Topical Q12H    cefTRIAXone (Rocephin) 1 g in sodium chloride 0.9% 100 mL IVPB-ADDV  1 g Intravenous Q24H    glucose (Dex4) 15 GM/59ML oral liquid 15 g  15 g Oral Q15 Min PRN    Or    glucose (Glutose) 40% oral gel 15 g  15 g Oral Q15 Min PRN    Or    glucose-vitamin C (Dex-4) chewable tab 4 tablet  4 tablet Oral Q15 Min PRN    Or    dextrose 50% injection 50 mL  50 mL Intravenous Q15 Min PRN    Or    glucose (Dex4) 15 GM/59ML oral liquid 30 g  30 g Oral Q15 Min PRN    Or    glucose (Glutose) 40% oral gel 30 g  30 g Oral Q15 Min PRN    Or    glucose-vitamin C (Dex-4) chewable tab 8 tablet  8 tablet Oral Q15 Min PRN    acetaminophen  (Tylenol) tab 650 mg  650 mg Oral Q6H PRN    [Held by provider] aspirin chewable tab 81 mg  81 mg Oral Daily    atorvastatin (Lipitor) tab 40 mg  40 mg Oral Nightly    losartan (Cozaar) tab 25 mg  25 mg Oral Daily    carvedilol (Coreg) tab 25 mg  25 mg Oral QAM    hydrALAzine (Apresoline) 20 mg/mL injection 10 mg  10 mg Intravenous Q6H PRN    pantoprazole (Protonix) 40 mg in sodium chloride 0.9% PF 10 mL IV push  40 mg Intravenous Daily   [2]   Patient Active Problem List  Diagnosis    Vision loss, left eye    Cerebrovascular accident (CVA), unspecified mechanism (HCC)    Essential hypertension    COVID-19    Right sided weakness    Aphasia due to recent stroke    Cerebral amyloid angiopathy (HCC)    Toxic encephalopathy    Hypertensive urgency    Acute chest pain    Renal artery stenosis    Cystitis    PAPO (acute kidney injury)    Primary hypertension    Uncontrolled hypertension    CVA (cerebral vascular accident) (HCC)    Acute CVA (cerebrovascular accident) (HCC)    Slurred speech    Weakness of right upper extremity    Dehydration    Failure to thrive in adult    Troponin level elevated    Hyperkalemia    Goals of care, counseling/discussion    Advance care planning    Palliative care by specialist    Acute cystitis without hematuria    Severe anemia

## 2025-07-17 LAB
ANION GAP SERPL CALC-SCNC: 8 MMOL/L (ref 0–18)
BASOPHILS # BLD AUTO: 0.02 X10(3) UL (ref 0–0.2)
BASOPHILS NFR BLD AUTO: 0.4 %
BUN BLD-MCNC: 14 MG/DL (ref 9–23)
BUN/CREAT SERPL: 19.2 (ref 10–20)
CALCIUM BLD-MCNC: 8.3 MG/DL (ref 8.7–10.4)
CHLORIDE SERPL-SCNC: 107 MMOL/L (ref 98–112)
CO2 SERPL-SCNC: 26 MMOL/L (ref 21–32)
CREAT BLD-MCNC: 0.73 MG/DL (ref 0.55–1.02)
DEPRECATED RDW RBC AUTO: 52.2 FL (ref 35.1–46.3)
EGFRCR SERPLBLD CKD-EPI 2021: 87 ML/MIN/1.73M2 (ref 60–?)
EOSINOPHIL # BLD AUTO: 0.08 X10(3) UL (ref 0–0.7)
EOSINOPHIL NFR BLD AUTO: 1.5 %
ERYTHROCYTE [DISTWIDTH] IN BLOOD BY AUTOMATED COUNT: 18.9 % (ref 11–15)
GLUCOSE BLD-MCNC: 90 MG/DL (ref 70–99)
GLUCOSE BLDC GLUCOMTR-MCNC: 112 MG/DL (ref 70–99)
GLUCOSE BLDC GLUCOMTR-MCNC: 142 MG/DL (ref 70–99)
GLUCOSE BLDC GLUCOMTR-MCNC: 172 MG/DL (ref 70–99)
GLUCOSE BLDC GLUCOMTR-MCNC: 95 MG/DL (ref 70–99)
HCT VFR BLD AUTO: 24.2 % (ref 35–48)
HEMOCCULT STL QL: NEGATIVE
HGB BLD-MCNC: 7.5 G/DL (ref 12–16)
IMM GRANULOCYTES # BLD AUTO: 0.01 X10(3) UL (ref 0–1)
IMM GRANULOCYTES NFR BLD: 0.2 %
LYMPHOCYTES # BLD AUTO: 1.51 X10(3) UL (ref 1–4)
LYMPHOCYTES NFR BLD AUTO: 28.3 %
MCH RBC QN AUTO: 24 PG (ref 26–34)
MCHC RBC AUTO-ENTMCNC: 31 G/DL (ref 31–37)
MCV RBC AUTO: 77.3 FL (ref 80–100)
MONOCYTES # BLD AUTO: 0.35 X10(3) UL (ref 0.1–1)
MONOCYTES NFR BLD AUTO: 6.6 %
NEUTROPHILS # BLD AUTO: 3.37 X10 (3) UL (ref 1.5–7.7)
NEUTROPHILS # BLD AUTO: 3.37 X10(3) UL (ref 1.5–7.7)
NEUTROPHILS NFR BLD AUTO: 63 %
OSMOLALITY SERPL CALC.SUM OF ELEC: 292 MOSM/KG (ref 275–295)
PLATELET # BLD AUTO: 355 10(3)UL (ref 150–450)
POTASSIUM SERPL-SCNC: 3.6 MMOL/L (ref 3.5–5.1)
POTASSIUM SERPL-SCNC: 3.6 MMOL/L (ref 3.5–5.1)
POTASSIUM SERPL-SCNC: 4.1 MMOL/L (ref 3.5–5.1)
RBC # BLD AUTO: 3.13 X10(6)UL (ref 3.8–5.3)
SODIUM SERPL-SCNC: 141 MMOL/L (ref 136–145)
WBC # BLD AUTO: 5.3 X10(3) UL (ref 4–11)

## 2025-07-17 RX ORDER — ASPIRIN 81 MG/1
81 TABLET, CHEWABLE ORAL DAILY
Status: DISCONTINUED | OUTPATIENT
Start: 2025-07-17 | End: 2025-07-22

## 2025-07-17 RX ORDER — POTASSIUM CHLORIDE 1500 MG/1
40 TABLET, EXTENDED RELEASE ORAL EVERY 4 HOURS
Status: COMPLETED | OUTPATIENT
Start: 2025-07-17 | End: 2025-07-17

## 2025-07-17 NOTE — PLAN OF CARE
Problem: Patient Centered Care  Goal: Patient preferences are identified and integrated in the patient's plan of care  Description: Interventions:  - What would you like us to know as we care for you? History of CVA with aphasia  - Provide timely, complete, and accurate information to patient/family  - Incorporate patient and family knowledge, values, beliefs, and cultural backgrounds into the planning and delivery of care  - Encourage patient/family to participate in care and decision-making at the level they choose  - Honor patient and family perspectives and choices  Outcome: Progressing     Problem: PAIN - ADULT  Goal: Verbalizes/displays adequate comfort level or patient's stated pain goal  Description: INTERVENTIONS:  - Encourage pt to monitor pain and request assistance  - Assess pain using appropriate pain scale  - Administer analgesics based on type and severity of pain and evaluate response  - Implement non-pharmacological measures as appropriate and evaluate response  - Consider cultural and social influences on pain and pain management  - Manage/alleviate anxiety  - Utilize distraction and/or relaxation techniques  - Monitor for opioid side effects  - Notify MD/LIP if interventions unsuccessful or patient reports new pain  - Anticipate increased pain with activity and pre-medicate as appropriate  Outcome: Progressing     Problem: RISK FOR INFECTION - ADULT  Goal: Absence of fever/infection during anticipated neutropenic period  Description: INTERVENTIONS  - Monitor WBC  - Administer growth factors as ordered  - Implement neutropenic guidelines  Outcome: Progressing     Problem: SAFETY ADULT - FALL  Goal: Free from fall injury  Description: INTERVENTIONS:  - Assess pt frequently for physical needs  - Identify cognitive and physical deficits and behaviors that affect risk of falls.  - [place] fall precautions as indicated by assessment.  - Educate pt/family on patient safety including physical  limitations  - Instruct pt to call for assistance with activity based on assessment  - Modify environment to reduce risk of injury  - Provide assistive devices as appropriate  - Consider OT/PT consult to assist with strengthening/mobility  - Encourage toileting schedule  Outcome: Progressing     Problem: DISCHARGE PLANNING  Goal: Discharge to home or other facility with appropriate resources  Description: INTERVENTIONS:  - Identify barriers to discharge w/pt and caregiver  - Include patient/family/discharge partner in discharge planning  - Arrange for needed discharge resources and transportation as appropriate  - Identify discharge learning needs (meds, wound care, etc)  - Arrange for interpreters to assist at discharge as needed  - Consider post-discharge preferences of patient/family/discharge partner  - Complete POLST form as appropriate  - Assess patient's ability to be responsible for managing their own health  - Refer to Case Management Department for coordinating discharge planning if the patient needs post-hospital services based on physician/LIP order or complex needs related to functional status, cognitive ability or social support system  Outcome: Progressing     Problem: Altered Communication/Language Barrier  Goal: Patient/Family is able to understand and participate in their care  Description: Interventions:  - Assess communication ability and preferred communication style  - Implement communication aides and strategies  - Use visual cues when possible  - Listen attentively, be patient, do not interrupt  - Minimize distractions  - Allow time for understanding and response  - Establish method for patient to ask for assistance (call light)  - Provide an  as needed  - Communicate barriers and strategies to overcome with those who interact with patient  Outcome: Progressing     Problem: GASTROINTESTINAL - ADULT  Goal: Maintains adequate nutritional intake (undernourished)  Description:  INTERVENTIONS:  - Monitor percentage of each meal consumed  - Identify factors contributing to decreased intake, treat as appropriate  - Assist with meals as needed  - Monitor I&O, WT and lab values  - Obtain nutritional consult as needed  - Optimize oral hygiene and moisture  - Encourage food from home; allow for food preferences  - Enhance eating environment  Outcome: Progressing     Problem: GENITOURINARY - ADULT  Goal: Absence of urinary retention  Description: INTERVENTIONS:  - Assess patient’s ability to void and empty bladder  - Monitor intake/output and perform bladder scan as needed  - Follow urinary retention protocol/standard of care  - Consider collaborating with pharmacy to review patient's medication profile  - Implement strategies to promote bladder emptying  Outcome: Progressing     Problem: METABOLIC/FLUID AND ELECTROLYTES - ADULT  Goal: Glucose maintained within prescribed range  Description: INTERVENTIONS:  - Monitor Blood Glucose as ordered  - Assess for signs and symptoms of hyperglycemia and hypoglycemia  - Administer ordered medications to maintain glucose within target range  - Assess barriers to adequate nutritional intake and initiate nutrition consult as needed  - Instruct patient on self management of diabetes  Outcome: Progressing  Goal: Electrolytes maintained within normal limits  Description: INTERVENTIONS:  - Monitor labs and rhythm and assess patient for signs and symptoms of electrolyte imbalances  - Administer electrolyte replacement as ordered  - Monitor response to electrolyte replacements, including rhythm and repeat lab results as appropriate  - Fluid restriction as ordered  - Instruct patient on fluid and nutrition restrictions as appropriate  Outcome: Progressing  Goal: Hemodynamic stability and optimal renal function maintained  Description: INTERVENTIONS:  - Monitor labs and assess for signs and symptoms of volume excess or deficit  - Monitor intake, output and patient  weight  - Monitor urine specific gravity, serum osmolarity and serum sodium as indicated or ordered  - Monitor response to interventions for patient's volume status, including labs, urine output, blood pressure (other measures as available)  - Encourage oral intake as appropriate  - Instruct patient on fluid and nutrition restrictions as appropriate  Outcome: Progressing     Problem: SKIN/TISSUE INTEGRITY - ADULT  Goal: Skin integrity remains intact  Description: INTERVENTIONS  - Assess and document risk factors for pressure ulcer development  - Assess and document skin integrity  - Monitor for areas of redness and/or skin breakdown  - Initiate interventions, skin care algorithm/standards of care as needed  Outcome: Progressing  Goal: Incision(s), wounds(s) or drain site(s) healing without S/S of infection  Description: INTERVENTIONS:  - Assess and document risk factors for pressure ulcer development  - Assess and document skin integrity  - Assess and document dressing/incision, wound bed, drain sites and surrounding tissue  - Implement wound care per orders  - Initiate isolation precautions as appropriate  - Initiate Pressure Ulcer prevention bundle as indicated  Outcome: Progressing  Goal: Oral mucous membranes remain intact  Description: INTERVENTIONS  - Assess oral mucosa and hygiene practices  - Implement preventative oral hygiene regimen  - Implement oral medicated treatments as ordered  Outcome: Progressing

## 2025-07-17 NOTE — PAYOR COMM NOTE
--------------  CONTINUED STAY REVIEW    Payor: MARJORIE DAVE O  Subscriber #:  [id number]  Authorization Number: [id number]    Admit date: 7/13/25  Admit time:  3:23 AM    7/16  AMS    HGB 7.5  URINE CX + > 100K AEROCOCCUS URINAE    Acute cystitis without hematuria  Continue antibiotics     Altered mental status.  Most likely acute toxic metabolic encephalopathy secondary to UTI.          Severe anemia  Most likely anemia of the chronic disease.  Will evaluate for any bleeding.  No signs of bleeding.      MEDICATIONS ADMINISTERED IN LAST 1 DAY:  atorvastatin (Lipitor) tab 40 mg       Date Action Dose Route User    7/16/2025 2024 Given 40 mg Oral Ramya Leija RN          carvedilol (Coreg) tab 25 mg       Date Action Dose Route User    7/17/2025 0900 Given 25 mg Oral Vasquez Rabago RN          cefTRIAXone (Rocephin) 1 g in sodium chloride 0.9% 100 mL IVPB-ADDV       Date Action Dose Route User    7/16/2025 1617 New Bag 1 g Intravenous Thomas Holilns RN          sodium hypochlorite (Dakin's) 0.125 % external solution       Date Action Dose Route User    7/17/2025 1230 Given (none) Topical Vasquez Rabago RN    7/17/2025 0524 Given (none) Topical Ramya Leija RN    7/16/2025 1407 Given (none) Topical Thomas Hollins RN          hydrALAzine (Apresoline) 20 mg/mL injection 10 mg       Date Action Dose Route User    7/16/2025 2031 Given 10 mg Intravenous Ramya Leija RN          losartan (Cozaar) tab 25 mg       Date Action Dose Route User    7/17/2025 0741 Given 25 mg Oral Vasquez Rabago RN          pantoprazole (Protonix) 40 mg in sodium chloride 0.9% PF 10 mL IV push       Date Action Dose Route User    7/17/2025 0742 Given 40 mg Intravenous Vasquez Rabago RN          potassium chloride (Klor-Con M20) tab 40 mEq       Date Action Dose Route User    7/17/2025 1227 Given 40 mEq Oral Vasquez Rabago RN    7/17/2025 0837 Given 40 mEq Oral Vasquez Rabago, RN            Vitals (last day)        Date/Time Temp Pulse Resp BP SpO2 Weight O2 Device O2 Flow Rate (L/min) [facility]    07/17/25 1229 -- -- -- 147/65 -- -- -- --     07/17/25 0839 -- -- -- 158/64 -- -- -- --     07/17/25 0742 98.4 °F (36.9 °C) 82 16 181/81 99 % -- None (Room air) --     07/17/25 0235 98 °F (36.7 °C) 80 16 166/62 100 % -- None (Room air) --     07/16/25 2023 98.8 °F (37.1 °C) 95 16 196/79 100 % -- None (Room air) --     07/16/25 1616 98.8 °F (37.1 °C) 86 18 155/68 100 % -- None (Room air) --     07/16/25 0858 98.7 °F (37.1 °C) 86 18 155/70 99 % -- None (Room air) --     07/16/25 0717 -- -- -- 168/84 -- -- -- --     07/16/25 0501 98.7 °F (37.1 °C) 86 18 178/78 100 % 133 lb 9.6 oz (60.6 kg) None (Room air) --           CIWA Scores (since admission)       None          Blood Transfusion Record       Product Unit Status Volume Start End            Transfuse RBC       25  [id number]  [id number] Completed 07/13/25 0855 456.25 mL 07/13/25 0550 07/13/25 0855

## 2025-07-17 NOTE — PLAN OF CARE
Problem: PAIN - ADULT  Goal: Verbalizes/displays adequate comfort level or patient's stated pain goal  Description: INTERVENTIONS:  - Encourage pt to monitor pain and request assistance  - Assess pain using appropriate pain scale  - Administer analgesics based on type and severity of pain and evaluate response  - Implement non-pharmacological measures as appropriate and evaluate response  - Consider cultural and social influences on pain and pain management  - Manage/alleviate anxiety  - Utilize distraction and/or relaxation techniques  - Monitor for opioid side effects  - Notify MD/LIP if interventions unsuccessful or patient reports new pain  - Anticipate increased pain with activity and pre-medicate as appropriate  Outcome: Progressing     Problem: RISK FOR INFECTION - ADULT  Goal: Absence of fever/infection during anticipated neutropenic period  Description: INTERVENTIONS  - Monitor WBC  - Administer growth factors as ordered  - Implement neutropenic guidelines  Outcome: Progressing     Problem: SAFETY ADULT - FALL  Goal: Free from fall injury  Description: INTERVENTIONS:  - Assess pt frequently for physical needs  - Identify cognitive and physical deficits and behaviors that affect risk of falls.  - [place] fall precautions as indicated by assessment.  - Educate pt/family on patient safety including physical limitations  - Instruct pt to call for assistance with activity based on assessment  - Modify environment to reduce risk of injury  - Provide assistive devices as appropriate  - Consider OT/PT consult to assist with strengthening/mobility  - Encourage toileting schedule  Outcome: Progressing     Problem: DISCHARGE PLANNING  Goal: Discharge to home or other facility with appropriate resources  Description: INTERVENTIONS:  - Identify barriers to discharge w/pt and caregiver  - Include patient/family/discharge partner in discharge planning  - Arrange for needed discharge resources and transportation as  appropriate  - Identify discharge learning needs (meds, wound care, etc)  - Arrange for interpreters to assist at discharge as needed  - Consider post-discharge preferences of patient/family/discharge partner  - Complete POLST form as appropriate  - Assess patient's ability to be responsible for managing their own health  - Refer to Case Management Department for coordinating discharge planning if the patient needs post-hospital services based on physician/LIP order or complex needs related to functional status, cognitive ability or social support system  Outcome: Progressing     Problem: Altered Communication/Language Barrier  Goal: Patient/Family is able to understand and participate in their care  Description: Interventions:  - Assess communication ability and preferred communication style  - Implement communication aides and strategies  - Use visual cues when possible  - Listen attentively, be patient, do not interrupt  - Minimize distractions  - Allow time for understanding and response  - Establish method for patient to ask for assistance (call light)  - Provide an  as needed  - Communicate barriers and strategies to overcome with those who interact with patient  Outcome: Progressing     Problem: GASTROINTESTINAL - ADULT  Goal: Maintains adequate nutritional intake (undernourished)  Description: INTERVENTIONS:  - Monitor percentage of each meal consumed  - Identify factors contributing to decreased intake, treat as appropriate  - Assist with meals as needed  - Monitor I&O, WT and lab values  - Obtain nutritional consult as needed  - Optimize oral hygiene and moisture  - Encourage food from home; allow for food preferences  - Enhance eating environment  Outcome: Progressing     Problem: GENITOURINARY - ADULT  Goal: Absence of urinary retention  Description: INTERVENTIONS:  - Assess patient’s ability to void and empty bladder  - Monitor intake/output and perform bladder scan as needed  - Follow  urinary retention protocol/standard of care  - Consider collaborating with pharmacy to review patient's medication profile  - Implement strategies to promote bladder emptying  Outcome: Progressing     Problem: METABOLIC/FLUID AND ELECTROLYTES - ADULT  Goal: Glucose maintained within prescribed range  Description: INTERVENTIONS:  - Monitor Blood Glucose as ordered  - Assess for signs and symptoms of hyperglycemia and hypoglycemia  - Administer ordered medications to maintain glucose within target range  - Assess barriers to adequate nutritional intake and initiate nutrition consult as needed  - Instruct patient on self management of diabetes  Outcome: Progressing  Goal: Electrolytes maintained within normal limits  Description: INTERVENTIONS:  - Monitor labs and rhythm and assess patient for signs and symptoms of electrolyte imbalances  - Administer electrolyte replacement as ordered  - Monitor response to electrolyte replacements, including rhythm and repeat lab results as appropriate  - Fluid restriction as ordered  - Instruct patient on fluid and nutrition restrictions as appropriate  Outcome: Progressing  Goal: Hemodynamic stability and optimal renal function maintained  Description: INTERVENTIONS:  - Monitor labs and assess for signs and symptoms of volume excess or deficit  - Monitor intake, output and patient weight  - Monitor urine specific gravity, serum osmolarity and serum sodium as indicated or ordered  - Monitor response to interventions for patient's volume status, including labs, urine output, blood pressure (other measures as available)  - Encourage oral intake as appropriate  - Instruct patient on fluid and nutrition restrictions as appropriate  Outcome: Progressing     Problem: SKIN/TISSUE INTEGRITY - ADULT  Goal: Skin integrity remains intact  Description: INTERVENTIONS  - Assess and document risk factors for pressure ulcer development  - Assess and document skin integrity  - Monitor for areas of  redness and/or skin breakdown  - Initiate interventions, skin care algorithm/standards of care as needed  Outcome: Progressing  Goal: Incision(s), wounds(s) or drain site(s) healing without S/S of infection  Description: INTERVENTIONS:  - Assess and document risk factors for pressure ulcer development  - Assess and document skin integrity  - Assess and document dressing/incision, wound bed, drain sites and surrounding tissue  - Implement wound care per orders  - Initiate isolation precautions as appropriate  - Initiate Pressure Ulcer prevention bundle as indicated  Outcome: Progressing  Goal: Oral mucous membranes remain intact  Description: INTERVENTIONS  - Assess oral mucosa and hygiene practices  - Implement preventative oral hygiene regimen  - Implement oral medicated treatments as ordered  Outcome: Progressing

## 2025-07-17 NOTE — PROGRESS NOTES
[facility]  part of [facility]    Progress Note    Eliud Fine Patient Status:  Inpatient    1955 MRN [id number]   Location [facility] 5SW/SE Attending Angelina Deal MD   Hosp Day # 4 PCP Yao Rodriguez       SUBJECTIVE:  Alert.    No complaints  OBJECTIVE:  Vital signs in last 24 hours:  /64   Pulse 82   Temp 98.4 °F (36.9 °C) (Axillary)   Resp 16   Ht 5' 7\" (1.702 m)   Wt 133 lb 9.6 oz (60.6 kg)   SpO2 99%   BMI 20.92 kg/m²     Intake/Output:    Intake/Output Summary (Last 24 hours) at 2025 1010  Last data filed at 2025 0933  Gross per 24 hour   Intake 3646 ml   Output 1150 ml   Net 2496 ml       Wt Readings from Last 3 Encounters:   25 133 lb 9.6 oz (60.6 kg)   25 131 lb 6.3 oz (59.6 kg)   25 131 lb 4.8 oz (59.6 kg)       Exam  Gen: No acute distress, alert  HEENT: Pallor noted  Pulm: Lungs clear, normal respiratory effort  CV: Heart with regular rate and rhythm, no peripheral edema  Abd: Abdomen soft, nontender, nondistended, no organomegaly, bowel sounds present  Skin: no rashes or lesions  CNS: Alert    Data Review:     Labs:   Lab Results   Component Value Date    WBC 5.3 2025    HGB 7.5 2025    HCT 24.2 2025    .0 2025    CREATSERUM 0.73 2025    BUN 14 2025     2025    K 3.6 2025    K 3.6 2025     2025    CO2 26.0 2025    GLU 90 2025    CA 8.3 2025       LABS  Recent Labs   Lab 25  0133 25  0155 25  0959 25  1204 07/15/25  0617 25  0616 25  0643   RBC 2.86* 2.98*  --   --   --  3.17* 3.13*   HGB 6.4* 6.6*   < > 8.4*  --  7.5* 7.5*   HCT 21.0* 21.8*   < > 26.8*  --  23.9* 24.2*   MCV 73.4* 73.2*  --   --   --  75.4* 77.3*   MCH 22.4* 22.1*  --   --   --  23.7* 24.0*   MCHC 30.5* 30.3*  --   --   --  31.4 31.0   RDW 18.3* 18.4*  --   --   --  18.6* 18.9*   NEPRELIM 3.57 3.31  --   --   --   2.88 3.37   WBC 5.3 5.2  --   --   --  4.6 5.3   .0 354.0  --   --   --  336.0 355.0   AST 20 19  --   --   --   --   --    ALT 16 19  --   --   --   --   --    * 124*  --   --  131* 105* 90    < > = values in this interval not displayed.       Imaging:      Meds:   Current Hospital Medications[1]    Assessment  Problem List[2]  Anemia.  Hemoglobin 7.5 today.  Could be dilutional but patient is eating well according to the nursing staff  Occult blood is negative  Plan:   Continue IV antibiotics.  Discussed with the family at the bedside.  Possible discharge tomorrow if her hemoglobin remained stable       Active Orders   LAB    Potassium     Frequency: Timed draw     Number of Occurrences: 1 Occurrences     Order Comments: DO NOT DISCONTINUE MUST REMAIN FOR ELECTROLYTE PROTOCOL       Nourishments    Dietary Nutrition Supplements TID     Frequency: TID     Number of Occurrences: Until Specified     Order Comments: Vanilla SF mighty shake BID (@ bfast and lunch) and vanilla magic cup daily (@ dinner)      Room Service Eligibility Until Discontinued     Frequency: Until Discontinued     Number of Occurrences: Until Specified    Room Service Notify RN Until Discontinued     Frequency: Until Discontinued     Number of Occurrences: Until Specified   Respiratory Care    Auto CPAP When Sleeping     Frequency: When Sleeping     Number of Occurrences: Until Specified   Microbiology    Occult Blood Stool, Diagnostic     Frequency: Once     Number of Occurrences: 1 Occurrences   Diet    Regular/General diet Fluid Consistency: Nectar Thick / Mildly Thick Liquids; Texture Consistency: Pureed; Is Patient on Accuchecks? Yes; Misc Restriction: No Straw     Frequency: Effective Now     Number of Occurrences: Until Specified     Order Comments: meds crushed in puree     Nursing    Accucheck     Frequency: PRN     Number of Occurrences: Until Specified     Order Comments: For symptoms or suspicion of hypoglycemia       Accucheck     Frequency: TID AC and HS     Number of Occurrences: Until Specified    Apply dressing Other (Betadine with Dry) Daily     Frequency: Daily     Number of Occurrences: Until Specified     Order Comments: ~Paint RIGHT HEEL wound with Betadine and apply dry dressing daily and PRN      Apply dressing Other (Dakins) Q12H     Frequency: Q12H     Number of Occurrences: Until Specified     Order Comments: ~ Obtain Dakins from Pharmacy  ~ Dakin Instructions to__LEFT LATERAL ANKLE, SACRUM_________  ~ Remove old dressingS  ~ Dampen gauze with Dakin Solution  ~ Dressing change Q 12 hours and PRN  ~ Make sure to pack wound depth or tunnels  ~ Cover with dry gauze and secure in place W BORDERED FOAMS      Apply dressing Other (Sensicare Ointment) PRN     Frequency: PRN     Number of Occurrences: Until Specified     Order Comments: ~ Obtain Zinc/Protective Ointment from [facility]  ~ Clean area with soap and water  ~ Apply to ___PERI AREA______ BID and PRN      Apply Heel Boot     Frequency: Until Discontinued     Number of Occurrences: Until Specified     Order Comments: Remove boot every shift to asses skin.  Send boot with patient on transfer/discharge.      Discontinue all oral diabetic agents     Frequency: Once     Number of Occurrences: 1 Occurrences    Elevate heels off of bed     Frequency: Until Discontinued     Number of Occurrences: Until Specified    Incontinence Care     Frequency: PRN     Number of Occurrences: Until Specified     Order Comments: Prompt      Initiate electrolyte protocol     Frequency: Until Discontinued     Number of Occurrences: Until Specified    Initiate Hypoglycemia Algorithm for Adults     Frequency: Until Discontinued     Number of Occurrences: Until Specified     Order Comments: For blood glucose less than 70      Lotion to skin     Frequency: Daily     Number of Occurrences: Until Specified    Nursing communication (specify)     Frequency: Until Discontinued     Number  of Occurrences: Until Specified     Order Comments: AIR PUMP TO ISOTOUR GEL MATTRESS      Please communicate patient's home diabetic medications when speaking with physician     Frequency: Once     Number of Occurrences: 1 Occurrences    Provide diabetes patient education guide     Frequency: Once     Number of Occurrences: 1 Occurrences     Order Comments: Initiate education for new onset diabetes or pre-existing diabetes with knowledge deficits.      Teach blood glucose monitoring with bedside glucometer     Frequency: Once     Number of Occurrences: 1 Occurrences     Order Comments: If patient does not have a home glucometer, notify physician to order for discharge home.      Transfuse Orders to be completed     Frequency: If condition met     Number of Occurrences: Until Specified     Order Comments: Outstanding Blood Transfusion orders      Turn and reposition patient     Frequency: Q2H     Number of Occurrences: 2 Weeks   Medications    acetaminophen (Tylenol) tab 650 mg     Frequency: Q6H PRN     Dose: 650 mg     Route: Oral    aspirin chewable tab 81 mg     Frequency: Daily     Dose: 81 mg     Route: Oral    atorvastatin (Lipitor) tab 40 mg     Frequency: Nightly     Dose: 40 mg     Route: Oral    carvedilol (Coreg) tab 25 mg     Frequency: QAM     Dose: 25 mg     Route: Oral    cefTRIAXone (Rocephin) 1 g in sodium chloride 0.9% 100 mL IVPB-ADDV     Frequency: Q24H     Dose: 1 g     Route: Intravenous    dextrose 50% injection 50 mL     Linked Order: Or     Frequency: Q15 Min PRN     Dose: 50 mL     Route: Intravenous    glucose (Dex4) 15 GM/59ML oral liquid 15 g     Linked Order: Or     Frequency: Q15 Min PRN     Dose: 15 g     Route: Oral    glucose (Dex4) 15 GM/59ML oral liquid 30 g     Linked Order: Or     Frequency: Q15 Min PRN     Dose: 30 g     Route: Oral    glucose (Glutose) 40% oral gel 15 g     Linked Order: Or     Frequency: Q15 Min PRN     Dose: 15 g     Route: Oral    glucose (Glutose) 40% oral  gel 30 g     Linked Order: Or     Frequency: Q15 Min PRN     Dose: 30 g     Route: Oral    glucose-vitamin C (Dex-4) chewable tab 4 tablet     Linked Order: Or     Frequency: Q15 Min PRN     Dose: 4 tablet     Route: Oral    glucose-vitamin C (Dex-4) chewable tab 8 tablet     Linked Order: Or     Frequency: Q15 Min PRN     Dose: 8 tablet     Route: Oral    hydrALAzine (Apresoline) 20 mg/mL injection 10 mg     Frequency: Q6H PRN     Dose: 10 mg     Route: Intravenous    losartan (Cozaar) tab 25 mg     Frequency: Daily     Dose: 25 mg     Route: Oral    pantoprazole (Protonix) 40 mg in sodium chloride 0.9% PF 10 mL IV push     Frequency: Daily     Dose: 40 mg     Route: Intravenous    potassium chloride (Klor-Con M20) tab 40 mEq     Frequency: Q4H     Dose: 40 mEq     Route: Oral    sodium hypochlorite (Dakin's) 0.125 % external solution     Frequency: Q12H     Route: Topical       Angelina Deal MD         [1]   Current Facility-Administered Medications   Medication Dose Route Frequency    potassium chloride (Klor-Con M20) tab 40 mEq  40 mEq Oral Q4H    sodium hypochlorite (Dakin's) 0.125 % external solution   Topical Q12H    cefTRIAXone (Rocephin) 1 g in sodium chloride 0.9% 100 mL IVPB-ADDV  1 g Intravenous Q24H    glucose (Dex4) 15 GM/59ML oral liquid 15 g  15 g Oral Q15 Min PRN    Or    glucose (Glutose) 40% oral gel 15 g  15 g Oral Q15 Min PRN    Or    glucose-vitamin C (Dex-4) chewable tab 4 tablet  4 tablet Oral Q15 Min PRN    Or    dextrose 50% injection 50 mL  50 mL Intravenous Q15 Min PRN    Or    glucose (Dex4) 15 GM/59ML oral liquid 30 g  30 g Oral Q15 Min PRN    Or    glucose (Glutose) 40% oral gel 30 g  30 g Oral Q15 Min PRN    Or    glucose-vitamin C (Dex-4) chewable tab 8 tablet  8 tablet Oral Q15 Min PRN    acetaminophen (Tylenol) tab 650 mg  650 mg Oral Q6H PRN    [Held by provider] aspirin chewable tab 81 mg  81 mg Oral Daily    atorvastatin (Lipitor) tab 40 mg  40 mg Oral Nightly    losartan  (Cozaar) tab 25 mg  25 mg Oral Daily    carvedilol (Coreg) tab 25 mg  25 mg Oral QAM    hydrALAzine (Apresoline) 20 mg/mL injection 10 mg  10 mg Intravenous Q6H PRN    pantoprazole (Protonix) 40 mg in sodium chloride 0.9% PF 10 mL IV push  40 mg Intravenous Daily   [2]   Patient Active Problem List  Diagnosis    Vision loss, left eye    Cerebrovascular accident (CVA), unspecified mechanism (HCC)    Essential hypertension    COVID-19    Right sided weakness    Aphasia due to recent stroke    Cerebral amyloid angiopathy (HCC)    Toxic encephalopathy    Hypertensive urgency    Acute chest pain    Renal artery stenosis    Cystitis    PAPO (acute kidney injury)    Primary hypertension    Uncontrolled hypertension    CVA (cerebral vascular accident) (HCC)    Acute CVA (cerebrovascular accident) (HCC)    Slurred speech    Weakness of right upper extremity    Dehydration    Failure to thrive in adult    Troponin level elevated    Hyperkalemia    Goals of care, counseling/discussion    Advance care planning    Palliative care by specialist    Acute cystitis without hematuria    Severe anemia

## 2025-07-18 LAB
ANION GAP SERPL CALC-SCNC: 5 MMOL/L (ref 0–18)
BASOPHILS # BLD AUTO: 0.02 X10(3) UL (ref 0–0.2)
BASOPHILS NFR BLD AUTO: 0.3 %
BUN BLD-MCNC: 14 MG/DL (ref 9–23)
BUN/CREAT SERPL: 17.7 (ref 10–20)
CALCIUM BLD-MCNC: 8.5 MG/DL (ref 8.7–10.4)
CHLORIDE SERPL-SCNC: 108 MMOL/L (ref 98–112)
CO2 SERPL-SCNC: 27 MMOL/L (ref 21–32)
CREAT BLD-MCNC: 0.79 MG/DL (ref 0.55–1.02)
DEPRECATED RDW RBC AUTO: 52.5 FL (ref 35.1–46.3)
EGFRCR SERPLBLD CKD-EPI 2021: 79 ML/MIN/1.73M2 (ref 60–?)
EOSINOPHIL # BLD AUTO: 0.09 X10(3) UL (ref 0–0.7)
EOSINOPHIL NFR BLD AUTO: 1.5 %
ERYTHROCYTE [DISTWIDTH] IN BLOOD BY AUTOMATED COUNT: 19.2 % (ref 11–15)
GLUCOSE BLD-MCNC: 87 MG/DL (ref 70–99)
GLUCOSE BLDC GLUCOMTR-MCNC: 106 MG/DL (ref 70–99)
GLUCOSE BLDC GLUCOMTR-MCNC: 107 MG/DL (ref 70–99)
GLUCOSE BLDC GLUCOMTR-MCNC: 164 MG/DL (ref 70–99)
GLUCOSE BLDC GLUCOMTR-MCNC: 85 MG/DL (ref 70–99)
HCT VFR BLD AUTO: 24.4 % (ref 35–48)
HGB BLD-MCNC: 7.7 G/DL (ref 12–16)
IMM GRANULOCYTES # BLD AUTO: 0.02 X10(3) UL (ref 0–1)
IMM GRANULOCYTES NFR BLD: 0.3 %
LYMPHOCYTES # BLD AUTO: 1.42 X10(3) UL (ref 1–4)
LYMPHOCYTES NFR BLD AUTO: 24.3 %
MCH RBC QN AUTO: 23.8 PG (ref 26–34)
MCHC RBC AUTO-ENTMCNC: 31.6 G/DL (ref 31–37)
MCV RBC AUTO: 75.3 FL (ref 80–100)
MONOCYTES # BLD AUTO: 0.27 X10(3) UL (ref 0.1–1)
MONOCYTES NFR BLD AUTO: 4.6 %
NEUTROPHILS # BLD AUTO: 4.03 X10 (3) UL (ref 1.5–7.7)
NEUTROPHILS # BLD AUTO: 4.03 X10(3) UL (ref 1.5–7.7)
NEUTROPHILS NFR BLD AUTO: 69 %
OSMOLALITY SERPL CALC.SUM OF ELEC: 290 MOSM/KG (ref 275–295)
PLATELET # BLD AUTO: 325 10(3)UL (ref 150–450)
POTASSIUM SERPL-SCNC: 3.7 MMOL/L (ref 3.5–5.1)
RBC # BLD AUTO: 3.24 X10(6)UL (ref 3.8–5.3)
SODIUM SERPL-SCNC: 140 MMOL/L (ref 136–145)
WBC # BLD AUTO: 5.9 X10(3) UL (ref 4–11)

## 2025-07-18 RX ORDER — AMOXICILLIN 500 MG/1
500 CAPSULE ORAL EVERY 8 HOURS
Status: DISCONTINUED | OUTPATIENT
Start: 2025-07-18 | End: 2025-07-22

## 2025-07-18 RX ORDER — POTASSIUM CHLORIDE 1500 MG/1
40 TABLET, EXTENDED RELEASE ORAL ONCE
Status: COMPLETED | OUTPATIENT
Start: 2025-07-18 | End: 2025-07-18

## 2025-07-18 RX ORDER — PANTOPRAZOLE SODIUM 40 MG/1
40 TABLET, DELAYED RELEASE ORAL
Status: DISCONTINUED | OUTPATIENT
Start: 2025-07-18 | End: 2025-07-22

## 2025-07-18 NOTE — PAYOR COMM NOTE
--------------  7/17 7/18:  CONTINUED STAY REVIEW    Payor: MARJORIE DAVE O  Subscriber #:  [id number]  Authorization Number: [id number]    Admit date: 7/13/25  Admit time:  3:23 AM      7/17 HOSPITALIST:    SUBJECTIVE:  Alert.    No complaints  OBJECTIVE:  Vital signs in last 24 hours:  /64   Pulse 82   Temp 98.4 °F (36.9 °C) (Axillary)   Resp 16   Ht 5' 7\" (1.702 m)   Wt 133 lb 9.6 oz (60.6 kg)   SpO2 99%   BMI 20.92 kg/m²      Intake/Output:     Intake/Output Summary (Last 24 hours) at 7/17/2025 1010  Last data filed at 7/17/2025 0933      Gross per 24 hour   Intake 3646 ml   Output 1150 ml   Net 2496 ml           LABS            Recent Labs   Lab 07/13/25  0133 07/13/25  0155 07/13/25  0959 07/14/25  1204 07/15/25  0617 07/16/25  0616 07/17/25  0643   RBC 2.86* 2.98*  --   --   --  3.17* 3.13*   HGB 6.4* 6.6*   < > 8.4*  --  7.5* 7.5*   HCT 21.0* 21.8*   < > 26.8*  --  23.9* 24.2*   MCV 73.4* 73.2*  --   --   --  75.4* 77.3*   MCH 22.4* 22.1*  --   --   --  23.7* 24.0*   MCHC 30.5* 30.3*  --   --   --  31.4 31.0   RDW 18.3* 18.4*  --   --   --  18.6* 18.9*   NEPRELIM 3.57 3.31  --   --   --  2.88 3.37   WBC 5.3 5.2  --   --   --  4.6 5.3   .0 354.0  --   --   --  336.0 355.0   AST 20 19  --   --   --   --   --    ALT 16 19  --   --   --   --   --    * 124*  --   --  131* 105* 90    < > = values in this interval not displayed.        Anemia.  Hemoglobin 7.5 today.  Could be dilutional but patient is eating well according to the nursing staff  Occult blood is negative  Plan:   Continue IV antibiotics.  Discussed with the family at the bedside.  Possible discharge tomorrow if her hemoglobin remained stable        7/18 HOSPITALIST:    SUBJECTIVE:  Alert.    No complaints per nursing staff.  Patient was getting fed her lunch.  And she ate most of it without any problems.  Also the [facility] reports no complaints while patient eating     I discussed with the son he tells me that he was just  visiting the patient and she is not looking good to him and she was not responding.  But she is alert  OBJECTIVE:  Vital signs in last 24 hours:  /69 (BP Location: Right arm)   Pulse 80   Temp 98.3 °F (36.8 °C) (Axillary)   Resp 16   Ht 5' 7\" (1.702 m)   Wt 145 lb 4.8 oz (65.9 kg)   SpO2 99%   BMI 22.76 kg/m²      Intake/Output:     Intake/Output Summary (Last 24 hours) at 7/18/2025 1310  Last data filed at 7/18/2025 1242      Gross per 24 hour   Intake 630 ml   Output 900 ml   Net -270 ml             Wt Readings from Last 3 Encounters:   07/18/25 145 lb 4.8 oz (65.9 kg)   03/13/25 131 lb 6.3 oz (59.6 kg)   02/20/25 131 lb 4.8 oz (59.6 kg)         Exam  Gen: No acute distress, alert  HEENT: Pallor noted  Pulm: Lungs clear, normal respiratory effort  CV: Heart with regular rate and rhythm, no peripheral edema  Abd: Abdomen soft, nontender, nondistended, no organomegaly, bowel sounds present  Skin: no rashes or lesions  CNS: Alert     Data Review:      Labs:         Lab Results   Component Value Date     WBC 5.9 07/18/2025     HGB 7.7 07/18/2025     HCT 24.4 07/18/2025     .0 07/18/2025     CREATSERUM 0.79 07/18/2025     BUN 14 07/18/2025      07/18/2025     K 3.7 07/18/2025      07/18/2025     CO2 27.0 07/18/2025     GLU 87 07/18/2025     CA 8.5 07/18/2025         LABS           Recent Labs   Lab 07/13/25  0133 07/13/25  0155 07/13/25  0959 07/16/25  0616 07/17/25  0643 07/18/25  0557   RBC 2.86* 2.98*  --  3.17* 3.13* 3.24*   HGB 6.4* 6.6*   < > 7.5* 7.5* 7.7*   HCT 21.0* 21.8*   < > 23.9* 24.2* 24.4*   MCV 73.4* 73.2*  --  75.4* 77.3* 75.3*   MCH 22.4* 22.1*  --  23.7* 24.0* 23.8*   MCHC 30.5* 30.3*  --  31.4 31.0 31.6   RDW 18.3* 18.4*  --  18.6* 18.9* 19.2*   NEPRELIM 3.57 3.31  --  2.88 3.37 4.03   WBC 5.3 5.2  --  4.6 5.3 5.9   .0 354.0  --  336.0 355.0 325.0   AST 20 19  --   --   --   --    ALT 16 19  --   --   --   --    * 124*   < > 105* 90 87    < > = values in  this interval not displayed.        Anemia.  Hemoglobin 7.5 today.  Could be dilutional but patient is eating well according to the nursing staff  Occult blood is negative  Plan:   Continue IV antibiotics.  Discussed with the family at the bedside.    Possible discharge later today if the patient eats her dinner  Discussed with the son         MEDICATIONS ADMINISTERED IN LAST 1 DAY:  acetaminophen (Tylenol) tab 650 mg       Date Action Dose Route User    7/17/2025 1643 Given 650 mg Oral Vasquez Rabago RN          aspirin chewable tab 81 mg       Date Action Dose Route User    7/18/2025 0736 Given 81 mg Oral Vasquez Rabago RN    7/17/2025 1859 Given 81 mg Oral Vasquez Rabago RN          carvedilol (Coreg) tab 25 mg       Date Action Dose Route User    7/18/2025 0736 Given 25 mg Oral Vasquez Rabago RN          cefTRIAXone (Rocephin) 1 g in sodium chloride 0.9% 100 mL IVPB-ADDV       Date Action Dose Route User    7/17/2025 1553 New Bag 1 g Intravenous Vasquez Rabago RN          sodium hypochlorite (Dakin's) 0.125 % external solution       Date Action Dose Route User    7/18/2025 1227 Given (none) Topical Vasquez Rabago RN    7/18/2025 0425 Given (none) Topical Ana Ojeda RN          hydrALAzine (Apresoline) 20 mg/mL injection 10 mg       Date Action Dose Route User    7/17/2025 2227 Given 10 mg Intravenous Ana Ojeda RN          losartan (Cozaar) tab 25 mg       Date Action Dose Route User    7/18/2025 0736 Given 25 mg Oral Vasquez Rabago RN          potassium chloride (Klor-Con M20) tab 40 mEq       Date Action Dose Route User    7/18/2025 0736 Given 40 mEq Oral Vasquez Rabago RN            Vitals (last day)       Date/Time Temp Pulse Resp BP SpO2 Weight O2 Device O2 Flow Rate (L/min) [facility]    07/18/25 0736 98.3 °F (36.8 °C) 80 16 158/69 99 % -- None (Room air) -- BN    07/18/25 0605 -- -- -- -- -- 145 lb 4.8 oz (65.9 kg) -- --     07/18/25 0239  98.5 °F (36.9 °C) 93 16 159/57 99 % -- None (Room air) --     07/17/25 2218 98.4 °F (36.9 °C) 85 16 170/80 100 % -- None (Room air) --     07/17/25 1555 98.7 °F (37.1 °C) 81 16 153/70 99 % -- None (Room air) --     07/17/25 1229 -- -- -- 147/65 -- -- -- --     07/17/25 0839 -- -- -- 158/64 -- -- -- --     07/17/25 0742 98.4 °F (36.9 °C) 82 16 181/81 99 % -- None (Room air) --     07/17/25 0235 98 °F (36.7 °C) 80 16 166/62 100 % -- None (Room air) -- CG               Product Unit Status Volume Start End            Transfuse RBC       25  [id number]  [id number] Completed 07/13/25 0855 456.25 mL 07/13/25 0550 07/13/25 0855

## 2025-07-18 NOTE — PHYSICAL THERAPY NOTE
PHYSICAL THERAPY TREATMENT NOTE - INPATIENT     Room Number: 565/565-A       Presenting Problem: AMS / HTN / dehydration /UTI and severe anemia .   PMH : DM / CAD / HTN / HL / OA / CVA --w/c bound on admission dependent ADL and mobility needs       Problem List  Principal Problem:    Acute cystitis without hematuria  Active Problems:    Severe anemia      PHYSICAL THERAPY ASSESSMENT   Patient demonstrates limited progress this session, pt declined sitting EOB this session. Nursing staff turn and reposition pt.      Patient is requiring maximum assist and dependent as a result of the following impairments: decreased functional strength, pain, impaired motor planning, decreased muscular endurance, and limited active and passive ROM.     Patient continues to function near baseline with bed mobility.  Discussed equipment and mobility with pt's son at bedside. Pt's son has equipment required, no questions or needs. Discussed turning pt. Pt's son states he has a thick cushion on high back w/c for pt if she gets OOB.    Pt has no skilled inpt PT needs at this time, discharge PT.    PLAN DURING HOSPITALIZATION  Nursing Mobility Recommendation : Lift Equipment  PT Device Recommendation: Mechanical lift  PT Treatment Plan:  (d/c PT)  Frequency (Obs):  (f/u prn this admission for family training and readiness for DC to home setting)     SUBJECTIVE  \"No\" occasionally when asked if a motion was painful    OBJECTIVE  Precautions: Cardiac, Bed/chair alarm    WEIGHT BEARING RESTRICTION       PAIN ASSESSMENT   Rating: Unable to rate  Location: L hip with mobility  Management Techniques: Repositioning    BALANCE  Static Sitting: Not tested  Dynamic Sitting: Not tested  Static Standing: Not tested  Dynamic Standing: Not tested    ACTIVITY TOLERANCE                          O2 WALK       AM-PAC '6-Clicks' INPATIENT SHORT FORM - BASIC MOBILITY  How much difficulty does the patient currently have...  Patient Difficulty: Turning over in  bed (including adjusting bedclothes, sheets and blankets)?: A Lot   Patient Difficulty: Sitting down on and standing up from a chair with arms (e.g., wheelchair, bedside commode, etc.): Unable   Patient Difficulty: Moving from lying on back to sitting on the side of the bed?: Unable   How much help from another person does the patient currently need...   Help from Another: Moving to and from a bed to a chair (including a wheelchair)?: Total   Help from Another: Need to walk in hospital room?: Total   Help from Another: Climbing 3-5 steps with a railing?: Total     AM-PAC Score:  Raw Score: 7   Approx Degree of Impairment: 92.36%   Standardized Score (AM-PAC Scale): 26.42   CMS Modifier (G-Code): CM    FUNCTIONAL ABILITY STATUS  Functional Mobility/Gait Assessment  Gait Assistance: Not tested  Pt declined sitting EOB.    Skilled Therapy Provided: Pt ok to be seen per RN. Pt's son at bedside, both educ in role of PT and PT POC. Pt declined sitting up. Performed PROM at [facility] and [facility] with inc tone. Discussed turning with pt's son. Per pt's son, they have the equipment needed at home. Pt's son states he has a thick w/c cushion for pt. Discussed turning for pressure relief. Pt's son stated they have an HHPT to come at home for positioning/mobility needs. Pt has been bathing in bed vs transferring to a shower chair recently. Pt is at her baseline, no skilled inpt PT needs at this time. Please re-order PT should pt's needs change.    The patient's Approx Degree of Impairment: 92.36% has been calculated based on documentation in the [facility] '6 clicks' Inpatient Daily Activity Short Form.  Research supports that patients with this level of impairment may benefit from LTC. Son cares for pt at home, has equipment in place.  Final disposition will be made by interdisciplinary medical team.      Patient End of Session: In bed, Needs met, Call light within reach, RN aware of session/findings, All patient questions and concerns  addressed, Hospital anti-slip socks, Restraints (boots applied)    CURRENT GOALS   Goals to be met by: 7/30/25  Patient Goal Patient's self-stated goal is: per son family wants pt to return home    Goal #1  Max assist of one for rolling and repositioning    Goal #1   Current Status  pt declined   Goal #2 Family training as needed for DC plan of home including discussion of frequent turning , PROM , positioning , equipment needs    Goal #2  Current Status  MET   Goal #3     Goal #3   Current Status     Goal #4     Goal #4   Current Status     Goal #5     Goal #5   Current Status     Goal #6     Goal #6  Current Status         Therapeutic Activity: 15 minutes

## 2025-07-18 NOTE — PROGRESS NOTES
[facility]  part of [facility]    Progress Note    Eliud Fine Patient Status:  Inpatient    1955 MRN [id number]   Location [facility] 5SW/SE Attending Angelina Deal MD   Hosp Day # 5 PCP Yao Rodriguez       SUBJECTIVE:  Alert.    No complaints per nursing staff.  Patient was getting fed her lunch.  And she ate most of it without any problems.  Also the PCT Rizwan reports no complaints while patient eating    I discussed with the son he tells me that he was just visiting the patient and she is not looking good to him and she was not responding.  But she is alert  OBJECTIVE:  Vital signs in last 24 hours:  /69 (BP Location: Right arm)   Pulse 80   Temp 98.3 °F (36.8 °C) (Axillary)   Resp 16   Ht 5' 7\" (1.702 m)   Wt 145 lb 4.8 oz (65.9 kg)   SpO2 99%   BMI 22.76 kg/m²     Intake/Output:    Intake/Output Summary (Last 24 hours) at 2025 1310  Last data filed at 2025 1242  Gross per 24 hour   Intake 630 ml   Output 900 ml   Net -270 ml       Wt Readings from Last 3 Encounters:   25 145 lb 4.8 oz (65.9 kg)   25 131 lb 6.3 oz (59.6 kg)   25 131 lb 4.8 oz (59.6 kg)       Exam  Gen: No acute distress, alert  HEENT: Pallor noted  Pulm: Lungs clear, normal respiratory effort  CV: Heart with regular rate and rhythm, no peripheral edema  Abd: Abdomen soft, nontender, nondistended, no organomegaly, bowel sounds present  Skin: no rashes or lesions  CNS: Alert    Data Review:     Labs:   Lab Results   Component Value Date    WBC 5.9 2025    HGB 7.7 2025    HCT 24.4 2025    .0 2025    CREATSERUM 0.79 2025    BUN 14 2025     2025    K 3.7 2025     2025    CO2 27.0 2025    GLU 87 2025    CA 8.5 2025       LABS  Recent Labs   Lab 25  0133 25  0155 25  0959 25  0616 25  0643 25  0557   RBC 2.86* 2.98*  --  3.17* 3.13*  3.24*   HGB 6.4* 6.6*   < > 7.5* 7.5* 7.7*   HCT 21.0* 21.8*   < > 23.9* 24.2* 24.4*   MCV 73.4* 73.2*  --  75.4* 77.3* 75.3*   MCH 22.4* 22.1*  --  23.7* 24.0* 23.8*   MCHC 30.5* 30.3*  --  31.4 31.0 31.6   RDW 18.3* 18.4*  --  18.6* 18.9* 19.2*   NEPRELIM 3.57 3.31  --  2.88 3.37 4.03   WBC 5.3 5.2  --  4.6 5.3 5.9   .0 354.0  --  336.0 355.0 325.0   AST 20 19  --   --   --   --    ALT 16 19  --   --   --   --    * 124*   < > 105* 90 87    < > = values in this interval not displayed.       Imaging:      Meds:   Current Hospital Medications[1]    Assessment  Problem List[2]  Anemia.  Hemoglobin 7.5 today.  Could be dilutional but patient is eating well according to the nursing staff  Occult blood is negative  Plan:   Continue IV antibiotics.  Discussed with the family at the bedside.    Possible discharge later today if the patient eats her dinner  Discussed with the son       Active Orders   LAB    Potassium     Frequency: Tomorrow AM draw     Number of Occurrences: 1 Occurrences     Order Comments: DO NOT DISCONTINUE MUST REMAIN FOR ELECTROLYTE PROTOCOL       Nourishments    Dietary Nutrition Supplements TID     Frequency: TID     Number of Occurrences: Until Specified     Order Comments: Vanilla SF mighty shake BID (@ bfast and lunch) and vanilla magic cup daily (@ dinner)      Room Service Eligibility Until Discontinued     Frequency: Until Discontinued     Number of Occurrences: Until Specified    Room Service Notify RN Until Discontinued     Frequency: Until Discontinued     Number of Occurrences: Until Specified   Respiratory Care    Auto CPAP When Sleeping     Frequency: When Sleeping     Number of Occurrences: Until Specified   Diet    Regular/General diet Fluid Consistency: Nectar Thick / Mildly Thick Liquids; Texture Consistency: Pureed; Is Patient on Accuchecks? Yes; Misc Restriction: No Straw     Frequency: Effective Now     Number of Occurrences: Until Specified     Order Comments: meds  crushed in puree     Nursing    Accucheck     Frequency: PRN     Number of Occurrences: Until Specified     Order Comments: For symptoms or suspicion of hypoglycemia      Accucheck     Frequency: TID AC and HS     Number of Occurrences: Until Specified    Apply dressing Other (Betadine with Dry) Daily     Frequency: Daily     Number of Occurrences: Until Specified     Order Comments: ~Paint RIGHT HEEL wound with Betadine and apply dry dressing daily and PRN      Apply dressing Other (Dakins) Q12H     Frequency: Q12H     Number of Occurrences: Until Specified     Order Comments: ~ Obtain Dakins from Pharmacy  ~ Dakin Instructions to__LEFT LATERAL ANKLE, SACRUM_________  ~ Remove old dressingS  ~ Dampen gauze with Dakin Solution  ~ Dressing change Q 12 hours and PRN  ~ Make sure to pack wound depth or tunnels  ~ Cover with dry gauze and secure in place W BORDERED FOAMS      Apply dressing Other (Sensicare Ointment) PRN     Frequency: PRN     Number of Occurrences: Until Specified     Order Comments: ~ Obtain Zinc/Protective Ointment from [facility]  ~ Clean area with soap and water  ~ Apply to ___PERI AREA______ BID and PRN      Apply Heel Boot     Frequency: Until Discontinued     Number of Occurrences: Until Specified     Order Comments: Remove boot every shift to asses skin.  Send boot with patient on transfer/discharge.      Discontinue all oral diabetic agents     Frequency: Once     Number of Occurrences: 1 Occurrences    Elevate heels off of bed     Frequency: Until Discontinued     Number of Occurrences: Until Specified    Incontinence Care     Frequency: PRN     Number of Occurrences: Until Specified     Order Comments: Prompt      Initiate electrolyte protocol     Frequency: Until Discontinued     Number of Occurrences: Until Specified    Initiate Hypoglycemia Algorithm for Adults     Frequency: Until Discontinued     Number of Occurrences: Until Specified     Order Comments: For blood glucose less  than 70      Lotion to skin     Frequency: Daily     Number of Occurrences: Until Specified    Nursing communication (specify)     Frequency: Until Discontinued     Number of Occurrences: Until Specified     Order Comments: AIR PUMP TO ISOTOUR GEL MATTRESS      Please communicate patient's home diabetic medications when speaking with physician     Frequency: Once     Number of Occurrences: 1 Occurrences    Provide diabetes patient education guide     Frequency: Once     Number of Occurrences: 1 Occurrences     Order Comments: Initiate education for new onset diabetes or pre-existing diabetes with knowledge deficits.      Teach blood glucose monitoring with bedside glucometer     Frequency: Once     Number of Occurrences: 1 Occurrences     Order Comments: If patient does not have a home glucometer, notify physician to order for discharge home.      Transfuse Orders to be completed     Frequency: If condition met     Number of Occurrences: Until Specified     Order Comments: Outstanding Blood Transfusion orders      Turn and reposition patient     Frequency: Q2H     Number of Occurrences: 2 Weeks   Medications    acetaminophen (Tylenol) tab 650 mg     Frequency: Q6H PRN     Dose: 650 mg     Route: Oral    aspirin chewable tab 81 mg     Frequency: Daily     Dose: 81 mg     Route: Oral    atorvastatin (Lipitor) tab 40 mg     Frequency: Nightly     Dose: 40 mg     Route: Oral    carvedilol (Coreg) tab 25 mg     Frequency: QAM     Dose: 25 mg     Route: Oral    cefTRIAXone (Rocephin) 1 g in sodium chloride 0.9% 100 mL IVPB-ADDV     Frequency: Q24H     Dose: 1 g     Route: Intravenous    dextrose 50% injection 50 mL     Linked Order: Or     Frequency: Q15 Min PRN     Dose: 50 mL     Route: Intravenous    glucose (Dex4) 15 GM/59ML oral liquid 15 g     Linked Order: Or     Frequency: Q15 Min PRN     Dose: 15 g     Route: Oral    glucose (Dex4) 15 GM/59ML oral liquid 30 g     Linked Order: Or     Frequency: Q15 Min PRN      Dose: 30 g     Route: Oral    glucose (Glutose) 40% oral gel 15 g     Linked Order: Or     Frequency: Q15 Min PRN     Dose: 15 g     Route: Oral    glucose (Glutose) 40% oral gel 30 g     Linked Order: Or     Frequency: Q15 Min PRN     Dose: 30 g     Route: Oral    glucose-vitamin C (Dex-4) chewable tab 4 tablet     Linked Order: Or     Frequency: Q15 Min PRN     Dose: 4 tablet     Route: Oral    glucose-vitamin C (Dex-4) chewable tab 8 tablet     Linked Order: Or     Frequency: Q15 Min PRN     Dose: 8 tablet     Route: Oral    hydrALAzine (Apresoline) 20 mg/mL injection 10 mg     Frequency: Q6H PRN     Dose: 10 mg     Route: Intravenous    losartan (Cozaar) tab 25 mg     Frequency: Daily     Dose: 25 mg     Route: Oral    pantoprazole (Protonix) DR tab 40 mg     Frequency: QAM AC     Dose: 40 mg     Route: Oral    sodium hypochlorite (Dakin's) 0.125 % external solution     Frequency: Q12H     Route: Topical       Angelina Deal MD         [1]   Current Facility-Administered Medications   Medication Dose Route Frequency    pantoprazole (Protonix) DR tab 40 mg  40 mg Oral QAM AC    aspirin chewable tab 81 mg  81 mg Oral Daily    sodium hypochlorite (Dakin's) 0.125 % external solution   Topical Q12H    cefTRIAXone (Rocephin) 1 g in sodium chloride 0.9% 100 mL IVPB-ADDV  1 g Intravenous Q24H    glucose (Dex4) 15 GM/59ML oral liquid 15 g  15 g Oral Q15 Min PRN    Or    glucose (Glutose) 40% oral gel 15 g  15 g Oral Q15 Min PRN    Or    glucose-vitamin C (Dex-4) chewable tab 4 tablet  4 tablet Oral Q15 Min PRN    Or    dextrose 50% injection 50 mL  50 mL Intravenous Q15 Min PRN    Or    glucose (Dex4) 15 GM/59ML oral liquid 30 g  30 g Oral Q15 Min PRN    Or    glucose (Glutose) 40% oral gel 30 g  30 g Oral Q15 Min PRN    Or    glucose-vitamin C (Dex-4) chewable tab 8 tablet  8 tablet Oral Q15 Min PRN    acetaminophen (Tylenol) tab 650 mg  650 mg Oral Q6H PRN    atorvastatin (Lipitor) tab 40 mg  40 mg Oral Nightly     losartan (Cozaar) tab 25 mg  25 mg Oral Daily    carvedilol (Coreg) tab 25 mg  25 mg Oral QAM    hydrALAzine (Apresoline) 20 mg/mL injection 10 mg  10 mg Intravenous Q6H PRN   [2]   Patient Active Problem List  Diagnosis    Vision loss, left eye    Cerebrovascular accident (CVA), unspecified mechanism (HCC)    Essential hypertension    COVID-19    Right sided weakness    Aphasia due to recent stroke    Cerebral amyloid angiopathy (HCC)    Toxic encephalopathy    Hypertensive urgency    Acute chest pain    Renal artery stenosis    Cystitis    PAPO (acute kidney injury)    Primary hypertension    Uncontrolled hypertension    CVA (cerebral vascular accident) (HCC)    Acute CVA (cerebrovascular accident) ([facility])    Slurred speech    Weakness of right upper extremity    Dehydration    Failure to thrive in adult    Troponin level elevated    Hyperkalemia    Goals of care, counseling/discussion    Advance care planning    Palliative care by specialist    Acute cystitis without hematuria    Severe anemia

## 2025-07-18 NOTE — PLAN OF CARE
Problem: Patient Centered Care  Goal: Patient preferences are identified and integrated in the patient's plan of care  Description: Interventions:  - What would you like us to know as we care for you? History of CVA with aphasia  - Provide timely, complete, and accurate information to patient/family  - Incorporate patient and family knowledge, values, beliefs, and cultural backgrounds into the planning and delivery of care  - Encourage patient/family to participate in care and decision-making at the level they choose  - Honor patient and family perspectives and choices  Outcome: Progressing     Problem: PAIN - ADULT  Goal: Verbalizes/displays adequate comfort level or patient's stated pain goal  Description: INTERVENTIONS:  - Encourage pt to monitor pain and request assistance  - Assess pain using appropriate pain scale  - Administer analgesics based on type and severity of pain and evaluate response  - Implement non-pharmacological measures as appropriate and evaluate response  - Consider cultural and social influences on pain and pain management  - Manage/alleviate anxiety  - Utilize distraction and/or relaxation techniques  - Monitor for opioid side effects  - Notify MD/LIP if interventions unsuccessful or patient reports new pain  - Anticipate increased pain with activity and pre-medicate as appropriate  Outcome: Progressing     Problem: RISK FOR INFECTION - ADULT  Goal: Absence of fever/infection during anticipated neutropenic period  Description: INTERVENTIONS  - Monitor WBC  - Administer growth factors as ordered  - Implement neutropenic guidelines  Outcome: Progressing     Problem: SAFETY ADULT - FALL  Goal: Free from fall injury  Description: INTERVENTIONS:  - Assess pt frequently for physical needs  - Identify cognitive and physical deficits and behaviors that affect risk of falls.  - [place] fall precautions as indicated by assessment.  - Educate pt/family on patient safety including physical  limitations  - Instruct pt to call for assistance with activity based on assessment  - Modify environment to reduce risk of injury  - Provide assistive devices as appropriate  - Consider OT/PT consult to assist with strengthening/mobility  - Encourage toileting schedule  Outcome: Progressing     Problem: DISCHARGE PLANNING  Goal: Discharge to home or other facility with appropriate resources  Description: INTERVENTIONS:  - Identify barriers to discharge w/pt and caregiver  - Include patient/family/discharge partner in discharge planning  - Arrange for needed discharge resources and transportation as appropriate  - Identify discharge learning needs (meds, wound care, etc)  - Arrange for interpreters to assist at discharge as needed  - Consider post-discharge preferences of patient/family/discharge partner  - Complete POLST form as appropriate  - Assess patient's ability to be responsible for managing their own health  - Refer to Case Management Department for coordinating discharge planning if the patient needs post-hospital services based on physician/LIP order or complex needs related to functional status, cognitive ability or social support system  Outcome: Progressing     Problem: Altered Communication/Language Barrier  Goal: Patient/Family is able to understand and participate in their care  Description: Interventions:  - Assess communication ability and preferred communication style  - Implement communication aides and strategies  - Use visual cues when possible  - Listen attentively, be patient, do not interrupt  - Minimize distractions  - Allow time for understanding and response  - Establish method for patient to ask for assistance (call light)  - Provide an  as needed  - Communicate barriers and strategies to overcome with those who interact with patient  Outcome: Progressing     Problem: GASTROINTESTINAL - ADULT  Goal: Maintains adequate nutritional intake (undernourished)  Description:  INTERVENTIONS:  - Monitor percentage of each meal consumed  - Identify factors contributing to decreased intake, treat as appropriate  - Assist with meals as needed  - Monitor I&O, WT and lab values  - Obtain nutritional consult as needed  - Optimize oral hygiene and moisture  - Encourage food from home; allow for food preferences  - Enhance eating environment  Outcome: Progressing     Problem: GENITOURINARY - ADULT  Goal: Absence of urinary retention  Description: INTERVENTIONS:  - Assess patient’s ability to void and empty bladder  - Monitor intake/output and perform bladder scan as needed  - Follow urinary retention protocol/standard of care  - Consider collaborating with pharmacy to review patient's medication profile  - Implement strategies to promote bladder emptying  Outcome: Progressing     Problem: METABOLIC/FLUID AND ELECTROLYTES - ADULT  Goal: Glucose maintained within prescribed range  Description: INTERVENTIONS:  - Monitor Blood Glucose as ordered  - Assess for signs and symptoms of hyperglycemia and hypoglycemia  - Administer ordered medications to maintain glucose within target range  - Assess barriers to adequate nutritional intake and initiate nutrition consult as needed  - Instruct patient on self management of diabetes  Outcome: Progressing  Goal: Electrolytes maintained within normal limits  Description: INTERVENTIONS:  - Monitor labs and rhythm and assess patient for signs and symptoms of electrolyte imbalances  - Administer electrolyte replacement as ordered  - Monitor response to electrolyte replacements, including rhythm and repeat lab results as appropriate  - Fluid restriction as ordered  - Instruct patient on fluid and nutrition restrictions as appropriate  Outcome: Progressing  Goal: Hemodynamic stability and optimal renal function maintained  Description: INTERVENTIONS:  - Monitor labs and assess for signs and symptoms of volume excess or deficit  - Monitor intake, output and patient  weight  - Monitor urine specific gravity, serum osmolarity and serum sodium as indicated or ordered  - Monitor response to interventions for patient's volume status, including labs, urine output, blood pressure (other measures as available)  - Encourage oral intake as appropriate  - Instruct patient on fluid and nutrition restrictions as appropriate  Outcome: Progressing     Problem: SKIN/TISSUE INTEGRITY - ADULT  Goal: Skin integrity remains intact  Description: INTERVENTIONS  - Assess and document risk factors for pressure ulcer development  - Assess and document skin integrity  - Monitor for areas of redness and/or skin breakdown  - Initiate interventions, skin care algorithm/standards of care as needed  Outcome: Progressing  Goal: Incision(s), wounds(s) or drain site(s) healing without S/S of infection  Description: INTERVENTIONS:  - Assess and document risk factors for pressure ulcer development  - Assess and document skin integrity  - Assess and document dressing/incision, wound bed, drain sites and surrounding tissue  - Implement wound care per orders  - Initiate isolation precautions as appropriate  - Initiate Pressure Ulcer prevention bundle as indicated  Outcome: Progressing  Goal: Oral mucous membranes remain intact  Description: INTERVENTIONS  - Assess oral mucosa and hygiene practices  - Implement preventative oral hygiene regimen  - Implement oral medicated treatments as ordered  Outcome: Progressing

## 2025-07-18 NOTE — DIETARY NOTE
ADULT NUTRITION INITIAL ASSESSMENT    Pt is at high nutrition risk.  Pt meets severe malnutrition criteria.      CRITERIA FOR MALNUTRITION DIAGNOSIS:  Criteria for severe malnutrition diagnosis: chronic illness related to wt loss greater than 10% in 6 months, energy intake less than 75% for greater than 1 month, and body fat moderate depletion, and muscle mass moderate depletion.     RECOMMENDATIONS TO MD: See nutrition intervention for ONS (oral nutrition supplements)    ADMITTING DIAGNOSIS:  Acute cystitis without hematuria [N30.00]  Severe anemia [D64.9]  PERTINENT PAST MEDICAL HISTORY: Past Medical History[1]    PATIENT STATUS: Initial 07/14/25: Pt assessed due to screening at risk, low BMI of 18.1 and MST score of 3 for weight loss and poor PO/appetite. Noted multiple pressure injuries per flowsheet. Pt admitted to ED for decreased mental status. PMH of T2DM, CAD, HLD, osteoarthritis, and hx of CVA. Noted pt with expressive aphasia. SLP recommended puree diet with nectar thick/mildly thick liquids. Met with pt bedside, pt unable to provide hx. Completed NFPE, see below. Noted pt eating 10-75% PO x last 4 meals. Called pt's son Gokul, who provided hx on the pt. Son reports pt has has an ok appetite. He noticed she began eating less starting in February. He typically feeds pt soft foods such as mac n cheese, mashed potatoes, apple sauce, etc. He reports pt has lost quite a bit of weight, he reports a UBW of ~160 lbs. Per EMR review, pt last weighed 165 lbs 1/14/25, current weight at 115 lbs. 30% weight loss in 6 months, significant and severe. Discussed ONS, son reports pt drinks ensure at home. Agreeable to magic cup daily and SF mighty shake BID (vanilla). Will monitor PO + ONS intake.     7/18/25: Patient eating well per nursing. PO intakes recorded for today -> 50% bfast today, 100% lunch today. % x 4 ONS documented. Will continue to monitor intakes, possible plans to discharge today if hemoglobin is  stable.     FOOD/NUTRITION RELATED HISTORY:  Appetite: Fair and improving   Intake: 50% bfast and 100% lunch today  Intake Meeting Needs: Marginal, ONS in place to maximize  Percent Meals Eaten (last 6 days)       Date/Time Percent Meals Eaten (%)    07/13/25 1026 60 %    07/13/25 1415 30 %    07/13/25 1900 10 %    07/14/25 0853 75 %    07/14/25 1343 25 %    07/14/25 1853 35 %    07/14/25 2032 --     Percent Meals Eaten (%): bite of apple sauce with crushed med at 07/14/25 2032    07/15/25 0758 70 %    07/15/25 1331 70 %    07/15/25 1743 20 %    07/16/25 0938 90 %     Percent Meals Eaten (%): pancakes and eggs at 07/16/25 0938    07/16/25 1452 50 %    07/16/25 1904 75 %    07/17/25 0933 30 %    07/17/25 1251 35 %    07/17/25 1900 40 %    07/18/25 0825 50 %    07/18/25 1242 100 %           Food Allergies: No Known Food Allergies (NKFA)  Cultural/Ethnic/Buddhist Preferences: Not Obtained    GASTROINTESTINAL: +BM x1 soft, brown, medium (7/18) and Swallow evaluation noted    MEDICATIONS: reviewed  Scheduled Medications[2]  Medication Infusions[3]     LABS: reviewed  POC gluc x 24 hrs->   Recent Labs     07/16/25  0616 07/17/25  0643 07/17/25  1625 07/18/25  0557   * 90  --  87   BUN 14 14  --  14   CREATSERUM 0.76 0.73  --  0.79   CA 8.0* 8.3*  --  8.5*    141  --  140   K 3.7  3.7 3.6  3.6 4.1 3.7    107  --  108   CO2 25.0 26.0  --  27.0   OSMOCALC 291 292  --  290       WEIGHT HISTORY:  Patient Weight(s) for the past 336 hrs:   Weight   07/18/25 0605 65.9 kg (145 lb 4.8 oz)   07/16/25 0501 60.6 kg (133 lb 9.6 oz)   07/13/25 0330 52.3 kg (115 lb 6 oz)     Wt Readings from Last 10 Encounters:   07/18/25 65.9 kg (145 lb 4.8 oz)   03/13/25 59.6 kg (131 lb 6.3 oz)   02/20/25 59.6 kg (131 lb 4.8 oz)   01/30/25 68 kg (150 lb)   01/14/25 74.8 kg (165 lb)   01/04/25 72.6 kg (160 lb)   11/11/24 74.8 kg (165 lb)   11/06/24 70.3 kg (155 lb)   09/27/24 73.9 kg (162 lb 14.4 oz)   07/08/24 77.6 kg (171  lb)       ANTHROPOMETRICS:  HT: 170.2 cm (5' 7\")  Wt Readings from Last 1 Encounters:   07/18/25 65.9 kg (145 lb 4.8 oz)     Last weight: Fluid changes noted  BMI: Body mass index is 22.76 kg/m².  Dosing Weight: 52.3 kg - admission weight, utilized for anthropometric calculations  BMI CLASSIFICATION: less than 18.5 kg/m2 - underweight  IBW/lbs (Calculated) Female: 135 lbs             86% IBW  Usual Body Wt: ~160 lbs       72% UBW    NUTRITION RELATED PHYSICAL FINDINGS:  - Nutrition Focused Physical Exam (NFPE): moderate depletion body fat triceps region and moderate depletion muscle mass temple region, clavicle region, scapular region, shoulder region, and calf region  - Fluid Accumulation: non-pitting Right Hand (s) and +1 Right Lower extremity and Foot . See RN documentation for details  - Skin Integrity: Multiple pressure injuries (not staged) -> PI on right ankle, right heel, left buttocks, and sacrum. See RN documentation for details      NUTRITION DIAGNOSIS/PROBLEM:   Severe Malnutrition related to Chronic - Physiological causes resulting in anorexia or diminished intake as evidenced by 30% weight loss in 6 months, energy intake <75% for greater than 1 month, body fat moderate depletion, muscle mass moderate depletion, and low BMI of 18.     NUTRITION INTERVENTION:     NUTRITION PRESCRIPTION:   Estimated Nutrition needs: --dosing wt of 52.3 kg - wt taken on 7/13/25  Calories: 2880-2263 calories/day (30-32 calories per kg Dosing wt)  Protein: 63-78 g protein/day (1.2-1.5 g protein/kg Dosing wt)  Fluid Needs: 1 mL/kcal    - Diet:       Procedures    Regular/General diet Fluid Consistency: Nectar Thick / Mildly Thick Liquids; Texture Consistency: Pureed; Is Patient on Accuchecks? Yes; Misc Restriction: No Straw      - Nutrition Care Plan: Initiated ONS (oral nutritional supplements)  - ONS (Oral Nutrition Supplements)/Meals/Snacks: Magic Cup (290 calories/ 9 g protein each) Daily Vanilla and SF Shake (200  calories/ 7 g protein each) BID Vanilla   - Vitamin and mineral supplements: none  - Feeding assistance: meal set up and feed  - Nutrition education: not appropriate at this time    - Coordination of nutrition care: collaboration with other providers  - Discharge and transfer of nutrition care to new setting or provider: monitor plans    MONITOR AND EVALUATE/NUTRITION GOALS:  - Food and Nutrient Intake:      Monitor: adequacy of PO intake and adequacy of supplement intake  - Food and Nutrient Administration:      Monitor: N/A  - Anthropometric Measurement:    Monitor weight  - Nutrition Goals:      halt wt loss, PO and supplement greater than 75% of needs, intake to meet needs, good supplement intake, euglycemia, minimize lean body mass loss, prevent skin breakdown, promote healing, support wound healing, and support body systems    DIETITIAN FOLLOW UP: RD to follow and monitor nutrition status    Lauren Lion MS, RDN, LDN  Clinical Dietitian  [phone]          [1]   Past Medical History:   Asthma (HCC)    Coronary atherosclerosis    Diabetes (HCC)    diabetes for 2 yrs    Essential hypertension    Glaucoma    right    Heart attack (HCC)    2005    High blood pressure    High cholesterol    Hyperlipidemia    Osteoarthritis    Sleep apnea    cpap    Stroke (HCC)    30 yrs ago    Visual impairment    left eye edema   [2]    pantoprazole  40 mg Oral QAM AC    aspirin  81 mg Oral Daily    sodium hypochlorite   Topical Q12H    cefTRIAXone  1 g Intravenous Q24H    atorvastatin  40 mg Oral Nightly    losartan  25 mg Oral Daily    carvedilol  25 mg Oral QAM   [3]

## 2025-07-19 ENCOUNTER — APPOINTMENT (OUTPATIENT)
Dept: CT IMAGING | Facility: HOSPITAL | Age: 70
End: 2025-07-19
Attending: INTERNAL MEDICINE

## 2025-07-19 ENCOUNTER — APPOINTMENT (OUTPATIENT)
Dept: CV DIAGNOSTICS | Facility: HOSPITAL | Age: 70
End: 2025-07-19
Attending: INTERNAL MEDICINE

## 2025-07-19 LAB
ANION GAP SERPL CALC-SCNC: 6 MMOL/L (ref 0–18)
BASOPHILS # BLD AUTO: 0.03 X10(3) UL (ref 0–0.2)
BASOPHILS NFR BLD AUTO: 0.3 %
BUN BLD-MCNC: 15 MG/DL (ref 9–23)
BUN/CREAT SERPL: 17.9 (ref 10–20)
CALCIUM BLD-MCNC: 8.5 MG/DL (ref 8.7–10.4)
CHLORIDE SERPL-SCNC: 107 MMOL/L (ref 98–112)
CO2 SERPL-SCNC: 25 MMOL/L (ref 21–32)
CREAT BLD-MCNC: 0.84 MG/DL (ref 0.55–1.02)
DEPRECATED RDW RBC AUTO: 59.6 FL (ref 35.1–46.3)
EGFRCR SERPLBLD CKD-EPI 2021: 74 ML/MIN/1.73M2 (ref 60–?)
EOSINOPHIL # BLD AUTO: 0.03 X10(3) UL (ref 0–0.7)
EOSINOPHIL NFR BLD AUTO: 0.3 %
ERYTHROCYTE [DISTWIDTH] IN BLOOD BY AUTOMATED COUNT: 20.3 % (ref 11–15)
GLUCOSE BLD-MCNC: 98 MG/DL (ref 70–99)
GLUCOSE BLDC GLUCOMTR-MCNC: 111 MG/DL (ref 70–99)
GLUCOSE BLDC GLUCOMTR-MCNC: 112 MG/DL (ref 70–99)
GLUCOSE BLDC GLUCOMTR-MCNC: 115 MG/DL (ref 70–99)
GLUCOSE BLDC GLUCOMTR-MCNC: 90 MG/DL (ref 70–99)
GLUCOSE BLDC GLUCOMTR-MCNC: 95 MG/DL (ref 70–99)
HCT VFR BLD AUTO: 26.2 % (ref 35–48)
HGB BLD-MCNC: 7.7 G/DL (ref 12–16)
IMM GRANULOCYTES # BLD AUTO: 0.03 X10(3) UL (ref 0–1)
IMM GRANULOCYTES NFR BLD: 0.3 %
LYMPHOCYTES # BLD AUTO: 1.44 X10(3) UL (ref 1–4)
LYMPHOCYTES NFR BLD AUTO: 13.7 %
MCH RBC QN AUTO: 23.7 PG (ref 26–34)
MCHC RBC AUTO-ENTMCNC: 29.4 G/DL (ref 31–37)
MCV RBC AUTO: 80.6 FL (ref 80–100)
MONOCYTES # BLD AUTO: 0.34 X10(3) UL (ref 0.1–1)
MONOCYTES NFR BLD AUTO: 3.2 %
NEUTROPHILS # BLD AUTO: 8.61 X10 (3) UL (ref 1.5–7.7)
NEUTROPHILS # BLD AUTO: 8.61 X10(3) UL (ref 1.5–7.7)
NEUTROPHILS NFR BLD AUTO: 82.2 %
OSMOLALITY SERPL CALC.SUM OF ELEC: 287 MOSM/KG (ref 275–295)
PLATELET # BLD AUTO: 388 10(3)UL (ref 150–450)
PLATELETS.RETICULATED NFR BLD AUTO: 0.9 % (ref 0–7)
POTASSIUM SERPL-SCNC: 3.7 MMOL/L (ref 3.5–5.1)
POTASSIUM SERPL-SCNC: 3.7 MMOL/L (ref 3.5–5.1)
RBC # BLD AUTO: 3.25 X10(6)UL (ref 3.8–5.3)
SODIUM SERPL-SCNC: 138 MMOL/L (ref 136–145)
TSI SER-ACNC: 2.37 UIU/ML (ref 0.55–4.78)
WBC # BLD AUTO: 10.5 X10(3) UL (ref 4–11)

## 2025-07-19 PROCEDURE — 71260 CT THORAX DX C+: CPT | Performed by: STUDENT IN AN ORGANIZED HEALTH CARE EDUCATION/TRAINING PROGRAM

## 2025-07-19 PROCEDURE — 93306 TTE W/DOPPLER COMPLETE: CPT | Performed by: INTERNAL MEDICINE

## 2025-07-19 PROCEDURE — 99291 CRITICAL CARE FIRST HOUR: CPT | Performed by: HOSPITALIST

## 2025-07-19 RX ORDER — CARVEDILOL 12.5 MG/1
12.5 TABLET ORAL 2 TIMES DAILY WITH MEALS
Status: DISCONTINUED | OUTPATIENT
Start: 2025-07-19 | End: 2025-07-22

## 2025-07-19 RX ORDER — HEPARIN SODIUM 5000 [USP'U]/ML
5000 INJECTION, SOLUTION INTRAVENOUS; SUBCUTANEOUS EVERY 12 HOURS SCHEDULED
Status: DISCONTINUED | OUTPATIENT
Start: 2025-07-19 | End: 2025-07-20

## 2025-07-19 RX ORDER — METOPROLOL TARTRATE 1 MG/ML
INJECTION, SOLUTION INTRAVENOUS
Status: COMPLETED
Start: 2025-07-19 | End: 2025-07-19

## 2025-07-19 RX ORDER — POTASSIUM CHLORIDE 14.9 MG/ML
20 INJECTION INTRAVENOUS ONCE
Status: COMPLETED | OUTPATIENT
Start: 2025-07-19 | End: 2025-07-19

## 2025-07-19 RX ORDER — METOPROLOL TARTRATE 1 MG/ML
5 INJECTION, SOLUTION INTRAVENOUS EVERY 6 HOURS
Status: DISCONTINUED | OUTPATIENT
Start: 2025-07-19 | End: 2025-07-22

## 2025-07-19 RX ORDER — TIMOLOL MALEATE 5 MG/ML
1 SOLUTION/ DROPS OPHTHALMIC 2 TIMES DAILY
Status: DISCONTINUED | OUTPATIENT
Start: 2025-07-19 | End: 2025-07-22

## 2025-07-19 RX ORDER — METOPROLOL TARTRATE 1 MG/ML
5 INJECTION, SOLUTION INTRAVENOUS ONCE
Status: DISCONTINUED | OUTPATIENT
Start: 2025-07-19 | End: 2025-07-22

## 2025-07-19 NOTE — PROGRESS NOTES
[facility]  part of [facility]    Progress Note    Eliud Fine Patient Status:  Inpatient    1955 MRN [id number]   Location [facility] 5SW/SE Attending Angelina Deal MD   Hosp Day # 6 PCP Yao Rodriguez       Subjective:   Eliud Fine is a(n) 70 year old female with a past medical history significant for dementia, RRT called by RN with heart rate in the 130s sustaining.  Patient does not appear to be in any distress.    Objective:   Blood pressure 128/70, pulse 86, temperature 99.9 °F (37.7 °C), temperature source Axillary, resp. rate 15, height 5' 7\" (1.702 m), weight 145 lb 4.8 oz (65.9 kg), SpO2 99%.    GENERAL:  The patient appeared to be in no distress and was comfortable.  SKIN:  Warm and hydrated  PSYCHIATRIC: Demented  HEENT:  Head was atraumatic and normocephalic.  Eyes: Sclera was anicteric.  Pupils were equal.  Ears:  There were no lesions.  Nose:  No lesions were noted.     NECK:  Supple.  There was no JVD.   CHEST:  Symmetrical movement on inspiration  LUNGS:  No audible wheezing  ABDOMEN: Non-distended  MUSCULOSKELETAL:  There was no deformity.  There was full range of motion in all the extremities.   EXTREMITIES: There was no edema, clubbing or cyanosis  NEUROLOGICAL:  There was no focal deficit.      Scheduled Medications[1]        Results:     Lab Results   Component Value Date    WBC 5.9 2025    HGB 7.7 (L) 2025    HCT 24.4 (L) 2025    .0 2025    CREATSERUM 0.79 2025    BUN 14 2025     2025    K 3.7 2025     2025    CO2 27.0 2025    GLU 87 2025    CA 8.5 (L) 2025    ALB 2.9 (L) 2025    ALKPHO 61 2025    BILT 0.4 2025    TP 6.0 2025    AST 19 2025    ALT 19 2025    PTT 30.4 2024    INR 1.11 2024    T4F 1.3 02/15/2025    TSH 1.457 02/15/2025    LIP 53 2025    DDIMER 2.40 (H) 10/11/2022    ESRML 37 (H)  12/18/2022    CRP 0.31 (H) 12/18/2022    MG 1.9 02/20/2025    PHOS 2.9 02/17/2025    CK 83 02/12/2025    B12 687 02/15/2025       No results found.  EKG 12 Lead  Result Date: 7/19/2025  Supraventricular tachycardia Nonspecific T wave abnormality Abnormal ECG When compared with ECG of 30-MAY-2025 05:54, Premature atrial complexes are no longer Present        Assessment and Plan:     SVT  EKG done shows SVT, attempted carotid massage with improvement in heart rate.  Patient given Lopressor 5 mg IV x 1 with resolution now sinus in the 80s.  Patient placed on remote telemetry, use IV Lopressor for heart rate greater than 120.    35 minutes of critical care time spent with patient including coordinating care with ancillary staff              EMILIA PETERSEN MD  7/19/2025       [1]    metoprolol  5 mg Intravenous Once    metoprolol  5 mg Intravenous Q6H    timolol  1 drop Both Eyes BID    heparin  5,000 Units Subcutaneous 2 times per day    potassium chloride  20 mEq Intravenous Once    pantoprazole  40 mg Oral QAM AC    amoxicillin  500 mg Oral Q8H    aspirin  81 mg Oral Daily    sodium hypochlorite   Topical Q12H    atorvastatin  40 mg Oral Nightly    losartan  25 mg Oral Daily    carvedilol  25 mg Oral QAM

## 2025-07-19 NOTE — PROGRESS NOTES
[facility]  part of [facility]    Progress Note    Eliud Fine Patient Status:  Inpatient    1955 MRN [id number]   Location [facility] 5SW/SE Attending Angelina Deal MD   Hosp Day # 6 PCP Yao Rodriguez       SUBJECTIVE:  Alert.    Nursing staff reports that patient does not take all her medications sometimes she refuses some of her medications.  Patient had an episode of SVT earlier today     OBJECTIVE:  Vital signs in last 24 hours:  /64 (BP Location: Left arm)   Pulse 83   Temp 98.7 °F (37.1 °C) (Axillary)   Resp 18   Ht 5' 7\" (1.702 m)   Wt 145 lb 4.8 oz (65.9 kg)   SpO2 100%   BMI 22.76 kg/m²     Intake/Output:    Intake/Output Summary (Last 24 hours) at 2025 1722  Last data filed at 2025 1658  Gross per 24 hour   Intake 556 ml   Output 650 ml   Net -94 ml       Wt Readings from Last 3 Encounters:   25 145 lb 4.8 oz (65.9 kg)   25 131 lb 6.3 oz (59.6 kg)   25 131 lb 4.8 oz (59.6 kg)       Exam  Gen: No acute distress, alert  HEENT: Pallor noted  Pulm: Lungs clear, normal respiratory effort  CV: Heart with regular rate and rhythm, no peripheral edema  Abd: Abdomen soft, nontender, nondistended, no organomegaly, bowel sounds present  Skin: no rashes or lesions  CNS: Alert    Data Review:     Labs:   Lab Results   Component Value Date    WBC 10.5 2025    HGB 7.7 2025    HCT 26.2 2025    .0 2025    CREATSERUM 0.84 2025    BUN 15 2025     2025    K 3.7 2025    K 3.7 2025     2025    CO2 25.0 2025    GLU 98 2025    CA 8.5 2025    TSH 2.374 2025         LABS  Recent Labs   Lab 25  0133 25  0155 25  0959 25  0643 25  0557 25  0638   RBC 2.86* 2.98*   < > 3.13* 3.24* 3.25*   HGB 6.4* 6.6*   < > 7.5* 7.7* 7.7*   HCT 21.0* 21.8*   < > 24.2* 24.4* 26.2*   MCV 73.4* 73.2*   < > 77.3* 75.3* 80.6    MCH 22.4* 22.1*   < > 24.0* 23.8* 23.7*   MCHC 30.5* 30.3*   < > 31.0 31.6 29.4*   RDW 18.3* 18.4*   < > 18.9* 19.2* 20.3*   NEPRELIM 3.57 3.31   < > 3.37 4.03 8.61*   WBC 5.3 5.2   < > 5.3 5.9 10.5   .0 354.0   < > 355.0 325.0 388.0   AST 20 19  --   --   --   --    ALT 16 19  --   --   --   --    * 124*   < > 90 87 98    < > = values in this interval not displayed.       Imaging:      Meds:   Current Hospital Medications[1]    Assessment  Problem List[2]  Anemia.  Stable     SVT, could be secondary to patient not taking her carvedilol.  CT chest is negative for pulmonary embolism.  Waiting for Doppler TSH is normal.    plan:   Changed to oral antibiotics today  Discussed with the family at the bedside.      Discussed with the son, possible discharge tomorrow if she is stable       Active Orders   LAB    Basic Metabolic Panel (8)     Frequency: Tomorrow AM draw     Number of Occurrences: 1 Occurrences    Basic Metabolic Panel (8)     Frequency: Tomorrow AM draw     Number of Occurrences: 1 Occurrences    CBC With Differential With Platelet     Frequency: Tomorrow AM draw     Number of Occurrences: 1 Occurrences    Potassium     Frequency: Tomorrow AM draw     Number of Occurrences: 1 Occurrences     Order Comments: DO NOT DISCONTINUE MUST REMAIN FOR ELECTROLYTE PROTOCOL       Nourishments    Dietary Nutrition Supplements TID     Frequency: TID     Number of Occurrences: Until Specified     Order Comments: Vanilla SF mighty shake BID (@ bfast and lunch) and vanilla magic cup daily (@ dinner)      Room Service Eligibility Until Discontinued     Frequency: Until Discontinued     Number of Occurrences: Until Specified    Room Service Notify RN Until Discontinued     Frequency: Until Discontinued     Number of Occurrences: Until Specified   Respiratory Care    Auto CPAP When Sleeping     Frequency: When Sleeping     Number of Occurrences: Until Specified   Diet    Regular/General diet Fluid Consistency:  Nectar Thick / Mildly Thick Liquids; Texture Consistency: Pureed; Is Patient on Accuchecks? Yes; Misc Restriction: No Straw     Frequency: Effective Now     Number of Occurrences: Until Specified     Order Comments: meds crushed in puree     Nursing    Accucheck     Frequency: PRN     Number of Occurrences: Until Specified     Order Comments: For symptoms or suspicion of hypoglycemia      Accucheck     Frequency: TID AC and HS     Number of Occurrences: Until Specified    Apply dressing Other (Betadine with Dry) Daily     Frequency: Daily     Number of Occurrences: Until Specified     Order Comments: ~Paint RIGHT HEEL wound with Betadine and apply dry dressing daily and PRN      Apply dressing Other (Dakins) Q12H     Frequency: Q12H     Number of Occurrences: Until Specified     Order Comments: ~ Obtain Dakins from Pharmacy  ~ Dakin Instructions to__LEFT LATERAL ANKLE, SACRUM_________  ~ Remove old dressingS  ~ Dampen gauze with Dakin Solution  ~ Dressing change Q 12 hours and PRN  ~ Make sure to pack wound depth or tunnels  ~ Cover with dry gauze and secure in place W BORDERED FOAMS      Apply dressing Other (Sensicare Ointment) PRN     Frequency: PRN     Number of Occurrences: Until Specified     Order Comments: ~ Obtain Zinc/Protective Ointment from [facility]  ~ Clean area with soap and water  ~ Apply to ___PERI AREA______ BID and PRN      Apply Heel Boot     Frequency: Until Discontinued     Number of Occurrences: Until Specified     Order Comments: Remove boot every shift to asses skin.  Send boot with patient on transfer/discharge.      Cardiac monitoring     Frequency: Until Discontinued     Number of Occurrences: Until Specified    Discontinue all oral diabetic agents     Frequency: Once     Number of Occurrences: 1 Occurrences    Elevate heels off of bed     Frequency: Until Discontinued     Number of Occurrences: Until Specified    Incontinence Care     Frequency: PRN     Number of Occurrences:  Until Specified     Order Comments: Prompt      Initiate electrolyte protocol     Frequency: Until Discontinued     Number of Occurrences: Until Specified    Initiate Hypoglycemia Algorithm for Adults     Frequency: Until Discontinued     Number of Occurrences: Until Specified     Order Comments: For blood glucose less than 70      Lotion to skin     Frequency: Daily     Number of Occurrences: Until Specified    Nursing communication (specify)     Frequency: Until Discontinued     Number of Occurrences: Until Specified     Order Comments: AIR PUMP TO ISOTOUR GEL MATTRESS      Please communicate patient's home diabetic medications when speaking with physician     Frequency: Once     Number of Occurrences: 1 Occurrences    Provide diabetes patient education guide     Frequency: Once     Number of Occurrences: 1 Occurrences     Order Comments: Initiate education for new onset diabetes or pre-existing diabetes with knowledge deficits.      Remote Telemetry     Frequency: Until Discontinued     Number of Occurrences: Until Specified    Teach blood glucose monitoring with bedside glucometer     Frequency: Once     Number of Occurrences: 1 Occurrences     Order Comments: If patient does not have a home glucometer, notify physician to order for discharge home.      Tele Box     Frequency: Once     Number of Occurrences: 1 Occurrences    Transfuse Orders to be completed     Frequency: If condition met     Number of Occurrences: Until Specified     Order Comments: Outstanding Blood Transfusion orders      Turn and reposition patient     Frequency: Q2H     Number of Occurrences: 2 Weeks   Medications    acetaminophen (Tylenol) tab 650 mg     Frequency: Q6H PRN     Dose: 650 mg     Route: Oral    amoxicillin (Amoxil) cap 500 mg     Frequency: Q8H     Dose: 500 mg     Route: Oral    aspirin chewable tab 81 mg     Frequency: Daily     Dose: 81 mg     Route: Oral    atorvastatin (Lipitor) tab 40 mg     Frequency: Nightly     Dose:  40 mg     Route: Oral    carvedilol (Coreg) tab 12.5 mg     Frequency: BID with meals     Dose: 12.5 mg     Route: Oral    carvedilol (Coreg) tab 25 mg     Frequency: QAM     Dose: 25 mg     Route: Oral    dextrose 50% injection 50 mL     Linked Order: Or     Frequency: Q15 Min PRN     Dose: 50 mL     Route: Intravenous    glucose (Dex4) 15 GM/59ML oral liquid 15 g     Linked Order: Or     Frequency: Q15 Min PRN     Dose: 15 g     Route: Oral    glucose (Dex4) 15 GM/59ML oral liquid 30 g     Linked Order: Or     Frequency: Q15 Min PRN     Dose: 30 g     Route: Oral    glucose (Glutose) 40% oral gel 15 g     Linked Order: Or     Frequency: Q15 Min PRN     Dose: 15 g     Route: Oral    glucose (Glutose) 40% oral gel 30 g     Linked Order: Or     Frequency: Q15 Min PRN     Dose: 30 g     Route: Oral    glucose-vitamin C (Dex-4) chewable tab 4 tablet     Linked Order: Or     Frequency: Q15 Min PRN     Dose: 4 tablet     Route: Oral    glucose-vitamin C (Dex-4) chewable tab 8 tablet     Linked Order: Or     Frequency: Q15 Min PRN     Dose: 8 tablet     Route: Oral    heparin (Porcine) 5000 UNIT/ML injection 5,000 Units     Frequency: Q12H KB     Dose: 5,000 Units     Route: Subcutaneous    hydrALAzine (Apresoline) 20 mg/mL injection 10 mg     Frequency: Q6H PRN     Dose: 10 mg     Route: Intravenous    losartan (Cozaar) tab 25 mg     Frequency: Daily     Dose: 25 mg     Route: Oral    metoprolol (Lopressor) 5 mg/5mL injection 5 mg     Frequency: Once     Dose: 5 mg     Route: Intravenous    metoprolol (Lopressor) 5 mg/5mL injection 5 mg     Frequency: Q6H     Dose: 5 mg     Route: Intravenous    pantoprazole (Protonix) DR tab 40 mg     Frequency: QAM AC     Dose: 40 mg     Route: Oral    sodium hypochlorite (Dakin's) 0.125 % external solution     Frequency: Q12H     Route: Topical    timolol (Timoptic) 0.5 % ophthalmic solution 1 drop     Frequency: BID     Dose: 1 drop     Route: Both Eyes       Angelina Deal  MD         [1]   Current Facility-Administered Medications   Medication Dose Route Frequency    metoprolol (Lopressor) 5 mg/5mL injection 5 mg  5 mg Intravenous Once    metoprolol (Lopressor) 5 mg/5mL injection 5 mg  5 mg Intravenous Q6H    timolol (Timoptic) 0.5 % ophthalmic solution 1 drop  1 drop Both Eyes BID    heparin (Porcine) 5000 UNIT/ML injection 5,000 Units  5,000 Units Subcutaneous 2 times per day    carvedilol (Coreg) tab 12.5 mg  12.5 mg Oral BID with meals    pantoprazole (Protonix) DR tab 40 mg  40 mg Oral QAM AC    amoxicillin (Amoxil) cap 500 mg  500 mg Oral Q8H    aspirin chewable tab 81 mg  81 mg Oral Daily    sodium hypochlorite (Dakin's) 0.125 % external solution   Topical Q12H    glucose (Dex4) 15 GM/59ML oral liquid 15 g  15 g Oral Q15 Min PRN    Or    glucose (Glutose) 40% oral gel 15 g  15 g Oral Q15 Min PRN    Or    glucose-vitamin C (Dex-4) chewable tab 4 tablet  4 tablet Oral Q15 Min PRN    Or    dextrose 50% injection 50 mL  50 mL Intravenous Q15 Min PRN    Or    glucose (Dex4) 15 GM/59ML oral liquid 30 g  30 g Oral Q15 Min PRN    Or    glucose (Glutose) 40% oral gel 30 g  30 g Oral Q15 Min PRN    Or    glucose-vitamin C (Dex-4) chewable tab 8 tablet  8 tablet Oral Q15 Min PRN    acetaminophen (Tylenol) tab 650 mg  650 mg Oral Q6H PRN    atorvastatin (Lipitor) tab 40 mg  40 mg Oral Nightly    losartan (Cozaar) tab 25 mg  25 mg Oral Daily    carvedilol (Coreg) tab 25 mg  25 mg Oral QAM    hydrALAzine (Apresoline) 20 mg/mL injection 10 mg  10 mg Intravenous Q6H PRN   [2]   Patient Active Problem List  Diagnosis    Vision loss, left eye    Cerebrovascular accident (CVA), unspecified mechanism (HCC)    Essential hypertension    COVID-19    Right sided weakness    Aphasia due to recent stroke    Cerebral amyloid angiopathy (HCC)    Toxic encephalopathy    Hypertensive urgency    Acute chest pain    Renal artery stenosis    Cystitis    PAPO (acute kidney injury)    Primary hypertension     Uncontrolled hypertension    CVA (cerebral vascular accident) (HCC)    Acute CVA (cerebrovascular accident) (HCC)    Slurred speech    Weakness of right upper extremity    Dehydration    Failure to thrive in adult    Troponin level elevated    Hyperkalemia    Goals of care, counseling/discussion    Advance care planning    Palliative care by specialist    Acute cystitis without hematuria    Severe anemia

## 2025-07-19 NOTE — PLAN OF CARE
Problem: Patient Centered Care  Goal: Patient preferences are identified and integrated in the patient's plan of care  Description: Interventions:  - What would you like us to know as we care for you? History of CVA with aphasia  - Provide timely, complete, and accurate information to patient/family  - Incorporate patient and family knowledge, values, beliefs, and cultural backgrounds into the planning and delivery of care  - Encourage patient/family to participate in care and decision-making at the level they choose  - Honor patient and family perspectives and choices  Outcome: Progressing     Problem: PAIN - ADULT  Goal: Verbalizes/displays adequate comfort level or patient's stated pain goal  Description: INTERVENTIONS:  - Encourage pt to monitor pain and request assistance  - Assess pain using appropriate pain scale  - Administer analgesics based on type and severity of pain and evaluate response  - Implement non-pharmacological measures as appropriate and evaluate response  - Consider cultural and social influences on pain and pain management  - Manage/alleviate anxiety  - Utilize distraction and/or relaxation techniques  - Monitor for opioid side effects  - Notify MD/LIP if interventions unsuccessful or patient reports new pain  - Anticipate increased pain with activity and pre-medicate as appropriate  Outcome: Progressing     Problem: RISK FOR INFECTION - ADULT  Goal: Absence of fever/infection during anticipated neutropenic period  Description: INTERVENTIONS  - Monitor WBC  - Administer growth factors as ordered  - Implement neutropenic guidelines  Outcome: Progressing     Problem: SAFETY ADULT - FALL  Goal: Free from fall injury  Description: INTERVENTIONS:  - Assess pt frequently for physical needs  - Identify cognitive and physical deficits and behaviors that affect risk of falls.  - [place] fall precautions as indicated by assessment.  - Educate pt/family on patient safety including physical  limitations  - Instruct pt to call for assistance with activity based on assessment  - Modify environment to reduce risk of injury  - Provide assistive devices as appropriate  - Consider OT/PT consult to assist with strengthening/mobility  - Encourage toileting schedule  Outcome: Progressing

## 2025-07-19 NOTE — PROGRESS NOTES
RRT    *See RRT Documentation Record*    Reason the RRT was called: heart rate 130s sustaining   Assessment of patient leading up to RRT: heart rate 130s   Interventions/Testing: EKG, Metoprolol iv given, remote tele ordered   Patient Outcome/Disposition: none   Family Notified: will notify in the morning , son notified and updated on patient status at 0645   Name of family notified: will call son Gokul Fine

## 2025-07-20 ENCOUNTER — APPOINTMENT (OUTPATIENT)
Dept: CT IMAGING | Facility: HOSPITAL | Age: 70
End: 2025-07-20
Attending: INTERNAL MEDICINE

## 2025-07-20 LAB
ANION GAP SERPL CALC-SCNC: 5 MMOL/L (ref 0–18)
ANTIBODY SCREEN: NEGATIVE
BASOPHILS # BLD AUTO: 0.01 X10(3) UL (ref 0–0.2)
BASOPHILS NFR BLD AUTO: 0.2 %
BUN BLD-MCNC: 15 MG/DL (ref 9–23)
BUN/CREAT SERPL: 20 (ref 10–20)
CALCIUM BLD-MCNC: 8.2 MG/DL (ref 8.7–10.4)
CHLORIDE SERPL-SCNC: 107 MMOL/L (ref 98–112)
CO2 SERPL-SCNC: 27 MMOL/L (ref 21–32)
CREAT BLD-MCNC: 0.75 MG/DL (ref 0.55–1.02)
DEPRECATED RDW RBC AUTO: 55.6 FL (ref 35.1–46.3)
EGFRCR SERPLBLD CKD-EPI 2021: 85 ML/MIN/1.73M2 (ref 60–?)
EOSINOPHIL # BLD AUTO: 0.09 X10(3) UL (ref 0–0.7)
EOSINOPHIL NFR BLD AUTO: 1.6 %
ERYTHROCYTE [DISTWIDTH] IN BLOOD BY AUTOMATED COUNT: 19.6 % (ref 11–15)
GLUCOSE BLD-MCNC: 69 MG/DL (ref 70–99)
GLUCOSE BLDC GLUCOMTR-MCNC: 132 MG/DL (ref 70–99)
GLUCOSE BLDC GLUCOMTR-MCNC: 156 MG/DL (ref 70–99)
GLUCOSE BLDC GLUCOMTR-MCNC: 79 MG/DL (ref 70–99)
HCT VFR BLD AUTO: 22.5 % (ref 35–48)
HCT VFR BLD AUTO: 23.3 % (ref 35–48)
HCT VFR BLD AUTO: 27.9 % (ref 35–48)
HGB BLD-MCNC: 7 G/DL (ref 12–16)
HGB BLD-MCNC: 7.1 G/DL (ref 12–16)
HGB BLD-MCNC: 9.2 G/DL (ref 12–16)
IMM GRANULOCYTES # BLD AUTO: 0.03 X10(3) UL (ref 0–1)
IMM GRANULOCYTES NFR BLD: 0.5 %
LYMPHOCYTES # BLD AUTO: 1.78 X10(3) UL (ref 1–4)
LYMPHOCYTES NFR BLD AUTO: 31.7 %
MCH RBC QN AUTO: 24.3 PG (ref 26–34)
MCHC RBC AUTO-ENTMCNC: 31.6 G/DL (ref 31–37)
MCV RBC AUTO: 77.1 FL (ref 80–100)
MONOCYTES # BLD AUTO: 0.31 X10(3) UL (ref 0.1–1)
MONOCYTES NFR BLD AUTO: 5.5 %
NEUTROPHILS # BLD AUTO: 3.4 X10 (3) UL (ref 1.5–7.7)
NEUTROPHILS # BLD AUTO: 3.4 X10(3) UL (ref 1.5–7.7)
NEUTROPHILS NFR BLD AUTO: 60.5 %
OSMOLALITY SERPL CALC.SUM OF ELEC: 287 MOSM/KG (ref 275–295)
PLATELET # BLD AUTO: 327 10(3)UL (ref 150–450)
POTASSIUM SERPL-SCNC: 3.6 MMOL/L (ref 3.5–5.1)
POTASSIUM SERPL-SCNC: 3.6 MMOL/L (ref 3.5–5.1)
Q-T INTERVAL: 264 MS
QRS DURATION: 68 MS
QTC CALCULATION (BEZET): 407 MS
R AXIS: -11 DEGREES
RBC # BLD AUTO: 2.92 X10(6)UL (ref 3.8–5.3)
RH BLOOD TYPE: POSITIVE
SODIUM SERPL-SCNC: 139 MMOL/L (ref 136–145)
T AXIS: 124 DEGREES
VENTRICULAR RATE: 143 BPM
WBC # BLD AUTO: 5.6 X10(3) UL (ref 4–11)

## 2025-07-20 PROCEDURE — 74177 CT ABD & PELVIS W/CONTRAST: CPT | Performed by: INTERNAL MEDICINE

## 2025-07-20 RX ORDER — POTASSIUM CHLORIDE 1.5 G/1.58G
40 POWDER, FOR SOLUTION ORAL EVERY 4 HOURS
Status: COMPLETED | OUTPATIENT
Start: 2025-07-20 | End: 2025-07-20

## 2025-07-20 RX ORDER — SODIUM CHLORIDE 9 MG/ML
INJECTION, SOLUTION INTRAVENOUS ONCE
Status: COMPLETED | OUTPATIENT
Start: 2025-07-20 | End: 2025-07-20

## 2025-07-20 NOTE — PLAN OF CARE
Problem: Patient Centered Care  Goal: Patient preferences are identified and integrated in the patient's plan of care  Description: Interventions:  - What would you like us to know as we care for you? History of CVA with aphasia  - Provide timely, complete, and accurate information to patient/family  - Incorporate patient and family knowledge, values, beliefs, and cultural backgrounds into the planning and delivery of care  - Encourage patient/family to participate in care and decision-making at the level they choose  - Honor patient and family perspectives and choices  Outcome: Progressing     Problem: PAIN - ADULT  Goal: Verbalizes/displays adequate comfort level or patient's stated pain goal  Description: INTERVENTIONS:  - Encourage pt to monitor pain and request assistance  - Assess pain using appropriate pain scale  - Administer analgesics based on type and severity of pain and evaluate response  - Implement non-pharmacological measures as appropriate and evaluate response  - Consider cultural and social influences on pain and pain management  - Manage/alleviate anxiety  - Utilize distraction and/or relaxation techniques  - Monitor for opioid side effects  - Notify MD/LIP if interventions unsuccessful or patient reports new pain  - Anticipate increased pain with activity and pre-medicate as appropriate  Outcome: Progressing     Problem: RISK FOR INFECTION - ADULT  Goal: Absence of fever/infection during anticipated neutropenic period  Description: INTERVENTIONS  - Monitor WBC  - Administer growth factors as ordered  - Implement neutropenic guidelines  Outcome: Progressing     Problem: SAFETY ADULT - FALL  Goal: Free from fall injury  Description: INTERVENTIONS:  - Assess pt frequently for physical needs  - Identify cognitive and physical deficits and behaviors that affect risk of falls.  - [place] fall precautions as indicated by assessment.  - Educate pt/family on patient safety including physical  limitations  - Instruct pt to call for assistance with activity based on assessment  - Modify environment to reduce risk of injury  - Provide assistive devices as appropriate  - Consider OT/PT consult to assist with strengthening/mobility  - Encourage toileting schedule  Outcome: Progressing     Problem: DISCHARGE PLANNING  Goal: Discharge to home or other facility with appropriate resources  Description: INTERVENTIONS:  - Identify barriers to discharge w/pt and caregiver  - Include patient/family/discharge partner in discharge planning  - Arrange for needed discharge resources and transportation as appropriate  - Identify discharge learning needs (meds, wound care, etc)  - Arrange for interpreters to assist at discharge as needed  - Consider post-discharge preferences of patient/family/discharge partner  - Complete POLST form as appropriate  - Assess patient's ability to be responsible for managing their own health  - Refer to Case Management Department for coordinating discharge planning if the patient needs post-hospital services based on physician/LIP order or complex needs related to functional status, cognitive ability or social support system  Outcome: Progressing     Problem: Altered Communication/Language Barrier  Goal: Patient/Family is able to understand and participate in their care  Description: Interventions:  - Assess communication ability and preferred communication style  - Implement communication aides and strategies  - Use visual cues when possible  - Listen attentively, be patient, do not interrupt  - Minimize distractions  - Allow time for understanding and response  - Establish method for patient to ask for assistance (call light)  - Provide an  as needed  - Communicate barriers and strategies to overcome with those who interact with patient  Outcome: Progressing     Problem: GASTROINTESTINAL - ADULT  Goal: Maintains adequate nutritional intake (undernourished)  Description:  INTERVENTIONS:  - Monitor percentage of each meal consumed  - Identify factors contributing to decreased intake, treat as appropriate  - Assist with meals as needed  - Monitor I&O, WT and lab values  - Obtain nutritional consult as needed  - Optimize oral hygiene and moisture  - Encourage food from home; allow for food preferences  - Enhance eating environment  Outcome: Progressing     Problem: GENITOURINARY - ADULT  Goal: Absence of urinary retention  Description: INTERVENTIONS:  - Assess patient’s ability to void and empty bladder  - Monitor intake/output and perform bladder scan as needed  - Follow urinary retention protocol/standard of care  - Consider collaborating with pharmacy to review patient's medication profile  - Implement strategies to promote bladder emptying  Outcome: Progressing     Problem: METABOLIC/FLUID AND ELECTROLYTES - ADULT  Goal: Glucose maintained within prescribed range  Description: INTERVENTIONS:  - Monitor Blood Glucose as ordered  - Assess for signs and symptoms of hyperglycemia and hypoglycemia  - Administer ordered medications to maintain glucose within target range  - Assess barriers to adequate nutritional intake and initiate nutrition consult as needed  - Instruct patient on self management of diabetes  Outcome: Progressing  Goal: Electrolytes maintained within normal limits  Description: INTERVENTIONS:  - Monitor labs and rhythm and assess patient for signs and symptoms of electrolyte imbalances  - Administer electrolyte replacement as ordered  - Monitor response to electrolyte replacements, including rhythm and repeat lab results as appropriate  - Fluid restriction as ordered  - Instruct patient on fluid and nutrition restrictions as appropriate  Outcome: Progressing  Goal: Hemodynamic stability and optimal renal function maintained  Description: INTERVENTIONS:  - Monitor labs and assess for signs and symptoms of volume excess or deficit  - Monitor intake, output and patient  weight  - Monitor urine specific gravity, serum osmolarity and serum sodium as indicated or ordered  - Monitor response to interventions for patient's volume status, including labs, urine output, blood pressure (other measures as available)  - Encourage oral intake as appropriate  - Instruct patient on fluid and nutrition restrictions as appropriate  Outcome: Progressing     Problem: SKIN/TISSUE INTEGRITY - ADULT  Goal: Skin integrity remains intact  Description: INTERVENTIONS  - Assess and document risk factors for pressure ulcer development  - Assess and document skin integrity  - Monitor for areas of redness and/or skin breakdown  - Initiate interventions, skin care algorithm/standards of care as needed  Outcome: Progressing  Goal: Incision(s), wounds(s) or drain site(s) healing without S/S of infection  Description: INTERVENTIONS:  - Assess and document risk factors for pressure ulcer development  - Assess and document skin integrity  - Assess and document dressing/incision, wound bed, drain sites and surrounding tissue  - Implement wound care per orders  - Initiate isolation precautions as appropriate  - Initiate Pressure Ulcer prevention bundle as indicated  Outcome: Progressing  Goal: Oral mucous membranes remain intact  Description: INTERVENTIONS  - Assess oral mucosa and hygiene practices  - Implement preventative oral hygiene regimen  - Implement oral medicated treatments as ordered  Outcome: Progressing

## 2025-07-20 NOTE — PROGRESS NOTES
[facility]  part of [facility]    Progress Note    Eliud Fine Patient Status:  Inpatient    1955 MRN [id number]   Location [facility] 5SW/SE Attending Angelina Deal MD   Hosp Day # 7 PCP Yao Rodriguez       SUBJECTIVE:  Alert.    Patient is talking.  Tells me she is \"fine\"  OBJECTIVE:  Vital signs in last 24 hours:  /59 (BP Location: Left arm)   Pulse 73   Temp 99 °F (37.2 °C) (Axillary)   Resp 18   Ht 5' 7\" (1.702 m)   Wt 145 lb 4.8 oz (65.9 kg)   SpO2 99%   BMI 22.76 kg/m²     Intake/Output:    Intake/Output Summary (Last 24 hours) at 2025 1300  Last data filed at 2025 0923  Gross per 24 hour   Intake 640 ml   Output 350 ml   Net 290 ml       Wt Readings from Last 3 Encounters:   25 145 lb 4.8 oz (65.9 kg)   25 131 lb 6.3 oz (59.6 kg)   25 131 lb 4.8 oz (59.6 kg)       Exam  Gen: No acute distress, alert  HEENT: Pallor noted  Pulm: Lungs clear, normal respiratory effort  CV: Heart with regular rate and rhythm, no peripheral edema  Abd: Abdomen soft, nontender, nondistended, no organomegaly, bowel sounds present  Skin: no rashes or lesions  CNS: Alert    Data Review:     Labs:   Lab Results   Component Value Date    WBC 5.6 2025    HGB 7.0 2025    HCT 23.3 2025    .0 2025    CREATSERUM 0.75 2025    BUN 15 2025     2025    K 3.6 2025    K 3.6 2025     2025    CO2 27.0 2025    GLU 69 2025    CA 8.2 2025         LABS  Recent Labs   Lab 25  0557 25  0638 25  0808 25  1230   RBC 3.24* 3.25* 2.92*  --    HGB 7.7* 7.7* 7.1* 7.0*   HCT 24.4* 26.2* 22.5* 23.3*   MCV 75.3* 80.6 77.1*  --    MCH 23.8* 23.7* 24.3*  --    MCHC 31.6 29.4* 31.6  --    RDW 19.2* 20.3* 19.6*  --    NEPRELIM 4.03 8.61* 3.40  --    WBC 5.9 10.5 5.6  --    .0 388.0 327.0  --    GLU 87 98 69*  --        Imaging:      Meds:   Current  Hospital Medications[1]    Assessment  Problem List[2]  Anemia.    Hemoglobin 7.0.  Dropped from 7.7.  There is no evidence of any active bleeding.  Will transfuse her 1 unit of packed red blood cell.  Will monitor her hemoglobin.  Will consider any CT of the abdominal the pelvis as a CT angiogram if there is no appropriate response to the blood transfusion.  Her fecal occult blood was negative.  Will also repeat it.    SVT, could be secondary to patient not taking her carvedilol.  CT chest is negative for pulmonary embolism.   Echocardiogram noted.  Patient has aortic regurgitation will need outpatient follow-up with cardiology patient is not having any shortness of breath           Changed to oral antibiotics today         Active Orders   LAB    Potassium     Frequency: Tomorrow AM draw     Number of Occurrences: 1 Occurrences     Order Comments: DO NOT DISCONTINUE MUST REMAIN FOR ELECTROLYTE PROTOCOL       Nourishments    Dietary Nutrition Supplements TID     Frequency: TID     Number of Occurrences: Until Specified     Order Comments: Vanilla SF mighty shake BID (@ bfast and lunch) and vanilla magic cup daily (@ dinner)      Room Service Eligibility Until Discontinued     Frequency: Until Discontinued     Number of Occurrences: Until Specified    Room Service Notify RN Until Discontinued     Frequency: Until Discontinued     Number of Occurrences: Until Specified   Respiratory Care    Auto CPAP When Sleeping     Frequency: When Sleeping     Number of Occurrences: Until Specified   Diet    Regular/General diet Fluid Consistency: Nectar Thick / Mildly Thick Liquids; Texture Consistency: Pureed; Is Patient on Accuchecks? Yes; Misc Restriction: No Straw     Frequency: Effective Now     Number of Occurrences: Until Specified     Order Comments: meds crushed in puree     Nursing    Accucheck     Frequency: PRN     Number of Occurrences: Until Specified     Order Comments: For symptoms or suspicion of hypoglycemia       Accucheck     Frequency: TID AC and HS     Number of Occurrences: Until Specified    Apply dressing Other (Betadine with Dry) Daily     Frequency: Daily     Number of Occurrences: Until Specified     Order Comments: ~Paint RIGHT HEEL wound with Betadine and apply dry dressing daily and PRN      Apply dressing Other (Dakins) Q12H     Frequency: Q12H     Number of Occurrences: Until Specified     Order Comments: ~ Obtain Dakins from Pharmacy  ~ Dakin Instructions to__LEFT LATERAL ANKLE, SACRUM_________  ~ Remove old dressingS  ~ Dampen gauze with Dakin Solution  ~ Dressing change Q 12 hours and PRN  ~ Make sure to pack wound depth or tunnels  ~ Cover with dry gauze and secure in place W BORDERED FOAMS      Apply dressing Other (Sensicare Ointment) PRN     Frequency: PRN     Number of Occurrences: Until Specified     Order Comments: ~ Obtain Zinc/Protective Ointment from [facility]  ~ Clean area with soap and water  ~ Apply to ___PERI AREA______ BID and PRN      Apply Heel Boot     Frequency: Until Discontinued     Number of Occurrences: Until Specified     Order Comments: Remove boot every shift to asses skin.  Send boot with patient on transfer/discharge.      Cardiac monitoring     Frequency: Until Discontinued     Number of Occurrences: Until Specified    Discontinue all oral diabetic agents     Frequency: Once     Number of Occurrences: 1 Occurrences    Elevate heels off of bed     Frequency: Until Discontinued     Number of Occurrences: Until Specified    Incontinence Care     Frequency: PRN     Number of Occurrences: Until Specified     Order Comments: Prompt      Initiate electrolyte protocol     Frequency: Until Discontinued     Number of Occurrences: Until Specified    Initiate Hypoglycemia Algorithm for Adults     Frequency: Until Discontinued     Number of Occurrences: Until Specified     Order Comments: For blood glucose less than 70      Lotion to skin     Frequency: Daily     Number of  Occurrences: Until Specified    Nursing communication (specify)     Frequency: Until Discontinued     Number of Occurrences: Until Specified     Order Comments: AIR PUMP TO ISOTOUR GEL MATTRESS      Please communicate patient's home diabetic medications when speaking with physician     Frequency: Once     Number of Occurrences: 1 Occurrences    Provide diabetes patient education guide     Frequency: Once     Number of Occurrences: 1 Occurrences     Order Comments: Initiate education for new onset diabetes or pre-existing diabetes with knowledge deficits.      Remote Telemetry     Frequency: Until Discontinued     Number of Occurrences: Until Specified    Teach blood glucose monitoring with bedside glucometer     Frequency: Once     Number of Occurrences: 1 Occurrences     Order Comments: If patient does not have a home glucometer, notify physician to order for discharge home.      Tele Box     Frequency: Once     Number of Occurrences: 1 Occurrences    Transfuse Orders to be completed     Frequency: If condition met     Number of Occurrences: Until Specified     Order Comments: Outstanding Blood Transfusion orders      Turn and reposition patient     Frequency: Q2H     Number of Occurrences: 2 Weeks   Medications    acetaminophen (Tylenol) tab 650 mg     Frequency: Q6H PRN     Dose: 650 mg     Route: Oral    amoxicillin (Amoxil) cap 500 mg     Frequency: Q8H     Dose: 500 mg     Route: Oral    aspirin chewable tab 81 mg     Frequency: Daily     Dose: 81 mg     Route: Oral    atorvastatin (Lipitor) tab 40 mg     Frequency: Nightly     Dose: 40 mg     Route: Oral    carvedilol (Coreg) tab 12.5 mg     Frequency: BID with meals     Dose: 12.5 mg     Route: Oral    dextrose 50% injection 50 mL     Linked Order: Or     Frequency: Q15 Min PRN     Dose: 50 mL     Route: Intravenous    glucose (Dex4) 15 GM/59ML oral liquid 15 g     Linked Order: Or     Frequency: Q15 Min PRN     Dose: 15 g     Route: Oral    glucose (Dex4)  15 GM/59ML oral liquid 30 g     Linked Order: Or     Frequency: Q15 Min PRN     Dose: 30 g     Route: Oral    glucose (Glutose) 40% oral gel 15 g     Linked Order: Or     Frequency: Q15 Min PRN     Dose: 15 g     Route: Oral    glucose (Glutose) 40% oral gel 30 g     Linked Order: Or     Frequency: Q15 Min PRN     Dose: 30 g     Route: Oral    glucose-vitamin C (Dex-4) chewable tab 4 tablet     Linked Order: Or     Frequency: Q15 Min PRN     Dose: 4 tablet     Route: Oral    glucose-vitamin C (Dex-4) chewable tab 8 tablet     Linked Order: Or     Frequency: Q15 Min PRN     Dose: 8 tablet     Route: Oral    heparin (Porcine) 5000 UNIT/ML injection 5,000 Units     Frequency: Q12H KB     Dose: 5,000 Units     Route: Subcutaneous    hydrALAzine (Apresoline) 20 mg/mL injection 10 mg     Frequency: Q6H PRN     Dose: 10 mg     Route: Intravenous    losartan (Cozaar) tab 25 mg     Frequency: Daily     Dose: 25 mg     Route: Oral    metoprolol (Lopressor) 5 mg/5mL injection 5 mg     Frequency: Once     Dose: 5 mg     Route: Intravenous    metoprolol (Lopressor) 5 mg/5mL injection 5 mg     Frequency: Q6H     Dose: 5 mg     Route: Intravenous    pantoprazole (Protonix) DR tab 40 mg     Frequency: QAM AC     Dose: 40 mg     Route: Oral    potassium chloride 40 mEq in 250mL sodium chloride 0.9% IVPB premix     Frequency: Once     Dose: 40 mEq     Route: Intravenous     Order Comments: Administer through peripheral line    sodium hypochlorite (Dakin's) 0.125 % external solution     Frequency: Q12H     Route: Topical    timolol (Timoptic) 0.5 % ophthalmic solution 1 drop     Frequency: BID     Dose: 1 drop     Route: Both Eyes       Angelina Deal MD         [1]   Current Facility-Administered Medications   Medication Dose Route Frequency    potassium chloride 40 mEq in 250mL sodium chloride 0.9% IVPB premix  40 mEq Intravenous Once    metoprolol (Lopressor) 5 mg/5mL injection 5 mg  5 mg Intravenous Once    metoprolol  (Lopressor) 5 mg/5mL injection 5 mg  5 mg Intravenous Q6H    timolol (Timoptic) 0.5 % ophthalmic solution 1 drop  1 drop Both Eyes BID    heparin (Porcine) 5000 UNIT/ML injection 5,000 Units  5,000 Units Subcutaneous 2 times per day    carvedilol (Coreg) tab 12.5 mg  12.5 mg Oral BID with meals    pantoprazole (Protonix) DR tab 40 mg  40 mg Oral QAM AC    amoxicillin (Amoxil) cap 500 mg  500 mg Oral Q8H    aspirin chewable tab 81 mg  81 mg Oral Daily    sodium hypochlorite (Dakin's) 0.125 % external solution   Topical Q12H    glucose (Dex4) 15 GM/59ML oral liquid 15 g  15 g Oral Q15 Min PRN    Or    glucose (Glutose) 40% oral gel 15 g  15 g Oral Q15 Min PRN    Or    glucose-vitamin C (Dex-4) chewable tab 4 tablet  4 tablet Oral Q15 Min PRN    Or    dextrose 50% injection 50 mL  50 mL Intravenous Q15 Min PRN    Or    glucose (Dex4) 15 GM/59ML oral liquid 30 g  30 g Oral Q15 Min PRN    Or    glucose (Glutose) 40% oral gel 30 g  30 g Oral Q15 Min PRN    Or    glucose-vitamin C (Dex-4) chewable tab 8 tablet  8 tablet Oral Q15 Min PRN    acetaminophen (Tylenol) tab 650 mg  650 mg Oral Q6H PRN    atorvastatin (Lipitor) tab 40 mg  40 mg Oral Nightly    losartan (Cozaar) tab 25 mg  25 mg Oral Daily    hydrALAzine (Apresoline) 20 mg/mL injection 10 mg  10 mg Intravenous Q6H PRN   [2]   Patient Active Problem List  Diagnosis    Vision loss, left eye    Cerebrovascular accident (CVA), unspecified mechanism (HCC)    Essential hypertension    COVID-19    Right sided weakness    Aphasia due to recent stroke    Cerebral amyloid angiopathy (HCC)    Toxic encephalopathy    Hypertensive urgency    Acute chest pain    Renal artery stenosis    Cystitis    PAPO (acute kidney injury)    Primary hypertension    Uncontrolled hypertension    CVA (cerebral vascular accident) (HCC)    Acute CVA (cerebrovascular accident) (HCC)    Slurred speech    Weakness of right upper extremity    Dehydration    Failure to thrive in adult    Troponin level  elevated    Hyperkalemia    Goals of care, counseling/discussion    Advance care planning    Palliative care by specialist    Acute cystitis without hematuria    Severe anemia

## 2025-07-20 NOTE — PROGRESS NOTES
I called and left a message for the son.  Discussed with the nursing staff she is going to repeat the blood glucose level

## 2025-07-20 NOTE — PLAN OF CARE
Problem: Patient Centered Care  Goal: Patient preferences are identified and integrated in the patient's plan of care  Description: Interventions:  - What would you like us to know as we care for you? History of CVA with aphasia. Lives at home with her son.   - Provide timely, complete, and accurate information to patient/family  - Incorporate patient and family knowledge, values, beliefs, and cultural backgrounds into the planning and delivery of care  - Encourage patient/family to participate in care and decision-making at the level they choose  - Honor patient and family perspectives and choices  Outcome: Progressing     Problem: PAIN - ADULT  Goal: Verbalizes/displays adequate comfort level or patient's stated pain goal  Description: INTERVENTIONS:  - Encourage pt to monitor pain and request assistance  - Assess pain using appropriate pain scale  - Administer analgesics based on type and severity of pain and evaluate response  - Implement non-pharmacological measures as appropriate and evaluate response  - Consider cultural and social influences on pain and pain management  - Manage/alleviate anxiety  - Utilize distraction and/or relaxation techniques  - Monitor for opioid side effects  - Notify MD/LIP if interventions unsuccessful or patient reports new pain  - Anticipate increased pain with activity and pre-medicate as appropriate  Outcome: Progressing     Problem: RISK FOR INFECTION - ADULT  Goal: Absence of fever/infection during anticipated neutropenic period  Description: INTERVENTIONS  - Monitor WBC  - Administer growth factors as ordered  - Implement neutropenic guidelines  Outcome: Progressing     Problem: SAFETY ADULT - FALL  Goal: Free from fall injury  Description: INTERVENTIONS:  - Assess pt frequently for physical needs  - Identify cognitive and physical deficits and behaviors that affect risk of falls.  - [place] fall precautions as indicated by assessment.  - Educate pt/family on patient  safety including physical limitations  - Instruct pt to call for assistance with activity based on assessment  - Modify environment to reduce risk of injury  - Provide assistive devices as appropriate  - Consider OT/PT consult to assist with strengthening/mobility  - Encourage toileting schedule  Outcome: Progressing     Problem: DISCHARGE PLANNING  Goal: Discharge to home or other facility with appropriate resources  Description: INTERVENTIONS:  - Identify barriers to discharge w/pt and caregiver  - Include patient/family/discharge partner in discharge planning  - Arrange for needed discharge resources and transportation as appropriate  - Identify discharge learning needs (meds, wound care, etc)  - Arrange for interpreters to assist at discharge as needed  - Consider post-discharge preferences of patient/family/discharge partner  - Complete POLST form as appropriate  - Assess patient's ability to be responsible for managing their own health  - Refer to Case Management Department for coordinating discharge planning if the patient needs post-hospital services based on physician/LIP order or complex needs related to functional status, cognitive ability or social support system  Outcome: Progressing     Problem: Altered Communication/Language Barrier  Goal: Patient/Family is able to understand and participate in their care  Description: Interventions:  - Assess communication ability and preferred communication style  - Implement communication aides and strategies  - Use visual cues when possible  - Listen attentively, be patient, do not interrupt  - Minimize distractions  - Allow time for understanding and response  - Establish method for patient to ask for assistance (call light)  - Provide an  as needed  - Communicate barriers and strategies to overcome with those who interact with patient  Outcome: Progressing     Problem: GASTROINTESTINAL - ADULT  Goal: Maintains adequate nutritional intake  (undernourished)  Description: INTERVENTIONS:  - Monitor percentage of each meal consumed  - Identify factors contributing to decreased intake, treat as appropriate  - Assist with meals as needed  - Monitor I&O, WT and lab values  - Obtain nutritional consult as needed  - Optimize oral hygiene and moisture  - Encourage food from home; allow for food preferences  - Enhance eating environment  Outcome: Progressing     Problem: GENITOURINARY - ADULT  Goal: Absence of urinary retention  Description: INTERVENTIONS:  - Assess patient’s ability to void and empty bladder  - Monitor intake/output and perform bladder scan as needed  - Follow urinary retention protocol/standard of care  - Consider collaborating with pharmacy to review patient's medication profile  - Implement strategies to promote bladder emptying  Outcome: Progressing     Problem: METABOLIC/FLUID AND ELECTROLYTES - ADULT  Goal: Glucose maintained within prescribed range  Description: INTERVENTIONS:  - Monitor Blood Glucose as ordered  - Assess for signs and symptoms of hyperglycemia and hypoglycemia  - Administer ordered medications to maintain glucose within target range  - Assess barriers to adequate nutritional intake and initiate nutrition consult as needed  - Instruct patient on self management of diabetes  Outcome: Progressing  Goal: Electrolytes maintained within normal limits  Description: INTERVENTIONS:  - Monitor labs and rhythm and assess patient for signs and symptoms of electrolyte imbalances  - Administer electrolyte replacement as ordered  - Monitor response to electrolyte replacements, including rhythm and repeat lab results as appropriate  - Fluid restriction as ordered  - Instruct patient on fluid and nutrition restrictions as appropriate  Outcome: Progressing  Goal: Hemodynamic stability and optimal renal function maintained  Description: INTERVENTIONS:  - Monitor labs and assess for signs and symptoms of volume excess or deficit  - Monitor  intake, output and patient weight  - Monitor urine specific gravity, serum osmolarity and serum sodium as indicated or ordered  - Monitor response to interventions for patient's volume status, including labs, urine output, blood pressure (other measures as available)  - Encourage oral intake as appropriate  - Instruct patient on fluid and nutrition restrictions as appropriate  Outcome: Progressing     Problem: SKIN/TISSUE INTEGRITY - ADULT  Goal: Skin integrity remains intact  Description: INTERVENTIONS  - Assess and document risk factors for pressure ulcer development  - Assess and document skin integrity  - Monitor for areas of redness and/or skin breakdown  - Initiate interventions, skin care algorithm/standards of care as needed  Outcome: Progressing  Goal: Incision(s), wounds(s) or drain site(s) healing without S/S of infection  Description: INTERVENTIONS:  - Assess and document risk factors for pressure ulcer development  - Assess and document skin integrity  - Assess and document dressing/incision, wound bed, drain sites and surrounding tissue  - Implement wound care per orders  - Initiate isolation precautions as appropriate  - Initiate Pressure Ulcer prevention bundle as indicated  Outcome: Progressing  Goal: Oral mucous membranes remain intact  Description: INTERVENTIONS  - Assess oral mucosa and hygiene practices  - Implement preventative oral hygiene regimen  - Implement oral medicated treatments as ordered  Outcome: Progressing     Problem: Impaired Swallowing  Goal: Minimize aspiration risk  Description: Interventions:  - Patient should be alert and upright for all feedings (90 degrees preferred)  - Offer food and liquids at a slow rate  - No straws  - Encourage small bites of food and small sips of liquid  - Offer pills one at a time, crush or deliver with applesauce as needed  - Discontinue feeding and notify MD (or speech pathologist) if coughing or persistent throat clearing or wet/gurgly vocal  quality is noted  Outcome: Progressing     Problem: Patient/Family Goals  Goal: Patient/Family Long Term Goal  Description: Patient's Long Term Goal: Discharge from the hospital    Interventions:  - Monitor vital signs  - Monitor appropriate labs  - Monitor blood glucose levels  - Pain management  - Administer medications per order  - Follow MD orders  - Diagnostics per order  - Update / inform patient and family on plan of care  - Discharge planning  - See additional Care Plan goals for specific interventions  Outcome: Progressing  Goal: Patient/Family Short Term Goal  Description: Patient's Short Term Goal: Improve urinary tract infection     Interventions:   - Monitor vital signs  - Monitor appropriate labs  - Monitor blood glucose levels  - Pain management  - Administer medications per order  - Follow MD orders  - Diagnostics per order  - Update / inform patient and family on plan of care  - See additional Care Plan goals for specific interventions  Outcome: Progressing

## 2025-07-20 NOTE — SLP NOTE
SPEECH DAILY NOTE - INPATIENT    ASSESSMENT & PLAN   ASSESSMENT    Patient seen for dysphagia f/u per plan of care, goals addressed with details in grid below. Patient upright in bed with lunch meal and assistance by PCT, afebrile, in NAD, engages well with PO feeding and concrete conversation. Patient presents with improved clinical tolerance for soft solids and thin liquids today, appears appropriate for diet advancement as below with ongoing 1:1 support/assist for aspiration precautions. Acute SLP service will continue to follow while in house, patient would benefit from ongoing SLP service in next level of care pending discharge plans. Plan and recommendations reviewed with patient and RN/PCT at bedside. Written recommendations posted on whiteboard.       Diet Recommendations - Solids: Mechanical soft chopped/ Soft & Bite Sized  Diet Recommendations - Liquids: Thin Liquids    Compensatory Strategies Recommended: Alternate consistencies, Extra sauce/gravy  Aspiration Precautions: Upright position, Slow rate, Small bites, Small sips, 1:1 feeding  Medication Administration Recommendations: Crushed in puree    Patient Experiencing Pain: No    Treatment Plan  Treatment Plan/Recommendations: Aspiration precautions    Interdisciplinary Communication: Discussed with RN  Recommendations posted at bedside          GOALS  Goal #1 The patient will tolerate puree consistency and mildly thick liquids without overt signs or symptoms of aspiration with 100 % accuracy over 2 session(s).  Patient accepted tsps purees and cup sips mildly thick liquids from lunch tray with assist by PCT with oropharyngeal timing and control which appears WFL and without overt signs aspiration/distress.    NEW GOAL 7/20/25:  Patient will demonstrate safe and efficient clinical tolerance for soft/bite size diet and thin liquids without overt signs aspiration/distress x1-2 f/u visits.      Goal Met; Revised   Goal #2 The patient/family/caregiver will  demonstrate understanding and implementation of aspiration precautions and swallow strategies independently over 3-4 session(s).  PCT verbalizes/demonstrates understanding during this visit.      In Progress   Goal #3 The patient will tolerate trial upgrade of minced and moist/bite sized consistency and mildly thick liquids without overt signs or symptoms of aspiration with 100 % accuracy over 3-4 session(s).  Patient accepted bites of soft solids and self-administered cup sips thin liquids with hand over hand assistance. Oral phase efficiency mildly reduced for dry solid with prolonged mastication and delayed bolus formation/propulsion with eventual adequate oral clearance. Suspect reduced posterior oral bolus control for large volumes thin liquids with straw. Immediate cough response with large straw sip thin liquid, otherwise no overt signs aspiration/distress.     Goal Met   Goal #4 The patient will utilize compensatory strategies as outlined by  BSSE (clinical evaluation) including Slow rate, Small bites, Small sips, Alternate liquids/solids, No straws, Upright 90 degrees, Feed patient with 1:1 feed assistance 100 % of the time across 2 sessions.   Patient positioned upright in bed, distractions minimized, safe swallow precautions reviewed/modeled. PCT and SLP provided 1:1 assist.     In Progress     FOLLOW UP  Follow Up Needed (Documentation Required): Yes  SLP Follow-up Date: 07/22/25  Duration: 1 week    Session: 3 following BSE    If you have any questions, please contact Joelle Cyr, SLP  Joelle Cyr M.S. CCC-SLP  Speech-Language Pathologist  [phone]

## 2025-07-21 LAB
ANION GAP SERPL CALC-SCNC: 4 MMOL/L (ref 0–18)
BASOPHILS # BLD AUTO: 0.02 X10(3) UL (ref 0–0.2)
BASOPHILS NFR BLD AUTO: 0.4 %
BLOOD TYPE BARCODE: 8400
BUN BLD-MCNC: 17 MG/DL (ref 9–23)
BUN/CREAT SERPL: 22.7 (ref 10–20)
CALCIUM BLD-MCNC: 8.3 MG/DL (ref 8.7–10.4)
CHLORIDE SERPL-SCNC: 108 MMOL/L (ref 98–112)
CO2 SERPL-SCNC: 28 MMOL/L (ref 21–32)
CREAT BLD-MCNC: 0.75 MG/DL (ref 0.55–1.02)
DEPRECATED RDW RBC AUTO: 53.3 FL (ref 35.1–46.3)
EGFRCR SERPLBLD CKD-EPI 2021: 85 ML/MIN/1.73M2 (ref 60–?)
EOSINOPHIL # BLD AUTO: 0.11 X10(3) UL (ref 0–0.7)
EOSINOPHIL NFR BLD AUTO: 2 %
ERYTHROCYTE [DISTWIDTH] IN BLOOD BY AUTOMATED COUNT: 18.9 % (ref 11–15)
GLUCOSE BLD-MCNC: 79 MG/DL (ref 70–99)
GLUCOSE BLDC GLUCOMTR-MCNC: 111 MG/DL (ref 70–99)
GLUCOSE BLDC GLUCOMTR-MCNC: 115 MG/DL (ref 70–99)
GLUCOSE BLDC GLUCOMTR-MCNC: 126 MG/DL (ref 70–99)
GLUCOSE BLDC GLUCOMTR-MCNC: 154 MG/DL (ref 70–99)
GLUCOSE BLDC GLUCOMTR-MCNC: 82 MG/DL (ref 70–99)
HCT VFR BLD AUTO: 27 % (ref 35–48)
HGB BLD-MCNC: 8.7 G/DL (ref 12–16)
IMM GRANULOCYTES # BLD AUTO: 0.01 X10(3) UL (ref 0–1)
IMM GRANULOCYTES NFR BLD: 0.2 %
LYMPHOCYTES # BLD AUTO: 2 X10(3) UL (ref 1–4)
LYMPHOCYTES NFR BLD AUTO: 35.6 %
MCH RBC QN AUTO: 25 PG (ref 26–34)
MCHC RBC AUTO-ENTMCNC: 32.2 G/DL (ref 31–37)
MCV RBC AUTO: 77.6 FL (ref 80–100)
MONOCYTES # BLD AUTO: 0.29 X10(3) UL (ref 0.1–1)
MONOCYTES NFR BLD AUTO: 5.2 %
NEUTROPHILS # BLD AUTO: 3.19 X10 (3) UL (ref 1.5–7.7)
NEUTROPHILS # BLD AUTO: 3.19 X10(3) UL (ref 1.5–7.7)
NEUTROPHILS NFR BLD AUTO: 56.6 %
OSMOLALITY SERPL CALC.SUM OF ELEC: 290 MOSM/KG (ref 275–295)
PLATELET # BLD AUTO: 338 10(3)UL (ref 150–450)
POTASSIUM SERPL-SCNC: 4 MMOL/L (ref 3.5–5.1)
POTASSIUM SERPL-SCNC: 4 MMOL/L (ref 3.5–5.1)
RBC # BLD AUTO: 3.48 X10(6)UL (ref 3.8–5.3)
SODIUM SERPL-SCNC: 140 MMOL/L (ref 136–145)
UNIT VOLUME: 350 ML
WBC # BLD AUTO: 5.6 X10(3) UL (ref 4–11)

## 2025-07-21 RX ORDER — FUROSEMIDE 10 MG/ML
20 INJECTION INTRAMUSCULAR; INTRAVENOUS ONCE
Status: COMPLETED | OUTPATIENT
Start: 2025-07-21 | End: 2025-07-21

## 2025-07-21 NOTE — SLP NOTE
SPEECH DAILY NOTE - INPATIENT    ASSESSMENT & PLAN   ASSESSMENT     SLP f/u for ongoing meal assessment per recommendations of upgraded diet soft bite size solids and thin liquids per speech therapy recommendations. RN reports pt tolerates diet and medication well with no overt clinical overt clinical signs aspiration. RN approves swallowing therapy session. The pt seen at bedside and agreeable to participate in swallowing therapy. No family/caregiver present at the time of therapy.  Pt denies any swallowing challenges. Pt denies any pain.  Pt prefers education via verbal explanation.     Pt positioned upright in 90 degrees in bed. Pt alert but requires constant cues to remain on task, afebrile, tolerating room air with oxygen status 95% prior to the start of oral trials. SLP verbally reviewed aspiration precautions and safe swallowing compensatory strategies with the patient and modeled precautions with 1:1 feeding. Patient acknowledged understanding of swallowing precautions but made no attempt to feed herself any trials.  The pt would benefit from continued tx. Improving tolerates on po trials of puree, soft bite size solids, and thin liquids via cup with no overt clinical signs of aspiration (e.g., immediate/delayed throat clear, immediate/delayed cough, wet vocal quality, increased O2 effort) observed across all trials.  Adequate oral acceptance and bilabial seal.  Oral phase delayed with prolonged mastication. Trial of soft bite size solids increased already prolonged mastication time and increased oral residue post the swallow.  No advancement in diet at this time.  Continue to assess for advancement to soft easy to chew solids as alertness/awareness improves.  Oral/buccal cavities clear of residue at the end of the session. Recommend to continue current diet of soft bite size solids and thin liquids with no straws.  Swallowing precautions posted in written format on white board to assist pt with carry over.   The pt was left resting in bed watching television with call light in reach.     PLAN: Recommend to continue swallowing therapy 1-2x for meal assessment, train pt on swallowing precautions, monitor CXR, and VFSS if any overt CSA and/or decline in CXR. RN, Yesemia alerted with results and recommendations. Updated diet order in chart.       Diet Recommendations - Solids: Mechanical soft chopped/ Soft & Bite Sized  Diet Recommendations - Liquids: Thin Liquids    Compensatory Strategies Recommended: Alternate consistencies, Extra sauce/gravy  Aspiration Precautions: Upright position, Slow rate, Small bites, Small sips, 1:1 feeding  Medication Administration Recommendations: Crushed in puree    Patient Experiencing Pain: No    Treatment Plan  Treatment Plan/Recommendations: Aspiration precautions    Interdisciplinary Communication: Discussed with RN  Recommendations posted at bedside          GOALS   Patient will demonstrate safe and efficient clinical tolerance for soft/bite size diet and thin liquids without overt signs aspiration/distress x1-2 f/u visits.   In Progress   Goal #2 The patient/family/caregiver will demonstrate understanding and implementation of aspiration precautions and swallow strategies independently over 3-4 session(s).       In Progress   Goal #3 The patient will tolerate trial upgrade of soft easy to chew sized consistency and thin liquids without overt signs or symptoms of aspiration with 100 % accuracy over 1-2 session(s).     Revised 7/21/25   Goal #4 The patient will utilize compensatory strategies as outlined by  BSSE (clinical evaluation) including Slow rate, Small bites, Small sips, Alternate liquids/solids, No straws, Upright 90 degrees, Feed patient with 1:1 feed assistance 100 % of the time across 2 sessions.       In Progress     FOLLOW UP  Follow Up Needed (Documentation Required): Yes  SLP Follow-up Date: 07/22/25  Duration: 1 week    Session: 4 following BSE    If you have any  questions, please contact     Lou Woo MS/CCC-SLP  Speech Language Pathologist  [facility]  EXT. [phone]

## 2025-07-21 NOTE — PLAN OF CARE
Problem: Patient Centered Care  Goal: Patient preferences are identified and integrated in the patient's plan of care  Description: Interventions:  - What would you like us to know as we care for you? History of CVA with aphasia. Lives at home with her son.   - Provide timely, complete, and accurate information to patient/family  - Incorporate patient and family knowledge, values, beliefs, and cultural backgrounds into the planning and delivery of care  - Encourage patient/family to participate in care and decision-making at the level they choose  - Honor patient and family perspectives and choices  Outcome: Progressing     Problem: PAIN - ADULT  Goal: Verbalizes/displays adequate comfort level or patient's stated pain goal  Description: INTERVENTIONS:  - Encourage pt to monitor pain and request assistance  - Assess pain using appropriate pain scale  - Administer analgesics based on type and severity of pain and evaluate response  - Implement non-pharmacological measures as appropriate and evaluate response  - Consider cultural and social influences on pain and pain management  - Manage/alleviate anxiety  - Utilize distraction and/or relaxation techniques  - Monitor for opioid side effects  - Notify MD/LIP if interventions unsuccessful or patient reports new pain  - Anticipate increased pain with activity and pre-medicate as appropriate  Outcome: Progressing     Problem: RISK FOR INFECTION - ADULT  Goal: Absence of fever/infection during anticipated neutropenic period  Description: INTERVENTIONS  - Monitor WBC  - Administer growth factors as ordered  - Implement neutropenic guidelines  Outcome: Progressing     Problem: SAFETY ADULT - FALL  Goal: Free from fall injury  Description: INTERVENTIONS:  - Assess pt frequently for physical needs  - Identify cognitive and physical deficits and behaviors that affect risk of falls.  - [place] fall precautions as indicated by assessment.  - Educate pt/family on patient  safety including physical limitations  - Instruct pt to call for assistance with activity based on assessment  - Modify environment to reduce risk of injury  - Provide assistive devices as appropriate  - Consider OT/PT consult to assist with strengthening/mobility  - Encourage toileting schedule  Outcome: Progressing     Problem: DISCHARGE PLANNING  Goal: Discharge to home or other facility with appropriate resources  Description: INTERVENTIONS:  - Identify barriers to discharge w/pt and caregiver  - Include patient/family/discharge partner in discharge planning  - Arrange for needed discharge resources and transportation as appropriate  - Identify discharge learning needs (meds, wound care, etc)  - Arrange for interpreters to assist at discharge as needed  - Consider post-discharge preferences of patient/family/discharge partner  - Complete POLST form as appropriate  - Assess patient's ability to be responsible for managing their own health  - Refer to Case Management Department for coordinating discharge planning if the patient needs post-hospital services based on physician/LIP order or complex needs related to functional status, cognitive ability or social support system  Outcome: Progressing     Problem: Altered Communication/Language Barrier  Goal: Patient/Family is able to understand and participate in their care  Description: Interventions:  - Assess communication ability and preferred communication style  - Implement communication aides and strategies  - Use visual cues when possible  - Listen attentively, be patient, do not interrupt  - Minimize distractions  - Allow time for understanding and response  - Establish method for patient to ask for assistance (call light)  - Provide an  as needed  - Communicate barriers and strategies to overcome with those who interact with patient  Outcome: Progressing     Problem: GASTROINTESTINAL - ADULT  Goal: Maintains adequate nutritional intake  (undernourished)  Description: INTERVENTIONS:  - Monitor percentage of each meal consumed  - Identify factors contributing to decreased intake, treat as appropriate  - Assist with meals as needed  - Monitor I&O, WT and lab values  - Obtain nutritional consult as needed  - Optimize oral hygiene and moisture  - Encourage food from home; allow for food preferences  - Enhance eating environment  Outcome: Progressing     Problem: GENITOURINARY - ADULT  Goal: Absence of urinary retention  Description: INTERVENTIONS:  - Assess patient’s ability to void and empty bladder  - Monitor intake/output and perform bladder scan as needed  - Follow urinary retention protocol/standard of care  - Consider collaborating with pharmacy to review patient's medication profile  - Implement strategies to promote bladder emptying  Outcome: Progressing     Problem: METABOLIC/FLUID AND ELECTROLYTES - ADULT  Goal: Glucose maintained within prescribed range  Description: INTERVENTIONS:  - Monitor Blood Glucose as ordered  - Assess for signs and symptoms of hyperglycemia and hypoglycemia  - Administer ordered medications to maintain glucose within target range  - Assess barriers to adequate nutritional intake and initiate nutrition consult as needed  - Instruct patient on self management of diabetes  Outcome: Progressing  Goal: Electrolytes maintained within normal limits  Description: INTERVENTIONS:  - Monitor labs and rhythm and assess patient for signs and symptoms of electrolyte imbalances  - Administer electrolyte replacement as ordered  - Monitor response to electrolyte replacements, including rhythm and repeat lab results as appropriate  - Fluid restriction as ordered  - Instruct patient on fluid and nutrition restrictions as appropriate  Outcome: Progressing  Goal: Hemodynamic stability and optimal renal function maintained  Description: INTERVENTIONS:  - Monitor labs and assess for signs and symptoms of volume excess or deficit  - Monitor  intake, output and patient weight  - Monitor urine specific gravity, serum osmolarity and serum sodium as indicated or ordered  - Monitor response to interventions for patient's volume status, including labs, urine output, blood pressure (other measures as available)  - Encourage oral intake as appropriate  - Instruct patient on fluid and nutrition restrictions as appropriate  Outcome: Progressing     Problem: SKIN/TISSUE INTEGRITY - ADULT  Goal: Skin integrity remains intact  Description: INTERVENTIONS  - Assess and document risk factors for pressure ulcer development  - Assess and document skin integrity  - Monitor for areas of redness and/or skin breakdown  - Initiate interventions, skin care algorithm/standards of care as needed  Outcome: Progressing  Goal: Incision(s), wounds(s) or drain site(s) healing without S/S of infection  Description: INTERVENTIONS:  - Assess and document risk factors for pressure ulcer development  - Assess and document skin integrity  - Assess and document dressing/incision, wound bed, drain sites and surrounding tissue  - Implement wound care per orders  - Initiate isolation precautions as appropriate  - Initiate Pressure Ulcer prevention bundle as indicated  Outcome: Progressing  Goal: Oral mucous membranes remain intact  Description: INTERVENTIONS  - Assess oral mucosa and hygiene practices  - Implement preventative oral hygiene regimen  - Implement oral medicated treatments as ordered  Outcome: Progressing     Problem: Impaired Swallowing  Goal: Minimize aspiration risk  Description: Interventions:  - Patient should be alert and upright for all feedings (90 degrees preferred)  - Offer food and liquids at a slow rate  - No straws  - Encourage small bites of food and small sips of liquid  - Offer pills one at a time, crush or deliver with applesauce as needed  - Discontinue feeding and notify MD (or speech pathologist) if coughing or persistent throat clearing or wet/gurgly vocal  quality is noted  Outcome: Progressing     Problem: Patient/Family Goals  Goal: Patient/Family Long Term Goal  Description: Patient's Long Term Goal: Discharge from the hospital    Interventions:  - Monitor vital signs  - Monitor appropriate labs  - Monitor blood glucose levels  - Pain management  - Administer medications per order  - Follow MD orders  - Diagnostics per order  - Update / inform patient and family on plan of care  - Discharge planning  - See additional Care Plan goals for specific interventions  Outcome: Progressing  Goal: Patient/Family Short Term Goal  Description: Patient's Short Term Goal: Improve urinary tract infection     Interventions:   - Monitor vital signs  - Monitor appropriate labs  - Monitor blood glucose levels  - Pain management  - Administer medications per order  - Follow MD orders  - Diagnostics per order  - Update / inform patient and family on plan of care  - See additional Care Plan goals for specific interventions  Outcome: Progressing

## 2025-07-21 NOTE — SPIRITUAL CARE NOTE
Spiritual Care Visit Note    Patient Name: Eliud Fine Date of Spiritual Care Visit: 25   : 1955 Primary Dx: Acute cystitis without hematuria       Referred By: Referral From:     Spiritual Care Taxonomy:    Intended Effects: Lessen someone's feelings of isolation    Methods: Offer support, Offer spiritual/Christianity support    Interventions: Provide hospitality, Share words of hope and inspiration, Prayer for healing    Visit Type/Summary:     - Spiritual Care: Consulted with RN prior to visit.  offered words of encouragement and prayer.  remains available as needed for follow up.    Spiritual Care support can be requested via an [facility] consult. For urgent/immediate needs, please contact the On Call  at: [place]: ext [phone]    Rev Halie Mcconnell MDiv

## 2025-07-21 NOTE — PLAN OF CARE
Problem: Patient Centered Care  Goal: Patient preferences are identified and integrated in the patient's plan of care  Description: Interventions:  - What would you like us to know as we care for you? History of CVA with aphasia. Lives at home with her son.   - Provide timely, complete, and accurate information to patient/family  - Incorporate patient and family knowledge, values, beliefs, and cultural backgrounds into the planning and delivery of care  - Encourage patient/family to participate in care and decision-making at the level they choose  - Honor patient and family perspectives and choices  Outcome: Progressing     Problem: PAIN - ADULT  Goal: Verbalizes/displays adequate comfort level or patient's stated pain goal  Description: INTERVENTIONS:  - Encourage pt to monitor pain and request assistance  - Assess pain using appropriate pain scale  - Administer analgesics based on type and severity of pain and evaluate response  - Implement non-pharmacological measures as appropriate and evaluate response  - Consider cultural and social influences on pain and pain management  - Manage/alleviate anxiety  - Utilize distraction and/or relaxation techniques  - Monitor for opioid side effects  - Notify MD/LIP if interventions unsuccessful or patient reports new pain  - Anticipate increased pain with activity and pre-medicate as appropriate  Outcome: Progressing     Problem: RISK FOR INFECTION - ADULT  Goal: Absence of fever/infection during anticipated neutropenic period  Description: INTERVENTIONS  - Monitor WBC  - Administer growth factors as ordered  - Implement neutropenic guidelines  Outcome: Progressing     Problem: SAFETY ADULT - FALL  Goal: Free from fall injury  Description: INTERVENTIONS:  - Assess pt frequently for physical needs  - Identify cognitive and physical deficits and behaviors that affect risk of falls.  - [place] fall precautions as indicated by assessment.  - Educate pt/family on patient  safety including physical limitations  - Instruct pt to call for assistance with activity based on assessment  - Modify environment to reduce risk of injury  - Provide assistive devices as appropriate  - Consider OT/PT consult to assist with strengthening/mobility  - Encourage toileting schedule  Outcome: Progressing     Problem: DISCHARGE PLANNING  Goal: Discharge to home or other facility with appropriate resources  Description: INTERVENTIONS:  - Identify barriers to discharge w/pt and caregiver  - Include patient/family/discharge partner in discharge planning  - Arrange for needed discharge resources and transportation as appropriate  - Identify discharge learning needs (meds, wound care, etc)  - Arrange for interpreters to assist at discharge as needed  - Consider post-discharge preferences of patient/family/discharge partner  - Complete POLST form as appropriate  - Assess patient's ability to be responsible for managing their own health  - Refer to Case Management Department for coordinating discharge planning if the patient needs post-hospital services based on physician/LIP order or complex needs related to functional status, cognitive ability or social support system  Outcome: Progressing     Problem: Altered Communication/Language Barrier  Goal: Patient/Family is able to understand and participate in their care  Description: Interventions:  - Assess communication ability and preferred communication style  - Implement communication aides and strategies  - Use visual cues when possible  - Listen attentively, be patient, do not interrupt  - Minimize distractions  - Allow time for understanding and response  - Establish method for patient to ask for assistance (call light)  - Provide an  as needed  - Communicate barriers and strategies to overcome with those who interact with patient  Outcome: Progressing     Problem: GASTROINTESTINAL - ADULT  Goal: Maintains adequate nutritional intake  (undernourished)  Description: INTERVENTIONS:  - Monitor percentage of each meal consumed  - Identify factors contributing to decreased intake, treat as appropriate  - Assist with meals as needed  - Monitor I&O, WT and lab values  - Obtain nutritional consult as needed  - Optimize oral hygiene and moisture  - Encourage food from home; allow for food preferences  - Enhance eating environment  Outcome: Progressing     Problem: GENITOURINARY - ADULT  Goal: Absence of urinary retention  Description: INTERVENTIONS:  - Assess patient’s ability to void and empty bladder  - Monitor intake/output and perform bladder scan as needed  - Follow urinary retention protocol/standard of care  - Consider collaborating with pharmacy to review patient's medication profile  - Implement strategies to promote bladder emptying  Outcome: Progressing     Problem: METABOLIC/FLUID AND ELECTROLYTES - ADULT  Goal: Glucose maintained within prescribed range  Description: INTERVENTIONS:  - Monitor Blood Glucose as ordered  - Assess for signs and symptoms of hyperglycemia and hypoglycemia  - Administer ordered medications to maintain glucose within target range  - Assess barriers to adequate nutritional intake and initiate nutrition consult as needed  - Instruct patient on self management of diabetes  Outcome: Progressing  Goal: Electrolytes maintained within normal limits  Description: INTERVENTIONS:  - Monitor labs and rhythm and assess patient for signs and symptoms of electrolyte imbalances  - Administer electrolyte replacement as ordered  - Monitor response to electrolyte replacements, including rhythm and repeat lab results as appropriate  - Fluid restriction as ordered  - Instruct patient on fluid and nutrition restrictions as appropriate  Outcome: Progressing  Goal: Hemodynamic stability and optimal renal function maintained  Description: INTERVENTIONS:  - Monitor labs and assess for signs and symptoms of volume excess or deficit  - Monitor  intake, output and patient weight  - Monitor urine specific gravity, serum osmolarity and serum sodium as indicated or ordered  - Monitor response to interventions for patient's volume status, including labs, urine output, blood pressure (other measures as available)  - Encourage oral intake as appropriate  - Instruct patient on fluid and nutrition restrictions as appropriate  Outcome: Progressing     Problem: SKIN/TISSUE INTEGRITY - ADULT  Goal: Skin integrity remains intact  Description: INTERVENTIONS  - Assess and document risk factors for pressure ulcer development  - Assess and document skin integrity  - Monitor for areas of redness and/or skin breakdown  - Initiate interventions, skin care algorithm/standards of care as needed  Outcome: Progressing  Goal: Incision(s), wounds(s) or drain site(s) healing without S/S of infection  Description: INTERVENTIONS:  - Assess and document risk factors for pressure ulcer development  - Assess and document skin integrity  - Assess and document dressing/incision, wound bed, drain sites and surrounding tissue  - Implement wound care per orders  - Initiate isolation precautions as appropriate  - Initiate Pressure Ulcer prevention bundle as indicated  Outcome: Progressing  Goal: Oral mucous membranes remain intact  Description: INTERVENTIONS  - Assess oral mucosa and hygiene practices  - Implement preventative oral hygiene regimen  - Implement oral medicated treatments as ordered  Outcome: Progressing     Problem: Impaired Swallowing  Goal: Minimize aspiration risk  Description: Interventions:  - Patient should be alert and upright for all feedings (90 degrees preferred)  - Offer food and liquids at a slow rate  - No straws  - Encourage small bites of food and small sips of liquid  - Offer pills one at a time, crush or deliver with applesauce as needed  - Discontinue feeding and notify MD (or speech pathologist) if coughing or persistent throat clearing or wet/gurgly vocal  quality is noted  Outcome: Progressing     Problem: Patient/Family Goals  Goal: Patient/Family Long Term Goal  Description: Patient's Long Term Goal: Discharge from the hospital    Interventions:  - Monitor vital signs  - Monitor appropriate labs  - Monitor blood glucose levels  - Pain management  - Administer medications per order  - Follow MD orders  - Diagnostics per order  - Update / inform patient and family on plan of care  - Discharge planning  - See additional Care Plan goals for specific interventions  Outcome: Progressing  Goal: Patient/Family Short Term Goal  Description: Patient's Short Term Goal: Improve urinary tract infection     Interventions:   - Monitor vital signs  - Monitor appropriate labs  - Monitor blood glucose levels  - Pain management  - Administer medications per order  - Follow MD orders  - Diagnostics per order  - Update / inform patient and family on plan of care  - See additional Care Plan goals for specific interventions  Outcome: Progressing

## 2025-07-21 NOTE — PROGRESS NOTES
[facility]  part of [facility]    Progress Note    Eliud Fine Patient Status:  Inpatient    1955 MRN [id number]   Location [facility] 5SW/SE Attending Angelina Deal MD   Hosp Day # 8 PCP Yao Rodriguez       SUBJECTIVE:  Nursing staff reports no complaints.  Patient ate food brought by the son.  But she did not eat the food from here according to the patient care technician      OBJECTIVE:  Vital signs in last 24 hours:  /65 (BP Location: Right arm)   Pulse 74   Temp 98.4 °F (36.9 °C) (Oral)   Resp 18   Ht 5' 7\" (1.702 m)   Wt 145 lb 4.8 oz (65.9 kg)   SpO2 95%   BMI 22.76 kg/m²     Intake/Output:    Intake/Output Summary (Last 24 hours) at 2025 1007  Last data filed at 2025 0700  Gross per 24 hour   Intake 790 ml   Output 1200 ml   Net -410 ml       Wt Readings from Last 3 Encounters:   25 145 lb 4.8 oz (65.9 kg)   25 131 lb 6.3 oz (59.6 kg)   25 131 lb 4.8 oz (59.6 kg)       Exam  Gen: No acute distress, alert  HEENT: Pallor noted  Pulm: Lungs clear, normal respiratory effort  CV: Heart with regular rate and rhythm, no peripheral edema  Abd: Abdomen soft, nontender, nondistended, no organomegaly, bowel sounds present  Skin: no rashes or lesions  CNS: Alert  Extremities pedal edema noted    Data Review:     Labs:   Lab Results   Component Value Date    WBC 5.6 2025    HGB 8.7 2025    HCT 27.0 2025    .0 2025    CREATSERUM 0.75 2025    BUN 17 2025     2025    K 4.0 2025    K 4.0 2025     2025    CO2 28.0 2025    GLU 79 2025    CA 8.3 2025         LABS  Recent Labs   Lab 25  0638 25  0808 25  1230 25  0425   RBC 3.25* 2.92*  --   --  3.48*   HGB 7.7* 7.1* 7.0* 9.2* 8.7*   HCT 26.2* 22.5* 23.3* 27.9* 27.0*   MCV 80.6 77.1*  --   --  77.6*   MCH 23.7* 24.3*  --   --  25.0*   MCHC 29.4* 31.6  --    --  32.2   RDW 20.3* 19.6*  --   --  18.9*   NEPRELIM 8.61* 3.40  --   --  3.19   WBC 10.5 5.6  --   --  5.6   .0 327.0  --   --  338.0   GLU 98 69*  --   --  79       Imaging:      Meds:   Current Hospital Medications[1]    Assessment  Problem List[2]  Anemia.  Stable     SVT, could be secondary to patient not taking her carvedilol.  CT chest is negative for pulmonary embolism.  Waiting for Doppler TSH is normal.    Hypertension.  Continue current treatment.  Aortic regurgitation.  Also give her Lasix.  Discussed with the nursing staff.      plan:   Changed to oral antibiotics today  Discussed with the family at the bedside.      Discussed with the son, possible discharge tomorrow if she is stable       Active Orders   Nourishments    Dietary Nutrition Supplements TID     Frequency: TID     Number of Occurrences: Until Specified     Order Comments: Vanilla SF mighty shake BID (@ bfast and lunch) and vanilla magic cup daily (@ dinner)      Room Service Eligibility Until Discontinued     Frequency: Until Discontinued     Number of Occurrences: Until Specified    Room Service Notify RN Until Discontinued     Frequency: Until Discontinued     Number of Occurrences: Until Specified   Respiratory Care    Auto CPAP When Sleeping     Frequency: When Sleeping     Number of Occurrences: Until Specified   Diet    Regular/General diet Texture Consistency: Chopped / Soft & Bite Sized; Is Patient on Accuchecks? Yes; Misc Restriction: No Straw     Frequency: Effective Now     Number of Occurrences: Until Specified     Order Comments: meds crushed in puree     Nursing    Accucheck     Frequency: PRN     Number of Occurrences: Until Specified     Order Comments: For symptoms or suspicion of hypoglycemia      Accucheck     Frequency: TID AC and HS     Number of Occurrences: Until Specified    Apply dressing Other (Betadine with Dry) Daily     Frequency: Daily     Number of Occurrences: Until Specified     Order Comments:  ~Paint RIGHT HEEL wound with Betadine and apply dry dressing daily and PRN      Apply dressing Other (Dakins) Q12H     Frequency: Q12H     Number of Occurrences: Until Specified     Order Comments: ~ Obtain Dakins from Pharmacy  ~ Dakin Instructions to__LEFT LATERAL ANKLE, SACRUM_________  ~ Remove old dressingS  ~ Dampen gauze with Dakin Solution  ~ Dressing change Q 12 hours and PRN  ~ Make sure to pack wound depth or tunnels  ~ Cover with dry gauze and secure in place W BORDERED FOAMS      Apply dressing Other (Sensicare Ointment) PRN     Frequency: PRN     Number of Occurrences: Until Specified     Order Comments: ~ Obtain Zinc/Protective Ointment from [facility]  ~ Clean area with soap and water  ~ Apply to ___PERI AREA______ BID and PRN      Apply Heel Boot     Frequency: Until Discontinued     Number of Occurrences: Until Specified     Order Comments: Remove boot every shift to asses skin.  Send boot with patient on transfer/discharge.      Cardiac monitoring     Frequency: Until Discontinued     Number of Occurrences: Until Specified    Discontinue all oral diabetic agents     Frequency: Once     Number of Occurrences: 1 Occurrences    Elevate heels off of bed     Frequency: Until Discontinued     Number of Occurrences: Until Specified    Hemoglobin post transfusion 45 min after transfusion is completed (RN to enter order)     Frequency: Once     Number of Occurrences: 1 Occurrences    Incontinence Care     Frequency: PRN     Number of Occurrences: Until Specified     Order Comments: Prompt      Initiate electrolyte protocol     Frequency: Until Discontinued     Number of Occurrences: Until Specified    Initiate Hypoglycemia Algorithm for Adults     Frequency: Until Discontinued     Number of Occurrences: Until Specified     Order Comments: For blood glucose less than 70      Initiate transfusion reaction protocol: if patient exhibits signs/symptoms of transfusion reaction     Frequency: PRN      Number of Occurrences: Until Specified    Insert/maintain PIV     Frequency: PRN     Number of Occurrences: Until Specified    Lotion to skin     Frequency: Daily     Number of Occurrences: Until Specified    Notify physician     Frequency: Continuous     Number of Occurrences: Until Specified     Order Comments: Hold transfusion and notify physician if the patient:   1) Complains of chills an/or abdominal/back pain  2) Shortness of breath  3) Chest pain   4) Restlessness   5) Infusion site pain   6) Sudden changes in vital signs ie: decrease in blood pressure or temperature: elevation of one degree Centigrade or 2 degrees fahrenheit from pre-transfusion temperature  7) Nausea/vomiting   8) Shock   9) Change in color of urine   10) Urticaria   11) Edema        Nursing communication (specify)     Frequency: Until Discontinued     Number of Occurrences: Until Specified     Order Comments: AIR PUMP TO ISOTOUR GEL MATTRESS      Please communicate patient's home diabetic medications when speaking with physician     Frequency: Once     Number of Occurrences: 1 Occurrences    Provide diabetes patient education guide     Frequency: Once     Number of Occurrences: 1 Occurrences     Order Comments: Initiate education for new onset diabetes or pre-existing diabetes with knowledge deficits.      Provide patient/family transfusion information     Frequency: Once     Number of Occurrences: 1 Occurrences    Remote Telemetry     Frequency: Until Discontinued     Number of Occurrences: Until Specified    Teach blood glucose monitoring with bedside glucometer     Frequency: Once     Number of Occurrences: 1 Occurrences     Order Comments: If patient does not have a home glucometer, notify physician to order for discharge home.      Tele Box     Frequency: Once     Number of Occurrences: 1 Occurrences    Transfuse Orders to be completed     Frequency: If condition met     Number of Occurrences: Until Specified     Order Comments:  Outstanding Blood Transfusion orders      Turn and reposition patient     Frequency: Q2H     Number of Occurrences: 2 Weeks    Vital signs - Per blood administration policy     Frequency: Per Unit Routine     Number of Occurrences: Until Specified     Order Comments: Vital signs as follows; T, BP, HR, RR one set prior to transfusion, one set at 15 minutes, then one set hourly until transfusion complete. After transufusion complete resume previous VS frequency.       Medications    acetaminophen (Tylenol) tab 650 mg     Frequency: Q6H PRN     Dose: 650 mg     Route: Oral    amoxicillin (Amoxil) cap 500 mg     Frequency: Q8H     Dose: 500 mg     Route: Oral    aspirin chewable tab 81 mg     Frequency: Daily     Dose: 81 mg     Route: Oral    atorvastatin (Lipitor) tab 40 mg     Frequency: Nightly     Dose: 40 mg     Route: Oral    carvedilol (Coreg) tab 12.5 mg     Frequency: BID with meals     Dose: 12.5 mg     Route: Oral    dextrose 50% injection 50 mL     Linked Order: Or     Frequency: Q15 Min PRN     Dose: 50 mL     Route: Intravenous    glucose (Dex4) 15 GM/59ML oral liquid 15 g     Linked Order: Or     Frequency: Q15 Min PRN     Dose: 15 g     Route: Oral    glucose (Dex4) 15 GM/59ML oral liquid 30 g     Linked Order: Or     Frequency: Q15 Min PRN     Dose: 30 g     Route: Oral    glucose (Glutose) 40% oral gel 15 g     Linked Order: Or     Frequency: Q15 Min PRN     Dose: 15 g     Route: Oral    glucose (Glutose) 40% oral gel 30 g     Linked Order: Or     Frequency: Q15 Min PRN     Dose: 30 g     Route: Oral    glucose-vitamin C (Dex-4) chewable tab 4 tablet     Linked Order: Or     Frequency: Q15 Min PRN     Dose: 4 tablet     Route: Oral    glucose-vitamin C (Dex-4) chewable tab 8 tablet     Linked Order: Or     Frequency: Q15 Min PRN     Dose: 8 tablet     Route: Oral    hydrALAzine (Apresoline) 20 mg/mL injection 10 mg     Frequency: Q6H PRN     Dose: 10 mg     Route: Intravenous    losartan (Cozaar) tab  25 mg     Frequency: Daily     Dose: 25 mg     Route: Oral    metoprolol (Lopressor) 5 mg/5mL injection 5 mg     Frequency: Once     Dose: 5 mg     Route: Intravenous    metoprolol (Lopressor) 5 mg/5mL injection 5 mg     Frequency: Q6H     Dose: 5 mg     Route: Intravenous    pantoprazole (Protonix) DR tab 40 mg     Frequency: QAM AC     Dose: 40 mg     Route: Oral    sodium hypochlorite (Dakin's) 0.125 % external solution     Frequency: Q12H     Route: Topical    timolol (Timoptic) 0.5 % ophthalmic solution 1 drop     Frequency: BID     Dose: 1 drop     Route: Both Eyes       Angelina Deal MD         [1]   Current Facility-Administered Medications   Medication Dose Route Frequency    metoprolol (Lopressor) 5 mg/5mL injection 5 mg  5 mg Intravenous Once    metoprolol (Lopressor) 5 mg/5mL injection 5 mg  5 mg Intravenous Q6H    timolol (Timoptic) 0.5 % ophthalmic solution 1 drop  1 drop Both Eyes BID    carvedilol (Coreg) tab 12.5 mg  12.5 mg Oral BID with meals    pantoprazole (Protonix) DR tab 40 mg  40 mg Oral QAM AC    amoxicillin (Amoxil) cap 500 mg  500 mg Oral Q8H    aspirin chewable tab 81 mg  81 mg Oral Daily    sodium hypochlorite (Dakin's) 0.125 % external solution   Topical Q12H    glucose (Dex4) 15 GM/59ML oral liquid 15 g  15 g Oral Q15 Min PRN    Or    glucose (Glutose) 40% oral gel 15 g  15 g Oral Q15 Min PRN    Or    glucose-vitamin C (Dex-4) chewable tab 4 tablet  4 tablet Oral Q15 Min PRN    Or    dextrose 50% injection 50 mL  50 mL Intravenous Q15 Min PRN    Or    glucose (Dex4) 15 GM/59ML oral liquid 30 g  30 g Oral Q15 Min PRN    Or    glucose (Glutose) 40% oral gel 30 g  30 g Oral Q15 Min PRN    Or    glucose-vitamin C (Dex-4) chewable tab 8 tablet  8 tablet Oral Q15 Min PRN    acetaminophen (Tylenol) tab 650 mg  650 mg Oral Q6H PRN    atorvastatin (Lipitor) tab 40 mg  40 mg Oral Nightly    losartan (Cozaar) tab 25 mg  25 mg Oral Daily    hydrALAzine (Apresoline) 20 mg/mL injection 10 mg   10 mg Intravenous Q6H PRN   [2]   Patient Active Problem List  Diagnosis    Vision loss, left eye    Cerebrovascular accident (CVA), unspecified mechanism (HCC)    Essential hypertension    COVID-19    Right sided weakness    Aphasia due to recent stroke    Cerebral amyloid angiopathy (HCC)    Toxic encephalopathy    Hypertensive urgency    Acute chest pain    Renal artery stenosis    Cystitis    PAPO (acute kidney injury)    Primary hypertension    Uncontrolled hypertension    CVA (cerebral vascular accident) (HCC)    Acute CVA (cerebrovascular accident) (HCC)    Slurred speech    Weakness of right upper extremity    Dehydration    Failure to thrive in adult    Troponin level elevated    Hyperkalemia    Goals of care, counseling/discussion    Advance care planning    Palliative care by specialist    Acute cystitis without hematuria    Severe anemia

## 2025-07-21 NOTE — PLAN OF CARE
Problem: Patient Centered Care  Goal: Patient preferences are identified and integrated in the patient's plan of care  Description: Interventions:  - What would you like us to know as we care for you? History of CVA with aphasia. Lives at home with her son.   - Provide timely, complete, and accurate information to patient/family  - Incorporate patient and family knowledge, values, beliefs, and cultural backgrounds into the planning and delivery of care  - Encourage patient/family to participate in care and decision-making at the level they choose  - Honor patient and family perspectives and choices  Outcome: Progressing     Problem: PAIN - ADULT  Goal: Verbalizes/displays adequate comfort level or patient's stated pain goal  Description: INTERVENTIONS:  - Encourage pt to monitor pain and request assistance  - Assess pain using appropriate pain scale  - Administer analgesics based on type and severity of pain and evaluate response  - Implement non-pharmacological measures as appropriate and evaluate response  - Consider cultural and social influences on pain and pain management  - Manage/alleviate anxiety  - Utilize distraction and/or relaxation techniques  - Monitor for opioid side effects  - Notify MD/LIP if interventions unsuccessful or patient reports new pain  - Anticipate increased pain with activity and pre-medicate as appropriate  Outcome: Progressing     Problem: RISK FOR INFECTION - ADULT  Goal: Absence of fever/infection during anticipated neutropenic period  Description: INTERVENTIONS  - Monitor WBC  - Administer growth factors as ordered  - Implement neutropenic guidelines  Outcome: Progressing     Problem: SAFETY ADULT - FALL  Goal: Free from fall injury  Description: INTERVENTIONS:  - Assess pt frequently for physical needs  - Identify cognitive and physical deficits and behaviors that affect risk of falls.  - [place] fall precautions as indicated by assessment.  - Educate pt/family on patient  safety including physical limitations  - Instruct pt to call for assistance with activity based on assessment  - Modify environment to reduce risk of injury  - Provide assistive devices as appropriate  - Consider OT/PT consult to assist with strengthening/mobility  - Encourage toileting schedule  Outcome: Progressing     Problem: DISCHARGE PLANNING  Goal: Discharge to home or other facility with appropriate resources  Description: INTERVENTIONS:  - Identify barriers to discharge w/pt and caregiver  - Include patient/family/discharge partner in discharge planning  - Arrange for needed discharge resources and transportation as appropriate  - Identify discharge learning needs (meds, wound care, etc)  - Arrange for interpreters to assist at discharge as needed  - Consider post-discharge preferences of patient/family/discharge partner  - Complete POLST form as appropriate  - Assess patient's ability to be responsible for managing their own health  - Refer to Case Management Department for coordinating discharge planning if the patient needs post-hospital services based on physician/LIP order or complex needs related to functional status, cognitive ability or social support system  Outcome: Progressing     Problem: Altered Communication/Language Barrier  Goal: Patient/Family is able to understand and participate in their care  Description: Interventions:  - Assess communication ability and preferred communication style  - Implement communication aides and strategies  - Use visual cues when possible  - Listen attentively, be patient, do not interrupt  - Minimize distractions  - Allow time for understanding and response  - Establish method for patient to ask for assistance (call light)  - Provide an  as needed  - Communicate barriers and strategies to overcome with those who interact with patient  Outcome: Progressing     Problem: GASTROINTESTINAL - ADULT  Goal: Maintains adequate nutritional intake  (undernourished)  Description: INTERVENTIONS:  - Monitor percentage of each meal consumed  - Identify factors contributing to decreased intake, treat as appropriate  - Assist with meals as needed  - Monitor I&O, WT and lab values  - Obtain nutritional consult as needed  - Optimize oral hygiene and moisture  - Encourage food from home; allow for food preferences  - Enhance eating environment  Outcome: Progressing     Problem: GENITOURINARY - ADULT  Goal: Absence of urinary retention  Description: INTERVENTIONS:  - Assess patient’s ability to void and empty bladder  - Monitor intake/output and perform bladder scan as needed  - Follow urinary retention protocol/standard of care  - Consider collaborating with pharmacy to review patient's medication profile  - Implement strategies to promote bladder emptying  Outcome: Progressing     Problem: METABOLIC/FLUID AND ELECTROLYTES - ADULT  Goal: Glucose maintained within prescribed range  Description: INTERVENTIONS:  - Monitor Blood Glucose as ordered  - Assess for signs and symptoms of hyperglycemia and hypoglycemia  - Administer ordered medications to maintain glucose within target range  - Assess barriers to adequate nutritional intake and initiate nutrition consult as needed  - Instruct patient on self management of diabetes  Outcome: Progressing  Goal: Electrolytes maintained within normal limits  Description: INTERVENTIONS:  - Monitor labs and rhythm and assess patient for signs and symptoms of electrolyte imbalances  - Administer electrolyte replacement as ordered  - Monitor response to electrolyte replacements, including rhythm and repeat lab results as appropriate  - Fluid restriction as ordered  - Instruct patient on fluid and nutrition restrictions as appropriate  Outcome: Progressing  Goal: Hemodynamic stability and optimal renal function maintained  Description: INTERVENTIONS:  - Monitor labs and assess for signs and symptoms of volume excess or deficit  - Monitor  intake, output and patient weight  - Monitor urine specific gravity, serum osmolarity and serum sodium as indicated or ordered  - Monitor response to interventions for patient's volume status, including labs, urine output, blood pressure (other measures as available)  - Encourage oral intake as appropriate  - Instruct patient on fluid and nutrition restrictions as appropriate  Outcome: Progressing     Problem: SKIN/TISSUE INTEGRITY - ADULT  Goal: Skin integrity remains intact  Description: INTERVENTIONS  - Assess and document risk factors for pressure ulcer development  - Assess and document skin integrity  - Monitor for areas of redness and/or skin breakdown  - Initiate interventions, skin care algorithm/standards of care as needed  Outcome: Progressing  Goal: Incision(s), wounds(s) or drain site(s) healing without S/S of infection  Description: INTERVENTIONS:  - Assess and document risk factors for pressure ulcer development  - Assess and document skin integrity  - Assess and document dressing/incision, wound bed, drain sites and surrounding tissue  - Implement wound care per orders  - Initiate isolation precautions as appropriate  - Initiate Pressure Ulcer prevention bundle as indicated  Outcome: Progressing  Goal: Oral mucous membranes remain intact  Description: INTERVENTIONS  - Assess oral mucosa and hygiene practices  - Implement preventative oral hygiene regimen  - Implement oral medicated treatments as ordered  Outcome: Progressing     Problem: Impaired Swallowing  Goal: Minimize aspiration risk  Description: Interventions:  - Patient should be alert and upright for all feedings (90 degrees preferred)  - Offer food and liquids at a slow rate  - No straws  - Encourage small bites of food and small sips of liquid  - Offer pills one at a time, crush or deliver with applesauce as needed  - Discontinue feeding and notify MD (or speech pathologist) if coughing or persistent throat clearing or wet/gurgly vocal  quality is noted  Outcome: Progressing     Problem: Patient/Family Goals  Goal: Patient/Family Long Term Goal  Description: Patient's Long Term Goal: Discharge from the hospital    Interventions:  - Monitor vital signs  - Monitor appropriate labs  - Monitor blood glucose levels  - Pain management  - Administer medications per order  - Follow MD orders  - Diagnostics per order  - Update / inform patient and family on plan of care  - Discharge planning  - See additional Care Plan goals for specific interventions  Outcome: Progressing  Goal: Patient/Family Short Term Goal  Description: Patient's Short Term Goal: Improve urinary tract infection     Interventions:   - Monitor vital signs  - Monitor appropriate labs  - Monitor blood glucose levels  - Pain management  - Administer medications per order  - Follow MD orders  - Diagnostics per order  - Update / inform patient and family on plan of care  - See additional Care Plan goals for specific interventions  Outcome: Progressing

## 2025-07-22 VITALS
HEART RATE: 76 BPM | OXYGEN SATURATION: 100 % | RESPIRATION RATE: 18 BRPM | WEIGHT: 145.31 LBS | BODY MASS INDEX: 22.81 KG/M2 | HEIGHT: 67 IN | DIASTOLIC BLOOD PRESSURE: 68 MMHG | SYSTOLIC BLOOD PRESSURE: 158 MMHG | TEMPERATURE: 98 F

## 2025-07-22 LAB
ANION GAP SERPL CALC-SCNC: 8 MMOL/L (ref 0–18)
BASOPHILS # BLD AUTO: 0.03 X10(3) UL (ref 0–0.2)
BASOPHILS NFR BLD AUTO: 0.5 %
BUN BLD-MCNC: 13 MG/DL (ref 9–23)
BUN/CREAT SERPL: 16 (ref 10–20)
CALCIUM BLD-MCNC: 8.4 MG/DL (ref 8.7–10.4)
CHLORIDE SERPL-SCNC: 105 MMOL/L (ref 98–112)
CO2 SERPL-SCNC: 27 MMOL/L (ref 21–32)
CREAT BLD-MCNC: 0.81 MG/DL (ref 0.55–1.02)
DEPRECATED RDW RBC AUTO: 55.2 FL (ref 35.1–46.3)
EGFRCR SERPLBLD CKD-EPI 2021: 77 ML/MIN/1.73M2 (ref 60–?)
EOSINOPHIL # BLD AUTO: 0.09 X10(3) UL (ref 0–0.7)
EOSINOPHIL NFR BLD AUTO: 1.6 %
ERYTHROCYTE [DISTWIDTH] IN BLOOD BY AUTOMATED COUNT: 19.4 % (ref 11–15)
GLUCOSE BLD-MCNC: 84 MG/DL (ref 70–99)
GLUCOSE BLDC GLUCOMTR-MCNC: 129 MG/DL (ref 70–99)
GLUCOSE BLDC GLUCOMTR-MCNC: 98 MG/DL (ref 70–99)
HCT VFR BLD AUTO: 27.6 % (ref 35–48)
HGB BLD-MCNC: 8.8 G/DL (ref 12–16)
IMM GRANULOCYTES # BLD AUTO: 0.02 X10(3) UL (ref 0–1)
IMM GRANULOCYTES NFR BLD: 0.3 %
LYMPHOCYTES # BLD AUTO: 1.72 X10(3) UL (ref 1–4)
LYMPHOCYTES NFR BLD AUTO: 29.7 %
MCH RBC QN AUTO: 24.7 PG (ref 26–34)
MCHC RBC AUTO-ENTMCNC: 31.9 G/DL (ref 31–37)
MCV RBC AUTO: 77.5 FL (ref 80–100)
MONOCYTES # BLD AUTO: 0.35 X10(3) UL (ref 0.1–1)
MONOCYTES NFR BLD AUTO: 6 %
NEUTROPHILS # BLD AUTO: 3.58 X10 (3) UL (ref 1.5–7.7)
NEUTROPHILS # BLD AUTO: 3.58 X10(3) UL (ref 1.5–7.7)
NEUTROPHILS NFR BLD AUTO: 61.9 %
OSMOLALITY SERPL CALC.SUM OF ELEC: 289 MOSM/KG (ref 275–295)
PLATELET # BLD AUTO: 358 10(3)UL (ref 150–450)
POTASSIUM SERPL-SCNC: 3.4 MMOL/L (ref 3.5–5.1)
RBC # BLD AUTO: 3.56 X10(6)UL (ref 3.8–5.3)
SODIUM SERPL-SCNC: 140 MMOL/L (ref 136–145)
WBC # BLD AUTO: 5.8 X10(3) UL (ref 4–11)

## 2025-07-22 RX ORDER — SODIUM HYPOCHLORITE 1.25 MG/ML
SOLUTION TOPICAL
Qty: 473 ML | Refills: 0 | Status: SHIPPED | OUTPATIENT
Start: 2025-07-22

## 2025-07-22 RX ORDER — PANTOPRAZOLE SODIUM 40 MG/1
40 TABLET, DELAYED RELEASE ORAL
Qty: 30 TABLET | Refills: 0 | Status: SHIPPED | OUTPATIENT
Start: 2025-07-22

## 2025-07-22 RX ORDER — POTASSIUM CHLORIDE 1500 MG/1
40 TABLET, EXTENDED RELEASE ORAL EVERY 4 HOURS
Status: COMPLETED | OUTPATIENT
Start: 2025-07-22 | End: 2025-07-22

## 2025-07-22 RX ORDER — FUROSEMIDE 20 MG/1
20 TABLET ORAL DAILY
Qty: 30 TABLET | Refills: 0 | Status: SHIPPED | OUTPATIENT
Start: 2025-07-22

## 2025-07-22 NOTE — PLAN OF CARE
Problem: Patient Centered Care  Goal: Patient preferences are identified and integrated in the patient's plan of care  Description: Interventions:  - What would you like us to know as we care for you? History of CVA with aphasia. Lives at home with her son.   - Provide timely, complete, and accurate information to patient/family  - Incorporate patient and family knowledge, values, beliefs, and cultural backgrounds into the planning and delivery of care  - Encourage patient/family to participate in care and decision-making at the level they choose  - Honor patient and family perspectives and choices  Outcome: Adequate for Discharge     Problem: PAIN - ADULT  Goal: Verbalizes/displays adequate comfort level or patient's stated pain goal  Description: INTERVENTIONS:  - Encourage pt to monitor pain and request assistance  - Assess pain using appropriate pain scale  - Administer analgesics based on type and severity of pain and evaluate response  - Implement non-pharmacological measures as appropriate and evaluate response  - Consider cultural and social influences on pain and pain management  - Manage/alleviate anxiety  - Utilize distraction and/or relaxation techniques  - Monitor for opioid side effects  - Notify MD/LIP if interventions unsuccessful or patient reports new pain  - Anticipate increased pain with activity and pre-medicate as appropriate  Outcome: Adequate for Discharge     Problem: RISK FOR INFECTION - ADULT  Goal: Absence of fever/infection during anticipated neutropenic period  Description: INTERVENTIONS  - Monitor WBC  - Administer growth factors as ordered  - Implement neutropenic guidelines  Outcome: Adequate for Discharge     Problem: SAFETY ADULT - FALL  Goal: Free from fall injury  Description: INTERVENTIONS:  - Assess pt frequently for physical needs  - Identify cognitive and physical deficits and behaviors that affect risk of falls.  - [place] fall precautions as indicated by assessment.  -  Educate pt/family on patient safety including physical limitations  - Instruct pt to call for assistance with activity based on assessment  - Modify environment to reduce risk of injury  - Provide assistive devices as appropriate  - Consider OT/PT consult to assist with strengthening/mobility  - Encourage toileting schedule  Outcome: Adequate for Discharge     Problem: DISCHARGE PLANNING  Goal: Discharge to home or other facility with appropriate resources  Description: INTERVENTIONS:  - Identify barriers to discharge w/pt and caregiver  - Include patient/family/discharge partner in discharge planning  - Arrange for needed discharge resources and transportation as appropriate  - Identify discharge learning needs (meds, wound care, etc)  - Arrange for interpreters to assist at discharge as needed  - Consider post-discharge preferences of patient/family/discharge partner  - Complete POLST form as appropriate  - Assess patient's ability to be responsible for managing their own health  - Refer to Case Management Department for coordinating discharge planning if the patient needs post-hospital services based on physician/LIP order or complex needs related to functional status, cognitive ability or social support system  Outcome: Adequate for Discharge     Problem: Altered Communication/Language Barrier  Goal: Patient/Family is able to understand and participate in their care  Description: Interventions:  - Assess communication ability and preferred communication style  - Implement communication aides and strategies  - Use visual cues when possible  - Listen attentively, be patient, do not interrupt  - Minimize distractions  - Allow time for understanding and response  - Establish method for patient to ask for assistance (call light)  - Provide an  as needed  - Communicate barriers and strategies to overcome with those who interact with patient  Outcome: Adequate for Discharge     Problem: GASTROINTESTINAL -  ADULT  Goal: Maintains adequate nutritional intake (undernourished)  Description: INTERVENTIONS:  - Monitor percentage of each meal consumed  - Identify factors contributing to decreased intake, treat as appropriate  - Assist with meals as needed  - Monitor I&O, WT and lab values  - Obtain nutritional consult as needed  - Optimize oral hygiene and moisture  - Encourage food from home; allow for food preferences  - Enhance eating environment  Outcome: Adequate for Discharge     Problem: GENITOURINARY - ADULT  Goal: Absence of urinary retention  Description: INTERVENTIONS:  - Assess patient’s ability to void and empty bladder  - Monitor intake/output and perform bladder scan as needed  - Follow urinary retention protocol/standard of care  - Consider collaborating with pharmacy to review patient's medication profile  - Implement strategies to promote bladder emptying  Outcome: Adequate for Discharge     Problem: METABOLIC/FLUID AND ELECTROLYTES - ADULT  Goal: Glucose maintained within prescribed range  Description: INTERVENTIONS:  - Monitor Blood Glucose as ordered  - Assess for signs and symptoms of hyperglycemia and hypoglycemia  - Administer ordered medications to maintain glucose within target range  - Assess barriers to adequate nutritional intake and initiate nutrition consult as needed  - Instruct patient on self management of diabetes  Outcome: Adequate for Discharge  Goal: Electrolytes maintained within normal limits  Description: INTERVENTIONS:  - Monitor labs and rhythm and assess patient for signs and symptoms of electrolyte imbalances  - Administer electrolyte replacement as ordered  - Monitor response to electrolyte replacements, including rhythm and repeat lab results as appropriate  - Fluid restriction as ordered  - Instruct patient on fluid and nutrition restrictions as appropriate  Outcome: Adequate for Discharge  Goal: Hemodynamic stability and optimal renal function maintained  Description:  INTERVENTIONS:  - Monitor labs and assess for signs and symptoms of volume excess or deficit  - Monitor intake, output and patient weight  - Monitor urine specific gravity, serum osmolarity and serum sodium as indicated or ordered  - Monitor response to interventions for patient's volume status, including labs, urine output, blood pressure (other measures as available)  - Encourage oral intake as appropriate  - Instruct patient on fluid and nutrition restrictions as appropriate  Outcome: Adequate for Discharge     Problem: SKIN/TISSUE INTEGRITY - ADULT  Goal: Skin integrity remains intact  Description: INTERVENTIONS  - Assess and document risk factors for pressure ulcer development  - Assess and document skin integrity  - Monitor for areas of redness and/or skin breakdown  - Initiate interventions, skin care algorithm/standards of care as needed  Outcome: Adequate for Discharge  Goal: Incision(s), wounds(s) or drain site(s) healing without S/S of infection  Description: INTERVENTIONS:  - Assess and document risk factors for pressure ulcer development  - Assess and document skin integrity  - Assess and document dressing/incision, wound bed, drain sites and surrounding tissue  - Implement wound care per orders  - Initiate isolation precautions as appropriate  - Initiate Pressure Ulcer prevention bundle as indicated  Outcome: Adequate for Discharge  Goal: Oral mucous membranes remain intact  Description: INTERVENTIONS  - Assess oral mucosa and hygiene practices  - Implement preventative oral hygiene regimen  - Implement oral medicated treatments as ordered  Outcome: Adequate for Discharge     Problem: Impaired Swallowing  Goal: Minimize aspiration risk  Description: Interventions:  - Patient should be alert and upright for all feedings (90 degrees preferred)  - Offer food and liquids at a slow rate  - No straws  - Encourage small bites of food and small sips of liquid  - Offer pills one at a time, crush or deliver with  applesauce as needed  - Discontinue feeding and notify MD (or speech pathologist) if coughing or persistent throat clearing or wet/gurgly vocal quality is noted  Outcome: Adequate for Discharge     Problem: Patient/Family Goals  Goal: Patient/Family Long Term Goal  Description: Patient's Long Term Goal: Discharge from the hospital    Interventions:  - Monitor vital signs  - Monitor appropriate labs  - Monitor blood glucose levels  - Pain management  - Administer medications per order  - Follow MD orders  - Diagnostics per order  - Update / inform patient and family on plan of care  - Discharge planning  - See additional Care Plan goals for specific interventions  Outcome: Adequate for Discharge  Goal: Patient/Family Short Term Goal  Description: Patient's Short Term Goal: Improve urinary tract infection     Interventions:   - Monitor vital signs  - Monitor appropriate labs  - Monitor blood glucose levels  - Pain management  - Administer medications per order  - Follow MD orders  - Diagnostics per order  - Update / inform patient and family on plan of care  - See additional Care Plan goals for specific interventions  Outcome: Adequate for Discharge       IV removed. Patient left via ambulance.

## 2025-07-22 NOTE — DISCHARGE SUMMARY
[facility]  part of [facility]    Discharge Summary    Eliud Fine Patient Status:  Inpatient    1955 MRN [id number]   Location [facility] 5SW/SE Attending Angelina Deal MD   Hosp Day # 9 PCP Yao Rodriguez                Date of Admission: 2025   Date of Discharge: 2025    Admitting Diagnosis: Acute cystitis without hematuria [N30.00]  Severe anemia [D64.9]    Disposition: Home    Discharge Diagnosis: .Principal Problem:    Acute cystitis without hematuria  Active Problems:    Severe anemia  Hypertension  Uti  CAD      Hospital Course:   Reason for Admission:  Altered mental status  Acute metabolic encephalopathy.  UTI.        Discharge Physical Exam:  Vital Signs:  Blood pressure 158/68, pulse 76, temperature 97.6 °F (36.4 °C), temperature source Axillary, resp. rate 18, height 5' 7\" (1.702 m), weight 145 lb 4.8 oz (65.9 kg), SpO2 100%.     General: No acute distress. Alert  HEENT: Moist mucous membranes. EOM-I. PERRL  Neck: No lymphadenopathy.  No JVD. No carotid bruits.  Respiratory: Clear to auscultation bilaterally.  No wheezes. No rhonchi.  Cardiovascular: S1, S2.  Regular rate and rhythm.  No murmurs. Equal pulses   Abdomen: Soft, nontender, nondistended.  Positive bowel sounds. No rebound tenderness  Musculoskeletal: Full range of motion of all extremities.  No swelling noted.  Integument: No lesions. No erythema.  Psychiatric: Appropriate mood and affect.    Hospital Course: Eliud Fine is a(n) 70 year old female hypertension, diabetes mellitus type 2, coronary artery disease, hyperlipidemia, osteoarthritis, history of CVA, also to the emergency room by the family because they noticed her to have some change in mental status.  And patient although responding.  In the concern that patient could be having UTI.  Patient usually has a change in mental status when she has UTI.  Patient is otherwise comfortable.  Nursing staff reports no  complaints.  Patient was admitted to hospital.  Patient was treated with IV antibiotics.   Patient mental status improved.  But she was having poor oral intake.  Patient also had an episode of SVT.  Overall patient has improved today.  Today the patient is doing fine.  And it was decided to discharge her.    Complications: none reported             Discharge Plan:   Discharge Condition: Good    Current Discharge Medication List        New Orders    Details   pantoprazole 40 MG Oral Tab EC Take 1 tablet (40 mg total) by mouth every morning before breakfast.      sodium hypochlorite 0.125 % External Solution Apply to affected  area  bid      furosemide (LASIX) 20 MG Oral Tab Take 1 tablet (20 mg total) by mouth daily.           Home Meds - Unchanged    Details   carvedilol 25 MG Oral Tab Take 1 tablet (25 mg total) by mouth in the morning and 1 tablet (25 mg total) in the evening. Take with meals.      losartan 50 MG Oral Tab Take 0.5 tablets (25 mg total) by mouth in the morning.      atorvastatin 40 MG Oral Tab       Brimonidine Tartrate-Timolol 0.2-0.5 % Ophthalmic Solution Apply 1 drop to eye in the morning and 1 drop before bedtime.      aspirin 81 MG Oral Chew Tab Chew 1 tablet (81 mg total) by mouth daily.                 Discharge Diet: Cardiac diet      Discharge Activity: As tolerated    Follow up:      Follow-up Information       Yao Rodriguez Schedule an appointment as soon as possible for a visit in 1 week(s).    Specialty: Physician Assistant  Contact information:  [address] [place] [address]  [phone]                             Follow up Labs:          Other Discharge Instructions:         [facility]  Phone: [phone]  Fax: [phone]    Get complete  blood count , basic metabolic panel  next week with your primary care physician.     Call MD or  come to emergency room for any fever chills chest pain shortness of breath or any new  symptom        Angelina Deal MD   7/22/2025    I spent 35 minutes in discharge planning for this patient, including extensive counseling regarding the patient's condition, recommendations regarding follow-up care, discussing with any applicable consultants, and arranging follow-up as indicated.

## 2025-07-22 NOTE — CM/SW NOTE
07/22/25 1427   Discharge disposition   Expected discharge disposition Home or Self   Post Acute Care Provider Home   Additional Home Care/Hospice Provider   (Purpose Care)   Discharge transportation [facility]     Patient received MDO for discharge. Pt requires an ambulance according to the RN due to being a max assist. SW called and ordered an Ambulance from [facility] to transport pt to Home  at 315pm.  PCS flow sheet completed. RN to attached to AVS and print out.  Purpose [facility] aware of DC today.     SW/PETRA to remain available for support and/or discharge planning.     Meghna RUIZ, MSW, LSW   x [phone]

## 2025-07-22 NOTE — PROGRESS NOTES
[facility]  part of [facility]    Progress Note    Eliud Fine Patient Status:  Inpatient    1955 MRN [id number]   Location [facility] 5SW/SE Attending Angelina Deal MD   Hosp Day # 9 PCP Yao Rodriguez       SUBJECTIVE:  Nursing staff reports no complaints.    Patient is eating well.  Patient tells me she is fine.    OBJECTIVE:  Vital signs in last 24 hours:  /68 (BP Location: Left arm)   Pulse 76   Temp 97.6 °F (36.4 °C) (Axillary)   Resp 18   Ht 5' 7\" (1.702 m)   Wt 145 lb 4.8 oz (65.9 kg)   SpO2 100%   BMI 22.76 kg/m²     Intake/Output:    Intake/Output Summary (Last 24 hours) at 2025 1200  Last data filed at 2025 0941  Gross per 24 hour   Intake 360 ml   Output 1700 ml   Net -1340 ml       Wt Readings from Last 3 Encounters:   25 145 lb 4.8 oz (65.9 kg)   25 131 lb 6.3 oz (59.6 kg)   25 131 lb 4.8 oz (59.6 kg)       Exam  Gen: No acute distress, alert  HEENT: Pallor noted  Pulm: Lungs clear, normal respiratory effort  CV: Heart with regular rate and rhythm, no peripheral edema  Abd: Abdomen soft, nontender, nondistended, no organomegaly, bowel sounds present  Skin: no rashes or lesions  CNS: Alert  Extremities pedal edema noted    Data Review:     Labs:   Lab Results   Component Value Date    WBC 5.8 2025    HGB 8.8 2025    HCT 27.6 2025    .0 2025    CREATSERUM 0.81 2025    BUN 13 2025     2025    K 3.4 2025     2025    CO2 27.0 2025    GLU 84 2025    CA 8.4 2025         LABS  Recent Labs   Lab 25  0808 25  1230 25  2042 25  0425 25  0635   RBC 2.92*  --   --  3.48* 3.56*   HGB 7.1*   < > 9.2* 8.7* 8.8*   HCT 22.5*   < > 27.9* 27.0* 27.6*   MCV 77.1*  --   --  77.6* 77.5*   MCH 24.3*  --   --  25.0* 24.7*   MCHC 31.6  --   --  32.2 31.9   RDW 19.6*  --   --  18.9* 19.4*   NEPRELIM 3.40  --   --  3.19  3.58   WBC 5.6  --   --  5.6 5.8   .0  --   --  338.0 358.0   GLU 69*  --   --  79 84    < > = values in this interval not displayed.       Imaging:      Meds:   Current Hospital Medications[1]    Assessment  Problem List[2]  Anemia.  Stable     SVT, could be secondary to patient not taking her carvedilol.  CT chest is negative for pulmonary embolism.  Waiting for Doppler TSH is normal.    Hypertension.  Continue current treatment.  Aortic regurgitation.  Also give her Lasix.  Discussed with the nursing staff.      plan:   DC home today.  Stop antibiotics.  Will send her on a low-dose of Lasix.  Discussed with the son, possible discharge tomorrow if she is stable  Called and left a message for the patient's son       Active Orders   LAB    Potassium     Frequency: Timed draw     Number of Occurrences: 1 Occurrences     Order Comments: DO NOT DISCONTINUE MUST REMAIN FOR ELECTROLYTE PROTOCOL       Nourishments    Dietary Nutrition Supplements TID     Frequency: TID     Number of Occurrences: Until Specified     Order Comments: Vanilla SF mighty shake BID (@ bfast and lunch) and vanilla magic cup daily (@ dinner)      Room Service Eligibility Until Discontinued     Frequency: Until Discontinued     Number of Occurrences: Until Specified    Room Service Notify RN Until Discontinued     Frequency: Until Discontinued     Number of Occurrences: Until Specified   Respiratory Care    Auto CPAP When Sleeping     Frequency: When Sleeping     Number of Occurrences: Until Specified   Discharge    Discharge patient     Frequency: Once     Number of Occurrences: 1 Occurrences   Diet    Regular/General diet Texture Consistency: Chopped / Soft & Bite Sized; Is Patient on Accuchecks? Yes; Misc Restriction: No Straw     Frequency: Effective Now     Number of Occurrences: Until Specified     Order Comments: meds crushed in puree     Nursing    Accucheck     Frequency: PRN     Number of Occurrences: Until Specified     Order  Comments: For symptoms or suspicion of hypoglycemia      Accucheck     Frequency: TID AC and HS     Number of Occurrences: Until Specified    Apply dressing Other (Betadine with Dry) Daily     Frequency: Daily     Number of Occurrences: Until Specified     Order Comments: ~Paint RIGHT HEEL wound with Betadine and apply dry dressing daily and PRN      Apply dressing Other (Dakins) Q12H     Frequency: Q12H     Number of Occurrences: Until Specified     Order Comments: ~ Obtain Dakins from Pharmacy  ~ Dakin Instructions to__LEFT LATERAL ANKLE, SACRUM_________  ~ Remove old dressingS  ~ Dampen gauze with Dakin Solution  ~ Dressing change Q 12 hours and PRN  ~ Make sure to pack wound depth or tunnels  ~ Cover with dry gauze and secure in place W BORDERED FOAMS      Apply dressing Other (Sensicare Ointment) PRN     Frequency: PRN     Number of Occurrences: Until Specified     Order Comments: ~ Obtain Zinc/Protective Ointment from [facility] Distribution  ~ Clean area with soap and water  ~ Apply to ___PERI AREA______ BID and PRN      Apply Heel Boot     Frequency: Until Discontinued     Number of Occurrences: Until Specified     Order Comments: Remove boot every shift to asses skin.  Send boot with patient on transfer/discharge.      Cardiac monitoring     Frequency: Until Discontinued     Number of Occurrences: Until Specified    Discontinue all oral diabetic agents     Frequency: Once     Number of Occurrences: 1 Occurrences    Elevate heels off of bed     Frequency: Until Discontinued     Number of Occurrences: Until Specified    Hemoglobin post transfusion 45 min after transfusion is completed (RN to enter order)     Frequency: Once     Number of Occurrences: 1 Occurrences    Incontinence Care     Frequency: PRN     Number of Occurrences: Until Specified     Order Comments: Prompt      Initiate electrolyte protocol     Frequency: Until Discontinued     Number of Occurrences: Until Specified    Initiate Hypoglycemia  Algorithm for Adults     Frequency: Until Discontinued     Number of Occurrences: Until Specified     Order Comments: For blood glucose less than 70      Initiate transfusion reaction protocol: if patient exhibits signs/symptoms of transfusion reaction     Frequency: PRN     Number of Occurrences: Until Specified    Insert/maintain PIV     Frequency: PRN     Number of Occurrences: Until Specified    Lotion to skin     Frequency: Daily     Number of Occurrences: Until Specified    Notify physician     Frequency: Continuous     Number of Occurrences: Until Specified     Order Comments: Hold transfusion and notify physician if the patient:   1) Complains of chills an/or abdominal/back pain  2) Shortness of breath  3) Chest pain   4) Restlessness   5) Infusion site pain   6) Sudden changes in vital signs ie: decrease in blood pressure or temperature: elevation of one degree Centigrade or 2 degrees fahrenheit from pre-transfusion temperature  7) Nausea/vomiting   8) Shock   9) Change in color of urine   10) Urticaria   11) Edema        Nursing communication (specify)     Frequency: Until Discontinued     Number of Occurrences: Until Specified     Order Comments: AIR PUMP TO ISOTOUR GEL MATTRESS      Please communicate patient's home diabetic medications when speaking with physician     Frequency: Once     Number of Occurrences: 1 Occurrences    Provide diabetes patient education guide     Frequency: Once     Number of Occurrences: 1 Occurrences     Order Comments: Initiate education for new onset diabetes or pre-existing diabetes with knowledge deficits.      Provide patient/family transfusion information     Frequency: Once     Number of Occurrences: 1 Occurrences    Remote Telemetry     Frequency: Until Discontinued     Number of Occurrences: Until Specified    Teach blood glucose monitoring with bedside glucometer     Frequency: Once     Number of Occurrences: 1 Occurrences     Order Comments: If patient does not have  a home glucometer, notify physician to order for discharge home.      Tele Box     Frequency: Once     Number of Occurrences: 1 Occurrences    Transfuse Orders to be completed     Frequency: If condition met     Number of Occurrences: Until Specified     Order Comments: Outstanding Blood Transfusion orders      Turn and reposition patient     Frequency: Q2H     Number of Occurrences: 2 Weeks    Vital signs - Per blood administration policy     Frequency: Per Unit Routine     Number of Occurrences: Until Specified     Order Comments: Vital signs as follows; T, BP, HR, RR one set prior to transfusion, one set at 15 minutes, then one set hourly until transfusion complete. After transufusion complete resume previous VS frequency.       Medications    acetaminophen (Tylenol) tab 650 mg     Frequency: Q6H PRN     Dose: 650 mg     Route: Oral    amoxicillin (Amoxil) cap 500 mg     Frequency: Q8H     Dose: 500 mg     Route: Oral    aspirin chewable tab 81 mg     Frequency: Daily     Dose: 81 mg     Route: Oral    atorvastatin (Lipitor) tab 40 mg     Frequency: Nightly     Dose: 40 mg     Route: Oral    carvedilol (Coreg) tab 12.5 mg     Frequency: BID with meals     Dose: 12.5 mg     Route: Oral    dextrose 50% injection 50 mL     Linked Order: Or     Frequency: Q15 Min PRN     Dose: 50 mL     Route: Intravenous    glucose (Dex4) 15 GM/59ML oral liquid 15 g     Linked Order: Or     Frequency: Q15 Min PRN     Dose: 15 g     Route: Oral    glucose (Dex4) 15 GM/59ML oral liquid 30 g     Linked Order: Or     Frequency: Q15 Min PRN     Dose: 30 g     Route: Oral    glucose (Glutose) 40% oral gel 15 g     Linked Order: Or     Frequency: Q15 Min PRN     Dose: 15 g     Route: Oral    glucose (Glutose) 40% oral gel 30 g     Linked Order: Or     Frequency: Q15 Min PRN     Dose: 30 g     Route: Oral    glucose-vitamin C (Dex-4) chewable tab 4 tablet     Linked Order: Or     Frequency: Q15 Min PRN     Dose: 4 tablet     Route: Oral     glucose-vitamin C (Dex-4) chewable tab 8 tablet     Linked Order: Or     Frequency: Q15 Min PRN     Dose: 8 tablet     Route: Oral    hydrALAzine (Apresoline) 20 mg/mL injection 10 mg     Frequency: Q6H PRN     Dose: 10 mg     Route: Intravenous    losartan (Cozaar) tab 25 mg     Frequency: Daily     Dose: 25 mg     Route: Oral    metoprolol (Lopressor) 5 mg/5mL injection 5 mg     Frequency: Once     Dose: 5 mg     Route: Intravenous    metoprolol (Lopressor) 5 mg/5mL injection 5 mg     Frequency: Q6H     Dose: 5 mg     Route: Intravenous    pantoprazole (Protonix) DR tab 40 mg     Frequency: QAM AC     Dose: 40 mg     Route: Oral    potassium chloride (Klor-Con M20) tab 40 mEq     Frequency: Q4H     Dose: 40 mEq     Route: Oral    sodium hypochlorite (Dakin's) 0.125 % external solution     Frequency: Q12H     Route: Topical    timolol (Timoptic) 0.5 % ophthalmic solution 1 drop     Frequency: BID     Dose: 1 drop     Route: Both Eyes       Angelina Deal MD         [1]   Current Facility-Administered Medications   Medication Dose Route Frequency    potassium chloride (Klor-Con M20) tab 40 mEq  40 mEq Oral Q4H    metoprolol (Lopressor) 5 mg/5mL injection 5 mg  5 mg Intravenous Once    metoprolol (Lopressor) 5 mg/5mL injection 5 mg  5 mg Intravenous Q6H    timolol (Timoptic) 0.5 % ophthalmic solution 1 drop  1 drop Both Eyes BID    carvedilol (Coreg) tab 12.5 mg  12.5 mg Oral BID with meals    pantoprazole (Protonix) DR tab 40 mg  40 mg Oral QAM AC    amoxicillin (Amoxil) cap 500 mg  500 mg Oral Q8H    aspirin chewable tab 81 mg  81 mg Oral Daily    sodium hypochlorite (Dakin's) 0.125 % external solution   Topical Q12H    glucose (Dex4) 15 GM/59ML oral liquid 15 g  15 g Oral Q15 Min PRN    Or    glucose (Glutose) 40% oral gel 15 g  15 g Oral Q15 Min PRN    Or    glucose-vitamin C (Dex-4) chewable tab 4 tablet  4 tablet Oral Q15 Min PRN    Or    dextrose 50% injection 50 mL  50 mL Intravenous Q15 Min PRN    Or     glucose (Dex4) 15 GM/59ML oral liquid 30 g  30 g Oral Q15 Min PRN    Or    glucose (Glutose) 40% oral gel 30 g  30 g Oral Q15 Min PRN    Or    glucose-vitamin C (Dex-4) chewable tab 8 tablet  8 tablet Oral Q15 Min PRN    acetaminophen (Tylenol) tab 650 mg  650 mg Oral Q6H PRN    atorvastatin (Lipitor) tab 40 mg  40 mg Oral Nightly    losartan (Cozaar) tab 25 mg  25 mg Oral Daily    hydrALAzine (Apresoline) 20 mg/mL injection 10 mg  10 mg Intravenous Q6H PRN   [2]   Patient Active Problem List  Diagnosis    Vision loss, left eye    Cerebrovascular accident (CVA), unspecified mechanism (HCC)    Essential hypertension    COVID-19    Right sided weakness    Aphasia due to recent stroke    Cerebral amyloid angiopathy (HCC)    Toxic encephalopathy    Hypertensive urgency    Acute chest pain    Renal artery stenosis    Cystitis    PAPO (acute kidney injury)    Primary hypertension    Uncontrolled hypertension    CVA (cerebral vascular accident) (HCC)    Acute CVA (cerebrovascular accident) (HCC)    Slurred speech    Weakness of right upper extremity    Dehydration    Failure to thrive in adult    Troponin level elevated    Hyperkalemia    Goals of care, counseling/discussion    Advance care planning    Palliative care by specialist    Acute cystitis without hematuria    Severe anemia

## 2025-07-22 NOTE — PLAN OF CARE
Problem: Patient Centered Care  Goal: Patient preferences are identified and integrated in the patient's plan of care  Description: Interventions:  - What would you like us to know as we care for you? History of CVA with aphasia. Lives at home with her son.   - Provide timely, complete, and accurate information to patient/family  - Incorporate patient and family knowledge, values, beliefs, and cultural backgrounds into the planning and delivery of care  - Encourage patient/family to participate in care and decision-making at the level they choose  - Honor patient and family perspectives and choices  Outcome: Progressing     Problem: PAIN - ADULT  Goal: Verbalizes/displays adequate comfort level or patient's stated pain goal  Description: INTERVENTIONS:  - Encourage pt to monitor pain and request assistance  - Assess pain using appropriate pain scale  - Administer analgesics based on type and severity of pain and evaluate response  - Implement non-pharmacological measures as appropriate and evaluate response  - Consider cultural and social influences on pain and pain management  - Manage/alleviate anxiety  - Utilize distraction and/or relaxation techniques  - Monitor for opioid side effects  - Notify MD/LIP if interventions unsuccessful or patient reports new pain  - Anticipate increased pain with activity and pre-medicate as appropriate  Outcome: Progressing     Problem: RISK FOR INFECTION - ADULT  Goal: Absence of fever/infection during anticipated neutropenic period  Description: INTERVENTIONS  - Monitor WBC  - Administer growth factors as ordered  - Implement neutropenic guidelines  Outcome: Progressing     Problem: SAFETY ADULT - FALL  Goal: Free from fall injury  Description: INTERVENTIONS:  - Assess pt frequently for physical needs  - Identify cognitive and physical deficits and behaviors that affect risk of falls.  - [place] fall precautions as indicated by assessment.  - Educate pt/family on patient  safety including physical limitations  - Instruct pt to call for assistance with activity based on assessment  - Modify environment to reduce risk of injury  - Provide assistive devices as appropriate  - Consider OT/PT consult to assist with strengthening/mobility  - Encourage toileting schedule  Outcome: Progressing     Problem: DISCHARGE PLANNING  Goal: Discharge to home or other facility with appropriate resources  Description: INTERVENTIONS:  - Identify barriers to discharge w/pt and caregiver  - Include patient/family/discharge partner in discharge planning  - Arrange for needed discharge resources and transportation as appropriate  - Identify discharge learning needs (meds, wound care, etc)  - Arrange for interpreters to assist at discharge as needed  - Consider post-discharge preferences of patient/family/discharge partner  - Complete POLST form as appropriate  - Assess patient's ability to be responsible for managing their own health  - Refer to Case Management Department for coordinating discharge planning if the patient needs post-hospital services based on physician/LIP order or complex needs related to functional status, cognitive ability or social support system  Outcome: Progressing     Problem: Altered Communication/Language Barrier  Goal: Patient/Family is able to understand and participate in their care  Description: Interventions:  - Assess communication ability and preferred communication style  - Implement communication aides and strategies  - Use visual cues when possible  - Listen attentively, be patient, do not interrupt  - Minimize distractions  - Allow time for understanding and response  - Establish method for patient to ask for assistance (call light)  - Provide an  as needed  - Communicate barriers and strategies to overcome with those who interact with patient  Outcome: Progressing     Problem: GASTROINTESTINAL - ADULT  Goal: Maintains adequate nutritional intake  (undernourished)  Description: INTERVENTIONS:  - Monitor percentage of each meal consumed  - Identify factors contributing to decreased intake, treat as appropriate  - Assist with meals as needed  - Monitor I&O, WT and lab values  - Obtain nutritional consult as needed  - Optimize oral hygiene and moisture  - Encourage food from home; allow for food preferences  - Enhance eating environment  Outcome: Progressing     Problem: GENITOURINARY - ADULT  Goal: Absence of urinary retention  Description: INTERVENTIONS:  - Assess patient’s ability to void and empty bladder  - Monitor intake/output and perform bladder scan as needed  - Follow urinary retention protocol/standard of care  - Consider collaborating with pharmacy to review patient's medication profile  - Implement strategies to promote bladder emptying  Outcome: Progressing     Problem: METABOLIC/FLUID AND ELECTROLYTES - ADULT  Goal: Glucose maintained within prescribed range  Description: INTERVENTIONS:  - Monitor Blood Glucose as ordered  - Assess for signs and symptoms of hyperglycemia and hypoglycemia  - Administer ordered medications to maintain glucose within target range  - Assess barriers to adequate nutritional intake and initiate nutrition consult as needed  - Instruct patient on self management of diabetes  Outcome: Progressing  Goal: Electrolytes maintained within normal limits  Description: INTERVENTIONS:  - Monitor labs and rhythm and assess patient for signs and symptoms of electrolyte imbalances  - Administer electrolyte replacement as ordered  - Monitor response to electrolyte replacements, including rhythm and repeat lab results as appropriate  - Fluid restriction as ordered  - Instruct patient on fluid and nutrition restrictions as appropriate  Outcome: Progressing  Goal: Hemodynamic stability and optimal renal function maintained  Description: INTERVENTIONS:  - Monitor labs and assess for signs and symptoms of volume excess or deficit  - Monitor  intake, output and patient weight  - Monitor urine specific gravity, serum osmolarity and serum sodium as indicated or ordered  - Monitor response to interventions for patient's volume status, including labs, urine output, blood pressure (other measures as available)  - Encourage oral intake as appropriate  - Instruct patient on fluid and nutrition restrictions as appropriate  Outcome: Progressing     Problem: SKIN/TISSUE INTEGRITY - ADULT  Goal: Skin integrity remains intact  Description: INTERVENTIONS  - Assess and document risk factors for pressure ulcer development  - Assess and document skin integrity  - Monitor for areas of redness and/or skin breakdown  - Initiate interventions, skin care algorithm/standards of care as needed  Outcome: Progressing  Goal: Incision(s), wounds(s) or drain site(s) healing without S/S of infection  Description: INTERVENTIONS:  - Assess and document risk factors for pressure ulcer development  - Assess and document skin integrity  - Assess and document dressing/incision, wound bed, drain sites and surrounding tissue  - Implement wound care per orders  - Initiate isolation precautions as appropriate  - Initiate Pressure Ulcer prevention bundle as indicated  Outcome: Progressing  Goal: Oral mucous membranes remain intact  Description: INTERVENTIONS  - Assess oral mucosa and hygiene practices  - Implement preventative oral hygiene regimen  - Implement oral medicated treatments as ordered  Outcome: Progressing     Problem: Impaired Swallowing  Goal: Minimize aspiration risk  Description: Interventions:  - Patient should be alert and upright for all feedings (90 degrees preferred)  - Offer food and liquids at a slow rate  - No straws  - Encourage small bites of food and small sips of liquid  - Offer pills one at a time, crush or deliver with applesauce as needed  - Discontinue feeding and notify MD (or speech pathologist) if coughing or persistent throat clearing or wet/gurgly vocal  quality is noted  Outcome: Progressing     Problem: Patient/Family Goals  Goal: Patient/Family Long Term Goal  Description: Patient's Long Term Goal: Discharge from the hospital    Interventions:  - Monitor vital signs  - Monitor appropriate labs  - Monitor blood glucose levels  - Pain management  - Administer medications per order  - Follow MD orders  - Diagnostics per order  - Update / inform patient and family on plan of care  - Discharge planning  - See additional Care Plan goals for specific interventions  Outcome: Progressing  Goal: Patient/Family Short Term Goal  Description: Patient's Short Term Goal: feel better     Interventions:   - Monitor vitals, labs, imaging   - Follow MD's orders   - Administer medications per order    - See additional Care Plan goals for specific interventions  Outcome: Progressing

## 2025-07-23 NOTE — PAYOR COMM NOTE
--------------  DISCHARGE REVIEW    Payor: MARJORIE DAVE [facility]  [place] #:  [id number]  Authorization Number: [id number]    Admit date: 25  Admit time:   3:23 AM  Discharge Date: 2025  3:43 PM     Admitting Physician: Angelina Deal MD  Attending Physician:  No att. providers found  Primary Care Physician: Yao Rodriguez          Discharge Summary Notes        Discharge Summary signed by Angelina Deal MD at 2025 12:09 PM       Author: Angelina Deal MD Specialty: Internal Medicine Author Type: Physician    Filed: 2025 12:09 PM Date of Service: 2025 12:03 PM Status: Signed    : Angelina Deal MD (Physician)         [facility]  part of [facility]    Discharge Summary    Eliud Fine Patient Status:  Inpatient    1955 MRN [id number]   Location [facility] 5SW/SE Attending Angelina Deal MD   Hosp Day # 9 PCP Yao Rodriguez                Date of Admission: 2025   Date of Discharge: 2025    Admitting Diagnosis: Acute cystitis without hematuria [N30.00]  Severe anemia [D64.9]    Disposition: Home    Discharge Diagnosis: .Principal Problem:    Acute cystitis without hematuria  Active Problems:    Severe anemia  Hypertension  Uti  CAD      Hospital Course:   Reason for Admission:  Altered mental status  Acute metabolic encephalopathy.  UTI.        Discharge Physical Exam:  Vital Signs:  Blood pressure 158/68, pulse 76, temperature 97.6 °F (36.4 °C), temperature source Axillary, resp. rate 18, height 5' 7\" (1.702 m), weight 145 lb 4.8 oz (65.9 kg), SpO2 100%.     General: No acute distress. Alert  HEENT: Moist mucous membranes. EOM-I. PERRL  Neck: No lymphadenopathy.  No JVD. No carotid bruits.  Respiratory: Clear to auscultation bilaterally.  No wheezes. No rhonchi.  Cardiovascular: S1, S2.  Regular rate and rhythm.  No murmurs. Equal pulses   Abdomen: Soft, nontender, nondistended.  Positive bowel sounds. No rebound  tenderness  Musculoskeletal: Full range of motion of all extremities.  No swelling noted.  Integument: No lesions. No erythema.  Psychiatric: Appropriate mood and affect.    Hospital Course: Eliud Fine is a(n) 70 year old female hypertension, diabetes mellitus type 2, coronary artery disease, hyperlipidemia, osteoarthritis, history of CVA, also to the emergency room by the family because they noticed her to have some change in mental status.  And patient although responding.  In the concern that patient could be having UTI.  Patient usually has a change in mental status when she has UTI.  Patient is otherwise comfortable.  Nursing staff reports no complaints.  Patient was admitted to hospital.  Patient was treated with IV antibiotics.   Patient mental status improved.  But she was having poor oral intake.  Patient also had an episode of SVT.  Overall patient has improved today.  Today the patient is doing fine.  And it was decided to discharge her.    Complications: none reported             Discharge Plan:   Discharge Condition: Good    Current Discharge Medication List        New Orders    Details   pantoprazole 40 MG Oral Tab EC Take 1 tablet (40 mg total) by mouth every morning before breakfast.      sodium hypochlorite 0.125 % External Solution Apply to affected  area  bid      furosemide (LASIX) 20 MG Oral Tab Take 1 tablet (20 mg total) by mouth daily.           Home Meds - Unchanged    Details   carvedilol 25 MG Oral Tab Take 1 tablet (25 mg total) by mouth in the morning and 1 tablet (25 mg total) in the evening. Take with meals.      losartan 50 MG Oral Tab Take 0.5 tablets (25 mg total) by mouth in the morning.      atorvastatin 40 MG Oral Tab       Brimonidine Tartrate-Timolol 0.2-0.5 % Ophthalmic Solution Apply 1 drop to eye in the morning and 1 drop before bedtime.      aspirin 81 MG Oral Chew Tab Chew 1 tablet (81 mg total) by mouth daily.                 Discharge Diet: Cardiac diet       Discharge Activity: As tolerated    Follow up:      Follow-up Information       Yao Rodriguez Schedule an appointment as soon as possible for a visit in 1 week(s).    Specialty: Physician Assistant  Contact information:  [address] [place] [address]  [phone]                             Follow up Labs:          Other Discharge Instructions:         [facility]  Phone: [phone]  Fax: [phone]    Get complete  blood count , basic metabolic panel  next week with your primary care physician.     Call MD or  come to emergency room for any fever chills chest pain shortness of breath or any new symptom        Angelina Deal MD   7/22/2025    I spent 35 minutes in discharge planning for this patient, including extensive counseling regarding the patient's condition, recommendations regarding follow-up care, discussing with any applicable consultants, and arranging follow-up as indicated.     Electronically signed by Angelina Deal MD on 7/22/2025 12:09 PM         REVIEWER COMMENTS

## 2025-07-24 ENCOUNTER — PATIENT OUTREACH (OUTPATIENT)
Dept: CASE MANAGEMENT | Age: 70
End: 2025-07-24

## 2025-07-24 NOTE — PROGRESS NOTES
Hospital Follow up for PCP (Discharge 7/22 elm)         PCP   Yao Rodriguez   [address] [place] [id number]  [phone]  Attempt #1:  Left message on voicemail for patient to call transitions specialist back to schedule follow up appointments. Provided Transitions specialist scheduling phone number [phone].

## 2025-07-25 NOTE — PROGRESS NOTES
Hospital follow up.    JERARDO Thomas  Family Medicine  [address] [place] [address]  [phone]  Office will contact the patient to schedule.    No answer on return call; left a voice message with appointment information and callback information.    Closing encounter.

## (undated) DEVICE — Device: Brand: JELCO

## (undated) DEVICE — LENS VITRTM FLT DISP

## (undated) DEVICE — APPLICATOR COTTON TIP 3 10/PK

## (undated) DEVICE — STERILE LATEX POWDER-FREE SURGICAL GLOVESWITH NITRILE COATING: Brand: PROTEXIS

## (undated) DEVICE — REVOLUTION DSP 25G ILM FORCEPS: Brand: ALCON GRIESHABER REVOLUTION

## (undated) DEVICE — SOL H2O 1000ML BTL

## (undated) DEVICE — SOLUTION IRR BSS+

## (undated) DEVICE — KIT 25+GA VITRECTOMY

## (undated) DEVICE — RETINAL: Brand: MEDLINE INDUSTRIES, INC.

## (undated) DEVICE — PACK SRG BIOM FULL DRP STRL

## (undated) DEVICE — SOLUTION IRR BTL 250CC NACL

## (undated) DEVICE — 3M™ MICROFOAM™ SURGICAL TAPE, 2 INCHES X 5 YARDS, 6 ROLLS/CARTON, 6 CARTONS/CASE, 1528-2: Brand: 3M™ MICROFOAM™

## (undated) DEVICE — GOWN SURG AERO BLUE PERF LG

## (undated) DEVICE — LIMBHOLDER RSTRNT FOAM ADLT

## (undated) DEVICE — DISP ATKINSON RETROBULBAR NDL 25G 10/BOX: Brand: DISP ATKINSON RETROBULBAR NDL 25G 10/BOX

## (undated) NOTE — LETTER
Date & Time: 5/3/2022, 11:16 AM  Patient: Ally Abrams  Encounter Provider(s):    Parth Majano MD       To Whom It May Concern:    Jaime Jimenez was seen and treated in our department on 5/3/2022. She can return to [facility].     If you have any questions or concerns, please do not hesitate to call.        _____________________________  Physician/APC Signature

## (undated) NOTE — ED AVS SNAPSHOT
Zulema Yaima   MRN: [id number]    Department:  [facility] Emergency Department   Date of Visit:  10/9/2017           Disclosure     Insurance plans vary and the physician(s) referred by the ER may not be covered by your plan.  Please contact CARE PHYSICIAN AT ONCE OR RETURN IMMEDIATELY TO THE EMERGENCY DEPARTMENT. If you have been prescribed any medication(s), please fill your prescription right away and begin taking the medication(s) as directed.   If you believe that any of the medications

## (undated) NOTE — IP AVS SNAPSHOT
[facility]            (For Outpatient Use Only) Initial Admit Date: 1/18/2022   Inpt/Obs Admit Date: Inpt: N/A / Obs: 01/18/22   Discharge Date:    Hospital Acct:  [de-identified]   MRN: [de-identified]   CSN: [id number]   CEID: [id number]        EN :    Subscriber ID:  Pt Rel to Subscriber:    Hospital Account Financial Class: Medicare Advantage    2022

## (undated) NOTE — LETTER
Nuclear Medicine Stress test    Consent for Diagnostic Services         Date of Procedure: 12/20/2022    The physician has ordered a nuclear medicine stress test to determine heart function. The information obtained will aid in detecting the possible presence of heart disease, to determine why related symptoms may be occurring, and to determine an appropriate plan of medical management. The risks associated with any exercise test or pharmacological stress test are similar to the risks associated with exercise, including an abnormal blood pressure, dizziness, fainting, abnormal heart rate and in very rare instances, heart attack, stroke, or death. During the test, every effort will be made to minimize such risks by careful observation, however any unusual feeling or symptoms experienced should be reported. Emergency equipment and trained personnel are available to assist with unusual situations, which may arise, and these personnel have permission to perform treatment. During the treadmill or nuclear stress test, the exercise intensity begins at a low level and advances in stages depending on fitness level. The test may stop at any time because of signs of fatigue, changes in heart rate, electrocardiogram, or blood pressure, or any other symptoms experienced. If a pharmacological stress test has been ordered, symptoms may include: headache, shortness of breath, flushing, warmth, rapid heart rate, or a bitter taste in the mouth. Sometimes symptoms may not be experienced. Prompt reporting of any symptoms is very important. This could include either regadenoson or dobutamine. During a regadenoson stress test, an injection of regadenoson is administered. Immediately after the regadenoson is given, there is an injection of a radioactive isotope. During a dobutamine stress test, dobutamine infuses over approximately fifteen minutes. A radioactive isotope is injected during the infusion.  After either pharmacological stress test, a nuclear scan or an echocardiogram will be performed. The information obtained during testing will be treated as privileged and confidential. The information obtained will be given to my doctor, may be used for insurance purposes or to track and trend data as part of  with privacy retained. I have read and understand the above information. I voluntarily agree and consent to the above ordered test. Furthermore, no guarantees or assurances have been given by anyone as to the results that may be obtained from this procedure. Any questions that may have occurred to me have been answered to my satisfaction.       [  ]  Patient denies pregnancy or not applicable     [  ]  Patient denies breastfeeding    Signature of Patient:   _______________________________________________________________  Responsible person in case of minor, unconscious: ________________________________________  Relationship to patient: ______________________________________________________________    Signature of Witness: _________________________________Date: ___________Time: __________      Patient Name: Prince Foley : 1955  Printed: 2022   Medical Record #: [id number]

## (undated) NOTE — LETTER
[address] [place] [address]  Authorization for Invasive Procedures  Date: ***           Time: ***    {[facility] ivs consent:06538}

## (undated) NOTE — IP AVS SNAPSHOT
Patient Demographics     Address  [place] Phone  [phone] [facility])  [phone] (Mobile) *Preferred*      Emergency Contact(s)     Name Relation Home Work Mobile    Sarah Gonzalez   [phone]      Allergies as of 1/2 daily.   Miriam Pineda MD         fluticasone-salmeterol 500-50 MCG/DOSE Aepb  Commonly known as: ADVAIR DISKUS  Next dose due: Tonight   Notes to patient: * rinse mouth & spit after use *      Inhale 1 puff into the lungs 2 (two) times daily. [id number] fluticasone furoate-vilanterol (BREO ELLIPTA) 200-25 MCG/INH inhaler 1 puff 01/25/22 0913 Given      [id number] losartan (COZAAR) tab 100 mg 01/25/22 0912 Given      [id number] metFORMIN (GLUCOPHAGE) tab 500 mg 01/25/22 0912 Given      [id number] ox Patient Status:  Observation    1955 MRN [id number]   Location [address] [place] [address] Attending Adela Perez MD   Hosp Day # 0 PCP WILLIAM Chauhan     Date:  2022  Date of Admission:  2022    History provided by:patient and cynthia tobacco: Never Used    Alcohol use: No    Drug use: No      Allergies/Medications: Allergies: No Known Allergies  oxybutynin 5 MG Oral Tab, Take 5 mg by mouth 3 (three) times daily.   Clopidogrel Bisulfate 75 MG Oral Tab, Take 1 tablet (75 mg total) by mo 01/18/2022    BUN 23 (H) 01/18/2022     01/18/2022    K 3.5 01/18/2022     01/18/2022    CO2 27.0 01/18/2022     (H) 01/18/2022    CA 9.0 01/18/2022    ALB 3.7 07/21/2017    ALKPHO 77 07/21/2017    BILT 0.5 07/21/2017    TP 7.8 07/21/201 signed by Niecy Valera MD on 1/18/2022 11:43 PM     H&P signed by Niecy Valera MD at 1/18/2022 10:46 PM  Version 1 of 2    Author: Niecy Valera MD Service: Internal Medicine Author Type: Physician    Filed: 1/18/2022 10:46 PM Date of Serv cpap   • Stroke [facility])     30 yrs ago   • Visual impairment     left eye edema     Past Surgical History:   Procedure Laterality Date   • CATH BARE METAL STENT (BMS)     • COLONOSCOPY     • HYSTERECTOMY     • TOTAL ABDOM HYSTERECTOMY     • TUBAL LIGATION Regular rate and rhythm. No murmurs. Equal pulses   Abdomen: Soft, nontender, nondistended. Positive bowel sounds. No rebound tenderness  Neurologic: L sided facial droop, RUE weakness  Musculoskeletal: Full range of motion of all extremities.   No swelli prn  adat    Code status: DNI  POA: Gokul son  Dispo: pending neurology and SW    Certification      PHYSICIAN Certification of Need for Inpatient Hospitalization - Initial Certification    Patient will require inpatient services that will reasonably be ex SLP following for dysphagia, dysarthria, and aphasia.   She is on a mechanical soft chopped thin liquid diet, no straws, swallow strategies    Current functional status  Bed mobility: Contact guard assist  Transfers: Contact guard assist  Gait Assistance: YASMEEN **OR** glucose **OR** glucose-vitamin C    No Known Allergies     Family History   Problem Relation Age of Onset   • Cancer Father    • Diabetes Mother         Social History    Tobacco Use      Smoking status: Former Smoker        Years: 4.00        Quit sitting in recliner  HEENT: Decreased right nasolabial fold, tongue midline, hearing intact to voice  Cardiovascular: warm, well perfused, RRR  Pulm: breathing comfortably, no respiratory distress, no coughing  GI: abdomen soft, non distended   Skin: no ra other levels of rehab care to manage concomitant medical issues, maintain medical stability and prevent complications, assure adequate pain control, rehabilitation team coordination for an efficient program and optimal outcomeRisks if patient is transferre Physician    Filed: 1/24/2022  1:19 PM Date of Service: 1/24/2022  1:16 PM Status: Signed    : Toni Reveles MD (Physician)    [facility], [facility]    Discharge Summary    [facility] Patient Statu transfer to [facility] but unable to go to facility due to covid positive result. Patient unable to provide history due to stroke, spoke to daughter, son. Patient went upstairs and looked weak and family brought her in. Denies any specific symptoms.  Celestina Sam by mouth daily. fluticasone-salmeterol 500-50 MCG/DOSE Inhalation Aerosol Powder, Breath Activated  Inhale 1 puff into the lungs 2 (two) times daily. brimonidine Tartrate 0.2 % Ophthalmic Solution  Place 1 drop into both eyes 2 (two) times daily. mobilization. Did require Mod A for initial sit > stand due to RLE extended in front of her and instability. She has difficulties with motor planning and tends to perseverate on tasks. Needs frequent cues for sequencing of tasks and for safety.  Trisha adamson from a chair with arms (e.g., wheelchair, bedside commode, etc.): A Lot   Patient Difficulty: Moving from lying on back to sitting on the side of the bed?: A Little   How much help from another person does the patient currently need. ..    Help from Another: at 1/24/2022 12:49 PM  Version 1 of 2    Author: Nj Landers PT Service: Physical Medicine and Rehabilitation Author Type: Physical Therapist    Filed: 1/24/2022 12:49 PM Date of Service: 1/24/2022 12:45 PM Status: Signed    : Nj Landers patients with this level of impairment may benefit from Acute Rehab. DISCHARGE RECOMMENDATIONS  PT Discharge Recommendations: Acute rehabilitation     PLAN  PT Treatment Plan: Bed mobility; Body mechanics; Endurance; Energy conservation;Patient education;F in chair;Needs met;Call light within reach;RN aware of session/findings; All patient questions and concerns addressed; Alarm set    CURRENT GOALS   Goals to be met by: 1/31/22  Patient Goal Patient's self-stated goal is: to go home   Goal #1 Patient is able Upon arrival, pt was supine in bed and agreeable to activity. No family was present during session.  During this session, pt limited by decreased attention to right side, perseveration with ADLs and motor tasks, decreased insight into deficits, word finding regular lower body clothing?: A Lot  -   Bathing (including washing, rinsing, drying)?: A Lot  -   Toileting, which includes using toilet, bedpan or urinal? : A Lot  -   Putting on and taking off regular upper body clothing?: A Lot  -   Taking care of pers ASSESSMENT    PT IS COVID 19 +. PPE REQUIRED. THIS SLP WORE GLOVES, GOWN, FACE SHIELD AND DROPLET MASK OVER N95 MASK. HANDS SANITIZED/WASHED UPON ENTRANCE/EXIT.     Pt seen for both speech and swallowing:    Swallowing:  Pt alert, afebrile and on room air precautions  Interdisciplinary Communication: Discussed with RN    GOALS  Goal #1 The patient will tolerate Soft and Bite size Diet consistency and THIN liquids without overt signs or symptoms of aspiration with 90 % accuracy over 1-2 session(s).     No CSA sentences with max (semantic and phonemic) cues with 80 % accuracy within 2 session(s).        Naming  Common objects 80% min cues  Sentence completion 80% with min cues  Progressing   Goal #5 The patient will complete oral motor exercises with 90% accuracy

## (undated) NOTE — LETTER
24          Eliud Fine  :  1955      To Whom It May Concern:    This patient was seen in our office on 24. Her daughter Nick Fine accompanied her today as her caregiver. Please excuse her from work today from24 to 24    If this office may be of further assistance, please do not hesitate to contact us.      Sincerely,        Theresa Gil MD

## (undated) NOTE — LETTER
[address] [place] [address]  Authorization for Invasive Procedures  Date: ***           Time: ***    {[facility] ivs consent:53161}